# Patient Record
Sex: FEMALE | Race: BLACK OR AFRICAN AMERICAN | NOT HISPANIC OR LATINO | Employment: UNEMPLOYED | URBAN - METROPOLITAN AREA
[De-identification: names, ages, dates, MRNs, and addresses within clinical notes are randomized per-mention and may not be internally consistent; named-entity substitution may affect disease eponyms.]

---

## 2017-02-16 ENCOUNTER — TELEPHONE (OUTPATIENT)
Dept: BEHAVIORAL HEALTH | Facility: CLINIC | Age: 37
End: 2017-02-16

## 2017-02-16 ENCOUNTER — HOSPITAL ENCOUNTER (INPATIENT)
Facility: CLINIC | Age: 37
LOS: 1 days | Discharge: HOME | DRG: 885 | End: 2017-02-17
Attending: PSYCHIATRY & NEUROLOGY | Admitting: PSYCHIATRY & NEUROLOGY
Payer: COMMERCIAL

## 2017-02-16 DIAGNOSIS — F25.1 SCHIZOAFFECTIVE DISORDER, DEPRESSIVE TYPE (H): Primary | ICD-10-CM

## 2017-02-16 DIAGNOSIS — R45.851 SUICIDAL IDEATION: ICD-10-CM

## 2017-02-16 LAB
AMPHETAMINES UR QL SCN: NORMAL
BARBITURATES UR QL: NORMAL
BENZODIAZ UR QL: NORMAL
CANNABINOIDS UR QL SCN: NORMAL
COCAINE UR QL: NORMAL
ETHANOL UR QL SCN: NORMAL
HCG UR QL: NEGATIVE
OPIATES UR QL SCN: NORMAL

## 2017-02-16 PROCEDURE — 80320 DRUG SCREEN QUANTALCOHOLS: CPT | Performed by: FAMILY MEDICINE

## 2017-02-16 PROCEDURE — 99285 EMERGENCY DEPT VISIT HI MDM: CPT | Performed by: PSYCHIATRY & NEUROLOGY

## 2017-02-16 PROCEDURE — 81025 URINE PREGNANCY TEST: CPT | Performed by: FAMILY MEDICINE

## 2017-02-16 PROCEDURE — 12400001 ZZH R&B MH UMMC

## 2017-02-16 PROCEDURE — 80307 DRUG TEST PRSMV CHEM ANLYZR: CPT | Performed by: FAMILY MEDICINE

## 2017-02-16 PROCEDURE — 99285 EMERGENCY DEPT VISIT HI MDM: CPT | Mod: Z6 | Performed by: PSYCHIATRY & NEUROLOGY

## 2017-02-16 RX ORDER — OLANZAPINE 10 MG/1
10 TABLET ORAL
Status: DISCONTINUED | OUTPATIENT
Start: 2017-02-16 | End: 2017-02-17 | Stop reason: HOSPADM

## 2017-02-16 RX ORDER — ALUMINA, MAGNESIA, AND SIMETHICONE 2400; 2400; 240 MG/30ML; MG/30ML; MG/30ML
30 SUSPENSION ORAL EVERY 4 HOURS PRN
Status: DISCONTINUED | OUTPATIENT
Start: 2017-02-16 | End: 2017-02-17 | Stop reason: HOSPADM

## 2017-02-16 RX ORDER — HYDROXYZINE HYDROCHLORIDE 25 MG/1
25-50 TABLET, FILM COATED ORAL EVERY 4 HOURS PRN
Status: DISCONTINUED | OUTPATIENT
Start: 2017-02-16 | End: 2017-02-17 | Stop reason: HOSPADM

## 2017-02-16 RX ORDER — OLANZAPINE 10 MG/2ML
10 INJECTION, POWDER, FOR SOLUTION INTRAMUSCULAR
Status: DISCONTINUED | OUTPATIENT
Start: 2017-02-16 | End: 2017-02-17 | Stop reason: HOSPADM

## 2017-02-16 RX ORDER — IBUPROFEN 600 MG/1
600 TABLET, FILM COATED ORAL EVERY 6 HOURS PRN
Status: DISCONTINUED | OUTPATIENT
Start: 2017-02-16 | End: 2017-02-17 | Stop reason: HOSPADM

## 2017-02-16 RX ORDER — DIPHENHYDRAMINE HCL 25 MG
25-50 CAPSULE ORAL DAILY PRN
Status: DISCONTINUED | OUTPATIENT
Start: 2017-02-16 | End: 2017-02-17 | Stop reason: HOSPADM

## 2017-02-16 RX ORDER — ACETAMINOPHEN 325 MG/1
650 TABLET ORAL EVERY 4 HOURS PRN
Status: DISCONTINUED | OUTPATIENT
Start: 2017-02-16 | End: 2017-02-17 | Stop reason: HOSPADM

## 2017-02-16 RX ORDER — TRAZODONE HYDROCHLORIDE 50 MG/1
50 TABLET, FILM COATED ORAL
Status: DISCONTINUED | OUTPATIENT
Start: 2017-02-16 | End: 2017-02-17 | Stop reason: HOSPADM

## 2017-02-16 RX ORDER — BISACODYL 10 MG
10 SUPPOSITORY, RECTAL RECTAL DAILY PRN
Status: DISCONTINUED | OUTPATIENT
Start: 2017-02-16 | End: 2017-02-17 | Stop reason: HOSPADM

## 2017-02-16 ASSESSMENT — ENCOUNTER SYMPTOMS
HALLUCINATIONS: 1
SLEEP DISTURBANCE: 1
RESPIRATORY NEGATIVE: 1
DECREASED CONCENTRATION: 1
ENDOCRINE NEGATIVE: 1
CARDIOVASCULAR NEGATIVE: 1
DYSPHORIC MOOD: 0
MUSCULOSKELETAL NEGATIVE: 1
HYPERACTIVE: 0
GASTROINTESTINAL NEGATIVE: 1
NERVOUS/ANXIOUS: 1
EYES NEGATIVE: 1
CONSTITUTIONAL NEGATIVE: 1
NEUROLOGICAL NEGATIVE: 1

## 2017-02-16 ASSESSMENT — ACTIVITIES OF DAILY LIVING (ADL)
TOILETING: 0-->INDEPENDENT
DRESS: 0-->INDEPENDENT
SWALLOWING: 0-->SWALLOWS FOODS/LIQUIDS WITHOUT DIFFICULTY
RETIRED_EATING: 0-->INDEPENDENT
COGNITION: 0 - NO COGNITION ISSUES REPORTED
FALL_HISTORY_WITHIN_LAST_SIX_MONTHS: NO
BATHING: 0-->INDEPENDENT
TRANSFERRING: 0-->INDEPENDENT
RETIRED_COMMUNICATION: 0-->UNDERSTANDS/COMMUNICATES WITHOUT DIFFICULTY
AMBULATION: 0-->INDEPENDENT

## 2017-02-16 NOTE — PHARMACY-ADMISSION MEDICATION HISTORY
Admission Medication History status for the 2/16/2017 admission is complete.  See EPIC admission navigator for Prior to Admission medications.    Medication history sources: Patient     Medication history source reliability: Good; patient knew her medications well. Confirmed paliperidone injection with ACT team since dose seemed higher than expected based on oral paliperidone dose when discharged 11/2016. ACT team confirmed she had been on 156 mg, but had been increased to 234 mg one to two months ago.    Medication adherence:  Good; patient reports she takes her medications regularly    Changes made to PTA medication list (reason)  Added:   - Diphenhydramine 25-50 mg po daily PRN EPS per patient  Deleted:   - Ferrous sulfate; patient reports she stopped taking  Changed:   - Paliperidone Palmitate 234 mg IM q28 days per patient and ACT team (updated dose and frequency). Patient last received 2/6/2016.    Additional medication history information (including reliability of information, actions taken by pharmacist): None    Time spent in this activity: 20 minutes    Medication history completed by: Andie James PharmD    Prior to Admission medications    Medication Sig Last Dose Taking? Auth Provider   Paliperidone Palmitate (INVEGA SUSTENNA IM) Inject 234 mg into the muscle every 28 days  2/6/2017 Yes Reported, Patient   FLUoxetine HCl (PROZAC PO) Take 20 mg by mouth daily 2/15/2017 Yes Reported, Patient   DiphenhydrAMINE HCl (BENADRYL PO) Take 25 mg by mouth daily as needed (EPS) 2/15/2017 Yes Unknown, Entered By History   Prenatal Vit-Fe Fumarate-FA (PRENATAL MULTIVITAMIN  PLUS IRON) 27-0.8 MG TABS per tablet Take 1 tablet by mouth daily 2/15/2017 Yes Ashok Perry MD   Cholecalciferol (VITAMIN D) 2000 UNITS tablet Take 2,000 Units by mouth daily 2/15/2017 Yes Rossana Lomeli MD   ibuprofen (ADVIL,MOTRIN) 600 MG tablet Take 1 tablet (600 mg) by mouth every 6 hours as needed for moderate pain  Yes  Patricia Mcduffie MD

## 2017-02-16 NOTE — PROGRESS NOTES
BELONGING:    In locker   [place] wic program vendors   Phone LG  Wic packaged in zip lock bag   Wallet   [facility] health card program (5)  Medica card (7)  Smilesafe (5)                  ADMISSION:  I am responsible for any personal items that are not sent to the safe or pharmacy. [facility] is not responsible for loss, theft or damage of any property in my possession.    Patient Signature _____________________ Date/Time _____________________    Staff Signature _______________________ Date/Time _____________________    2nd Staff person, if patient is unable/unwilling to sign  ___________________________________ Date/Time _____________________    DISCHARGE:  My personal items have been returned to me.   Patient Signature _____________________ Date/Time _____________________

## 2017-02-16 NOTE — IP AVS SNAPSHOT
MRN:[id number]                      After Visit Summary   2/16/2017    Nona Finley    MRN: [id number]           Thank you!     Thank you for choosing [facility] for your care. Our goal is always to provide you with excellent care.        Patient Information     Date Of Birth          1980        About your hospital stay     You were admitted on:  February 16, 2017 You last received care in the:  [facility]    You were discharged on:  February 17, 2017       Who to Call     For medical emergencies, please call 911.  For non-urgent questions about your medical care, please call your primary care provider or clinic, [phone]          Attending Provider     Provider Specialty    Serg Chauhan MD Psychiatry    Olga Nazario MD Psychiatry    Pavey, Nathan Watson MD Psychiatry       Primary Care Provider Office Phone # Fax #    Vanessa Thompson [phone]       [facility] [address] [place] [address]        Further instructions from your care team       Behavioral Discharge Planning and Instructions      Summary:  You were admitted on 2/16/2017 for anxiety and auditory ahllucinations telling you to kill yourself.  You were treated by Dr. Nathan Block MD and discharged on 02/17/2017 from [facility].    Main Diagnosis:  Schizoaffective Disorder     Health Care Follow-up Appointments:   No appointments were set up due to a quick discharge. You will return to the care of your ACT team.    Attend all scheduled appointments with your outpatient providers. Call at least 24 hours in advance if you need to reschedule an appointment to ensure continued access to your outpatient providers.   Major Treatments, Procedures and Findings:  You were provided with: a psychiatric assessment, medication evaluation and/or management and group therapy    Symptoms to Report: feeling more aggressive, increased confusion, losing more sleep, mood getting worse or thoughts of  "suicide    Early warning signs can include: increased depression or anxiety sleep disturbances increased thoughts or behaviors of suicide or self-harm  increased unusual thinking, such as paranoia or hearing voices    Safety and Wellness:  Take all medicines as directed.  Make no changes unless your doctor suggests them. Follow treatment recommendations.  Refrain from alcohol and non-prescribed drugs.  If there is a concern for safety, call 911.    Resources:   Crisis Intervention: 639.467.8145 or 999-846-1811 (TTY: 172.102.1896).  Call anytime for help.  National [place] on Mental Illness (www.mn.doron.org): 385.852.7777 or 341-885-2628.  Suicide Awareness Voices of Education (SAVE) (www.save.org): 860-176-NJJH (4001)  National Suicide Prevention Line (www.mentalhealthmn.org): 494-570-NGWM (0163)  [facility] Crisis (COPE) Response - Adult [phone]  [facility] Crisis Team - Child: [phone]    The treatment team has appreciated the opportunity to work with you. If you have any questions or concerns our unit number is [phone]. You may be receiving a follow-up phone call within the next three days from a representative from behavioral health.  You have identified the best phone number to reach you as [phone].        Pending Results     No orders found for last 3 day(s).            Statement of Approval     Ordered          02/17/17 1331  I have reviewed and agree with all the recommendations and orders detailed in this document.  EFFECTIVE NOW     Approved and electronically signed by:  Nathan Block MD             Admission Information     Date & Time Provider Department Dept. Phone    2/16/2017 Nathan Block MD [facility] [phone]      Your Vitals Were     Blood Pressure Pulse Temperature Respirations Height Weight    116/69 75 97.5  F (36.4  C) 16 1.676 m (5' 6\") 59.9 kg (132 lb)    Last Period Pulse Oximetry BMI (Body Mass Index)             12/16/2016 " 98% 21.31 kg/m2         Statusly Information     Status[facility] gives you secure access to your electronic health record. If you see a primary care provider, you can also send messages to your care team and make appointments. If you have questions, please call your primary care clinic.  If you do not have a primary care provider, please call [phone] and they will assist you.        Care EveryWhere ID     This is your Care EveryWhere ID. This could be used by other organizations to access your [facility] medical records  [id number]           Review of your medicines      START taking        Dose / Directions    OLANZapine 5 MG tablet   Commonly known as:  zyPREXA   Used for:  Schizoaffective disorder, depressive type (H)        Dose:  5 mg   Take 1 tablet (5 mg) by mouth 2 times daily as needed   Quantity:  30 tablet   Refills:  1         CONTINUE these medicines which have NOT CHANGED        Dose / Directions    BENADRYL PO        Dose:  25-50 mg   Take 25-50 mg by mouth daily as needed (EPS)   Refills:  0       ibuprofen 600 MG tablet   Commonly known as:  ADVIL/MOTRIN   Used for:  Delivery normal        Dose:  600 mg   Take 1 tablet (600 mg) by mouth every 6 hours as needed for moderate pain   Quantity:  60 tablet   Refills:  0       INVEGA SUSTENNA IM        Dose:  234 mg   Inject 234 mg into the muscle every 28 days   Refills:  0       prenatal multivitamin  plus iron 27-0.8 MG Tabs per tablet   Used for:  On Depo-Provera for contraception        Dose:  1 tablet   Take 1 tablet by mouth daily   Quantity:  100 tablet   Refills:  3       PROZAC PO        Dose:  20 mg   Take 20 mg by mouth daily   Refills:  0       vitamin D 2000 UNITS tablet   Used for:  Lactating mother        Dose:  2000 Units   Take 2,000 Units by mouth daily   Quantity:  100 tablet   Refills:  3            Where to get your medicines      These medications were sent to [facility] [place], [place] - [address]  [address]  [facility] [address], [facility] [address]     Phone:  [phone]     OLANZapine 5 MG tablet                Protect others around you: Learn how to safely use, store and throw away your medicines at www.disposemymeds.org.             Medication List: This is a list of all your medications and when to take them. Check marks below indicate your daily home schedule. Keep this list as a reference.      Medications           Morning Afternoon Evening Bedtime As Needed    BENADRYL PO   Take 25-50 mg by mouth daily as needed (EPS)                                ibuprofen 600 MG tablet   Commonly known as:  ADVIL/MOTRIN   Take 1 tablet (600 mg) by mouth every 6 hours as needed for moderate pain                                INVEGA SUSTENNA IM   Inject 234 mg into the muscle every 28 days                                OLANZapine 5 MG tablet   Commonly known as:  zyPREXA   Take 1 tablet (5 mg) by mouth 2 times daily as needed                                prenatal multivitamin  plus iron 27-0.8 MG Tabs per tablet   Take 1 tablet by mouth daily                                PROZAC PO   Take 20 mg by mouth daily   Last time this was given:  20 mg on 2/17/2017  8:35 AM                                vitamin D 2000 UNITS tablet   Take 2,000 Units by mouth daily   Last time this was given:  2,000 Units on 2/17/2017  8:35 AM

## 2017-02-16 NOTE — IP AVS SNAPSHOT
30    [address]    [place] [address]    Phone:  [phone]                                       After Visit Summary   2/16/2017    Nona Finley    MRN: [id number]           After Visit Summary Signature Page     I have received my discharge instructions, and my questions have been answered. I have discussed any challenges I see with this plan with the nurse or doctor.    ..........................................................................................................................................  Patient/Patient Representative Signature      ..........................................................................................................................................  Patient Representative Print Name and Relationship to Patient    ..................................................               ................................................  Date                                            Time    ..........................................................................................................................................  Reviewed by Signature/Title    ...................................................              ..............................................  Date                                                            Time

## 2017-02-16 NOTE — TELEPHONE ENCOUNTER
"S: Per Karrie in [facility] see Dr. Chauhan's ED note for need for admit  B: Tien's notes reason for admit: Suicidal.  called 911 after patient had taken off clothes and was running around, per PD. told PD she wanted to jump in the river. Just had baby 5 months ago. Per patient \"I called 911 telling them I was going to kill myself\". SI since \"waking up\". This has happened before and \"I don't know what happens, last time right after baby born and I tried to walk in the river\". Nona Finley is a 36 year old female who is here sent in from her ACT team. Patient has schizoaffective disorder. She had decompensated with post-partum onset. She was last hospitalized 4 months ago when her son was born. She currently receives an Invega Sustenna injection and had one on 2/6/17. Patient reports missing her period last month. She wonders if she could be pregnant. She woke up this morning at 2 AM with command voices to go kill herself. She was planning to walking/jumping into the river. She denies drug abuse. She has no acute medical concerns. She is voluntary for admission. Per pt's nurse Rossana reports pt is cooperative and pleasant. Pt is K[place]gy[place], speaks fluent English, no interpretor needed.  A: Depressed, anxious, SI, blunt affect, command hallucinations to jump into the river, impulsive, needing admit for safety and stabilization.  R: Admit to 30 under Magno Nazario accepted.  "

## 2017-02-16 NOTE — TELEPHONE ENCOUNTER
"S: Per Karrie in [facility] see Dr. Chauhan's ED note for need for admit  B: Tien's notes reason for admit: Suicidal.  called 911 after patient had taken off clothes and was running around, per PD. told PD she wanted to jump in the river. Just had baby 5 months ago. Per patient \"I called 911 telling them I was going to kill myself\". SI since \"waking up\". This has happened before and \"I don't know what happens, last time right after baby born and I tried to walk in the river\". Nona Finley is a 36 year old female who is here sent in from her ACT team. Patient has schizoaffective disorder. She had decompensated with post-partum onset. She was last hospitalized 4 months ago when her son was born. She currently receives an Invega Sustenna injection and had one on 2/6/17. Patient reports missing her period last month. She wonders if she could be pregnant. She woke up this morning at 2 AM with command voices to go kill herself. She was planning to walking/jumping into the river. She denies drug abuse. She has no acute medical concerns. She is voluntary for admission. Per pt's nurse Rossana reports pt is cooperative and pleasant. Pt is [place]an, speaks fluent English, no interpretor needed.  A: Depressed, anxious, SI, blunt affect, command hallucinations to jump into the river, impulsive, needing admit for safety and stabilization.  R:   "

## 2017-02-16 NOTE — ED PROVIDER NOTES
"  History     Chief Complaint   Patient presents with     Suicidal      called 911 after patient had taken off clothes and was running around, per PD. told PD she wanted to jump in the river. Just had baby 5 months ago. Per patient \"I called 911 telling them I was going to kill myself\". SI since \"waking up\". This has happened before and \"I don't know what happens, last time right after baby born and I tried to walk in the river\".      The history is provided by the patient and medical records.     Nona Finley is a 36 year old female who is here sent in from her ACT team. Patient has schizoaffective disorder. She had decompensated with post-partum onset. She was last hospitalized 4 months ago when her son was born. She currently receives an Invega Sustenna injection and had one on 2/6/17. Patient reports missing her period last month. She wonders if she could be pregnant. She woke up this morning at 2 AM with command voices to go kill herself. She was planning to walking/jumping into the river. She denies drug abuse. She has no acute medical concerns. She is voluntary for admission.    I have reviewed the Medications, Allergies, Past Medical and Surgical History, and Social History in the Epic system.    Review of Systems   Constitutional: Negative.    HENT: Negative.    Eyes: Negative.    Respiratory: Negative.    Cardiovascular: Negative.    Gastrointestinal: Negative.    Endocrine: Negative.    Genitourinary: Negative.    Musculoskeletal: Negative.    Skin: Negative.    Neurological: Negative.    Psychiatric/Behavioral: Positive for decreased concentration, hallucinations, sleep disturbance and suicidal ideas. Negative for dysphoric mood. The patient is nervous/anxious. The patient is not hyperactive.    All other systems reviewed and are negative.      Physical Exam   BP: 110/72  Heart Rate: 74  Temp: 97.1  F (36.2  C)  Resp: 16  SpO2: 100 %  Physical Exam   Constitutional: She appears " well-developed and well-nourished.   HENT:   Head: Normocephalic.   Eyes: Pupils are equal, round, and reactive to light.   Neck: Normal range of motion.   Cardiovascular: Normal rate.    Pulmonary/Chest: Effort normal.   Abdominal: Soft.   Musculoskeletal: Normal range of motion.   Neurological: She is alert.   Skin: Skin is warm.   Psychiatric: Her speech is normal and behavior is normal. Her mood appears anxious. Her affect is blunt. She is not agitated, not aggressive, not hyperactive and not combative. Cognition and memory are normal. She expresses impulsivity. She exhibits a depressed mood. She expresses suicidal ideation. She expresses suicidal plans.   Nursing note and vitals reviewed.      ED Course     ED Course     Procedures    Labs Ordered and Resulted from Time of ED Arrival Up to the Time of Departure from the ED - No data to display    Assessments & Plan (with Medical Decision Making)   Patient with schizoaffective disorder who appears to have decompensated and is having command hallucinations to jump into the river. Patient will be referred for admission.    I have reviewed the nursing notes.    I have reviewed the findings, diagnosis, plan and need for follow up with the patient.    New Prescriptions    No medications on file       Final diagnoses:   Schizoaffective disorder, depressive type (H)   Suicidal ideation       2/16/2017   [facility], [place], EMERGENCY DEPARTMENT     Serg Chauhan MD  02/16/17 4085

## 2017-02-16 NOTE — ED NOTES
"States she has a \"mood disturbance\"  Reports seeing things.  Currently seeing crocodiles.  Feels there are drugs in her food and medicine.  Requested food.    "

## 2017-02-17 VITALS
SYSTOLIC BLOOD PRESSURE: 116 MMHG | TEMPERATURE: 97.5 F | RESPIRATION RATE: 16 BRPM | HEART RATE: 75 BPM | BODY MASS INDEX: 21.21 KG/M2 | WEIGHT: 132 LBS | DIASTOLIC BLOOD PRESSURE: 69 MMHG | HEIGHT: 66 IN | OXYGEN SATURATION: 98 %

## 2017-02-17 LAB
ANION GAP SERPL CALCULATED.3IONS-SCNC: 8 MMOL/L (ref 3–14)
BASOPHILS # BLD AUTO: 0 10E9/L (ref 0–0.2)
BASOPHILS NFR BLD AUTO: 0.7 %
BUN SERPL-MCNC: 21 MG/DL (ref 7–30)
CALCIUM SERPL-MCNC: 8.9 MG/DL (ref 8.5–10.1)
CHLORIDE SERPL-SCNC: 108 MMOL/L (ref 94–109)
CO2 SERPL-SCNC: 24 MMOL/L (ref 20–32)
CREAT SERPL-MCNC: 0.54 MG/DL (ref 0.52–1.04)
DIFFERENTIAL METHOD BLD: NORMAL
EOSINOPHIL # BLD AUTO: 0.1 10E9/L (ref 0–0.7)
EOSINOPHIL NFR BLD AUTO: 1.4 %
ERYTHROCYTE [DISTWIDTH] IN BLOOD BY AUTOMATED COUNT: 13.3 % (ref 10–15)
GFR SERPL CREATININE-BSD FRML MDRD: NORMAL ML/MIN/1.7M2
GLUCOSE SERPL-MCNC: 83 MG/DL (ref 70–99)
HCT VFR BLD AUTO: 35.3 % (ref 35–47)
HGB BLD-MCNC: 12.1 G/DL (ref 11.7–15.7)
IMM GRANULOCYTES # BLD: 0 10E9/L (ref 0–0.4)
IMM GRANULOCYTES NFR BLD: 0.2 %
LYMPHOCYTES # BLD AUTO: 1.4 10E9/L (ref 0.8–5.3)
LYMPHOCYTES NFR BLD AUTO: 32.8 %
MCH RBC QN AUTO: 30.6 PG (ref 26.5–33)
MCHC RBC AUTO-ENTMCNC: 34.3 G/DL (ref 31.5–36.5)
MCV RBC AUTO: 89 FL (ref 78–100)
MONOCYTES # BLD AUTO: 0.4 10E9/L (ref 0–1.3)
MONOCYTES NFR BLD AUTO: 9.2 %
NEUTROPHILS # BLD AUTO: 2.4 10E9/L (ref 1.6–8.3)
NEUTROPHILS NFR BLD AUTO: 55.7 %
NRBC # BLD AUTO: 0 10*3/UL
NRBC BLD AUTO-RTO: 0 /100
PLATELET # BLD AUTO: 289 10E9/L (ref 150–450)
POTASSIUM SERPL-SCNC: 4.1 MMOL/L (ref 3.4–5.3)
RBC # BLD AUTO: 3.95 10E12/L (ref 3.8–5.2)
SODIUM SERPL-SCNC: 140 MMOL/L (ref 133–144)
WBC # BLD AUTO: 4.3 10E9/L (ref 4–11)

## 2017-02-17 PROCEDURE — 85025 COMPLETE CBC W/AUTO DIFF WBC: CPT | Performed by: PSYCHIATRY & NEUROLOGY

## 2017-02-17 PROCEDURE — 25000132 ZZH RX MED GY IP 250 OP 250 PS 637: Performed by: PSYCHIATRY & NEUROLOGY

## 2017-02-17 PROCEDURE — 36415 COLL VENOUS BLD VENIPUNCTURE: CPT | Performed by: PSYCHIATRY & NEUROLOGY

## 2017-02-17 PROCEDURE — 80048 BASIC METABOLIC PNL TOTAL CA: CPT | Performed by: PSYCHIATRY & NEUROLOGY

## 2017-02-17 PROCEDURE — H2032 ACTIVITY THERAPY, PER 15 MIN: HCPCS

## 2017-02-17 PROCEDURE — 99235 HOSP IP/OBS SAME DATE MOD 70: CPT | Performed by: PSYCHIATRY & NEUROLOGY

## 2017-02-17 RX ORDER — OLANZAPINE 5 MG/1
5 TABLET ORAL 2 TIMES DAILY PRN
Qty: 30 TABLET | Refills: 1 | Status: ON HOLD | OUTPATIENT
Start: 2017-02-17 | End: 2017-04-14

## 2017-02-17 RX ADMIN — VITAMIN D, TAB 1000IU (100/BT) 2000 UNITS: 25 TAB at 08:35

## 2017-02-17 RX ADMIN — FLUOXETINE 20 MG: 20 CAPSULE ORAL at 08:35

## 2017-02-17 NOTE — PLAN OF CARE
Problem: Depressive Symptoms  Goal: Depressive Symptoms  Signs and symptoms of listed problems will be absent or manageable.   Outcome: Adequate for Discharge Date Met:  02/17/17  Patient denies hallucinations and suicidal ideation. Patient states that she feels ready to go home. Patient misses his family, and all her children. Patient is attentive to her discharge plan and understands the use of her prn medication, Zyprexa. Patient is calling to have transport home.

## 2017-02-17 NOTE — PLAN OF CARE
Problem: Depressive Symptoms  Goal: Depressive Symptoms  Signs and symptoms of listed problems will be absent or manageable.  35 yo female admitted on a voluntary basis. She was having auditory hallucinations to kill herself. She was sleeping when I approached her for the interview. She was polite and cooperative. She states she will be better if she takes her medications and she does enjoy taking care of her children. She had good eye contact and insight.

## 2017-02-17 NOTE — DISCHARGE INSTRUCTIONS
Behavioral Discharge Planning and Instructions      Summary:  You were admitted on 2/16/2017 for anxiety and auditory ahllucinations telling you to kill yourself.  You were treated by Dr. Nathan Block MD and discharged on 02/17/2017 from [facility].    Main Diagnosis:  Schizoaffective Disorder     Health Care Follow-up Appointments:   No appointments were set up due to a quick discharge. You will return to the care of your ACT team.    Attend all scheduled appointments with your outpatient providers. Call at least 24 hours in advance if you need to reschedule an appointment to ensure continued access to your outpatient providers.   Major Treatments, Procedures and Findings:  You were provided with: a psychiatric assessment, medication evaluation and/or management and group therapy    Symptoms to Report: feeling more aggressive, increased confusion, losing more sleep, mood getting worse or thoughts of suicide    Early warning signs can include: increased depression or anxiety sleep disturbances increased thoughts or behaviors of suicide or self-harm  increased unusual thinking, such as paranoia or hearing voices    Safety and Wellness:  Take all medicines as directed.  Make no changes unless your doctor suggests them. Follow treatment recommendations.  Refrain from alcohol and non-prescribed drugs.  If there is a concern for safety, call 911.    Resources:   Crisis Intervention: 766.936.4293 or 614-614-4647 (TTY: 361.199.9113).  Call anytime for help.  National [place] on Mental Illness (www.mn.doron.org): 357.326.9503 or 360-913-9130.  Suicide Awareness Voices of Education (SAVE) (www.save.org): 204-467-DVTC (1083)  National Suicide Prevention Line (www.mentalhealthmn.org): 801-811-QEEL (8054)  [facility] Crisis (COPE) Response - Adult [phone]  [facility] Crisis Team - Child: [phone]    The treatment team has appreciated the opportunity to work with you. If you have any  questions or concerns our unit number is [phone]. You may be receiving a follow-up phone call within the next three days from a representative from behavioral health.  You have identified the best phone number to reach you as [phone].

## 2017-02-17 NOTE — PLAN OF CARE
Problem: General Plan of Care (Inpatient Behavioral)  Goal: Team Discussion  Team Plan:   BEHAVIORAL TEAM DISCUSSION     Continued Stay Criteria/Rationale:New pt admitted for auditory halluciantions telling her to kill herself  Plan: Provide a safe environment and therapeutic milieu. Encourage participation in unit programming. Develop appropriate aftercare plan.  Participants: Dr. Nathan Block MD, Geovanna Hyman [facility]  Summary/Recommendation: Pt is requesting discharge, stating her symptoms have ceased  Medical/Physical: defer to medical  Progress: Improved. Pt will discharge today.

## 2017-02-17 NOTE — PROGRESS NOTES
BELONGING:    In locker   [place] wic program vendors   Phone LG  Wic package in zip lock bag  Wallet   Med[employer] card (7)  [place] health card program   Smilesafe card (5)   [employer] card   $2 bill  Bettina Tara (2)  Medela lotion  Lipgloss   Key   Small clear Eyebrow sharpener   $12.94 in change   $1.25 in change (foreign money)  Necklaces  Small locker key   White phone    Black and gold scarf   Black and white dress   Black medium length coat   Black  boats     Security   [place] 's license (5)  [facility] card (2)  [place] 's license     ADMISSION:  I am responsible for any personal items that are not sent to the safe or pharmacy. [facility] is not responsible for loss, theft or damage of any property in my possession.    Patient Signature _____________________ Date/Time _____________________    Staff Signature _______________________ Date/Time _____________________    2nd Staff person, if patient is unable/unwilling to sign  ___________________________________ Date/Time _____________________    DISCHARGE:  My personal items have been returned to me.   Patient Signature _____________________ Date/Time _____________________

## 2017-02-17 NOTE — PLAN OF CARE
Problem: Depressive Symptoms  Intervention: Social and Therapeutic Interv (Depressive Symptoms)        Occupational Therapy:  Pt has yet to attend OT-facilitated group sessions.  Plan:  Pt will be encouraged to attend groups and be provided a self assessment form.  OT staff will explain the value of OT including pt in her treatment plan and offer options to meet her needs, identify goals, and implement positive coping skills.

## 2017-02-17 NOTE — PLAN OF CARE
Problem: General Plan of Care (Inpatient Behavioral)  Goal: Individualization/Patient Specific Goal (IP Behavioral)  The patient and/or their representative will achieve their patient-specific goals related to the plan of care.    The patient-specific goals include: Illness Management Recovery model: Objectives    Patient will identify reason(s) for hospitalization from their perspective.  Patient will identify a minimum of three goals for discharge.  Patient will identify a minimum of three triggers that may increase their symptoms.  Patient will identify a minimum of three coping skills they can do to stay well.   Patient will identify their support system to demonstrate readiness for discharge.      The patient completed the reasons for admit and goals for discharge in the personal plan of care.   Reasons for admit:  1)  I was hearing voices telling me to kill myself  2)  The voices told me to go to river and jump off bridge  3)  Anxiety  4)  ACT team recommended I come     Goals for discharge:  1)  The voices stopped today  2)  Anxiety is gone

## 2017-02-17 NOTE — PROGRESS NOTES
Initial Psychosocial Assessment     I have reviewed the chart, met with the patient, and developed Care Plan. Information for assessment was obtained from: pt and chart review     Presenting Problem:  Patient admitted voluntarily due to command voices telling her to kill herself with a plan to walk or jump into the river. She was sent here by her ACT team. Pt was last hospitalized here 4 months ago for postpartum symptoms when her daughter was born.     History of Mental Health and Chemical Dependency:  Hx of bipolar disorder, severe with psychotic behavior, schizoaffective disorder, postpartum depression with psychotic features. Hx of postpartum symptoms after her delivery in 2011; but symptoms were worse then. Hx of commitment and Reyes in 2012. 6 previous hospitalizations; first being in 10/2011 and last one 11/2016. Hx of ECT in 2012.      Pt denies drug use.      Family Description (Constellation, Family Psychiatric History):  Pt .  recently graduated from medical school in the [facility] [facility]. He was gone for 4 years, came back to [place] in May 2016.  Has 6 children. Oldest child is 12 (has autism) and youngest child is 5 months old (born 10/21/16). Pt also reports that her 7 year-old has seizures. Pt came to the  in 2001 from [place] [place]. Pt reports her brother In [place] [place] has mental illness with similar symptoms as hers. Several relatives have mental illness.     Significant Life Events (Illness, Abuse, Trauma, Death):  None reported.     Living Situation:  Lives with her  and children. Owns a house.      Educational Background:  5th grade     Occupational History:  None. 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      Financial Status:  Stable.      Legal Issues:  none     Ethnic/Cultural Issues:  Somalian, pt reports no issues     Spiritual Orientation:  Muslium      Service History:  none     Social Functioning (organization, interests):  Pt reports that  her  is her biggest support. Her 's mother takes care of the children while she is in the hospital. Pt reports she has no friends. She mainly takes care of her kids.     Current Treatment Providers are:  ACT team through [facility] visit pt daily.     Social Service Assessment/Plan:  Patient will have psychiatric assessment and medication management by the psychiatrist. Medications will be reviewed and adjusted per MD as indicated. The treatment team will continue to assess and stabilize the patient's mental health symptoms with the use of medications and therapeutic programming. Hospital staff will provide a safe environment and a therapeutic milieu. Staff will continue to assess patient as needed. Patient will participate in unit groups and activities. Patient will receive individual and group support on the unit.  CTC will do individual inpatient treatment planning and after care planning. CTC will discuss options for increasing community supports with the patient. CTC will coordinate with outpatient providers and will place referrals to ensure appropriate follow up care is in place.

## 2017-02-17 NOTE — H&P
"PSYCHIATRIC HISTORY AND PHYSICAL       DATE OF ADMISSION:  02/16/2017        DATE OF SERVICE:  02/17/2017      CHIEF COMPLAINT:  \"I'm ready to go home.\"      HISTORY OF PRESENT ILLNESS:  Nona Finley is a 36-year-old woman who initially presented to the Emergency Department after she called her ACT team, and then 911 was called because she was experiencing suicidal thoughts.  She woke up in the morning with command hallucinations to walk into the river.  She apparently had a similar problem and did start walking into the river in 11/2016.  She says other than that, she has never had a suicide attempt.  Command hallucinations have never told her to harm anybody else.  She states that she has not had any voices since 6:00 p.m. last night.  She is no longer feeling suicidal and very eager to get home.  She is agreeable to going home with Zyprexa as an as-needed oral medication to help and she plans to follow up with her outpatient providers.      PAST PSYCHIATRIC HISTORY:  The patient's last hospitalization was in November for about 4 days when she was changed from Zyprexa to Invega and again she had a similar presentation and discharged.      PAST MEDICAL HISTORY:  No chronic or acute medical issues.      SUBSTANCE HISTORY:  The patient denies any substance use including tobacco, alcohol or illicit drugs.      PHYSICAL REVIEW OF SYSTEMS:  The patient denies any problems on 10-point review of systems except as noted in HPI.      FAMILY HISTORY:  Brother with unkown mental illness.  No suicides in family members.      SOCIAL HISTORY:  The patient currently lives at home with , children and mother-in-law.   works throughout the week.  She has 6 children.      ALLERGIES:  No known drug allergies.      PRIOR TO ADMISSION MEDICATIONS:   1.  Vitamin D.   2.  Benadryl as needed for extrapyramidal symptoms.   3.  Fluoxetine 20 mg daily.   4.  Ibuprofen 600 mg every 6 hours as needed.  She uses this for headaches. "   5.  Paliperidone long-acting injection 234 mg every 28 days.  Most recent injection was on 02/06/2017.     6.  Prenatal vitamin.      LABORATORY DATA:  Basic metabolic panel within normal limits.  Urine pregnancy is negative.  Glucose is 83.  CBC with differential within normal limits.  Urine toxicology is negative for amphetamines, cocaine, opiates, cannabinoids, barbiturates, benzodiazepines and alcohol.      VITAL SIGNS:  Temperature 97.5, pulse is 75, respiratory rate is 16, blood pressure is 116/69, oxygen saturation 98% on room air.      PHYSICAL EXAMINATION:  I reviewed physical exam as documented by emergency room physician, Serg Chauhan, dated 02/16/2017.  I have no additional findings at this time.      MENTAL STATUS EXAMINATION:  The patient is awake, alert, oriented to person, place and date.  She is casually dressed.  She is cooperative throughout the interview.  She has good eye contact.  One eye is divergent throughout the interview.  Speech is normal in rate and volume.  Language is intact for word usage and sentence structure.  Mood is euthymic.  Affect is congruent to mood, somewhat blunted.  She has no psychomotor agitation or retardation.  Muscle strength and tone and gait and station are within normal limits.  Thought process is linear and goal oriented.  Associations are intact.  Thought content is currently negative for suicidal thoughts, homicidal thoughts, no signs of psychosis, and patient denies hallucinations.  Recent and remote memory are intact.  Fund of knowledge is adequate.  Attention and concentration are intact.  Insight is fair, judgment is fair.      DIAGNOSIS:  Schizoaffective disorder, depressive type.      HOSPITAL COURSE:   The patient reports that her auditory hallucinations subsided about 6:00 p.m. last night.  She has not had any since and she is asking to leave.  She has demonstrated ability to utilize resources available to maintain her safety during prior to past  admissions and no time has she ever had any thoughts to harm anyone else.  She has not had a suicide attempt other than the possible issue of her last admission, but again she was able to alert someone and brought into the hospital safely.  During her last admission, she was in the hospital for several days, not long enough for the medications to have full effect; however, she was discharged with good results.  At this time because of her ability to keep herself safe, I do not feel as though further involuntary hospitalization is warranted.  I provided encouragement to possibly stay in the hospital for another night just to make sure that her symptoms had subsided completely; however, she was not agreeable to this.  She did indicate her safety plan including calling her crisis team or 911 should she have any return in her suicidal thoughts and plan to return if that happened.  She will continue to follow up with her outpatient provider.      PLAN:     1.  I have ordered the patient Zyprexa 5 mg twice a day as needed for hallucinations.  She agreed to have this medication.  I gave her 30 tabs with a refill.     2.  The patient will continue with her previous outpatient appointments.         ANNETTE GILBERT MD             D: 2017 14:17   T: 2017 14:56   MT: TONY      Name:     BHAVIK CHRIS   MRN:      [id number]        Account:      [id number]   :      1980           Admitted:     [id number]      Document: [id number]

## 2017-02-19 ENCOUNTER — HOSPITAL ENCOUNTER (INPATIENT)
Facility: CLINIC | Age: 37
LOS: 16 days | Discharge: HOME | DRG: 885 | End: 2017-03-07
Attending: PSYCHIATRY & NEUROLOGY | Admitting: PSYCHIATRY & NEUROLOGY
Payer: COMMERCIAL

## 2017-02-19 DIAGNOSIS — F25.1 SCHIZOAFFECTIVE DISORDER, DEPRESSIVE TYPE (H): ICD-10-CM

## 2017-02-19 PROCEDURE — 25000132 ZZH RX MED GY IP 250 OP 250 PS 637: Performed by: STUDENT IN AN ORGANIZED HEALTH CARE EDUCATION/TRAINING PROGRAM

## 2017-02-19 PROCEDURE — 99285 EMERGENCY DEPT VISIT HI MDM: CPT | Mod: Z6 | Performed by: PSYCHIATRY & NEUROLOGY

## 2017-02-19 PROCEDURE — 80307 DRUG TEST PRSMV CHEM ANLYZR: CPT | Performed by: PSYCHIATRY & NEUROLOGY

## 2017-02-19 PROCEDURE — 99285 EMERGENCY DEPT VISIT HI MDM: CPT | Mod: 25 | Performed by: PSYCHIATRY & NEUROLOGY

## 2017-02-19 PROCEDURE — 12400001 ZZH R&B MH UMMC

## 2017-02-19 PROCEDURE — 81025 URINE PREGNANCY TEST: CPT | Performed by: PSYCHIATRY & NEUROLOGY

## 2017-02-19 PROCEDURE — 80320 DRUG SCREEN QUANTALCOHOLS: CPT | Performed by: PSYCHIATRY & NEUROLOGY

## 2017-02-19 PROCEDURE — 90791 PSYCH DIAGNOSTIC EVALUATION: CPT

## 2017-02-19 RX ORDER — ACETAMINOPHEN 325 MG/1
650 TABLET ORAL EVERY 4 HOURS PRN
Status: DISCONTINUED | OUTPATIENT
Start: 2017-02-19 | End: 2017-03-07 | Stop reason: HOSPADM

## 2017-02-19 RX ORDER — PRENATAL VIT/IRON FUM/FOLIC AC 27MG-0.8MG
1 TABLET ORAL DAILY
Status: DISCONTINUED | OUTPATIENT
Start: 2017-02-20 | End: 2017-03-07 | Stop reason: HOSPADM

## 2017-02-19 RX ORDER — OLANZAPINE 10 MG/2ML
10 INJECTION, POWDER, FOR SOLUTION INTRAMUSCULAR
Status: DISCONTINUED | OUTPATIENT
Start: 2017-02-19 | End: 2017-03-07 | Stop reason: HOSPADM

## 2017-02-19 RX ORDER — DIPHENHYDRAMINE HCL 25 MG
25-50 CAPSULE ORAL DAILY PRN
Status: DISCONTINUED | OUTPATIENT
Start: 2017-02-19 | End: 2017-03-07 | Stop reason: HOSPADM

## 2017-02-19 RX ORDER — TRAZODONE HYDROCHLORIDE 50 MG/1
50 TABLET, FILM COATED ORAL
Status: DISCONTINUED | OUTPATIENT
Start: 2017-02-19 | End: 2017-03-07 | Stop reason: HOSPADM

## 2017-02-19 RX ORDER — ALUMINA, MAGNESIA, AND SIMETHICONE 2400; 2400; 240 MG/30ML; MG/30ML; MG/30ML
30 SUSPENSION ORAL EVERY 4 HOURS PRN
Status: DISCONTINUED | OUTPATIENT
Start: 2017-02-19 | End: 2017-03-07 | Stop reason: HOSPADM

## 2017-02-19 RX ORDER — IBUPROFEN 600 MG/1
600 TABLET, FILM COATED ORAL EVERY 6 HOURS PRN
Status: DISCONTINUED | OUTPATIENT
Start: 2017-02-19 | End: 2017-03-07 | Stop reason: HOSPADM

## 2017-02-19 RX ORDER — OLANZAPINE 10 MG/1
10 TABLET ORAL
Status: DISCONTINUED | OUTPATIENT
Start: 2017-02-19 | End: 2017-03-07 | Stop reason: HOSPADM

## 2017-02-19 RX ORDER — OLANZAPINE 5 MG/1
5 TABLET ORAL 2 TIMES DAILY PRN
Status: DISCONTINUED | OUTPATIENT
Start: 2017-02-19 | End: 2017-03-07 | Stop reason: HOSPADM

## 2017-02-19 RX ORDER — LANOLIN ALCOHOL/MO/W.PET/CERES
3 CREAM (GRAM) TOPICAL
Status: DISCONTINUED | OUTPATIENT
Start: 2017-02-19 | End: 2017-03-07 | Stop reason: HOSPADM

## 2017-02-19 RX ADMIN — FLUOXETINE 20 MG: 20 CAPSULE ORAL at 20:22

## 2017-02-19 ASSESSMENT — ACTIVITIES OF DAILY LIVING (ADL)
FALL_HISTORY_WITHIN_LAST_SIX_MONTHS: NO
COGNITION: 0 - NO COGNITION ISSUES REPORTED
AMBULATION: 0-->INDEPENDENT
BATHING: 0-->INDEPENDENT
DRESS: 0-->INDEPENDENT
RETIRED_COMMUNICATION: 0-->UNDERSTANDS/COMMUNICATES WITHOUT DIFFICULTY
SWALLOWING: 0-->SWALLOWS FOODS/LIQUIDS WITHOUT DIFFICULTY
PRIOR_FUNCTIONAL_LEVEL_COMMENT: INDEPENDENT
RETIRED_EATING: 0-->INDEPENDENT
TOILETING: 0-->INDEPENDENT
TRANSFERRING: 0-->INDEPENDENT

## 2017-02-19 ASSESSMENT — ENCOUNTER SYMPTOMS
DIZZINESS: 0
CHEST TIGHTNESS: 0
SHORTNESS OF BREATH: 0
HALLUCINATIONS: 1
BACK PAIN: 0
ABDOMINAL PAIN: 0
NERVOUS/ANXIOUS: 0
DYSPHORIC MOOD: 0
FEVER: 0

## 2017-02-19 NOTE — IP AVS SNAPSHOT
MRN:[id number]                      After Visit Summary   2/19/2017    Nona Finley    MRN: [id number]           Thank you!     Thank you for choosing [facility] for your care. Our goal is always to provide you with excellent care.        Patient Information     Date Of Birth          1980        About your hospital stay     You were admitted on:  February 19, 2017 You last received care in the:   [facility]    You were discharged on:  March 7, 2017       Who to Call     For medical emergencies, please call 911.  For non-urgent questions about your medical care, please call your primary care provider or clinic, [phone]          Attending Provider     Provider Specialty    Volodymyr Santos MD Emergency Medicine    Allison Coombs MD Psychiatry    Danish Levi MD Psychiatry       Primary Care Provider Office Phone # Fax #    Vanessa Thompson [phone]       [facility] [address] [place] [address]        Further instructions from your care team       Behavioral Discharge Planning and Instructions    Summary: You were admitted to [facility] on February 19th after having an increase in psychotic symptoms. You had not been taking your medication as prescribed which made your hallucinations worsen and thoughts of suicide persist to the point of requiring hospitalization. CPS was notified by hospital staff as you had initial concerns regarding the safety of your children while in your care. Since being in the hospital your medications have been assessed and have began to help your thinking to clear. After contacting your ACT team and working with CPS you have been approved to return home with close follow up in place.     Main Diagnosis: Schizoaffective Disorder    Major Treatments, Procedures and Findings: You were seen daily by your mental health team to discuss treatment options and concerns. You had access  to both occupational and therapeutic groups to help promote a safe recovery. Medications were assessed and evaluated for their effectiveness in treating targeted mental health areas that have been dysregulated.     Symptoms to Report: feeling more aggressive, increased confusion, losing more sleep, mood getting worse or thoughts of suicide    Lifestyle Adjustment: Attend all your appointments and take your medications as prescribed. If you are unable to do so notify a member of your treatment team. Adjust your lifestyle to get enough sleep, relaxation, exercise and good nutrition. Continue to develop healthy coping skills to decrease stress and promote a healthy living environment. Abstain from the use of alcohol, illegal drugs or medications that you are not prescribed.     Psychiatry Follow-up:     Dr. Gaviria- Re-Entry Act Team will schedule follow up appointment with provider.  Phone: [phone] Fax: [phone]    Clozaril Instructions:   You have been referred to [facility] for ongoing maintenance of your Clozaril medication. They have been sent your information and will be contacting you. You should follow up and call them if you do not receive a call within 24 hours. Phone [phone]   With this medication you will need weekly lab draws in order for your medication to be filled by a pharmacy. This medication is only distributed 7 days at a time initially.   [facility] will establish with you someone to come to your home to do the lab draw and will have your medication sent to you at home.   [facility] [phone]     Turner IGLESIAS will contact you to schedule follow up assessment for safety   Ph: [phone] Fax: [phone]    Resources:   COPE Community Outreach for Psychiatric Emergencies ([place]) 341.137.1172    Crisis Intervention: 825.836.8140 or 603-209-5861 (TTY: 209.313.3855).  Call anytime for help.  National [place] on Mental Illness (www.[place].doron.org): 467.116.7118 or  "348.438.1600.  Suicide Awareness Voices of Education (SAVE) (www.save.org): 944-358-SWZT (9669)  National Suicide Prevention Line (www.mentalhealthmn.org): 935-133-NUBW (5763)  Mental Health Consumer/Survivor Network of [place] (www.mhcsn.net): 709.911.4668 or 246-908-7260  Mental Health Association of [place] (www.mentalhealth.org): 468.785.9052 or 473-200-8148    General Medication Instructions:   See your medication sheet(s) for instructions.   Take all medicines as directed.  Make no changes unless your doctor suggests them.   Go to all your doctor visits.  Be sure to have all your required lab tests. This way, your medicines can be refilled on time.  Do not use any drugs not prescribed by your doctor.  Avoid alcohol.    The treatment team has appreciated the opportunity to work with you.  We wish you the best in the future.  If you have any questions or concerns our unit number is [phone]        Pending Results     No orders found from 2/17/2017 to 2/20/2017.            Statement of Approval     Ordered          03/07/17 1110  I have reviewed and agree with all the recommendations and orders detailed in this document.  EFFECTIVE NOW     Approved and electronically signed by:  Avery Freeman MD             Admission Information     Date & Time Provider Department Dept. Phone    2/19/2017 Danish Levi MD [facility] [phone]      Your Vitals Were     Blood Pressure Pulse Temperature Respirations Height Weight    95/62 (BP Location: Right arm) 72 97.5  F (36.4  C) (Tympanic) 15 1.676 m (5' 6\") 64 kg (141 lb)    Last Period Pulse Oximetry BMI (Body Mass Index)             12/16/2016 100% 22.76 kg/m2         MyChart Information     [facility] gives you secure access to your electronic health record. If you see a primary care provider, you can also send messages to your care team and make appointments. If you have questions, please call your primary care clinic.  If you do not have a primary care provider, " please call [phone] and they will assist you.        Care EveryWhere ID     This is your Care EveryWhere ID. This could be used by other organizations to access your [facility] medical records  [id number]           Review of your medicines      START taking        Dose / Directions    cloZAPine 25 MG tablet   Commonly known as:  CLOZARIL   Used for:  Schizoaffective disorder, depressive type (H)        Dose:  25 mg   Take 1 tablet (25 mg) by mouth At Bedtime   Quantity:  7 tablet   Refills:  0         CONTINUE these medicines which have NOT CHANGED        Dose / Directions    BENADRYL PO        Dose:  25-50 mg   Take 25-50 mg by mouth daily as needed (EPS)   Refills:  0       ibuprofen 600 MG tablet   Commonly known as:  ADVIL/MOTRIN   Used for:  Delivery normal        Dose:  600 mg   Take 1 tablet (600 mg) by mouth every 6 hours as needed for moderate pain   Quantity:  60 tablet   Refills:  0       INVEGA SUSTENNA IM        Dose:  234 mg   Inject 234 mg into the muscle every 28 days   Refills:  0       OLANZapine 5 MG tablet   Commonly known as:  zyPREXA   Used for:  Schizoaffective disorder, depressive type (H)        Dose:  5 mg   Take 1 tablet (5 mg) by mouth 2 times daily as needed   Quantity:  30 tablet   Refills:  1       prenatal multivitamin  plus iron 27-0.8 MG Tabs per tablet   Used for:  On Depo-Provera for contraception        Dose:  1 tablet   Take 1 tablet by mouth daily   Quantity:  100 tablet   Refills:  3       vitamin D 2000 UNITS tablet   Used for:  Lactating mother        Dose:  2000 Units   Take 2,000 Units by mouth daily   Quantity:  100 tablet   Refills:  3         STOP taking     PROZAC PO                Where to get your medicines      These medications were sent to [facility] [place], [place] - [address]  [address] [address]     Phone:  [phone]     cloZAPine 25 MG tablet                Protect others around you: Learn how to safely  use, store and throw away your medicines at www.disposemymeds.org.             Medication List: This is a list of all your medications and when to take them. Check marks below indicate your daily home schedule. Keep this list as a reference.      Medications           Morning Afternoon Evening Bedtime As Needed    BENADRYL PO   Take 25-50 mg by mouth daily as needed (EPS)                                cloZAPine 25 MG tablet   Commonly known as:  CLOZARIL   Take 1 tablet (25 mg) by mouth At Bedtime   Last time this was given:  25 mg on 3/6/2017  9:03 PM                                ibuprofen 600 MG tablet   Commonly known as:  ADVIL/MOTRIN   Take 1 tablet (600 mg) by mouth every 6 hours as needed for moderate pain   Last time this was given:  600 mg on 3/4/2017  3:35 PM                                INVEGA SUSTENNA IM   Inject 234 mg into the muscle every 28 days   Last time this was given:  234 mg on 3/6/2017  5:37 PM                                OLANZapine 5 MG tablet   Commonly known as:  zyPREXA   Take 1 tablet (5 mg) by mouth 2 times daily as needed   Last time this was given:  10 mg on 2/21/2017  9:03 AM                                prenatal multivitamin  plus iron 27-0.8 MG Tabs per tablet   Take 1 tablet by mouth daily   Last time this was given:  1 tablet on 3/7/2017  8:31 AM                                vitamin D 2000 UNITS tablet   Take 2,000 Units by mouth daily   Last time this was given:  2,000 Units on 3/7/2017  8:30 AM

## 2017-02-19 NOTE — ED PROVIDER NOTES
History     Chief Complaint   Patient presents with     Hallucinations     hearing voices telli ng her to kill her self and family. pt states she held knife to 5 month old daughter today but was stopped by      Suicidal     The history is provided by the patient and medical records.     Nona Finley is a 36 year old female who comes in due to her hearing voices to kill all her kids and herself.  She was walking around this am with a knife.  Her  saw her and took the knife away bringing her here.  She has 6 kids total with her daughter being 5 months old.  She has a history of schizoaffective disorder.  She was hospitalized for voices for one day and then discharged because she was no longer hearing voices nor wanting to hurt herself or her kids.  This was 2 days ago.  She woke up hearing the voices again.  She feels she needs to be in the hospital. She did not take the zyprexa given to her from the hospital.    Please see the 's assessment for further details.    I have reviewed the Medications, Allergies, Past Medical and Surgical History, and Social History in the Epic system.    Review of Systems   Constitutional: Negative for fever.   Eyes: Negative for visual disturbance.   Respiratory: Negative for chest tightness and shortness of breath.    Cardiovascular: Negative for chest pain.   Gastrointestinal: Negative for abdominal pain.   Musculoskeletal: Negative for back pain.   Neurological: Negative for dizziness.   Psychiatric/Behavioral: Positive for hallucinations and suicidal ideas. Negative for dysphoric mood and self-injury. The patient is not nervous/anxious.    All other systems reviewed and are negative.      Physical Exam   BP: 113/66  Pulse: 84  Temp: 98.4  F (36.9  C)  Resp: 14  SpO2: 98 %  Physical Exam   Constitutional: She is oriented to person, place, and time. She appears well-developed and well-nourished.   HENT:   Head: Normocephalic and atraumatic.   Mouth/Throat:  Oropharynx is clear and moist.   Eyes: Pupils are equal, round, and reactive to light.   Neck: Normal range of motion. Neck supple.   Cardiovascular: Normal rate, regular rhythm and normal heart sounds.    Pulmonary/Chest: Effort normal and breath sounds normal.   Abdominal: Soft. Bowel sounds are normal.   Musculoskeletal: Normal range of motion.   Neurological: She is alert and oriented to person, place, and time.   Skin: Skin is warm and dry.   Psychiatric: She has a normal mood and affect. Her speech is normal. Thought content normal. She is actively hallucinating. Thought content is not delusional. Cognition and memory are normal. She expresses inappropriate judgment. She expresses no suicidal ideation. She expresses no suicidal plans and no homicidal plans.   Nona is a 37 y/o female who looks her age.  She is well groomed with good eye contact.   Nursing note and vitals reviewed.      ED Course     ED Course     Procedures               Labs Ordered and Resulted from Time of ED Arrival Up to the Time of Departure from the ED - No data to display    Assessments & Plan (with Medical Decision Making)   Nona will be admitted to the hospital on a 72 hour hold due to her postpartum psychosis with thoughts to kill all her kids and herself.  She will go to station 22 under Dr. Levi.    I have reviewed the nursing notes.    I have reviewed the findings, diagnosis, plan and need for follow up with the patient.    New Prescriptions    No medications on file       Final diagnoses:   Postpartum psychosis   Schizoaffective disorder, depressive type (H)       2/19/2017   [facility], [place], EMERGENCY DEPARTMENT     Volodymyr Santos MD  02/19/17 9317

## 2017-02-19 NOTE — ED NOTES
Pt states that she hears voices telling her to hurt herself and her children. She had a knife and the baby. She states that her meds are not working

## 2017-02-19 NOTE — IP AVS SNAPSHOT
[address] [address]    Phone:  [phone]                                       After Visit Summary   2/19/2017    Nona Finley    MRN: [id number]           After Visit Summary Signature Page     I have received my discharge instructions, and my questions have been answered. I have discussed any challenges I see with this plan with the nurse or doctor.    ..........................................................................................................................................  Patient/Patient Representative Signature      ..........................................................................................................................................  Patient Representative Print Name and Relationship to Patient    ..................................................               ................................................  Date                                            Time    ..........................................................................................................................................  Reviewed by Signature/Title    ...................................................              ..............................................  Date                                                            Time

## 2017-02-20 PROCEDURE — 99223 1ST HOSP IP/OBS HIGH 75: CPT | Mod: GC | Performed by: PSYCHIATRY & NEUROLOGY

## 2017-02-20 PROCEDURE — 25000132 ZZH RX MED GY IP 250 OP 250 PS 637: Performed by: STUDENT IN AN ORGANIZED HEALTH CARE EDUCATION/TRAINING PROGRAM

## 2017-02-20 PROCEDURE — 12400001 ZZH R&B MH UMMC

## 2017-02-20 RX ADMIN — FLUOXETINE 20 MG: 20 CAPSULE ORAL at 08:19

## 2017-02-20 RX ADMIN — ACETAMINOPHEN 650 MG: 325 TABLET, FILM COATED ORAL at 08:23

## 2017-02-20 RX ADMIN — PRENATAL VIT W/ FE FUMARATE-FA TAB 27-0.8 MG 1 TABLET: 27-0.8 TAB at 08:19

## 2017-02-20 RX ADMIN — Medication 2000 UNITS: at 08:19

## 2017-02-20 RX ADMIN — OLANZAPINE 5 MG: 5 TABLET, FILM COATED ORAL at 08:21

## 2017-02-20 ASSESSMENT — ACTIVITIES OF DAILY LIVING (ADL)
DRESS: STREET CLOTHES
GROOMING: INDEPENDENT
LAUNDRY: WITH SUPERVISION
ORAL_HYGIENE: INDEPENDENT
DRESS: STREET CLOTHES;INDEPENDENT
HYGIENE/GROOMING: INDEPENDENT
ORAL_HYGIENE: INDEPENDENT

## 2017-02-20 NOTE — PHARMACY-ADMISSION MEDICATION HISTORY
Admission Medication History status for the 2/19/2017 admission is complete.  See EPIC admission navigator for Prior to Admission medications.    Medication history sources:  Patient, chart review (Pt discharged 2/17/2017 from [facility])    Medication history source reliability: Good; I had reviewed the patient's medications when she was here 2/16/2017. The only change was a new order for olanzapine PRN.    Medication adherence:  Good; patient reports taking her medications regularly    Changes made to PTA medication list (reason)  Added: None  Deleted: None  Changed: None    Additional medication history information (including reliability of information, actions taken by pharmacist):   - Patient received a 234 mg Invega Sustenna injection on 2/6/2016.   - The patient reports she was taking the olanzapine and took 10 mg this morning.     Time spent in this activity: 5 minutes    Medication history completed by: Andie James PharmD    Prior to Admission medications    Medication Sig Last Dose Taking? Auth Provider   OLANZapine (ZYPREXA) 5 MG tablet Take 1 tablet (5 mg) by mouth 2 times daily as needed 2/19/2017 at am - 10 mg Yes Nathan Block MD   Paliperidone Palmitate (INVEGA SUSTENNA IM) Inject 234 mg into the muscle every 28 days  2/6/2017 Yes Reported, Patient   FLUoxetine HCl (PROZAC PO) Take 20 mg by mouth daily 2/18/2017 Yes Reported, Patient   DiphenhydrAMINE HCl (BENADRYL PO) Take 25-50 mg by mouth daily as needed (EPS)   Yes Unknown, Entered By History   Prenatal Vit-Fe Fumarate-FA (PRENATAL MULTIVITAMIN  PLUS IRON) 27-0.8 MG TABS per tablet Take 1 tablet by mouth daily 2/19/2017 Yes Ashok Perry MD   Cholecalciferol (VITAMIN D) 2000 UNITS tablet Take 2,000 Units by mouth daily 2/19/2017 Yes Rossana Lomeli MD   ibuprofen (ADVIL,MOTRIN) 600 MG tablet Take 1 tablet (600 mg) by mouth every 6 hours as needed for moderate pain  Yes Patricia Mcduffie MD

## 2017-02-20 NOTE — PLAN OF CARE
Problem: Depressive Symptoms  Goal: Depressive Symptoms  Signs and symptoms of listed problems will be absent or manageable.  37 yo female admitted on a voluntary basis. She was here a couple of days ago and said she asked to go home and knows now that she went home too soon.  She is cooperative and pleasant with good eye contact. She tells her  that she is going to kill herself and waves a knife around in the kitchen.

## 2017-02-20 NOTE — PROGRESS NOTES
Pt has been mostly isolative in her room today, laying in bed and sleeping. Pt reports hearing voices and seeing people talking to her. Has suicidal thoughts and thoughts of self harm, but has contracted for safety on the unit. Pt feels anxious and became tearful during 1-1.       02/20/17 1305   Behavioral Health   Hallucinations auditory;visual   Thinking intact   Orientation person: oriented;place: oriented;date: oriented;time: oriented   Memory baseline memory   Insight poor   Judgement impaired   Eye Contact at examiner   Affect blunted, flat   Mood anxious;depressed   ADL Assessment (WDL) WDL   Suicidality thoughts only   Self Injury thoughts only   Activity isolative;withdrawn   Speech (WDL) WDL   Psychomotor Gait (WDL) WDL   Psycho Education   Type of Intervention 1:1 intervention   Response participates, initiates socially appropriate   Hours 0.5   Treatment Detail triggers   Activities of Daily Living   Hygiene/Grooming independent   Oral Hygiene independent   Dress street clothes   Room Organization independent

## 2017-02-20 NOTE — PROGRESS NOTES
02/19/17 2052   Valuables   Disposition of Belongings in pt locker   Did you bring any home meds/supplements to the hospital?  Yes   Disposition of meds  Sent to security/pharmacy per site process     Clothing: Boots, dress (x2), long skirt, zip-up sweater, grey leggings, scarf (x2).   Purse  cellphone w/pink case    Lotion (x2)  Wallet containing medical cards and multiple state ID's    Valuables:   Debit cards ([facility] [employer] VISA [id number] and [employer] VISA [id number])  Gold colored necklace with black jewel.   ADMISSION:  I am responsible for any personal items that are not sent to the safe or pharmacy. [facility] is not responsible for loss, theft or damage of any property in my possession.    Patient Signature _____________________ Date/Time _____________________    Staff Signature _______________________ Date/Time _____________________    2nd Staff person, if patient is unable/unwilling to sign  ___________________________________ Date/Time _____________________    DISCHARGE:  My personal items have been returned to me.   Patient Signature _____________________ Date/Time _____________________

## 2017-02-20 NOTE — PROGRESS NOTES
----------------------------------------------------------------------------------------------------------  [facility], [place]   Psychiatric Progress Note     Assessment    Presentation:  Nona Chatterjee is a 37yo [place] female w a h/o Schizoaffective Disorder who was BIB her  after he found her brandishing a knife and stating she is going to kill 6 children and herself with it. She was recently discharged from [facility] for SI 2 days ago after a brief hospitalization. She states that her medications are no longer working for her. Prior to Friday, she had not been hospitalized since October following the birth of her new daughter.  She has 6 children.  She has had good adherence since discharging but the AH simply aren't going away as they have in the past with medications. Her OPP recently increased her long-acting paliperidone to 234mg. Her mood has been good of late, but the 's feeding schedule has been disruptive to her sleep cycle. She does not want to kill her children or herself, but the CAH were overwhelming the morning of admission.    Diagnostic Impression:  Nona Finley is a 36 year old female with h/o Schizoaffective disorder who presented to the [facility] following command hallucinations telling her to kill herself and her children in the context of medication inefficacy, disrupted sleep, and stressful home (6 kids, one of whom has autism). The patient's last psychiatric hospitalization was 2 days prior to this admission. The patient is currently followed by [facility] with psychiatrist Walter Gaviria. Has had ECT at this facility in . Family history is notable for schizophrenia. Current psychosocial stressors include raising multiple children and chronic mental illness. The patient denies drug abuse and self injurious behaviors. The MSE is notable for command hallucinations, SI, and HI. Admission warranted to maintain safety and identify a new  antipsychotic medication for managing her psychotic symptoms.    Hospital course: Nona Finley was admitted to station 22 on a 72-hr hold.  PTA fluoxetine and PRN olanzapine were restarted.  During the initial assessment interview on the first morning of admission the patient endorsed being closely followed by [facility] team and we agreed to speak with her [facility] psychiatrist Dr. Walter Gaviria regarding possible initiation of clozapine.  The possibility of ECT was also discussed, as the patient had received this modality of treatment from this facility in 2012.    Medical course: None.    Plan     Principal Diagnosis: Schizoaffective disorder    Medications:  Paliperidone 234mg [id number] (last on 2/6, next on 3/6)  Fluoxetine 20mg daily  Olanzapine 5mg BID PRN; 10mg PO/IM for behavioral events    Labs: previous admission labs reviewed/wnl; utox/hcg negative  Consults pending: none   Treatment in therapeutic milieu with individual/group therapies as described.     Medical diagnoses to be addressed this admission (Problem-Based): None     Relevant psychosocial stressors: severe including motherhood, chronic MI    Legal Status: 72-h Hold signed on 2/19/2017    Safety Assessment:   Checks: Status 15  Precautions: Suicide  Pt has not required locked seclusion or restraints in the past 24 hours to maintain safety, please refer to RN documentation for further details.    The risks, benefits, alternatives and side effects have been discussed and are understood by the patient and other caregivers.    Anticipated Disposition/Discharge Date: TBD pending stabilization and the formulation of an appropriate discharge plan    Pt seen and discussed with my attending, Danish eLvi MD.      Avery Freeman DO  Resident Psychiatrist, PGY-1  [facility]    Psychiatry Attending Attestation:  This patient has been seen and evaluated by me, Danish Levi M.D.  The patient's condition and treatment plan were discussed with the  resident, and care coordinated with the CTC and RN. I reviewed, edited and agree with the findings and plan in this note.     Danish Levi M.D.   of Psychiatry     Interim History:   The patient's care was discussed with the treatment team and chart notes were reviewed.     Sleep: 7 hrs  PRN'S: Zyprexa 5mg x1, Tylenol 650mg x1  Staff Notes: Cooperative and pleasant, acknowledges that she had gone home too soon from station 30 on 2/17    Interview:  Patient met with the treatment team in the conference room this AM.  She was clear, cooperative and answered questions put to her.   She endorsed hearing and seeing commanding hallucinations instructing her to kill her daughter and herself with a knife.  He  had come home and encountered her in this state.  She stated that she has only acted on command hallucinations once, that being in 2012 when she had begun walking to the river to throw herself in but had been stopped prior to doing so.  She did recall having ECT at this facility with attending Dr. Levi in 2012.  She stated that she lost her memory at the time but it returned.  She stated that when she is well she does not have difficulty taking care of her family.  Her eldest daughter has autism and goes to school during the day and there is a PCA who is approved for 5 hrs in the evening to help the family with this child's care.  Nona did state her feeling that her significant stress in taking care of her family has led to this current decompensation in part.  We discussed clozapine and she agreed to consider this.  We agreed to speak with her ACT team psychiatrist, Walter Gaviria, prior to initiating clozapine.    Review of systems:     The Review of Systems is negative other than noted in the HPI         Medications:     Current Facility-Administered Medications   Medication     cholecalciferol (vitamin D) tablet 2,000 Units     diphenhydrAMINE (BENADRYL) capsule 25-50 mg      "FLUoxetine (PROzac) capsule 20 mg     ibuprofen (ADVIL/MOTRIN) tablet 600 mg     OLANZapine (zyPREXA) tablet 5 mg     [START ON 3/6/2017] paliperidone (INVEGA SUSTENNA) injection SUSP 234 mg     prenatal multivitamin  plus iron per tablet 1 tablet     OLANZapine (zyPREXA) tablet 10 mg    Or     OLANZapine (zyPREXA) injection 10 mg     acetaminophen (TYLENOL) tablet 650 mg     alum & mag hydroxide-simethicone (MYLANTA ES/MAALOX  ES) suspension 30 mL     magnesium hydroxide (MILK OF MAGNESIA) suspension 30 mL     traZODone (DESYREL) tablet 50 mg     melatonin tablet 3 mg             Allergies:   No Known Allergies         Psychiatric Examination:   /69  Pulse 69  Temp 97.7  F (36.5  C) (Tympanic)  Resp 14  Ht 1.676 m (5' 6\")  Wt 62.6 kg (138 lb)  LMP 12/16/2016  SpO2 98%  BMI 22.27 kg/m2  Weight is 138 lbs 0 oz  Body mass index is 22.27 kg/(m^2).    Appearance:  awake, alert and neatly groomed with head covering/Micronesian dress  Attitude:  cooperative  Eye Contact:  good  Mood:  better but worried, sad  Affect:  intensity is blunted and constricted mobility  Speech:  clear, coherent  Psychomotor Behavior:  no evidence of tardive dyskinesia, dystonia, or tics and intact station, gait and muscle tone  Thought Process:  logical and linear  Associations:  no loose associations  Thought Content:  thoughts of self-harm/suicide and homicide  Insight:  good  Judgment:  intact  Oriented to:  time, person, and place  Attention Span and Concentration:  intact  Recent and Remote Memory:  fair  Language: clear English, Micronesian accent  Fund of Knowledge: appropriate  Muscle Strength and Tone: normal  Gait and Station: Normal         Labs:   No results found for this or any previous visit (from the past 24 hour(s)).    Antipsychotic Labs:  Recent Labs   Lab Test  05/09/12   0842  10/27/11   0756   CHOL  206*  180   TRIG  35  69   LDL  139*  105   HDL  60  61     Recent Labs   Lab Test  02/17/17   0737  11/05/16   0759  11/03/16   " 1459  11/05/15   1503   GLC  83  85  70   --    A1C   --    --    --   5.4     Recent Labs   Lab Test  02/17/17   0737  11/05/16   0759  11/03/16   1459   WBC  4.3  4.7  4.7   ANEU  2.4  2.6  2.4   HGB  12.1  13.1  12.4   PLT  289  333  312       [place] Labs:  Recent Labs   Lab Test  09/13/12   0903  06/01/12   0932  05/13/12   0759   LITHIUM  <0.2*  0.9  0.8     Recent Labs   Lab Test  02/17/17   0737  11/05/16   0759  11/03/16   1459   CR  0.54  0.49*  0.48*   GFRESTIMATED  >90  Non  GFR Calc    >90  Non  GFR Calc    >90  Non  GFR Calc     BUN  21  9  14   NA  140  141  144   POTASSIUM  4.1  4.0  3.6   BRIANNA  8.9  9.3  8.9   GLC  83  85  70     Recent Labs   Lab Test  11/05/15   1503   A1C  5.4     Recent Labs   Lab Test  11/05/16   0759  11/03/16   1459  11/05/15   1627  09/13/12   0903   TSH  0.61  0.37*  0.66  0.79   T4   --   1.43   --    --      Recent Labs   Lab Test  02/17/17   0737  11/05/16   0759  11/03/16   1459   WBC  4.3  4.7  4.7   ANEU  2.4  2.6  2.4   HGB  12.1  13.1  12.4   PLT  289  333  312     Recent Labs   Lab Test  11/09/16   1103  03/11/16   1548  10/26/11   1345   SG  1.020  1.025  1.023     No lab results found.    Valproate Labs:  No lab results found.  Recent Labs   Lab Test  11/05/16   0759  11/03/16   1459  11/05/15   1503   09/25/12   0825   AST  22  25  22.3   < >  25   ALT  25  25  12.4   < >  11   ALKPHOS  60  56   --    --   60   BILITOTAL  0.4  0.5   --    --   0.5   PROTTOTAL  8.2  8.0   --    --   7.6   ALBUMIN  3.4  3.4   --    --   3.9    < > = values in this interval not displayed.     Recent Labs   Lab Test  02/17/17   0737  11/05/16   0759  11/03/16   1459   WBC  4.3  4.7  4.7   ANEU  2.4  2.6  2.4   HGB  12.1  13.1  12.4   PLT  289  333  312

## 2017-02-20 NOTE — PROGRESS NOTES
Initial Psychosocial Assessment    I have reviewed the chart, met with the patient, and developed Care Plan.  Information for assessment was obtained from:     Chart: Patient was discharged 2 days prior to admission so information was carried forward    Presenting Problem:  Patient admitted voluntarily due to command voices telling her to kill herself with a plan to walk or jump into the river. She was sent here by her ACT team. Pt was last hospitalized here 4 months ago for postpartum symptoms when her daughter was born.      History of Mental Health and Chemical Dependency:  Hx of bipolar disorder, severe with psychotic behavior, schizoaffective disorder, postpartum depression with psychotic features. Hx of postpartum symptoms after her delivery in 2011; but symptoms were worse then. Hx of commitment and [place] in 2012. 6 previous hospitalizations; first being in 10/2011 and last one 11/2016. Hx of ECT in 2012.       Pt denies drug use.       Family Description (Constellation, Family Psychiatric History):  Pt .  recently graduated from medical school in the [place]. He was gone for 4 years, came back to [place] in May 2016. Has 6 children. Oldest child is 12 (has autism) and youngest child is 5 months old (born 10/21/16). Pt also reports that her 7 year-old has seizures. Pt came to the  in 2001 from [place]. Pt reports her brother In [place] has mental illness with similar symptoms as hers. Several relatives have mental illness.      Significant Life Events (Illness, Abuse, Trauma, Death):  None reported.      Living Situation:  Lives with her  and children. Owns a house.       Educational Background:  5th grade      Occupational History:  None. 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       Financial Status:   supports      Legal Issues:  none      Ethnic/Cultural Issues:  [place], pt reports no issues      Spiritual Orientation:  [employer]  Service History:  none      Social Functioning (organization, interests):  Pt reports that her  is her biggest support. Her 's mother takes care of the children while she is in the hospital. Pt reports she has no friends. She mainly takes care of her kids.      Current Treatment Providers are:  ACT team through [facility] visit pt daily.      Social Service Assessment/Plan:  Patient has been admitted for psychiatric stabilization for this acute crisis. Patient will meet with treatment team including a psychiatrist to have psychiatric assessment and medication management. Team will coordinate with the any outpatient medication providers to review and adjust medications as appropriate. Staff will provide therapeutic programming and structure to help maintain a safe environment for healing. Staff will continue to assess patient as needed. Patient will participate in unit groups and activities. Patient will receive individual and group support on the unit. CTC will coordinate with outpatient providers and will place referrals to ensure appropriate follow up care is in place.

## 2017-02-20 NOTE — H&P
"[facility]  Psychiatric History and Physical    Name: Nona Finley MRN#: [id number]   Age: 36 year old YOB: 1980     Date of Admission: 2017          Contacts:    [facility]  Possibly has an ACT team   Bob Rawls [phone]         Chief Complaint:    \"I'm hearing voices telling me to kill myself and my children.\"    History obtained from the chart and patient. She is a good historian.         History of Present Illness:    Nona Chatterjee is a 35yo Citizen of the [place] female w a h/o Schizoaffective Disorder who was BIB her  after he found her brandishing a knife and stating she is going to kill 6 children and herself with it. She was recently discharged from [facility] for  2 days ago after a brief hospitalization. She states that her medications are no longer working for her. Prior to Friday, she had not been hospitalized since October following the birth of her new daughter. She has 6 childrens. Her experienced her first psychotic symptoms after the birth of her first daughter 12 years ago. She has had good adherence since discharging but the AH simply aren't going away as they have in the past with medications. Her OPP recently increased or long-acting paliperidone to 234mg. Her mood has been good of late, but the 's feeding schedule has been disruptive to her sleep cycle. She does not want to kill her children or herself, but the CAH were overwhelming this morning.         Psychiatric History:    Multiple previous admissions. Currently on paliperidone and PRN olanzapine. Has been on risperidone (ineffective) and olanzapine at higher doses (too sedating). Cannot recall use of other psych meds at this time. Believes her current regimen is no longer working. Olanzapine had been effective, but sedation was too severe.         Substance Use History:    Denies substance use hx, active or historical.         Psychiatric Review of Systems:  "   Depression: negative  Anxiety: negative  Martha: negative  Psychosis: see HPI  PTSD: negative for trauma          Medical Review of Systems:    The 10 point Review of Systems is negative other than noted in the HPI          Past Medical History:    This patient has no significant past medical or surgical history.         Home Medications:      Prior to Admission medications    Medication Sig Last Dose Taking? Auth Provider   OLANZapine (ZYPREXA) 5 MG tablet Take 1 tablet (5 mg) by mouth 2 times daily as needed 2/19/2017 at am - 10 mg Yes Nathan Block MD   Paliperidone Palmitate (INVEGA SUSTENNA IM) Inject 234 mg into the muscle every 28 days  2/6/2017 Yes Reported, Patient   FLUoxetine HCl (PROZAC PO) Take 20 mg by mouth daily 2/18/2017 Yes Reported, Patient   DiphenhydrAMINE HCl (BENADRYL PO) Take 25-50 mg by mouth daily as needed (EPS)   Yes Unknown, Entered By History   Prenatal Vit-Fe Fumarate-FA (PRENATAL MULTIVITAMIN  PLUS IRON) 27-0.8 MG TABS per tablet Take 1 tablet by mouth daily 2/19/2017 Yes Ashok Perry MD   Cholecalciferol (VITAMIN D) 2000 UNITS tablet Take 2,000 Units by mouth daily 2/19/2017 Yes Rossana Lomeli MD   ibuprofen (ADVIL,MOTRIN) 600 MG tablet Take 1 tablet (600 mg) by mouth every 6 hours as needed for moderate pain  Yes Patricia Mcduffie MD          Allergies:    No Known Allergies         Family History:    Reports significant family history of psychotic illness--brother, aunt, others         Social and Sexual Health History:    Lives in [place] with her  and their six children, ages 4mos to 12 years. Eldest has autism. 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  is a graduate of a [employer], currently working as a lab tech. [place] immigrant, came to [place] in [place]. No h/o of trauma. Has not lived in refugee camps.         Lab Results:   Utox negative  HCG negative  CBC, CMP from last admission all wnl         Psychiatric Examination:     Appearance:   "Sedated s/p olanzapine, dressed in ethnic clothing  Psychomotor Behavior:  no evidence of tardive dyskinesia, dystonia, or tics, intact station, gait and muscle tone and physical retardation  Eye Contact:  fair  Attitude:  cooperative  Mood:  \"okay\"  Affect:  intensity is flat, fixed mobility and restricted range  Speech: Soft volume, decreased prosody  Language: English is primary, communicates coherently in conversational context  Thought Process:  linear, no loose associations  Thought Content: active suicidal ideation present, auditory hallucinations present and voices telling her to kill her children  Insight:  good  Judgment:  intact  Oriented to:  time, person, and place  Attention Span and Concentration:  intact  Recent and Remote Memory:  intact  Fund of Knowledge: appropriate         Physical Exam:   Blood pressure 113/66, pulse 84, temperature 98.4  F (36.9  C), temperature source Oral, resp. rate 14, last menstrual period 12/16/2016, SpO2 98 %, not currently breastfeeding.    For physical exam, please see note from ED on day of admission.          Assessment & Plan:    Nona Finley is a 36 year old female with h/o Schizoaffective disorder who presented to the [facility] following command hallucinations telling her to kill herself and her children in the context of medication inefficacy, disrupted sleep, and stressful home (6 kids, one of whom has autism). The patient's last psychiatric hospitalization was 2 days ago.  The patient is currently followed by [facility]. Family history is notable for schizophrenia. Current psychosocial stressors include raising multiple children and chronic mental illness. The patient denies  drug abuse and self injurious behaviors. The MSE is notable for command hallucinations, SI, and HI. Admission warranted to maintain safety and identify a new antipsychotic medication for managing her psychotic symptoms.    Principal Diagnosis: Schizoaffective disorder  --Meds: continue PTA " meds for now, though anticipate primary team may elect to make changes  -Paliperidone 234mg [id number] (last on 2/6, next on 3/6)  -Olanzapine 5mg BID PRN; 10mg PO/IM for behavioral events  -Fluoxetine 20mg daily  --Labs: previous admission labs reviewed/wnl; utox/hcg negative  --Consults pending: none  --Will staff in the AM with Dr. Levi.  --Risks/benefits/alternatives/side effects of the above discussed with, understood by patient   --Trx in therapeutic milieu with individual/group therapies as described.    Medical diagnoses to be addressed this admission (Problem-Based):  None    #Relevant psychosocial stressors: severe including motherhood, chronic MI  #Legal Status: 72hh expiring 11:59PM on 2/22  #Safety Assessment:   Checks: Status 15  Precautions: suicide  Pt has not required locked seclusion or restraints in the past 24 hours to maintain safety, please refer to RN documentation for further details.    #Dispo: TBD pending clinical stabilization and establishment of a safe discharge plan.    Attestation: I have seen and evaluated this patient. She will be staffed in the AM by Dr. Levi.    Attending Admission Attestation Note:    I personally interviewed and examined Nona on February 20, 2017, I reviewed the admission history/examination and other documents related to the admission.  I confirmed the findings in the admission note and I agree with the diagnosis and treatment plan with the following corrections, clarifications, additional findings, and assessments: I certify that the treatment plan was reviewed and approved or developed by me in accordance with standard psychiatric practice. I certifiy that the inpatient services were ordered in accordance with the Medicare regulations governing the order. This includes certification that hospital inpatient services are reasonable and necessary and in the case of services not specified as inpatient-only under 42 .22(n), that they are appropriately  provided as inpatient services in accordance with the 2-midnight benchmark under 42 .3(e).     The reason for inpatient status is Acute Psychosis, Bipolar Disorder, Suicidal Ideation and/or Behavior, Danger to others due to mental illness and Unable to care for self due to mental illness. High degree of complexity due to infanticidal ideation, multiple hospitalizations, past treatment resistance, lack of family support for her needs.    35 y/o M [place] F mother of 6, admitted for AH and urges to kill her children, specifically her 5 month old infant. This is at least the 3rd post-partum episode--I treated her with Li+ and risperidone in early 2012 and later that year for a course of ECT. Response to that was equivocal at the time of her last treatment. She now says her medications have stopped working, ut she also describes a stressful home situation with 6 children under the age of 12, oldest daughter has autism and requires near total care, has a PCA in the evenings during the week, but spouse does little of the . We will review her Hx, but she seems to be a candidate for clozapine if her ACT psychiatrist is in agreement with that plan. Alternatively, ECT could be reconsidered if clozapine is not an option or if it is only partially effective.    Danish Levi M.D.  of Psychiatry  Pager: [phone]    email: [email or web address]

## 2017-02-20 NOTE — PROGRESS NOTES
CPS has been notified of this situation.  They would like to be called when pt discharges @ [phone]

## 2017-02-21 LAB
BASOPHILS # BLD AUTO: 0 10E9/L (ref 0–0.2)
BASOPHILS NFR BLD AUTO: 0.4 %
CK SERPL-CCNC: 105 U/L (ref 30–225)
CRP SERPL-MCNC: <2.9 MG/L (ref 0–8)
DIFFERENTIAL METHOD BLD: NORMAL
EOSINOPHIL # BLD AUTO: 0.1 10E9/L (ref 0–0.7)
EOSINOPHIL NFR BLD AUTO: 2.6 %
ERYTHROCYTE [DISTWIDTH] IN BLOOD BY AUTOMATED COUNT: 13.6 % (ref 10–15)
HCT VFR BLD AUTO: 40.3 % (ref 35–47)
HGB BLD-MCNC: 13.3 G/DL (ref 11.7–15.7)
IMM GRANULOCYTES # BLD: 0 10E9/L (ref 0–0.4)
IMM GRANULOCYTES NFR BLD: 0.2 %
LYMPHOCYTES # BLD AUTO: 2 10E9/L (ref 0.8–5.3)
LYMPHOCYTES NFR BLD AUTO: 38.1 %
MCH RBC QN AUTO: 30.6 PG (ref 26.5–33)
MCHC RBC AUTO-ENTMCNC: 33 G/DL (ref 31.5–36.5)
MCV RBC AUTO: 93 FL (ref 78–100)
MONOCYTES # BLD AUTO: 0.3 10E9/L (ref 0–1.3)
MONOCYTES NFR BLD AUTO: 5.4 %
NEUTROPHILS # BLD AUTO: 2.9 10E9/L (ref 1.6–8.3)
NEUTROPHILS NFR BLD AUTO: 53.3 %
NRBC # BLD AUTO: 0 10*3/UL
NRBC BLD AUTO-RTO: 0 /100
PLATELET # BLD AUTO: 304 10E9/L (ref 150–450)
RBC # BLD AUTO: 4.34 10E12/L (ref 3.8–5.2)
WBC # BLD AUTO: 5.4 10E9/L (ref 4–11)

## 2017-02-21 PROCEDURE — 25000132 ZZH RX MED GY IP 250 OP 250 PS 637: Performed by: STUDENT IN AN ORGANIZED HEALTH CARE EDUCATION/TRAINING PROGRAM

## 2017-02-21 PROCEDURE — 12400001 ZZH R&B MH UMMC

## 2017-02-21 PROCEDURE — 82550 ASSAY OF CK (CPK): CPT | Performed by: STUDENT IN AN ORGANIZED HEALTH CARE EDUCATION/TRAINING PROGRAM

## 2017-02-21 PROCEDURE — 36415 COLL VENOUS BLD VENIPUNCTURE: CPT | Performed by: STUDENT IN AN ORGANIZED HEALTH CARE EDUCATION/TRAINING PROGRAM

## 2017-02-21 PROCEDURE — 25900017 H RX MED GY IP 259 OP 259 PS 637: Performed by: STUDENT IN AN ORGANIZED HEALTH CARE EDUCATION/TRAINING PROGRAM

## 2017-02-21 PROCEDURE — 85025 COMPLETE CBC W/AUTO DIFF WBC: CPT | Performed by: PSYCHIATRY & NEUROLOGY

## 2017-02-21 PROCEDURE — S0136 CLOZAPINE, 25 MG: HCPCS | Performed by: STUDENT IN AN ORGANIZED HEALTH CARE EDUCATION/TRAINING PROGRAM

## 2017-02-21 PROCEDURE — 93005 ELECTROCARDIOGRAM TRACING: CPT

## 2017-02-21 PROCEDURE — 86140 C-REACTIVE PROTEIN: CPT | Performed by: STUDENT IN AN ORGANIZED HEALTH CARE EDUCATION/TRAINING PROGRAM

## 2017-02-21 PROCEDURE — 99232 SBSQ HOSP IP/OBS MODERATE 35: CPT | Mod: GC | Performed by: PSYCHIATRY & NEUROLOGY

## 2017-02-21 RX ORDER — CLOZAPINE 25 MG/1
12.5 TABLET ORAL AT BEDTIME
Status: DISCONTINUED | OUTPATIENT
Start: 2017-02-21 | End: 2017-02-22

## 2017-02-21 RX ADMIN — OLANZAPINE 10 MG: 10 TABLET, FILM COATED ORAL at 09:03

## 2017-02-21 RX ADMIN — Medication 12.5 MG: at 21:04

## 2017-02-21 RX ADMIN — PRENATAL VIT W/ FE FUMARATE-FA TAB 27-0.8 MG 1 TABLET: 27-0.8 TAB at 09:00

## 2017-02-21 RX ADMIN — Medication 2000 UNITS: at 09:00

## 2017-02-21 RX ADMIN — ACETAMINOPHEN 650 MG: 325 TABLET, FILM COATED ORAL at 09:10

## 2017-02-21 RX ADMIN — FLUOXETINE 20 MG: 20 CAPSULE ORAL at 09:00

## 2017-02-21 ASSESSMENT — ACTIVITIES OF DAILY LIVING (ADL)
ORAL_HYGIENE: INDEPENDENT
DRESS: STREET CLOTHES
ORAL_HYGIENE: INDEPENDENT
GROOMING: INDEPENDENT
DRESS: STREET CLOTHES;INDEPENDENT
LAUNDRY: WITH SUPERVISION
GROOMING: INDEPENDENT
LAUNDRY: WITH SUPERVISION

## 2017-02-21 NOTE — PLAN OF CARE
"Problem: Depressive Symptoms  Goal: Depressive Symptoms  Signs and symptoms of listed problems will be absent or manageable.   Outcome: No Change  RN48/      Patient rates depression 8/10* c/o Significant change in appetite or weight  Change in sleep (too much or too little)  Psychomotor agitation or retardation (can be observed by others)  Diminished concentration or indecisiveness  Recurrent thoughts of death or suicide  Patient rates anxiety at 8/10* c/o Trouble relaxing  Restlessness  A/H of voices telling her to kill her kids and herself  Patient describes mood as \"depressed and anxious.\"     Patient is cooperative appearing blunted, indifferent and brief responsesand Blunted/Flat. Patient is med-compliant, is eating 25% and reports \"waking at night and feels unrested\". Patient is not attending groups.     Patient reports suicidal ideation with out intention or a suicidal plan   SIB denies  Thoughts only  HI: denies current or recent homicidal ideation or behaviors.     VS reviewed: /69  Pulse 69  Temp 97.7  F (36.5  C) (Tympanic)  Resp 15  Ht 1.676 m (5' 6\")  Wt 60.8 kg (134 lb)  LMP 12/16/2016  SpO2 98%  BMI 21.63 kg/m2 . Patient reports low back pain takes tylenol with some relief.     Patient evaluation continues. Assessed mood,anxiety,thoughts and behavior. Patient is not progressing towards goals. Patient is encouraged to participate in groups and assisted to develop healthy coping skills.      Length of stay: 2     Refer to daily team meeting notes for individualized plan of care. Nursing will continue to assess.     * Scale is offered as scale of 1 to 10 with 10 being the worst.                       "

## 2017-02-21 NOTE — PROGRESS NOTES
Pt has not attended OT since admit.  Will continue to encourage participation and completion of  self-assessment as able. OT staff will explain the purpose of being involved with treatment plan and provide options to meet current needs and goals.

## 2017-02-21 NOTE — PROGRESS NOTES
Pt spent most of the shift sleeping in pt room. Pt isolative and withdrawn. Pt did not attend group. Pt ate meals and snacks in dining area. Affect is blunted, flat, mood is depressed. Pt endorses auditory and visual hallucinations. Pt endorses thoughts of SI/SIB.        02/20/17 2100   Behavioral Health   Hallucinations auditory;visual   Thinking poor concentration   Orientation person: oriented;place: oriented   Memory baseline memory   Insight poor   Judgement impaired   Eye Contact at examiner   Affect blunted, flat   Mood depressed   Physical Appearance/Attire attire appropriate to age and situation   Hygiene other (see comment)  (adequate)   Suicidality thoughts only   Self Injury thoughts only   Activity isolative;withdrawn   Speech clear;coherent   Medication Sensitivity no stated side effects   Psychomotor / Gait balanced;steady   Psycho Education   Type of Intervention 1:1 intervention   Response participates, initiates socially appropriate   Hours 0.5   Treatment Detail Triggers   Activities of Daily Living   Hygiene/Grooming independent   Oral Hygiene independent   Dress street clothes;independent   Laundry with supervision   Room Organization independent

## 2017-02-21 NOTE — PROGRESS NOTES
----------------------------------------------------------------------------------------------------------  [facility], [place]   Psychiatric Progress Note     Assessment    Presentation:  Nona Chatterjee is a 35yo [place] female w a h/o Schizoaffective Disorder who was BIB her  after he found her brandishing a knife and stating she is going to kill 6 children and herself with it. She was recently discharged from [facility] for SI 2 days ago after a brief hospitalization. She states that her medications are no longer working for her. Prior to Friday, she had not been hospitalized since October following the birth of her new daughter.  She has 6 children.  She has had good adherence since discharging but the AH simply aren't going away as they have in the past with medications. Her ACT team recently increased her long-acting paliperidone to 234mg. Her mood has been good of late, but the 's feeding schedule has been disruptive to her sleep cycle. She does not want to kill her children or herself, but the CAH were overwhelming the morning of admission.    Diagnostic Impression:  Nona Finley is a 36 year old female with h/o Schizoaffective disorder who presented to the [facility] following command hallucinations telling her to kill herself and her children in the context of medication inefficacy, disrupted sleep, and stressful home (6 kids, one of whom has autism). The patient's last psychiatric hospitalization was 2 days prior to this admission. The patient is currently followed by [facility] with psychiatrist Walter Gaviria. Has had ECT at this facility in . Family history is notable for schizophrenia. Current psychosocial stressors include raising multiple children,  away at medical school in the [facility], and chronic mental illness. The patient denies drug abuse and self injurious behaviors. The MSE is notable for command hallucinations, SI, and HI.  Admission warranted to maintain safety and identify a new antipsychotic medication for managing her psychotic symptoms.    Hospital course: Nona Finley was admitted to [facility] on a 72-hr hold.  PTA fluoxetine and PRN olanzapine were restarted.  During the initial assessment interview on the first morning of admission the patient endorsed being closely followed by [facility] team and we agreed to speak with her [facility] psychiatrist Dr. Walter Gaviria regarding possible initiation of clozapine, which the ACT team agreed to have started inpatient. Consideration of stopping fluoxetine also discussed due to possible activation of david.    Medical course: None.    Plan     Principal Diagnosis: Schizoaffective disorder    Medications:  Paliperidone 234mg [id number] (last on 2/6, next on 3/6)  Fluoxetine 20mg daily - consider discontinuing  Olanzapine 5mg BID PRN; 10mg PO/IM for behavioral events  Clozapine - 12.5 mg QHS start 2/21    Labs: previous admission labs reviewed/wnl; utox/hcg negative  Consults pending: none   Treatment in therapeutic milieu with individual/group therapies as described.     Medical diagnoses to be addressed this admission (Problem-Based): None     Relevant psychosocial stressors: severe including motherhood, chronic MI    Legal Status: 72-h Hold signed on 2/19/2017    Safety Assessment:   Checks: Status 15  Precautions: Suicide  Pt has not required locked seclusion or restraints in the past 24 hours to maintain safety, please refer to RN documentation for further details.    The risks, benefits, alternatives and side effects have been discussed and are understood by the patient and other caregivers.    Anticipated Disposition/Discharge Date: TBD pending stabilization and the formulation of an appropriate discharge plan    Pt seen and discussed with my attending, Danish Levi MD.      Ban Gillette MD MPH  PGY2 Psychiatry    Psychiatry Attending Attestation:  This patient has been  "seen and evaluated by me, Danish Levi M.D.  The patient's condition and treatment plan were discussed with the resident, and care coordinated with the CTC and RN. I reviewed, edited and agree with the findings and plan in this note.     Danish Levi M.D.   of Psychiatry     Interim History:   The patient's care was discussed with the treatment team and chart notes were reviewed.     Sleep: 7 hrs  PRN'S: Zyprexa 5mg x1, Tylenol 650mg x1  Staff Notes: Cooperative and pleasant, acknowledges that she had gone home too soon from [facility] on 2/17    Interview:  Patient met with the treatment team in the conference room this AM.  She endorsed continued AVH - telling her not to take her medications because they are bad \"drugs\", and wishing she would kill herself (by stabbing) and her children. She started fluoxetine 1 month ago to treat PP depression and feels it helps with that, but also thinks that her AVH worsening since starting fluoxetine. Patient did receive depo provera for birth control sometime in December, and would like another shot if she is due. She has never been on combination oral contraceptive pills, but would consider them since her psychosis and mood symptoms have worsened post partum, in the past, and may be related to hypoestrogenic state. She denies breastfeeding. Her mother in law, who doesn't speak English, is watching her 6 children. She feels her mother in law does a good job and that right now she, herself, could not watch her children, as she is overwhelmed. She would consider changing medication to clozapine, after reviewing the risks and benefits. She also gave the team verbal permission to speak with her . She endorsed feeling somewhat safe on the unit and agreed to talk to staff if she had concerns. She also agreed to not use silverware for now.    Review of systems:     The Review of Systems is negative other than noted in the HPI         Medications: " "    Current Facility-Administered Medications   Medication     cholecalciferol (vitamin D) tablet 2,000 Units     diphenhydrAMINE (BENADRYL) capsule 25-50 mg     FLUoxetine (PROzac) capsule 20 mg     ibuprofen (ADVIL/MOTRIN) tablet 600 mg     OLANZapine (zyPREXA) tablet 5 mg     [START ON 3/6/2017] paliperidone (INVEGA SUSTENNA) injection SUSP 234 mg     prenatal multivitamin  plus iron per tablet 1 tablet     OLANZapine (zyPREXA) tablet 10 mg    Or     OLANZapine (zyPREXA) injection 10 mg     acetaminophen (TYLENOL) tablet 650 mg     alum & mag hydroxide-simethicone (MYLANTA ES/MAALOX  ES) suspension 30 mL     magnesium hydroxide (MILK OF MAGNESIA) suspension 30 mL     traZODone (DESYREL) tablet 50 mg     melatonin tablet 3 mg             Allergies:   No Known Allergies         Psychiatric Examination:   /69  Pulse 69  Temp 97.7  F (36.5  C) (Tympanic)  Resp 15  Ht 1.676 m (5' 6\")  Wt 60.8 kg (134 lb)  LMP 12/16/2016  SpO2 98%  BMI 21.63 kg/m2  Weight is 134 lbs 0 oz  Body mass index is 21.63 kg/(m^2).    Appearance:  awake, alert and neatly groomed with head covering/Mal[place]ian dress with hospital blanket over that  Attitude:  cooperative  Eye Contact:  good  Mood:  depressed, sad, anxious/frightened  Affect:  intensity is blunted and constricted mobility  Speech:  clear, coherent  Psychomotor Behavior:  no evidence of tardive dyskinesia, dystonia, or tics and intact station, gait and muscle tone  Thought Process:  linear  Associations:  no loose associations  Thought Content:  thoughts of self-harm/suicide and homicide  Insight:  fair  Judgment:  limited  Oriented to:  time, person, and place  Attention Span and Concentration:  intact  Recent and Remote Memory:  fair  Language: clear English, Malaysian accent  Fund of Knowledge: appropriate  Muscle Strength and Tone: normal  Gait and Station: Normal         Labs:     2/19 Utox and Uhcg negative  2/21 EKG Sinus arrhythmia rate 54   2/21 CBC normal; WBC 5.4 " and ANC 2900; ; CRP <2.9

## 2017-02-21 NOTE — PROGRESS NOTES
"Pt taking medications with prompts including prn tylenol for Low back pain; pt reassures writer she is urinating with no discomfort or pain; encourage patient to drink fluids/keep hydrated, \"They voices are really bothering me, zyprexa is not helping, its what I was taking at home.\", Pt took her scheduled vitamins and prozac and accepting of 10 mg of zyprexa when offered the 5 or the 10 mg dose; will continue to monitor behavior, pain, VS and reassess effectiveness of medications.  "

## 2017-02-22 LAB — INTERPRETATION ECG - MUSE: NORMAL

## 2017-02-22 PROCEDURE — 25900017 H RX MED GY IP 259 OP 259 PS 637: Performed by: STUDENT IN AN ORGANIZED HEALTH CARE EDUCATION/TRAINING PROGRAM

## 2017-02-22 PROCEDURE — 93005 ELECTROCARDIOGRAM TRACING: CPT | Performed by: NURSE PRACTITIONER

## 2017-02-22 PROCEDURE — 12400001 ZZH R&B MH UMMC

## 2017-02-22 PROCEDURE — 25000132 ZZH RX MED GY IP 250 OP 250 PS 637: Performed by: STUDENT IN AN ORGANIZED HEALTH CARE EDUCATION/TRAINING PROGRAM

## 2017-02-22 PROCEDURE — S0136 CLOZAPINE, 25 MG: HCPCS | Performed by: STUDENT IN AN ORGANIZED HEALTH CARE EDUCATION/TRAINING PROGRAM

## 2017-02-22 PROCEDURE — 99232 SBSQ HOSP IP/OBS MODERATE 35: CPT | Mod: GC | Performed by: PSYCHIATRY & NEUROLOGY

## 2017-02-22 RX ORDER — CLOZAPINE 25 MG/1
25 TABLET ORAL AT BEDTIME
Status: DISCONTINUED | OUTPATIENT
Start: 2017-02-22 | End: 2017-02-23

## 2017-02-22 RX ADMIN — ACETAMINOPHEN 650 MG: 325 TABLET, FILM COATED ORAL at 19:09

## 2017-02-22 RX ADMIN — ACETAMINOPHEN 650 MG: 325 TABLET, FILM COATED ORAL at 08:06

## 2017-02-22 RX ADMIN — FLUOXETINE 20 MG: 20 CAPSULE ORAL at 08:07

## 2017-02-22 RX ADMIN — PRENATAL VIT W/ FE FUMARATE-FA TAB 27-0.8 MG 1 TABLET: 27-0.8 TAB at 08:06

## 2017-02-22 RX ADMIN — Medication 2000 UNITS: at 08:06

## 2017-02-22 RX ADMIN — CLOZAPINE 25 MG: 25 TABLET ORAL at 20:27

## 2017-02-22 ASSESSMENT — ACTIVITIES OF DAILY LIVING (ADL)
ORAL_HYGIENE: INDEPENDENT
LAUNDRY: WITH SUPERVISION
LAUNDRY: WITH SUPERVISION
ORAL_HYGIENE: INDEPENDENT
GROOMING: INDEPENDENT
DRESS: STREET CLOTHES
GROOMING: INDEPENDENT
DRESS: STREET CLOTHES;INDEPENDENT

## 2017-02-22 NOTE — PROGRESS NOTES
----------------------------------------------------------------------------------------------------------  [facility], [place]   Psychiatric Progress Note     Assessment    Presentation:  Nona Chatterjee is a 37yo Lao female w a h/o Schizoaffective Disorder who was BIB her  after he found her brandishing a knife and stating she is going to kill 6 children and herself with it. She was recently discharged from [facility] for SI 2 days ago after a brief hospitalization. She states that her medications are no longer working for her. Prior to Friday, she had not been hospitalized since October following the birth of her new daughter.  She has 6 children.  She has had good adherence since discharging but the AH simply aren't going away as they have in the past with medications. Her ACT team recently increased her long-acting paliperidone to 234mg. Her mood has been good of late, but the 's feeding schedule has been disruptive to her sleep cycle. She does not want to kill her children or herself, but the CAH were overwhelming the morning of admission.    Diagnostic Impression:  Nona Finley is a 36 year old female with h/o Schizoaffective disorder who presented to the [facility] following command hallucinations telling her to kill herself and her children in the context of medication inefficacy, disrupted sleep, and stressful home (6 kids, one of whom has autism). The patient's last psychiatric hospitalization was 2 days prior to this admission. The patient is currently followed by [facility] with psychiatrist Walter Gaviria. Has had ECT at this facility in . Family history is notable for schizophrenia. Current psychosocial stressors include raising multiple children,  away at medical school in the [facility], and chronic mental illness. The patient denies drug abuse and self injurious behaviors. The MSE is notable for command hallucinations, SI, and HI.  Admission warranted to maintain safety and identify a new antipsychotic medication for managing her psychotic symptoms.    Hospital course: Nona Finley was admitted to station 22 on a 72-hr hold.  PTA fluoxetine and PRN olanzapine were restarted.  During the initial assessment interview on the first morning of admission the patient endorsed being closely followed by [facility] team and we agreed to speak with her [facility] psychiatrist Dr. Walter Gaviria regarding possible initiation of clozapine, which the ACT team agreed to have started inpatient. Will discontinue fluoxetine due to possible activation of david. Clozapine was started on 2/21 and titrated up as tolerated.  Medical course: None.    Plan     Principal Diagnosis: Schizoaffective disorder    Medications:  D/c Fluoxetine today 2/22  Paliperidone 234mg [id number] (last on 2/6, next on 3/6)  Olanzapine 5mg BID PRN; 10mg PO/IM for behavioral events  Clozapine - increase from 12.5 mg to 25 mg QHS 2/22    Labs: previous admission labs reviewed/wnl; utox/hcg negative  Consults pending: none   Treatment in therapeutic milieu with individual/group therapies as described.     Medical diagnoses to be addressed this admission (Problem-Based): None     Relevant psychosocial stressors: severe including motherhood, chronic MI    Legal Status: 72-h Hold signed on 2/19/2017    Safety Assessment:   Checks: Status 15  Precautions: Suicide  Pt has not required locked seclusion or restraints in the past 24 hours to maintain safety, please refer to RN documentation for further details.    The risks, benefits, alternatives and side effects have been discussed and are understood by the patient and other caregivers.    Anticipated Disposition/Discharge Date: TBD pending stabilization and the formulation of an appropriate discharge plan    SUSANA, Rosie Horton, MS4, am serving as a scribe to document services personally performed by Dr. Avery Freeman based on my observations and the  "provider's statements to me.    I, Avery Freeman DO, have reviewed and edited the documentation recorded by the scribe.  This documentation accurately reflects the services I personally performed and treatment decisions made by me in consultation with the attending physician.    Avery Freeman DO  Resident Psychiatrist, PGY-1  Pager: [phone]    Psychiatry Attending Attestation:  This patient has been seen and evaluated by me, Danish Levi M.D.  The patient's condition and treatment plan were discussed with the resident, and care coordinated with the CTC and RN. I reviewed, edited and agree with the findings and plan in this note.     Danish Levi M.D.   of Psychiatry     Interim History:   The patient's care was discussed with the treatment team and chart notes were reviewed.     Sleep: 7 hrs  PRN'S: Zyprexa 5mg x1, Tylenol 650mg x1  Staff Notes: Cooperative, appearing blunted, indifferent and brief responses. Isolative to room; pt ate meals and snacks in dining area. Not attending groups.     Interview:  Patient met with the treatment team in the [facility] area this AM. She endorsed continued AVH. She says the voices occur throughout the day and are worse in the early morning. The voices continue to tell her to kill (stab) her children. The visual hallucinations consist of seeing people \"holding knives and wanting to kill me.\" The VH occur mainly in the early morning as well as at night. She says she is very frightened by the hallucinations because sometimes she thinks they are real. She says she has been avoiding group meetings due to feeling anxious and frightened. She also endorses feeling worried about what will happen if the medications do not work and she cannot go home. She agrees with discontinuing the fluoxetine and increasing the dose of clozapine. She endorsed feeling somewhat safe on the unit and agreed to talk to staff if she had concerns.  She also reports feeling dizzy when " "she gets up from bed in the morning or anytime she stands up after sitting or laying down for a period of time. She says she has not been drinking very much water and agreed to try and take in more fluids. She agreed to talk to staff if she feels dizzy or feels like she is going to faint.    Review of systems:     The Review of Systems is negative other than noted in the HPI         Medications:     Current Facility-Administered Medications   Medication     cloZAPine (CLOZARIL) tablet 25 mg     [START ON 3/6/2017] paliperidone (INVEGA SUSTENNA) injection SUSP 234 mg     cholecalciferol (vitamin D) tablet 2,000 Units     diphenhydrAMINE (BENADRYL) capsule 25-50 mg     ibuprofen (ADVIL/MOTRIN) tablet 600 mg     OLANZapine (zyPREXA) tablet 5 mg     prenatal multivitamin  plus iron per tablet 1 tablet     OLANZapine (zyPREXA) tablet 10 mg    Or     OLANZapine (zyPREXA) injection 10 mg     acetaminophen (TYLENOL) tablet 650 mg     alum & mag hydroxide-simethicone (MYLANTA ES/MAALOX  ES) suspension 30 mL     magnesium hydroxide (MILK OF MAGNESIA) suspension 30 mL     traZODone (DESYREL) tablet 50 mg     melatonin tablet 3 mg             Allergies:   No Known Allergies         Psychiatric Examination:   /69  Pulse 69  Temp 97  F (36.1  C) (Tympanic)  Resp 14  Ht 1.676 m (5' 6\")  Wt 60.8 kg (134 lb)  LMP 12/16/2016  SpO2 98%  BMI 21.63 kg/m2   Orthostatic vitals: Sitting /63, sitting pulse 82 bpm. Standing /53, standing pulse 130 bpm.   Weight is 134 lbs 0 oz  Body mass index is 21.63 kg/(m^2).    Appearance:  awake, alert and neatly groomed with head covering/Citizen of the [place] dress with hospital blanket over that  Attitude:  cooperative  Eye Contact:  good  Mood:  depressed, sad, anxious/frightened  Affect:  intensity is blunted and constricted mobility  Speech:  clear, coherent  Psychomotor Behavior:  no evidence of tardive dyskinesia, dystonia, or tics and intact station, gait and muscle tone  Thought Process:  " linear  Associations:  no loose associations  Thought Content:  thoughts of self-harm/suicide and homicide  Insight:  fair  Judgment:  limited  Oriented to:  time, person, and place  Attention Span and Concentration:  intact  Recent and Remote Memory:  fair  Language: clear English, [place] accent  Fund of Knowledge: appropriate  Muscle Strength and Tone: normal  Gait and Station: Normal         Labs:     2/19 Utox and Uhcg negative  2/21 EKG Sinus arrhythmia rate 54   2/21 CBC normal; WBC 5.4 and ANC 2900; ; CRP <2.9

## 2017-02-22 NOTE — PROGRESS NOTES
"Pt was withdrawn pacing the halls. Pt socialized with others and was present in the milieu. Pt refused suggestions to attend groups stating she didn't like them. She said she is feeling bad today and not doing good today because of the visual and auditory hallucinations. She also complained of lower back pain, \"I feel a pressure on my lower back.\" Pt was given a hot pack and staff suggested she lay down being that she was constantly on her feet throughout the day. Pt stated she is not having thoughts of SI/SIB today.      02/22/17 1300   Behavioral Health   Hallucinations auditory;visual   Thinking distractable;poor concentration   Orientation person: oriented;place: oriented   Memory baseline memory   Insight poor   Judgement impaired   Eye Contact at examiner   Affect blunted, flat   Mood depressed   Physical Appearance/Attire attire appropriate to age and situation   Hygiene well groomed   Suicidality other (see comments)  (denies)   Self Injury other (see comment)  (denies)   Activity withdrawn   Speech coherent;clear   Medication Sensitivity no stated side effects;no observed side effects   Psychomotor / Gait balanced;steady   Psycho Education   Type of Intervention 1:1 intervention   Response participates, initiates socially appropriate   Hours 0.5   Treatment Detail check in    Activities of Daily Living   Hygiene/Grooming independent   Oral Hygiene independent   Dress street clothes;independent   Laundry with supervision   Room Organization independent   Activity   Activity Level of Assistance independent     "

## 2017-02-22 NOTE — PROGRESS NOTES
Pt isolative to room. Pt ate meals and snacks in dining area. Pt endorses auditory and visual hallucinations. Pt endorses SI/SIB thoughts only. Affect is blunted, flat, mood is depressed.       02/21/17 2100   Behavioral Health   Hallucinations auditory;visual   Thinking poor concentration   Orientation person: oriented;place: oriented   Memory baseline memory   Insight poor   Judgement impaired   Eye Contact at examiner   Affect blunted, flat   Mood depressed   Physical Appearance/Attire attire appropriate to age and situation   Hygiene well groomed   Suicidality thoughts only   Self Injury thoughts only   Activity isolative;withdrawn   Speech clear;coherent   Medication Sensitivity no stated side effects   Psychomotor / Gait balanced   Psycho Education   Type of Intervention 1:1 intervention   Response participates, initiates socially appropriate   Hours 0.5   Treatment Detail wellness strategies   Activities of Daily Living   Hygiene/Grooming independent   Oral Hygiene independent   Dress street clothes   Laundry with supervision   Room Organization independent   Activity   Activity Level of Assistance independent

## 2017-02-23 LAB
HCG UR QL: NEGATIVE
INTERPRETATION ECG - MUSE: NORMAL

## 2017-02-23 PROCEDURE — 12400001 ZZH R&B MH UMMC

## 2017-02-23 PROCEDURE — 25000128 H RX IP 250 OP 636: Performed by: PSYCHIATRY & NEUROLOGY

## 2017-02-23 PROCEDURE — 90853 GROUP PSYCHOTHERAPY: CPT

## 2017-02-23 PROCEDURE — 81025 URINE PREGNANCY TEST: CPT | Performed by: PSYCHIATRY & NEUROLOGY

## 2017-02-23 PROCEDURE — 99232 SBSQ HOSP IP/OBS MODERATE 35: CPT | Mod: GC | Performed by: PSYCHIATRY & NEUROLOGY

## 2017-02-23 PROCEDURE — S0136 CLOZAPINE, 25 MG: HCPCS | Performed by: STUDENT IN AN ORGANIZED HEALTH CARE EDUCATION/TRAINING PROGRAM

## 2017-02-23 PROCEDURE — 25900017 H RX MED GY IP 259 OP 259 PS 637: Performed by: STUDENT IN AN ORGANIZED HEALTH CARE EDUCATION/TRAINING PROGRAM

## 2017-02-23 PROCEDURE — H2032 ACTIVITY THERAPY, PER 15 MIN: HCPCS

## 2017-02-23 PROCEDURE — 25000132 ZZH RX MED GY IP 250 OP 250 PS 637: Performed by: STUDENT IN AN ORGANIZED HEALTH CARE EDUCATION/TRAINING PROGRAM

## 2017-02-23 RX ORDER — CLOZAPINE 25 MG/1
50 TABLET ORAL AT BEDTIME
Status: DISCONTINUED | OUTPATIENT
Start: 2017-02-23 | End: 2017-02-26

## 2017-02-23 RX ORDER — MEDROXYPROGESTERONE ACETATE 150 MG/ML
150 INJECTION, SUSPENSION INTRAMUSCULAR ONCE
Status: COMPLETED | OUTPATIENT
Start: 2017-02-23 | End: 2017-02-23

## 2017-02-23 RX ADMIN — CLOZAPINE 50 MG: 25 TABLET ORAL at 20:01

## 2017-02-23 RX ADMIN — PRENATAL VIT W/ FE FUMARATE-FA TAB 27-0.8 MG 1 TABLET: 27-0.8 TAB at 09:02

## 2017-02-23 RX ADMIN — MEDROXYPROGESTERONE ACETATE 150 MG: 150 INJECTION, SUSPENSION INTRAMUSCULAR at 14:49

## 2017-02-23 RX ADMIN — IBUPROFEN 600 MG: 600 TABLET ORAL at 09:13

## 2017-02-23 RX ADMIN — Medication 2000 UNITS: at 09:02

## 2017-02-23 ASSESSMENT — ACTIVITIES OF DAILY LIVING (ADL)
DRESS: STREET CLOTHES;INDEPENDENT
ORAL_HYGIENE: INDEPENDENT
ORAL_HYGIENE: INDEPENDENT
GROOMING: INDEPENDENT
GROOMING: HANDWASHING;SHOWER;INDEPENDENT
DRESS: INDEPENDENT

## 2017-02-23 NOTE — PROGRESS NOTES
----------------------------------------------------------------------------------------------------------  [facility], [place]   Psychiatric Progress Note     Assessment    Presentation:  Nona Chatterjee is a 35yo [place] female w a h/o Schizoaffective Disorder who was BIB her  after he found her brandishing a knife and stating she is going to kill 6 children and herself with it. She was recently discharged from [facility] for SI 2 days ago after a brief hospitalization. She states that her medications are no longer working for her. Prior to Friday, she had not been hospitalized since October following the birth of her new daughter.  She has 6 children.  She has had good adherence since discharging but the AH simply aren't going away as they have in the past with medications. Her ACT team recently increased her long-acting paliperidone to 234mg. Her mood has been good of late, but the 's feeding schedule has been disruptive to her sleep cycle. She does not want to kill her children or herself, but the CAH were overwhelming the morning of admission.    Diagnostic Impression:  Nona Finley is a 36 year old female with h/o Schizoaffective disorder who presented to the [facility] following command hallucinations telling her to kill herself and her children in the context of medication inefficacy, disrupted sleep, and stressful home (6 kids, one of whom has autism). The patient's last psychiatric hospitalization was 2 days prior to this admission. The patient is currently followed by [facility] with psychiatrist Walter Gaviria. Has had ECT at this facility in . Family history is notable for schizophrenia. Current psychosocial stressors include raising multiple children,  away at medical school in the [facility], and chronic mental illness. The patient denies drug abuse and self injurious behaviors. The MSE is notable for command hallucinations, SI, and HI.  Admission warranted to maintain safety and identify a new antipsychotic medication for managing her psychotic symptoms.    Hospital course: Nona Finley was admitted to station 22 on a 72-hr hold.  PTA fluoxetine and PRN olanzapine were restarted.  During the initial assessment interview on the first morning of admission the patient endorsed being closely followed by [facility] team and we agreed to speak with her ACT team regarding possible initiation of clozapine.  On 2/21 the team spoke with the [facility] psychiatry resident who works on the ACT team with the patient, Dr. Yuridia Rod, and she stated that they had previously considered starting clozapine, endorsing approval of our team initiating clozapine treatment this admission.  She also agreed with discontinuing fluoxetine due to possible activation of david. Clozapine was started on 2/21 and titrated up as tolerated.  On 2/23 the patient agreed to sign in voluntarily.  Additionally on that date she endorsed having already seen improvements in psychotic symptoms with two doses of clozapine.    Medical course: Patient endorsed preferring to continue Depo-Provera versus beginning OCP's.  On 2/23 Depo-Provera 150 mg injection was given.    Plan     Principal Diagnosis: Schizoaffective disorder    Medications:  Clozapine upward titration - 50 mg qHS 2/23  Paliperidone 234mg [id number] (last on 2/6, next on 3/6)  Olanzapine 5mg BID PRN; 10mg PO/IM for behavioral events  Depo-Provera 150 mg injection, once 2/23    Labs: previous admission labs reviewed/wnl; Utox/HCG negative.  Will repeat HCG on 2/27 due to lag in Depo-Provera coverage.  Consults pending: none   Treatment in therapeutic milieu with individual/group therapies as described.     Medical diagnoses to be addressed this admission (Problem-Based): None     Relevant psychosocial stressors: severe including motherhood, chronic MI    Legal Status: Voluntary    Safety Assessment:   Checks: Status  "15  Precautions: Suicide  Pt has not required locked seclusion or restraints in the past 24 hours to maintain safety, please refer to RN documentation for further details.    The risks, benefits, alternatives and side effects have been discussed and are understood by the patient and other caregivers.    Anticipated Disposition/Discharge Date: TBD pending stabilization and the formulation of an appropriate discharge plan    Pt seen and discussed with my attending, Danish Levi MD.      Avery Freeman, DO  Resident Psychiatrist, PGY-1  [facility] Psychiatry    Psychiatry Attending Attestation:  This patient has been seen and evaluated by me, Danish Levi M.D.  The patient's condition and treatment plan were discussed with the resident, and care coordinated with the CTC and RN. I reviewed, edited and agree with the findings and plan in this note.     Danish eLvi M.D.   of Psychiatry     Interim History:   The patient's care was discussed with the treatment team and chart notes were reviewed.     Sleep: 7 hrs  PRN'S: Tylenol 650 mg x1, Ibuprofen 600 mg x1  Staff Notes: Withdrawn, pacing.  Socialized with peers in milieu but did not attend groups.  Endorsed feeling bad with bad voices and visual hallucinations.  Endorsed some lower back pain.  Withdrawn but full range when interacting with staff.  Later in the evening stated that her voices had diminished, anxiety rated 5/10.  Was medication compliant, and stated: \"I didn't think the voices would let me take the medicine...\"    Interview:  Patient met with the treatment team in the conference room this AM.  She seemed more bright eyed and had a slightly broader affect.  Stated that since taking the medications last night her voices are gone, denying any AVH since last night or this morning.  We told her that the team was encouraged that she has been feeling better, but explained that she has only started taking a very low dose of clozapine (25 mg) " "and that it will be necessary to continue titrating and to see if her gains are maintained.  She asked why her outpatient paliperidone injections \"hadn't worked,\" and we explained that different people respond to different medications.  We also suggested that she might have received some degree of benefit from the injections which has allowed her to improve with a lower dose of clozapine, but that over time as the paliperidone injection wears off it may become necessary to increase her dose of clozapine further.  As the meeting ended, we discussed birth control and she endorsed wanting to continue Depo-Provera instead of birth-control pills.  She was also encouraged to consider going to groups today.    Review of systems:     The Review of Systems is negative other than noted in the HPI         Medications:     Current Facility-Administered Medications   Medication     cloZAPine (CLOZARIL) tablet 25 mg     [START ON 3/6/2017] paliperidone (INVEGA SUSTENNA) injection SUSP 234 mg     cholecalciferol (vitamin D) tablet 2,000 Units     diphenhydrAMINE (BENADRYL) capsule 25-50 mg     ibuprofen (ADVIL/MOTRIN) tablet 600 mg     OLANZapine (zyPREXA) tablet 5 mg     prenatal multivitamin  plus iron per tablet 1 tablet     OLANZapine (zyPREXA) tablet 10 mg    Or     OLANZapine (zyPREXA) injection 10 mg     acetaminophen (TYLENOL) tablet 650 mg     alum & mag hydroxide-simethicone (MYLANTA ES/MAALOX  ES) suspension 30 mL     magnesium hydroxide (MILK OF MAGNESIA) suspension 30 mL     traZODone (DESYREL) tablet 50 mg     melatonin tablet 3 mg             Allergies:   No Known Allergies         Psychiatric Examination:   /69  Pulse 69  Temp 97.8  F (36.6  C) (Tympanic)  Resp 16  Ht 1.676 m (5' 6\")  Wt 60.8 kg (134 lb)  LMP 12/16/2016  SpO2 98%  BMI 21.63 kg/m2   Orthostatic vitals: Sitting /63, sitting pulse 82 bpm. Standing /53, standing pulse 130 bpm.   Weight is 134 lbs 0 oz  Body mass index is 21.63 " "kg/(m^2).    Appearance:  awake, alert and neatly groomed with head covering/[place] dress with hospital blanket over that  Attitude:  cooperative  Eye Contact:  good  Mood: calm, with \"no emotions\"  Affect: mildly constricted mobility and improved  Speech:  clear, coherent  Psychomotor Behavior:  no evidence of tardive dyskinesia, dystonia, or tics and intact station, gait and muscle tone  Thought Process:  linear  Associations:  no loose associations  Thought Content:  denied AVH since last night  Insight:  fair  Judgment:  limited  Oriented to:  time, person, and place  Attention Span and Concentration:  intact  Recent and Remote Memory:  fair  Language: clear English, [place] accent  Fund of Knowledge: appropriate  Muscle Strength and Tone: normal  Gait and Station: Normal         Labs:     2/19 Utox and Uhcg negative  2/21 EKG Sinus arrhythmia rate 54   2/21 CBC normal; WBC 5.4 and ANC 2900; ; CRP <2.9  "

## 2017-02-23 NOTE — PLAN OF CARE
"Problem: Depressive Symptoms  Goal: Depressive Symptoms  Signs and symptoms of listed problems will be absent or manageable.      Pt is calm and pleasant, has bland affect. She reports her mood is \"good.\" She is a/o, denies hallucinations or paranoid thoughts; does not appear guarded. Pt said she does not attend groups, but is in the milieu. She states she will take a nap today, and mainly keeps to herself. Pt reports some dizziness; states this is a side effect of her meds. Her standing BP is 77/54; fluids encouraged. Pt t has some \"back pain, pressure;\" nidhi Navarrete is aware of this; ibuprofen given.Her goal is \"to go home to my  and kids.\"       "

## 2017-02-23 NOTE — PROGRESS NOTES
"Client was present in the milieu for a majority of the shift mainly pacing the hallway. She is withdrawn but full range affect when interacting with staff during check-in. She reports the voices have reduced. Denies SI/SIB, and depression. Rates anxiety at a 5 \"I did not think I would be able to take the medication because the voices were telling me not to.\" She did question \"If you refuse meds will they transfer you to a psychotic hospital?\"        02/22/17 1800   Behavioral Health   Hallucinations auditory   Thinking poor concentration   Orientation person: oriented;place: oriented;date: oriented;time: oriented   Memory baseline memory   Insight admits / accepts   Judgement impaired   Eye Contact at examiner   Affect full range affect   Mood anxious   Physical Appearance/Attire attire appropriate to age and situation   Hygiene well groomed   Suicidality other (see comments)  (denies)   Self Injury other (see comment)  (denies)   Activity withdrawn   Speech coherent;clear   Psychomotor / Gait balanced   Coping/Psychosocial   Verbalized Emotional State acceptance;hopefulness   Activities of Daily Living   Hygiene/Grooming independent   Oral Hygiene independent   Dress street clothes   Laundry with supervision   Room Organization independent   Activity   Activity Level of Assistance independent   Behavioral Health Interventions   Depression maintain safety precautions;maintain safe secure environment;assist patient in developing safety plan;assist patient in following safety plan;provide emotional support;establish therapeutic relationship;assist with developing and utilizing healthy coping strategies;build upon strengths;monitor need for prn medication   Social and Therapeutic Interventions (Depression) encourage socialization with peers;encourage effective boundaries with peers;encourage participation in therapeutic groups and milieu activities     "

## 2017-02-23 NOTE — PLAN OF CARE
Problem: General Plan of Care (Inpatient Behavioral)  Goal: Individualization/Patient Specific Goal (IP Behavioral)  The patient and/or their representative will achieve their patient-specific goals related to the plan of care.    The patient-specific goals include:   Illness Management Recovery model: Objectives    Patient will identify reason(s) for hospitalization from their perspective.  Patient will identify a minimum of three goals for discharge.  Patient will identify a minimum of three triggers that may increase their symptoms.  Patient will identify a minimum of three coping skills they can do to stay well.   Patient will identify their support system to demonstrate readiness for discharge.   Illness Management Recovery model:  Feedback.     Patient identified the following people they would like to receive feedback from if/when they see early warning signs:     1.   2. psychiatrist

## 2017-02-23 NOTE — PROGRESS NOTES
"Initially seen by OT on this date. Nona  attended 2 of 2 OT groups today. This a.m. she attended a yoga group, and was able to follow instructions and participate in all movement sequences. Affect bright. Interactive. Also  participated in a structured task group this afternoon (baking cookies). Described herself as \"not a good baker,\" but amicably agreed to try and learn. Followed recipe with some assistance, problem solved when necessary, and cooperated with peers to execute the activity. Will be given a written self-assessment upon increased group attendance. More observation needed to complete initial evaluation at this time.       "

## 2017-02-24 PROCEDURE — 97150 GROUP THERAPEUTIC PROCEDURES: CPT | Mod: GO

## 2017-02-24 PROCEDURE — 25000132 ZZH RX MED GY IP 250 OP 250 PS 637: Performed by: STUDENT IN AN ORGANIZED HEALTH CARE EDUCATION/TRAINING PROGRAM

## 2017-02-24 PROCEDURE — 99232 SBSQ HOSP IP/OBS MODERATE 35: CPT | Mod: GC | Performed by: PSYCHIATRY & NEUROLOGY

## 2017-02-24 PROCEDURE — 25900017 H RX MED GY IP 259 OP 259 PS 637: Performed by: STUDENT IN AN ORGANIZED HEALTH CARE EDUCATION/TRAINING PROGRAM

## 2017-02-24 PROCEDURE — 12400001 ZZH R&B MH UMMC

## 2017-02-24 PROCEDURE — S0136 CLOZAPINE, 25 MG: HCPCS | Performed by: STUDENT IN AN ORGANIZED HEALTH CARE EDUCATION/TRAINING PROGRAM

## 2017-02-24 RX ADMIN — Medication 2000 UNITS: at 10:03

## 2017-02-24 RX ADMIN — CLOZAPINE 50 MG: 25 TABLET ORAL at 21:36

## 2017-02-24 RX ADMIN — PRENATAL VIT W/ FE FUMARATE-FA TAB 27-0.8 MG 1 TABLET: 27-0.8 TAB at 10:04

## 2017-02-24 ASSESSMENT — ACTIVITIES OF DAILY LIVING (ADL)
LAUNDRY: UNABLE TO COMPLETE
DRESS: INDEPENDENT
DRESS: PROMPTS
HYGIENE/GROOMING: INDEPENDENT
GROOMING: PROMPTS
ORAL_HYGIENE: PROMPTS
ORAL_HYGIENE: INDEPENDENT
LAUNDRY: WITH SUPERVISION

## 2017-02-24 NOTE — PROGRESS NOTES
CPS is involved with patient due to her thoughts of wanting to harm her children. CPS has required a call from team when patient is ready to discharge to [facility] [phone].

## 2017-02-24 NOTE — PLAN OF CARE
Problem: General Plan of Care (Inpatient Behavioral)  Goal: Team Discussion  Team Plan:   BEHAVIORAL TEAM DISCUSSION     Continued Stay Criteria/Rationale: New admission due to command auditory hallucinations   Plan: Patient is being titrated on clozapine to help with symptoms of psychosis.  Participants: Ewa Sandy [facility],   Dr. Danish Levi MD,  Dr. Soni Gillette MD,  Dr. Avery Freeman DO  Summary/Recommendation: Patient had complaints of dizziness when standing so she is encouraged to drink more water. Team discussed the option of going to IRT as her ACT team has encouraged and patient has reluctantly agreed to this so referrals will be made.   Medical/Physical: Dizziness   Progress: Little change

## 2017-02-24 NOTE — PROGRESS NOTES
02/24/17 9089   Behavioral Health   Hallucinations appears responding;auditory   Thinking distractable;poor concentration   Orientation person: oriented;place: oriented   Memory baseline memory   Insight poor   Judgement impaired   Eye Contact at examiner;into space   Affect blunted, flat   Mood mood is calm   Physical Appearance/Attire disheveled;multiple layers   Hygiene neglected grooming - unclean body, hair, teeth   Suicidality other (see comments)  (denies)   Self Injury other (see comment)  (denies)   Activity isolative;withdrawn;refusal   Speech clear;rambling   Medication Sensitivity orthostasis   Psychomotor / Gait slow;steady   Occupational Therapy   Type of Intervention structured groups   Response Participates with encouragement   Hours 2   Activities of Daily Living   Hygiene/Grooming independent   Oral Hygiene independent   Dress independent   Laundry unable to complete   Room Organization independent   Behavioral Health Interventions   Depression maintain safety precautions;monitor need to revise level of observation;maintain safe secure environment;assist patient in developing safety plan;assist patient in following safety plan;encourage participation / independence with adls;provide emotional support;establish therapeutic relationship;assist with developing and utilizing healthy coping strategies;build upon strengths;assess patient response to medication;monitor need for prn medication;monitor confusion, memory loss, decision making ability and reorient / intervene as needed;assess grief and loss issues   Social and Therapeutic Interventions (Depression) encourage socialization with peers;encourage effective boundaries with peers;encourage participation in therapeutic groups and milieu activities   Patient denies SI or SIB. Patient appears talking with herself, and responding to hallucination. Patient has complaints of dizziness and feeling tired when standing, finds more comfortable to walk  around. Patient appears isolative.

## 2017-02-24 NOTE — PROGRESS NOTES
Patient was isolative and withdrawn during shift. Pt's  visited and brought her some food. Minimally social with peers. Went to sleep early in the evening after having her BP checked. Pt stated that she feels dizzy from her medications. No safety concerns this shift.        02/23/17 2021   Behavioral Health   Hallucinations denies / not responding to hallucinations   Thinking distractable;poor concentration   Orientation person: oriented;place: oriented   Memory baseline memory   Insight poor   Judgement impaired   Eye Contact at examiner   Affect blunted, flat   Mood mood is calm   Physical Appearance/Attire multiple layers   Hygiene other (see comment)  (adequate)   Suicidality other (see comments)  (denies)   Self Injury other (see comment)  (denies)   Activity isolative;withdrawn   Speech clear   Medication Sensitivity orthostasis   Psychomotor / Gait balanced;steady   Activities of Daily Living   Hygiene/Grooming independent   Oral Hygiene independent   Dress independent   Room Organization independent   Activity   Activity Level of Assistance independent
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
## 2017-02-24 NOTE — PROGRESS NOTES
Nona took her morning meds shortly after eating breakfast in the lounge.  Took a nap in her room mid-morning.  States feeling tired and somewhat dizzy.  VS = lying down (97/56 with pulse of 96).  Sitting VS = 90/65 with pulse of 121.  She states feeling dizzy and unable to stand for long (hence no standing VS).  Dizziness initially began for patient yesterday.  She feels it is related to the increase in her Clozaril dose which increased from 12.5 mg to 50 mg in a few days.  Encouraged patient to increase her fluid intake and alert staff if sxs worsen or further questions or concerns.  Will notify MD about above.

## 2017-02-25 PROCEDURE — S0136 CLOZAPINE, 25 MG: HCPCS | Performed by: STUDENT IN AN ORGANIZED HEALTH CARE EDUCATION/TRAINING PROGRAM

## 2017-02-25 PROCEDURE — 25000132 ZZH RX MED GY IP 250 OP 250 PS 637: Performed by: STUDENT IN AN ORGANIZED HEALTH CARE EDUCATION/TRAINING PROGRAM

## 2017-02-25 PROCEDURE — 25900017 H RX MED GY IP 259 OP 259 PS 637: Performed by: STUDENT IN AN ORGANIZED HEALTH CARE EDUCATION/TRAINING PROGRAM

## 2017-02-25 PROCEDURE — 12400001 ZZH R&B MH UMMC

## 2017-02-25 RX ADMIN — CLOZAPINE 50 MG: 25 TABLET ORAL at 20:56

## 2017-02-25 RX ADMIN — PRENATAL VIT W/ FE FUMARATE-FA TAB 27-0.8 MG 1 TABLET: 27-0.8 TAB at 10:17

## 2017-02-25 RX ADMIN — Medication 2000 UNITS: at 10:17

## 2017-02-25 ASSESSMENT — ACTIVITIES OF DAILY LIVING (ADL)
LAUNDRY: WITH SUPERVISION
GROOMING: PROMPTS
ORAL_HYGIENE: INDEPENDENT
DRESS: STREET CLOTHES;INDEPENDENT
LAUNDRY: WITH SUPERVISION
DRESS: PROMPTS
ORAL_HYGIENE: PROMPTS
GROOMING: INDEPENDENT

## 2017-02-25 NOTE — PROGRESS NOTES
02/24/17 2100   Behavioral Health   Thoughts/Cognition (WDL) insight   Hallucinations denies / not responding to hallucinations   Thinking distractable;poor concentration   Orientation situation, disoriented   Memory baseline memory   Insight poor   Judgement impaired   Eye Contact at examiner   Affect blunted, flat   Mood mood is calm   Physical Appearance/Attire multiple layers;attire appropriate to age and situation   Hygiene neglected grooming - unclean body, hair, teeth   Suicidality other (see comments)   Self Injury other (see comment)   Activity isolative;withdrawn   Speech clear;coherent   Medication Sensitivity no stated side effects   Psychomotor / Gait slow;steady   Coping/Psychosocial   Verbalized Emotional State frustration   Plan Of Care Reviewed With patient   Patient Agreement with Plan of Care agrees   Psycho Education   Type of Intervention 1:1 intervention   Response participates with encouragement   Hours 0.5   Activities of Daily Living   Hygiene/Grooming prompts   Oral Hygiene prompts   Dress prompts   Laundry with supervision   Room Organization independent   Activity   Activity Level of Assistance independent       Patient spent the evening in her room sleeping/napping withdrawn and isolated from peers and staff. Mood was calm and quiet. Affect was tense and blunted. She ate dinner and snack in common area with peers. She was prompted for group but decline . Patient was pleasant on approached.

## 2017-02-25 NOTE — PROGRESS NOTES
02/25/17 1300   Behavioral Health   Thoughts/Cognition (WDL) insight   Hallucinations denies / not responding to hallucinations   Thinking distractable   Orientation situation, disoriented   Memory baseline memory   Insight poor   Judgement impaired   Eye Contact at examiner   Affect blunted, flat   Mood mood is calm   Physical Appearance/Attire posture rigid   Hygiene neglected grooming - unclean body, hair, teeth   Suicidality other (see comments)   Self Injury other (see comment)   Activity isolative;withdrawn   Speech clear;coherent   Medication Sensitivity no stated side effects   Psychomotor / Gait slow;steady   Coping/Psychosocial   Verbalized Emotional State frustration   Plan Of Care Reviewed With patient   Patient Agreement with Plan of Care agrees   Psycho Education   Type of Intervention 1:1 intervention   Response participates with encouragement   Hours 0.5   Activities of Daily Living   Hygiene/Grooming prompts   Oral Hygiene prompts   Dress prompts   Laundry with supervision   Room Organization prompts   Activity   Activity Level of Assistance independent     Patient spent the morning in her room withdrawn and isolated from peers and staff,sleeping/napping.   Mood was calm and quiet but upon approached patient is pleasant. Affect was tense and blunted. She came out for her breakfast and  Lunch.

## 2017-02-26 PROCEDURE — 25000132 ZZH RX MED GY IP 250 OP 250 PS 637: Performed by: STUDENT IN AN ORGANIZED HEALTH CARE EDUCATION/TRAINING PROGRAM

## 2017-02-26 PROCEDURE — 25900017 H RX MED GY IP 259 OP 259 PS 637: Performed by: STUDENT IN AN ORGANIZED HEALTH CARE EDUCATION/TRAINING PROGRAM

## 2017-02-26 PROCEDURE — S0136 CLOZAPINE, 25 MG: HCPCS | Performed by: STUDENT IN AN ORGANIZED HEALTH CARE EDUCATION/TRAINING PROGRAM

## 2017-02-26 PROCEDURE — 12400001 ZZH R&B MH UMMC

## 2017-02-26 RX ORDER — CLOZAPINE 25 MG/1
25 TABLET ORAL AT BEDTIME
Status: DISCONTINUED | OUTPATIENT
Start: 2017-02-26 | End: 2017-03-07 | Stop reason: HOSPADM

## 2017-02-26 RX ORDER — CLOZAPINE 25 MG/1
25 TABLET ORAL 2 TIMES DAILY
Status: DISCONTINUED | OUTPATIENT
Start: 2017-02-26 | End: 2017-02-26

## 2017-02-26 RX ADMIN — CLOZAPINE 25 MG: 25 TABLET ORAL at 20:11

## 2017-02-26 RX ADMIN — PRENATAL VIT W/ FE FUMARATE-FA TAB 27-0.8 MG 1 TABLET: 27-0.8 TAB at 09:50

## 2017-02-26 RX ADMIN — Medication 2000 UNITS: at 09:50

## 2017-02-26 ASSESSMENT — ACTIVITIES OF DAILY LIVING (ADL)
DRESS: INDEPENDENT
ORAL_HYGIENE: INDEPENDENT
GROOMING: INDEPENDENT
ORAL_HYGIENE: INDEPENDENT
LAUNDRY: WITH SUPERVISION
DRESS: INDEPENDENT;STREET CLOTHES
GROOMING: INDEPENDENT
LAUNDRY: WITH SUPERVISION

## 2017-02-26 NOTE — PROGRESS NOTES
Brief Resident Cross-Cover Note:    S: Ms. Finley has been feeling light-headed and dizzy while standing. Started clozapine titration a few days ago. PO-ing well. She is walking about the unit w/o issue. She becomes dizzy when standing up or when standing still.    O:    Vital reviewed    2/25:   sitting 105/75 --> standing 67/52, pulse 82-->107  104/56-->80/30, pulse 107-->134  2/26:  107/60-->68/50, pulse 106-->117    A/P:    Primary team is aware of this issue. Her symptoms seem to have progressed over the last 2 days. Likely to be orthostatic hypotension related to clozapine, which typically resolves with time. Discussed with Dr. Valdes.    --Decreased clozapine dosing to 25mg at bed time  --Increase salt intake: ordered Gatorades for between meals  --Primary team will re-assess in the AM    --  Griffin Pagan MD, MPH  Psychiatry PGY2
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
## 2017-02-26 NOTE — PLAN OF CARE
"Problem: Depressive Symptoms  Goal: Depressive Symptoms  Signs and symptoms of listed problems will be absent or manageable.   Outcome: Improving  Nona did not attend community meeting. She came out for dinner and to watch TV. She agreed to talk briefly to this nurse. She smiled and said that she no longer hears any voices. But, she says the medication has been making her feel dizzy, especially in the morning when she gets up. She has been making an effort to drink more liquids, but says is still dizzy in the morning. She said that it has been suggested that she go to a placement for about 3 months. She looked sad then and said \"I have a 5 month old daughter.\" This nurse encouraged her to take the time that she needs to heal, especially since she has 6 children. She smiled and said that her  wants her to return home to care for the house and the children. She says that her mother in law is helping him with the children. She indicates that no one at home is attending to the cleaning. Says that when she does return home, it will be terrible to deal with the condition of the house- after more than 3 months away. This nurse tried to encourage her that it is OK to take care of herself so that she will better be able to help her family later.       "

## 2017-02-26 NOTE — PROGRESS NOTES
Pt was calm and present in the milieu. Pt spent some time pacing the halls and watching movies. Pt mentioned dizziness and had noticeable trouble standing still during vitals. Staff has been giving pt snacks throughout the shift and she has a pitcher in her room that she prefers to be filled with apple juice. Pt denies hallucination and SI/SIB.      02/26/17 1427   Behavioral Health   Hallucinations denies / not responding to hallucinations   Thinking distractable   Orientation person: oriented;place: oriented;date: oriented   Memory baseline memory   Insight poor   Judgement impaired   Eye Contact at examiner   Affect full range affect   Mood mood is calm   Physical Appearance/Attire attire appropriate to age and situation   Hygiene well groomed   Suicidality other (see comments)  (denies)   Self Injury other (see comment)  (denies)   Activity withdrawn   Speech clear;coherent   Medication Sensitivity other (see comment)  (dizziness)   Psychomotor / Gait slow;steady   Psycho Education   Type of Intervention 1:1 intervention   Response participates, initiates socially appropriate   Hours 0.5   Treatment Detail Check in    Activities of Daily Living   Hygiene/Grooming independent   Oral Hygiene independent   Dress independent;street clothes   Laundry with supervision   Room Organization independent

## 2017-02-27 LAB — HCG UR QL: NEGATIVE

## 2017-02-27 PROCEDURE — 25900017 H RX MED GY IP 259 OP 259 PS 637: Performed by: STUDENT IN AN ORGANIZED HEALTH CARE EDUCATION/TRAINING PROGRAM

## 2017-02-27 PROCEDURE — 25000132 ZZH RX MED GY IP 250 OP 250 PS 637: Performed by: STUDENT IN AN ORGANIZED HEALTH CARE EDUCATION/TRAINING PROGRAM

## 2017-02-27 PROCEDURE — 81025 URINE PREGNANCY TEST: CPT | Performed by: STUDENT IN AN ORGANIZED HEALTH CARE EDUCATION/TRAINING PROGRAM

## 2017-02-27 PROCEDURE — 12400001 ZZH R&B MH UMMC

## 2017-02-27 PROCEDURE — 99221 1ST HOSP IP/OBS SF/LOW 40: CPT | Performed by: PHYSICIAN ASSISTANT

## 2017-02-27 PROCEDURE — S0136 CLOZAPINE, 25 MG: HCPCS | Performed by: STUDENT IN AN ORGANIZED HEALTH CARE EDUCATION/TRAINING PROGRAM

## 2017-02-27 PROCEDURE — 99232 SBSQ HOSP IP/OBS MODERATE 35: CPT | Mod: GC | Performed by: PSYCHIATRY & NEUROLOGY

## 2017-02-27 RX ADMIN — CLOZAPINE 25 MG: 25 TABLET ORAL at 20:22

## 2017-02-27 RX ADMIN — PRENATAL VIT W/ FE FUMARATE-FA TAB 27-0.8 MG 1 TABLET: 27-0.8 TAB at 09:00

## 2017-02-27 RX ADMIN — Medication 2000 UNITS: at 09:00

## 2017-02-27 ASSESSMENT — ACTIVITIES OF DAILY LIVING (ADL)
ORAL_HYGIENE: INDEPENDENT
DRESS: INDEPENDENT
GROOMING: HANDWASHING;SHOWER;INDEPENDENT
ORAL_HYGIENE: INDEPENDENT
DRESS: STREET CLOTHES;INDEPENDENT
LAUNDRY: WITH SUPERVISION
LAUNDRY: WITH SUPERVISION
GROOMING: INDEPENDENT

## 2017-02-27 NOTE — CONSULTS
"[facility]  Internal Medicine Consult     Nona Finley MRN# [id number]   Age: 36 year old YOB: 1980     Date of Admission: 2/19/2017  Date of Consult:  2/27/2017    Requesting Service: Behavioral Health - Danish Levi MD         Reason for Consult:   \"Variability in resting HR, prior abnormal EKGs\"         Assessment and Recommendations:   Noan Finley is a 36 year old female with a pmh of schizoaffective disorder who was admitted to [facility] for suicidal ideation. Internal medicine was asked to see this patient in regards to a variable resting HR and concern for previous abnormal EKG's.    # Schizoaffective disorder. Management per primary team, psychiatry.       Variable HR; Hx symptomatic hypotension. Patient started on Clozapine 25 mg on 2/21, titrated to 50 mg. Noted to be symptomatically hypotensive on 2/24,2/26, with no improvement with increased PO intake. Of note, hypotension and tachycardia or known AEs of Clozapine.  Today Clozapine decreased to 25 mg. Patient endorses feeling significantly better at this lower dose. At higher dose admits to feeling nauseated, dizzy, and lightheaded. Significant correlation between symptomatic hypotension and Clozapine dose. EKG performed 2/21, 2/22 with nonspecific findings of ?atrial enlargement, no significant change from prior and no signs of tachyarrhythmia. Patient denies any cardiac hx, or hx of chest pain, sob, palpitations or syncope. No signs of fluid overload on exam.   - Agree with decreased dose of Clozapine, would consider alternative agent if low dose is not therapeutic.  - Encourage PO intake  - Difficult to inteperate orthostatic readings, would encourage nursing staff to be sure to wait a full 120 seconds after standing to recheck BP and HR.  - Repeat EKG if sustained tachycardia >120  - No indication for further cardiac testing at this time       Internal medicine will continue to follow for variable HR and " orthostatic hypotension             History of Present Illness:   Nona Finley is a 36 year old female with a pmh of schizoaffective disorder who was admitted to [facility] for suicidal ideation. Internal medicine was asked to see this patient in regards to a variable resting HR and concern for previous abnormal EKG.    On admission, patients psychiatric symptoms were managed on PTA fluoxetine and prn olanzapine. On 2/21 primary team initiated Clozapine at 25 mg, titrating dose to 50 mg. On 2/24 she experienced symptomatic orthostatic hypotension. Increased fluid intake was encouraged. On 2/26 she noted that she continued to have symptomatic hypotension (lightheadedness, dizziness), and primary team decreased dose of Clozapine to 25 mg.     Upon interview, patient denies any history of cardiac or pulmonary disease. She denies currently, or having ever experienced chest pain or palpitations. No difficulty breathing, shortness of breath, or LE swelling. She does note that when her Clozapine dose was increased, she felt very dizzy and nauseated. These symptoms have resolved with reduction in dosing.          Review of Systems:   A 10 point review of systems was performed and is negative unless otherwise noted in HPI.          Past Medical History:     Past Medical History   Diagnosis Date     Bipolar 1 disorder (H)      Bipolar affective disorder, depressed, severe, with psychotic behavior (H)      NONSPECIFIC MEDICAL HISTORY      h/o +PPD. Neg CXR 2006     Postpartum depression 8/18/2011     Tuberculosis      Varicosities              Past Surgical History:      Past Surgical History   Procedure Laterality Date     No history of surgery       No history of surgery  06/13/2013     Per patient reported this             Family History:   Family history was reviewed.      Family History   Problem Relation Age of Onset     Neurologic Disorder Brother      mental health problem?     Neurologic Disorder Sister      seizures  (half sister)     Respiratory Paternal Grandfather      asthma     Respiratory Father      asthma     Respiratory Sister      asthma     Respiratory Brother      asthma     Neurologic Disorder Daughter      autism      Hypertension Maternal Grandmother      CEREBROVASCULAR DISEASE Mother      Neurologic Disorder Sister      seizures     DIABETES Maternal Grandfather      Coronary Artery Disease Son              Social History:     Social History   Substance Use Topics     Smoking status: Never Smoker     Smokeless tobacco: Never Used     Alcohol use No             Medications:     No current facility-administered medications on file prior to encounter.   Current Outpatient Prescriptions on File Prior to Encounter:  OLANZapine (ZYPREXA) 5 MG tablet Take 1 tablet (5 mg) by mouth 2 times daily as needed   Paliperidone Palmitate (INVEGA SUSTENNA IM) Inject 234 mg into the muscle every 28 days    FLUoxetine HCl (PROZAC PO) Take 20 mg by mouth daily   DiphenhydrAMINE HCl (BENADRYL PO) Take 25-50 mg by mouth daily as needed (EPS)    Prenatal Vit-Fe Fumarate-FA (PRENATAL MULTIVITAMIN  PLUS IRON) 27-0.8 MG TABS per tablet Take 1 tablet by mouth daily   Cholecalciferol (VITAMIN D) 2000 UNITS tablet Take 2,000 Units by mouth daily   ibuprofen (ADVIL,MOTRIN) 600 MG tablet Take 1 tablet (600 mg) by mouth every 6 hours as needed for moderate pain       Current Facility-Administered Medications   Medication     cloZAPine (CLOZARIL) tablet 25 mg     [START ON 3/6/2017] paliperidone (INVEGA SUSTENNA) injection SUSP 234 mg     cholecalciferol (vitamin D) tablet 2,000 Units     diphenhydrAMINE (BENADRYL) capsule 25-50 mg     ibuprofen (ADVIL/MOTRIN) tablet 600 mg     OLANZapine (zyPREXA) tablet 5 mg     prenatal multivitamin  plus iron per tablet 1 tablet     OLANZapine (zyPREXA) tablet 10 mg    Or     OLANZapine (zyPREXA) injection 10 mg     acetaminophen (TYLENOL) tablet 650 mg     alum & mag hydroxide-simethicone (MYLANTA ES/MAALOX  " ES) suspension 30 mL     magnesium hydroxide (MILK OF MAGNESIA) suspension 30 mL     traZODone (DESYREL) tablet 50 mg     melatonin tablet 3 mg            Allergies:   No Known Allergies          Physical Exam:   BP (!) 89/54  Pulse 71  Temp 98.5  F (36.9  C) (Tympanic)  Resp 16  Ht 1.676 m (5' 6\")  Wt 64 kg (141 lb)  LMP 12/16/2016  SpO2 98%  BMI 22.76 kg/m2   GENERAL: Alert and oriented x 3. NAD.   HEENT: Anicteric sclera. PERRL. Mucous membranes moist.   CV: RRR. S1, S2. No murmurs appreciated.   RESPIRATORY: Effort normal. Lungs CTAB with no wheezing, rales, rhonchi.   GI: Abdomen soft and non distended with normoactive bowel sounds present in all quadrants. No tenderness, rebound, guarding.   MUSCULOSKELETAL: No joint swelling or tenderness.   NEUROLOGICAL: No focal deficits. Moves all extremities.   EXTREMITIES: No peripheral edema. Intact bilateral pedal pulses.   SKIN: No jaundice. No rashes.          Labs:   CBC:  Recent Labs   Lab Test  02/21/17   1624   WBC  5.4   RBC  4.34   HGB  13.3   HCT  40.3   MCV  93   MCH  30.6   MCHC  33.0   RDW  13.6   PLT  304       CMP:  Recent Labs   Lab Test  02/17/17   0737  11/05/16   0759   NA  140  141   POTASSIUM  4.1  4.0   CHLORIDE  108  105   BRIANNA  8.9  9.3   CO2  24  29   BUN  21  9   CR  0.54  0.49*   GLC  83  85   AST   --   22   ALT   --   25   BILITOTAL   --   0.4   ALBUMIN   --   3.4   PROTTOTAL   --   8.2   ALKPHOS   --   60       TSH:  TSH   Date Value Ref Range Status   11/05/2016 0.61 0.40 - 4.00 mU/L Final                  EKG/Imaging:   EKG 2/21/17  Vent. rate 54 BPM  AL interval 136 ms  QRS duration 76 ms  QT/QTc 408/386 ms  P-R-T axes 83 73 53  Sinus bradycardia with sinus arrhythmia  Possible Left atrial enlargement  Borderline ECG  When compared with ECG of 02-OCT-2012 14:07,  Vent. rate has decreased BY 33 BPM  T wave inversion less evident in Anterior leads  QT has shortened    EKG 2/22/17  Vent. rate 65 BPM  AL interval 148 ms  QRS duration 76 " ms  QT/QTc 386/401 ms  P-R-T axes 80 74 65  Sinus rhythm with sinus arrhythmia  Possible Left atrial enlargement  Borderline ECG  When compared with ECG of 21-FEB-2017 15:44,  No significant change was found        Cindi iWtt PA-C  Hospitalist Service  [phone]

## 2017-02-27 NOTE — PROGRESS NOTES
"   02/27/17 1200   Behavioral Health   Hallucinations denies / not responding to hallucinations   Thinking intact   Orientation person: oriented;place: oriented   Memory baseline memory   Insight insight appropriate to situation   Judgement intact   Eye Contact at examiner   Affect full range affect   Mood mood is calm   Physical Appearance/Attire neat   Hygiene well groomed   Suicidality other (see comments)  (reported none )   Self Injury other (see comment)  (reported none )   Activity isolative   Speech clear;coherent   Activities of Daily Living   Hygiene/Grooming independent   Oral Hygiene independent   Dress independent   Laundry with supervision   Room Organization independent       Patient steven SI and SIB.  Patient seems calm, isolative spend most of shift in room.  Patient reported feeling tired from a scale 1-10 a 6.  Patient wants to get in touch with her .  Patient did not have any daily goals or concerns.  Patient goal after discharge is to go to urban housing.  Patient stated \" they told me I can't go home after this I have to go to urban housing for three months, but I have a baby at home.\"    "

## 2017-02-27 NOTE — PROGRESS NOTES
----------------------------------------------------------------------------------------------------------  [facility], [place]   Psychiatric Progress Note     Assessment    Presentation:  Nona Chatterjee is a 35yo Lao female w a h/o Schizoaffective Disorder who was BIB her  after he found her brandishing a knife and stating she is going to kill 6 children and herself with it. She was recently discharged from [facility] for SI 2 days ago after a brief hospitalization. She states that her medications are no longer working for her. Prior to Friday, she had not been hospitalized since October following the birth of her new daughter.  She has 6 children.  She has had good adherence since discharging but the AH simply aren't going away as they have in the past with medications. Her ACT team recently increased her long-acting paliperidone to 234mg. Her mood has been good of late, but the 's feeding schedule has been disruptive to her sleep cycle. She does not want to kill her children or herself, but the CAH were overwhelming the morning of admission.    Diagnostic Impression:  Nona Finley is a 36 year old female with h/o Schizoaffective disorder who presented to the [facility] following command hallucinations telling her to kill herself and her children in the context of medication inefficacy, disrupted sleep, and stressful home (6 kids, one of whom has autism). The patient's last psychiatric hospitalization was 2 days prior to this admission. The patient is currently followed by [facility] with psychiatrist Walter Gaviria. Has had ECT at this facility in . Family history is notable for schizophrenia. Current psychosocial stressors include raising multiple children,  away at medical school in the [employer], and chronic mental illness. The patient denies drug abuse and self injurious behaviors. The MSE is notable for command hallucinations, SI, and HI.  "Admission warranted to maintain safety and identify a new antipsychotic medication for managing her psychotic symptoms.    Hospital course: Nona Finley was admitted to [facility] on a 72-hr hold.  PTA fluoxetine and PRN olanzapine were restarted.  During the initial assessment interview on the first morning of admission the patient endorsed being closely followed by [facility] team and we agreed to speak with her ACT team regarding possible initiation of clozapine.  On 2/21 the team spoke with the [facility] psychiatry resident who works on the ACT team with the patient, Dr. Yuridia Rod, and she stated that they had previously considered starting clozapine, endorsing approval of our team initiating clozapine treatment this admission.  She also agreed with discontinuing fluoxetine due to possible activation of david. Clozapine was started on 2/21 and titrated up as tolerated.  On 2/23 the patient agreed to sign in voluntarily.  Additionally on that date she endorsed having already seen improvements in psychotic symptoms with two doses of clozapine. On 2/24, she again noted the absence of AH/SIB, however did endorse symptomatic orthostatic hypotension (BP found to be 70s/50s on standing this AM, a/w dizziness). Encouraged vigilant fluid intake on this date, and will maintain clozaril dose at 50 mg for this reason. In addition, her  visited on 2/23 who shared with her that CPS felt it was unsafe for her to return directly home. Thus, following a meeting with representatives from her ACT team (also on 2/23), she thinks it \"would be a good idea\" for her to spend time in transitional housing (such as an [facility]) immediately following discharge. Our team agreed with this, and stated that we will begin making arrangements for this moving forward.  On 2/26 the patient had demonstrated continued light-headedness with observed orthostatic hypotension and so the decision was made to hold clozapine dose at 25 mg " during continued assessment of her tolerance to this agent.  On 2/27, she reported subjective improvement in light-headedness but with continued orthostatic tachycardia.  Also of note, on that date her willingness to discharge to an ITRs seemed to have waned somewhat and a conversation regarding CPS's rationale on this matter was conducted with the patient demonstrating understanding.    Medical course: Patient endorsed preferring to continue Depo-Provera versus beginning OCP's.  On 2/23 Depo-Provera 150 mg injection was given.  On 2/27 Internal Medicine was consulted to evaluate the patient's EKG's, vitals and cardiac history due to marked orthostatic side effects from clozapine and to weight in on whether the patient might appear to have any underlying cardiac disease.    Plan     Principal Diagnosis: Schizoaffective disorder    Medications:  Clozapine 25 mg qHS  Paliperidone 234mg [id number] (last on 2/6, next on 3/6)  Olanzapine 5mg BID PRN; 10mg PO/IM for behavioral events    Labs: previous admission labs reviewed/wnl; Utox/HCG negative.  Repeat HCG today due to lag in Depo-Provera coverage.  Consults pending: none   Treatment in therapeutic milieu with individual/group therapies as described.     Medical diagnoses to be addressed this admission (Problem-Based): None     Relevant psychosocial stressors: severe including motherhood, chronic MI    Legal Status: Voluntary; CPS involvement - notify prior to discharge    Safety Assessment:   Checks: Status 15  Precautions: Suicide  Pt has not required locked seclusion or restraints in the past 24 hours to maintain safety, please refer to RN documentation for further details.    The risks, benefits, alternatives and side effects have been discussed and are understood by the patient and other caregivers.    Anticipated Disposition/Discharge Date: TBD pending stabilization and the formulation of an appropriate discharge plan    Patient has been seen and evaluated by me,  "Avery Freeman DO, Psychiatry Resident, PGY1.    Avery Freeman DO  Resident Psychiatrist, PGY-1  [facility] Psychiatry    Attestation:  This patient has been seen and evaluated by me, Allison Coombs.  I have discussed this patient with the psychiatry resident and I agree with the findings and plan in this note.    I have reviewed today's vital signs, medications, labs and imaging. Allison Coombs MD.         Interim History:   The patient's care was discussed with the treatment team and chart notes were reviewed.     Sleep: Friday 7 hrs; Sat 7 hrs: Sun 7 hrs  PRN's: Fri-Sunday: None  Staff Notes: On Friday, patient was observed responding to internal stimuli, speaking to herself in the milieu.  Reported dizziness, was isolative and declined groups.  Saturday her dizziness upon standing and with prolonged standing continued and this occurred on Sunday morning as well, but appeared to lessen throughout the day on Sunday.  It was charted that she was responsive to suggestions that she drink plenty of fluids, having taken in almost a liter during early AM on Sunday alone.    Interview:  Patient met with the treatment team in her room this AM.  Her affect was mildly blunted but much more well-modulated than in previous interviews and she smiled several times.  She denied having had any voices since Thursday of last week \"after that night that I had 50 mg\" of clozapine.  We reviewed her recent dose adjustments, including that the 50 mg had been lowered to 25 mg on Sunday due to her continued orthostatic side effects.  She did state that while she is still feeling very tired from the medication, her lightheadedness has improved since yesterday morning.  As the interview progressed, the patient stated that she had changed her mind about discharging to an ITRs and said that with further consideration, she now feels that she would like to go home after \"I'm done here.\"  (It is of note that the patient's  stated on " "Friday, after the team had met with the patient, his desire for her to return home after discharge because of difficulty arranging childcare for their six children.)  The patient's desire to return home was validated at length, and she was invited to tell the team about her children.  She was also reminded of CPS's rationale for her transitioning through an IRTs on her way to returning home, e.g. to make certain that everyone, including herself, is safe and ready to be reunited, especially given how acute and frightening her decompensation had been (with thoughts of harm to self and children).  As the meeting ended, she agreed to proceed as planned with seeking an IRTs placement.    Review of systems:     The 10 point Review of Systems is negative other than noted in the HPI         Medications:     Current Facility-Administered Medications   Medication     cloZAPine (CLOZARIL) tablet 25 mg     [START ON 3/6/2017] paliperidone (INVEGA SUSTENNA) injection SUSP 234 mg     cholecalciferol (vitamin D) tablet 2,000 Units     diphenhydrAMINE (BENADRYL) capsule 25-50 mg     ibuprofen (ADVIL/MOTRIN) tablet 600 mg     OLANZapine (zyPREXA) tablet 5 mg     prenatal multivitamin  plus iron per tablet 1 tablet     OLANZapine (zyPREXA) tablet 10 mg    Or     OLANZapine (zyPREXA) injection 10 mg     acetaminophen (TYLENOL) tablet 650 mg     alum & mag hydroxide-simethicone (MYLANTA ES/MAALOX  ES) suspension 30 mL     magnesium hydroxide (MILK OF MAGNESIA) suspension 30 mL     traZODone (DESYREL) tablet 50 mg     melatonin tablet 3 mg             Allergies:   No Known Allergies         Psychiatric Examination:   BP (!) 89/54  Pulse 71  Temp 98.5  F (36.9  C) (Tympanic)  Resp 16  Ht 1.676 m (5' 6\")  Wt 64 kg (141 lb)  LMP 12/16/2016  SpO2 98%  BMI 22.76 kg/m2   Orthostatic vitals: Sitting /63, sitting pulse 82 bpm. Standing /53, standing pulse 130 bpm.   Weight is 141 lbs 0 oz  Body mass index is 22.76 " kg/(m^2).    Appearance:  awake, alert and neatly groomed with head covering/[place]oc[place] dress   Attitude:  cooperative  Eye Contact:  good  Mood: calm, sad about not going right home  Affect: mildly constricted mobility but improved with a few smiles  Speech:  clear, coherent  Psychomotor Behavior:  no evidence of tardive dyskinesia, dystonia, or tics and intact station, gait and muscle tone  Thought Process:  linear  Associations:  no loose associations  Thought Content:  denied AVH and SIB.   Insight:  fair  Judgment:  limited but noticeable improvement  Oriented to:  time, person, and place  Attention Span and Concentration:  intact  Recent and Remote Memory:  fair  Language: clear English, Moroccan accent  Fund of Knowledge: appropriate  Muscle Strength and Tone: normal  Gait and Station: Normal         Labs:     2/19 Utox and Uhcg negative  2/21 EKG Sinus arrhythmia rate 54   2/21 CBC normal; WBC 5.4 and ANC 2900; ; CRP <2.9

## 2017-02-27 NOTE — PROGRESS NOTES
Pt was positive and social with staff and peers. She was visited by her brother and two other family members, stated it was a good visit. Pt said she did not feel as dizzy  as she did this AM and is having a good day. Pt denies SI/SIB and hallucinations.      02/26/17 1855   Behavioral Health   Hallucinations denies / not responding to hallucinations   Thinking distractable   Orientation person: oriented;place: oriented;date: oriented   Memory baseline memory   Insight poor   Judgement impaired   Eye Contact at examiner   Affect full range affect   Mood mood is calm   Physical Appearance/Attire attire appropriate to age and situation   Hygiene well groomed   Suicidality other (see comments)  (denies)   Self Injury other (see comment)  (denies)   Activity restless   Speech clear;coherent   Medication Sensitivity other (see comment)  (dizziness)   Psychomotor / Gait slow;steady   Activities of Daily Living   Hygiene/Grooming independent   Oral Hygiene independent   Dress independent   Laundry with supervision   Room Organization independent   Activity   Activity Level of Assistance independent

## 2017-02-27 NOTE — PROGRESS NOTES
Nona denies dizziness today-states has been consuming adequate fluids-tachycardia continues with standing measure, but orthostatic hypotension not present this AM-

## 2017-02-27 NOTE — PROGRESS NOTES
[facility] ph: [phone]     Voicemail left on green team CTC phone at 1:00pm from the above. She stated in message that received phone message from [place] about plan to look into IRTS placement. Requested call back to discuss patient current symptoms / status. Also stated would like to share information from the last visit she had with Nona.     This writer called the above worker back at stated number. Left her voicemail informing her I am covering for patient's case today, if calling back today can call my phone directly and left number. Also stated if needed to talk to team RN or psychiatrist can call  and ask if available to speak with. Left  number as well.

## 2017-02-28 ENCOUNTER — APPOINTMENT (OUTPATIENT)
Dept: GENERAL RADIOLOGY | Facility: CLINIC | Age: 37
DRG: 885 | End: 2017-02-28
Attending: PHYSICIAN ASSISTANT
Payer: COMMERCIAL

## 2017-02-28 LAB
ALBUMIN SERPL-MCNC: 3.3 G/DL (ref 3.4–5)
ALP SERPL-CCNC: 56 U/L (ref 40–150)
ALT SERPL W P-5'-P-CCNC: 30 U/L (ref 0–50)
ANION GAP SERPL CALCULATED.3IONS-SCNC: 8 MMOL/L (ref 3–14)
AST SERPL W P-5'-P-CCNC: 22 U/L (ref 0–45)
BASOPHILS # BLD AUTO: 0 10E9/L (ref 0–0.2)
BASOPHILS NFR BLD AUTO: 0.3 %
BILIRUB SERPL-MCNC: 0.2 MG/DL (ref 0.2–1.3)
BUN SERPL-MCNC: 18 MG/DL (ref 7–30)
CALCIUM SERPL-MCNC: 8.7 MG/DL (ref 8.5–10.1)
CHLORIDE SERPL-SCNC: 108 MMOL/L (ref 94–109)
CO2 SERPL-SCNC: 26 MMOL/L (ref 20–32)
CREAT SERPL-MCNC: 0.62 MG/DL (ref 0.52–1.04)
DIFFERENTIAL METHOD BLD: NORMAL
EOSINOPHIL # BLD AUTO: 0.2 10E9/L (ref 0–0.7)
EOSINOPHIL NFR BLD AUTO: 2.4 %
GFR SERPL CREATININE-BSD FRML MDRD: ABNORMAL ML/MIN/1.7M2
GLUCOSE SERPL-MCNC: 122 MG/DL (ref 70–99)
IMM GRANULOCYTES # BLD: 0 10E9/L (ref 0–0.4)
IMM GRANULOCYTES NFR BLD: 0.3 %
LACTATE SERPL-SCNC: 1.8 MMOL/L (ref 0.4–2)
LYMPHOCYTES # BLD AUTO: 2 10E9/L (ref 0.8–5.3)
LYMPHOCYTES NFR BLD AUTO: 32.7 %
MONOCYTES # BLD AUTO: 0.3 10E9/L (ref 0–1.3)
MONOCYTES NFR BLD AUTO: 5.5 %
NEUTROPHILS # BLD AUTO: 3.6 10E9/L (ref 1.6–8.3)
NEUTROPHILS NFR BLD AUTO: 58.8 %
NRBC # BLD AUTO: 0 10*3/UL
NRBC BLD AUTO-RTO: 0 /100
POTASSIUM SERPL-SCNC: 3.8 MMOL/L (ref 3.4–5.3)
PROT SERPL-MCNC: 7.4 G/DL (ref 6.8–8.8)
SODIUM SERPL-SCNC: 142 MMOL/L (ref 133–144)
WBC # BLD AUTO: 6.1 10E9/L (ref 4–11)

## 2017-02-28 PROCEDURE — 80053 COMPREHEN METABOLIC PANEL: CPT | Performed by: PHYSICIAN ASSISTANT

## 2017-02-28 PROCEDURE — 83605 ASSAY OF LACTIC ACID: CPT | Performed by: PHYSICIAN ASSISTANT

## 2017-02-28 PROCEDURE — S0136 CLOZAPINE, 25 MG: HCPCS | Performed by: STUDENT IN AN ORGANIZED HEALTH CARE EDUCATION/TRAINING PROGRAM

## 2017-02-28 PROCEDURE — 71010 XR CHEST PORT 1 VW: CPT

## 2017-02-28 PROCEDURE — 87040 BLOOD CULTURE FOR BACTERIA: CPT | Performed by: PHYSICIAN ASSISTANT

## 2017-02-28 PROCEDURE — 36415 COLL VENOUS BLD VENIPUNCTURE: CPT | Performed by: PSYCHIATRY & NEUROLOGY

## 2017-02-28 PROCEDURE — 85048 AUTOMATED LEUKOCYTE COUNT: CPT | Performed by: PSYCHIATRY & NEUROLOGY

## 2017-02-28 PROCEDURE — 99232 SBSQ HOSP IP/OBS MODERATE 35: CPT | Mod: GC | Performed by: PSYCHIATRY & NEUROLOGY

## 2017-02-28 PROCEDURE — 85004 AUTOMATED DIFF WBC COUNT: CPT | Performed by: PSYCHIATRY & NEUROLOGY

## 2017-02-28 PROCEDURE — 25900017 H RX MED GY IP 259 OP 259 PS 637: Performed by: STUDENT IN AN ORGANIZED HEALTH CARE EDUCATION/TRAINING PROGRAM

## 2017-02-28 PROCEDURE — 36415 COLL VENOUS BLD VENIPUNCTURE: CPT | Performed by: PHYSICIAN ASSISTANT

## 2017-02-28 PROCEDURE — 12400001 ZZH R&B MH UMMC

## 2017-02-28 PROCEDURE — 25000132 ZZH RX MED GY IP 250 OP 250 PS 637: Performed by: STUDENT IN AN ORGANIZED HEALTH CARE EDUCATION/TRAINING PROGRAM

## 2017-02-28 RX ADMIN — Medication 2000 UNITS: at 08:52

## 2017-02-28 RX ADMIN — PRENATAL VIT W/ FE FUMARATE-FA TAB 27-0.8 MG 1 TABLET: 27-0.8 TAB at 08:53

## 2017-02-28 RX ADMIN — CLOZAPINE 25 MG: 25 TABLET ORAL at 19:56

## 2017-02-28 ASSESSMENT — ACTIVITIES OF DAILY LIVING (ADL)
GROOMING: INDEPENDENT
LAUNDRY: WITH SUPERVISION
LAUNDRY: WITH SUPERVISION
GROOMING: INDEPENDENT
ORAL_HYGIENE: INDEPENDENT
ORAL_HYGIENE: INDEPENDENT
DRESS: INDEPENDENT;STREET CLOTHES
DRESS: INDEPENDENT

## 2017-02-28 NOTE — PROGRESS NOTES
"Brief Medicine Note    Internal Medicine following this patient in regards to variable HR and orthostatic hypotension.  Patient first noted to be orthostatic on 2/25, and thought secondary to increased dose of Clozapine (50 mg). Dose since decreased to 25 mg. Patient endorses feeling significantly better at this lower dose. Continues to be hypotensive and intermittently tachycardic. Most recent BP 92/61 with pulse 106. Also note that elevated temp to 100.2 evening of 2/27 and 99.0 this am. Will start a basic infectious workup as this could also explain hypotension.     Plan:  - CBC, CMP, Lactate, ordered  - UA, BCx  - CXR    Internal Medicine will follow for above results.     BP 92/61 (BP Location: Left arm)  Pulse 106  Temp 97.8  F (36.6  C) (Oral)  Resp 16  Ht 1.676 m (5' 6\")  Wt 64 kg (141 lb)  LMP 12/16/2016  SpO2 98%  BMI 22.76 kg/m2      Cindi Witt PA-C  Hospitalist Service  Pager [phone]      "

## 2017-02-28 NOTE — PLAN OF CARE
"Problem: Depressive Symptoms  Goal: Depressive Symptoms  Signs and symptoms of listed problems will be absent or manageable.   Outcome: Improving  Nona did not attend community meeting. She has been isolative to her room, often resting on bed. She came out for dinner and ate with peers. She had a water pitcher present over weekend in her room, but today it has been missing. She had a bottle of sports drink in her room, but has not finished it this evening. Is running a low grade temperature (100.2). This nurse brought in a large glass of water and encouraged her to drink some more fluid. Trying to locate some water pitchers through [facility]. Her urine was sent for HCG and was found to be negative. This nurse entered her room later in the evening and asked if she wanted to talk, but she declined saying that she is \"tired.\" She has been denying any voices or any desire to harm self or others.       "

## 2017-02-28 NOTE — PROGRESS NOTES
----------------------------------------------------------------------------------------------------------  [facility], [place]   Psychiatric Progress Note     Assessment    Presentation:  Nona Chatterjee is a 35yo Ch[place] female w a h/o Schizoaffective Disorder who was BIB her  after he found her brandishing a knife and stating she is going to kill 6 children and herself with it. She was recently discharged from [facility] for SI 2 days ago after a brief hospitalization. She states that her medications are no longer working for her. Prior to Friday, she had not been hospitalized since October following the birth of her new daughter.  She has 6 children.  She has had good adherence since discharging but the AH simply aren't going away as they have in the past with medications. Her ACT team recently increased her long-acting paliperidone to 234mg. Her mood has been good of late, but the 's feeding schedule has been disruptive to her sleep cycle. She does not want to kill her children or herself, but the CAH were overwhelming the morning of admission.    Diagnostic Impression:  Nona Finley is a 36 year old female with h/o Schizoaffective disorder who presented to the [facility] following command hallucinations telling her to kill herself and her children in the context of medication inefficacy, disrupted sleep, and stressful home (6 kids, one of whom has autism). The patient's last psychiatric hospitalization was 2 days prior to this admission. The patient is currently followed by [facility] with psychiatrist Walter Gaviria. Has had ECT at this facility in . Family history is notable for schizophrenia. Current psychosocial stressors include raising multiple children,  away at medical school in the [facility], and chronic mental illness. The patient denies drug abuse and self injurious behaviors. The MSE is notable for command hallucinations, SI, and HI.  "Admission warranted to maintain safety and identify a new antipsychotic medication for managing her psychotic symptoms.    Hospital course: Nona Finley was admitted to station 22 on a 72-hr hold.  PTA fluoxetine and PRN olanzapine were restarted.  During the initial assessment interview on the first morning of admission the patient endorsed being closely followed by [facility] team and we agreed to speak with her ACT team regarding possible initiation of clozapine.  On 2/21 the team spoke with the [facility] psychiatry resident who works on the ACT team with the patient, Dr. Yuridia Rod, and she stated that they had previously considered starting clozapine, endorsing approval of our team initiating clozapine treatment this admission.  She also agreed with discontinuing fluoxetine due to possible activation of david. Clozapine was started on 2/21 and titrated up as tolerated.  On 2/23 the patient agreed to sign in voluntarily.  Additionally on that date she endorsed having already seen improvements in psychotic symptoms with two doses of clozapine. On 2/24, she again noted the absence of AH/SIB, however did endorse symptomatic orthostatic hypotension (BP found to be 70s/50s on standing this AM, a/w dizziness). Encouraged vigilant fluid intake on this date, and will maintain clozaril dose at 50 mg for this reason. In addition, her  visited on 2/23 who shared with her that CPS felt it was unsafe for her to return directly home. Thus, following a meeting with representatives from her ACT team (also on 2/23), she thinks it \"would be a good idea\" for her to spend time in transitional housing (such as an [facility]) immediately following discharge. Our team agreed with this, and stated that we will begin making arrangements for this moving forward.  On 2/26 the patient had demonstrated continued light-headedness with observed orthostatic hypotension and so the decision was made to hold clozapine dose at 25 mg " during continued assessment of her tolerance to this agent.  On 2/27, she reported subjective improvement in light-headedness but with continued orthostatic tachycardia.  Also of note, on that date her willingness to discharge to an ITRs seemed to have waned somewhat and a conversation regarding CPS's rationale on this matter was conducted with the patient demonstrating understanding.    Medical course: Patient endorsed preferring to continue Depo-Provera versus beginning OCP's.  On 2/23 Depo-Provera 150 mg injection was given.  On 2/27 Internal Medicine was consulted to evaluate the patient's EKG's, vitals and cardiac history due to marked orthostatic side effects from clozapine and to weight in on whether the patient might appear to have any underlying cardiac disease.  Their recommendations have been to continue encouraging PO fluids, to maintain clozapine dose at 25 mg qHS and to repeat EKG if sustained HR >120 is noticed.  They agreed to continue peripherally monitoring her vitals.    Plan     Principal Diagnosis: Schizoaffective disorder    Medications:  Clozapine 25 mg qHS  Paliperidone 234mg [id number] (last on 2/6, next on 3/6)  Olanzapine 5mg BID PRN; 10mg PO/IM for behavioral events    Labs: previous admission labs reviewed/wnl; Utox negative.  HCG negative x2.  Consults pending: none   Treatment in therapeutic milieu with individual/group therapies as described.     Medical diagnoses to be addressed this admission (Problem-Based): None     Relevant psychosocial stressors: severe including motherhood, chronic MI    Legal Status: Voluntary; CPS involvement - notify prior to discharge    Safety Assessment:   Checks: Status 15  Precautions: Suicide  Pt has not required locked seclusion or restraints in the past 24 hours to maintain safety, please refer to RN documentation for further details.    The risks, benefits, alternatives and side effects have been discussed and are understood by the patient and other  "caregivers.    Anticipated Disposition/Discharge Date: TBD pending continued stabilization, coordination with CPS, and determination of a safe immediate discharge destination, as well as a long-range plan for reintroducing the patient into her home environment    Patient has been seen and evaluated by me, Avery Freeman DO, Psychiatry Resident, PGY1.    Avery Freeman DO  Resident Psychiatrist, PGY-1  [facility] Psychiatry    Attestation:  This patient has been seen and evaluated by me, Allison Coombs.  I have discussed this patient with the psychiatry resident and I agree with the findings and plan in this note.    I have reviewed today's vital signs, medications, labs and imaging. Allison Coombs MD.         Interim History:   The patient's care was discussed with the treatment team and chart notes were reviewed.     Sleep: 7 hrs   PRN's: None  Staff Notes: Denied dizziness, denied SI, SIB thoughts.  Shared with staff her desire to return home but her understanding that it's the feeling of the team that she should discharge to an interim IRTs placement for further stabilization and in order to demonstrate safety.    Interview:  Patient met with the treatment team in the conference room this AM.  Her affect was mildly blunted and she seemed sad but she smiled several times.  She endorsed some light-headedness this morning and was encouraged to keep up her oral fluid intake as her body adjusts to the clozapine.  She stated that she has Gatorade and water both, and is trying to keep drinking. She denied hearing voices or having thoughts of suicide or self-harm.  Overall she stated that her mood is \"good\" but that she does have some anxiety regarding discharge to an IRT's, stating that she understands why this is suggested but also stating that she would prefer to be with her family/children.  Her desire to be with her family was validated.  She asked if it might be possible for her to visit her children here on the " "unit, and the procedure for seeing if this will be a possibility was discussed.  Her children are 12, 10, 9, 8, 5 year, 5 mos and we explained that generally family visits with children are discouraged due to safety concerns for the children, however in some circumstances they can be arranged and that we would both speak to CPS as well as our unit's nurse manager regarding this.  She thanked us and agreed to continue working with the team.    Review of systems:     The 10 point Review of Systems is negative other than noted in the HPI         Medications:     Current Facility-Administered Medications   Medication     cloZAPine (CLOZARIL) tablet 25 mg     [START ON 3/6/2017] paliperidone (INVEGA SUSTENNA) injection SUSP 234 mg     cholecalciferol (vitamin D) tablet 2,000 Units     diphenhydrAMINE (BENADRYL) capsule 25-50 mg     ibuprofen (ADVIL/MOTRIN) tablet 600 mg     OLANZapine (zyPREXA) tablet 5 mg     prenatal multivitamin  plus iron per tablet 1 tablet     OLANZapine (zyPREXA) tablet 10 mg    Or     OLANZapine (zyPREXA) injection 10 mg     acetaminophen (TYLENOL) tablet 650 mg     alum & mag hydroxide-simethicone (MYLANTA ES/MAALOX  ES) suspension 30 mL     magnesium hydroxide (MILK OF MAGNESIA) suspension 30 mL     traZODone (DESYREL) tablet 50 mg     melatonin tablet 3 mg             Allergies:   No Known Allergies         Psychiatric Examination:   BP 92/61 (BP Location: Left arm)  Pulse 106  Temp 97.8  F (36.6  C) (Oral)  Resp 16  Ht 1.676 m (5' 6\")  Wt 64 kg (141 lb)  LMP 12/16/2016  SpO2 98%  BMI 22.76 kg/m2   Orthostatic vitals: Sitting /63, sitting pulse 82 bpm. Standing /53, standing pulse 130 bpm.   Weight is 141 lbs 0 oz  Body mass index is 22.76 kg/(m^2).    Appearance:  awake, alert and neatly groomed with head covering/[place] dress   Attitude:  cooperative  Eye Contact:  good  Mood: calm, sad about not going right home  Affect: mildly constricted mobility but improved with a few " smiles  Speech:  clear, coherent  Psychomotor Behavior:  no evidence of tardive dyskinesia, dystonia, or tics and intact station, gait and muscle tone  Thought Process:  linear  Associations:  no loose associations  Thought Content:  denied AVH and SIB.   Insight:  fair  Judgment:  limited but noticeable improvement  Oriented to:  time, person, and place  Attention Span and Concentration:  intact  Recent and Remote Memory:  fair  Language: clear English, Wallisian accent  Fund of Knowledge: appropriate  Muscle Strength and Tone: normal  Gait and Station: Normal         Labs:     2/19 Utox and Uhcg negative  2/21 EKG Sinus arrhythmia rate 54   2/21 CBC normal; WBC 5.4 and ANC 2900; ; CRP <2.9  2/28 Uhcg negative

## 2017-03-01 ENCOUNTER — APPOINTMENT (OUTPATIENT)
Dept: CARDIOLOGY | Facility: CLINIC | Age: 37
DRG: 885 | End: 2017-03-01
Attending: PHYSICIAN ASSISTANT
Payer: COMMERCIAL

## 2017-03-01 LAB
ALBUMIN UR-MCNC: 10 MG/DL
APPEARANCE UR: CLEAR
BACTERIA #/AREA URNS HPF: ABNORMAL /HPF
BILIRUB UR QL STRIP: NEGATIVE
COLOR UR AUTO: YELLOW
GLUCOSE UR STRIP-MCNC: NEGATIVE MG/DL
HGB UR QL STRIP: NEGATIVE
KETONES UR STRIP-MCNC: NEGATIVE MG/DL
LEUKOCYTE ESTERASE UR QL STRIP: ABNORMAL
MUCOUS THREADS #/AREA URNS LPF: PRESENT /LPF
NITRATE UR QL: NEGATIVE
PH UR STRIP: 6 PH (ref 5–7)
RBC #/AREA URNS AUTO: 1 /HPF (ref 0–2)
SP GR UR STRIP: 1.02 (ref 1–1.03)
SQUAMOUS #/AREA URNS AUTO: 1 /HPF (ref 0–1)
URN SPEC COLLECT METH UR: ABNORMAL
UROBILINOGEN UR STRIP-MCNC: NORMAL MG/DL (ref 0–2)
WBC #/AREA URNS AUTO: 6 /HPF (ref 0–2)

## 2017-03-01 PROCEDURE — 93306 TTE W/DOPPLER COMPLETE: CPT | Mod: 26 | Performed by: INTERNAL MEDICINE

## 2017-03-01 PROCEDURE — 81001 URINALYSIS AUTO W/SCOPE: CPT | Performed by: PHYSICIAN ASSISTANT

## 2017-03-01 PROCEDURE — 87086 URINE CULTURE/COLONY COUNT: CPT | Performed by: PSYCHIATRY & NEUROLOGY

## 2017-03-01 PROCEDURE — 25900017 H RX MED GY IP 259 OP 259 PS 637: Performed by: STUDENT IN AN ORGANIZED HEALTH CARE EDUCATION/TRAINING PROGRAM

## 2017-03-01 PROCEDURE — 93306 TTE W/DOPPLER COMPLETE: CPT

## 2017-03-01 PROCEDURE — 87088 URINE BACTERIA CULTURE: CPT | Performed by: PSYCHIATRY & NEUROLOGY

## 2017-03-01 PROCEDURE — 12400001 ZZH R&B MH UMMC

## 2017-03-01 PROCEDURE — S0136 CLOZAPINE, 25 MG: HCPCS | Performed by: STUDENT IN AN ORGANIZED HEALTH CARE EDUCATION/TRAINING PROGRAM

## 2017-03-01 PROCEDURE — 25000132 ZZH RX MED GY IP 250 OP 250 PS 637: Performed by: STUDENT IN AN ORGANIZED HEALTH CARE EDUCATION/TRAINING PROGRAM

## 2017-03-01 RX ADMIN — CLOZAPINE 25 MG: 25 TABLET ORAL at 21:16

## 2017-03-01 RX ADMIN — PRENATAL VIT W/ FE FUMARATE-FA TAB 27-0.8 MG 1 TABLET: 27-0.8 TAB at 09:33

## 2017-03-01 RX ADMIN — IBUPROFEN 600 MG: 600 TABLET ORAL at 12:35

## 2017-03-01 RX ADMIN — Medication 2000 UNITS: at 09:33

## 2017-03-01 ASSESSMENT — ACTIVITIES OF DAILY LIVING (ADL)
GROOMING: HANDWASHING;SHOWER;INDEPENDENT
DRESS: STREET CLOTHES;INDEPENDENT
ORAL_HYGIENE: INDEPENDENT
ORAL_HYGIENE: INDEPENDENT
LAUNDRY: WITH SUPERVISION
GROOMING: INDEPENDENT
LAUNDRY: WITH SUPERVISION
DRESS: INDEPENDENT

## 2017-03-01 NOTE — PROGRESS NOTES
Pt spent the morning pacing the halls and watching TV. Pt's affect was blunted and flat, with her mood being calm. Pt did not attend groups and minimally interacted with staff, isolative with peers. Pt denies SI/SIB and hallucinations.      03/01/17 1039   Behavioral Health   Hallucinations denies / not responding to hallucinations   Thinking poor concentration   Orientation person: oriented;place: oriented;date: oriented   Memory baseline memory   Insight insight appropriate to situation   Judgement intact   Eye Contact at examiner   Affect full range affect   Mood mood is calm   Physical Appearance/Attire attire appropriate to age and situation   Hygiene well groomed   Suicidality other (see comments)  (denies )   Self Injury other (see comment)  (denies)   Activity restless;withdrawn   Speech clear;coherent   Medication Sensitivity no observed side effects   Psychomotor / Gait balanced;steady   Psycho Education   Type of Intervention 1:1 intervention   Response participates, initiates socially appropriate   Hours 0.5   Treatment Detail Warning signs/ Check in    Activities of Daily Living   Hygiene/Grooming independent   Oral Hygiene independent   Dress independent   Laundry with supervision   Room Organization independent   Activity   Activity Level of Assistance independent

## 2017-03-01 NOTE — PROGRESS NOTES
"Brief Medicine Note    Internal Medicine following this patient in regards to variable HR, orthostatic hypotension, and intermittently elevated temps. Please see consult note dated 2/27 for details.    Infectious w/u initiated 2/28 due to new onset elevated temps (Tmax 100.2), which included CMP, CBC, lactate, BCx and Chest XR, all of which resolved unremarkable. BP continues to be soft, with orthostatic tachycardia.     Plan:  - Echo ordered to r/o subacute endocarditis or valvular disease    BP 92/61 (BP Location: Left arm)  Pulse 86  Temp 97.5  F (36.4  C)  Resp 14  Ht 1.676 m (5' 6\")  Wt 64 kg (141 lb)  LMP 12/16/2016  SpO2 100%  BMI 22.76 kg/m2      Cindi Witt PA-C  Hospitalist Service  Pager [phone]      "

## 2017-03-01 NOTE — PROGRESS NOTES
"Brief Daily Psychiatric Progress Note      Visited with patient who endorsed feeling more anxious today. She says that she did not get enough sleep last night and was having nightmares described as \"people killing other people.\" She watched a movie yesterday that had people fighting, which she says may have prompted her nightmares. She reports hearing voices today calling her name, but says the voices are not intrusive or frightening. She had a brief episode had visual hallucinations this morning of \"dead figures,\" which she says was a little frightening. She denies having thoughts of suicide or self-harm.      Nona says she continues to be concerned over where she is going after discharge. She reports that she does not want to go to an IRTs unless \"it is court-ordered.\" She met with her outside  yesterday who brought up the IRTS, [employer]. Nona understands why this treatment is suggested, however she prefers to be with her family/children. Per Social Work, still waiting to hear from CPS regarding patient's current status.     Patient with persistent hypotension today; she reports her light-headedness is improved today and she has been drinking water and Gatorade. Internal Medicine (consulted 2/27) started infectious work-up yesterday given ongoing hypotension and orthostatic tachycardia despite vigilant fluid intake and low grade fevers. Work-up so far, including CMP, CBC, lactate, blood cultures, and chest XR are unremarkable. Discussed with Internal Medicine team about pursuing an ECHO to rule out possible endocarditis. They will order this for today.       I, Rosie Horton, MS4, am serving as a scribe to document services personally performed by Dr. Ban Gillette based on my observations and the provider's statements to me.        I have reviewed and edited the documentation recorded by the scribe.  This documentation accurately reflects the services I personally performed and treatment " decisions made by me in consultation with the attending physician.      Ban Gillette MD MPH  Psychiatry Resident, PGY-2

## 2017-03-01 NOTE — PROGRESS NOTES
Writer spoke to CPS worker Martha ([phone]) who states there was no court order placed on patient and she is free to return to home once she is stable.

## 2017-03-01 NOTE — PROGRESS NOTES
Patient was visible in milieu during shift, pacing halls and watching tv. Visited with her outside , which she said went well. Patient stated that she would prefer to go home but that she will probably have to go to another treatment facility when she discharges. Affect was full range. Mood was calm. Denied hallucinations, SI. Reported feeling sedated from her medications. No safety concerns this shift.       02/28/17 2114   Behavioral Health   Hallucinations denies / not responding to hallucinations   Thinking poor concentration   Orientation person: oriented;place: oriented   Memory baseline memory   Insight insight appropriate to situation   Judgement intact   Eye Contact at examiner   Affect full range affect   Mood mood is calm   Physical Appearance/Attire attire appropriate to age and situation   Hygiene well groomed   Suicidality other (see comments)  (denies)   Self Injury other (see comment)  (denies)   Activity withdrawn;restless   Speech clear;coherent   Medication Sensitivity no observed side effects   Psychomotor / Gait balanced;steady   Psycho Education   Type of Intervention 1:1 intervention   Response participates, initiates socially appropriate   Hours 0.5   Treatment Detail check in   Activities of Daily Living   Hygiene/Grooming independent   Oral Hygiene independent   Dress independent   Laundry with supervision   Room Organization independent   Activity   Activity Level of Assistance independent

## 2017-03-01 NOTE — PROGRESS NOTES
03/01/17 1620   Visit Information   Visit Made By Staff    Type of Visit Initial   Visited Patient   Interventions   Basic Spiritual Interventions    introduction/orientation to Spiritual Health Services;Assessment of spiritual needs/resources;Reflective conversation;Life Review;Prayer   Provision of Spiritual Resources  Sacred text;Prayer rug   Advanced Assessments/Interventions   Presenting Concerns/Issues Spiritual/Shinto/emotional support   SPIRITUAL HEALTH SERVICES Progress Note  [facility] [id number]          DATA:    Visited with pt on the basis of spiritual support for pt.  Reflected with pt around her hospital experience, sources of spiritual and emotional support and current spiritual health needs. Pt talked about her current situation and what it means for her. Pt shared her story and her family story. Pt said  I have had this sickness since 2011 . During my conversation with her, I let her know that I could be support of her. Pt receives support from her  and her extended family. She reported that her bobby is important to her and hope for the future and trust in God.        INTERVENTION:    Emotional support. Reflective conversation integrating illness elements and family spiritual narratives. I gave copy of the Qur an and prayer Rug. I encouraged her to follow the doctor's advised.  I introduced spiritual Health Services that the hospital offers. I also, introduced myself as [facility]  in the hospital.         OUTCOME:    Pt/family received spiritual support and reflective conversation in the context of this hospitalization. The pt/family expressed appreciation for the visit and the encouragement that she felt.       PLAN:    Will continue to provide support to pt/family during their hospitalization at least 1x/wk.                                                                                                                                      Joshua  Bob  Staff    Pager [phone]

## 2017-03-02 LAB
BACTERIA SPEC CULT: ABNORMAL
Lab: ABNORMAL
MICRO REPORT STATUS: ABNORMAL
SPECIMEN SOURCE: ABNORMAL

## 2017-03-02 PROCEDURE — S0136 CLOZAPINE, 25 MG: HCPCS | Performed by: STUDENT IN AN ORGANIZED HEALTH CARE EDUCATION/TRAINING PROGRAM

## 2017-03-02 PROCEDURE — 25000132 ZZH RX MED GY IP 250 OP 250 PS 637: Performed by: STUDENT IN AN ORGANIZED HEALTH CARE EDUCATION/TRAINING PROGRAM

## 2017-03-02 PROCEDURE — 90853 GROUP PSYCHOTHERAPY: CPT

## 2017-03-02 PROCEDURE — H2032 ACTIVITY THERAPY, PER 15 MIN: HCPCS

## 2017-03-02 PROCEDURE — 99232 SBSQ HOSP IP/OBS MODERATE 35: CPT | Mod: GC | Performed by: PSYCHIATRY & NEUROLOGY

## 2017-03-02 PROCEDURE — 12400001 ZZH R&B MH UMMC

## 2017-03-02 PROCEDURE — 25900017 H RX MED GY IP 259 OP 259 PS 637: Performed by: STUDENT IN AN ORGANIZED HEALTH CARE EDUCATION/TRAINING PROGRAM

## 2017-03-02 RX ADMIN — PRENATAL VIT W/ FE FUMARATE-FA TAB 27-0.8 MG 1 TABLET: 27-0.8 TAB at 10:06

## 2017-03-02 RX ADMIN — Medication 2000 UNITS: at 10:06

## 2017-03-02 RX ADMIN — CLOZAPINE 25 MG: 25 TABLET ORAL at 19:46

## 2017-03-02 ASSESSMENT — ACTIVITIES OF DAILY LIVING (ADL)
ORAL_HYGIENE: INDEPENDENT
ORAL_HYGIENE: INDEPENDENT
GROOMING: INDEPENDENT
DRESS: STREET CLOTHES;INDEPENDENT
GROOMING: INDEPENDENT
DRESS: INDEPENDENT
LAUNDRY: WITH SUPERVISION

## 2017-03-02 NOTE — PLAN OF CARE
Problem: Depressive Symptoms  Goal: Depressive Symptoms  Signs and symptoms of listed problems will be absent or manageable.   Outcome: Improving  Nona did not attend community meeting. She walked in the hallway and smiled and responded to this nurse when asked how she was doing. She says she is doing quite well and came out to eat dinner with peers. She continues to isolate in her room much of the time. She retired to bed early. Encouraged her to drink more water, but she did not want to do so late in the evening (doesn't want to get up during the night). She continues to deny that she has voices. Denies SI/HI.

## 2017-03-02 NOTE — PROGRESS NOTES
"Nona attended 2 of 2 OT groups today. This afternoon she participated in a structured task group (making homemade pizzas). Worked cooperatively with peers to execute task, problem solved as necessary. Also  attended a process group on the topic of kindness. When prompted, gave a compliment to a peer (\"you are kind\"). Pleasant, bright, engaged in the activity.   "

## 2017-03-02 NOTE — PROGRESS NOTES
"Brief Medicine Note    Internal Medicine following this patient in regards to variable HR, orthostatic hypotension, and intermittently elevated temps.     Patient started on Clozapine 25 mg on 2/21, titrated to 50 mg. Noted to be symptomatically hypotensive on 2/24,2/26, with no improvement with increased PO intake. Of note, hypotension and tachycardia or known AEs of Clozapine.  Clozapine decreased to 25 mg. Patient endorsed feeling significantly better at  lower dose. At higher dose admits to feeling nauseated, dizzy, and lightheaded. Significant correlation between symptomatic hypotension and Clozapine dose. EKG performed 2/21, 2/22 with nonspecific findings of ?atrial enlargement, no significant change from prior and no signs of tachyarrhythmia. Patient denies any cardiac hx, or hx of chest pain, sob, palpitations or syncope. No signs of fluid overload on exam. Continued to be hypotensive and intermittently tachycardic. Noted elevated tempt to 100.2 evening of 2/27 and initiated an infectious w/u (CBC, CMP, Lactate, BC, CXR) all of which were unremarkable. Echo performed 3/1 to rule out subacute endocarditis or valvular disease, which showed normal LV function w/ EF of 60-65%, no evidence of vegetation noted. Did note mild tricuspid and mitral insufficiency.     Today's vital signs, medications, and nursing notes were reviewed. Her BP continues to be soft, but she is seen up walking the halls without any dizziness, lightheadedness, or syncope. Would continue to encourage adequate fluid intake during this hospital admission.     Of note, UA collected yesterday significant for moderate LE and 6 WBC. Will continue to follow cultures and treat if indicated. If cultures negative after 48 hours, will likely sign off.      BP 91/48 (BP Location: Right arm)  Pulse 90  Temp 97.5  F (36.4  C)  Resp 14  Ht 1.676 m (5' 6\")  Wt 64 kg (141 lb)  LMP 12/16/2016  SpO2 100%  BMI 22.76 kg/m2      Cindi Witt, " SOUTH  Hospitalist Service  Pager [phone]

## 2017-03-02 NOTE — PROGRESS NOTES
Pt states she feels much better today, her thoughts are clear and she has no thoughts of SI/SIB. Denies depression, anxiety, racing and negative thoughts. Pt showed full range affect during check in.      03/02/17 1400   Behavioral Health   Hallucinations denies / not responding to hallucinations   Thinking poor concentration   Orientation person: oriented;place: oriented;time: oriented;date: oriented   Memory baseline memory   Insight insight appropriate to events;insight appropriate to situation   Eye Contact at examiner   Affect full range affect   Mood mood is calm   Physical Appearance/Attire neat   Hygiene well groomed   Suicidality other (see comments)  (denies)   Self Injury other (see comment)  (denies)   Activity other (see comment)  (participating in groups)   Speech clear;coherent   Medication Sensitivity no stated side effects;no observed side effects   Psychomotor / Gait steady;balanced   Psycho Education   Type of Intervention 1:1 intervention   Response participates, initiates socially appropriate   Hours 0.5   Treatment Detail Check in   Activities of Daily Living   Hygiene/Grooming independent   Oral Hygiene independent   Dress independent   Room Organization independent   Activity   Activity Level of Assistance independent

## 2017-03-02 NOTE — PROGRESS NOTES
----------------------------------------------------------------------------------------------------------  [facility], [place]   Psychiatric Progress Note     Assessment    Presentation:  Nona Chatterjee is a 35yo [place] female w a h/o Schizoaffective Disorder who was BIB her  after he found her brandishing a knife and stating she is going to kill 6 children and herself with it. She was recently discharged from [facility] for SI 2 days ago after a brief hospitalization. She states that her medications are no longer working for her. Prior to Friday, she had not been hospitalized since October following the birth of her new daughter.  She has 6 children.  She has had good adherence since discharging but the AH simply aren't going away as they have in the past with medications. Her ACT team recently increased her long-acting paliperidone to 234mg. Her mood has been good of late, but the 's feeding schedule has been disruptive to her sleep cycle. She does not want to kill her children or herself, but the CAH were overwhelming the morning of admission.    Diagnostic Impression:  Nona Finley is a 36 year old female with h/o Schizoaffective disorder who presented to the [facility] following command hallucinations telling her to kill herself and her children in the context of medication inefficacy, disrupted sleep, and stressful home (6 kids, one of whom has autism). The patient's last psychiatric hospitalization was 2 days prior to this admission. The patient is currently followed by [facility] with psychiatrist Walter Gaviria. Has had ECT at this facility in . Family history is notable for schizophrenia. Current psychosocial stressors include raising multiple children,  away at medical school in the [facility], and chronic mental illness. The patient denies drug abuse and self injurious behaviors. The MSE is notable for command hallucinations, SI, and HI.  "Admission warranted to maintain safety and identify a new antipsychotic medication for managing her psychotic symptoms.    Hospital course: Nona Finley was admitted to [facility] on a 72-hr hold.  PTA fluoxetine and PRN olanzapine were restarted.  During the initial assessment interview on the first morning of admission the patient endorsed being closely followed by [facility] team and we agreed to speak with her ACT team regarding possible initiation of clozapine.  On 2/21 the team spoke with the [facility] psychiatry resident who works on the ACT team with the patient, Dr. Yuridia Rod, and she stated that they had previously considered starting clozapine, endorsing approval of our team initiating clozapine treatment this admission.  She also agreed with discontinuing fluoxetine due to possible activation of david. Clozapine was started on 2/21 and titrated up as tolerated.  On 2/23 the patient agreed to sign in voluntarily.  Additionally on that date she endorsed having already seen improvements in psychotic symptoms with two doses of clozapine. On 2/24, she again noted the absence of AH/SIB, however did endorse symptomatic orthostatic hypotension (BP found to be 70s/50s on standing this AM, a/w dizziness). Encouraged vigilant fluid intake on this date, and will maintain clozaril dose at 50 mg for this reason. In addition, her  visited on 2/23 who shared with her that CPS felt it was unsafe for her to return directly home. Thus, following a meeting with representatives from her ACT team (also on 2/23), she thinks it \"would be a good idea\" for her to spend time in transitional housing (such as an [facility]) immediately following discharge. Our team agreed with this, and stated that we will begin making arrangements for this moving forward.  On 2/26 the patient had demonstrated continued light-headedness with observed orthostatic hypotension and so the decision was made to hold clozapine dose at 25 mg " during continued assessment of her tolerance to this agent.  On 2/27, she reported subjective improvement in light-headedness but with continued orthostatic tachycardia.  Also of note, on that date her willingness to discharge to an ITRs seemed to have waned somewhat and a conversation regarding CPS's rationale on this matter was conducted with the patient demonstrating understanding.  On 3/1, the patient had decided that she wished to return home and would only transition her care through an IRTs if this was court ordered.  Later that day, by way of clarification, our unit CTC spoke with CPS who stated that they did not feel it was at this time merited to use the courts to hart temporary GH placement and that with the supports she currently has (ACT team) the patient could return home once our team at [facility] have deemed her stabilized and safe for discharge. On 3/2 the patient reported resolution of her symptomatic orthostasis.      Medical course: Patient endorsed preferring to continue Depo-Provera versus beginning OCP's.  On 2/23 Depo-Provera 150 mg injection was given.  On 2/27 Internal Medicine was consulted to evaluate the patient's EKG's, vitals and cardiac history due to marked orthostatic side effects from clozapine and to weight in on whether the patient might appear to have any underlying cardiac disease.  Their recommendations have been to continue encouraging PO fluids, to maintain clozapine dose at 25 mg qHS and to repeat EKG if sustained HR >120 is noticed.  They agreed to continue peripherally monitoring her vitals.  On 2/28, in response to a low grade temperature of 100.2, the IM consult service ordered an infectious work up with CMP, WBC w/ diff, lactic acid, UA, blood cultures, CXR, and echo (to assess for endocarditis), all of which were WNL.    Plan     Principal Diagnosis: Schizoaffective disorder    Medications:  Clozapine 25 mg qHS   Paliperidone 234mg [id number] (last on 2/6, next on  "3/6)  Olanzapine 5mg BID PRN; 10mg PO/IM for behavioral events    Labs: previous admission labs reviewed/wnl; Utox negative.  HCG negative x2.  CMP, WBC w/ diff, lactic acid, UA, blood cultures, CXR, echo - all WNL.  Consults pending: none   Treatment in therapeutic milieu with individual/group therapies as described.     Medical diagnoses to be addressed this admission (Problem-Based): None     Relevant psychosocial stressors: severe including motherhood, chronic MI    Legal Status: Voluntary; CPS involvement - notify prior to discharge    Safety Assessment:   Checks: Status 15  Precautions: Suicide  Pt has not required locked seclusion or restraints in the past 24 hours to maintain safety, please refer to RN documentation for further details.    The risks, benefits, alternatives and side effects have been discussed and are understood by the patient and other caregivers.    Anticipated Disposition/Discharge Date: TBD pending continued stabilization, coordination with CPS, and determination of a safe immediate discharge destination, as well as a long-range plan for reintroducing the patient into her home environment    Patient has been seen and evaluated by me, Avery Freeman DO, Psychiatry Resident, PGY1.    Avery Freeman DO  Resident Psychiatrist, PGY-1  [facility] Psychiat[facility]    Attestation:  This patient has been seen and evaluated by me, Allison Coombs.  I have discussed this patient with the psychiatry resident and I agree with the findings and plan in this note.    I have reviewed today's vital signs, medications, labs and imaging. Allison Coombs MD.           Interim History:   The patient's care was discussed with the treatment team and chart notes were reviewed.     Sleep: 7 hrs  PRN's:  Ibuprofen 600 mg x1  Staff Notes:  Pacing halls during the day.  Flat and blunted.  Denied SI, SIB thoughts, AVH.  Attended no groups.  In the evening was noted to be smiling and stated she was \"doing " "well.\"    Interview:  Patient met with the treatment team in the hallway today.  She had been sleeping but roused easily, becoming alert and responsive to questioning.  Her affect was mildly flattened but congruent with mood and content.  She was asked about the AVH she had experienced yesterday morning - the dead figures she saw and the voices she had heard calling her name.  She repeated that this was due to her fatigue and is something which can happen to her at baseline, in addition to her feeling that the movie she'd seen the night before, with fighting, had disturbed her.  We discussed possibly avoiding these kind of movies/shows, particularly before bed, and she agreed.  She denied any AVH, paranoia or anxiety since that time.  She also denied lightheadedness.  We told her that the team was considering titrating her clozapine up another 12.5 mg to see if she can tolerate this however that we will not do this yet and would like to see several more days of stable vital signs.  She asked about whether we had talked to CPS about a court order for her to attend IRTs and told her that it wasn't their feeling that this would be necessary, and that instead we can discharge her home once the team feels she is safe and has stabilized.  She endorsed understanding.    Review of systems:     The 10 point Review of Systems is negative other than noted in the HPI         Medications:     Current Facility-Administered Medications   Medication     cloZAPine (CLOZARIL) tablet 25 mg     [START ON 3/6/2017] paliperidone (INVEGA SUSTENNA) injection SUSP 234 mg     cholecalciferol (vitamin D) tablet 2,000 Units     diphenhydrAMINE (BENADRYL) capsule 25-50 mg     ibuprofen (ADVIL/MOTRIN) tablet 600 mg     OLANZapine (zyPREXA) tablet 5 mg     prenatal multivitamin  plus iron per tablet 1 tablet     OLANZapine (zyPREXA) tablet 10 mg    Or     OLANZapine (zyPREXA) injection 10 mg     acetaminophen (TYLENOL) tablet 650 mg     alum & " "mag hydroxide-simethicone (MYLANTA ES/MAALOX  ES) suspension 30 mL     magnesium hydroxide (MILK OF MAGNESIA) suspension 30 mL     traZODone (DESYREL) tablet 50 mg     melatonin tablet 3 mg             Allergies:   No Known Allergies         Psychiatric Examination:   BP 91/48 (BP Location: Right arm)  Pulse 90  Temp 97.5  F (36.4  C)  Resp 14  Ht 1.676 m (5' 6\")  Wt 64 kg (141 lb)  LMP 12/16/2016  SpO2 100%  BMI 22.76 kg/m2   Orthostatic vitals: Sitting /63, sitting pulse 82 bpm. Standing /53, standing pulse 130 bpm.   Weight is 141 lbs 0 oz  Body mass index is 22.76 kg/(m^2).    Appearance:  awake, alert and neatly groomed with head covering/[place] dress   Attitude:  cooperative  Eye Contact:  good  Mood: calm, sad about not going right home  Affect: mildly constricted mobility but improved with a few smiles  Speech:  clear, coherent  Psychomotor Behavior:  no evidence of tardive dyskinesia, dystonia, or tics and intact station, gait and muscle tone  Thought Process:  linear  Associations:  no loose associations  Thought Content:  denied AVH and SIB.   Insight:  fair  Judgment:  limited but noticeable improvement  Oriented to:  time, person, and place  Attention Span and Concentration:  intact  Recent and Remote Memory:  fair  Language: clear English, [place] accent  Fund of Knowledge: appropriate  Muscle Strength and Tone: normal  Gait and Station: Normal         Labs:     2/19 Utox and Uhcg negative  2/21 EKG Sinus arrhythmia rate 54   2/21 CBC normal; WBC 5.4 and ANC 2900; ; CRP <2.9  2/28 Uhcg negative  3/1 CMP, WBC w/ diff, lactic acid, UA, blood cultures, CXR, echo - all WNL.    "

## 2017-03-03 PROCEDURE — 25900017 H RX MED GY IP 259 OP 259 PS 637: Performed by: STUDENT IN AN ORGANIZED HEALTH CARE EDUCATION/TRAINING PROGRAM

## 2017-03-03 PROCEDURE — S0136 CLOZAPINE, 25 MG: HCPCS | Performed by: STUDENT IN AN ORGANIZED HEALTH CARE EDUCATION/TRAINING PROGRAM

## 2017-03-03 PROCEDURE — 12400001 ZZH R&B MH UMMC

## 2017-03-03 PROCEDURE — 25000132 ZZH RX MED GY IP 250 OP 250 PS 637: Performed by: STUDENT IN AN ORGANIZED HEALTH CARE EDUCATION/TRAINING PROGRAM

## 2017-03-03 PROCEDURE — 90853 GROUP PSYCHOTHERAPY: CPT

## 2017-03-03 PROCEDURE — 97150 GROUP THERAPEUTIC PROCEDURES: CPT | Mod: GO

## 2017-03-03 PROCEDURE — 99232 SBSQ HOSP IP/OBS MODERATE 35: CPT | Mod: GC | Performed by: PSYCHIATRY & NEUROLOGY

## 2017-03-03 PROCEDURE — 25000132 ZZH RX MED GY IP 250 OP 250 PS 637: Performed by: PHYSICIAN ASSISTANT

## 2017-03-03 RX ADMIN — PRENATAL VIT W/ FE FUMARATE-FA TAB 27-0.8 MG 1 TABLET: 27-0.8 TAB at 08:32

## 2017-03-03 RX ADMIN — CLOZAPINE 25 MG: 25 TABLET ORAL at 19:40

## 2017-03-03 RX ADMIN — Medication 2000 UNITS: at 08:32

## 2017-03-03 RX ADMIN — AMPICILLIN 250 MG: 250 CAPSULE ORAL at 22:53

## 2017-03-03 RX ADMIN — AMPICILLIN 250 MG: 250 CAPSULE ORAL at 16:21

## 2017-03-03 ASSESSMENT — ACTIVITIES OF DAILY LIVING (ADL)
LAUNDRY: WITH SUPERVISION
GROOMING: INDEPENDENT
DRESS: STREET CLOTHES;INDEPENDENT
GROOMING: INDEPENDENT
ORAL_HYGIENE: INDEPENDENT
ORAL_HYGIENE: INDEPENDENT
DRESS: INDEPENDENT

## 2017-03-03 NOTE — PROGRESS NOTES
Pt visible in milieu. Pt ate meals and snacks in dining area. Full range affect, mood is calm. Pt denies hallucinations. Pt denies SI/SIB.        03/02/17 2100   Behavioral Health   Hallucinations denies / not responding to hallucinations   Thinking poor concentration   Orientation person: oriented;place: oriented;date: oriented   Memory baseline memory   Insight insight appropriate to situation   Eye Contact at examiner   Affect full range affect   Mood mood is calm   Physical Appearance/Attire neat   Hygiene well groomed   Suicidality other (see comments)  (denies)   Self Injury other (see comment)  (denies)   Activity other (see comment)  (visible in milieu)   Speech clear;coherent   Medication Sensitivity no stated side effects   Psychomotor / Gait balanced;steady   Psycho Education   Type of Intervention 1:1 intervention   Response participates, initiates socially appropriate   Hours 0.5   Treatment Detail receiving feedback   Activities of Daily Living   Hygiene/Grooming independent   Oral Hygiene independent   Dress street clothes;independent   Laundry with supervision   Room Organization independent   Activity   Activity Level of Assistance independent

## 2017-03-03 NOTE — PLAN OF CARE
"Problem: Depressive Symptoms  Goal: Depressive Symptoms  Signs and symptoms of listed problems will be absent or manageable.   Outcome: Improving  \"I'm better, my thoughts are more clear and I am able to focus and concentrate better. Denies depression and anxiety. Racing thoughts are gone, hallucinations are gone. Denies thoughts of suicide and thoughts of wanting to hurt anyone else. Attends about half the groups, patient states is social with peers. Continues to have drop in orthostatic BP and pulse increase when standing. Has been drinking a lot of fluids today. \"I want to go home, I want to see my children\". Doctor ordered face time  With patient on 's cell phone on the unit.       "

## 2017-03-03 NOTE — PROGRESS NOTES
"Brief Medicine Note    Internal Medicine following in regards to results of UC cultures.     UA collected 3/1 significant for moderate LE and 6 WBC. UCx growing Beta hemolytic Streptococcus. Pt endorses frequency, no dysuria or urgency.  Will treat with Ampicillin 250 mg Q6H x 3 days, ordered placed.     IM will sign off at this time. Please notify if any intercurrent medical questions or concerns arise.     BP 91/48 (BP Location: Right arm)  Pulse 90  Temp 97.8  F (36.6  C)  Resp 16  Ht 1.676 m (5' 6\")  Wt 64 kg (141 lb)  LMP 12/16/2016  SpO2 100%  BMI 22.76 kg/m2      Cindi Witt PA-C  Hospitalist Service  Pager [phone]      "

## 2017-03-03 NOTE — PROGRESS NOTES
----------------------------------------------------------------------------------------------------------  [facility], [place]   Psychiatric Progress Note     Assessment    Presentation:  Nona Chatterjee is a 37yo [place]ian female w a h/o Schizoaffective Disorder who was BIB her  after he found her brandishing a knife and stating she is going to kill 6 children and herself with it. She was recently discharged from [facility] for SI 2 days ago after a brief hospitalization. She states that her medications are no longer working for her. Prior to Friday, she had not been hospitalized since October following the birth of her new daughter.  She has 6 children.  She has had good adherence since discharging but the AH simply aren't going away as they have in the past with medications. Her ACT team recently increased her long-acting paliperidone to 234mg. Her mood has been good of late, but the 's feeding schedule has been disruptive to her sleep cycle. She does not want to kill her children or herself, but the CAH were overwhelming the morning of admission.    Diagnostic Impression:  Nona Finley is a 36 year old female with h/o Schizoaffective disorder who presented to the [facility] following command hallucinations telling her to kill herself and her children in the context of medication inefficacy, disrupted sleep, and stressful home (6 kids, one of whom has autism). The patient's last psychiatric hospitalization was 2 days prior to this admission. The patient is currently followed by [facility] with psychiatrist Walter Gaviria. Has had ECT at this facility in . Family history is notable for schizophrenia. Current psychosocial stressors include raising multiple children,  away at medical school in the [employer] [facility], and chronic mental illness. The patient denies drug abuse and self injurious behaviors. The MSE is notable for command hallucinations, SI, and HI.  "Admission warranted to maintain safety and identify a new antipsychotic medication for managing her psychotic symptoms.    Hospital course: Nona Finley was admitted to station 22 on a 72-hr hold.  PTA fluoxetine and PRN olanzapine were restarted.  During the initial assessment interview on the first morning of admission the patient endorsed being closely followed by [facility] team and we agreed to speak with her ACT team regarding possible initiation of clozapine.  On 2/21 the team spoke with the [facility] psychiatry resident who works on the ACT team with the patient, Dr. Yuridia Rod, and she stated that they had previously considered starting clozapine, endorsing approval of our team initiating clozapine treatment this admission.  She also agreed with discontinuing fluoxetine due to possible activation of david. Clozapine was started on 2/21 and titrated up as tolerated.  On 2/23 the patient agreed to sign in voluntarily.  Additionally on that date she endorsed having already seen improvements in psychotic symptoms with two doses of clozapine. On 2/24, she again noted the absence of AH/SIB, however did endorse symptomatic orthostatic hypotension (BP found to be 70s/50s on standing this AM, a/w dizziness). Encouraged vigilant fluid intake on this date, and will maintain clozaril dose at 50 mg for this reason. In addition, her  visited on 2/23 who shared with her that CPS felt it was unsafe for her to return directly home. Thus, following a meeting with representatives from her ACT team (also on 2/23), she thinks it \"would be a good idea\" for her to spend time in transitional housing (such as an [facility]) immediately following discharge. Our team agreed with this, and stated that we will begin making arrangements for this moving forward.  On 2/26 the patient had demonstrated continued light-headedness with observed orthostatic hypotension and so the decision was made to hold clozapine dose at 25 mg " during continued assessment of her tolerance to this agent.  On 2/27, she reported subjective improvement in light-headedness but with continued orthostatic tachycardia.  Also of note, on that date her willingness to discharge to an ITRs seemed to have waned somewhat and a conversation regarding CPS's rationale on this matter was conducted with the patient demonstrating understanding.  On 3/1, the patient had decided that she wished to return home and would only transition her care through an IRTs if this was court ordered.  Later that day, by way of clarification, our unit CTC spoke with CPS who stated that they did not feel it was at this time merited to use the courts to hart temporary GH placement and that with the supports she currently has (ACT team) the patient could return home once our team at [facility] have deemed her stabilized and safe for discharge. On 3/2 the patient reported resolution of her symptomatic orthostasis however on 3/3 she did allow palpitations upon standing and orthostatic hypotension was again noted x2.  Due to this the team decided to continue to hold dose titration of clozapine at 25 mg and to encourage fluid intake.  Also on that date it was agreed that the patient and her family could work with nursing staff in order to help facilitate a visit between the patient and her children via face-time.    Medical course: Patient endorsed preferring to continue Depo-Provera versus beginning OCP's.  On 2/23 Depo-Provera 150 mg injection was given.  On 2/27 Internal Medicine was consulted to evaluate the patient's EKG's, vitals and cardiac history due to marked orthostatic side effects from clozapine and to weight in on whether the patient might appear to have any underlying cardiac disease.  Their recommendations have been to continue encouraging PO fluids, to maintain clozapine dose at 25 mg qHS and to repeat EKG if sustained HR >120 is noticed.  They agreed to continue peripherally monitoring  her vitals.  On 2/28, in response to a low grade temperature of 100.2, the IM consult service ordered an infectious work up with CMP, WBC w/ diff, lactic acid, UA, blood cultures, CXR, and echo (to assess for endocarditis), all of which were WNL.  UA on that date was equivocal with moderate leukocyte esterase but without nitrate and urine culture of 10,000 to 50,000 CFU and patient denied urinary symptoms.  On 3/3 she did continue to demonstrate orthostatic hypotension and tachycardia, and endorsed palpitations when standing - with the decision being to continue holding clozapine titration and encouraging fluid intake.    Plan     Principal Diagnosis: Schizoaffective disorder    Medications:  Clozapine 25 mg qHS   Paliperidone 234mg [id number] (last on 2/6, next on 3/6)  Olanzapine 5mg BID PRN; 10mg PO/IM for behavioral events    Labs: previous admission labs reviewed/wnl; Utox negative.  HCG negative x2.  CMP, WBC w/ diff, lactic acid, UA, blood cultures, CXR, echo - all WNL.  Urine with moderate LE and urine culture with 10,000 to 50,000 CFU.  Consults pending: none   Treatment in therapeutic milieu with individual/group therapies as described.     Medical diagnoses to be addressed this admission (Problem-Based): None     Relevant psychosocial stressors: severe including motherhood, chronic MI    Legal Status: Voluntary; CPS involvement - notify prior to discharge    Safety Assessment:   Checks: Status 15  Precautions: Suicide  Pt has not required locked seclusion or restraints in the past 24 hours to maintain safety, please refer to RN documentation for further details.    The risks, benefits, alternatives and side effects have been discussed and are understood by the patient and other caregivers.    Anticipated Disposition/Discharge Date: TBD pending continued stabilization, coordination with CPS, and determination of a safe immediate discharge destination, as well as a long-range plan for reintroducing the patient  "into her home environment    Patient has been seen and evaluated by me, Avery Freeman DO, Psychiatry Resident, PGY1.    Avery Freeman DO  Resident Psychiatrist, PGY-1  [facility]     Interim History:   The patient's care was discussed with the treatment team and chart notes were reviewed.     Sleep: 7 hrs  PRN's:  None.  Staff Notes:  Stated that her \"thoughts are better,\" and \"clearer.\"  Affect full range.  Visible in milieu.  Denied AVH, SI, SIB thoughts.     Interview:  Patient met with the treatment team in her room this AM.  She denied any visual or auditory hallucinations since her overnight disturbances of two days ago.  When asked about her mood she stated that she was feeling \"good\" and \"better\" and denied anxiety or sadness.  She did express the desire to discharge today but agreed to stay until next week for continued stabilization given the severity of her PTA decompensation and the team's need to continue monitoring her vitals due to orthostasis which has been observed concomitant to initiation of clozapine.  When asked about these symptoms she denied lightheadedness but did allow palpitations (without chest pain or pressure) when standing.  She was encouraged to drink as much as she can during the day and she said that she would do her best to do so.  As the meeting ended we agreed to have nursing work with her family members to enable her to face-time with her kids during a visit from her  or other relative (they own an iPad at home and the patient's  owns an iPhone).  As well, we agreed to arrange to have an ACT team member visit her on the unit next week to talk about discharge.    Review of systems:     The 10 point Review of Systems is negative other than noted in the HPI         Medications:     Current Facility-Administered Medications   Medication     cloZAPine (CLOZARIL) tablet 25 mg     [START ON 3/6/2017] paliperidone (INVEGA SUSTENNA) injection SUSP 234 mg     " "cholecalciferol (vitamin D) tablet 2,000 Units     diphenhydrAMINE (BENADRYL) capsule 25-50 mg     ibuprofen (ADVIL/MOTRIN) tablet 600 mg     OLANZapine (zyPREXA) tablet 5 mg     prenatal multivitamin  plus iron per tablet 1 tablet     OLANZapine (zyPREXA) tablet 10 mg    Or     OLANZapine (zyPREXA) injection 10 mg     acetaminophen (TYLENOL) tablet 650 mg     alum & mag hydroxide-simethicone (MYLANTA ES/MAALOX  ES) suspension 30 mL     magnesium hydroxide (MILK OF MAGNESIA) suspension 30 mL     traZODone (DESYREL) tablet 50 mg     melatonin tablet 3 mg             Allergies:   No Known Allergies         Psychiatric Examination:   BP 91/48 (BP Location: Right arm)  Pulse 90  Temp 97.8  F (36.6  C)  Resp 16  Ht 1.676 m (5' 6\")  Wt 64 kg (141 lb)  LMP 12/16/2016  SpO2 100%  BMI 22.76 kg/m2   Orthostatic vitals: Laying /56, laying pulse 97 bpm. Standing BP 90/62, standing pulse 125 bpm.   Weight is 141 lbs 0 oz  Body mass index is 22.76 kg/(m^2).    Appearance:  awake, alert and neatly groomed with head covering/[place] dress   Attitude:  cooperative  Eye Contact:  good  Mood: calm, sad about not going home yet  Affect: mildly constricted mobility but improved with a few smiles  Speech:  clear, coherent  Psychomotor Behavior:  no evidence of tardive dyskinesia, dystonia, or tics and intact station, gait and muscle tone  Thought Process:  linear  Associations:  no loose associations  Thought Content:  denied AVH and SI.   Insight:  fair  Judgment:  limited but noticeable improvement  Oriented to:  time, person, and place  Attention Span and Concentration:  intact  Recent and Remote Memory:  fair  Language: clear English, [place] accent  Fund of Knowledge: appropriate  Muscle Strength and Tone: normal  Gait and Station: Normal         Labs:     2/19 Utox and Uhcg negative  2/21 EKG Sinus arrhythmia rate 54   2/21 CBC normal; WBC 5.4 and ANC 2900; ; CRP <2.9  2/28 Uhcg negative  3/1 CMP, WBC w/ diff, " lactic acid, UA, blood cultures, CXR, echo - all WNL.  Urine with moderate LE and urine culture with 10,000 to 50,000 CFU.    Attestation:    I, Ashok Bueno, have personally performed an examination of this patient on March 3, 2017 and I have reviewed the resident's documentation.  I have edited the note to reflect all relevant changes. I agree with resident findings and plan in this resident H&P.  I have reviewed all vitals and laboratory findings.      Nona endorses good response to clozapine with decreased frequency/intensity of AH. Unfortunately she has orthostatic hypotension and marked increase in HR on standing (to about 140). She denies chest pain or any other cardiac symptoms. We will hold clozapine at current dose and encourage fluid intake. We will monitor vitals over the w/e. Hopefully she will be able to increase claozapine next week.    Ashok Bueno

## 2017-03-04 PROCEDURE — 25000132 ZZH RX MED GY IP 250 OP 250 PS 637: Performed by: PHYSICIAN ASSISTANT

## 2017-03-04 PROCEDURE — 12400001 ZZH R&B MH UMMC

## 2017-03-04 PROCEDURE — S0136 CLOZAPINE, 25 MG: HCPCS | Performed by: STUDENT IN AN ORGANIZED HEALTH CARE EDUCATION/TRAINING PROGRAM

## 2017-03-04 PROCEDURE — 25900017 H RX MED GY IP 259 OP 259 PS 637: Performed by: STUDENT IN AN ORGANIZED HEALTH CARE EDUCATION/TRAINING PROGRAM

## 2017-03-04 PROCEDURE — 25000132 ZZH RX MED GY IP 250 OP 250 PS 637: Performed by: STUDENT IN AN ORGANIZED HEALTH CARE EDUCATION/TRAINING PROGRAM

## 2017-03-04 RX ADMIN — Medication 2000 UNITS: at 09:21

## 2017-03-04 RX ADMIN — AMPICILLIN 250 MG: 250 CAPSULE ORAL at 10:49

## 2017-03-04 RX ADMIN — CLOZAPINE 25 MG: 25 TABLET ORAL at 19:50

## 2017-03-04 RX ADMIN — ACETAMINOPHEN 650 MG: 325 TABLET, FILM COATED ORAL at 19:55

## 2017-03-04 RX ADMIN — AMPICILLIN 250 MG: 250 CAPSULE ORAL at 22:59

## 2017-03-04 RX ADMIN — AMPICILLIN 250 MG: 250 CAPSULE ORAL at 05:26

## 2017-03-04 RX ADMIN — IBUPROFEN 600 MG: 600 TABLET ORAL at 15:35

## 2017-03-04 RX ADMIN — PRENATAL VIT W/ FE FUMARATE-FA TAB 27-0.8 MG 1 TABLET: 27-0.8 TAB at 09:21

## 2017-03-04 RX ADMIN — AMPICILLIN 250 MG: 250 CAPSULE ORAL at 19:50

## 2017-03-04 RX ADMIN — ACETAMINOPHEN 650 MG: 325 TABLET, FILM COATED ORAL at 10:42

## 2017-03-04 ASSESSMENT — ACTIVITIES OF DAILY LIVING (ADL)
DRESS: INDEPENDENT
LAUNDRY: WITH SUPERVISION
LAUNDRY: WITH SUPERVISION
GROOMING: HANDWASHING;INDEPENDENT
GROOMING: INDEPENDENT
DRESS: STREET CLOTHES;INDEPENDENT
ORAL_HYGIENE: INDEPENDENT
ORAL_HYGIENE: INDEPENDENT

## 2017-03-04 NOTE — PROGRESS NOTES
"Pt pacing milieu and laying down in bed, ate meals, brushed teeth, pt reports being at a \"10\" feels ready to discharge and continue taking meds upon discharge.      03/04/17 1300   Behavioral Health   Hallucinations denies / not responding to hallucinations   Thinking intact   Orientation person: oriented;place: oriented   Memory baseline memory   Insight admits / accepts   Judgement intact   Eye Contact at examiner   Affect full range affect   Mood mood is calm   Physical Appearance/Attire attire appropriate to age and situation   Hygiene other (see comment)  (fair)   Suicidality other (see comments)  (denies)   Self Injury other (see comment)  (denies)   Activity withdrawn;restless   Speech clear;coherent   Medication Sensitivity sedation   Psychomotor / Gait balanced;steady   Psycho Education   Type of Intervention 1:1 intervention   Response participates, initiates socially appropriate   Hours 0.5   Treatment Detail check-in   Activities of Daily Living   Hygiene/Grooming handwashing;independent   Oral Hygiene independent   Dress street clothes;independent   Laundry with supervision   Room Organization independent   Activity   Activity Level of Assistance independent     "

## 2017-03-04 NOTE — PROGRESS NOTES
Patient was restless during shift, pacing halls, in and out of room. Minimally social with peers. Affect was full range. Mood was calm. Patient is eager to be discharged. Denies hallucinations, SI. No safety concerns this shift.        03/03/17 1939   Behavioral Health   Hallucinations denies / not responding to hallucinations   Thinking intact   Orientation person: oriented;place: oriented   Memory baseline memory   Insight admits / accepts   Judgement intact   Eye Contact at examiner   Affect full range affect   Mood mood is calm   Physical Appearance/Attire attire appropriate to age and situation   Hygiene other (see comment)  (adequate)   Suicidality (denies)   Self Injury (denies)   Activity withdrawn;restless   Speech clear;coherent   Medication Sensitivity sedation   Psychomotor / Gait balanced;steady   Activities of Daily Living   Hygiene/Grooming independent   Oral Hygiene independent   Dress independent   Laundry with supervision   Room Organization independent

## 2017-03-05 PROCEDURE — 25000132 ZZH RX MED GY IP 250 OP 250 PS 637: Performed by: STUDENT IN AN ORGANIZED HEALTH CARE EDUCATION/TRAINING PROGRAM

## 2017-03-05 PROCEDURE — S0136 CLOZAPINE, 25 MG: HCPCS | Performed by: STUDENT IN AN ORGANIZED HEALTH CARE EDUCATION/TRAINING PROGRAM

## 2017-03-05 PROCEDURE — 12400001 ZZH R&B MH UMMC

## 2017-03-05 PROCEDURE — 25900017 H RX MED GY IP 259 OP 259 PS 637: Performed by: STUDENT IN AN ORGANIZED HEALTH CARE EDUCATION/TRAINING PROGRAM

## 2017-03-05 PROCEDURE — 25000132 ZZH RX MED GY IP 250 OP 250 PS 637: Performed by: PHYSICIAN ASSISTANT

## 2017-03-05 RX ADMIN — AMPICILLIN 250 MG: 250 CAPSULE ORAL at 18:03

## 2017-03-05 RX ADMIN — AMPICILLIN 250 MG: 250 CAPSULE ORAL at 11:37

## 2017-03-05 RX ADMIN — AMPICILLIN 250 MG: 250 CAPSULE ORAL at 05:04

## 2017-03-05 RX ADMIN — CLOZAPINE 25 MG: 25 TABLET ORAL at 19:47

## 2017-03-05 RX ADMIN — Medication 2000 UNITS: at 10:06

## 2017-03-05 RX ADMIN — AMPICILLIN 250 MG: 250 CAPSULE ORAL at 22:17

## 2017-03-05 RX ADMIN — PRENATAL VIT W/ FE FUMARATE-FA TAB 27-0.8 MG 1 TABLET: 27-0.8 TAB at 10:06

## 2017-03-05 ASSESSMENT — ACTIVITIES OF DAILY LIVING (ADL)
GROOMING: INDEPENDENT
DRESS: INDEPENDENT
ORAL_HYGIENE: INDEPENDENT
LAUNDRY: WITH SUPERVISION
GROOMING: INDEPENDENT
DRESS: INDEPENDENT
ORAL_HYGIENE: INDEPENDENT
LAUNDRY: WITH SUPERVISION

## 2017-03-05 NOTE — PLAN OF CARE
Problem: Depressive Symptoms  Goal: Depressive Symptoms  -pt will remain safe without any self harm during hospitalization.  -pt will have decreased thoughts of self harm and or suicidal ideation.  -pt will report improved sleep.  -pt will have adequate daily oral intake.  -pt will have improvement in self care and person hygiene.  -pt will spend time out of their room.  -pt will express decrease feelings of hopelessness.  Outcome: Improving  Nona reports improved mood-states she is feeling completely recovered and feels prepared to return home and resume responsibilities-states she especially misses 5 mth old baby and is longing to hold her in her arms-did speak of increasing difficulty managing 12 yr old autistic daughter because she is now taller than Nona-states she must bathe her and brush her teeth and her larger size makes it more difficult-states it is difficult at home because  believes she should be able to do all , cooking and housekeeping without assistance-Nona states she is working all day and it is still difficult to keep up-Nona is, however, in good spirits and states she will do her best, but be more mindful of needing a break at times-discussed sacrificing some of cleaning expectations to lower burden-discussed various current events appropriately including recent research related to autism-active on unit-

## 2017-03-05 NOTE — PROGRESS NOTES
Patient was restless for most of shift, pacing halls. Patient denied anxiety and depression. Stated that her mood is good and she wants to go home soon.  Affect is full range. Mood is calm. Pt also denied SI, hallucinations, med side effects.     03/04/17 2008   Behavioral Health   Hallucinations denies / not responding to hallucinations   Thinking intact   Orientation person: oriented;place: oriented   Memory baseline memory   Insight admits / accepts   Judgement intact   Eye Contact at examiner   Affect full range affect   Mood mood is calm   Physical Appearance/Attire attire appropriate to age and situation   Hygiene (adequate)   Suicidality (denies)   Self Injury (denies)   Activity withdrawn;restless   Speech clear;coherent   Medication Sensitivity sedation   Psychomotor / Gait paces;balanced;steady   Activities of Daily Living   Hygiene/Grooming independent   Oral Hygiene independent   Dress independent   Laundry with supervision   Room Organization independent   Activity   Activity Level of Assistance independent

## 2017-03-06 LAB
BACTERIA SPEC CULT: NO GROWTH
BACTERIA SPEC CULT: NO GROWTH
Lab: NORMAL
Lab: NORMAL
MICRO REPORT STATUS: NORMAL
MICRO REPORT STATUS: NORMAL
SPECIMEN SOURCE: NORMAL
SPECIMEN SOURCE: NORMAL

## 2017-03-06 PROCEDURE — 12400001 ZZH R&B MH UMMC

## 2017-03-06 PROCEDURE — 25000132 ZZH RX MED GY IP 250 OP 250 PS 637: Performed by: STUDENT IN AN ORGANIZED HEALTH CARE EDUCATION/TRAINING PROGRAM

## 2017-03-06 PROCEDURE — S0136 CLOZAPINE, 25 MG: HCPCS | Performed by: STUDENT IN AN ORGANIZED HEALTH CARE EDUCATION/TRAINING PROGRAM

## 2017-03-06 PROCEDURE — 25000132 ZZH RX MED GY IP 250 OP 250 PS 637: Performed by: PHYSICIAN ASSISTANT

## 2017-03-06 PROCEDURE — 25900017 H RX MED GY IP 259 OP 259 PS 637: Performed by: STUDENT IN AN ORGANIZED HEALTH CARE EDUCATION/TRAINING PROGRAM

## 2017-03-06 PROCEDURE — 99232 SBSQ HOSP IP/OBS MODERATE 35: CPT | Mod: GC | Performed by: PSYCHIATRY & NEUROLOGY

## 2017-03-06 RX ORDER — CLOZAPINE 25 MG/1
25 TABLET ORAL AT BEDTIME
Qty: 7 TABLET | Refills: 0 | Status: ON HOLD
Start: 2017-03-06 | End: 2017-04-14

## 2017-03-06 RX ADMIN — CLOZAPINE 25 MG: 25 TABLET ORAL at 21:03

## 2017-03-06 RX ADMIN — Medication 2000 UNITS: at 09:02

## 2017-03-06 RX ADMIN — PRENATAL VIT W/ FE FUMARATE-FA TAB 27-0.8 MG 1 TABLET: 27-0.8 TAB at 09:02

## 2017-03-06 RX ADMIN — AMPICILLIN 250 MG: 250 CAPSULE ORAL at 05:04

## 2017-03-06 ASSESSMENT — ACTIVITIES OF DAILY LIVING (ADL)
DRESS: STREET CLOTHES;INDEPENDENT
GROOMING: HANDWASHING;SHOWER;INDEPENDENT
DRESS: INDEPENDENT
ORAL_HYGIENE: INDEPENDENT
LAUNDRY: WITH SUPERVISION
LAUNDRY: WITH SUPERVISION
ORAL_HYGIENE: INDEPENDENT
GROOMING: INDEPENDENT

## 2017-03-06 NOTE — DISCHARGE INSTRUCTIONS
Behavioral Discharge Planning and Instructions    Summary: You were admitted to [facility] station 22 on February 19th after having an increase in psychotic symptoms. You had not been taking your medication as prescribed which made your hallucinations worsen and thoughts of suicide persist to the point of requiring hospitalization. CPS was notified by hospital staff as you had initial concerns regarding the safety of your children while in your care. Since being in the hospital your medications have been assessed and have began to help your thinking to clear. After contacting your ACT team and working with CPS you have been approved to return home with close follow up in place.     Main Diagnosis: Schizoaffective Disorder    Major Treatments, Procedures and Findings: You were seen daily by your mental health team to discuss treatment options and concerns. You had access to both occupational and therapeutic groups to help promote a safe recovery. Medications were assessed and evaluated for their effectiveness in treating targeted mental health areas that have been dysregulated.     Symptoms to Report: feeling more aggressive, increased confusion, losing more sleep, mood getting worse or thoughts of suicide    Lifestyle Adjustment: Attend all your appointments and take your medications as prescribed. If you are unable to do so notify a member of your treatment team. Adjust your lifestyle to get enough sleep, relaxation, exercise and good nutrition. Continue to develop healthy coping skills to decrease stress and promote a healthy living environment. Abstain from the use of alcohol, illegal drugs or medications that you are not prescribed.     Psychiatry Follow-up:     Dr. Gaviria- Re-Entry Act Team will schedule follow up appointment with provider.  Phone: [phone] Fax: [phone]    Clozaril Instructions:   You have been referred to [facility] for ongoing maintenance  of your Clozaril medication. They have been sent your information and will be contacting you. You should follow up and call them if you do not receive a call within 24 hours. Phone [phone]   With this medication you will need weekly lab draws in order for your medication to be filled by a pharmacy. This medication is only distributed 7 days at a time initially.   [facility] will establish with you someone to come to your home to do the lab draw and will have your medication sent to you at home.   [facility] [phone]     Martha HARRIS will contact you to schedule follow up assessment for safety   Ph: [phone] Fax: [phone]    Resources:   "[employer] Community Outreach for Psychiatric Emergencies ([employer]) 248.323.6976    Crisis Intervention: 459.956.2592 or 240-507-3518 (TTY: 872.317.9566).  Call anytime for help.  National [place] on Mental Illness (www.mn.doron.org): 358.944.6234 or 866-794-1373.  Suicide Awareness Voices of Education (SAVE) (www.save.org): 048-371-WTIU (1844)  National Suicide Prevention Line (www.mentalhealthmn.org): 076-189-CDUR (2131)  Mental Health Consumer/Survivor Network of [place] (www.mhcsn.net): 369.974.7629 or 930-465-5343  Mental Health Association of [place] (www.mentalhealth.org): 760.871.7202 or 486-304-3363    General Medication Instructions:   See your medication sheet(s) for instructions.   Take all medicines as directed.  Make no changes unless your doctor suggests them.   Go to all your doctor visits.  Be sure to have all your required lab tests. This way, your medicines can be refilled on time.  Do not use any drugs not prescribed by your doctor.  Avoid alcohol.    The treatment team has appreciated the opportunity to work with you.  We wish you the best in the future.  If you have any questions or concerns our unit number is [phone]

## 2017-03-06 NOTE — PROGRESS NOTES
Writer and resident doctor met with patient and her ACT Team nurse Johanne regarding patient's discharge. Patient and team are agreeable with discharging tomorrow afternoon. ACT team will be seeing patient daily after discharge to continue monitoring her stability.

## 2017-03-06 NOTE — PLAN OF CARE
Problem: Depressive Symptoms  Goal: Depressive Symptoms  -pt will remain safe without any self harm during hospitalization.  -pt will have decreased thoughts of self harm and or suicidal ideation.  -pt will report improved sleep.  -pt will have adequate daily oral intake.  -pt will have improvement in self care and person hygiene.  -pt will spend time out of their room.  -pt will express decrease feelings of hopelessness.   Outcome: Improving  Nona crying on phone with -states she misses her baby and wants to go home-met with MDs, CTC and ACT team member-pankaj refusing grps-discussed plan for discharge tomorrow-spending most of day [place]-pleasant in interactions, despite disappointment not discharged today-

## 2017-03-06 NOTE — PROGRESS NOTES
Patient was restless during shift, slowly pacing halls, occasionally pausing to watch tv. Pt ate dinner and did her laundry. Was cooperative about having her room changed. Affect is full range. Mood is calm. No hallucinations, SI, med side effects noted.      03/05/17 2048   Behavioral Health   Hallucinations denies / not responding to hallucinations   Thinking intact   Orientation person: oriented;place: oriented   Memory baseline memory   Insight admits / accepts   Judgement intact   Eye Contact at examiner   Affect full range affect   Mood mood is calm   Physical Appearance/Attire attire appropriate to age and situation   Hygiene well groomed   Suicidality (denies)   Self Injury (denies)   Activity withdrawn;restless   Speech clear;coherent   Medication Sensitivity no observed side effects   Psychomotor / Gait paces;steady   Activities of Daily Living   Hygiene/Grooming independent   Oral Hygiene independent   Dress independent   Laundry with supervision   Room Organization independent   Activity   Activity Level of Assistance independent

## 2017-03-06 NOTE — DISCHARGE SUMMARY
----------------------------------------------------------------------------------------------------------  [facility], [place]   Discharge Summary      Nona Finley MRN# [id number]   Age: 36 year old YOB: 1980     Date of Admission:  2017  Date of Discharge:  3/7/2017  Admitting Physician:  Danish Levi MD  Discharge Physician:  Danish Levi MD         Event Leading to Hospitalization:     Nona Finley is a 37yo [place] female w a h/o Schizoaffective Disorder who was BIB her  after he found her brandishing a knife and stating she is going to kill 6 children and herself with it. She was recently discharged from [facility] for SI 2 days ago after a brief hospitalization. She states that her medications are no longer working for her. Prior to Friday, she had not been hospitalized since October following the birth of her new daughter. She has 6 children, the oldest of whom is developmentally delayed, non-verbal and requires the services of a PCA 5 hours/day. She has had good adherence since discharging but the AH simply aren't going away as they have in the past with medications. Her ACT team recently increased her long-acting paliperidone to 234mg. Her mood has been good of late, but the 's feeding schedule has been disruptive to her sleep cycle. She does not want to kill her children or herself, but the CAH were overwhelming the morning of admission.       See Admission note by Danish Levi MD, on 2017 for additional details.          Diagnoses:     Principal Diagnosis: Schizoaffective disorder, post-partum associated onset     Medical diagnoses to be addressed this admission (Problem-Based): Bacteriuria, Orthostasis (associated with clozapine use)      Relevant psychosocial stressors: Severe - including motherhood, chronic mental illness, recent postpartum status, extensive household responsibilities         Labs:     Utox  negative. HCG negative x2. CMP, WBC w/ diff, lactic acid, UA, blood cultures, CXR, echo - all WNL. Urine with moderate LE and urine culture with 10,000 to 50,000 CFU, grew beta hemolytic strep.    On 2/21/2017, prior to initiation of clozapine, CK total was assessed at 105 and CRP was <2.9.  Also on this date, WBC was 5.4 and ANC was 2.9.  EKG on 2/22/2017 was auto-read with normal sinus, possible left atrial enlargement and QTc of 401.    Later hematologic monitoring, performed after initiation of clozapine: 2/28/2017 - WBC 6.1 with ANC 3.6.  3/7/2017 - WBC 5.5 with ANC 2.7.         Consults/Medical Course:     Family planning: Patient endorsed preferring to continue Depo-Provera versus beginning OCP's. On 2/23 Depo-Provera 150 mg injection was given.     On 2/27, Internal Medicine was consulted to evaluate the patient's EKGs, vitals, and cardiac history due to marked orthostatic side effects from clozapine and to weigh in on whether there is reason to suspect underlying cardiac disease. Their recommendations were to encourage PO fluids, to maintain clozapine dose at 25 mg qHS, and to repeat EKG if sustained HR >120 is noticed. They agreed to continue peripherally monitoring her vitals. On 2/28, in response to a low grade temperature of 100.2, the IM consult service ordered an infectious work up with CMP, WBC w/ diff, lactic acid, UA, blood cultures, CXR, and echo (to assess for endocarditis), all of which were WNL. UA on that date with moderate leukocyte esterase but without nitrite.  Urine culture proved out 10,000 to 50,000 CFU growing beta hemolytic strep, and with the patient endorsing increased urinary frequency (without dysuria or urgency) the medicine team elected to start ampicillin 250 mg q6H x 3 days (3/3 - 3/6).    At the time of discharge the patient's orthostatic hypotension and endorsed lightheadedness had resolved, however she still endorsed intermittent palpitations and was assessed to have  orthostatic tachycardia.           Hospital Course:     Diagnostic Impression (Upon Admission): Nona Finley is a 36 year old female with h/o schizoaffective disorder who presented to the [facility] following command hallucinations telling her to kill herself and her children in the context of medication inefficacy, disrupted sleep, and stressful home (6 kids, one of whom has autism). The patient's last psychiatric hospitalization was 2 days prior to this admission. The patient is currently followed by [facility] with psychiatrist Walter Gaviria. Has had ECT at this facility in 2012. Family history is notable for schizophrenia. Current psychosocial stressors include raising multiple children,  away at medical school in the [facility], and chronic mental illness. The patient denies drug abuse and self injurious behaviors. The MSE is notable for command hallucinations, SI, and HI. Admission warranted to maintain safety and identify a new antipsychotic medication for managing her psychotic symptoms.     Hospital course: Nona Finley was admitted to [facility] on a 72-hr hold. PTA fluoxetine and PRN olanzapine were restarted. During the initial assessment interview on the first morning of admission the patient endorsed being closely followed by [facility] team and we agreed to speak with her ACT team regarding possible initiation of clozapine. On 2/21 the team spoke with the [facility] psychiatry resident who works on the ACT team with the patient, Dr. Yuridia Rod, and she stated that they had previously considered starting clozapine, endorsing approval of our team initiating treatment with this agent during this admission. She also agreed with discontinuing fluoxetine due to possible activation of david. Clozapine was started on 2/21 with the intention being to titrate up as tolerated. On 2/23 the patient agreed to sign in voluntarily. Additionally on that date she endorsed having already seen  "improvements in psychotic symptoms with two doses of clozapine.     On 2/23, her  visited and shared that CPS felt it was unsafe for her to return directly home. Thus, following a meeting with representatives from her ACT team (also on 2/23), she allowed that it \"would be a good idea\" for her to spend time in transitional housing (such as an IRTs) immediately following discharge. Our team agreed with this, and stated that we would begin making arrangements for this moving forward.  On 2/24, she again noted the absence of command AH/SI, or fear/dysphoria associated with same, however did endorse symptomatic orthostatic hypotension (BP found to be 70s/50s on standing with associated dizziness). Encouraged vigilant fluid intake on this date with arrestment of clozapine titration.   On 2/26 the patient had demonstrated continued light-headedness with observed orthostatic hypotension and so the decision was made to hold clozapine dose at 25 mg during continued assessment of her tolerance to this agent. On 2/27, she reported subjective improvement in light-headedness but with continued orthostatic tachycardia. Also of note, on that date her willingness to discharge to an ITRs appeared to have waned and a conversation regarding CPS's rationale on this matter was conducted at length with the patient allowing understanding     On 3/1, the patient informed the team that that she had decided to return home and would only transition her care through an IRTs if this was court ordered. Later that day, by way of clarification, our unit CTC spoke with CPS who stated that they did not feel it was merited at this time to use the courts to hart temporary GH placement and that with the supports she currently has (ACT team) the patient could return home once our team at [facility] have deemed her stabilized and safe for discharge. On 3/2 the patient reported resolution of her symptomatic orthostasis, however the next day she allowed " palpitations upon standing and clinically assessed orthostatic hypotension was again noted x2. Due to this the team decided to continue to hold dose titration of clozapine at 25 mg.    On 3/6, the patient received her paliperidone (Invega Sustenna) 234 mg q28 days injection.  Also on that date, a member of the patient's ACT team (Johanne) visited to assess the patient's status. They agreed that she seemed stable/close to her previous baseline, and it was agreed for her to discharge the next day, 3/7, with her mother-in-law present at home to provide additional support. The plan, as agreed upon, was for CPS to meet with the patient and her  at their home soon after discharge to elaborate a plan for safety of patient and children moving forward. Additionally, she will receive daily visits from ACT team in the next week and will have her clozapine monitoring provided by [facility]. Patient and family were in agreement with this plan. Given impending discharge and patient's stability, decision was made to keep patient on clozapine 25 mg rather than attempting an up-titration with (potentially) fludrocortisone to assist in management of her orthostatic instability.  In the future, if her response in the home setting warrants need for increase of clozapine, it is recommended that Florinef be added to target the hypotension.  Additionally, it may be valuable to consider checking a clozapine plasma level and pharmacogenetic testing to assess the patient's metabolic processing of clozapine, given her orthostatic side effects concomitant with apparent positive treatment response to such a low dose (25 mg) of this agent.    The patient's symptoms of violent command hallucinations, visual hallucinations, suicidal ideation, and marked fear and anxiety associated with the aforementioned improved throughout her admission, and on 3/7/2017 Nona Finley was discharged to home.  At the time of discharge Nona Finley was  determined to not be a danger to herself or others.    Today Nona Finley reports an absence of psychotic symptoms, denying AVH or paranoia, and improved mood, with no endorsed fear or anxiety associated with her psychiatric status, which had been marked upon admission.  This patient does have notable risk factors for self-harm, including anxiety, psychosis and recent history of auditory command hallucinations to harm herself and others. However, risk is mitigated by commitment to family, Hinduism beliefs, sobriety, absence of past attempts, ability to volunteer a safety plan and history of seeking help when needed. Additional steps taken to minimize risk include: aggressive coordination of outpatient support including daily ACT team visits, domestic assistance from family members and meeting with CPS to assess the patient's condition and the safety of herself and family members. Therefore, based on all available evidence including the factors cited above, Nona Finley does not appear to be at imminent risk for self-harm, and is appropriate for outpatient level of care.     This document serves as a transfer of care to Nona Finley's outpatient providers.         Discharge Medications (Psychiatric are Bolded):     Clozapine 25 mg qHS  Paliperidone (Invega Sustenna) 234 mg IM q28 days - last administered 3/6/2017  Diphenhydramine 25-50 mg daily PRN  Olanzapine 5 mg BID PRN  Cholecalciferol (vitamin D) 2,000 Units daily  Prenatal multivitamin plus iron 1 tablet daily         Psychiatric Examination:     Appearance: awake, alert and neatly groomed with head covering/[place] dress   Attitude: cooperative  Eye Contact: good, much improved from admission  Mood: happy  Affect: smiling, mood congruent and in normal range  Speech: clear, coherent  Psychomotor Behavior: no evidence of tardive dyskinesia, dystonia, or tics and intact station, gait and muscle tone  Thought Process: linear, logical and  goal-oriented  Associations: no loose associations  Thought Content: no evidence of suicidal or homicidal ideation and no evidence of psychotic thought, denies [facility]  Insight: fair  Judgment: fair, noticeable improvement  Oriented to: time, person, and place  Attention Span and Concentration: intact  Recent and Remote Memory: fair  Language: clear English, [place] accent  Fund of Knowledge: appropriate  Muscle Strength and Tone: normal  Gait and Station: Normal         Discharge Plan (Provided to Patient in [facility]):     Psychiatry Follow-up:      Dr. Gaviria- Re-Entry Act Team will schedule follow up appointment with provider.  Phone: [phone] Fax: [phone]     Clozaril Instructions:   You have been referred to [facility] [place], [facility] for ongoing maintenance of your Clozaril medication. They have been sent your information and will be contacting you. You should follow up and call them if you do not receive a call within 24 hours. Phone [phone]   With this medication you will need weekly lab draws in order for your medication to be filled by a pharmacy. This medication is only distributed 7 days at a time initially.   [facility] will establish with you someone to come to your home to do the lab draw and will have your medication sent to you at home.   [facility] [place] [phone]      [facility] to schedule follow up assessment for safety   Ph: [phone] Fax: [phone]    ----------------------------------------------    Patient has been seen and evaluated by me, Avery Freeman DO, Psychiatry Resident, PGY1.    Avery Freeman DO  Resident Psychiatrist, PGY-1  [facility]    Psychiatry Attending Attestation:  This patient has been seen and evaluated by me, Danish Levi M.D. The hospital care and discharge plan were formulated in team discussion with the patient, psychiatry resident and/or medical student, the acuna Clinical Treatment Coordinator, and RN. I reviewed, edited and agree with the  findings and plan in this discharge summary. Total time on discharge date involved with planning and face-to-face discussion with the patient was 25 minutes.    It was our pleasure and privilege to participate in the care of this patient. Please contact any of the team for any questions about the above information.     Raphael Levi M.D.   of Psychiatry  [facility]  Email [email or web address]  Phone [phone]

## 2017-03-06 NOTE — PROGRESS NOTES
----------------------------------------------------------------------------------------------------------  [facility], [place]   Psychiatric Progress Note     Assessment    Presentation:  Nona Chatterjee is a 35yo American female w a h/o Schizoaffective Disorder who was BIB her  after he found her brandishing a knife and stating she is going to kill 6 children and herself with it. She was recently discharged from [facility] for SI 2 days ago after a brief hospitalization. She states that her medications are no longer working for her. Prior to Friday, she had not been hospitalized since October following the birth of her new daughter.  She has 6 children.  She has had good adherence since discharging but the AH simply aren't going away as they have in the past with medications. Her ACT team recently increased her long-acting paliperidone to 234mg. Her mood has been good of late, but the 's feeding schedule has been disruptive to her sleep cycle. She does not want to kill her children or herself, but the CAH were overwhelming the morning of admission.    Diagnostic Impression:  Nona Finley is a 36 year old female with h/o Schizoaffective disorder who presented to the [facility] following command hallucinations telling her to kill herself and her children in the context of medication inefficacy, disrupted sleep, and stressful home (6 kids, one of whom has autism). The patient's last psychiatric hospitalization was 2 days prior to this admission. The patient is currently followed by [facility] with psychiatrist Walter Gaviria. Has had ECT at this facility in . Family history is notable for schizophrenia. Current psychosocial stressors include raising multiple children,  away at medical school in the [facility], and chronic mental illness. The patient denies drug abuse and self injurious behaviors. The MSE is notable for command hallucinations, SI, and HI.  "Admission warranted to maintain safety and identify a new antipsychotic medication for managing her psychotic symptoms.    Hospital course: Nona Finley was admitted to station [facility] on a 72-hr hold.  PTA fluoxetine and PRN olanzapine were restarted.  During the initial assessment interview on the first morning of admission the patient endorsed being closely followed by [facility] team and we agreed to speak with her ACT team regarding possible initiation of clozapine.  On 2/21 the team spoke with the [facility] psychiatry resident who works on the ACT team with the patient, Dr. Yuridia Rod, and she stated that they had previously considered starting clozapine, endorsing approval of our team initiating clozapine treatment this admission.  She also agreed with discontinuing fluoxetine due to possible activation of david. Clozapine was started on 2/21 and titrated up as tolerated.  On 2/23 the patient agreed to sign in voluntarily.  Additionally on that date she endorsed having already seen improvements in psychotic symptoms with two doses of clozapine. On 2/24, she again noted the absence of AH/SIB, however did endorse symptomatic orthostatic hypotension (BP found to be 70s/50s on standing this AM, a/w dizziness). Encouraged vigilant fluid intake on this date, and will maintain clozaril dose at 50 mg for this reason. In addition, her  visited on 2/23 who shared with her that CPS felt it was unsafe for her to return directly home. Thus, following a meeting with representatives from her ACT team (also on 2/23), she thinks it \"would be a good idea\" for her to spend time in transitional housing (such as an [facility]) immediately following discharge. Our team agreed with this, and stated that we will begin making arrangements for this moving forward.  On 2/26 the patient had demonstrated continued light-headedness with observed orthostatic hypotension and so the decision was made to hold clozapine dose at 25 mg " during continued assessment of her tolerance to this agent.  On 2/27, she reported subjective improvement in light-headedness but with continued orthostatic tachycardia.  Also of note, on that date her willingness to discharge to an IRTs seemed to have waned somewhat and a conversation regarding CPS's rationale on this matter was conducted with the patient demonstrating understanding.  On 3/1, the patient had decided that she wished to return home and would only transition her care through an IRTs if this was court ordered.  Later that day, by way of clarification, our unit CTC spoke with CPS who stated that they did not feel it was at this time merited to use the courts to hart temporary GH placement and that with the supports she currently has (ACT team) the patient could return home once our team at [facility] have deemed her stabilized and safe for discharge. On 3/2 the patient reported resolution of her symptomatic orthostasis however on 3/3 she did allow palpitations upon standing and orthostatic hypotension was again noted x2.  Due to this the team decided to continue to hold dose titration of clozapine at 25 mg and to encourage fluid intake.  Also on that date it was agreed that the patient and her family could work with nursing staff in order to help facilitate a visit between the patient and her children via face-time. On 3/6, a member of the patient's ACT team (Johanne) visited to assess the patient's status.  They agreed that she seemed stable/close to her previous baseline, and it was agreed for her to discharge the next day, 3/7, to be taken home by her  and with her mother-in-law present at home to provide additional support. The plan will be for CPS to meet with the patient and her  at their home soon after discharge to elaborate a plan for safety of patient and children moving forward.  Additionally, she will receive daily visits from ACT team in the next week and will have her  clozapine monitoring provided by [email or web address].  Patient and family were in agreement with this plan. Given impending discharge and patient's stability, decision was made to keep patient on clozapine 25 mg rather than attempting to up-titrate. In the future, if her response in the home setting warrants need for increase of clozapine, it is recommended that Florinef be added to target the hypotension.    Medical course: Patient endorsed preferring to continue Depo-Provera versus beginning OCP's.  On 2/23 Depo-Provera 150 mg injection was given.  On 2/27 Internal Medicine was consulted to evaluate the patient's EKG's, vitals and cardiac history due to marked orthostatic side effects from clozapine and to weight in on whether the patient might appear to have any underlying cardiac disease.  Their recommendations have been to continue encouraging PO fluids, to maintain clozapine dose at 25 mg qHS and to repeat EKG if sustained HR >120 is noticed.  They agreed to continue peripherally monitoring her vitals.  On 2/28, in response to a low grade temperature of 100.2, the IM consult service ordered an infectious work up with CMP, WBC w/ diff, lactic acid, UA, blood cultures, CXR, and echo (to assess for endocarditis), all of which were WNL.  UA on that date with moderate leukocyte esterase but without nitrate, urine culture of 10,000 to 50,000 CFU growing beta hemolytic strep, patient endorsed urinary frequency but no dysuria or urgency. Medicine team started ampicillin 250 mg q6H x 3 days (3/3 - 3/6). On 3/3 she did continue to demonstrate orthostatic hypotension and tachycardia, and endorsed palpitations when standing - with the decision being to continue holding clozapine titration and encouraging fluid intake.    Plan     Principal Diagnosis: Schizoaffective disorder    Medications:  Clozapine 25 mg qHS   Paliperidone 234mg Q86otru (last on 2/6, next on 3/6)  Olanzapine 5mg BID PRN; 10mg PO/IM for behavioral events    Labs:  previous admission labs reviewed/wnl; Utox negative.  HCG negative x2.  CMP, WBC w/ diff, lactic acid, UA, blood cultures, CXR, echo - all WNL.  Urine with moderate LE and urine culture with 10,000 to 50,000 CFU, grew beta hemolytic strep.  Consults pending: none   Treatment in therapeutic milieu with individual/group therapies as described.     Medical diagnoses to be addressed this admission (Problem-Based): Bacteriuria     Relevant psychosocial stressors: severe including motherhood, chronic MI    Legal Status: Voluntary; CPS involvement - notify prior to discharge    Safety Assessment:   Checks: Status 15  Precautions: Suicide  Pt has not required locked seclusion or restraints in the past 24 hours to maintain safety, please refer to RN documentation for further details.    The risks, benefits, alternatives and side effects have been discussed and are understood by the patient and other caregivers.    Anticipated Disposition/Discharge Date: Plan for discharge 3/7. Coordinating with ACT to provide additional home support (mother-in-law). CPS to meet with patient and  upon discharge to discuss how to ensure safety of children and patient moving forward. Will also set up regular blood count checks (clozapine monitoring) with [facility].    Scribed by Matt Negron, MS3, for Dr. Avery Freeman DO, PGY1.    I, Avery Freeman DO, have reviewed and edited the documentation recorded by the scribe.  This documentation accurately reflects the services I personally performed and treatment decisions made by me in consultation with the attending physician.    Avery Freeman DO  Resident Psychiatrist, PGY-1  Pager: [phone]    Psychiatry Attending Attestation:  This patient has been seen and evaluated by me, Danish Levi M.D.  The patient's condition and treatment plan were discussed with the resident, and care coordinated with the CTC and RN. I reviewed, edited and agree with the findings and plan in this note.     Danish  "TOMMY Levi M.D.   of Psychiatry     Interim History:   The patient's care was discussed with the treatment team and chart notes were reviewed.     Sleep:  Friday, 3/3: 7 hrs  Saturday, 3/4: 6.75 hrs  Sunday, 3/5: 6.5 hrs  PRN's:  None.  Staff Notes:  Stated that her mood is calm, feels ready to discharge.  Affect full range. Restless, pacing halls. Visible in milieu.  Denied AVH, SI, SIB thoughts.     Interview:  Patient met with the treatment team this AM. She denied any visual or auditory hallucinations over the weekend. When asked about her mood she reports feeling \"good\" and denied feelings of sadness. Her  visited her yesterday. She tried face-timing her kids over the weekend, but they did not answer. With regards to her orthostasis (clozapine), she reports orthostatic tachycardia that improves with increased fluid intake. She denies dizziness or lightheadedness. She again expressed her desire to discharge today but understood the plan for her treatment team to discuss her readiness to discharge with her  and ACT team.     Later in the AM, a member of her ACT team (Johanne) visited and stated that the patient appears to be back to her baseline.  It was decided that she will discharge tomorrow 3/7, to be taken home by her , to have her mother-in-law stay with her in the short term to assist with children/chores, with daily ACT team visits, and that CPS will visit her and her  at their home to assess the patient/home and to agree to a plan for keeping the patient and her family safe.    Review of systems:     The 10 point Review of Systems is negative other than noted in the HPI         Medications:     Current Facility-Administered Medications   Medication     cloZAPine (CLOZARIL) tablet 25 mg     paliperidone (INVEGA SUSTENNA) injection SUSP 234 mg     cholecalciferol (vitamin D) tablet 2,000 Units     diphenhydrAMINE (BENADRYL) capsule 25-50 mg     " "ibuprofen (ADVIL/MOTRIN) tablet 600 mg     OLANZapine (zyPREXA) tablet 5 mg     prenatal multivitamin  plus iron per tablet 1 tablet     OLANZapine (zyPREXA) tablet 10 mg    Or     OLANZapine (zyPREXA) injection 10 mg     acetaminophen (TYLENOL) tablet 650 mg     alum & mag hydroxide-simethicone (MYLANTA ES/MAALOX  ES) suspension 30 mL     magnesium hydroxide (MILK OF MAGNESIA) suspension 30 mL     traZODone (DESYREL) tablet 50 mg     melatonin tablet 3 mg          Allergies:   No Known Allergies         Psychiatric Examination:   BP 95/62 (BP Location: Right arm)  Pulse 72  Temp 98.6  F (37  C) (Tympanic)  Resp 14  Ht 1.676 m (5' 6\")  Wt 64 kg (141 lb)  LMP 12/16/2016  SpO2 100%  BMI 22.76 kg/m2  Weight is 141 lbs 0 oz  Body mass index is 22.76 kg/(m^2).    Appearance:  awake, alert and neatly groomed with head covering/Jordanian dress   Attitude:  cooperative  Eye Contact:  good  Mood: calm, sad about not going home yet  Affect: mildly constricted mobility but improved with a few smiles  Speech:  clear, coherent  Psychomotor Behavior:  no evidence of tardive dyskinesia, dystonia, or tics and intact station, gait and muscle tone  Thought Process:  linear, logical and goal-oriented  Associations:  no loose associations  Thought Content:  denied AVH and SI   Insight:  fair  Judgment:  fair, noticeable improvement  Oriented to:  time, person, and place  Attention Span and Concentration:  intact  Recent and Remote Memory:  fair  Language: clear English, Jordanian accent  Fund of Knowledge: appropriate  Muscle Strength and Tone: normal  Gait and Station: Normal         Labs:     2/19 Utox and Uhcg negative  2/21 EKG Sinus arrhythmia rate 54   2/21 CBC normal; WBC 5.4 and ANC 2900; ; CRP <2.9  2/28 Uhcg negative  3/1 CMP, WBC w/ diff, lactic acid, UA, blood cultures, CXR, echo - all WNL.  Urine with moderate LE and urine culture with 10,000 to 50,000 CFU, positive for beta hemolytic strep.  "

## 2017-03-07 VITALS
DIASTOLIC BLOOD PRESSURE: 62 MMHG | RESPIRATION RATE: 15 BRPM | HEART RATE: 72 BPM | SYSTOLIC BLOOD PRESSURE: 95 MMHG | HEIGHT: 66 IN | TEMPERATURE: 97.5 F | OXYGEN SATURATION: 100 % | BODY MASS INDEX: 22.66 KG/M2 | WEIGHT: 141 LBS

## 2017-03-07 LAB
BASOPHILS # BLD AUTO: 0 10E9/L (ref 0–0.2)
BASOPHILS NFR BLD AUTO: 0.5 %
DIFFERENTIAL METHOD BLD: NORMAL
EOSINOPHIL # BLD AUTO: 0.2 10E9/L (ref 0–0.7)
EOSINOPHIL NFR BLD AUTO: 4.4 %
IMM GRANULOCYTES # BLD: 0 10E9/L (ref 0–0.4)
IMM GRANULOCYTES NFR BLD: 0.4 %
LYMPHOCYTES # BLD AUTO: 2 10E9/L (ref 0.8–5.3)
LYMPHOCYTES NFR BLD AUTO: 37.2 %
MONOCYTES # BLD AUTO: 0.5 10E9/L (ref 0–1.3)
MONOCYTES NFR BLD AUTO: 8.7 %
NEUTROPHILS # BLD AUTO: 2.7 10E9/L (ref 1.6–8.3)
NEUTROPHILS NFR BLD AUTO: 48.8 %
NRBC # BLD AUTO: 0 10*3/UL
NRBC BLD AUTO-RTO: 0 /100
WBC # BLD AUTO: 5.5 10E9/L (ref 4–11)

## 2017-03-07 PROCEDURE — 85004 AUTOMATED DIFF WBC COUNT: CPT | Performed by: PSYCHIATRY & NEUROLOGY

## 2017-03-07 PROCEDURE — 25000132 ZZH RX MED GY IP 250 OP 250 PS 637: Performed by: STUDENT IN AN ORGANIZED HEALTH CARE EDUCATION/TRAINING PROGRAM

## 2017-03-07 PROCEDURE — 85048 AUTOMATED LEUKOCYTE COUNT: CPT | Performed by: PSYCHIATRY & NEUROLOGY

## 2017-03-07 PROCEDURE — 99238 HOSP IP/OBS DSCHRG MGMT 30/<: CPT | Mod: GC | Performed by: PSYCHIATRY & NEUROLOGY

## 2017-03-07 PROCEDURE — 36415 COLL VENOUS BLD VENIPUNCTURE: CPT | Performed by: PSYCHIATRY & NEUROLOGY

## 2017-03-07 RX ADMIN — Medication 2000 UNITS: at 08:30

## 2017-03-07 RX ADMIN — PRENATAL VIT W/ FE FUMARATE-FA TAB 27-0.8 MG 1 TABLET: 27-0.8 TAB at 08:31

## 2017-03-07 ASSESSMENT — ACTIVITIES OF DAILY LIVING (ADL)
ORAL_HYGIENE: INDEPENDENT
LAUNDRY: WITH SUPERVISION
GROOMING: INDEPENDENT
DRESS: STREET CLOTHES;INDEPENDENT

## 2017-03-07 NOTE — PLAN OF CARE
"Problem: General Plan of Care (Inpatient Behavioral)  Goal: Individualization/Patient Specific Goal (IP Behavioral)  The patient and/or their representative will achieve their patient-specific goals related to the plan of care.    The patient-specific goals include:   Illness Management Recovery model: Objectives    Patient will identify reason(s) for hospitalization from their perspective.  Patient will identify a minimum of three goals for discharge.  Patient will identify a minimum of three triggers that may increase their symptoms.  Patient will identify a minimum of three coping skills they can do to stay well.   Patient will identify their support system to demonstrate readiness for discharge.   Illness Management Recovery model:  Wellness Strategies.     Patient identified the following actions/activities they can do to stay well.     1. \"I want to go to school\".  2. Members of patient's support system help her by \"they tell me to take my meds\".   3. If patient had time every day to do something healthy for herself she would \"go to the park with my kids, take a shower\".   4. Something creative patient would like to do is \"paint the inside of my house\".           "

## 2017-03-07 NOTE — PLAN OF CARE
Problem: Depressive Symptoms  Goal: Depressive Symptoms  -pt will remain safe without any self harm during hospitalization.  -pt will have decreased thoughts of self harm and or suicidal ideation.  -pt will report improved sleep.  -pt will have adequate daily oral intake.  -pt will have improvement in self care and person hygiene.  -pt will spend time out of their room.  -pt will express decrease feelings of hopelessness.   Outcome: Rafael Castellano did not attend community meeting. She denies any voices or any thoughts of either self harm or of harming her children. She is looking forward to discharge tomorrow. Says that her  will probably come to pick her up by noon. She looks forward to getting home to take care of her children. In addition, she looks forward to cleaning her home in small increments. She had her invega shot tonight. Says that she feels quite tired from the medications. However, she is not as sedated as when she was on zyprexa.

## 2017-03-07 NOTE — PLAN OF CARE
"Problem: Depressive Symptoms  Goal: Depressive Symptoms  -pt will remain safe without any self harm during hospitalization.  -pt will have decreased thoughts of self harm and or suicidal ideation.  -pt will report improved sleep.  -pt will have adequate daily oral intake.  -pt will have improvement in self care and person hygiene.  -pt will spend time out of their room.  -pt will express decrease feelings of hopelessness.   Outcome: Adequate for Discharge Date Met:  03/07/17  \"I'm better in the way I'm thinking, my thinking is clear. The voices aren't telling me to hurt anyone\". Denies depression and anxiety, both have improved. Is able to focus and concentrate better. Did not go to groups yesterday or today. Read over discharge instructions with patient and answered questions. Was discharged at 1145 to home with .       "

## 2017-03-21 ENCOUNTER — HOSPITAL ENCOUNTER (INPATIENT)
Facility: CLINIC | Age: 37
LOS: 24 days | Discharge: HOME | DRG: 885 | End: 2017-04-14
Attending: PSYCHIATRY & NEUROLOGY | Admitting: PSYCHIATRY & NEUROLOGY
Payer: COMMERCIAL

## 2017-03-21 DIAGNOSIS — I95.1 ORTHOSTASIS: ICD-10-CM

## 2017-03-21 DIAGNOSIS — F25.1 SCHIZOAFFECTIVE DISORDER, DEPRESSIVE TYPE (H): ICD-10-CM

## 2017-03-21 DIAGNOSIS — Z00.00 ROUTINE GENERAL MEDICAL EXAMINATION AT A HEALTH CARE FACILITY: ICD-10-CM

## 2017-03-21 DIAGNOSIS — G25.71 AKATHISIA: ICD-10-CM

## 2017-03-21 DIAGNOSIS — K08.409 S/P TOOTH EXTRACTION, UNSPECIFIED EDENTULISM: Primary | ICD-10-CM

## 2017-03-21 DIAGNOSIS — R45.851 SUICIDAL IDEATION: ICD-10-CM

## 2017-03-21 LAB
AMPHETAMINES UR QL SCN: NORMAL
BARBITURATES UR QL: NORMAL
BASOPHILS # BLD AUTO: 0 10E9/L (ref 0–0.2)
BASOPHILS NFR BLD AUTO: 0.4 %
BENZODIAZ UR QL: NORMAL
CANNABINOIDS UR QL SCN: NORMAL
COCAINE UR QL: NORMAL
DIFFERENTIAL METHOD BLD: NORMAL
EOSINOPHIL # BLD AUTO: 0.1 10E9/L (ref 0–0.7)
EOSINOPHIL NFR BLD AUTO: 1.8 %
ETHANOL UR QL SCN: NORMAL
HCG UR QL: NEGATIVE
IMM GRANULOCYTES # BLD: 0 10E9/L (ref 0–0.4)
IMM GRANULOCYTES NFR BLD: 0.2 %
LYMPHOCYTES # BLD AUTO: 2.1 10E9/L (ref 0.8–5.3)
LYMPHOCYTES NFR BLD AUTO: 37.5 %
MONOCYTES # BLD AUTO: 0.6 10E9/L (ref 0–1.3)
MONOCYTES NFR BLD AUTO: 10.3 %
NEUTROPHILS # BLD AUTO: 2.8 10E9/L (ref 1.6–8.3)
NEUTROPHILS NFR BLD AUTO: 49.8 %
OPIATES UR QL SCN: NORMAL
WBC # BLD AUTO: 5.8 10E9/L (ref 4–11)

## 2017-03-21 PROCEDURE — 99285 EMERGENCY DEPT VISIT HI MDM: CPT | Mod: Z6 | Performed by: PSYCHIATRY & NEUROLOGY

## 2017-03-21 PROCEDURE — 85048 AUTOMATED LEUKOCYTE COUNT: CPT | Performed by: STUDENT IN AN ORGANIZED HEALTH CARE EDUCATION/TRAINING PROGRAM

## 2017-03-21 PROCEDURE — 25000132 ZZH RX MED GY IP 250 OP 250 PS 637: Performed by: STUDENT IN AN ORGANIZED HEALTH CARE EDUCATION/TRAINING PROGRAM

## 2017-03-21 PROCEDURE — 85004 AUTOMATED DIFF WBC COUNT: CPT | Performed by: STUDENT IN AN ORGANIZED HEALTH CARE EDUCATION/TRAINING PROGRAM

## 2017-03-21 PROCEDURE — 81025 URINE PREGNANCY TEST: CPT | Performed by: FAMILY MEDICINE

## 2017-03-21 PROCEDURE — 80320 DRUG SCREEN QUANTALCOHOLS: CPT | Performed by: FAMILY MEDICINE

## 2017-03-21 PROCEDURE — 25900017 H RX MED GY IP 259 OP 259 PS 637: Performed by: STUDENT IN AN ORGANIZED HEALTH CARE EDUCATION/TRAINING PROGRAM

## 2017-03-21 PROCEDURE — 12400001 ZZH R&B MH UMMC

## 2017-03-21 PROCEDURE — 80307 DRUG TEST PRSMV CHEM ANLYZR: CPT | Performed by: FAMILY MEDICINE

## 2017-03-21 PROCEDURE — 99285 EMERGENCY DEPT VISIT HI MDM: CPT | Performed by: PSYCHIATRY & NEUROLOGY

## 2017-03-21 PROCEDURE — S0136 CLOZAPINE, 25 MG: HCPCS | Performed by: STUDENT IN AN ORGANIZED HEALTH CARE EDUCATION/TRAINING PROGRAM

## 2017-03-21 PROCEDURE — HZ2ZZZZ DETOXIFICATION SERVICES FOR SUBSTANCE ABUSE TREATMENT: ICD-10-PCS | Performed by: PSYCHIATRY & NEUROLOGY

## 2017-03-21 RX ORDER — ACETAMINOPHEN 325 MG/1
650 TABLET ORAL EVERY 4 HOURS PRN
Status: DISCONTINUED | OUTPATIENT
Start: 2017-03-21 | End: 2017-04-14 | Stop reason: HOSPADM

## 2017-03-21 RX ORDER — CLOZAPINE 25 MG/1
12.5 TABLET ORAL AT BEDTIME
Status: DISCONTINUED | OUTPATIENT
Start: 2017-03-21 | End: 2017-03-23

## 2017-03-21 RX ORDER — OLANZAPINE 10 MG/2ML
10 INJECTION, POWDER, FOR SOLUTION INTRAMUSCULAR
Status: DISCONTINUED | OUTPATIENT
Start: 2017-03-21 | End: 2017-04-14 | Stop reason: HOSPADM

## 2017-03-21 RX ORDER — DIPHENHYDRAMINE HCL 25 MG
25-50 CAPSULE ORAL DAILY PRN
Status: DISCONTINUED | OUTPATIENT
Start: 2017-03-21 | End: 2017-04-14 | Stop reason: HOSPADM

## 2017-03-21 RX ORDER — PRENATAL VIT/IRON FUM/FOLIC AC 27MG-0.8MG
1 TABLET ORAL DAILY
Status: DISCONTINUED | OUTPATIENT
Start: 2017-03-21 | End: 2017-04-14 | Stop reason: HOSPADM

## 2017-03-21 RX ORDER — PRENATAL VIT/IRON FUM/FOLIC AC 27MG-0.8MG
1 TABLET ORAL DAILY
Status: ON HOLD | COMMUNITY
End: 2017-04-14

## 2017-03-21 RX ORDER — OLANZAPINE 10 MG/1
10 TABLET ORAL
Status: DISCONTINUED | OUTPATIENT
Start: 2017-03-21 | End: 2017-04-14 | Stop reason: HOSPADM

## 2017-03-21 RX ORDER — HYDROXYZINE HYDROCHLORIDE 25 MG/1
25-50 TABLET, FILM COATED ORAL EVERY 4 HOURS PRN
Status: DISCONTINUED | OUTPATIENT
Start: 2017-03-21 | End: 2017-04-14 | Stop reason: HOSPADM

## 2017-03-21 RX ORDER — IBUPROFEN 600 MG/1
600 TABLET, FILM COATED ORAL EVERY 6 HOURS PRN
Status: DISCONTINUED | OUTPATIENT
Start: 2017-03-21 | End: 2017-03-29

## 2017-03-21 ASSESSMENT — ACTIVITIES OF DAILY LIVING (ADL)
ORAL_HYGIENE: INDEPENDENT
LAUNDRY: WITH SUPERVISION
GROOMING: HANDWASHING;SHOWER;INDEPENDENT
DRESS: STREET CLOTHES;INDEPENDENT

## 2017-03-21 ASSESSMENT — ENCOUNTER SYMPTOMS
GASTROINTESTINAL NEGATIVE: 1
SLEEP DISTURBANCE: 1
NEUROLOGICAL NEGATIVE: 1
DECREASED CONCENTRATION: 1
HEMATOLOGIC/LYMPHATIC NEGATIVE: 1
HYPERACTIVE: 0
CONSTITUTIONAL NEGATIVE: 1
RESPIRATORY NEGATIVE: 1
CARDIOVASCULAR NEGATIVE: 1
EYES NEGATIVE: 1
MUSCULOSKELETAL NEGATIVE: 1
HALLUCINATIONS: 1
ENDOCRINE NEGATIVE: 1

## 2017-03-21 NOTE — IP AVS SNAPSHOT
MRN:[id number]                      After Visit Summary   3/21/2017    Nona Finley    MRN: [id number]           Thank you!     Thank you for choosing [facility] for your care. Our goal is always to provide you with excellent care.        Patient Information     Date Of Birth          1980        Designated Caregiver       Most Recent Value    Caregiver    Will someone help with your care after discharge? no      About your hospital stay     You were admitted on:  March 21, 2017 You last received care in the:  [facility]    You were discharged on:  April 14, 2017       Who to Call     For medical emergencies, please call 911.  For non-urgent questions about your medical care, please call your primary care provider or clinic, [phone]          Attending Provider     Provider Specialty    Serg Chauhan MD Psychiatry    Danish Levi MD Psychiatry       Primary Care Provider Office Phone # Fax #    Vanessa Thompson [phone]       [facility] [address] [place] [address]        Further instructions from your care team       Behavioral Discharge Planning and Instructions      Summary:  You were admitted on 3/21/2017  For Psychotic D/O NOS and Suicidal Ideations.  You were treated by Dr. Danish Levi MD and discharged on 4/14/2017 from [facility]. You completed the commitment process and have been placed on civil commitment through [facility]. You have agreed to the terms of your civil commitment by signing your provisional discharge and understand that any further noncompliance will result in you being placed outside of your home.     Main Diagnosis: Schizoaffective    Health Care Follow-up Appointments:     Dr. Gaviria- Re-Entry Act Team will schedule follow up appointment with provider.  Phone: [phone] Fax: [phone]    Major Treatments, Procedures and Findings:  You were provided with: a psychiatric assessment, assessed for medical  "stability, medication evaluation and/or management, group therapy and milieu management    Symptoms to Report: feeling more aggressive, increased confusion, losing more sleep or thoughts of suicide    Early warning signs can include: increased depression or anxiety increased thoughts or behaviors of suicide or self-harm  increased unusual thinking, such as paranoia or hearing voices    Safety and Wellness:  Take all medicines as directed.  Make no changes unless your doctor suggests them.      Follow treatment recommendations.  Refrain from alcohol and non-prescribed drugs.  If there is a concern for safety, call 911.    Resources:   Crisis Intervention: 456.506.9352 or 205-195-1848 (TTY: 103.387.8867).  Call anytime for help.  [employer] [place] [employer] Mental Illness (www.[place].doron.org): 187.141.3707 or 317-623-9809.  Suicide Awareness Voices of Education (SAVE) (www.save.org): 861-868-SAVE (7283)  Mental Health Association of [place] (www.mentalhealth.org): 834.791.4417 or 407-084-6662  Self- Management and Recovery Training., SMART-- Toll free: 471.461.1753  www.[employer]Control.[employer]  [facility] Crisis (COPE) Response - Adult 459 118-2553  Text 4 Life: txt \"LIFE\" to 48376 for immediate support and crisis intervention  Crisis text line: Text \"START\" to 808-662. Free, confidential, 24/7.    The treatment team has appreciated the opportunity to work with you.     If you have any questions or concerns our unit number is [phone]  You may be receiving a follow-up phone call within the next three days from a representative from behavioral health.        Pending Results     Date and Time Order Name Status Description    4/14/2017 1210 Prolactin In process             Admission Information     Date & Time Provider Department Dept. Phone    3/21/2017 Danish Levi MD [facility] [phone]      Your Vitals Were     Blood Pressure Pulse Temperature Respirations Height Weight    120/78 83 98  F (36.7  C) (Tympanic) 16 1.651 m " "(5' 5\") 73.9 kg (163 lb)    Pulse Oximetry BMI (Body Mass Index)                100% 27.12 kg/m2          [facility] Information     [facility] gives you secure access to your electronic health record. If you see a primary care provider, you can also send messages to your care team and make appointments. If you have questions, please call your primary care clinic.  If you do not have a primary care provider, please call [phone] and they will assist you.        Care EveryWhere ID     This is your Care EveryWhere ID. This could be used by other organizations to access your [facility] medical records  [id number]           Review of your medicines      START taking        Dose / Directions    fludrocortisone 0.1 MG tablet   Commonly known as:  FLORINEF   Used for:  Orthostasis        Dose:  0.1 mg   Take 1 tablet (0.1 mg) by mouth 2 times daily   Quantity:  60 tablet   Refills:  0       mirtazapine 15 MG tablet   Commonly known as:  REMERON   Used for:  Akathisia        Dose:  15 mg   Take 1 tablet (15 mg) by mouth At Bedtime   Quantity:  30 tablet   Refills:  0       naproxen 500 MG tablet   Commonly known as:  NAPROSYN   Used for:  S/P tooth extraction, unspecified edentulism        Dose:  500 mg   Take 1 tablet (500 mg) by mouth every 12 hours as needed for moderate pain   Quantity:  10 tablet   Refills:  0         CONTINUE these medicines which may have CHANGED, or have new prescriptions. If we are uncertain of the size of tablets/capsules you have at home, strength may be listed as something that might have changed.        Dose / Directions    cloZAPine 25 MG tablet   Commonly known as:  CLOZARIL   This may have changed:  how much to take   Used for:  Schizoaffective disorder, depressive type (H)        Dose:  75 mg   Take 3 tablets (75 mg) by mouth At Bedtime   Quantity:  21 tablet   Refills:  0         CONTINUE these medicines which have NOT CHANGED        Dose / Directions    BENADRYL PO        Dose:  25-50 mg "   Take 25-50 mg by mouth daily as needed (EPS)   Refills:  0       INVEGA SUSTENNA IM        Dose:  234 mg   Inject 234 mg into the muscle every 28 days   Refills:  0       prenatal multivitamin  plus iron 27-0.8 MG Tabs per tablet   Used for:  Routine general medical examination at a health care facility        Dose:  1 tablet   Take 1 tablet by mouth daily   Quantity:  30 tablet   Refills:  0       VITAMIN D (CHOLECALCIFEROL) PO        Dose:  2000 Units   Take 2,000 Units by mouth daily   Refills:  0         STOP taking     ibuprofen 600 MG tablet   Commonly known as:  ADVIL/MOTRIN           OLANZapine 5 MG tablet   Commonly known as:  zyPREXA                Where to get your medicines      These medications were sent to [facility] [place], [place] - [address]  [address] [address]     Phone:  [phone]     cloZAPine 25 MG tablet    fludrocortisone 0.1 MG tablet    mirtazapine 15 MG tablet    naproxen 500 MG tablet    prenatal multivitamin  plus iron 27-0.8 MG Tabs per tablet                Protect others around you: Learn how to safely use, store and throw away your medicines at www.disposemymeds.org.             Medication List: This is a list of all your medications and when to take them. Check marks below indicate your daily home schedule. Keep this list as a reference.      Medications           Morning Afternoon Evening Bedtime As Needed    BENADRYL PO   Take 25-50 mg by mouth daily as needed (EPS)   Last time this was given:  50 mg on 4/8/2017  8:01 PM                                cloZAPine 25 MG tablet   Commonly known as:  CLOZARIL   Take 3 tablets (75 mg) by mouth At Bedtime   Last time this was given:  75 mg on 4/13/2017  8:02 PM                                fludrocortisone 0.1 MG tablet   Commonly known as:  FLORINEF   Take 1 tablet (0.1 mg) by mouth 2 times daily   Last time this was given:  100 mcg on 4/14/2017  9:18 AM                                 INVEGA SUSTENNA IM   Inject 234 mg into the muscle every 28 days   Last time this was given:  234 mg on 4/10/2017 12:00 PM                                mirtazapine 15 MG tablet   Commonly known as:  REMERON   Take 1 tablet (15 mg) by mouth At Bedtime   Last time this was given:  15 mg on 4/13/2017  8:02 PM                                naproxen 500 MG tablet   Commonly known as:  NAPROSYN   Take 1 tablet (500 mg) by mouth every 12 hours as needed for moderate pain   Last time this was given:  500 mg on 4/11/2017  5:34 PM                                prenatal multivitamin  plus iron 27-0.8 MG Tabs per tablet   Take 1 tablet by mouth daily   Last time this was given:  1 tablet on 4/14/2017  9:18 AM                                VITAMIN D (CHOLECALCIFEROL) PO   Take 2,000 Units by mouth daily   Last time this was given:  2,000 Units on 4/14/2017  9:18 AM

## 2017-03-21 NOTE — SUMMARY OF CARE
Patient arrives to [facility]. Psych Associate explains process, gives patient urine cup and questionnaire. Patient told about meeting with Mental Health  and Psychiatrist. Patient told about 2-5 hour time frame for complete evaluation.

## 2017-03-21 NOTE — IP AVS SNAPSHOT
[address] [address]    Phone:  [phone]                                       After Visit Summary   3/21/2017    Nona Finley    MRN: [id number]           After Visit Summary Signature Page     I have received my discharge instructions, and my questions have been answered. I have discussed any challenges I see with this plan with the nurse or doctor.    ..........................................................................................................................................  Patient/Patient Representative Signature      ..........................................................................................................................................  Patient Representative Print Name and Relationship to Patient    ..................................................               ................................................  Date                                            Time    ..........................................................................................................................................  Reviewed by Signature/Title    ...................................................              ..............................................  Date                                                            Time

## 2017-03-21 NOTE — ED NOTES
Pt searched and wanded. Changed into scrubs.  Belongings given to security.  Process explained.  Offered fluids, nutrition, and comfort measures.

## 2017-03-21 NOTE — ED PROVIDER NOTES
Per Intake  Francisco Izabella        3/21/17 12:48 PM   Note      S-COPE calling to refer this pt to ED / [facility] for MH evaluation.  B-Dr Rod, psych , did crisis assessment today at pt's home. She is psychotic and having AH with voices telling her to kill herself. She has been off meds x 4 days b/c she thinks they are poisoned. She has not eaten x 2 days for same reason. She is paranoid and afraid of what will happen when her 6 kids come home from school. Pt has open CPS report due to pt having prior thoughts of harming her children when psychotic. Pt stated she will not voluntarily be admitted to the hospital. [facility] is looking for commitment with [facility] for this pt.   A-Evaluate for MH admit.  R-ok to call Dr Rod at  or her supervisor Dr Walter Gaviria: [phone].  Izabella Singh          History     Chief Complaint   Patient presents with     Suicidal     off meds for the past 1 week, now hearing voices, feeling suicidal     The history is provided by the patient and medical records.     Nona Finley is a 36 year old female who is referred here from [facility]. Patient is known to us. She has schizoaffective disorder. She is followed by an ACT team, psychiatrist is Dr Gaviria. Patient has been hospitalized here multiple times past couple months. She is postpartum. She reports stopping her oral meds as she felt they were poison. She since has trouble sleeping and reports exacerbation of command voices to kill herself. Patient denies acute medical concerns. She reports not eating past couple days for fear of being poisoned. She denies drug abuse. She is willing to sign herself into the hospital.    PERSONAL MEDICAL HISTORY  Past Medical History   Diagnosis Date     Bipolar 1 disorder (H)      Bipolar affective disorder, depressed, severe, with psychotic behavior (H)      NONSPECIFIC MEDICAL HISTORY      h/o +PPD. Neg CXR 2006     Postpartum depression 2011     Tuberculosis       Varicosities      PAST SURGICAL HISTORY  Past Surgical History   Procedure Laterality Date     No history of surgery       No history of surgery  06/13/2013     Per patient reported this     FAMILY HISTORY  Family History   Problem Relation Age of Onset     Neurologic Disorder Brother      mental health problem?     Neurologic Disorder Sister      seizures (half sister)     Respiratory Paternal Grandfather      asthma     Respiratory Father      asthma     Respiratory Sister      asthma     Respiratory Brother      asthma     Neurologic Disorder Daughter      autism      Hypertension Maternal Grandmother      CEREBROVASCULAR DISEASE Mother      Neurologic Disorder Sister      seizures     DIABETES Maternal Grandfather      Coronary Artery Disease Son      SOCIAL HISTORY  Social History   Substance Use Topics     Smoking status: Never Smoker     Smokeless tobacco: Never Used     Alcohol use No     MEDICATIONS  No current facility-administered medications for this encounter.      Current Outpatient Prescriptions   Medication     cloZAPine (CLOZARIL) 25 MG tablet     OLANZapine (ZYPREXA) 5 MG tablet     Paliperidone Palmitate (INVEGA SUSTENNA IM)     DiphenhydrAMINE HCl (BENADRYL PO)     ibuprofen (ADVIL,MOTRIN) 600 MG tablet     ALLERGIES  No Known Allergies    I have reviewed the Medications, Allergies, Past Medical and Surgical History, and Social History in the Epic system.    Review of Systems   Constitutional: Negative.    HENT: Negative.    Eyes: Negative.    Respiratory: Negative.    Cardiovascular: Negative.    Gastrointestinal: Negative.    Endocrine: Negative.    Genitourinary: Negative.    Musculoskeletal: Negative.    Skin: Negative.    Neurological: Negative.    Hematological: Negative.    Psychiatric/Behavioral: Positive for decreased concentration, hallucinations, sleep disturbance and suicidal ideas. Negative for self-injury. The patient is not hyperactive.    All other systems reviewed and are  negative.      Physical Exam   BP: 111/69  Pulse: 88  Temp: 98.6  F (37  C)  Resp: 16  SpO2: 99 %  Physical Exam   Constitutional: She appears well-developed.   HENT:   Head: Normocephalic.   Eyes: Pupils are equal, round, and reactive to light.   Neck: Normal range of motion.   Cardiovascular: Normal rate.    Pulmonary/Chest: Effort normal.   Abdominal: Soft.   Musculoskeletal: Normal range of motion.   Neurological: She is alert.   Skin: Skin is warm.   Psychiatric: Her speech is normal. Judgment normal. Her affect is blunt. She is withdrawn. She is not agitated, not aggressive, not hyperactive, not actively hallucinating and not combative. Thought content is paranoid. Cognition and memory are normal. She exhibits a depressed mood. She expresses suicidal ideation. She expresses no homicidal ideation.   Nursing note and vitals reviewed.      ED Course     ED Course     Procedures    Labs Ordered and Resulted from Time of ED Arrival Up to the Time of Departure from the ED - No data to display    Assessments & Plan (with Medical Decision Making)   Patient with schizoaffective disorder who is feeling suicidal. She has been nonadherent to her med regimen. She will be admitted for stabilization.    I have reviewed the nursing notes.    I have reviewed the findings, diagnosis, plan and need for follow up with the patient.    New Prescriptions    No medications on file       Final diagnoses:   Schizoaffective disorder, depressive type (H)   Suicidal ideation       3/21/2017   [facility], [place], EMERGENCY DEPARTMENT     Serg Chauhan MD  03/21/17 0727

## 2017-03-21 NOTE — ED NOTES
During search, pt refused to change into scrubs, and RN said that it is ok for pt to keep on street clothes.

## 2017-03-21 NOTE — H&P
-----------------------------------------------------------------------------------------------------------  Psychiatry History & Physical      Nona Finley MRN# [id number]   Age: 36 year old YOB: 1980     Date of Admission: 3/21/2017     Interviewed at 4:45 PM             Contacts:   [facility]  ACT Team:  Dr Rdo: [phone] or her supervisor Dr. Walter Gaviria: [phone]  Family:   Bob Rawls [phone]  [facility]: for ongoing maintenance of clozapine: [phone]           Chief Concern:   Voices are telling me to kill myself    History is obtained from the patient and EMR         History of Present Illness:   Nona Finley is a 36 year old female with a significant past psychiatric history of  schizoaffective disorder, peripartum onset who was brought in by ambulance due to suicidal ideation in the setting of worsening auditory and visual hallucinations. Nona reports initially taking her medications after discharge from hospital 2 weeks ago. She noticed an odd scent in her medications seven days ago and only took her medications intermittently for the next three days because she believed they were poisoned. Four days ago, she began experiencing AH/VH. At this point, she stopped taking all her medications because she believed the medciations were poisonous and would kill her. The AH tell her to kill herself by jumping into the river. She reports VH of people around her with knives threatening to kill her. She reports the VH will stab her if she does not kill herself. Nona reports running around the house trying to hide from the VH. The AH/VH have worsened over the last 4 days. Her AH do not tell her to harm her children and she has no thoughts to hurt them. Yesterday, she began smelling the odd scent in her food and did not eat last night or this morning for fear it would kill her. She did eat a little bit of food at the hospital because she was starving.  "During the last four days, Nona has progressively felt more depressed. She states she has suicidal ideation and a plan, but does not believe she will kill herself.    Nona was worried about her children coming home from school because she feels the commotion would make her depression worse. She has an infant at home and reports the child's grandmother comes over during the day to help take care her baby. She states she has less motivation than usual. She sits on her couch all day and is unable to get her usual chores done. She is in constant fear from her VH and AH. She also reports poor sleep because the / wake her.    Nona Finley's goals of this hospitalization are to \"feel free\" from her depression but states she is unwilling to take her medications and probably will not eat food. She knows she is getting the same medications and food as everyone else, but she believes the food and medications are poison to only her body.  Her greatest fear is that she will get sent to a \"state hospital\" for 1-2 years and be forced to take PO/IM meds, because \"those are dangerous places full of bad people, women get raped and killed there.\"  Pt is also suspicious that the meds make her feel tired/sleepy and they smell like \"street drugs that bad people use\", so they must be poisonous.         Psychiatric History:   Past Diagnoses: schizoaffective disorder, bipolar type, peripartum onset about 12 years ago  Past Hospitalizations: Multiple hospitalizations, most recently 2/19 - 3/7 2017 (SI/HI with knife, CAH); 2/17 on [address];  Nov 3-7 2016 (sczA, mood/anx, resolved on paliperidone)  Prior ECT: 2011, she is unsure if it worked  Prior use of Psychotropic Medication: on clozapine and olanzapine PRN after last hospitalization  Court Commitment: 2011  Past Suicide Attempt: yes, attempted to jump in river to drown, was stopped by her   Self-injurious Behavior: none         Substance Use History:     Denies any alcohol, " tobacco, or illicit drug use, citing cultural/Taoism restrictions.  Denies any history of CD treatment.  Denies any legal history related to substance abuse.         Psychiatric Review of Systems:   Depression:   Reports: depressed mood, suicidal ideation, decreased interest, changes in sleep, guilt, hopelessness, helplessness, impaired concentration, decreased energy, irritability.   Denies: changes in appetite  Martha:   Reports: sleeplessness  Denies: impulsiveness (spending, driving recklessly, change in sexual activity), racing thoughts  Psychosis:   Reports: visual hallucinations, auditory hallucinations, paranoia  Anxiety:   Reports: worries  Denies: panic attacks (palpitations, diaphoresis, shortness of breath, sense of impending doom)  PTSD:  Denies: re-experiencing past trauma, nightmares, increased arousal, avoidance of traumatic stimuli, impaired function.  OCD:   Reports: checking, cleaning.  Denies: obsessions, symmetry, skin picking.  ADD/ADHD:   Reports: impaired attention, difficulty with organization, difficulty with task completion, forgetful.  Denies:  unable to listen, interrupting.  ED:   Reports: restriction (due to belief that food is poisoned)  Denies: binging, purging.    ODD:   Reports: anger    Conduct Disorder:  Denies: bullies, fights, weapons        Medical Review of Systems:   The Review of Systems is negative other than noted in the HPI  The current method of family planning is Depo-Provera (last dose was on day of prior d/c, 3/7/17).          Past Medical History     Past Medical History   Diagnosis Date     Bipolar 1 disorder (H)      Bipolar affective disorder, depressed, severe, with psychotic behavior (H)      NONSPECIFIC MEDICAL HISTORY      h/o +PPD. Neg CXR 2006     Postpartum depression 8/18/2011     Tuberculosis      Varicosities           Medications:     Patient has not taken any medications for 4 days    Prescriptions Prior to Admission   Medication Sig Dispense Refill  "Last Dose     VITAMIN D, CHOLECALCIFEROL, PO Take 2,000 Units by mouth daily   Past Week at Unknown time     Prenatal Vit-Fe Fumarate-FA (PRENATAL MULTIVITAMIN  PLUS IRON) 27-0.8 MG TABS per tablet Take 1 tablet by mouth daily   Past Week at Unknown time     cloZAPine (CLOZARIL) 25 MG tablet Take 1 tablet (25 mg) by mouth At Bedtime 7 tablet 0 Past Week at Unknown time     OLANZapine (ZYPREXA) 5 MG tablet Take 1 tablet (5 mg) by mouth 2 times daily as needed 30 tablet 1 Past Week at Unknown time     Paliperidone Palmitate (INVEGA SUSTENNA IM) Inject 234 mg into the muscle every 28 days    3/6/2017 at Unknown time     DiphenhydrAMINE HCl (BENADRYL PO) Take 25-50 mg by mouth daily as needed (EPS)    More than a month at Unknown time     ibuprofen (ADVIL,MOTRIN) 600 MG tablet Take 1 tablet (600 mg) by mouth every 6 hours as needed for moderate pain 60 tablet 0 More than a month at Unknown time            Allergies:   No Known Allergies        Family History:    Schizophrenia: brother   \"mental illness\" in aunt, niece, cousin (she does not know what types)  Completed/Attempted Suicides in Friends/Family:  None        Social History   Relationships:   Current Living Situation: Lives in [facility] in house with  and 6 children  Education:  Completed fifth grade, took additional English classes  Occupation: stays home with child  Legal History: None  Abuse History: None         Labs:   No results found for this or any previous visit (from the past 24 hour(s)).         Psychiatric Examination:   /69  Pulse 88  Temp 98.6  F (37  C) (Oral)  Resp 16  SpO2 99%    Appearance:  awake, alert, adequately groomed, cooperative, no apparent distress, normal weight and in own clothes  Attitude:  cooperative  Eye Contact:  good  Mood:  calm and friendly  Affect:  appropriate and in normal range, mood congruent and intensity is blunted  Speech:  clear, coherent and normal prosody  Psychomotor Behavior:  no " evidence of tardive dyskinesia, dystonia, or tics  Thought Process:  linear, goal oriented and illogical  Associations:  no loose associations  Thought Content:  active suicidal ideation present and auditory hallucinations present  Insight:  limited  Judgment:  limited  Oriented to:  time, person, and place  Attention Span and Concentration:  intact  Recent and Remote Memory:  intact  Language: communicates coherently in conversational context  Fund of Knowledge: appropriate  Muscle Strength and Tone: normal  Gait and Station: Patient sitting in chair         Physical Examination:   Please refer to ED note by Dr. Chauhan, dated 3/21/17 for details of physical exam.         Assessment:   Nona Finley is a 36 year old female with a history of schizoaffective disorder, bipolar type who presented to the [facility] following suicidal ideation in the context of worsening AH/VH. The patient's last psychiatric hospitalization was in February 2017.  The patient is currently followed by ACT team with Dr. Rod. Family history is notable for schizophrenia in her brother. Current psychosocial stressors include raising multiple children and chronic mental illness. The patient denies drug abuse or self injurious behaviors. The MSE is notable for command hallucinations and suicidal ideation. Additionally, the patient has traits of paranoia which interfere with her ability to adhere with the treatment plan.  PTA medications were continued at time of admission, though patient reports she will refuse to take them. Admission warranted to maintain safety and identify medication plan to manage her psychotic symptoms. Disposition pending clinical stabilization, medication optimization and development of an appropriate discharge plan.      Plan   Admit to station 22 under the care of Dr. Danish Levi. To be staffed by Dr. Danish Levi in the AM.    Principal Diagnosis: Schizoaffective disorder, recurrent  Medications: continue PTA meds  for now, will anticipate primary team may elect to make changes   - paliperidone 234 mg IM q28 days (last on 3/6/17, next dose 4/3/17)   - Clozapine 25 mg PO qHS:  Dose interruption >2d, so will re-start at 12.5mg qhs, pending WBC w/diff results, and monitor for orthostatic Sx   - olanzapine 10 mg PO/IM PRN, for agitation   - benadryl 25-50 mg PO q4h prn, for EPS   - hydroxyzine 25-50 mg PO q4h prn, for anxiety    Laboratory/Imaging: utox/hcg negative, WBC w/diff (pending)    Consults: None    Patient will be treated in therapeutic milieu with appropriate individual and group therapies as described.    Secondary psychiatric diagnoses of concern this admission: None    Medical diagnoses to be addressed this admission:  None  Medications:     Depo-provera (last dispensed 3/7/17), not needed at this time    Cholecalciferol 2000u, 1 tab PO daily    Prenatal MV + iron, 1 tab PO daily    Tylenol 650mg tab PO q4h prn for mild pain    Ibuprofen tab 600mg q6h prn for moderate pain    Consults: None    Relevant psychosocial stressors: Severe including motherhood, chronic mental illness    Legal Status: Voluntary   - May wish to pursue commitment and Reyes, due to pt's likely non-adherence to medication, and unintentional risk of harm to her 6 children due to decompensated guardian/caretaker, with past Hx of intent to kill children & self with knife.    Safety Assessment:   Checks: Status 15  Precautions: Suicide  Self-harm  Pt has not required locked seclusion or restraints in the past 24 hours to maintain safety, please refer to RN documentation for further details.    The risks, benefits, alternatives and side effects have been discussed and are understood by the patient and other caregivers.     Anticipated Disposition/Discharge Date: Unknown at this time. Pending further clinical evaluation and stabilization.    Attestation:    Scribed by Jorge Diallo, MS4, for Dr. Ashok Casas, psychiatry resident    I have reviewed and  edited the documentation recorded by the scribe. This documentation accurately reflects the services I personally performed and treatment decisions made by me in consultation with the attending physician.    Ashok Casas MD, PGY-1 Psychiatry resident  Pager [phone]    Attending Admission Attestation Note:    I personally interviewed and examined Nona on March 22, 2017, I reviewed the admission history/examination and other documents related to the admission.  I confirmed the findings in the admission note and I agree with the diagnosis and treatment plan with the following corrections, clarifications, additional findings, and assessments: I certify that the treatment plan was reviewed and approved or developed by me in accordance with standard psychiatric practice. I certifiy that the inpatient services were ordered in accordance with the Medicare regulations governing the order. This includes certification that hospital inpatient services are reasonable and necessary and in the case of services not specified as inpatient-only under 42 .22(n), that they are appropriately provided as inpatient services in accordance with the 2-midnight benchmark under 42 .3(e).     The reason for inpatient status is Acute Psychosis, Suicidal Ideation and/or Behavior and Danger to others due to mental illness. High degree of complexity due to treatment refractory psychosis, nonadherence with treatment, poor insight, severe family sressors.      Danish Levi M.D.  of Psychiatry  Pager: [phone]    email: [email or web address]

## 2017-03-21 NOTE — PHARMACY-ADMISSION MEDICATION HISTORY
Admission Medication History status for the 3/21/2017 admission is complete.  See EPIC admission navigator for Prior to Admission medications.    Medication history sources:  Patient, EPIC     Medication history source reliability: Good    Medication adherence:  Poor Pt stopped taking her medications >1 week ago.    Changes made to PTA medication list (reason)  Added: Cholecalciferol 2000 units daily and Prenatal MV 1 tablet daily  Deleted: none  Changed: None    Additional medication history information (including reliability of information, actions taken by pharmacist): None    Time spent in this activity: 15 minutes    Medication history completed by: Sisi Carlson     Prior to Admission medications    Medication Sig Last Dose Taking? Auth Provider   VITAMIN D, CHOLECALCIFEROL, PO Take 2,000 Units by mouth daily Past Week at Unknown time Yes Unknown, Entered By History   Prenatal Vit-Fe Fumarate-FA (PRENATAL MULTIVITAMIN  PLUS IRON) 27-0.8 MG TABS per tablet Take 1 tablet by mouth daily Past Week at Unknown time Yes Unknown, Entered By History   cloZAPine (CLOZARIL) 25 MG tablet Take 1 tablet (25 mg) by mouth At Bedtime Past Week at Unknown time Yes Avery Freeman MD   OLANZapine (ZYPREXA) 5 MG tablet Take 1 tablet (5 mg) by mouth 2 times daily as needed Past Week at Unknown time Yes Nathan Block MD   Paliperidone Palmitate (INVEGA SUSTENNA IM) Inject 234 mg into the muscle every 28 days  3/6/2017 at Unknown time Yes Reported, Patient   DiphenhydrAMINE HCl (BENADRYL PO) Take 25-50 mg by mouth daily as needed (EPS)  More than a month at Unknown time  Unknown, Entered By History   ibuprofen (ADVIL,MOTRIN) 600 MG tablet Take 1 tablet (600 mg) by mouth every 6 hours as needed for moderate pain More than a month at Unknown time  Patricia Mcduffie MD

## 2017-03-21 NOTE — PROGRESS NOTES
03/21/17 1820   Patient Belongings   Did you bring any home meds/supplements to the hospital?  No   Patient Belongings earings;jewelry;cell phone/electronics;clothing;purse;shoes;wallet;necklace   Disposition of Belongings black jacket, dresses, headscarves, black boots, jewelry, cell phone and , wallet, purse, bras, underwear. 2 debit cards sent down to security.   Belongings Search Yes   Clothing Search Yes   Second Staff Shweta MARTINEZ RN       ADMISSION:  I am responsible for any personal items that are not sent to the safe or pharmacy. [place] [facility] is not responsible for loss, theft or damage of any property in my possession.    Patient Signature _____________________ Date/Time _____________________    Staff Signature _______________________ Date/Time _____________________    2nd Staff person, if patient is unable/unwilling to sign  ___________________________________ Date/Time _____________________    DISCHARGE:  My personal items have been returned to me.   Patient Signature _____________________ Date/Time _____________________

## 2017-03-22 PROCEDURE — S0136 CLOZAPINE, 25 MG: HCPCS | Performed by: STUDENT IN AN ORGANIZED HEALTH CARE EDUCATION/TRAINING PROGRAM

## 2017-03-22 PROCEDURE — 12400001 ZZH R&B MH UMMC

## 2017-03-22 PROCEDURE — 25900017 H RX MED GY IP 259 OP 259 PS 637: Performed by: STUDENT IN AN ORGANIZED HEALTH CARE EDUCATION/TRAINING PROGRAM

## 2017-03-22 PROCEDURE — 99223 1ST HOSP IP/OBS HIGH 75: CPT | Mod: GC | Performed by: PSYCHIATRY & NEUROLOGY

## 2017-03-22 ASSESSMENT — ACTIVITIES OF DAILY LIVING (ADL)
LAUNDRY: WITH SUPERVISION
GROOMING: INDEPENDENT
DRESS: INDEPENDENT;STREET CLOTHES
ORAL_HYGIENE: INDEPENDENT
DRESS: INDEPENDENT
LAUNDRY: WITH SUPERVISION
ORAL_HYGIENE: INDEPENDENT
GROOMING: INDEPENDENT

## 2017-03-22 NOTE — PROGRESS NOTES
Patient CMM came to visit with patient and work on insurance issues. Carmela Bass with [facility] [phone]

## 2017-03-22 NOTE — PLAN OF CARE
"Problem: Psychotic Symptoms  Goal: Psychotic Symptoms  Signs and symptoms of listed problems will be absent or manageable.  Outcome: No Change  36 year old Bangladeshi female admitted to station 22 from [facility] at 17:45. Upon admission, she cooperated with search. She was shown her room and settled in with room mate. She ate dinner with peers in the dining room. She was willing to be interviewed for adult data base. She declined all of her bedtime medications. When asked why, she said \"I can't. I just can't.\" While being interviewed privately, she said that voices tell her there is marijuana in the pills. (The pills this nurse presented to her were sealed and wrapped still). The voices also tell her \"to kill myself-to end my life.\" Sometimes they say to \"stab yourself.\" Her plan of choice right now is to walk to the river. She also sees people who have knives and scary faces. [facility]. She says that she has no hope of getting better right now. Asked why, she said it was because she is not taking medications. This nurse tried to present the positive points that if she takes her medications (including some vitamins), she can return home to her children. She indicates that her mother in law is staying with her children currently and normally comes over 5 hours a day. Says that her  and brother help, but just help by encouraging her to take her medications. Says her  \"doesn't help inside the house.\" Her main stressor is the noise of the children at home. Client said she was very tired and went back to her room to lie down. Defer signing of voluntary papers until morning, as this nurse was passing medications and the hour got late when completing data base.       "

## 2017-03-22 NOTE — PROGRESS NOTES
SPIRITUAL HEALTH SERVICES  SPIRITUAL ASSESSMENT Progress Note  [facility] ([facility]) Station 22     REFERRAL SOURCE: I visited [facility] this morning per [facility]  referral. Pt is a Christianity and I ask her, if she need to get an Christianity Imam but she refused that. As pt mentioned, at this moment she don't need any spiritual help form anyone. In case she change her mind during her stay in the unit, I let her know my availability.    PLAN: I will remain open to offer or to provide an Imam for any spiritual and emotional support she needs.    Yasmin Smart M.Div. ([place]), M.Th., D.Min., [facility]  Staff   Pager [phone]

## 2017-03-22 NOTE — PROGRESS NOTES
Pleasant and polite. In periphery of groups. Distracted. Ate meals.       03/22/17 1400   Behavioral Health   Hallucinations auditory   Thinking distractable;poor concentration   Orientation person: oriented;place: oriented   Memory baseline memory   Insight poor   Judgement impaired   Eye Contact at examiner   Affect blunted, flat   Mood depressed   Physical Appearance/Attire attire appropriate to age and situation   Hygiene well groomed   Activity restless;withdrawn   Speech clear;coherent   Psychomotor / Gait balanced;steady   Psycho Education   Type of Intervention structured groups   Response observes from a distance   Hours (15 min)   Treatment Detail Mental health Ed   Activities of Daily Living   Hygiene/Grooming independent   Oral Hygiene independent   Dress independent;street clothes   Laundry with supervision   Room Organization independent

## 2017-03-22 NOTE — PROGRESS NOTES
Initial Psychosocial Assessment     I have reviewed the chart, met with the patient, and developed Care Plan. Information for assessment was obtained from:      Chart: Patient was discharged a couple weeks ago, most information moved forward. Brief interview with patient regarding updates.     Presenting Problem:  Per ED Note: Nona Finley is a 36 year old female who is referred here from [place]. Patient is known to us. She has schizoaffective disorder. She is followed by an ACT team, psychiatrist is Dr Gaviria. Patient has been hospitalized here multiple times past couple months. She is postpartum. She reports stopping her oral meds as she felt they were poison. She since has trouble sleeping and reports exacerbation of command voices to kill herself. Patient denies acute medical concerns. She reports not eating past couple days for fear of being poisoned. She denies drug abuse. She is willing to sign herself into the hospital.     History of Mental Health and Chemical Dependency:  Was recently hospitalized 2/19-3/7/17 at [facility] Station 22. Hx of bipolar disorder, severe with psychotic behavior, schizoaffective disorder, postpartum depression with psychotic features. Hx of postpartum symptoms after her delivery in 2011; but symptoms were worse then. Hx of commitment and Reyes in 2012. 6 previous hospitalizations; first being in 10/2011 and last one 11/2016. Hx of ECT in 2012.      Pt denies drug use.      Family Description (Constellation, Family Psychiatric History):  Pt .  recently graduated from medical school in the [place]. He was gone for 4 years, came back to [place] in May 2016. Has 6 children. Oldest child is 12 (has autism) and youngest child is 5 months old (born 10/21/16). Pt also reports that her 7 year-old has seizures. Pt came to the [place] in [place] from [place]. Pt reports her brother [place] [facility] has mental illness with similar symptoms as hers. Several relatives have mental illness.      Significant Life Events (Illness, Abuse, Trauma, Death):  None reported.     Living Situation:  Lives with her  and children. Owns a house.      Educational Background:  5th grade     Occupational History:  None. 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      Financial Status:   supports     Legal Issues:  None     Ethnic/Cultural Issues:  Somalian, pt reports no issues     Spiritual Orientation:  Muslium      Service History:  None     Social Functioning (organization, interests):  Pt reports that her  is her biggest support. Her 's mother takes care of the children while she is in the hospital. Pt reports she has no friends. She mainly takes care of her kids.     Current Treatment Providers are:  ACT team through [facility] visit pt daily.  Carmela Rivas [place] @ [phone]     Social Service Assessment/Plan:  Patient has been admitted for psychiatric stabilization due to psychosis. Patient will have psychiatric assessment and medication management by the psychiatrist. Medications will be reviewed and adjusted per MD as indicated. The treatment team will continue to assess and stabilize the patient's mental health symptoms with the use of medications and therapeutic programming. Hospital staff will provide a safe environment and a therapeutic milieu. Staff will continue to assess patient as needed. Patient will participate in unit groups and activities. Patient will receive individual and group support on the unit.  CTC will do individual inpatient treatment planning and after care planning. CTC will discuss options for increasing community supports with the patient. CTC will coordinate with outpatient providers and will place referrals to ensure appropriate follow up care is in place. Petition for commitment, rhonda and price shep started.

## 2017-03-23 PROCEDURE — H2032 ACTIVITY THERAPY, PER 15 MIN: HCPCS

## 2017-03-23 PROCEDURE — 12400001 ZZH R&B MH UMMC

## 2017-03-23 PROCEDURE — 99232 SBSQ HOSP IP/OBS MODERATE 35: CPT | Mod: GC | Performed by: PSYCHIATRY & NEUROLOGY

## 2017-03-23 PROCEDURE — 25900017 H RX MED GY IP 259 OP 259 PS 637: Performed by: STUDENT IN AN ORGANIZED HEALTH CARE EDUCATION/TRAINING PROGRAM

## 2017-03-23 PROCEDURE — S0136 CLOZAPINE, 25 MG: HCPCS | Performed by: STUDENT IN AN ORGANIZED HEALTH CARE EDUCATION/TRAINING PROGRAM

## 2017-03-23 PROCEDURE — 25000132 ZZH RX MED GY IP 250 OP 250 PS 637: Performed by: STUDENT IN AN ORGANIZED HEALTH CARE EDUCATION/TRAINING PROGRAM

## 2017-03-23 RX ORDER — CLOZAPINE 25 MG/1
12.5 TABLET ORAL AT BEDTIME
Status: DISCONTINUED | OUTPATIENT
Start: 2017-03-24 | End: 2017-03-24

## 2017-03-23 RX ORDER — CLOZAPINE 25 MG/1
12.5 TABLET ORAL ONCE
Status: COMPLETED | OUTPATIENT
Start: 2017-03-23 | End: 2017-03-23

## 2017-03-23 RX ADMIN — HYDROXYZINE HYDROCHLORIDE 50 MG: 25 TABLET ORAL at 20:12

## 2017-03-23 RX ADMIN — Medication 12.5 MG: at 12:16

## 2017-03-23 RX ADMIN — PRENATAL VIT W/ FE FUMARATE-FA TAB 27-0.8 MG 1 TABLET: 27-0.8 TAB at 12:16

## 2017-03-23 RX ADMIN — VITAMIN D, TAB 1000IU (100/BT) 2000 UNITS: 25 TAB at 12:15

## 2017-03-23 ASSESSMENT — ACTIVITIES OF DAILY LIVING (ADL)
GROOMING: HANDWASHING
LAUNDRY: WITH SUPERVISION
LAUNDRY: UNABLE TO COMPLETE
DRESS: STREET CLOTHES
DRESS: STREET CLOTHES
ORAL_HYGIENE: INDEPENDENT
GROOMING: HANDWASHING;SHOWER;INDEPENDENT
ORAL_HYGIENE: INDEPENDENT

## 2017-03-23 NOTE — PROGRESS NOTES
"Pt present in milieu, pacing hallway, making phone calls. Pt said she was hearing voices earlier today that were telling her to \"jump off a bridge.\" She said she took meds about an hour ago and the voices have gone away. Pt says that she does not want to go home to her . Pt says she feels better and is going to try to relax. Pt ate meals, did not attend any groups.     03/23/17 1400   Behavioral Health   Hallucinations denies / not responding to hallucinations   Thinking poor concentration;distractable   Orientation person: oriented;place: oriented   Memory baseline memory   Insight insight appropriate to situation   Judgement impaired   Eye Contact at examiner   Affect sad;tense   Mood depressed;mood is calm   Physical Appearance/Attire attire appropriate to age and situation   Hygiene well groomed   Suicidality other (see comments)  (denies)   Self Injury other (see comment)  (denies)   Activity restless;withdrawn   Speech coherent;clear   Medication Sensitivity sedation   Psychomotor / Gait balanced;steady   Psycho Education   Type of Intervention 1:1 intervention   Response participates, initiates socially appropriate   Hours 0.5   Treatment Detail check-in   Activities of Daily Living   Hygiene/Grooming handwashing   Oral Hygiene independent   Dress street clothes   Laundry unable to complete   Room Organization independent       "

## 2017-03-23 NOTE — PROGRESS NOTES
"    ----------------------------------------------------------------------------------------------------------  [facility], [place]   Psychiatric Progress Note     Assessment    Presentation: Nona Finley is a 36 year old female with a significant past psychiatric history of schizoaffective disorder, peripartum onset who was brought in by ambulance due to suicidal ideation in the setting of worsening auditory and visual hallucinations.  Pt recently noticed an odd scent in her medications and food, was concerned for poison, stopped ingesting them, then began having AH/VH telling her to kill herself or they will stab her.  Pt endorses SI and plan, and paranoia, resulting in refusal to (willingly) take any medications to control her psychotic symptoms. Admission warranted to maintain safety and identify medication plan to manage her psychotic symptoms.    Diagnostic Impression: Pt with Hx german-partum depression and psychosis, prior hospitalizations for SI/HI.  Pt is relatively isolated at home, cannot tolerate the noise level and chaos of her 6 children.  Pt discontinued her meds and food due to paranoid belief that these are poisoned with marijuana, bleach or other chemicals;  AH/VH then erupted soon after discontinuation.  Pt's psychotic Sx and depression are consistent with her historic Dx of Schizoaffective d/o -- recurrent.    Hospital course: Nona Finley was admitted to station 22 as a voluntary patient.  Pt's PTA meds were restarted, but pt has consistently refused all her meds, with paranoid fear that they are \"poison.\"  3/22 Pt stated that she will not take meds or ECT unless she is forced.    Medical course None    Plan     Principal Diagnosis: Schizoaffective disorder, recurrent  Medications: continue PTA meds for now, will anticipate primary team may elect to make changes  - paliperidone 234 mg IM q28 days (last on 3/6/17, next dose 4/3/17)  - Clozapine 25 mg PO qHS: Dose " interruption >2d, so re-started at 12.5mg qhs; monitor for orthostatic Sx  - olanzapine 10 mg PO/IM PRN, for agitation  - benadryl 25-50 mg PO q4h prn, for EPS  - hydroxyzine 25-50 mg PO q4h prn, for anxiety     Laboratory/Imaging: utox/hcg negative, WBC w/diff (pending)     Consults: None     Patient will be treated in therapeutic milieu with appropriate individual and group therapies as described.    Secondary psychiatric diagnoses of concern this admission: None    Medical diagnoses to be addressed this admission:  None    Medications:     Depo-provera (last dispensed 3/7/17), not needed at this time    Cholecalciferol 2000u, 1 tab PO daily    Prenatal MV + iron, 1 tab PO daily    Tylenol 650mg tab PO q4h prn for mild pain    Ibuprofen tab 600mg q6h prn for moderate pain (Discontinued 3/22)     Relevant psychosocial stressors: Severe including motherhood, chronic mental illness    Legal Status: Voluntary  - Team discussed and wishes to pursue commitment and Reyes + [facility], due to pt's likely non-adherence to medication, and unintentional risk of harm to her 6 children due to decompensated guardian/caretaker, with past Hx of intent to kill children & self with knife.    Safety Assessment:   Checks: Status 15  Precautions: Suicide  Self-harm  Pt has not required locked seclusion or restraints in the past 24 hours to maintain safety, please refer to RN documentation for further details.     The risks, benefits, alternatives and side effects have been discussed and are understood by the patient and other caregivers.    Anticipated Disposition/Discharge Date: Unknown at this time. Pending further clinical evaluation and stabilization.    Attestation:    This documentation accurately reflects the services I personally performed and treatment decisions made by me in consultation with the attending physician.    Ashok Casas MD, PGY-1 Psychiatry resident  Pager [phone]    Psychiatry Attending  "Attestation:  This patient has been seen and evaluated by me, Danish Levi M.D.  The patient's condition and treatment plan were discussed with the resident, and care coordinated with the CTC and RN. I reviewed, edited and agree with the findings and plan in this note.     Danish Levi M.D.   of Psychiatry     Interim History:   The patient's care was discussed with the treatment team and chart notes were reviewed.  Sleep: 7h  PRN: None    Review of systems:     The 10 point Review of Systems is negative other than noted in the HPI         Medications:     Current Facility-Administered Medications   Medication     diphenhydrAMINE (BENADRYL) capsule 25-50 mg     ibuprofen (ADVIL/MOTRIN) tablet 600 mg     [START ON 4/3/2017] paliperidone (INVEGA SUSTENNA) injection SUSP 234 mg     prenatal multivitamin  plus iron per tablet 1 tablet     cholecalciferol (vitamin D) tablet 2,000 Units     hydrOXYzine (ATARAX) tablet 25-50 mg     OLANZapine (zyPREXA) tablet 10 mg    Or     OLANZapine (zyPREXA) injection 10 mg     acetaminophen (TYLENOL) tablet 650 mg     cloZAPine (CLOZARIL) half-tab 12.5 mg             Allergies:   No Known Allergies         Psychiatric Examination:   /50  Pulse 60  Temp 97.8  F (36.6  C) (Oral)  Resp 16  Ht 1.651 m (5' 5\")  Wt 67.8 kg (149 lb 8 oz)  SpO2 99%  BMI 24.88 kg/m2  Weight is 149 lbs 8 oz  Body mass index is 24.88 kg/(m^2).    Appearance:  awake, alert, adequately groomed, cooperative, mild distress and neatly groomed  Attitude:  cooperative, evasive and guarded  Eye Contact:  fair  Mood:  sad  and depressed  Affect:  appropriate and in normal range, mood congruent, intensity is blunted and constricted mobility  Speech:  clear, coherent  Psychomotor Behavior:  no evidence of tardive dyskinesia, dystonia, or tics and intact station, gait and muscle tone  Thought Process:  linear and goal oriented  Associations:  no loose associations  Thought Content:  " auditory hallucinations present, visual hallucinations present and patient appears to be responding to internal stimuli  Insight:  fair  Judgment:  limited  Oriented to:  time, person, and place  Attention Span and Concentration:  fair  Recent and Remote Memory:  fair  Language: Pt is fluent in conversational English  Fund of Knowledge: appropriate  Muscle Strength and Tone: normal  Gait and Station: Normal         Labs:   No results found for this or any previous visit (from the past 24 hour(s)).    Antipsychotic Labs:  Recent Labs   Lab Test  05/09/12   0842  10/27/11   0756   CHOL  206*  180   TRIG  35  69   LDL  139*  105   HDL  60  61     Recent Labs   Lab Test  02/28/17   1542  02/17/17   0737  11/05/16   0759   11/05/15   1503   GLC  122*  83  85   < >   --    A1C   --    --    --    --   5.4    < > = values in this interval not displayed.     Recent Labs   Lab Test  03/21/17   1917  03/07/17   0732  02/28/17   0816  02/21/17   1624  02/17/17   0737  11/05/16   0759   WBC  5.8  5.5  6.1  5.4  4.3  4.7   ANEU  2.8  2.7  3.6  2.9  2.4  2.6   HGB   --    --    --   13.3  12.1  13.1   PLT   --    --    --   304  289  333       [place] Labs:  Recent Labs   Lab Test  09/13/12   0903  06/01/12   0932  05/13/12   0759   LITHIUM  <0.2*  0.9  0.8     Recent Labs   Lab Test  02/28/17   1542  02/17/17   0737  11/05/16   0759   CR  0.62  0.54  0.49*   GFRESTIMATED  >90  Non  GFR Calc    >90  Non  GFR Calc    >90  Non  GFR Calc     BUN  18  21  9   NA  142  140  141   POTASSIUM  3.8  4.1  4.0   BRIANNA  8.7  8.9  9.3   GLC  122*  83  85     Recent Labs   Lab Test  11/05/15   1503   A1C  5.4     Recent Labs   Lab Test  11/05/16   0759  11/03/16   1459  11/05/15   1627  09/13/12   0903   TSH  0.61  0.37*  0.66  0.79   T4   --   1.43   --    --      Recent Labs   Lab Test  03/21/17   1917  03/07/17   0732  02/28/17   0816  02/21/17   1624  02/17/17   0737  11/05/16   0759   WBC  5.8   5.5  6.1  5.4  4.3  4.7   ANEU  2.8  2.7  3.6  2.9  2.4  2.6   HGB   --    --    --   13.3  12.1  13.1   PLT   --    --    --   304  289  333     Recent Labs   Lab Test  03/01/17   1430  11/09/16   1103  03/11/16   1548   SG  1.024  1.020  1.025     No lab results found.    Valproate Labs:  No lab results found.  Recent Labs   Lab Test  02/28/17   1542  11/05/16   0759  11/03/16   1459   AST  22  22  25   ALT  30  25  25   ALKPHOS  56  60  56   BILITOTAL  0.2  0.4  0.5   PROTTOTAL  7.4  8.2  8.0   ALBUMIN  3.3*  3.4  3.4     Recent Labs   Lab Test  03/21/17   1917  03/07/17   0732  02/28/17   0816  02/21/17   1624  02/17/17   0737  11/05/16   0759   WBC  5.8  5.5  6.1  5.4  4.3  4.7   ANEU  2.8  2.7  3.6  2.9  2.4  2.6   HGB   --    --    --   13.3  12.1  13.1   PLT   --    --    --   304  289  333

## 2017-03-23 NOTE — CONSULTS
Consult requested by Dr. Danish Levi to evaluate inpatient administration of paliperidone palmitate, long-acting injection.    Nona Finley is a 36 year old female with a significant past psychiatric history of schizoaffective disorder, peripartum onset who was brought in by ambulance due to suicidal ideation in the setting of worsening auditory and visual hallucinations.  The patient recently stopped taking her medications because she reports an odd scent from food and medications and is concerned they are poison. She also began having AH/VH telling her to kill herself or they will stab her.  The patient endorses SI and a plan along with paranoia resulting in refusal to willingly take any medications. She was admitted to maintain safety and identify medication plan to manage her psychotic symptoms.    Current Medications Prior to Admission:  Paliperidone palmitate 234 mg IM every 28 days (last dose 3/6/17)  Clozapine 25 mg PO at bedtime    Notable PRN Medications:  Olanzapine 5 mg PO BID PRN  Diphenhydramine 25-50 mg PO daily PRN for EPS    Allergies (known/reported):  No known allergies      Criteria for continuation of paliperidone palmitate during inpatient hospitalization:  1) Patient has been on paliperidone palmitate for at least 3 weeks prior to admission.  2) Patient has received both initiation doses.  3) Patient will be hospitalized for a minimum of one week after the next scheduled maintenance dose is due.   4) If six months or more have passed since the last maintenance dose, do not restart paliperidone palmitate in the hospital, defer to outpatient treatment.    Based on available records and per the [facility] policy on inpatient use of paliperidone palmitate MORENO, Nona qualifies for inpatient administration of paliperidone palmitate IM, MORENO no earlier than April 10, 2017.    Thank you for the consult. Will continue to follow.    Ayan Desouza, PharmD  [phone] (pager)

## 2017-03-23 NOTE — PROGRESS NOTES
03/22/17 2156   Behavioral Health   Hallucinations auditory;visual   Thinking poor concentration;distractable   Orientation person: oriented;place: oriented;date: oriented;time: oriented   Memory baseline memory   Insight poor   Judgement impaired   Affect sad   Mood mood is calm;depressed   Physical Appearance/Attire attire appropriate to age and situation   Hygiene well groomed   Self Injury other (see comment)  (denied)   Activity other (see comment)  (denied)   Speech clear;coherent   Psychomotor / Gait balanced;steady   Coping/Psychosocial   Verbalized Emotional State depression;anxiety   Plan Of Care Reviewed With patient   Psycho Education   Type of Intervention 1:1 intervention   Response participates, initiates socially appropriate   Hours 0.5   Activities of Daily Living   Hygiene/Grooming independent   Oral Hygiene independent   Dress independent   Laundry with supervision   Room Organization independent   Activity   Activity Level of Assistance independent   Behavioral Health Interventions   Psychotic Symptoms maintain safety precautions;maintain safe secure environment   Social and Therapeutic Interventions (Psychotic Symptoms) encourage socialization with peers;encourage participation in therapeutic groups and milieu activities     Pt stated that she had fleeting thoughts of suicide but did not have a plan with intent to act. Pt stated she was having auditory and visual hallucinations. Pt states that she is paranoid about taking medications because she feels like there is poison in it. Pt was present in milieu and appropriate with peers. Pt did appear paranoid at times. Pt ate meals but did not attend groups because she doesn't feel they are helpful. No concerns or issues to report otherwise.

## 2017-03-24 PROCEDURE — 12400001 ZZH R&B MH UMMC

## 2017-03-24 PROCEDURE — 25900017 H RX MED GY IP 259 OP 259 PS 637: Performed by: STUDENT IN AN ORGANIZED HEALTH CARE EDUCATION/TRAINING PROGRAM

## 2017-03-24 PROCEDURE — 90853 GROUP PSYCHOTHERAPY: CPT

## 2017-03-24 PROCEDURE — S0136 CLOZAPINE, 25 MG: HCPCS | Performed by: STUDENT IN AN ORGANIZED HEALTH CARE EDUCATION/TRAINING PROGRAM

## 2017-03-24 PROCEDURE — 25000132 ZZH RX MED GY IP 250 OP 250 PS 637: Performed by: STUDENT IN AN ORGANIZED HEALTH CARE EDUCATION/TRAINING PROGRAM

## 2017-03-24 PROCEDURE — 99232 SBSQ HOSP IP/OBS MODERATE 35: CPT | Mod: GC | Performed by: PSYCHIATRY & NEUROLOGY

## 2017-03-24 RX ORDER — CLOZAPINE 25 MG/1
25 TABLET ORAL AT BEDTIME
Status: DISCONTINUED | OUTPATIENT
Start: 2017-03-24 | End: 2017-03-29

## 2017-03-24 RX ADMIN — Medication 1 MG: at 21:49

## 2017-03-24 RX ADMIN — CLOZAPINE 25 MG: 25 TABLET ORAL at 21:50

## 2017-03-24 RX ADMIN — VITAMIN D, TAB 1000IU (100/BT) 2000 UNITS: 25 TAB at 08:54

## 2017-03-24 RX ADMIN — PRENATAL VIT W/ FE FUMARATE-FA TAB 27-0.8 MG 1 TABLET: 27-0.8 TAB at 08:54

## 2017-03-24 ASSESSMENT — ACTIVITIES OF DAILY LIVING (ADL)
DRESS: STREET CLOTHES;INDEPENDENT
GROOMING: INDEPENDENT
DRESS: INDEPENDENT;STREET CLOTHES
LAUNDRY: WITH SUPERVISION
GROOMING: INDEPENDENT
ORAL_HYGIENE: INDEPENDENT
ORAL_HYGIENE: INDEPENDENT
LAUNDRY: WITH SUPERVISION

## 2017-03-24 NOTE — PROGRESS NOTES
Writer has submitted the petition for commitment and Reyes to [facility] and an  will be out on 3/27 to see patient.

## 2017-03-24 NOTE — PROGRESS NOTES
"Pt was visible in the milieu, pacing and restless. She mentioned taking a little bit of medication today that's has helped. She stated that she is afraid to take her medication because her voices have told her not to. She said her voices are mean and ask her, \"why are you living\", \"You're worthless\". She said her voices are sometimes present for hours then disappear, which is what has happened today. Pt mentioned having a faster heart rate and dizziness upon standing. Pt denies SI/SIB      03/24/17 1227   Behavioral Health   Hallucinations auditory   Thinking poor concentration   Orientation person: oriented;place: oriented;date: oriented   Memory baseline memory   Insight insight appropriate to situation   Judgement impaired   Eye Contact at examiner   Affect sad   Mood mood is calm;depressed   Physical Appearance/Attire attire appropriate to age and situation   Hygiene well groomed   Suicidality other (see comments)  (denies )   Self Injury other (see comment)  (denies)   Activity restless   Speech clear;coherent   Medication Sensitivity sedation   Psychomotor / Gait balanced;steady   Psycho Education   Type of Intervention 1:1 intervention   Response participates, initiates socially appropriate   Hours 0.5   Treatment Detail Check in    Activities of Daily Living   Hygiene/Grooming independent   Oral Hygiene independent   Dress street clothes;independent   Laundry with supervision   Room Organization independent   Activity   Activity Level of Assistance independent      "

## 2017-03-24 NOTE — PLAN OF CARE
Problem: Psychotic Symptoms  Goal: Psychotic Symptoms  Signs and symptoms of listed problems will be absent or manageable.   Outcome: Rafael Castellano did not attend community meeting. Her affect is flat. She appears well groomed. She agreed to talk privately with this nurse. She denies any voices this evening. She continues to say that she wants a divorce from her . She says that all he does is go to work and pay bills, but is currently just working part time. She says he does nothing around the house. She does not sound happy that he has told her that he would get custody of the children. This nurse advised her to seek legal advice after discharge and reminded her that both men and women have rights. She is also concerned about finding a place to live. Says that she will not fight with her  over the house or over any belongings. She was encouraged to speak to [facility] tomorrow. She is willing to go to a placement for 6 months or so, she says.

## 2017-03-24 NOTE — PROGRESS NOTES
"    ----------------------------------------------------------------------------------------------------------  [facility], [place]   Psychiatric Progress Note     Assessment    Presentation: Nona Finley is a 36 year old female with a significant past psychiatric history of schizoaffective disorder, peripartum onset who was brought in by ambulance due to suicidal ideation in the setting of worsening auditory and visual hallucinations.  Pt recently noticed an odd scent in her medications and food, was concerned for poison, stopped ingesting them, then began having AH/VH telling her to kill herself or they will stab her.  Pt endorses SI and plan, and paranoia, resulting in refusal to (willingly) take any medications to control her psychotic symptoms. Admission warranted to maintain safety and identify medication plan to manage her psychotic symptoms.    Diagnostic Impression: Pt with Hx german-partum depression and psychosis, prior hospitalizations for SI/HI.  Pt is relatively isolated at home, cannot tolerate the noise level and chaos of her 6 children.  Pt discontinued her meds and food due to paranoid belief that these are poisoned with marijuana, bleach or other chemicals;  AH/VH then erupted soon after discontinuation.  Pt's psychotic Sx and depression are consistent with her historic Dx of Schizoaffective d/o -- recurrent.    Hospital course: Nona Finley was admitted to station 22 as a voluntary patient.  Pt's PTA meds were restarted, but pt has consistently refused all her meds, with paranoid fear that they are \"poison.\"  3/22 Pt stated that she will not take meds or ECT unless she is forced.  3/23 pt revealed she wants to leave , is willing to immediately take CLZ.    Medical course None    Plan     Principal Diagnosis: Schizoaffective disorder, recurrent  Medications: continue PTA meds for now, will anticipate primary team may elect to make changes  - paliperidone 234 " mg IM q28 days (last on 3/6/17, next dose 4/3/17)  - Clozapine 25 mg PO qHS: Dose interruption >2d, so re-started at 12.5mg qhs; monitor for orthostatic Sx  - olanzapine 10 mg PO/IM PRN, for agitation  - benadryl 25-50 mg PO q4h prn, for EPS  - hydroxyzine 25-50 mg PO q4h prn, for anxiety     Laboratory/Imaging: utox/hcg negative, 3/21 WBC 5.8 ANC 2.8     Consults: None     Patient will be treated in therapeutic milieu with appropriate individual and group therapies as described.    Secondary psychiatric diagnoses of concern this admission: None    Medical diagnoses to be addressed this admission:  None    Medications:     Depo-provera (last dispensed 3/7/17), not needed at this time    Cholecalciferol 2000u, 1 tab PO daily    Prenatal MV + iron, 1 tab PO daily    Tylenol 650mg tab PO q4h prn for mild pain    Ibuprofen tab 600mg q6h prn for moderate pain (Discontinued 3/22)     Relevant psychosocial stressors: Severe including motherhood, chronic mental illness    Legal Status: Voluntary  - Team discussed and wishes to pursue commitment and Reyes + Slater-Mckinnon, due to pt's likely non-adherence to medication, and unintentional risk of harm to her 6 children due to decompensated guardian/caretaker, with past Hx of intent to kill children & self with knife.    Safety Assessment:   Checks: Status 15  Precautions: Suicide  Self-harm  Pt has not required locked seclusion or restraints in the past 24 hours to maintain safety, please refer to RN documentation for further details.     The risks, benefits, alternatives and side effects have been discussed and are understood by the patient and other caregivers.    Anticipated Disposition/Discharge Date: Unknown at this time. Pending further clinical evaluation and stabilization.    Attestation:    This documentation accurately reflects the services I personally performed and treatment decisions made by me in consultation with the attending physician.    Ashok Casas MD,  "PGY-1 Psychiatry resident  Pager [phone]  Psychiatry Attending Attestation:  This patient has been seen and evaluated by me, Danish Levi M.D.  The patient's condition and treatment plan were discussed with the resident, and care coordinated with the CTC and RN. I reviewed, edited and agree with the findings and plan in this note.     Danish Levi M.D.   of Psychiatry     Interim History:   The patient's care was discussed with the treatment team and chart notes were reviewed.  Sleep: 7h  PRN: Pt continued to refuse all scheduled and PRN meds    No overnight staff notes were available.    This morning the pt was interviewed in [place] [facility] [place].  Pt reported that she still can't take any meds, the  voices tell her they're poison, and she's afraid to disobey the voices.    Pt becomes tearful and states that she doesn't want to go back home to her , she doesn't love him anymore, he is not supportive of her, she doesn't think there's any way he could change so that she'd want to stay.  Pt reports that  has said if she leaves, she'll never get to see her children again.   has more traditional cultural values, does not help with household chores or child-rearing very much.  He is an IM[employer] and [employer] .  He goes home after work, but then spends all his time on the computer doing residency-related things rather than supporting her.    Pt eventually assents to take clozapine immediately, in hopes that the voices will subside.  Orders are adjusted to give a \"now\" dose, then resume qhs dosing tomorrow.    Review of systems:     The 10 point Review of Systems is negative other than noted in the HPI         Medications:     Current Facility-Administered Medications   Medication     [START ON 3/24/2017] cloZAPine (CLOZARIL) half-tab 12.5 mg     diphenhydrAMINE (BENADRYL) capsule 25-50 mg     ibuprofen (ADVIL/MOTRIN) tablet 600 mg     [START ON 4/3/2017] paliperidone (INVEGA " "SUSTENNA) injection SUSP 234 mg     prenatal multivitamin  plus iron per tablet 1 tablet     cholecalciferol (vitamin D) tablet 2,000 Units     hydrOXYzine (ATARAX) tablet 25-50 mg     OLANZapine (zyPREXA) tablet 10 mg    Or     OLANZapine (zyPREXA) injection 10 mg     acetaminophen (TYLENOL) tablet 650 mg             Allergies:   No Known Allergies         Psychiatric Examination:   /50  Pulse 60  Temp 98.8  F (37.1  C) (Oral)  Resp 16  Ht 1.651 m (5' 5\")  Wt 67.8 kg (149 lb 8 oz)  SpO2 99%  BMI 24.88 kg/m2  Weight is 149 lbs 8 oz  Body mass index is 24.88 kg/(m^2).    Appearance:  awake, alert, adequately groomed, cooperative, moderate distress, and neatly groomed  Attitude:  cooperative, evasive and guarded  Eye Contact:  fair  Mood:  sad  and depressed  Affect:  appropriate and in normal range, mood congruent, intensity is blunted, constricted mobility and labile  Speech:  clear, coherent  Psychomotor Behavior:  no evidence of tardive dyskinesia, dystonia, or tics and intact station, gait and muscle tone  Thought Process:  linear and goal oriented  Associations:  no loose associations  Thought Content:  auditory hallucinations present, visual hallucinations present and patient appears to be responding to internal stimuli  Insight:  fair  Judgment:  limited  Oriented to:  time, person, and place  Attention Span and Concentration:  fair  Recent and Remote Memory:  fair  Language: Pt is fluent in conversational English  Fund of Knowledge: appropriate  Muscle Strength and Tone: normal  Gait and Station: Normal         Labs:   No results found for this or any previous visit (from the past 24 hour(s)).    Antipsychotic Labs:  Recent Labs   Lab Test  05/09/12   0842  10/27/11   0756   CHOL  206*  180   TRIG  35  69   LDL  139*  105   HDL  60  61     Recent Labs   Lab Test  02/28/17   1542  02/17/17   0737  11/05/16   0759   11/05/15   1503   GLC  122*  83  85   < >   --    A1C   --    --    --    --   5.4 "    < > = values in this interval not displayed.     Recent Labs   Lab Test  03/21/17 1917 03/07/17   0732  02/28/17   0816  02/21/17   1624  02/17/17   0737  11/05/16   0759   WBC  5.8  5.5  6.1  5.4  4.3  4.7   ANEU  2.8  2.7  3.6  2.9  2.4  2.6   HGB   --    --    --   13.3  12.1  13.1   PLT   --    --    --   304  289  333       [place] Labs:  Recent Labs   Lab Test  09/13/12   0903  06/01/12   0932  05/13/12   0759   LITHIUM  <0.2*  0.9  0.8     Recent Labs   Lab Test  02/28/17   1542  02/17/17   0737  11/05/16   0759   CR  0.62  0.54  0.49*   GFRESTIMATED  >90  Non  GFR Calc    >90  Non  GFR Calc    >90  Non  GFR Calc     BUN  18  21  9   NA  142  140  141   POTASSIUM  3.8  4.1  4.0   BRIANNA  8.7  8.9  9.3   GLC  122*  83  85     Recent Labs   Lab Test  11/05/15   1503   A1C  5.4     Recent Labs   Lab Test  11/05/16   0759  11/03/16   1459  11/05/15   1627  09/13/12   0903   TSH  0.61  0.37*  0.66  0.79   T4   --   1.43   --    --      Recent Labs   Lab Test  03/21/17 1917 03/07/17   0732  02/28/17   0816  02/21/17   1624  02/17/17   0737  11/05/16   0759   WBC  5.8  5.5  6.1  5.4  4.3  4.7   ANEU  2.8  2.7  3.6  2.9  2.4  2.6   HGB   --    --    --   13.3  12.1  13.1   PLT   --    --    --   304  289  333     Recent Labs   Lab Test  03/01/17   1430  11/09/16   1103  03/11/16   1548   SG  1.024  1.020  1.025     No lab results found.    Valproate Labs:  No lab results found.  Recent Labs   Lab Test  02/28/17   1542  11/05/16   0759  11/03/16   1459   AST  22  22  25   ALT  30  25  25   ALKPHOS  56  60  56   BILITOTAL  0.2  0.4  0.5   PROTTOTAL  7.4  8.2  8.0   ALBUMIN  3.3*  3.4  3.4     Recent Labs   Lab Test  03/21/17   1917  03/07/17   0732  02/28/17   0816  02/21/17   1624  02/17/17   0737  11/05/16   0759   WBC  5.8  5.5  6.1  5.4  4.3  4.7   ANEU  2.8  2.7  3.6  2.9  2.4  2.6   HGB   --    --    --   13.3  12.1  13.1   PLT   --    --    --   304  289  333

## 2017-03-24 NOTE — PROGRESS NOTES
"    ----------------------------------------------------------------------------------------------------------  [facility], [place]   Psychiatric Progress Note     Assessment    Presentation: Nona Finley is a 36 year old female with a significant past psychiatric history of schizoaffective disorder, peripartum onset who was brought in by ambulance due to suicidal ideation in the setting of worsening auditory and visual hallucinations.  Pt recently noticed an odd scent in her medications and food, was concerned for poison, stopped ingesting them, then began having AH/VH telling her to kill herself or they will stab her.  Pt endorses SI and plan, and paranoia, resulting in refusal to (willingly) take any medications to control her psychotic symptoms. Admission warranted to maintain safety and identify medication plan to manage her psychotic symptoms.    Diagnostic Impression: Pt with Hx german-partum depression and psychosis, prior hospitalizations for SI/HI.  Pt is relatively isolated at home, cannot tolerate the noise level and chaos of her 6 children.  Pt discontinued her meds and food due to paranoid belief that these are poisoned with marijuana, bleach or other chemicals;  AH/VH then erupted soon after discontinuation.  Pt's psychotic Sx and depression are consistent with her historic Dx of Schizoaffective d/o.    Hospital course: Nona Finley was admitted to station 22 as a voluntary patient.  Pt's PTA meds were restarted, but pt has consistently refused all her meds, with paranoid fear that they are \"poison.\"  3/22 Pt stated that she will not take meds or ECT unless she is forced.  3/23 pt revealed she wants to leave , is willing to immediately take CLZ.  3/24 AH voices stopped last night, haven't returned, pt improving.  Increased clozapine to 25mg QHS, and started melatonin 1mg qhs for sleep.  Filed Juan J paperwork for commitment and Reyes.    Medical course " None    Plan     Principal Diagnosis: Schizoaffective disorder    Medications:   - paliperidone 234 mg IM q28 days (last on 3/6/17, next dose 4/3/17)  - Clozapine 25 mg PO qHS  (Dose interruption >2d, so re-started at 12.5mg qhs; 3/24 increased to 25mg)  - olanzapine 10 mg PO/IM PRN, for agitation  - benadryl 25-50 mg PO q4h prn, for EPS  - hydroxyzine 25-50 mg PO q4h prn, for anxiety     Laboratory/Imaging: utox/hcg negative; 3/21 WBC 5.8 ANC 2.8 (re-measure each Tuesday, next: 3/28)     Consults: None     Patient will be treated in therapeutic milieu with appropriate individual and group therapies as described.    Secondary psychiatric diagnoses of concern this admission: None    Medical diagnoses to be addressed this admission:  Insomnia    Medications:     Melatonin 1 mg PO QHS (take 2-3h before desired sleep time) (started 3/24)    Depo-provera (last dispensed 3/7/17), not needed at this time    Cholecalciferol 2000u, 1 tab PO daily    Prenatal MV + iron, 1 tab PO daily    Tylenol 650mg tab PO q4h prn for mild pain    Ibuprofen tab 600mg q6h prn for moderate pain (Discontinued 3/22)     Relevant psychosocial stressors: Severe including motherhood, chronic mental illness    Legal Status: Voluntary  - Team pursuing commitment and Reyes (filed paperwork 3/24), due to pt's likely non-adherence to medication, and unintentional risk of harm to her 6 children due to decompensated guardian/caretaker, with past Hx of intent to kill children & self with knife.    Safety Assessment:   Checks: Status 15  Precautions: Suicide  Self-harm  Pt has not required locked seclusion or restraints in the past 24 hours to maintain safety, please refer to RN documentation for further details.     The risks, benefits, alternatives and side effects have been discussed and are understood by the patient and other caregivers.    Anticipated Disposition/Discharge Date: Unknown at this time. Pending further clinical evaluation and  stabilization.    Attestation:    This documentation accurately reflects the services I personally performed and treatment decisions made by me in consultation with the attending physician.    Ashok Casas MD, PGY-1 Psychiatry resident  Pager [phone]      Psychiatry Attending Attestation:  This patient has been seen and evaluated by me, Danish Levi M.D.  The patient's condition and treatment plan were discussed with the resident, and care coordinated with the CTC and RN. I reviewed, edited and agree with the findings and plan in this note.     Danish Levi M.D.   of Psychiatry     Interim History:   The patient's care was discussed with the treatment team and chart notes were reviewed.  Sleep: 6.75h  PRN: Took clozapine 12.5mg 3/23 12:15pm, is now scheduled 25mg QHS.  PRN atarax 50mg @8pm    Per staff notes, pt denied hearing voices any further last yeimi.  Still wants to divorce her .  Custody concerns.  Will need place to live s/p d/c.  Willing to go to placement for 6 months.    [place] [facility] [place]  Pt reports she feels much better today, the voices stopped yesterday evening and haven't returned.  Pt admits that she still feels paranoid about medications being poisoned, but it's more manageable now that the voices are gone.  Pt endorses difficulty sleeping.   Pt is still sad about planning to end her marriage and leave her home; is worried about housing.  Pt agreeable to continue medications.    Review of systems:     The 10 point Review of Systems is negative other than noted in the HPI         Medications:     Current Facility-Administered Medications   Medication     cloZAPine (CLOZARIL) half-tab 12.5 mg     diphenhydrAMINE (BENADRYL) capsule 25-50 mg     ibuprofen (ADVIL/MOTRIN) tablet 600 mg     [START ON 4/3/2017] paliperidone (INVEGA SUSTENNA) injection SUSP 234 mg     prenatal multivitamin  plus iron per tablet 1 tablet     cholecalciferol (vitamin D)  "tablet 2,000 Units     hydrOXYzine (ATARAX) tablet 25-50 mg     OLANZapine (zyPREXA) tablet 10 mg    Or     OLANZapine (zyPREXA) injection 10 mg     acetaminophen (TYLENOL) tablet 650 mg             Allergies:   No Known Allergies         Psychiatric Examination:   /50  Pulse 60  Temp 98.8  F (37.1  C)  Resp 12  Ht 1.651 m (5' 5\")  Wt 67.8 kg (149 lb 8 oz)  SpO2 99%  BMI 24.88 kg/m2  Weight is 149 lbs 8 oz  Body mass index is 24.88 kg/(m^2).    Appearance:  awake, alert, adequately groomed, cooperative, mild distress and neatly groomed  Attitude:  cooperative and guarded  Eye Contact:  fair  Mood:  sad , depressed and better  Affect:  appropriate and in normal range, mood congruent, intensity is blunted and constricted mobility  Speech:  clear, coherent  Psychomotor Behavior:  no evidence of tardive dyskinesia, dystonia, or tics and intact station, gait and muscle tone  Thought Process:  linear and goal oriented  Associations:  no loose associations  Thought Content:  patient appears to be responding to internal stimuli  Insight:  fair  Judgment:  fair  Oriented to:  time, person, and place  Attention Span and Concentration:  fair  Recent and Remote Memory:  fair  Language: Pt is fluent in conversational English  Fund of Knowledge: appropriate  Muscle Strength and Tone: normal  Gait and Station: Normal         Labs:   No results found for this or any previous visit (from the past 24 hour(s)).    Antipsychotic Labs:  Recent Labs   Lab Test  05/09/12   0842  10/27/11   0756   CHOL  206*  180   TRIG  35  69   LDL  139*  105   HDL  60  61     Recent Labs   Lab Test  02/28/17   1542  02/17/17   0737  11/05/16   0759   11/05/15   1503   GLC  122*  83  85   < >   --    A1C   --    --    --    --   5.4    < > = values in this interval not displayed.     Recent Labs   Lab Test  03/21/17   1917  03/07/17   0732  02/28/17   0816  02/21/17   1624  02/17/17   0737  11/05/16   0759   WBC  5.8  5.5  6.1  5.4  4.3  4.7 "   ANEU  2.8  2.7  3.6  2.9  2.4  2.6   HGB   --    --    --   13.3  12.1  13.1   PLT   --    --    --   304  289  333       [place] Labs:  Recent Labs   Lab Test  09/13/12   0903  06/01/12   0932  05/13/12   0759   LITHIUM  <0.2*  0.9  0.8     Recent Labs   Lab Test  02/28/17   1542  02/17/17   0737  11/05/16   0759   CR  0.62  0.54  0.49*   GFRESTIMATED  >90  Non  GFR Calc    >90  Non  GFR Calc    >90  Non  GFR Calc     BUN  18  21  9   NA  142  140  141   POTASSIUM  3.8  4.1  4.0   BRIANNA  8.7  8.9  9.3   GLC  122*  83  85     Recent Labs   Lab Test  11/05/15   1503   A1C  5.4     Recent Labs   Lab Test  11/05/16   0759  11/03/16   1459  11/05/15   1627  09/13/12   0903   TSH  0.61  0.37*  0.66  0.79   T4   --   1.43   --    --      Recent Labs   Lab Test  03/21/17 1917 03/07/17   0732  02/28/17   0816  02/21/17   1624  02/17/17   0737  11/05/16   0759   WBC  5.8  5.5  6.1  5.4  4.3  4.7   ANEU  2.8  2.7  3.6  2.9  2.4  2.6   HGB   --    --    --   13.3  12.1  13.1   PLT   --    --    --   304  289  333     Recent Labs   Lab Test  03/01/17   1430  11/09/16   1103  03/11/16   1548   SG  1.024  1.020  1.025     No lab results found.    Valproate Labs:  No lab results found.  Recent Labs   Lab Test  02/28/17   1542  11/05/16   0759  11/03/16   1459   AST  22  22  25   ALT  30  25  25   ALKPHOS  56  60  56   BILITOTAL  0.2  0.4  0.5   PROTTOTAL  7.4  8.2  8.0   ALBUMIN  3.3*  3.4  3.4     Recent Labs   Lab Test  03/21/17   1917  03/07/17   0732  02/28/17   0816  02/21/17   1624  02/17/17   0737  11/05/16   0759   WBC  5.8  5.5  6.1  5.4  4.3  4.7   ANEU  2.8  2.7  3.6  2.9  2.4  2.6   HGB   --    --    --   13.3  12.1  13.1   PLT   --    --    --   304  [id number]

## 2017-03-24 NOTE — PROGRESS NOTES
"Nona attended dance movement therapy this morning. After the session, the DMT reported to this writer a comment Nona had made after the group. Nona disclosed that she \"hears voices telling [her] that her medications contain marijuana and that [she] should not take them for that reason.\" Nona apparently recognizes that this is not factual, but merely an auditory hallucination, however it still makes her suspicious.   "

## 2017-03-25 PROCEDURE — 25000132 ZZH RX MED GY IP 250 OP 250 PS 637: Performed by: STUDENT IN AN ORGANIZED HEALTH CARE EDUCATION/TRAINING PROGRAM

## 2017-03-25 PROCEDURE — 12400001 ZZH R&B MH UMMC

## 2017-03-25 RX ADMIN — Medication 1 MG: at 21:25

## 2017-03-25 RX ADMIN — PRENATAL VIT W/ FE FUMARATE-FA TAB 27-0.8 MG 1 TABLET: 27-0.8 TAB at 13:25

## 2017-03-25 RX ADMIN — VITAMIN D, TAB 1000IU (100/BT) 2000 UNITS: 25 TAB at 13:25

## 2017-03-25 ASSESSMENT — ACTIVITIES OF DAILY LIVING (ADL)
ORAL_HYGIENE: INDEPENDENT
GROOMING: INDEPENDENT
DRESS: STREET CLOTHES

## 2017-03-25 NOTE — PROGRESS NOTES
Pt visible in milieu, withdrawn from others except a select few other peers. Pt did not attend group, but ate meals and snacks in dining area. Affect is blunted, flat, mood is calm, depressed. Pt endorses hearing voices. Pt denies SI/SIB.        03/24/17 2100   Behavioral Health   Hallucinations auditory   Thinking poor concentration   Orientation person: oriented;place: oriented;date: oriented   Memory baseline memory   Insight insight appropriate to situation   Judgement impaired   Eye Contact at examiner   Affect blunted, flat   Mood mood is calm;depressed   Physical Appearance/Attire attire appropriate to age and situation   Hygiene well groomed   Suicidality other (see comments)  (denies)   Self Injury other (see comment)  (denies)   Activity withdrawn  (visible in milieu)   Speech clear;coherent   Medication Sensitivity no stated side effects   Psychomotor / Gait balanced;steady   Psycho Education   Type of Intervention 1:1 intervention   Response participates, initiates socially appropriate   Hours 0.5   Treatment Detail taking action   Activities of Daily Living   Hygiene/Grooming independent   Oral Hygiene independent   Dress independent;street clothes   Laundry with supervision   Room Organization independent   Activity   Activity Level of Assistance independent

## 2017-03-25 NOTE — PLAN OF CARE
"Problem: Psychotic Symptoms  Goal: Psychotic Symptoms  Signs and symptoms of listed problems will be absent or manageable.   Outcome: No Change  Nona said, \"I am hearing voices this morning.  That is why I want to stay in my room.  I am so tired.  The voices are saying that I should not take my medicine so I am not going to.  My  and my daughter will say, \"Did you take your meds.  I can tell that you didn't.\"  But when I take the medicine I cannot feel anything around me.  I am not aware of things that are going on around me.  I cannot tell what people are thinking.  When I do take my medication the voices go away and I like that.  I do not think that people are watching my house and commenting on how I cannot manage and clean my house.  That is good too.  I think that my  speaks the truth but exaggerates when he says, \"You are normal, you can talk to people when you take your medication.\".   I know that I let people in my house, receive them, enjoy them, talk to them when I am taking the medicine.  But the voices are saying the medicine is full of marijuana.  See you are tired and drunk-like.  It is the marijuana in the medicine.\".  Even though Nona can use her reason for so far, she cannot, she says, go against this thought.  \"My mother in law likes the children.  She does a lot but it is also another kind of stress with her there.  I get angry with her and argue.  I have been sick since I was 13 years old, I started talking to myself all the time and angry all the time.  Then, yes, the talking went inside my head and is both male and female, telling me to do things, I should do this and that all the time.  Never kind, never relenting.\"  \"The baby, five months and the oldest child need a lot of care.  I am sick.  I want to live alone and not take the medicine.\"  Nona denies thoughts of hurting self or others.  She is very anxious she said and is suspicious of all the others around her " here.

## 2017-03-26 PROCEDURE — 25900017 H RX MED GY IP 259 OP 259 PS 637: Performed by: STUDENT IN AN ORGANIZED HEALTH CARE EDUCATION/TRAINING PROGRAM

## 2017-03-26 PROCEDURE — S0136 CLOZAPINE, 25 MG: HCPCS | Performed by: STUDENT IN AN ORGANIZED HEALTH CARE EDUCATION/TRAINING PROGRAM

## 2017-03-26 PROCEDURE — 25000132 ZZH RX MED GY IP 250 OP 250 PS 637: Performed by: STUDENT IN AN ORGANIZED HEALTH CARE EDUCATION/TRAINING PROGRAM

## 2017-03-26 PROCEDURE — 12400001 ZZH R&B MH UMMC

## 2017-03-26 RX ADMIN — PRENATAL VIT W/ FE FUMARATE-FA TAB 27-0.8 MG 1 TABLET: 27-0.8 TAB at 08:37

## 2017-03-26 RX ADMIN — Medication 1 MG: at 19:55

## 2017-03-26 RX ADMIN — VITAMIN D, TAB 1000IU (100/BT) 2000 UNITS: 25 TAB at 08:36

## 2017-03-26 RX ADMIN — CLOZAPINE 25 MG: 25 TABLET ORAL at 19:55

## 2017-03-26 RX ADMIN — CLOZAPINE 25 MG: 25 TABLET ORAL at 03:04

## 2017-03-26 ASSESSMENT — ACTIVITIES OF DAILY LIVING (ADL)
DRESS: STREET CLOTHES;INDEPENDENT
DRESS: INDEPENDENT;STREET CLOTHES
LAUNDRY: WITH SUPERVISION
LAUNDRY: WITH SUPERVISION
ORAL_HYGIENE: INDEPENDENT
GROOMING: INDEPENDENT
ORAL_HYGIENE: INDEPENDENT
GROOMING: INDEPENDENT

## 2017-03-26 NOTE — PROGRESS NOTES
"Pt appears sedated, paces the hallway, and napping in room. Pt reports dizziness upon standing and feeling like she \"wants to die.\" Pt is concerned about being able to work outside the home and has agreed to speak to her  on Monday regarding her choices.      03/26/17 1424   Behavioral Health   Hallucinations denies / not responding to hallucinations   Thinking poor concentration   Orientation place: oriented;person: oriented   Memory baseline memory   Insight insight appropriate to situation   Judgement impaired   Eye Contact at examiner   Affect sad;blunted, flat   Mood depressed;hopeless   Physical Appearance/Attire attire appropriate to age and situation   Hygiene well groomed   Suicidality thoughts only   Self Injury other (see comment)  (denies)   Activity isolative;withdrawn   Speech coherent;clear   Medication Sensitivity sedation   Psychomotor / Gait unsteady;slow;paces   Psycho Education   Type of Intervention 1:1 intervention   Response participates, initiates socially appropriate   Hours 0.5   Treatment Detail check-in/planning   Activities of Daily Living   Hygiene/Grooming independent   Oral Hygiene independent   Dress street clothes;independent   Laundry with supervision   Room Organization independent     "

## 2017-03-26 NOTE — PROGRESS NOTES
"Observed t have slept well till approp. 0300 at which time pt. awakened, came out to the nurses station reporting \"I did not take my medicine and now I am hearing voices and I cannot sleep.\" The voices told me that there was something in the medicine so I didn't take it\".  Requesting to take the Clozaril at this time \"so the voices will go away and I can sleep.\"  States that she knows that there is nothing in the medicine - -  \"the voices just tell me that\".  Positive feedback provided for insightful decision-making.  Sitting out in lounge area for a while.  Maintains pleasant but sad demeanor. Verbalizes feeling \"tired\"  Supportive intervention provided as appropriate.     "

## 2017-03-26 NOTE — PLAN OF CARE
Problem: Psychotic Symptoms  Goal: Psychotic Symptoms  Signs and symptoms of listed problems will be absent or manageable.   Nona was up earlier today, did go out for breakfast and asked that meds be given after her meal.  She smiled, walked slowly and acknowledged that taking the medication does help to decrease the voices.  She is still ambivalent, to say the least.

## 2017-03-27 PROCEDURE — 99232 SBSQ HOSP IP/OBS MODERATE 35: CPT | Mod: GC | Performed by: PSYCHIATRY & NEUROLOGY

## 2017-03-27 PROCEDURE — 25900017 H RX MED GY IP 259 OP 259 PS 637: Performed by: STUDENT IN AN ORGANIZED HEALTH CARE EDUCATION/TRAINING PROGRAM

## 2017-03-27 PROCEDURE — 25000132 ZZH RX MED GY IP 250 OP 250 PS 637: Performed by: STUDENT IN AN ORGANIZED HEALTH CARE EDUCATION/TRAINING PROGRAM

## 2017-03-27 PROCEDURE — S0136 CLOZAPINE, 25 MG: HCPCS | Performed by: STUDENT IN AN ORGANIZED HEALTH CARE EDUCATION/TRAINING PROGRAM

## 2017-03-27 PROCEDURE — 12400001 ZZH R&B MH UMMC

## 2017-03-27 RX ADMIN — VITAMIN D, TAB 1000IU (100/BT) 2000 UNITS: 25 TAB at 09:07

## 2017-03-27 RX ADMIN — PRENATAL VIT W/ FE FUMARATE-FA TAB 27-0.8 MG 1 TABLET: 27-0.8 TAB at 09:07

## 2017-03-27 RX ADMIN — CLOZAPINE 25 MG: 25 TABLET ORAL at 20:29

## 2017-03-27 RX ADMIN — Medication 1 MG: at 20:29

## 2017-03-27 ASSESSMENT — ACTIVITIES OF DAILY LIVING (ADL)
GROOMING: INDEPENDENT
GROOMING: INDEPENDENT
ORAL_HYGIENE: INDEPENDENT
DRESS: INDEPENDENT
LAUNDRY: WITH SUPERVISION
ORAL_HYGIENE: INDEPENDENT
LAUNDRY: WITH SUPERVISION
DRESS: INDEPENDENT

## 2017-03-27 NOTE — PROGRESS NOTES
Patient is agreeable to attending an IRT program stating that she believes a break from home could be helpful. Writer has made referrals to the following programs:  [facility]  [facility]  [facility]  [facility]

## 2017-03-27 NOTE — PROGRESS NOTES
"    ----------------------------------------------------------------------------------------------------------  [facility], [place]   Psychiatric Progress Note     Assessment    Presentation: Nona Finley is a 36 year old female with a significant past psychiatric history of schizoaffective disorder, peripartum onset who was brought in by ambulance due to suicidal ideation in the setting of worsening auditory and visual hallucinations.  Pt recently noticed an odd scent in her medications and food, was concerned for poison, stopped ingesting them, then began having AH/VH telling her to kill herself or they will stab her.  Pt endorses SI and plan, and paranoia, resulting in refusal to (willingly) take any medications to control her psychotic symptoms. Admission warranted to maintain safety and identify medication plan to manage her psychotic symptoms.    Diagnostic Impression: Pt with Hx german-partum depression and psychosis, prior hospitalizations for SI/HI.  Pt is relatively isolated at home, cannot tolerate the noise level and chaos of her 6 children.  Pt discontinued her meds and food due to paranoid belief that these are poisoned with marijuana, bleach or other chemicals;  AH/VH then erupted soon after discontinuation.  Pt's psychotic Sx and depression are consistent with her historic Dx of Schizoaffective d/o.    Hospital course: Nona Finley was admitted to station 22 as a voluntary patient.  Pt's PTA meds were restarted, but pt has consistently refused all her meds, with paranoid fear that they are \"poison.\"  3/22 Pt stated that she will not take meds or ECT unless she is forced.  3/23 pt revealed she wants to leave , is willing to immediately take CLZ.  3/24 AH voices stopped last night, haven't returned, pt improving.  Increased clozapine to 25mg QHS, and started melatonin 1mg qhs for sleep.  Filed [facility] paperwork for commitment and Reyes.  3/27 intermittent CAH, " no VH, still paranoia of poisoned meds.  Still awaiting court ruling for commitment/Reyes.    Medical course 3/27 pt c/o Sx of orthostatic hypotension / tachycardia, thought d/t increase in clozaril, similar occurrence on last hospitalization when clozaril was initially started / titrated.  If continues, team may consider [facility].    Plan     Principal Diagnosis: Schizoaffective disorder    Medications:   - paliperidone 234 mg IM q28 days (prior dose on 3/6/17, next dose 4/3/17)  - Clozapine 25 mg PO qHS  (Dose interruption >2d, so re-started at 12.5mg qhs; 3/24 increased to 25mg)  - olanzapine 10 mg PO/IM PRN, for agitation  - benadryl 25-50 mg PO q4h prn, for EPS  - hydroxyzine 25-50 mg PO q4h prn, for anxiety     Laboratory/Imaging: utox/hcg negative; 3/21 WBC 5.8 ANC 2.8 (re-measure each Tuesday, next: 3/28)   - 3/28 WBC/diff, fasting lipids (pending)    Consults: None     Patient will be treated in therapeutic milieu with appropriate individual and group therapies as described.    Secondary psychiatric diagnoses of concern this admission: None    Medical diagnoses to be addressed this admission:  Insomnia    Medications:     Melatonin 1 mg PO QHS (take 2-3h before desired sleep time) (started 3/24)    Depo-provera (last dispensed 3/7/17), not needed at this time    Cholecalciferol 2000u, 1 tab PO daily    Prenatal MV + iron, 1 tab PO daily    Tylenol 650mg tab PO q4h prn for mild pain    Ibuprofen tab 600mg q6h prn for moderate pain (Discontinued 3/22)     Relevant psychosocial stressors: Severe including motherhood, chronic mental illness    Legal Status: Voluntary  - Team pursuing commitment and Reyes (filed paperwork 3/24, pt seen by  3/27), due to pt's likely non-adherence to medication, and unintentional risk of harm to her 6 children due to decompensated guardian/caretaker, with past Hx of intent to kill children & self with knife.    Safety Assessment:   Checks: Status 15  Precautions:  "Suicide  Self-harm  Pt has not required locked seclusion or restraints in the past 24 hours to maintain safety, please refer to RN documentation for further details.     The risks, benefits, alternatives and side effects have been discussed and are understood by the patient and other caregivers.    Anticipated Disposition/Discharge Date: Unknown at this time. Pending further clinical evaluation and stabilization.  Recommending pt d/c to IRTs before d/c'ing home.    Attestation:    This documentation accurately reflects the services I personally performed and treatment decisions made by me in consultation with the attending physician.    Ashok Casas MD, PGY-1 Psychiatry resident  Pager [phone]    Psychiatry Attending Attestation:  This patient has been seen and evaluated by me, Danish Levi M.D.  The patient's condition and treatment plan were discussed with the resident, and care coordinated with the CTC and RN. I reviewed, edited and agree with the findings and plan in this note.     Danish Levi M.D.   of Psychiatry     Interim History:   The patient's care was discussed with the treatment team and chart notes were reviewed.  Sleep: 6.75h  PRN: 3/25 PT refused CLZ, then took at 3/24 3am.  No PRNs.    Per staff notes, pt has had commitment & Reyes papers filed 3/24.  Pt endorsing CAH telling her not to take Rxs, \"Why are you living, you're worthless.\"  Feels \"tired and drunk-like\" on meds, voices say meds laced with THC.  CAH present for a few hours then goes away jackie s/p CLZ dose.  Denies SI/SIB, endorses AH, denies VH since started taking CLZ during this hospitalization.  Pt c/o orthostatic tachycardia, dizziness on standing.  Pt also reports being sick since 12yo when voices started and she began talking to herself all the time.  3/27 AM pt seen by .    Pt was interviewed in the [facility] area.  Pt reports she feels better.  Last night she skipped the PM dose d/t fear " "of poison, but then woke @ 3am with increased voices so she finally took the dose.  Pt agrees that she is not ready to go home, is willing to go to IRTs for 1-3 months to stabilize and regain health before heading home.  Pt asks for all-[place] IRTs, but no such IRT exists, only permanent-residence GHs, which is not compatible w/ pt's long-term goals.    Review of systems:     The 10 point Review of Systems is negative other than noted in the HPI         Medications:     Current Facility-Administered Medications   Medication     [START ON 4/10/2017] paliperidone (INVEGA SUSTENNA) injection SUSP 234 mg     cloZAPine (CLOZARIL) tablet 25 mg     melatonin tablet 1 mg     diphenhydrAMINE (BENADRYL) capsule 25-50 mg     ibuprofen (ADVIL/MOTRIN) tablet 600 mg     prenatal multivitamin  plus iron per tablet 1 tablet     cholecalciferol (vitamin D) tablet 2,000 Units     hydrOXYzine (ATARAX) tablet 25-50 mg     OLANZapine (zyPREXA) tablet 10 mg    Or     OLANZapine (zyPREXA) injection 10 mg     acetaminophen (TYLENOL) tablet 650 mg             Allergies:   No Known Allergies         Psychiatric Examination:   BP 99/63  Pulse 98  Temp 97.7  F (36.5  C)  Resp 16  Ht 1.651 m (5' 5\")  Wt 69.4 kg (153 lb)  SpO2 99%  BMI 25.46 kg/m2  Weight is 153 lbs 0 oz  Body mass index is 25.46 kg/(m^2).    Appearance:  awake, alert, adequately groomed, cooperative, mild distress and neatly groomed  Attitude:  cooperative and guarded  Eye Contact:  fair  Mood:  sad , depressed and better  Affect:  appropriate and in normal range, mood congruent, intensity is blunted and constricted mobility  Speech:  clear, coherent  Psychomotor Behavior:  no evidence of tardive dyskinesia, dystonia, or tics and intact station, gait and muscle tone  Thought Process:  linear and goal oriented  Associations:  no loose associations  Thought Content:  auditory hallucinations present, no visual hallucinations present and patient appears to be responding to " internal stimuli  Insight:  fair  Judgment:  fair  Oriented to:  time, person, and place  Attention Span and Concentration:  fair  Recent and Remote Memory:  fair  Language: Pt is fluent in conversational English  Fund of Knowledge: appropriate  Muscle Strength and Tone: normal  Gait and Station: Normal         Labs:   No results found for this or any previous visit (from the past 24 hour(s)).    Antipsychotic Labs:  Recent Labs   Lab Test  05/09/12   0842  10/27/11   0756   CHOL  206*  180   TRIG  35  69   LDL  139*  105   HDL  60  61     Recent Labs   Lab Test  02/28/17   1542  02/17/17   0737  11/05/16   0759   11/05/15   1503   GLC  122*  83  85   < >   --    A1C   --    --    --    --   5.4    < > = values in this interval not displayed.     Recent Labs   Lab Test  03/21/17 1917 03/07/17   0732  02/28/17   0816  02/21/17   1624  02/17/17   0737  11/05/16   0759   WBC  5.8  5.5  6.1  5.4  4.3  4.7   ANEU  2.8  2.7  3.6  2.9  2.4  2.6   HGB   --    --    --   13.3  12.1  13.1   PLT   --    --    --   304  289  333       [place] Labs:  Recent Labs   Lab Test  09/13/12   0903  06/01/12   0932  05/13/12   0759   LITHIUM  <0.2*  0.9  0.8     Recent Labs   Lab Test  02/28/17   1542  02/17/17   0737  11/05/16   0759   CR  0.62  0.54  0.49*   GFRESTIMATED  >90  Non  GFR Calc    >90  Non  GFR Calc    >90  Non  GFR Calc     BUN  18  21  9   NA  142  140  141   POTASSIUM  3.8  4.1  4.0   BRIANNA  8.7  8.9  9.3   GLC  122*  83  85     Recent Labs   Lab Test  11/05/15   1503   A1C  5.4     Recent Labs   Lab Test  11/05/16   0759  11/03/16   1459  11/05/15   1627  09/13/12   0903   TSH  0.61  0.37*  0.66  0.79   T4   --   1.43   --    --      Recent Labs   Lab Test  03/21/17 1917 03/07/17   0732  02/28/17   0816  02/21/17   1624  02/17/17   0737  11/05/16   0759   WBC  5.8  5.5  6.1  5.4  4.3  4.7   ANEU  2.8  2.7  3.6  2.9  2.4  2.6   HGB   --    --    --   13.3  12.1  13.1    PLT   --    --    --   304  289  333     Recent Labs   Lab Test  03/01/17   1430  11/09/16   1103  03/11/16   1548   SG  1.024  1.020  1.025     No lab results found.    Valproate Labs:  No lab results found.  Recent Labs   Lab Test  02/28/17   1542  11/05/16   0759  11/03/16   1459   AST  22  22  25   ALT  30  25  25   ALKPHOS  56  60  56   BILITOTAL  0.2  0.4  0.5   PROTTOTAL  7.4  8.2  8.0   ALBUMIN  3.3*  3.4  3.4     Recent Labs   Lab Test  03/21/17   1917  03/07/17   0732  02/28/17   0816  02/21/17   1624  02/17/17   0737  11/05/16   0759   WBC  5.8  5.5  6.1  5.4  4.3  4.7   ANEU  2.8  2.7  3.6  2.9  2.4  2.6   HGB   --    --    --   13.3  12.1  13.1   PLT   --    --    --   304  289  333

## 2017-03-27 NOTE — PLAN OF CARE
Problem: Psychotic Symptoms  Goal: Psychotic Symptoms  Signs and symptoms of listed problems will be absent or manageable.  -pt will verbalize coping skills to manage hallucinations  -pt will participate in groups demonstrating adequate attention span  -pt will identify when or if they are having any medication side effects  -pt will be able to have reality based conversations  Outcome: Improving  Nona active on unit, although declines most grps-spends most of time walking in [place]-social with staff and select peers-states she feels improved today-states mood is better and she is feeling more hopeful, although continues issues with -

## 2017-03-27 NOTE — PROGRESS NOTES
Pt visible in milieu, withdrawn from others. Pt spend a good part of the shift pacing the hallway slowly with headphones on. Pt did not attend group. Affect is blunted, flat, sad, mood is depressed, hopeless. Pt denies SI/SIB. Pt appears to be responding to hallucinations.        03/26/17 2200   Behavioral Health   Hallucinations appears responding   Thinking poor concentration;distractable   Orientation person: oriented;place: oriented;date: oriented   Memory baseline memory   Insight poor   Judgement impaired   Eye Contact at examiner   Affect blunted, flat;sad   Mood depressed;hopeless   Physical Appearance/Attire attire appropriate to age and situation   Hygiene well groomed   Suicidality other (see comments)  (denies)   Self Injury other (see comment)  (denies)   Activity withdrawn   Speech clear;coherent   Medication Sensitivity no stated side effects   Psychomotor / Gait balanced;slow;paces   Psycho Education   Type of Intervention 1:1 intervention   Response participates, initiates socially appropriate   Hours 0.5   Treatment Detail self reflection and planning   Activities of Daily Living   Hygiene/Grooming independent   Oral Hygiene independent   Dress independent;street clothes   Laundry with supervision   Room Organization independent   Activity   Activity Level of Assistance independent

## 2017-03-28 LAB
BASOPHILS # BLD AUTO: 0 10E9/L (ref 0–0.2)
BASOPHILS NFR BLD AUTO: 0.4 %
CHOLEST SERPL-MCNC: 170 MG/DL
DIFFERENTIAL METHOD BLD: NORMAL
EOSINOPHIL # BLD AUTO: 0.1 10E9/L (ref 0–0.7)
EOSINOPHIL NFR BLD AUTO: 2.4 %
HDLC SERPL-MCNC: 59 MG/DL
IMM GRANULOCYTES # BLD: 0 10E9/L (ref 0–0.4)
IMM GRANULOCYTES NFR BLD: 0.2 %
LDLC SERPL CALC-MCNC: 98 MG/DL
LYMPHOCYTES # BLD AUTO: 1.9 10E9/L (ref 0.8–5.3)
LYMPHOCYTES NFR BLD AUTO: 36.3 %
MONOCYTES # BLD AUTO: 0.4 10E9/L (ref 0–1.3)
MONOCYTES NFR BLD AUTO: 6.9 %
NEUTROPHILS # BLD AUTO: 2.8 10E9/L (ref 1.6–8.3)
NEUTROPHILS NFR BLD AUTO: 53.8 %
NONHDLC SERPL-MCNC: 111 MG/DL
NRBC # BLD AUTO: 0 10*3/UL
NRBC BLD AUTO-RTO: 0 /100
TRIGL SERPL-MCNC: 64 MG/DL
WBC # BLD AUTO: 5.1 10E9/L (ref 4–11)

## 2017-03-28 PROCEDURE — 99232 SBSQ HOSP IP/OBS MODERATE 35: CPT | Mod: GC | Performed by: PSYCHIATRY & NEUROLOGY

## 2017-03-28 PROCEDURE — 25000132 ZZH RX MED GY IP 250 OP 250 PS 637: Performed by: STUDENT IN AN ORGANIZED HEALTH CARE EDUCATION/TRAINING PROGRAM

## 2017-03-28 PROCEDURE — 80061 LIPID PANEL: CPT | Performed by: PSYCHIATRY & NEUROLOGY

## 2017-03-28 PROCEDURE — 12400001 ZZH R&B MH UMMC

## 2017-03-28 PROCEDURE — S0136 CLOZAPINE, 25 MG: HCPCS | Performed by: STUDENT IN AN ORGANIZED HEALTH CARE EDUCATION/TRAINING PROGRAM

## 2017-03-28 PROCEDURE — 85004 AUTOMATED DIFF WBC COUNT: CPT | Performed by: PSYCHIATRY & NEUROLOGY

## 2017-03-28 PROCEDURE — 25900017 H RX MED GY IP 259 OP 259 PS 637: Performed by: STUDENT IN AN ORGANIZED HEALTH CARE EDUCATION/TRAINING PROGRAM

## 2017-03-28 PROCEDURE — 85048 AUTOMATED LEUKOCYTE COUNT: CPT | Performed by: PSYCHIATRY & NEUROLOGY

## 2017-03-28 PROCEDURE — 36415 COLL VENOUS BLD VENIPUNCTURE: CPT | Performed by: PSYCHIATRY & NEUROLOGY

## 2017-03-28 RX ORDER — FLUDROCORTISONE ACETATE 0.1 MG/1
100 TABLET ORAL DAILY
Status: DISCONTINUED | OUTPATIENT
Start: 2017-03-28 | End: 2017-03-30

## 2017-03-28 RX ADMIN — IBUPROFEN 600 MG: 600 TABLET ORAL at 15:33

## 2017-03-28 RX ADMIN — VITAMIN D, TAB 1000IU (100/BT) 2000 UNITS: 25 TAB at 08:56

## 2017-03-28 RX ADMIN — PRENATAL VIT W/ FE FUMARATE-FA TAB 27-0.8 MG 1 TABLET: 27-0.8 TAB at 08:56

## 2017-03-28 RX ADMIN — Medication 1 MG: at 19:59

## 2017-03-28 RX ADMIN — IBUPROFEN 600 MG: 600 TABLET ORAL at 08:55

## 2017-03-28 RX ADMIN — CLOZAPINE 25 MG: 25 TABLET ORAL at 19:57

## 2017-03-28 RX ADMIN — FLUDROCORTISONE ACETATE 100 MCG: 0.1 TABLET ORAL at 14:38

## 2017-03-28 ASSESSMENT — ACTIVITIES OF DAILY LIVING (ADL)
LAUNDRY: UNABLE TO COMPLETE
ORAL_HYGIENE: INDEPENDENT
GROOMING: INDEPENDENT
GROOMING: INDEPENDENT
DRESS: INDEPENDENT
ORAL_HYGIENE: INDEPENDENT
DRESS: STREET CLOTHES

## 2017-03-28 NOTE — PROGRESS NOTES
Patient was visible in milieu, watching tv, pacing halls for most of the shift. Pt was social with roommate who also speaks [place]. Pt took a shower and ate dinner. Affect was blunted, flat. Mood was calm. No SI, med side effects noted.      03/27/17 2102   Behavioral Health   Hallucinations other (see comment)   Thinking distractable;poor concentration   Orientation person: oriented;place: oriented   Memory baseline memory   Insight poor   Judgement impaired   Eye Contact at examiner   Affect blunted, flat   Mood mood is calm   Physical Appearance/Attire attire appropriate to age and situation   Hygiene well groomed   Suicidality (denies)   Self Injury (denies)   Activity withdrawn;restless   Speech clear;coherent   Medication Sensitivity no observed side effects   Psychomotor / Gait paces;slow   Activities of Daily Living   Hygiene/Grooming independent   Oral Hygiene independent   Dress independent   Laundry with supervision   Room Organization independent   Activity   Activity Level of Assistance independent

## 2017-03-28 NOTE — PROGRESS NOTES
Writer received a call from [facility] and the petition for commitment has been supported.

## 2017-03-28 NOTE — PROGRESS NOTES
03/28/17 1336   Behavioral Health   Hallucinations other (see comment)  (Pt. denies AH/VH.)   Thinking distractable   Orientation person: oriented;place: oriented;date: oriented   Memory baseline memory   Insight poor   Judgement impaired   Eye Contact at examiner   Affect blunted, flat   Mood depressed   Physical Appearance/Attire attire appropriate to age and situation   Hygiene well groomed   Suicidality other (see comments)  (None stated.)   Self Injury other (see comment)  (None stated.)   Activity withdrawn;other (see comment)  (Mostly isolative to room, in milieu for meals only.)   Speech clear;coherent   Medication Sensitivity no stated side effects   Psychomotor / Gait balanced;steady   Coping/Psychosocial   Verbalized Emotional State depression;hopefulness   Psycho Education   Type of Intervention 1:1 intervention   Response participates, initiates socially appropriate   Hours 0.5   Treatment Detail check-in   Activities of Daily Living   Hygiene/Grooming independent   Oral Hygiene independent   Dress street clothes   Laundry unable to complete   Room Organization independent   Behavioral Health Interventions   Psychotic Symptoms maintain safety precautions;maintain safe secure environment;reality orientation;assist patient in developing safety plan;assist patient in following safety plan;provide emotional support;establish therapeutic relationship;assist with developing & utilizing healthy coping strategies;build upon strengths;assess patient response to medication;assess medication adherance;monitor need for prn medication;monitor confusion, memory loss, decision making ability and reorient / intervent as needed   Social and Therapeutic Interventions (Psychotic Symptoms) encourage socialization with peers;encourage participation in therapeutic groups and milieu activities       Pt. Was mostly isolative this shift, occasionally coming into the milieu. Pt. Reported tightness in her left calf, which  eventually resolved by the end of the shift. No significant events to report.

## 2017-03-28 NOTE — PROGRESS NOTES
"    ----------------------------------------------------------------------------------------------------------  [facility], [place]   Psychiatric Progress Note     Assessment    Presentation: Nona Finley is a 36 year old female with a significant past psychiatric history of schizoaffective disorder, peripartum onset who was brought in by ambulance due to suicidal ideation in the setting of worsening auditory and visual hallucinations.  Pt recently noticed an odd scent in her medications and food, was concerned for poison, stopped ingesting them, then began having AH/VH telling her to kill herself or they will stab her.  Pt endorses SI and plan, and paranoia, resulting in refusal to (willingly) take any medications to control her psychotic symptoms. Admission warranted to maintain safety and identify medication plan to manage her psychotic symptoms.    Diagnostic Impression: Pt with Hx german-partum depression and psychosis, prior hospitalizations for SI/HI.  Pt is relatively isolated at home, cannot tolerate the noise level and chaos of her 6 children.  Pt discontinued her meds and food due to paranoid belief that these are poisoned with marijuana, bleach or other chemicals;  AH/VH then erupted soon after discontinuation.  Pt's psychotic Sx and depression are consistent with her historic Dx of Schizoaffective d/o.    Hospital course: Nona Finley was admitted to station 22 as a voluntary patient.  Pt's PTA meds were restarted, but pt has consistently refused all her meds, with paranoid fear that they are \"poison.\"  3/22 Pt stated that she will not take meds or ECT unless she is forced.  3/23 pt revealed she wants to leave , is willing to immediately take CLZ.  3/24 AH voices stopped last night, haven't returned, pt improving.  Increased clozapine to 25mg QHS, and started melatonin 1mg qhs for sleep.  Filed [facility] paperwork for commitment and Reyes.  3/27 intermittent CAH, " no VH, still paranoia of poisoned meds.  3/28 petition for commitment being approved for a hearing.    Medical course 3/28 Florinef 0.1 mg qd started for continuing Sx of orthostatic hypotension / tachycardia that began on 3/27, thought d/t increase in clozaril, similar occurrence on last hospitalization when clozaril was initially started / titrated.       Plan     Principal Diagnosis: Schizoaffective disorder    Medications:   - paliperidone 234 mg IM q28 days (prior dose on 3/6/17, next dose 4/3/17)  - Clozapine 25 mg PO qHS  (Dose interruption >2d, so re-started at 12.5mg qhs; 3/24 increased to 25mg)  - olanzapine 10 mg PO/IM PRN, for agitation  - benadryl 25-50 mg PO q4h prn, for EPS  - hydroxyzine 25-50 mg PO q4h prn, for anxiety  -fludrocortisone 0.1 mg PO qd, for orthostatic hypotension/tachycardia d/t Clozapine (3/28 started)     Laboratory/Imaging: utox/hcg negative; 3/21 WBC 5.8 ANC 2.8 (re-measure each Tuesday); 3/28 WBC 5.1 ANC 2.8  3/28 fasting lipids wnl.    Consults: None     Patient will be treated in therapeutic milieu with appropriate individual and group therapies as described.    Secondary psychiatric diagnoses of concern this admission: None    Medical diagnoses to be addressed this admission:  Insomnia    Medications:     Melatonin 1 mg PO QHS (take 2-3h before desired sleep time) (started 3/24)    Depo-provera (last dispensed 3/7/17), not needed at this time    Cholecalciferol 2000u, 1 tab PO daily    Prenatal MV + iron, 1 tab PO daily    Tylenol 650mg tab PO q4h prn for mild pain    Ibuprofen tab 600mg q6h prn for moderate pain     Relevant psychosocial stressors: Severe including motherhood, chronic mental illness    Legal Status: Voluntary  - 3/28 Pt  awaiting commitment and Reyes (filed paperwork 3/24, pt seen by  3/27, 3/28 petition for commitment being approved for a hearing), due to pt's likely non-adherence to medication, and unintentional risk of harm to her 6  children due to decompensated guardian/caretaker, with past Hx of intent to kill children & self with knife.    Safety Assessment:   Checks: Status 15  Precautions: Suicide  Self-harm  Pt has not required locked seclusion or restraints in the past 24 hours to maintain safety, please refer to RN documentation for further details.     The risks, benefits, alternatives and side effects have been discussed and are understood by the patient and other caregivers.    Anticipated Disposition/Discharge Date: Unknown at this time. Pending further clinical evaluation and stabilization.  Recommending pt d/c to IRTs.  IRT Referrals placed 3/27.    Attestation:    This documentation accurately reflects the services I personally performed and treatment decisions made by me in consultation with the attending physician.    Scribed by Vira Mandujano, MS3, for Dr. Ashok Casas, Resident.    I have reviewed and edited the documentation recorded by the scribe. This documentation accurately reflects the services I personally performed and treatment decisions made by me in consultation with the attending physician.    Ashok Casas MD, PGY-1 Psychiatry resident  Pager [phone]    Psychiatry Attending Attestation:  This patient has been seen and evaluated by me, Danish Levi M.D.  The patient's condition and treatment plan were discussed with the resident, and care coordinated with the CTC and RN. I reviewed, edited and agree with the findings and plan in this note.     Danish Levi M.D.   of Psychiatry     Interim History:   The patient's care was discussed with the treatment team and chart notes were reviewed.  Sleep: 7h  PRN:  No PRNs.    Per staff notes, pt's mood is improving, agreeable to IRTs.  Denies SI or med S/E.  Endorsed some leg pain.    [place] [facility] [place]  Pt reports she feels better.  Adherent with medication last night.  Continuing to experience orthostasis and tachycardia.  Pt  "agrees that she is not ready to go home, desires placement at IRT for 1-3 months to stabilize and regain health before heading home.  Also stated she is open to the option of a permanent-residence .     This morning pt reported left ankle pain that awoke her from her sleep, it is worsened with movement and bearing weight.  Has experienced this pain intermittently in the past, feels that it is a deep \"bone pain\", not shallow like skin or tendons. No recent injury to the area.  Recommended ibuprofen and ice prn.  Pain was completely alleviated at later questioning after ibuprofen.  Will consider IM consult if pain worsens or persists.      Review of systems:     The 10 point Review of Systems is negative other than noted in the HPI         Medications:     Current Facility-Administered Medications   Medication     [START ON 4/10/2017] paliperidone (INVEGA SUSTENNA) injection SUSP 234 mg     cloZAPine (CLOZARIL) tablet 25 mg     melatonin tablet 1 mg     diphenhydrAMINE (BENADRYL) capsule 25-50 mg     ibuprofen (ADVIL/MOTRIN) tablet 600 mg     prenatal multivitamin  plus iron per tablet 1 tablet     cholecalciferol (vitamin D) tablet 2,000 Units     hydrOXYzine (ATARAX) tablet 25-50 mg     OLANZapine (zyPREXA) tablet 10 mg    Or     OLANZapine (zyPREXA) injection 10 mg     acetaminophen (TYLENOL) tablet 650 mg             Allergies:   No Known Allergies         Psychiatric Examination:   BP 99/63  Pulse 98  Temp 97.9  F (36.6  C) (Tympanic)  Resp 16  Ht 1.651 m (5' 5\")  Wt 69.6 kg (153 lb 8 oz)  SpO2 99%  BMI 25.54 kg/m2  Weight is 153 lbs 8 oz  Body mass index is 25.54 kg/(m^2).    Appearance:  awake, alert, adequately groomed, cooperative, mild distress and neatly groomed  Attitude:  cooperative and guarded  Eye Contact:  fair  Mood:  sad , depressed and better  Affect:  appropriate and in normal range, mood congruent, intensity is blunted and constricted mobility  Speech:  clear, coherent  Psychomotor " Behavior:  no evidence of tardive dyskinesia, dystonia, or tics and intact station, gait and muscle tone  Thought Process:  linear and goal oriented  Associations:  no loose associations  Thought Content:  auditory hallucinations present, no visual hallucinations present and patient appears to be responding to internal stimuli  Insight:  fair  Judgment:  fair  Oriented to:  time, person, and place  Attention Span and Concentration:  fair  Recent and Remote Memory:  fair  Language: Pt is fluent in conversational English  Fund of Knowledge: appropriate  Muscle Strength and Tone: normal  Gait and Station: Normal         Labs:     Recent Results (from the past 24 hour(s))   WBC and differential    Collection Time: 03/28/17  7:37 AM   Result Value Ref Range    WBC 5.1 4.0 - 11.0 10e9/L    Diff Method Automated Method     % Neutrophils 53.8 %    % Lymphocytes 36.3 %    % Monocytes 6.9 %    % Eosinophils 2.4 %    % Basophils 0.4 %    % Immature Granulocytes 0.2 %    Nucleated RBCs 0 0 /100    Absolute Neutrophil 2.8 1.6 - 8.3 10e9/L    Absolute Lymphocytes 1.9 0.8 - 5.3 10e9/L    Absolute Monocytes 0.4 0.0 - 1.3 10e9/L    Absolute Eosinophils 0.1 0.0 - 0.7 10e9/L    Absolute Basophils 0.0 0.0 - 0.2 10e9/L    Abs Immature Granulocytes 0.0 0 - 0.4 10e9/L    Absolute Nucleated RBC 0.0    Lipid panel reflex to direct LDL    Collection Time: 03/28/17  7:37 AM   Result Value Ref Range    Cholesterol 170 <200 mg/dL    Triglycerides 64 <150 mg/dL    HDL Cholesterol 59 >49 mg/dL    LDL Cholesterol Calculated 98 <100 mg/dL    Non HDL Cholesterol 111 <130 mg/dL       Antipsychotic Labs:  Recent Labs   Lab Test  03/28/17   0737  05/09/12   0842  10/27/11   0756   CHOL  170  206*  180   TRIG  64  35  69   LDL  98  139*  105   HDL  59  60  61     Recent Labs   Lab Test  02/28/17   1542  02/17/17   0737  11/05/16   0759   11/05/15   1503   GLC  122*  83  85   < >   --    A1C   --    --    --    --   5.4    < > = values in this interval  not displayed.     Recent Labs   Lab Test  03/28/17   0737  03/21/17   1917  03/07/17   0732   02/21/17   1624  02/17/17   0737  11/05/16   0759   WBC  5.1  5.8  5.5   < >  5.4  4.3  4.7   ANEU  2.8  2.8  2.7   < >  2.9  2.4  2.6   HGB   --    --    --    --   13.3  12.1  13.1   PLT   --    --    --    --   304  289  333    < > = values in this interval not displayed.       [place] Labs:  Recent Labs   Lab Test  09/13/12   0903  06/01/12   0932  05/13/12   0759   LITHIUM  <0.2*  0.9  0.8     Recent Labs   Lab Test  02/28/17   1542  02/17/17   0737  11/05/16   0759   CR  0.62  0.54  0.49*   GFRESTIMATED  >90  Non  GFR Calc    >90  Non  GFR Calc    >90  Non  GFR Calc     BUN  18  21  9   NA  142  140  141   POTASSIUM  3.8  4.1  4.0   BRIANNA  8.7  8.9  9.3   GLC  122*  83  85     Recent Labs   Lab Test  11/05/15   1503   A1C  5.4     Recent Labs   Lab Test  11/05/16   0759  11/03/16   1459  11/05/15   1627  09/13/12   0903   TSH  0.61  0.37*  0.66  0.79   T4   --   1.43   --    --      Recent Labs   Lab Test  03/28/17   0737  03/21/17 1917 03/07/17   0732   02/21/17   1624  02/17/17   0737  11/05/16   0759   WBC  5.1  5.8  5.5   < >  5.4  4.3  4.7   ANEU  2.8  2.8  2.7   < >  2.9  2.4  2.6   HGB   --    --    --    --   13.3  12.1  13.1   PLT   --    --    --    --   304  289  333    < > = values in this interval not displayed.     Recent Labs   Lab Test  03/01/17   1430  11/09/16   1103  03/11/16   1548   SG  1.024  1.020  1.025     No lab results found.    Valproate Labs:  No lab results found.  Recent Labs   Lab Test  02/28/17   1542  11/05/16   0759  11/03/16   1459   AST  22  22  25   ALT  30  25  25   ALKPHOS  56  60  56   BILITOTAL  0.2  0.4  0.5   PROTTOTAL  7.4  8.2  8.0   ALBUMIN  3.3*  3.4  3.4     Recent Labs   Lab Test  03/28/17   0737  03/21/17   1917  03/07/17   0732   02/21/17   1624  02/17/17   0737  11/05/16   0759   WBC  5.1  5.8  5.5   < >  5.4  4.3   4.7   ANEU  2.8  2.8  2.7   < >  2.9  2.4  2.6   HGB   --    --    --    --   13.3  12.1  13.1   PLT   --    --    --    --   304  289  333    < > = values in this interval not displayed.

## 2017-03-29 PROCEDURE — 25900017 H RX MED GY IP 259 OP 259 PS 637: Performed by: STUDENT IN AN ORGANIZED HEALTH CARE EDUCATION/TRAINING PROGRAM

## 2017-03-29 PROCEDURE — 25000132 ZZH RX MED GY IP 250 OP 250 PS 637: Performed by: STUDENT IN AN ORGANIZED HEALTH CARE EDUCATION/TRAINING PROGRAM

## 2017-03-29 PROCEDURE — 99232 SBSQ HOSP IP/OBS MODERATE 35: CPT | Mod: GC | Performed by: PSYCHIATRY & NEUROLOGY

## 2017-03-29 PROCEDURE — S0136 CLOZAPINE, 25 MG: HCPCS | Performed by: STUDENT IN AN ORGANIZED HEALTH CARE EDUCATION/TRAINING PROGRAM

## 2017-03-29 PROCEDURE — 12400001 ZZH R&B MH UMMC

## 2017-03-29 PROCEDURE — 90853 GROUP PSYCHOTHERAPY: CPT

## 2017-03-29 RX ORDER — CLOZAPINE 25 MG/1
50 TABLET ORAL AT BEDTIME
Status: DISCONTINUED | OUTPATIENT
Start: 2017-03-29 | End: 2017-04-12

## 2017-03-29 RX ORDER — NAPROXEN 500 MG/1
500 TABLET ORAL EVERY 12 HOURS PRN
Status: DISCONTINUED | OUTPATIENT
Start: 2017-03-29 | End: 2017-04-14 | Stop reason: HOSPADM

## 2017-03-29 RX ADMIN — CLOZAPINE 50 MG: 25 TABLET ORAL at 19:57

## 2017-03-29 RX ADMIN — IBUPROFEN 600 MG: 600 TABLET ORAL at 08:39

## 2017-03-29 RX ADMIN — PRENATAL VIT W/ FE FUMARATE-FA TAB 27-0.8 MG 1 TABLET: 27-0.8 TAB at 08:40

## 2017-03-29 RX ADMIN — Medication 1 MG: at 19:57

## 2017-03-29 RX ADMIN — VITAMIN D, TAB 1000IU (100/BT) 2000 UNITS: 25 TAB at 08:39

## 2017-03-29 RX ADMIN — FLUDROCORTISONE ACETATE 100 MCG: 0.1 TABLET ORAL at 08:40

## 2017-03-29 ASSESSMENT — ACTIVITIES OF DAILY LIVING (ADL)
LAUNDRY: UNABLE TO COMPLETE
GROOMING: INDEPENDENT
GROOMING: INDEPENDENT
LAUNDRY: WITH SUPERVISION
ORAL_HYGIENE: INDEPENDENT
DRESS: STREET CLOTHES
DRESS: STREET CLOTHES
ORAL_HYGIENE: INDEPENDENT

## 2017-03-29 NOTE — PROGRESS NOTES
03/29/17 1258   Behavioral Health   Hallucinations denies / not responding to hallucinations   Thinking intact   Orientation person: oriented;place: oriented;date: oriented   Memory baseline memory   Insight other (see comment)  (fair)   Judgement intact   Eye Contact at examiner   Affect full range affect   Mood mood is calm   Physical Appearance/Attire attire appropriate to age and situation   Hygiene well groomed   Suicidality other (see comments)  (denies)   Self Injury other (see comment)  (denies)   Activity other (see comment)  (Present in the milieu, social and engaged with others.)   Speech clear;coherent   Medication Sensitivity no stated side effects   Psychomotor / Gait balanced;steady   Coping/Psychosocial   Verbalized Emotional State acceptance;hopefulness   Psycho Education   Type of Intervention 1:1 intervention   Response participates, initiates socially appropriate   Hours 0.5   Treatment Detail check-in   Activities of Daily Living   Hygiene/Grooming independent   Oral Hygiene independent   Dress street clothes   Laundry unable to complete   Room Organization independent   Behavioral Health Interventions   Psychotic Symptoms maintain safety precautions;maintain safe secure environment;provide emotional support;establish therapeutic relationship;assist with developing & utilizing healthy coping strategies;build upon strengths;assess patient response to medication;assess medication adherance;monitor need for prn medication;monitor confusion, memory loss, decision making ability and reorient / intervent as needed   Social and Therapeutic Interventions (Psychotic Symptoms) encourage socialization with peers;encourage participation in therapeutic groups and milieu activities       Pt. Was visible in the milieu, pleasant and appropriate. She denies AH/VH, as well as SI/SIB. Pt. Had a meeting with her  today to complete paperwork, which she stated was helpful. She reported that she may  discharge to home on Monday and is looking forward to this.

## 2017-03-29 NOTE — PROGRESS NOTES
Patient was visible in milieu and hallway, pacing, mostly quiet. Pt is social with roommate and other male peer and they were all speaking [place] in the lounge. Affect was blunted, flat. Mood was depressed. Pt showered and ate dinner. No SI, hallucinations, medication side effects noted.     03/28/17 2010   Behavioral Health   Hallucinations denies / not responding to hallucinations   Thinking distractable;poor concentration   Orientation person: oriented;place: oriented   Memory baseline memory   Insight poor   Judgement impaired   Eye Contact at examiner   Affect blunted, flat   Mood depressed   Physical Appearance/Attire attire appropriate to age and situation   Hygiene well groomed   Suicidality (denies)   Self Injury (denies)   Activity withdrawn;restless   Speech clear;coherent   Medication Sensitivity no observed side effects   Psychomotor / Gait balanced;steady   Activities of Daily Living   Hygiene/Grooming independent   Oral Hygiene independent   Dress independent   Room Organization independent   Activity   Activity Level of Assistance independent

## 2017-03-29 NOTE — PLAN OF CARE
Problem: Psychotic Symptoms  Goal: Social and Therapeutic (Psychotic Symptoms)  Signs and symptoms of listed problems will be absent or manageable.         Pt.attended partial session of OT group today.  Pt. Actively participated in goal directed task session. Social with peers.  Organized in structured task.  Talked and laughed about her life and her many children.  Pt.was cooperative and pleasant throughout session.

## 2017-03-29 NOTE — PROGRESS NOTES
"    ----------------------------------------------------------------------------------------------------------  [facility], [place]   Psychiatric Progress Note     Assessment    Presentation: Nona Finley is a 36 year old female with a significant past psychiatric history of schizoaffective disorder, peripartum onset who was brought in by ambulance due to suicidal ideation in the setting of worsening auditory and visual hallucinations.  Pt recently noticed an odd scent in her medications and food, was concerned for poison, stopped ingesting them, then began having AH/VH telling her to kill herself or they will stab her.  Pt endorses SI and plan, and paranoia, resulting in refusal to (willingly) take any medications to control her psychotic symptoms. Admission warranted to maintain safety and identify medication plan to manage her psychotic symptoms.    Diagnostic Impression: Pt with Hx german-partum depression and psychosis, prior hospitalizations for SI/HI.  Pt is relatively isolated at home, cannot tolerate the noise level and chaos of her 6 children.  Pt discontinued her meds and food due to paranoid belief that these are poisoned with marijuana, bleach or other chemicals;  AH/VH then erupted soon after discontinuation.  Pt's psychotic Sx and depression are consistent with her historic Dx of Schizoaffective d/o.    Hospital course: Nona Finley was admitted to station 22 as a voluntary patient.  Pt's PTA meds were restarted, but pt has consistently refused all her meds, with paranoid fear that they are \"poison.\"  3/22 Pt stated that she will not take meds or ECT unless she is forced.  3/23 pt revealed she wants to leave , is willing to immediately take CLZ.  3/24 AH voices stopped last night, haven't returned, pt improving.  Increased clozapine to 25mg QHS, and started melatonin 1mg qhs for sleep.  Filed [place] paperwork for commitment and Reyes.  3/27 intermittent CAH, " no VH, still paranoia of poisoned meds.  3/29 petition for commitment being approved for a hearing.    Medical course 3/28 Florinef 0.1 mg qd started for continuing Sx of orthostatic hypotension / tachycardia that began on 3/27, thought d/t increase in clozaril, similar occurrence on last hospitalization when clozaril was initially started / titrated.   3/29 pt responding well, no further orthostasis.    Plan     Principal Diagnosis: Schizoaffective disorder    Medications:   - paliperidone 234 mg IM q28 days (prior dose on 3/6/17, next dose 4/3/17)  - Clozapine 50 mg PO qHS  (Dose interruption >2d, so re-started at 12.5mg qhs; 3/24 increased to 25mg; 3/29 increased to 50mg)  - olanzapine 10 mg PO/IM PRN, for agitation  - benadryl 25-50 mg PO q4h prn, for EPS  - hydroxyzine 25-50 mg PO q4h prn, for anxiety  -fludrocortisone 0.1 mg PO qd, for orthostatic hypotension/tachycardia d/t Clozapine (3/28 started)     Laboratory/Imaging: utox/hcg negative; 3/21 WBC 5.8 ANC 2.8 (re-measure each Tuesday); 3/28 WBC 5.1 ANC 2.8  3/28 fasting lipids wnl.  3/29 Recommend full orthostatic vitals (including prone measurements)    Consults: None     Patient will be treated in therapeutic milieu with appropriate individual and group therapies as described.    Secondary psychiatric diagnoses of concern this admission: None    Medical diagnoses to be addressed this admission:  Insomnia    Medications:     Melatonin 1 mg PO QHS (take 2-3h before desired sleep time) (started 3/24)    Depo-provera (last dispensed 3/7/17), not needed at this time    Cholecalciferol 2000u, 1 tab PO daily    Prenatal MV + iron, 1 tab PO daily    Tylenol 650mg tab PO q4h prn for mild pain        Naproxen tab 500 mg q12h prn for moderate pain     Relevant psychosocial stressors: Severe including motherhood, chronic mental illness    Legal Status: Voluntary  - 3/28 Pt awaiting commitment and Reyes (filed paperwork 3/24, pt seen by  3/27, 3/29  petition for commitment being approved for a hearing), due to pt's likely non-adherence to medication, and unintentional risk of harm to her 6 children due to decompensated guardian/caretaker, with past Hx of intent to kill children & self with knife.    Safety Assessment:   Checks: Status 15  Precautions: Suicide  Self-harm  Pt has not required locked seclusion or restraints in the past 24 hours to maintain safety, please refer to RN documentation for further details.     The risks, benefits, alternatives and side effects have been discussed and are understood by the patient and other caregivers.    Anticipated Disposition/Discharge Date: Unknown at this time. Pending further clinical evaluation and stabilization.  Recommending pt d/c to IRTs.  IRT Referrals placed 3/27.    Attestation:    This documentation accurately reflects the services I personally performed and treatment decisions made by me in consultation with the attending physician.    Scribed by Vira Mandujano, MS3, for Dr. Ashok Casas, Resident.    I have reviewed and edited the documentation recorded by the scribe. This documentation accurately reflects the services I personally performed and treatment decisions made by me in consultation with the attending physician.    Ashok Casas MD, PGY-1 Psychiatry resident  Pager [phone]    Psychiatry Attending Attestation:  This patient has been seen and evaluated by me, Danish Levi M.D.  The patient's condition and treatment plan were discussed with the resident, and care coordinated with the CTC and RN. I reviewed, edited and agree with the findings and plan in this note.     Danish Levi M.D.   of Psychiatry   Interim History:   The patient's care was discussed with the treatment team and chart notes were reviewed.  Sleep: 6.75h  PRN:  Ibuprofen x2    Staff notes:  Pt isolative, quietly pacing flores.  C/o tightness in Left ankle/calf, resolved 100% on NSAIDs.  [facility]  "approved a hearing to [place] team's Petition for commitment / Reyes.    Pt was interviewed in her room.  Pt reports she is feeling well, adherent with medication last night.  No SI, hallucinations.  Started fludrocortisone yesterday for orthostasis/tachycardia d/t clozapine, today pt notes that those symptoms have disappeared.  She has no side effects from the fludrocortisone.  Since orthostasis/tachycardia have been ameliorated, discussed increasing clozapine to 50 mg qhs, she is agreeable. Pt is continuing to have intermittent left ankle pain that is entirely relieved by ibuprofen.  Since pt required 2 doses of ibuprofen yesterday, will switch to naproxen as it is longer-lasting.  Will consider IM consult if pain persists.     Concerning d/c plans, Pt decided today that she no longer desires placement at at IRT and now wishes to go home.  She cites the reason for this change as that \"I need to go home to my baby, she is only 6 months old.\"  This change of decision comes after talking with her  yesterday.  Pt states that she was not pressured to go home, that this is her decision.  \"I talk about divorce and leaving whenever I go off my meds.\"  She says that when she takes her medication, she is fine at home and not bothered by chaos of taking care of her 6 children. She feels safe at her home. Her mother-in-law will be helping her at home with the children and is a PCA.  She believes she will be successful at home if she adheres to her medications.  Treatment tx plans to meet with pt's  to discuss these discharge plans.     Review of systems:     The 10 point Review of Systems is negative other than noted in the HPI         Medications:     Current Facility-Administered Medications   Medication     fludrocortisone (FLORINEF) tablet 100 mcg     [START ON 4/10/2017] paliperidone (INVEGA SUSTENNA) injection SUSP 234 mg     cloZAPine (CLOZARIL) tablet 25 mg     melatonin tablet 1 mg     " "diphenhydrAMINE (BENADRYL) capsule 25-50 mg     ibuprofen (ADVIL/MOTRIN) tablet 600 mg     prenatal multivitamin  plus iron per tablet 1 tablet     cholecalciferol (vitamin D) tablet 2,000 Units     hydrOXYzine (ATARAX) tablet 25-50 mg     OLANZapine (zyPREXA) tablet 10 mg    Or     OLANZapine (zyPREXA) injection 10 mg     acetaminophen (TYLENOL) tablet 650 mg             Allergies:   No Known Allergies         Psychiatric Examination:   BP 99/63  Pulse 98  Temp 97.9  F (36.6  C) (Tympanic)  Resp 16  Ht 1.651 m (5' 5\")  Wt 69.6 kg (153 lb 8 oz)  SpO2 99%  BMI 25.54 kg/m2  Weight is 153 lbs 8 oz  Body mass index is 25.54 kg/(m^2).    Appearance:  awake, alert, adequately groomed, cooperative, mild distress and neatly groomed  Attitude:  cooperative and guarded  Eye Contact:  fair  Mood:  sad , depressed and better  Affect:  appropriate and in normal range, mood congruent, intensity is blunted and constricted mobility  Speech:  clear, coherent  Psychomotor Behavior:  no evidence of tardive dyskinesia, dystonia, or tics and intact station, gait and muscle tone  Thought Process:  linear and goal oriented  Associations:  no loose associations  Thought Content:  auditory hallucinations present, no visual hallucinations present and patient appears to be responding to internal stimuli  Insight:  fair  Judgment:  fair  Oriented to:  time, person, and place  Attention Span and Concentration:  fair  Recent and Remote Memory:  fair  Language: Pt is fluent in conversational English  Fund of Knowledge: appropriate  Muscle Strength and Tone: normal  Gait and Station: Normal         Labs:     No results found for this or any previous visit (from the past 24 hour(s)).    Antipsychotic Labs:  Recent Labs   Lab Test  03/28/17   0737  05/09/12   0842  10/27/11   0756   CHOL  170  206*  180   TRIG  64  35  69   LDL  98  139*  105   HDL  59  60  61     Recent Labs   Lab Test  02/28/17   1542  02/17/17   0737  11/05/16   0759   " 11/05/15   1503   GLC  122*  83  85   < >   --    A1C   --    --    --    --   5.4    < > = values in this interval not displayed.     Recent Labs   Lab Test  03/28/17   0737  03/21/17 1917 03/07/17   0732   02/21/17   1624  02/17/17   0737  11/05/16   0759   WBC  5.1  5.8  5.5   < >  5.4  4.3  4.7   ANEU  2.8  2.8  2.7   < >  2.9  2.4  2.6   HGB   --    --    --    --   13.3  12.1  13.1   PLT   --    --    --    --   304  289  333    < > = values in this interval not displayed.       [place] Labs:  Recent Labs   Lab Test  09/13/12   0903  06/01/12   0932  05/13/12   0759   LITHIUM  <0.2*  0.9  0.8     Recent Labs   Lab Test  02/28/17   1542  02/17/17   0737  11/05/16   0759   CR  0.62  0.54  0.49*   GFRESTIMATED  >90  Non  GFR Calc    >90  Non  GFR Calc    >90  Non  GFR Calc     BUN  18  21  9   NA  142  140  141   POTASSIUM  3.8  4.1  4.0   BRINANA  8.7  8.9  9.3   GLC  122*  83  85     Recent Labs   Lab Test  11/05/15   1503   A1C  5.4     Recent Labs   Lab Test  11/05/16   0759  11/03/16   1459  11/05/15   1627  09/13/12   0903   TSH  0.61  0.37*  0.66  0.79   T4   --   1.43   --    --      Recent Labs   Lab Test  03/28/17   0737  03/21/17 1917 03/07/17   0732   02/21/17   1624  02/17/17   0737  11/05/16   0759   WBC  5.1  5.8  5.5   < >  5.4  4.3  4.7   ANEU  2.8  2.8  2.7   < >  2.9  2.4  2.6   HGB   --    --    --    --   13.3  12.1  13.1   PLT   --    --    --    --   304  289  333    < > = values in this interval not displayed.     Recent Labs   Lab Test  03/01/17   1430  11/09/16   1103  03/11/16   1548   SG  1.024  1.020  1.025     No lab results found.    Valproate Labs:  No lab results found.  Recent Labs   Lab Test  02/28/17   1542  11/05/16   0759  11/03/16   1459   AST  22  22  25   ALT  30  25  25   ALKPHOS  56  60  56   BILITOTAL  0.2  0.4  0.5   PROTTOTAL  7.4  8.2  8.0   ALBUMIN  3.3*  3.4  3.4     Recent Labs   Lab Test  03/28/17   0737   03/21/17   1917  03/07/17   0732   02/21/17   1624  02/17/17   0737  11/05/16   0759   WBC  5.1  5.8  5.5   < >  5.4  4.3  4.7   ANEU  2.8  2.8  2.7   < >  2.9  2.4  2.6   HGB   --    --    --    --   13.3  12.1  13.1   PLT   --    --    --    --   304  289  333    < > = values in this interval not displayed.

## 2017-03-30 PROCEDURE — 12400001 ZZH R&B MH UMMC

## 2017-03-30 PROCEDURE — 25000132 ZZH RX MED GY IP 250 OP 250 PS 637: Performed by: STUDENT IN AN ORGANIZED HEALTH CARE EDUCATION/TRAINING PROGRAM

## 2017-03-30 PROCEDURE — 99232 SBSQ HOSP IP/OBS MODERATE 35: CPT | Mod: GC | Performed by: PSYCHIATRY & NEUROLOGY

## 2017-03-30 RX ORDER — FLUDROCORTISONE ACETATE 0.1 MG/1
100 TABLET ORAL AT BEDTIME
Status: DISCONTINUED | OUTPATIENT
Start: 2017-03-31 | End: 2017-04-04

## 2017-03-30 RX ADMIN — VITAMIN D, TAB 1000IU (100/BT) 2000 UNITS: 25 TAB at 09:30

## 2017-03-30 RX ADMIN — FLUDROCORTISONE ACETATE 100 MCG: 0.1 TABLET ORAL at 09:30

## 2017-03-30 RX ADMIN — PRENATAL VIT W/ FE FUMARATE-FA TAB 27-0.8 MG 1 TABLET: 27-0.8 TAB at 09:30

## 2017-03-30 ASSESSMENT — ACTIVITIES OF DAILY LIVING (ADL): GROOMING: INDEPENDENT

## 2017-03-30 NOTE — PLAN OF CARE
"Problem: Psychotic Symptoms  Goal: Psychotic Symptoms  Signs and symptoms of listed problems will be absent or manageable.  -pt will verbalize coping skills to manage hallucinations  -pt will participate in groups demonstrating adequate attention span  -pt will identify when or if they are having any medication side effects  -pt will be able to have reality based conversations   Outcome: Improving  Pt presents with flat, blunted affect. She endorses no sx of psychosis. She denies SI, SIB. She states that her mood was \"good\". She complains of sedation and lightheadedness. RN encouraged her to drink fluids and try to be out in the milieu during the day for good sleep hygiene. She states she is excited to DC soon and be with her family.      "

## 2017-03-30 NOTE — PROGRESS NOTES
"    ----------------------------------------------------------------------------------------------------------  [facility], [place]   Psychiatric Progress Note     Assessment    Presentation: Nona Finley is a 36 year old female with a significant past psychiatric history of schizoaffective disorder, peripartum onset who was brought in by ambulance due to suicidal ideation in the setting of worsening auditory and visual hallucinations.  Pt recently noticed an odd scent in her medications and food, was concerned for poison, stopped ingesting them, then began having AH/VH telling her to kill herself or they will stab her.  Pt endorses SI and plan, and paranoia, resulting in refusal to (willingly) take any medications to control her psychotic symptoms. Admission warranted to maintain safety and identify medication plan to manage her psychotic symptoms.    Diagnostic Impression: Pt with Hx german-partum depression and psychosis, prior hospitalizations for SI/HI.  Pt is relatively isolated at home, cannot tolerate the noise level and chaos of her 6 children.  Pt discontinued her meds and food due to paranoid belief that these are poisoned with marijuana, bleach or other chemicals;  AH/VH then erupted soon after discontinuation.  Pt's psychotic Sx and depression are consistent with her historic Dx of Schizoaffective d/o.    Hospital course: Nona Finley was admitted to station 22 as a voluntary patient.  Pt's PTA meds were restarted, but pt has consistently refused all her meds, with paranoid fear that they are \"poison.\"  3/22 Pt stated that she will not take meds or ECT unless she is forced.  3/23 pt revealed she wants to leave , is willing to immediately take CLZ.  3/24 AH voices stopped last night, haven't returned, pt improving.  Increased clozapine to 25mg QHS, and started melatonin 1mg qhs for sleep.  Filed [place] paperwork for commitment and Reyes.  3/27 intermittent CAH, " no VH, still paranoia of poisoned meds.  3/29 petition for commitment being approved for a hearing.  Pt requests d/c home and continue with day Tx.    Medical course 3/28 Florinef 0.1 mg qd started for continuing Sx of orthostatic hypotension / tachycardia that began on 3/27, thought d/t increase in clozaril, similar occurrence on last hospitalization when clozaril was initially started / titrated.   3/29 pt responding well, no further orthostasis.  3/30 moved Florinef to [facility] for nighttime protection.    Plan     Principal Diagnosis: Schizoaffective disorder    Medications:   - paliperidone 234 mg IM q28 days (prior dose on 3/6/17, next dose 4/3/17)  - Clozapine 50 mg PO qHS  (Dose interruption >2d, so re-started at 12.5mg qhs; 3/24 increased to 25mg; 3/29 increased to 50mg)  - olanzapine 10 mg PO/IM PRN, for agitation  - benadryl 25-50 mg PO q4h prn, for EPS  - hydroxyzine 25-50 mg PO q4h prn, for anxiety  -fludrocortisone 0.1 mg PO qd, for orthostatic hypotension/tachycardia d/t Clozapine (3/28 started)     Laboratory/Imaging: utox/hcg negative; 3/21 WBC 5.8 ANC 2.8 (re-measure each Tuesday); 3/28 WBC 5.1 ANC 2.8  3/28 fasting lipids wnl.  3/29 Recommend full orthostatic vitals (including prone measurements)    Consults: None     Patient will be treated in therapeutic milieu with appropriate individual and group therapies as described.    Secondary psychiatric diagnoses of concern this admission: None    Medical diagnoses to be addressed this admission:  Insomnia    Medications:     Melatonin 1 mg PO QHS (take 2-3h before desired sleep time) (started 3/24)    Depo-provera (last dispensed 3/7/17), not needed at this time    Cholecalciferol 2000u, 1 tab PO daily    Prenatal MV + iron, 1 tab PO daily    Tylenol 650mg tab PO q4h prn for mild pain        Naproxen tab 500 mg q12h prn for moderate pain    Florinef 0.1 mg PO qhs (3/28 started, 3/30 moved to [facility])     Relevant psychosocial stressors: Severe including  motherhood, chronic mental illness    Legal Status: Voluntary  - 3/28 Pt awaiting commitment and Reyes (filed paperwork 3/24, pt seen by  3/27, 3/29 petition for commitment being approved for a hearing), due to pt's likely non-adherence to medication, and unintentional risk of harm to her 6 children due to decompensated guardian/caretaker, with past Hx of intent to kill children & self with knife.  Will likely forego Reyes as pt has improved, is med compliant, has supportive family, willing to attend day Tx.    Safety Assessment:   Checks: Status 15  Precautions: Suicide  Self-harm  Pt has not required locked seclusion or restraints in the past 24 hours to maintain safety, please refer to RN documentation for further details.     The risks, benefits, alternatives and side effects have been discussed and are understood by the patient and other caregivers.    Anticipated Disposition/Discharge Date: Not yet determined, possibly in new few weekdays.  Pt likely to go home, continue meds, attend day Tx.    Attestation:    This documentation accurately reflects the services I personally performed and treatment decisions made by me in consultation with the attending physician.    Ashok Casas MD, PGY-1 Psychiatry resident  Pager [phone]    Psychiatry Attending Attestation:  This patient has been seen and evaluated by me, Danish Levi M.D.  The patient's condition and treatment plan were discussed with the resident, and care coordinated with the CTC and RN. I reviewed, edited and agree with the findings and plan in this note.     Danish Levi M.D.   of Psychiatry     Interim History:   The patient's care was discussed with the treatment team and chart notes were reviewed.  Sleep: 7h  PRN:  Ibuprofen x1    Staff notes:  Pt denies hallucinations, SI, or SIB.  Pt completed insurance paperwork with CM.  Pt attended some groups.    Pt was interviewed in the conference room.  Left  "ankle/leg still hurts at times, pain completely resolves with ibuprofen.  Pt endorses some increased tiredness, agrees that it's likely d/t the increased clozaril dose.  Pt reports that her orthostasis is OK since she started taking Florinef.  Pt willing to switch dosing to qhs to get the BP support she needs for safety throughout the night.    Pt asks about d/c plans.  Her  can tell when she's taking meds, and reports that she's on her meds and doing much better.  's mom is a PCA and lives at the home, helps with childcare, cooking, cleaning, so pt won't be overwhelmed with house and child-rearing duties.  Team is pleased with pt's progress and willingness to take medications, may decide to rescind petitions, and discharge pt soon to home with thorough follow-up.    Review of systems:     The 10 point Review of Systems is negative other than noted in the HPI         Medications:     Current Facility-Administered Medications   Medication     cloZAPine (CLOZARIL) tablet 50 mg     naproxen (NAPROSYN) tablet 500 mg     fludrocortisone (FLORINEF) tablet 100 mcg     [START ON 4/10/2017] paliperidone (INVEGA SUSTENNA) injection SUSP 234 mg     melatonin tablet 1 mg     diphenhydrAMINE (BENADRYL) capsule 25-50 mg     prenatal multivitamin  plus iron per tablet 1 tablet     cholecalciferol (vitamin D) tablet 2,000 Units     hydrOXYzine (ATARAX) tablet 25-50 mg     OLANZapine (zyPREXA) tablet 10 mg    Or     OLANZapine (zyPREXA) injection 10 mg     acetaminophen (TYLENOL) tablet 650 mg             Allergies:   No Known Allergies         Psychiatric Examination:   BP 99/63  Pulse 98  Temp 98.1  F (36.7  C) (Tympanic)  Resp 16  Ht 1.651 m (5' 5\")  Wt 69.6 kg (153 lb 8 oz)  SpO2 99%  BMI 25.54 kg/m2  Weight is 153 lbs 8 oz  Body mass index is 25.54 kg/(m^2).    Appearance:  awake, alert, adequately groomed, cooperative, no apparent distress and neatly groomed  Attitude:  cooperative  Eye Contact:  good, " fair  Mood:  sad , depressed and better  Affect:  appropriate and in normal range, mood congruent, intensity is blunted and constricted mobility  Speech:  clear, coherent  Psychomotor Behavior:  no evidence of tardive dyskinesia, dystonia, or tics and intact station, gait and muscle tone  Thought Process:  linear and goal oriented  Associations:  no loose associations  Thought Content:  no auditory hallucinations present and no visual hallucinations present  Insight:  fair  Judgment:  fair  Oriented to:  time, person, and place  Attention Span and Concentration:  fair  Recent and Remote Memory:  fair  Language: Pt is fluent in conversational English  Fund of Knowledge: appropriate  Muscle Strength and Tone: normal  Gait and Station: Normal         Labs:     ANTIPSYCHOTIC LABS  [glu, A1C, lipids (focus LDL), liver enzymes, WBC, ANEU, Hgb, plts]  q12mo   Recent Labs   Lab Test  03/31/17   1119  02/28/17   1542   11/05/15   1503   GLC  55*  122*   < >   --    A1C   --    --    --   5.4    < > = values in this interval not displayed.     Recent Labs   Lab Test  03/28/17   0737  05/09/12   0842   CHOL  170  206*   TRIG  64  35   LDL  98  139*   HDL  59  60     Recent Labs   Lab Test  02/28/17   1542  11/05/16   0759   AST  22  22   ALT  30  25   ALKPHOS  56  60     CLOZAPINE LABS  No lab results found.  combined therapeutic range: 450-500 ng/mL;  caution adverse effects 800-1200 ng/mL; critical value  >1800ng/mL  Recent Labs   Lab Test  03/28/17   0737  03/21/17   1917  03/07/17   0732   02/21/17   1624  02/17/17   0737  11/05/16   0759   WBC  5.1  5.8  5.5   < >  5.4  4.3  4.7   ANEU  2.8  2.8  2.7   < >  2.9  2.4  2.6   HGB   --    --    --    --   13.3  12.1  13.1   PLT   --    --    --    --   304  289  333    < > = values in this interval not displayed.

## 2017-03-30 NOTE — PLAN OF CARE
Problem: General Plan of Care (Inpatient Behavioral)  Goal: Team Discussion  Team Plan:   BEHAVIORAL TEAM DISCUSSION     Continued Stay Criteria/Rationale: Court process for Reyes order and continued need for medication adjustment for symptom improvement   Plan: Dose of clozapine will be increased as patient has less complaints of dizziness and a therapeutic dose is necessary to help symptoms to improve.   Participants: Ewa Sandy [facility],   Chanelle Key RN,  Dr. Danish Levi MD,  Dr. Ashok Casas MD,  Summary/Recommendation: Patient is agreeable with medication planning and is focused on discharge to return home. Patient is no longer seeking IRT placement and believes she could do well at home with her family.   Progress: Improving

## 2017-03-31 LAB
ANION GAP SERPL CALCULATED.3IONS-SCNC: 8 MMOL/L (ref 3–14)
BUN SERPL-MCNC: 19 MG/DL (ref 7–30)
CALCIUM SERPL-MCNC: 8.8 MG/DL (ref 8.5–10.1)
CHLORIDE SERPL-SCNC: 104 MMOL/L (ref 94–109)
CO2 SERPL-SCNC: 27 MMOL/L (ref 20–32)
CREAT SERPL-MCNC: 0.5 MG/DL (ref 0.52–1.04)
GFR SERPL CREATININE-BSD FRML MDRD: ABNORMAL ML/MIN/1.7M2
GLUCOSE SERPL-MCNC: 55 MG/DL (ref 70–99)
POTASSIUM SERPL-SCNC: 3.9 MMOL/L (ref 3.4–5.3)
SODIUM SERPL-SCNC: 139 MMOL/L (ref 133–144)

## 2017-03-31 PROCEDURE — 80048 BASIC METABOLIC PNL TOTAL CA: CPT | Performed by: PSYCHIATRY & NEUROLOGY

## 2017-03-31 PROCEDURE — 99232 SBSQ HOSP IP/OBS MODERATE 35: CPT | Mod: GC | Performed by: PSYCHIATRY & NEUROLOGY

## 2017-03-31 PROCEDURE — 36415 COLL VENOUS BLD VENIPUNCTURE: CPT | Performed by: PSYCHIATRY & NEUROLOGY

## 2017-03-31 PROCEDURE — 90853 GROUP PSYCHOTHERAPY: CPT

## 2017-03-31 PROCEDURE — 25900017 H RX MED GY IP 259 OP 259 PS 637: Performed by: STUDENT IN AN ORGANIZED HEALTH CARE EDUCATION/TRAINING PROGRAM

## 2017-03-31 PROCEDURE — 12400001 ZZH R&B MH UMMC

## 2017-03-31 PROCEDURE — S0136 CLOZAPINE, 25 MG: HCPCS | Performed by: STUDENT IN AN ORGANIZED HEALTH CARE EDUCATION/TRAINING PROGRAM

## 2017-03-31 PROCEDURE — 25000132 ZZH RX MED GY IP 250 OP 250 PS 637: Performed by: STUDENT IN AN ORGANIZED HEALTH CARE EDUCATION/TRAINING PROGRAM

## 2017-03-31 RX ADMIN — FLUDROCORTISONE ACETATE 100 MCG: 0.1 TABLET ORAL at 19:58

## 2017-03-31 RX ADMIN — CLOZAPINE 50 MG: 25 TABLET ORAL at 19:55

## 2017-03-31 RX ADMIN — Medication 1 MG: at 19:55

## 2017-03-31 RX ADMIN — PRENATAL VIT W/ FE FUMARATE-FA TAB 27-0.8 MG 1 TABLET: 27-0.8 TAB at 19:58

## 2017-03-31 ASSESSMENT — ACTIVITIES OF DAILY LIVING (ADL)
ORAL_HYGIENE: INDEPENDENT
DRESS: STREET CLOTHES
HYGIENE/GROOMING: INDEPENDENT
ORAL_HYGIENE: INDEPENDENT
GROOMING: INDEPENDENT
DRESS: STREET CLOTHES;INDEPENDENT

## 2017-03-31 NOTE — PROGRESS NOTES
03/30/17 2039   Behavioral Health   Hallucinations denies / not responding to hallucinations   Thinking distractable   Insight insight appropriate to situation   Judgement intact   Eye Contact at examiner   Affect blunted, flat   Mood mood is calm   Physical Appearance/Attire attire appropriate to age and situation   Hygiene well groomed   Activity withdrawn   Speech coherent   Psycho Education   Type of Intervention 1:1 intervention   Response participates with cues/redirection   Activities of Daily Living   Hygiene/Grooming independent   Pt affect is blunted, she is visible in milieu watching tv, or lying in bed. Pt is pleasant and cooperative on approach. Pt states no further concerns.

## 2017-03-31 NOTE — PROGRESS NOTES
Behavioral Health  Note   Behavioral Health  Spirituality Group Note     Unit 22    Name: Nona Finley    YOB: 1980   MRN: [id number]    Age: 36 year old     Patient attended -led group, which included discussion of spirituality, coping with illness and building resilience.   Patient attended group for 0.5 hrs.   patient minimally participated, but was respectful to the group process.     Yasmin [facility]  Staff    Page [phone]

## 2017-03-31 NOTE — PROGRESS NOTES
"    ----------------------------------------------------------------------------------------------------------  [facility], [place]   Psychiatric Progress Note     Assessment    Presentation: Nona Finley is a 36 year old female w/h/o schizoaffective disorder depressive type with recent hospitalization in Feb. who presented 3/21/17 by ambulance with SI in the setting of worsening AH and paranoia.    Diagnostic Impression: Pt's presentation consistent with exacerbation of schizoaffective disorder partly due to poor medication adherence.  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  Concerns for discharge include SI and medication non-adherence. If patient continues to decline clozapine tonight 3/31, will need to reduce dose to 25 mg.    Hospital course: Nona Finley was admitted to station 22 as a voluntary patient.  Patient initially refused  medication, prompting filing paperwork for commitment and Reyes to [facility]. Subsequently she agreed to restart clozapine, but developed tachycardia and orthostatic hypotension with 25 mg dose, so fludrocortisone was started at 0.1 mg QD, which seemed to resolve her adverse reaction, but after two days, she again refused all medications, believling that they were poison, and said she could not eat or drink because water smelled \"like bleach.\"  Patient has since had inconsistent adherence to medication and vacillating symptom occurrence.  Currently awaiting ruling on Commitment and Reyes.    Medical course: Fludrocortisone was added to patient's medical regimen for orthostasis 2/2 Clozapine.    Plan     Principal Diagnosis: Schizoaffective disorder, depressive type    Medications:   - paliperidone 234 mg IM q28 days (next dose 4/3/17)  - Clozapine 50 mg PO qHS   - olanzapine 10 mg PO/IM PRN  - benadryl 25-50 mg PO q4h prn  - hydroxyzine 25-50 mg " PO q4h prn  -fludrocortisone 0.1 mg PO qd  -melatonin 1 mg PO qhs  -Naproxen 500 mg PO PRN     Laboratory/Imaging:   -CBC q1 wk     Consults: None     Patient will be treated in therapeutic milieu with appropriate individual and group therapies as described.    Secondary psychiatric diagnoses of concern this admission: None    Medical diagnoses to be addressed this admission:  Insomnia    Relevant psychosocial stressors: Severe including motherhood, chronic mental illness    Legal Status: Voluntary  - Awaiting commitment and Reyes     Safety Assessment:   Checks: Status 15  Precautions: Suicide  Self-harm  Pt has not required locked seclusion or restraints in the past 24 hours to maintain safety, please refer to RN documentation for further details.     The risks, benefits, alternatives and side effects have been discussed and are understood by the patient and other caregivers.    Anticipated Disposition/Discharge Date: Not yet determined, continues to need medical stabilization as pt is now non-compliant with meds.      Attestation:    This documentation accurately reflects the services I personally performed and treatment decisions made by me in consultation with the attending physician.    Vira Mandujano, MS3, Scribed for Dr. Farzana Santoyo, Resident MD    I have reviewed and edited the documentation recorded by the scribe. This documentation accurately reflects the services I personally performed and treatment decisions made by me in consultation with the attending physician.     Farzana Santoyo MD  Psychiatry Resident, PGY-4    Psychiatry Attending Attestation:  This patient has been seen and evaluated by me, Danish Levi M.D.  The patient's condition and treatment plan were discussed with the resident, and care coordinated with the CTC and RN. I reviewed, edited and agree with the findings and plan in this note.     Danish Levi M.D.   of Psychiatry     Interim History:   The patient's  "care was discussed with the treatment team and chart notes were reviewed.  Sleep: 6.75h  PRN:  None    Staff notes:  Pt declined all medications last night.      Pt was interviewed in the conference room.  Pt reports CAH last night, ordering her not to take medications because they are poisoned. States she refused all evening medications.   Pt had disturbed sleep overnight, waking up approximately q1hr to AH.   AH are scary and distressing to her, but she believes their content. Offered pt injectable medication, pt declined.  Pt has not been drinking water because  \"water smell like bleach and blood when I do not take my medication.\"  Pt agreed to have labs drawn for BMP; encouraged pt to increase fluid intake.    Review of systems:     The 10 point Review of Systems is negative other than noted in the HPI         Medications:     Current Facility-Administered Medications   Medication     fludrocortisone (FLORINEF) tablet 100 mcg     cloZAPine (CLOZARIL) tablet 50 mg     naproxen (NAPROSYN) tablet 500 mg     [START ON 4/10/2017] paliperidone (INVEGA SUSTENNA) injection SUSP 234 mg     melatonin tablet 1 mg     diphenhydrAMINE (BENADRYL) capsule 25-50 mg     prenatal multivitamin  plus iron per tablet 1 tablet     cholecalciferol (vitamin D) tablet 2,000 Units     hydrOXYzine (ATARAX) tablet 25-50 mg     OLANZapine (zyPREXA) tablet 10 mg    Or     OLANZapine (zyPREXA) injection 10 mg     acetaminophen (TYLENOL) tablet 650 mg             Allergies:   No Known Allergies         Psychiatric Examination:   BP 99/63  Pulse 98  Temp 99.3  F (37.4  C) (Oral)  Resp 16  Ht 1.651 m (5' 5\")  Wt 70.8 kg (156 lb)  SpO2 99%  BMI 25.96 kg/m2  Weight is 156 lbs 0 oz  Body mass index is 25.96 kg/(m^2).    Appearance:  awake, alert, adequately groomed, cooperative, no apparent distress and neatly groomed  Attitude:  cooperative  Eye Contact:  good, fair  Mood:  sad  and depressed  Affect:  mood congruent, intensity is blunted, " constricted mobility and restricted range  Speech:  clear, coherent  Psychomotor Behavior:  no evidence of tardive dyskinesia, dystonia, or tics and intact station, gait and muscle tone  Thought Process:  linear  Associations:  no loose associations  Thought Content:  auditory hallucinations present  Insight:  fair  Judgment:  fair  Oriented to:  time, person, and place  Attention Span and Concentration:  fair  Recent and Remote Memory:  fair  Language: Pt is fluent in conversational English  Fund of Knowledge: appropriate  Muscle Strength and Tone: normal  Gait and Station: Normal         Labs:     No results found for this or any previous visit (from the past 24 hour(s)).    Antipsychotic Labs:  Recent Labs   Lab Test  03/28/17   0737  05/09/12   0842  10/27/11   0756   CHOL  170  206*  180   TRIG  64  35  69   LDL  98  139*  105   HDL  59  60  61     Recent Labs   Lab Test  02/28/17   1542  02/17/17   0737  11/05/16   0759   11/05/15   1503   GLC  122*  83  85   < >   --    A1C   --    --    --    --   5.4    < > = values in this interval not displayed.     Recent Labs   Lab Test  03/28/17   0737  03/21/17   1917  03/07/17   0732   02/21/17   1624  02/17/17   0737  11/05/16   0759   WBC  5.1  5.8  5.5   < >  5.4  4.3  4.7   ANEU  2.8  2.8  2.7   < >  2.9  2.4  2.6   HGB   --    --    --    --   13.3  12.1  13.1   PLT   --    --    --    --   304  289  333    < > = values in this interval not displayed.       [place] Labs:  Recent Labs   Lab Test  09/13/12   0903  06/01/12   0932  05/13/12   0759   LITHIUM  <0.2*  0.9  0.8     Recent Labs   Lab Test  02/28/17   1542  02/17/17   0737  11/05/16   0759   CR  0.62  0.54  0.49*   GFRESTIMATED  >90  Non  GFR Calc    >90  Non  GFR Calc    >90  Non  GFR Calc     BUN  18  21  9   NA  142  140  141   POTASSIUM  3.8  4.1  4.0   BRIANNA  8.7  8.9  9.3   GLC  122*  83  85     Recent Labs   Lab Test  11/05/15   1503   A1C  5.4      Recent Labs   Lab Test  11/05/16   0759  11/03/16   1459  11/05/15   1627  09/13/12   0903   TSH  0.61  0.37*  0.66  0.79   T4   --   1.43   --    --      Recent Labs   Lab Test  03/28/17   0737  03/21/17 1917 03/07/17   0732   02/21/17   1624  02/17/17   0737  11/05/16   0759   WBC  5.1  5.8  5.5   < >  5.4  4.3  4.7   ANEU  2.8  2.8  2.7   < >  2.9  2.4  2.6   HGB   --    --    --    --   13.3  12.1  13.1   PLT   --    --    --    --   304  289  333    < > = values in this interval not displayed.     Recent Labs   Lab Test  03/01/17   1430  11/09/16   1103  03/11/16   1548   SG  1.024  1.020  1.025     No lab results found.    Valproate Labs:  No lab results found.  Recent Labs   Lab Test  02/28/17   1542  11/05/16   0759  11/03/16   1459   AST  22  22  25   ALT  30  25  25   ALKPHOS  56  60  56   BILITOTAL  0.2  0.4  0.5   PROTTOTAL  7.4  8.2  8.0   ALBUMIN  3.3*  3.4  3.4     Recent Labs   Lab Test  03/28/17   0737  03/21/17 1917 03/07/17   0732   02/21/17   1624  02/17/17   0737  11/05/16   0759   WBC  5.1  5.8  5.5   < >  5.4  4.3  4.7   ANEU  2.8  2.8  2.7   < >  2.9  2.4  2.6   HGB   --    --    --    --   13.3  12.1  13.1   PLT   --    --    --    --   304  289  333    < > = values in this interval not displayed.

## 2017-03-31 NOTE — PLAN OF CARE
"Problem: Psychotic Symptoms  Goal: Psychotic Symptoms  Signs and symptoms of listed problems will be absent or manageable.  -pt will verbalize coping skills to manage hallucinations  -pt will participate in groups demonstrating adequate attention span  -pt will identify when or if they are having any medication side effects  -pt will be able to have reality based conversations   Outcome: No Change  \"I'm suffering today, I'm hearing voices and seeing things. My thoughts are racing, I'm not sleeping\". Depression and anxiety are high. Denies suicidal thoughts. Is having a hard time focusing and concentrating. Does not attend groups. Is minimally social. Refused medications this morning, \"the voices are telling me not to take them\". Does not attend groups. Ate well for lunch, said she ate what she could for breakfast. Has been drinking a lot of water.       "

## 2017-03-31 NOTE — PLAN OF CARE
"Problem: General Plan of Care (Inpatient Behavioral)  Goal: Individualization/Patient Specific Goal (IP Behavioral)  The patient and/or their representative will achieve their patient-specific goals related to the plan of care.    The patient-specific goals include: Illness Management Recovery model: Objectives    Patient will identify reason(s) for hospitalization from their perspective.  Patient will identify a minimum of three goals for discharge.  Patient will identify a minimum of three triggers that may increase their symptoms.  Patient will identify a minimum of three coping skills they can do to stay well.   Patient will identify their support system to demonstrate readiness for discharge.   Illness Management Recovery model:  Taking Action.     Patient identified the following actions they can take when early warning signs are present in order to prevent relapse:     1. \"Taking my medications\".  2.  is the most supportive person in patient's life.  3. When patient is feeling really stressed she will walk to feel better.   4.THe first person patient calls when she is feeling unstable is \"my  and brother\".           "

## 2017-03-31 NOTE — PROGRESS NOTES
"Nona went to bed without requesting her bedtime medication. This nurse approached her room and asked her to come out for medication. \"I don't want them tonight.\" This nurse expressed surprise that she was going to go home next week! Reminded her that if she does not take her medication, she will not be able to return home. She still insists she does not want the medicine, not even the melatonin. Says she is having the voices \"they say not to.\" Reminded her that when she does not take medicine, the voices will get worse. She continues to refuse.   "

## 2017-04-01 PROCEDURE — S0136 CLOZAPINE, 25 MG: HCPCS | Performed by: STUDENT IN AN ORGANIZED HEALTH CARE EDUCATION/TRAINING PROGRAM

## 2017-04-01 PROCEDURE — 12400001 ZZH R&B MH UMMC

## 2017-04-01 PROCEDURE — 25000132 ZZH RX MED GY IP 250 OP 250 PS 637: Performed by: STUDENT IN AN ORGANIZED HEALTH CARE EDUCATION/TRAINING PROGRAM

## 2017-04-01 PROCEDURE — 25900017 H RX MED GY IP 259 OP 259 PS 637: Performed by: STUDENT IN AN ORGANIZED HEALTH CARE EDUCATION/TRAINING PROGRAM

## 2017-04-01 RX ADMIN — CLOZAPINE 50 MG: 25 TABLET ORAL at 20:07

## 2017-04-01 RX ADMIN — PRENATAL VIT W/ FE FUMARATE-FA TAB 27-0.8 MG 1 TABLET: 27-0.8 TAB at 10:43

## 2017-04-01 RX ADMIN — FLUDROCORTISONE ACETATE 100 MCG: 0.1 TABLET ORAL at 20:07

## 2017-04-01 RX ADMIN — VITAMIN D, TAB 1000IU (100/BT) 2000 UNITS: 25 TAB at 10:42

## 2017-04-01 RX ADMIN — ACETAMINOPHEN 650 MG: 325 TABLET, FILM COATED ORAL at 10:42

## 2017-04-01 ASSESSMENT — ACTIVITIES OF DAILY LIVING (ADL)
GROOMING: INDEPENDENT
DRESS: STREET CLOTHES
ORAL_HYGIENE: INDEPENDENT

## 2017-04-01 NOTE — PROGRESS NOTES
Had a good evening tonight. Says she has been dealing with some auditory and some visual hallucinations. She said they should get better when she takes her medications tonight. Said she has anxiety because she is seeing and hearing hallucinations. Was visible at groups and in the milieu tonight. Mood was calm, blunted, and flat.

## 2017-04-02 PROCEDURE — 12400001 ZZH R&B MH UMMC

## 2017-04-02 PROCEDURE — 25900017 H RX MED GY IP 259 OP 259 PS 637: Performed by: STUDENT IN AN ORGANIZED HEALTH CARE EDUCATION/TRAINING PROGRAM

## 2017-04-02 PROCEDURE — S0136 CLOZAPINE, 25 MG: HCPCS | Performed by: STUDENT IN AN ORGANIZED HEALTH CARE EDUCATION/TRAINING PROGRAM

## 2017-04-02 PROCEDURE — 25000132 ZZH RX MED GY IP 250 OP 250 PS 637: Performed by: STUDENT IN AN ORGANIZED HEALTH CARE EDUCATION/TRAINING PROGRAM

## 2017-04-02 RX ADMIN — VITAMIN D, TAB 1000IU (100/BT) 2000 UNITS: 25 TAB at 12:14

## 2017-04-02 RX ADMIN — CLOZAPINE 50 MG: 25 TABLET ORAL at 20:00

## 2017-04-02 RX ADMIN — FLUDROCORTISONE ACETATE 100 MCG: 0.1 TABLET ORAL at 20:01

## 2017-04-02 RX ADMIN — PRENATAL VIT W/ FE FUMARATE-FA TAB 27-0.8 MG 1 TABLET: 27-0.8 TAB at 12:15

## 2017-04-02 ASSESSMENT — ACTIVITIES OF DAILY LIVING (ADL)
DRESS: INDEPENDENT
GROOMING: INDEPENDENT
DRESS: INDEPENDENT
LAUNDRY: WITH SUPERVISION
ORAL_HYGIENE: INDEPENDENT
LAUNDRY: WITH SUPERVISION
GROOMING: INDEPENDENT
ORAL_HYGIENE: INDEPENDENT

## 2017-04-02 NOTE — PLAN OF CARE
Problem: Psychotic Symptoms  Goal: Psychotic Symptoms  Signs and symptoms of listed problems will be absent or manageable.  -pt will verbalize coping skills to manage hallucinations  -pt will participate in groups demonstrating adequate attention span  -pt will identify when or if they are having any medication side effects  -pt will be able to have reality based conversations   Outcome: Improving  Nona slept until late AM-states feeling tired in AM due to HS Clozaril-OOB and [place]-unable to stand for orthostatic BP c/o feeling too dizzy-states no fluids since yesterday-sxs resolved with 20 oz water including resolution of orthostatic BP-expressed concerns  will send her to [facility] stating this is making her anxious-reminded tx plans are initiated by MD-states she is relieved by this as she has discussed with MD returning home-

## 2017-04-03 PROCEDURE — 25900017 H RX MED GY IP 259 OP 259 PS 637: Performed by: STUDENT IN AN ORGANIZED HEALTH CARE EDUCATION/TRAINING PROGRAM

## 2017-04-03 PROCEDURE — 25000132 ZZH RX MED GY IP 250 OP 250 PS 637: Performed by: STUDENT IN AN ORGANIZED HEALTH CARE EDUCATION/TRAINING PROGRAM

## 2017-04-03 PROCEDURE — 99232 SBSQ HOSP IP/OBS MODERATE 35: CPT | Mod: GC | Performed by: PSYCHIATRY & NEUROLOGY

## 2017-04-03 PROCEDURE — S0136 CLOZAPINE, 25 MG: HCPCS | Performed by: STUDENT IN AN ORGANIZED HEALTH CARE EDUCATION/TRAINING PROGRAM

## 2017-04-03 PROCEDURE — 12400001 ZZH R&B MH UMMC

## 2017-04-03 RX ADMIN — PRENATAL VIT W/ FE FUMARATE-FA TAB 27-0.8 MG 1 TABLET: 27-0.8 TAB at 09:15

## 2017-04-03 RX ADMIN — VITAMIN D, TAB 1000IU (100/BT) 2000 UNITS: 25 TAB at 09:15

## 2017-04-03 RX ADMIN — FLUDROCORTISONE ACETATE 100 MCG: 0.1 TABLET ORAL at 20:23

## 2017-04-03 RX ADMIN — CLOZAPINE 50 MG: 25 TABLET ORAL at 20:22

## 2017-04-03 ASSESSMENT — ACTIVITIES OF DAILY LIVING (ADL)
DRESS: STREET CLOTHES;INDEPENDENT
DRESS: STREET CLOTHES
GROOMING: INDEPENDENT
ORAL_HYGIENE: INDEPENDENT
ORAL_HYGIENE: INDEPENDENT
LAUNDRY: WITH SUPERVISION
GROOMING: HANDWASHING;SHOWER;INDEPENDENT

## 2017-04-03 NOTE — PROGRESS NOTES
"    ----------------------------------------------------------------------------------------------------------  [facility], [place]   Psychiatric Progress Note     Assessment    Presentation: Nona Finley is a 36 year old female w/h/o schizoaffective disorder depressive type with recent hospitalization in Feb. who presented 3/21/17 by ambulance with SI in the setting of worsening AH and paranoia.    Diagnostic Impression: Pt's presentation consistent with exacerbation of schizoaffective disorder partly due to poor medication adherence.  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  Concerns for discharge include SI and medication non-adherence. If patient continues to decline clozapine tonight 3/31, will need to reduce dose to 25 mg.    Hospital course: Nona Finley was admitted to station 22 as a voluntary patient.  Patient initially refused  medication, prompting filing paperwork for commitment and Reyes to [facility]. Subsequently she agreed to restart clozapine, but developed tachycardia and orthostatic hypotension with 25 mg dose, so fludrocortisone was started at 0.1 mg QD, which seemed to resolve her adverse reaction, but after two days, she again refused all medications, believing that they were poison, and said she could not eat or drink because water smelled \"like bleach.\"  Patient has had inconsistent adherence to medication and vacillating symptom occurrence.  Currently awaiting ruling on Commitment and Reyes.    Medical course: Fludrocortisone was added to patient's medical regimen for orthostasis 2/2 clozapine, plus encouraging good PO intake.    Plan     Principal Diagnosis: Schizoaffective disorder, depressive type    Medications:   - paliperidone 234 mg IM q28 days (next dose 4/3/17)  - Clozapine 50 mg PO qHS   - olanzapine 10 mg PO/IM PRN  - benadryl 25-50 mg PO q4h " prn  - hydroxyzine 25-50 mg PO q4h prn  -fludrocortisone 0.1 mg PO qd  -melatonin 1 mg PO qhs  -Naproxen 500 mg PO PRN     Laboratory/Imaging:   -WBC/diff q1 wk     Consults: None     Patient will be treated in therapeutic milieu with appropriate individual and group therapies as described.    Secondary psychiatric diagnoses of concern this admission: None    Medical diagnoses to be addressed this admission:  Insomnia    Relevant psychosocial stressors: Severe including motherhood, chronic mental illness    Legal Status: Voluntary  - Awaiting commitment and Reyes     Safety Assessment:   Checks: Status 15  Precautions: Suicide  Self-harm  Pt has not required locked seclusion or restraints in the past 24 hours to maintain safety, please refer to RN documentation for further details.     The risks, benefits, alternatives and side effects have been discussed and are understood by the patient and other caregivers.    Anticipated Disposition/Discharge Date: Not yet determined, continues to need medical stabilization as pt is now non-compliant with meds.      Attestation:    This documentation accurately reflects the services I personally performed and treatment decisions made by me in consultation with the attending physician.    Vira Mandujano, MS3, Scribed for Dr. Ashok Casas, Resident     I have reviewed and edited the documentation recorded by the scribe. This documentation accurately reflects the services I personally performed and treatment decisions made by me in consultation with the attending physician.    Ashok Casas MD, PGY-1 Psychiatry resident  Pager [phone]    Psychiatry Attending Attestation:  This patient has been seen and evaluated by me, Danish Levi M.D.  The patient's condition and treatment plan were discussed with the resident, and care coordinated with the CTC and RN. I reviewed, edited and agree with the findings and plan in this note.     Danish Levi M.D.   of  "Psychiatry     Interim History:   The patient's care was discussed with the treatment team and chart notes were reviewed.  Sleep: 7h  PRN:  Tylenol 4/1; refused scheduled melatonin    Staff notes:  Pt was compliant w/all critical medication doses over the weekend.  AH have decreased Sat-Sun.  Participating in group activities.    Pt was interviewed in the conference room.  Pt reports she was able to cope with AH on Friday night and take her medication.  AH decreased on Sat; no AH on Sun.  Davina/olf hallucinations of water/food tasting \"like bleach\" have also abated. Has been sleeping about 7 hrs per night, sleep has been restful.  Pt declined melatonin b/c \"it made me sleep too deeply, it's hard to wake up in the morning, I slept well without it.\"  Pt continues to have symptoms of orthostasis, tx team encouraged pt to increase her fluid intake to about 8-10 cups/day.  She is agreeable to trying to increase her fluid intake.   Will do strict I/Os to ensure adequate intake.      Review of systems:     The 10 point Review of Systems is negative other than noted in the HPI         Medications:     Current Facility-Administered Medications   Medication     fludrocortisone (FLORINEF) tablet 100 mcg     cloZAPine (CLOZARIL) tablet 50 mg     naproxen (NAPROSYN) tablet 500 mg     [START ON 4/10/2017] paliperidone (INVEGA SUSTENNA) injection SUSP 234 mg     melatonin tablet 1 mg     diphenhydrAMINE (BENADRYL) capsule 25-50 mg     prenatal multivitamin  plus iron per tablet 1 tablet     cholecalciferol (vitamin D) tablet 2,000 Units     hydrOXYzine (ATARAX) tablet 25-50 mg     OLANZapine (zyPREXA) tablet 10 mg    Or     OLANZapine (zyPREXA) injection 10 mg     acetaminophen (TYLENOL) tablet 650 mg             Allergies:   No Known Allergies         Psychiatric Examination:   /65  Pulse 93  Temp 96.8  F (36  C) (Tympanic)  Resp 16  Ht 1.651 m (5' 5\")  Wt 70.8 kg (156 lb)  SpO2 99%  BMI 25.96 kg/m2  Weight is 156 lbs " 0 oz  Body mass index is 25.96 kg/(m^2).    Appearance:  awake, alert, adequately groomed, cooperative, no apparent distress and neatly groomed  Attitude:  cooperative  Eye Contact:  good, fair  Mood:  sad  and depressed  Affect:  mood congruent, intensity is blunted, constricted mobility and restricted range  Speech:  clear, coherent  Psychomotor Behavior:  no evidence of tardive dyskinesia, dystonia, or tics and intact station, gait and muscle tone  Thought Process:  linear  Associations:  no loose associations  Thought Content:  no auditory hallucinations present  Insight:  fair  Judgment:  fair  Oriented to:  time, person, and place  Attention Span and Concentration:  fair  Recent and Remote Memory:  fair  Language: Pt is fluent in conversational English  Fund of Knowledge: appropriate  Muscle Strength and Tone: normal  Gait and Station: Normal         Labs:     3/31: BMP wnl, except glucose 55 (L), Cr 0.5 (L)    No results found for this or any previous visit (from the past 24 hour(s)).    Antipsychotic Labs:  Recent Labs   Lab Test  03/28/17   0737  05/09/12   0842  10/27/11   0756   CHOL  170  206*  180   TRIG  64  35  69   LDL  98  139*  105   HDL  59  60  61     Recent Labs   Lab Test  03/31/17   1119  02/28/17   1542  02/17/17   0737   11/05/15   1503   GLC  55*  122*  83   < >   --    A1C   --    --    --    --   5.4    < > = values in this interval not displayed.     Recent Labs   Lab Test  03/28/17   0737  03/21/17   1917  03/07/17   0732   02/21/17   1624  02/17/17   0737  11/05/16   0759   WBC  5.1  5.8  5.5   < >  5.4  4.3  4.7   ANEU  2.8  2.8  2.7   < >  2.9  2.4  2.6   HGB   --    --    --    --   13.3  12.1  13.1   PLT   --    --    --    --   304  289  333    < > = values in this interval not displayed.       [place] Labs:  Recent Labs   Lab Test  09/13/12   0903  06/01/12   0932  05/13/12   0759   LITHIUM  <0.2*  0.9  0.8     Recent Labs   Lab Test  03/31/17   1119  02/28/17   1542  02/17/17    0737   CR  0.50*  0.62  0.54   GFRESTIMATED  >90  Non  GFR Calc    >90  Non  GFR Calc    >90  Non  GFR Calc     BUN  19  18  21   NA  139  142  140   POTASSIUM  3.9  3.8  4.1   BRIANNA  8.8  8.7  8.9   GLC  55*  122*  83     Recent Labs   Lab Test  11/05/15   1503   A1C  5.4     Recent Labs   Lab Test  11/05/16   0759  11/03/16   1459  11/05/15   1627  09/13/12   0903   TSH  0.61  0.37*  0.66  0.79   T4   --   1.43   --    --      Recent Labs   Lab Test  03/28/17   0737  03/21/17   1917  03/07/17   0732   02/21/17   1624  02/17/17   0737  11/05/16   0759   WBC  5.1  5.8  5.5   < >  5.4  4.3  4.7   ANEU  2.8  2.8  2.7   < >  2.9  2.4  2.6   HGB   --    --    --    --   13.3  12.1  13.1   PLT   --    --    --    --   304  289  333    < > = values in this interval not displayed.     Recent Labs   Lab Test  03/01/17   1430  11/09/16   1103  03/11/16   1548   SG  1.024  1.020  1.025     No lab results found.    Valproate Labs:  No lab results found.  Recent Labs   Lab Test  02/28/17   1542  11/05/16   0759  11/03/16   1459   AST  22  22  25   ALT  30  25  25   ALKPHOS  56  60  56   BILITOTAL  0.2  0.4  0.5   PROTTOTAL  7.4  8.2  8.0   ALBUMIN  3.3*  3.4  3.4     Recent Labs   Lab Test  03/28/17   0737  03/21/17   1917  03/07/17   0732   02/21/17   1624  02/17/17   0737  11/05/16   0759   WBC  5.1  5.8  5.5   < >  5.4  4.3  4.7   ANEU  2.8  2.8  2.7   < >  2.9  2.4  2.6   HGB   --    --    --    --   13.3  12.1  13.1   PLT   --    --    --    --   304  638  333    < > = values in this interval not displayed.

## 2017-04-03 NOTE — PROGRESS NOTES
Patient was visible in milieu, selectively social but mostly with her roommate. Pt showered and used the blow dryer on her hair. Pt ate dinner. Affect is blunted, flat. Mood is calm. No SI, hallucinations, med side effects noted.      04/02/17 2037   Behavioral Health   Hallucinations denies / not responding to hallucinations   Thinking distractable;poor concentration   Orientation person: oriented;place: oriented   Memory baseline memory   Insight poor   Judgement impaired   Eye Contact at examiner   Affect blunted, flat   Mood mood is calm   Physical Appearance/Attire attire appropriate to age and situation   Hygiene well groomed   Suicidality other (see comments)  (denies)   Self Injury (denies)   Activity isolative   Speech clear;coherent   Medication Sensitivity no observed side effects   Psychomotor / Gait balanced;steady   Activities of Daily Living   Hygiene/Grooming independent   Oral Hygiene independent   Dress independent   Laundry with supervision   Room Organization independent   Activity   Activity Level of Assistance independent

## 2017-04-03 NOTE — PROGRESS NOTES
"   04/03/17 1143   Behavioral Health   Hallucinations denies / not responding to hallucinations   Thinking (improved )   Orientation person: oriented;place: oriented;date: oriented   Insight (limited )   Judgement impaired   Eye Contact at examiner   Affect blunted, flat   Mood mood is calm   Physical Appearance/Attire attire appropriate to age and situation   Hygiene (fair )   Suicidality (denies )   Self Injury (none)   Activity withdrawn   Speech clear;coherent   Psychomotor / Gait balanced;steady   Activities of Daily Living   Hygiene/Grooming independent   Oral Hygiene independent   Dress street clothes   Room Organization independent   Pt. Slept in this am, got up around 11:00. Pt states she had a medication increase and this made her more drowsy. Pt also c/o dizziness. Pt states her mood is \"great\" today, denies depression, SI, or safety concerns. PT denies aud halls at the present time. Pt remains pleasant and cooperative on unit.   "

## 2017-04-04 LAB
BASOPHILS # BLD AUTO: 0 10E9/L (ref 0–0.2)
BASOPHILS NFR BLD AUTO: 0.2 %
DIFFERENTIAL METHOD BLD: NORMAL
EOSINOPHIL # BLD AUTO: 0.3 10E9/L (ref 0–0.7)
EOSINOPHIL NFR BLD AUTO: 4.8 %
IMM GRANULOCYTES # BLD: 0 10E9/L (ref 0–0.4)
IMM GRANULOCYTES NFR BLD: 0.4 %
LYMPHOCYTES # BLD AUTO: 1.8 10E9/L (ref 0.8–5.3)
LYMPHOCYTES NFR BLD AUTO: 34.1 %
MONOCYTES # BLD AUTO: 0.5 10E9/L (ref 0–1.3)
MONOCYTES NFR BLD AUTO: 9 %
NEUTROPHILS # BLD AUTO: 2.7 10E9/L (ref 1.6–8.3)
NEUTROPHILS NFR BLD AUTO: 51.5 %
NRBC # BLD AUTO: 0 10*3/UL
NRBC BLD AUTO-RTO: 0 /100
WBC # BLD AUTO: 5.2 10E9/L (ref 4–11)

## 2017-04-04 PROCEDURE — 90853 GROUP PSYCHOTHERAPY: CPT

## 2017-04-04 PROCEDURE — 85048 AUTOMATED LEUKOCYTE COUNT: CPT | Performed by: PSYCHIATRY & NEUROLOGY

## 2017-04-04 PROCEDURE — 25900017 H RX MED GY IP 259 OP 259 PS 637: Performed by: STUDENT IN AN ORGANIZED HEALTH CARE EDUCATION/TRAINING PROGRAM

## 2017-04-04 PROCEDURE — S0136 CLOZAPINE, 25 MG: HCPCS | Performed by: STUDENT IN AN ORGANIZED HEALTH CARE EDUCATION/TRAINING PROGRAM

## 2017-04-04 PROCEDURE — 25000132 ZZH RX MED GY IP 250 OP 250 PS 637: Performed by: STUDENT IN AN ORGANIZED HEALTH CARE EDUCATION/TRAINING PROGRAM

## 2017-04-04 PROCEDURE — 99232 SBSQ HOSP IP/OBS MODERATE 35: CPT | Mod: GC | Performed by: PSYCHIATRY & NEUROLOGY

## 2017-04-04 PROCEDURE — 36415 COLL VENOUS BLD VENIPUNCTURE: CPT | Performed by: PSYCHIATRY & NEUROLOGY

## 2017-04-04 PROCEDURE — 85004 AUTOMATED DIFF WBC COUNT: CPT | Performed by: PSYCHIATRY & NEUROLOGY

## 2017-04-04 PROCEDURE — 12400001 ZZH R&B MH UMMC

## 2017-04-04 RX ORDER — FLUDROCORTISONE ACETATE 0.1 MG/1
100 TABLET ORAL 2 TIMES DAILY
Status: DISCONTINUED | OUTPATIENT
Start: 2017-04-04 | End: 2017-04-14 | Stop reason: HOSPADM

## 2017-04-04 RX ADMIN — PRENATAL VIT W/ FE FUMARATE-FA TAB 27-0.8 MG 1 TABLET: 27-0.8 TAB at 08:20

## 2017-04-04 RX ADMIN — FLUDROCORTISONE ACETATE 100 MCG: 0.1 TABLET ORAL at 10:44

## 2017-04-04 RX ADMIN — CLOZAPINE 50 MG: 25 TABLET ORAL at 20:00

## 2017-04-04 RX ADMIN — VITAMIN D, TAB 1000IU (100/BT) 2000 UNITS: 25 TAB at 08:20

## 2017-04-04 RX ADMIN — FLUDROCORTISONE ACETATE 100 MCG: 0.1 TABLET ORAL at 20:00

## 2017-04-04 ASSESSMENT — ACTIVITIES OF DAILY LIVING (ADL)
DRESS: INDEPENDENT
LAUNDRY: WITH SUPERVISION
HYGIENE/GROOMING: INDEPENDENT
ORAL_HYGIENE: INDEPENDENT
ORAL_HYGIENE: INDEPENDENT
GROOMING: INDEPENDENT
DRESS: INDEPENDENT;STREET CLOTHES
LAUNDRY: WITH SUPERVISION

## 2017-04-04 NOTE — PROGRESS NOTES
Nona did not attend community meeting. She has been quite withdrawn. Her brother from [place] came to visit a little early for visiting. She had full range of affect while talking to him. Seemed to enjoy his visit. She was compliant keeping track of her intake. Measured 600 cc output in the sani pan. She also says that she forgot and voided in the toilet once. Estiimated 600 cc additional output unmeasured. She took her scheduled medication at bedtime.

## 2017-04-04 NOTE — PROGRESS NOTES
04/04/17 1445   Behavioral Health   Hallucinations denies / not responding to hallucinations   Thinking other (see comment);intact  (good concentration than last time when talked with patient)   Orientation person: oriented;place: oriented;date: oriented   Memory baseline memory   Insight admits / accepts   Judgement impaired   Eye Contact at examiner   Affect full range affect   Mood mood is calm   Physical Appearance/Attire attire appropriate to age and situation   Hygiene well groomed   Suicidality other (see comments)  (denies)   Self Injury other (see comment)  (denies)   Activity refusal;isolative   Speech clear;coherent   Medication Sensitivity no stated side effects  (no dizziness)   Psychomotor / Gait balanced;steady   Activities of Daily Living   Hygiene/Grooming independent   Oral Hygiene independent   Dress independent   Laundry with supervision   Room Organization independent   Behavioral Health Interventions   Psychotic Symptoms maintain safe secure environment;simple, clear language;decrease environmental stimulation;assist patient in following safety plan;encourage nutrition and hydration;provide emotional support;encourage participation / independence with adls;establish therapeutic relationship;assist with developing & utilizing healthy coping strategies   Social and Therapeutic Interventions (Psychotic Symptoms) encourage socialization with peers;encourage effective boundaries with peers;encourage participation in therapeutic groups and milieu activities   Patient had a good shift.    Patient did not require seclusion/restraints to manage behavior.    Nona Finley did not participate in groups and was visible in the milieu.    Notable mental health symptoms during this shift:decreased energy    Patient is working on these coping/social skills: Sharing feelings  Reaching out to family  Asking for medications when needed    Other information about this shift: Patient denied SI/ SIB. Patient  refused to go to groups. Patient is excited about being discharged on Friday. Patient denied any anxiousness and depression.

## 2017-04-04 NOTE — PROGRESS NOTES
"    ----------------------------------------------------------------------------------------------------------  [facility], [place]   Psychiatric Progress Note     Assessment    Presentation: Nona Finley is a 36 year old female w/h/o schizoaffective disorder depressive type with recent hospitalization in Feb. who presented 3/21/17 by ambulance with SI in the setting of worsening AH and paranoia.    Diagnostic Impression: Pt's presentation consistent with exacerbation of schizoaffective disorder partly due to poor medication adherence.  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  Concerns for discharge include SI and medication non-adherence. If patient continues to decline clozapine tonight 3/31, will need to reduce dose to 25 mg.    Hospital course: Nona Finley was admitted to station 22 as a voluntary patient.  Patient initially refused medication, prompting filing paperwork for commitment and Reyes to [facility]. Subsequently she agreed to restart clozapine, but developed tachycardia and orthostatic hypotension with 25 mg dose, so fludrocortisone was started at 0.1 mg QD, which seemed to resolve her adverse reaction, but after two days, she again refused all medications, believing that they were poison, and said she could not eat or drink because water smelled \"like bleach.\"  Patient has had inconsistent adherence to medication and vacillating symptom occurrence.  Court date Thurs 4/6, awaiting ruling on Commitment and Reyes.    Medical course: Fludrocortisone was added to patient's medical regimen for orthostasis 2/2 clozapine, plus encouraging good PO intake.    Plan     Principal Diagnosis: Schizoaffective disorder, depressive type    Medications:   - paliperidone 234 mg IM q28 days (most recent dose 4/3/17, next dose 5/1/17)  - Clozapine 50 mg PO qHS   -fludrocortisone 0.1 mg " PO bid  - olanzapine 10 mg PO/IM PRN, agitation  - benadryl 25-50 mg PO q4h prn, eps  - hydroxyzine 25-50 mg PO q4h prn, anxiety  -Naproxen 500 mg PO PRN     Laboratory/Imaging:   -WBC/diff q1 wk     Consults: None     Patient will be treated in therapeutic milieu with appropriate individual and group therapies as described.    Secondary psychiatric diagnoses of concern this admission: None    Medical diagnoses to be addressed this admission:  Insomnia: resolved    Relevant psychosocial stressors: Severe including motherhood, chronic mental illness    Legal Status: Voluntary  - Awaiting commitment and Reyes, court date Thurs 4/6    Safety Assessment:   Checks: Status 15  Precautions: Suicide  Self-harm  Pt has not required locked seclusion or restraints in the past 24 hours to maintain safety, please refer to RN documentation for further details.     The risks, benefits, alternatives and side effects have been discussed and are understood by the patient and other caregivers.    Anticipated Disposition/Discharge Date: Not yet determined, continues to need medical stabilization as pt is now non-compliant with meds.  Anticipating d/c pt home to family, continue meds and appointments, and go to intensive day Tx.    Attestation:    This documentation accurately reflects the services I personally performed and treatment decisions made by me in consultation with the attending physician.    Scribed by Vira Mandujano, MS3, for Dr. Ashok Casas, Resident     I have reviewed and edited the documentation recorded by the scribe. This documentation accurately reflects the services I personally performed and treatment decisions made by me in consultation with the attending physician.    Ashok Casas MD, PGY-1 Psychiatry resident  Pager [phone]    Psychiatry Attending Attestation:  This patient has been seen and evaluated by me, Danish Levi M.D.  The patient's condition and treatment plan were discussed with the resident, and  "care coordinated with the CTC and RN. I reviewed, edited and agree with the findings and plan in this note.     Danish Levi M.D.   of Psychiatry   Interim History:   The patient's care was discussed with the treatment team and chart notes were reviewed.  Sleep: 7h  PRN:  None.  Declined melatonin.    Staff notes:  Intake 1,520 mL in past 24 hrs. Did not attend groups.  Brother visited, pt had full affect when talking with him.    Pt was interviewed in the conference room.  Has been compliant with medication, no AH, no SI.  Pt reports that she continues to feel \"dizzy\" when standing, reviewed that pt continues to show signs of orthostatic hypotension in vitals from this AM.  Recommended increasing fludrocortisone to bid dosing, pt is agreeable.  Pt stated that she has been successful at increasing her fluids, and that it is easy for her to drink a lot at home.  Pt has been refusing melatonin because she says it makes her too drowsy the following day and that she has not had any problems with falling asleep.  Will discontinue melatonin.  Pt continues to desire d/c to home, has upcoming hearing for commitment/Reyes on Thurs 4/6.      Review of systems:     The 10 point Review of Systems is negative other than noted in the HPI         Medications:     Current Facility-Administered Medications   Medication     fludrocortisone (FLORINEF) tablet 100 mcg     cloZAPine (CLOZARIL) tablet 50 mg     naproxen (NAPROSYN) tablet 500 mg     [START ON 4/10/2017] paliperidone (INVEGA SUSTENNA) injection SUSP 234 mg     diphenhydrAMINE (BENADRYL) capsule 25-50 mg     prenatal multivitamin  plus iron per tablet 1 tablet     cholecalciferol (vitamin D) tablet 2,000 Units     hydrOXYzine (ATARAX) tablet 25-50 mg     OLANZapine (zyPREXA) tablet 10 mg    Or     OLANZapine (zyPREXA) injection 10 mg     acetaminophen (TYLENOL) tablet 650 mg             Allergies:   No Known Allergies         Psychiatric Examination: " "  /65  Pulse 93  Temp 96.8  F (36  C) (Tympanic)  Resp 16  Ht 1.651 m (5' 5\")  Wt 70.8 kg (156 lb)  SpO2 99%  BMI 25.96 kg/m2  Weight is 156 lbs 0 oz  Body mass index is 25.96 kg/(m^2).    Appearance:  awake, alert, adequately groomed, cooperative, no apparent distress and neatly groomed  Attitude:  cooperative  Eye Contact:  good, fair  Mood:  sad  and better  Affect:  mood congruent, intensity is blunted, constricted mobility and restricted range  Speech:  clear, coherent  Psychomotor Behavior:  no evidence of tardive dyskinesia, dystonia, or tics and intact station, gait and muscle tone  Thought Process:  linear  Associations:  no loose associations  Thought Content:  no evidence of psychotic thought, no auditory hallucinations present and no visual hallucinations present  Insight:  fair  Judgment:  fair  Oriented to:  time, person, and place  Attention Span and Concentration:  fair  Recent and Remote Memory:  fair  Language: Pt is fluent in conversational English  Fund of Knowledge: appropriate  Muscle Strength and Tone: normal  Gait and Station: Normal         Selected Labs:     3/31: BMP wnl, except glucose 55 (L), Cr 0.5 (L)    WBC/diff (weekly):  4/4: WBC 5.2, ANC 2.7    "

## 2017-04-05 PROCEDURE — 97150 GROUP THERAPEUTIC PROCEDURES: CPT | Mod: GO

## 2017-04-05 PROCEDURE — 25900017 H RX MED GY IP 259 OP 259 PS 637: Performed by: STUDENT IN AN ORGANIZED HEALTH CARE EDUCATION/TRAINING PROGRAM

## 2017-04-05 PROCEDURE — 99232 SBSQ HOSP IP/OBS MODERATE 35: CPT | Mod: GC | Performed by: PSYCHIATRY & NEUROLOGY

## 2017-04-05 PROCEDURE — S0136 CLOZAPINE, 25 MG: HCPCS | Performed by: STUDENT IN AN ORGANIZED HEALTH CARE EDUCATION/TRAINING PROGRAM

## 2017-04-05 PROCEDURE — 25000132 ZZH RX MED GY IP 250 OP 250 PS 637: Performed by: STUDENT IN AN ORGANIZED HEALTH CARE EDUCATION/TRAINING PROGRAM

## 2017-04-05 PROCEDURE — 12400001 ZZH R&B MH UMMC

## 2017-04-05 RX ORDER — MIRTAZAPINE 15 MG/1
15 TABLET, FILM COATED ORAL AT BEDTIME
Status: DISCONTINUED | OUTPATIENT
Start: 2017-04-05 | End: 2017-04-14 | Stop reason: HOSPADM

## 2017-04-05 RX ADMIN — PRENATAL VIT W/ FE FUMARATE-FA TAB 27-0.8 MG 1 TABLET: 27-0.8 TAB at 09:17

## 2017-04-05 RX ADMIN — FLUDROCORTISONE ACETATE 100 MCG: 0.1 TABLET ORAL at 19:33

## 2017-04-05 RX ADMIN — VITAMIN D, TAB 1000IU (100/BT) 2000 UNITS: 25 TAB at 09:16

## 2017-04-05 RX ADMIN — FLUDROCORTISONE ACETATE 100 MCG: 0.1 TABLET ORAL at 09:17

## 2017-04-05 RX ADMIN — MIRTAZAPINE 15 MG: 15 TABLET, FILM COATED ORAL at 20:55

## 2017-04-05 RX ADMIN — CLOZAPINE 50 MG: 25 TABLET ORAL at 19:33

## 2017-04-05 ASSESSMENT — ACTIVITIES OF DAILY LIVING (ADL)
DRESS: INDEPENDENT
LAUNDRY: WITH SUPERVISION
GROOMING: INDEPENDENT
DRESS: INDEPENDENT
LAUNDRY: WITH SUPERVISION
GROOMING: INDEPENDENT
ORAL_HYGIENE: INDEPENDENT
ORAL_HYGIENE: INDEPENDENT

## 2017-04-05 NOTE — PLAN OF CARE
Problem: General Plan of Care (Inpatient Behavioral)  Goal: Team Discussion  Team Plan:   BEHAVIORAL TEAM DISCUSSION     Continued Stay Criteria/Rationale: In court commitment process with [facility]  Plan: Continue medication   Participants: Ewa Sandy [facility],   Chanelle Key RN,  Dr. Danish Levi MD, Sita Mota MD,  Dr. Mily Domínguez MD, Priyanka Mandujano MS3  Summary/Recommendation: Patient states that she has not been hearing any voices. Patient discussed her restlessness due to the Invega and patient is agreeable to starting a medication to help with this side effect.   Medical/Physical: Previous concerns due to dizziness because of the clozapine but drinking water has helped with this issue.  Progress: Improving

## 2017-04-05 NOTE — PROGRESS NOTES
"    ----------------------------------------------------------------------------------------------------------  [facility]   Psychiatric Progress Note     Assessment    Presentation: Nona Finley is a 36 year old female w/h/o schizoaffective disorder depressive type with recent hospitalization in Feb. who presented 3/21/17 by ambulance with SI in the setting of worsening AH and paranoia.    Diagnostic Impression: Pt's presentation consistent with exacerbation of schizoaffective disorder partly due to poor medication (clozapine) adherence.  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  Concerns for discharge include SI and medication non-adherence.     Hospital course: Nona Finley was admitted to station 22 as a voluntary patient.  Patient initially refused medication which she thought was poisoned, prompting filing paperwork for commitment and Reyes to [facility]. Subsequently she agreed to restart clozapine, but developed tachycardia and orthostatic hypotension with 25 mg dose, so fludrocortisone was started at 0.1 mg QD, which seemed to resolve her adverse reaction, but after two days, she again refused all medications, believing that they were poison, and said she could not eat or drink because water smelled \"like bleach.\"  Patient has had inconsistent adherence to medication and vacillating symptom occurrence.  Once taking clozapine 50 mg QHS and fludrocortisone 0.1 mg BID were taken consistently for 3 days, psychotic Sx largely resolved, her psychotic Sx improved and she changed her mind from wanting to leave her family and live in a group home to wishing to return home to care for her children. Court date Thurs 4/6, awaiting ruling on Commitment and Reyes. On  4/5 to address chronic mild akathisia ( likely secondeary to paliperidone) , propranolol and benztropine were " considered however due to her cardiovascular symptoms, mirtazapine 15 mg QHS was added .    Medical course: Fludrocortisone was added to patient's medical regimen for orthostasis 2/2 clozapine, plus encouraging good PO intake.    Plan     Principal Diagnosis: Schizoaffective disorder, depressive type    Medications:   - paliperidone 234 mg IM q28 days (most recent dose 4/3/17, next dose 5/1/17)  - Clozapine 50 mg PO qHS   -fludrocortisone 0.1 mg PO bid  -mirtazapine 15 mg qhs  - olanzapine 10 mg PO/IM PRN, agitation  - benadryl 25-50 mg PO q4h prn, eps  - hydroxyzine 25-50 mg PO q4h prn, anxiety  -Naproxen 500 mg PO PRN     Med Changes: added mirtazapine 15 mg qhs    Hold: None    Laboratory/Imaging:   -WBC/diff q1 wk     Consults: None     Patient will be treated in therapeutic milieu with appropriate individual and group therapies as described.    Secondary psychiatric diagnoses of concern this admission: None    Medical diagnoses to be addressed this admission:  Insomnia: resolved    Relevant psychosocial stressors: Severe including motherhood (6 children, ages 6 mo to 12 years, oldest is indio with autism spectrum, needs PCA 5 hour+/day, provided by mother-in-law.     Legal Status: - Awaiting commitment and Reyes, court date Thurs 4/6    Safety Assessment:   Checks: Status 15  Precautions: Suicide  Self-harm  Pt has not required locked seclusion or restraints in the past 24 hours to maintain safety, please refer to RN documentation for further details.     The risks, benefits, alternatives and side effects have been discussed and are understood by the patient and other caregivers.    Anticipated Disposition/Discharge Date: Not yet determined.  Awaiting court ruling on commitment/Reyes.  Anticipating d/c pt home to family, continue meds and appointments, and go to intensive day Tx.    Attestation:    This documentation accurately reflects the services I personally performed and treatment decisions made by me  in consultation with the attending physician.    Scribed by Vira Mandujano, MS3, for Dr. Mily Domínguez, Resident     Attestation:  I have reviewed and edited the documentation recorded by the scribe. This documentation accurately reflects the services I personally performed and treatment decisions made by me in consultation with the attending physician.     Mily Domínguez MD  Psychiatry PGY1    Psychiatry Attending Attestation:  This patient has been seen and evaluated by me, Danish Levi M.D.  The patient's condition and treatment plan were discussed with the resident, and care coordinated with the CTC and RN. I reviewed, edited and agree with the findings and plan in this note.     Danish Levi M.D.   of Psychiatry     Interim History:   The patient's care was discussed with the treatment team and chart notes were reviewed.  Sleep: 7h  VS: Stable  Scheduled Meds: Adherent  PRN:  None.    Staff notes: Reviewed. Intake 5, 020 mL in past 24 hrs. Did not participate in groups, visible in milieu.    Pt was interviewed in the conference room.  She reports feeling well, no AH, SI.   Pt has greatly increased her fluid intake, no symptoms of orthostasis.  No side effects from increased dose of fludrocortisone.  Pt c/o akathisia, restlessness that is very bothersome to her.  Recommended starting mirtazapine 15 mg qhs, pt is agreeable.  Pt continues to desire d/c to home, upcoming hearing for commitment/Reyes is tomorrow 4/6.  Since patient was verbally agreeable to the plan and continuing to take medications, team decided to request stay of commitment and team CTC is notified.     Review of systems:     The 10 point Review of Systems is negative other than noted above         Medications:     Current Facility-Administered Medications   Medication     mirtazapine (REMERON) tablet 15 mg     fludrocortisone (FLORINEF) tablet 100 mcg     cloZAPine (CLOZARIL) tablet 50 mg     naproxen (NAPROSYN)  "tablet 500 mg     [START ON 4/10/2017] paliperidone (INVEGA SUSTENNA) injection SUSP 234 mg     diphenhydrAMINE (BENADRYL) capsule 25-50 mg     prenatal multivitamin  plus iron per tablet 1 tablet     cholecalciferol (vitamin D) tablet 2,000 Units     hydrOXYzine (ATARAX) tablet 25-50 mg     OLANZapine (zyPREXA) tablet 10 mg    Or     OLANZapine (zyPREXA) injection 10 mg     acetaminophen (TYLENOL) tablet 650 mg             Allergies:   No Known Allergies         Psychiatric Examination:   /65  Pulse 93  Temp 98.9  F (37.2  C) (Tympanic)  Resp 16  Ht 1.651 m (5' 5\")  Wt 70.8 kg (156 lb)  SpO2 99%  BMI 25.96 kg/m2  Weight is 156 lbs 0 oz  Body mass index is 25.96 kg/(m^2).    Appearance:  awake, alert, adequately groomed, cooperative, no apparent distress and neatly groomed  Attitude:  cooperative  Eye Contact:  good, fair  Mood:  better  Affect:  mood congruent, intensity is normal, fixed mobility and restricted range  Speech:  clear, coherent  Psychomotor Behavior:  no evidence of tardive dyskinesia, dystonia, or tics and intact station, gait and muscle tone  Thought Process:  linear  Associations:  no loose associations  Thought Content:  no evidence of psychotic thought, no auditory hallucinations present and no visual hallucinations present  Insight:  fair  Judgment:  fair  Oriented to:  time, person, and place  Attention Span and Concentration:  fair  Recent and Remote Memory:  fair  Language: Pt is fluent in conversational English  Fund of Knowledge: appropriate  Muscle Strength and Tone: normal  Gait and Station: Normal         Selected Labs:     3/31: BMP wnl, except glucose 55 (L), Cr 0.5 (L)    WBC/diff (weekly):  4/4: WBC 5.2, ANC 2.7    "

## 2017-04-05 NOTE — PLAN OF CARE
Problem: Psychotic Symptoms  Goal: Psychotic Symptoms  Signs and symptoms of listed problems will be absent or manageable.  -pt will verbalize coping skills to manage hallucinations  -pt will participate in groups demonstrating adequate attention span  -pt will identify when or if they are having any medication side effects  -pt will be able to have reality based conversations   Outcome: Improving  Nona active on unit-compliant with I&O today-denies auditory hallucinations and does not appear to be responding-states she is going to agree to stay tomorrow and ask MD for discharge Friday-adamant she will continue medications as prescribed post discharge

## 2017-04-05 NOTE — PROGRESS NOTES
Nona  attended 1 of 2 OT groups today. She  participated in a project group this afternoon on creative letter writing. Designed a personalized greeting card for a peer's daughter, who is having a birthday this week. Reported boredom and frustration about still being on the unit, though she was bright and engaged during session this afternoon.

## 2017-04-06 PROCEDURE — 99231 SBSQ HOSP IP/OBS SF/LOW 25: CPT | Mod: GC | Performed by: PSYCHIATRY & NEUROLOGY

## 2017-04-06 PROCEDURE — 25900017 H RX MED GY IP 259 OP 259 PS 637: Performed by: STUDENT IN AN ORGANIZED HEALTH CARE EDUCATION/TRAINING PROGRAM

## 2017-04-06 PROCEDURE — S0136 CLOZAPINE, 25 MG: HCPCS | Performed by: STUDENT IN AN ORGANIZED HEALTH CARE EDUCATION/TRAINING PROGRAM

## 2017-04-06 PROCEDURE — 12400001 ZZH R&B MH UMMC

## 2017-04-06 PROCEDURE — 25000132 ZZH RX MED GY IP 250 OP 250 PS 637: Performed by: STUDENT IN AN ORGANIZED HEALTH CARE EDUCATION/TRAINING PROGRAM

## 2017-04-06 RX ADMIN — CLOZAPINE 50 MG: 25 TABLET ORAL at 21:06

## 2017-04-06 RX ADMIN — FLUDROCORTISONE ACETATE 100 MCG: 0.1 TABLET ORAL at 08:13

## 2017-04-06 RX ADMIN — PRENATAL VIT W/ FE FUMARATE-FA TAB 27-0.8 MG 1 TABLET: 27-0.8 TAB at 08:13

## 2017-04-06 RX ADMIN — VITAMIN D, TAB 1000IU (100/BT) 2000 UNITS: 25 TAB at 08:13

## 2017-04-06 RX ADMIN — FLUDROCORTISONE ACETATE 100 MCG: 0.1 TABLET ORAL at 21:06

## 2017-04-06 RX ADMIN — DIPHENHYDRAMINE HYDROCHLORIDE 25 MG: 25 CAPSULE ORAL at 21:10

## 2017-04-06 ASSESSMENT — ACTIVITIES OF DAILY LIVING (ADL)
GROOMING: INDEPENDENT
ORAL_HYGIENE: INDEPENDENT
DRESS: STREET CLOTHES;INDEPENDENT
ORAL_HYGIENE: INDEPENDENT
DRESS: STREET CLOTHES
GROOMING: INDEPENDENT
LAUNDRY: WITH SUPERVISION

## 2017-04-06 NOTE — PROGRESS NOTES
"   04/06/17 1051   Behavioral Health   Hallucinations denies / not responding to hallucinations   Thinking distractable;poor concentration   Orientation person: oriented;place: oriented;date: oriented   Insight insight appropriate to situation   Judgement impaired   Eye Contact at examiner   Affect blunted, flat;sad   Mood depressed;mood is calm   Physical Appearance/Attire attire appropriate to age and situation   Hygiene (fair )   Suicidality (denies )   Self Injury (none)   Activity (visible on unit )   Speech clear   Psychomotor / Gait paces;balanced;steady   Activities of Daily Living   Hygiene/Grooming independent   Oral Hygiene independent   Dress street clothes   Room Organization independent   Pt. Was at court this am, came back around 10:30. Pt tells staff she feels upset about how court went. \"They want me to go to a group home.\" pt states she was hoping to be discharged back to home and was depressed about the news she received today. Pt denies any active SI or safety concerns at this time. Pt also denying aud halls presently.   "

## 2017-04-06 NOTE — PROGRESS NOTES
"    ----------------------------------------------------------------------------------------------------------  [facility], [place]   Psychiatric Progress Note     Assessment    Presentation: Nona Finley is a 36 year old female w/h/o schizoaffective disorder depressive type with recent hospitalization in Feb. who presented 3/21/17 by ambulance with SI in the setting of worsening AH and paranoia.    Diagnostic Impression: Pt's presentation consistent with exacerbation of schizoaffective disorder partly due to poor medication (clozapine) adherence.  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  Concerns for discharge include SI and medication non-adherence.     Hospital course: Nona Finley was admitted to station 22 as a voluntary patient.  Patient initially refused medication which she thought was poisoned, prompting filing paperwork for commitment and Reyes to [facility]. Subsequently she agreed to restart clozapine, but developed tachycardia and orthostatic hypotension with 25 mg dose, so fludrocortisone was started at 0.1 mg QD, which seemed to resolve her adverse reaction, but after two days, she again refused all medications, believing that they were poison, and said she could not eat or drink because water smelled \"like bleach.\"  Patient has had inconsistent adherence to medication and vacillating symptom occurrence.  Once taking clozapine 50 mg QHS and fludrocortisone 0.1 mg BID were taken consistently for 3 days, psychotic Sx largely resolved, her psychotic Sx improved and she changed her mind from wanting to leave her family and live in a group home to wishing to return home to care for her children. On  4/5 to address chronic mild akathisia ( likely secondeary to paliperidone) , propranolol and benztropine were considered however due to her cardiovascular symptoms, mirtazapine " 15 mg QHS was added . Court supported full commitment/Reyes on 4/6,  subsequent court date scheduled for 4/11.     Medical course: Fludrocortisone was added to patient's medical regimen for orthostasis 2/2 clozapine, plus encouraging good PO intake.    Plan     Principal Diagnosis: Schizoaffective disorder, depressive type    Medications:   - paliperidone 234 mg IM q28 days (most recent dose 4/3/17, next dose 5/1/17)  - Clozapine 50 mg PO qHS   -fludrocortisone 0.1 mg PO bid  -mirtazapine 15 mg qhs  - olanzapine 10 mg PO/IM PRN, agitation  - benadryl 25-50 mg PO q4h prn, eps  - hydroxyzine 25-50 mg PO q4h prn, anxiety  -Naproxen 500 mg PO PRN     Med Changes: None    Hold: None    Laboratory/Imaging:   -WBC/diff q1 wk     Consults: None     Patient will be treated in therapeutic milieu with appropriate individual and group therapies as described.    Secondary psychiatric diagnoses of concern this admission: None    Medical diagnoses to be addressed this admission:   # Insomnia: resolved    Relevant psychosocial stressors: Severe including motherhood (6 children, ages 6 mo to 12 years, oldest is indio with autism spectrum, needs PCA 5 hour+/day, provided by mother-in-law.     Legal Status: - Awaiting commitment and Reyes, court date Tues 4/11.    Safety Assessment:   Checks: Status 15  Precautions: Suicide  Self-harm  Pt has not required locked seclusion or restraints in the past 24 hours to maintain safety, please refer to RN documentation for further details.     The risks, benefits, alternatives and side effects have been discussed and are understood by the patient and other caregivers.    Anticipated Disposition/Discharge Date: Not yet determined.  Awaiting court ruling on commitment/Reyes.  Anticipating d/c pt home to family, continue meds and appointments, and go to intensive day Tx.    Attestation:    This documentation accurately reflects the services I personally performed and treatment decisions made  by me in consultation with the attending physician.    Scribed by Vira Mandujano, MS3, for Dr. Mily Domínguez, Resident   Attestation:  I have reviewed and edited the documentation recorded by the scribe. This documentation accurately reflects the services I personally performed and treatment decisions made by me in consultation with the attending physician.     Mily Domínguez MD  Psychiatry PGY1    Psychiatry Attending Attestation:  The patient's condition and treatment plan were discussed with the resident, and care coordinated with the CTC and RN. I reviewed, edited and agree with the findings and plan in this note.     Danish Levi M.D.   of Psychiatry    Interim History:   The patient's care was discussed with the treatment team and chart notes were reviewed.  Sleep: 7h  VS: Stable  Scheduled Meds: Adherent  PRN:  None.    Staff notes: Reviewed. Intake 1,200 mL in past 24 hrs. Participated in OT grp, completed project.  Pacing, no SI, no AH.    Pt was interviewed in the conference room.  Attended court hearing today, says that court decided that she needs to go to IRT for a few months and that she is very frustrated w/this.  Discussed long-term benefit of attending IRT.  Has another court hearing on Tuesday.  Reports no orthostasis, has been hydrating well.  Took first dose of mirtazapine last night, she has noticed that she feels less restless today and has not been pacing as much.             Review of systems:     The 10 point Review of Systems is negative other than noted above         Medications:     Current Facility-Administered Medications   Medication     mirtazapine (REMERON) tablet 15 mg     fludrocortisone (FLORINEF) tablet 100 mcg     cloZAPine (CLOZARIL) tablet 50 mg     naproxen (NAPROSYN) tablet 500 mg     [START ON 4/10/2017] paliperidone (INVEGA SUSTENNA) injection SUSP 234 mg     diphenhydrAMINE (BENADRYL) capsule 25-50 mg     prenatal multivitamin  plus iron per  "tablet 1 tablet     cholecalciferol (vitamin D) tablet 2,000 Units     hydrOXYzine (ATARAX) tablet 25-50 mg     OLANZapine (zyPREXA) tablet 10 mg    Or     OLANZapine (zyPREXA) injection 10 mg     acetaminophen (TYLENOL) tablet 650 mg             Allergies:   No Known Allergies         Psychiatric Examination:   /65  Pulse 93  Temp 98.3  F (36.8  C)  Resp 16  Ht 1.651 m (5' 5\")  Wt 70.8 kg (156 lb)  SpO2 99%  BMI 25.96 kg/m2  Weight is 156 lbs 0 oz  Body mass index is 25.96 kg/(m^2).    Appearance:  awake, alert, cooperative, no apparent distress and neatly groomed  Attitude:  cooperative  Eye Contact:  good, fair  Mood:  sad  and frustrated about court decision  Affect:  mood congruent, intensity is normal, fixed mobility and restricted range  Speech:  clear, coherent  Psychomotor Behavior:  no evidence of tardive dyskinesia, dystonia, or tics, intact station, gait and muscle tone and mild fidgeting  Thought Process:  linear  Associations:  no loose associations  Thought Content:  no evidence of psychotic thought, no auditory hallucinations present and no visual hallucinations present  Insight:  fair  Judgment:  fair  Oriented to:  time, person, and place  Attention Span and Concentration:  fair  Recent and Remote Memory:  fair  Language: Pt is fluent in conversational English  Fund of Knowledge: appropriate  Muscle Strength and Tone: normal  Gait and Station: Normal         Selected Labs:     3/31: BMP wnl, except glucose 55 (L), Cr 0.5 (L)    WBC/diff (weekly):  4/4: WBC 5.2, ANC 2.7    "

## 2017-04-06 NOTE — PROGRESS NOTES
Patient was visible in milieu, watching tv and pacing hallway. Pt asked to be woken up at 6:30 am because she has to go to court at 7:30 in the morning. Affect was blunted, flat. Mood was calm. Minimally social with peers. No SI, hallucinations, med side effects noted.     04/05/17 1956   Behavioral Health   Hallucinations denies / not responding to hallucinations   Thinking distractable;poor concentration   Orientation person: oriented;place: oriented   Memory baseline memory   Insight poor   Judgement impaired   Eye Contact at examiner   Affect blunted, flat   Mood mood is calm   Physical Appearance/Attire attire appropriate to age and situation   Hygiene well groomed   Suicidality (denies)   Self Injury (denies)   Activity withdrawn;restless   Speech clear;coherent   Medication Sensitivity no observed side effects   Psychomotor / Gait paces;steady   Activities of Daily Living   Hygiene/Grooming independent   Oral Hygiene independent   Dress independent   Laundry with supervision   Room Organization independent   Activity   Activity Level of Assistance independent

## 2017-04-06 NOTE — PROGRESS NOTES
[facility] did not agree to support a stay of commitment, patient on full commitment. Eric hearing scheduled for April 11, 2017 at 10am.

## 2017-04-07 PROCEDURE — 25000132 ZZH RX MED GY IP 250 OP 250 PS 637: Performed by: STUDENT IN AN ORGANIZED HEALTH CARE EDUCATION/TRAINING PROGRAM

## 2017-04-07 PROCEDURE — 12400001 ZZH R&B MH UMMC

## 2017-04-07 PROCEDURE — 25000132 ZZH RX MED GY IP 250 OP 250 PS 637: Performed by: PSYCHIATRY & NEUROLOGY

## 2017-04-07 PROCEDURE — S0136 CLOZAPINE, 25 MG: HCPCS | Performed by: STUDENT IN AN ORGANIZED HEALTH CARE EDUCATION/TRAINING PROGRAM

## 2017-04-07 PROCEDURE — 99231 SBSQ HOSP IP/OBS SF/LOW 25: CPT | Mod: GC | Performed by: PSYCHIATRY & NEUROLOGY

## 2017-04-07 PROCEDURE — 97150 GROUP THERAPEUTIC PROCEDURES: CPT | Mod: GO

## 2017-04-07 RX ORDER — IBUPROFEN 800 MG/1
800 TABLET, FILM COATED ORAL EVERY 8 HOURS PRN
Status: DISCONTINUED | OUTPATIENT
Start: 2017-04-07 | End: 2017-04-07

## 2017-04-07 RX ADMIN — FLUDROCORTISONE ACETATE 100 MCG: 0.1 TABLET ORAL at 20:30

## 2017-04-07 RX ADMIN — CLOZAPINE 50 MG: 25 TABLET ORAL at 20:30

## 2017-04-07 RX ADMIN — PRENATAL VIT W/ FE FUMARATE-FA TAB 27-0.8 MG 1 TABLET: 27-0.8 TAB at 09:15

## 2017-04-07 RX ADMIN — NAPROXEN 500 MG: 500 TABLET ORAL at 20:30

## 2017-04-07 RX ADMIN — ACETAMINOPHEN 650 MG: 325 TABLET, FILM COATED ORAL at 17:42

## 2017-04-07 RX ADMIN — ACETAMINOPHEN 650 MG: 325 TABLET, FILM COATED ORAL at 21:52

## 2017-04-07 RX ADMIN — HYDROXYZINE HYDROCHLORIDE 50 MG: 25 TABLET ORAL at 20:30

## 2017-04-07 RX ADMIN — VITAMIN D, TAB 1000IU (100/BT) 2000 UNITS: 25 TAB at 09:15

## 2017-04-07 RX ADMIN — FLUDROCORTISONE ACETATE 100 MCG: 0.1 TABLET ORAL at 09:15

## 2017-04-07 RX ADMIN — BENZOCAINE: 200 SOLUTION TOPICAL at 20:32

## 2017-04-07 ASSESSMENT — ACTIVITIES OF DAILY LIVING (ADL)
DRESS: INDEPENDENT
LAUNDRY: WITH SUPERVISION
LAUNDRY: WITH SUPERVISION
ORAL_HYGIENE: INDEPENDENT
GROOMING: INDEPENDENT
ORAL_HYGIENE: INDEPENDENT
DRESS: INDEPENDENT
GROOMING: INDEPENDENT

## 2017-04-07 NOTE — PROGRESS NOTES
"    ----------------------------------------------------------------------------------------------------------  [facility], [place]   Psychiatric Progress Note     Assessment    Presentation: Nona Finley is a 36 year old female w/h/o schizoaffective disorder depressive type with recent hospitalization in Feb. who presented 3/21/17 by ambulance with SI in the setting of worsening AH and paranoia.    Diagnostic Impression: Pt's presentation consistent with decompensation of schizoaffective disorder likely due to poor medication (clozapine) adherence. 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  Concerns for discharge include SI and medication non-adherence.     Hospital course: Nona Finley was admitted to station 22 as a voluntary patient.  Patient initially refused medication which she thought was poisoned, prompting filing paperwork for commitment and Reyes to [facility]. Subsequently she agreed to restart clozapine, but developed tachycardia and orthostatic hypotension with 25 mg dose, so fludrocortisone was started at 0.1 mg QD, which seemed to resolve her adverse reaction, but after two days, she again refused all medications, believing that they were poison, and said she could not eat or drink because water smelled \"like bleach.\"  Patient has had inconsistent adherence to medication and vacillating symptom occurrence.  Once taking clozapine 50 mg QHS and fludrocortisone 0.1 mg BID were taken consistently for 3 days, psychotic Sx largely resolved, her psychotic Sx improved and she changed her mind from wanting to leave her family and live in a group home to wishing to return home to care for her children. On  4/5 to address chronic mild akathisia ( likely secondeary to " paliperidone) , propranolol and benztropine were considered however due to her cardiovascular symptoms, mirtazapine 15 mg QHS was added . Court supported full commitment on 4/6,  subsequent court date for Reyes scheduled for 4/11.     Medical course: None    Plan     Principal Diagnosis: Schizoaffective disorder, depressive type    Medications:   Continue:  - paliperidone 234 mg IM q28 days ( next dose 4/10/17)  - Clozapine 50 mg PO qHS   - fludrocortisone 0.1 mg PO bid  -mirtazapine 15 mg qhs  - olanzapine 10 mg PO/IM PRN  - benadryl 25-50 mg PO q4h prn  - hydroxyzine 25-50 mg PO q4h prn  -Naproxen 500 mg PO PRN    Change: None    Hold: None    Laboratory/Imaging:   -WBC/diff q1 wk     Consults: None     Patient will be treated in therapeutic milieu with appropriate individual and group therapies as described.    Secondary psychiatric diagnoses of concern this admission: None    Medical diagnoses to be addressed this admission:   # Insomnia: resolved    Relevant psychosocial stressors: Severe including motherhood: 6 children, ages 6 mo to 12 years, oldest is indio with autism spectrum, needs PCA 5 hour+/day, provided by mother-in-law.     Legal Status:  Court hold. Awaiting commitment and Reyes, next court date Tues 4/11.    Safety Assessment:   Checks: Status 15  Precautions: Suicide  Self-harm  Pt has not required locked seclusion or restraints in the past 24 hours to maintain safety, please refer to RN documentation for further details.     The risks, benefits, alternatives and side effects have been discussed and are understood by the patient and other caregivers.    Anticipated Disposition/Discharge Date:   Awaiting court ruling on commitment/Reyes.     Attestation:    This documentation accurately reflects the services I personally performed and treatment decisions made by me in consultation with the attending physician.    Scribed by Vira Mandujano, MS3, for Dr. Mily Domínguez, Resident   Attestation:  I  have reviewed and edited the documentation recorded by the scribe. This documentation accurately reflects the services I personally performed and treatment decisions made by me in consultation with the attending physician.     Mily Domínguez MD  Psychiatry PGY1    Psychiatry Attending Attestation:  This patient has been seen and evaluated by me, Danish Levi M.D.  The patient's condition and treatment plan were discussed with the resident, and care coordinated with the CTC and RN. I reviewed, edited and agree with the findings and plan in this note.     Danish Levi M.D.   of Psychiatry    Interim History:   The patient's care was discussed with the treatment team and chart notes were reviewed.    Sleep: 7h  VSS  Scheduled Meds: Adherent w/clozapine, refused mirtazapine  PRN:  Diphenhydramine   Staff notes: Reviewed. Did not attend grps, withdrawn, sad.  No hallucinations, SI.    Pt was interviewed in the conference room.  Pt reports she is doing well but is unhappy because the she says the  specified to her that she will need to be discharged to an IRT rather than home.  Declined mirtazapine last night because she was informed that it was indicated for insomnia.  Educated pt about mirtazapine, that it is an antidepressant that can help insomnia but for her is indicated for akathisia.  She agreed to continue taking mirtazapine for at least 3 days consecutively in order to assess whether it decreases her symptoms of akathisia.  No symptoms of orthostasis, has been hydrating well.      Review of systems:     The 10 point Review of Systems is negative other than noted above         Medications:     Current Facility-Administered Medications   Medication     mirtazapine (REMERON) tablet 15 mg     fludrocortisone (FLORINEF) tablet 100 mcg     cloZAPine (CLOZARIL) tablet 50 mg     naproxen (NAPROSYN) tablet 500 mg     [START ON 4/10/2017] paliperidone (INVEGA SUSTENNA) injection SUSP  "234 mg     diphenhydrAMINE (BENADRYL) capsule 25-50 mg     prenatal multivitamin  plus iron per tablet 1 tablet     cholecalciferol (vitamin D) tablet 2,000 Units     hydrOXYzine (ATARAX) tablet 25-50 mg     OLANZapine (zyPREXA) tablet 10 mg    Or     OLANZapine (zyPREXA) injection 10 mg     acetaminophen (TYLENOL) tablet 650 mg             Allergies:   No Known Allergies         Psychiatric Examination:   /65  Pulse 93  Temp 98.8  F (37.1  C)  Resp 12  Ht 1.651 m (5' 5\")  Wt 70.8 kg (156 lb)  SpO2 99%  BMI 25.96 kg/m2  Weight is 156 lbs 0 oz  Body mass index is 25.96 kg/(m^2).    Appearance: American female, wearing hijab,  cooperative, no apparent distress, awake, appears fatigued, and neatly groomed  Attitude:  cooperative and reserved  Eye Contact:  good, fair  Mood:  \"good\", sad about prospect of not being discharged directly to home  Affect:  mood congruent, intensity is normal, fixed mobility and restricted range  Speech:  clear, coherent  Psychomotor Behavior:  no evidence of tardive dyskinesia, dystonia, or tics, intact station, gait and muscle tone and mild fidgeting  Thought Process:  linear and forward thinking  Associations:  no loose associations  Thought Content:  no evidence of psychotic thought, no auditory hallucinations present and no visual hallucinations present  Insight:  fair  Judgment:  fair  Oriented to:  time, person, and place  Attention Span and Concentration:  fair  Recent and Remote Memory:  fair  Language: Pt is fluent in conversational English  Fund of Knowledge: appropriate  Muscle Strength and Tone: normal  Gait and Station: Normal         Selected Labs:     3/31: BMP wnl, except glucose 55 (L), Cr 0.5 (L)    WBC/diff (weekly):  4/4: WBC 5.2, ANC 2.7    "

## 2017-04-07 NOTE — PROGRESS NOTES
Pt withdrawn from others but visible in milieu. Pt ate meals and snacks in dining area. Affect is blunted, flat, sad, mood is calm, depressed. Pt stated she went to court today and they want her to go to a group home for two months, and this makes her sad because she does not want to. Pt denies hallucinations. Pt denies SI/SIB.        04/06/17 2151   Behavioral Health   Hallucinations denies / not responding to hallucinations   Thinking poor concentration   Orientation person: oriented;date: oriented;place: oriented;time: oriented   Memory baseline memory   Insight insight appropriate to situation   Judgement impaired   Eye Contact at examiner   Affect blunted, flat;sad   Mood depressed;mood is calm   Physical Appearance/Attire attire appropriate to age and situation   Hygiene well groomed   Suicidality other (see comments)  (denies)   Self Injury other (see comment)  (denies)   Activity withdrawn   Speech clear   Medication Sensitivity no stated side effects   Psychomotor / Gait balanced;steady   Oxygen Therapy   SpO2 99 %   Psycho Education   Type of Intervention 1:1 intervention   Response participates, initiates socially appropriate   Hours 0.5   Treatment Detail receiving feedback   Activities of Daily Living   Hygiene/Grooming independent   Oral Hygiene independent   Dress street clothes;independent   Laundry with supervision   Room Organization independent   Activity   Activity Level of Assistance independent

## 2017-04-07 NOTE — PLAN OF CARE
Problem: Psychotic Symptoms  Goal: Psychotic Symptoms  Signs and symptoms of listed problems will be absent or manageable.  -pt will verbalize coping skills to manage hallucinations  -pt will participate in groups demonstrating adequate attention span  -pt will identify when or if they are having any medication side effects  -pt will be able to have reality based conversations   Outcome: Improving  Nona continues to express frustration stating she was told in court she must go to IRTS-states she wants to return home to family-denies hallucinations-discussed importance of maintaining regimen to prevent return of sxs-Nona spoke of stoping meds in past thinking she didn't need them because she felt so well-also spoke of becoming busy with responsibilities at home at home and missing doses-states she will try her best to take meds as scheduled

## 2017-04-07 NOTE — PROGRESS NOTES
"INITIAL O.T. ASSESSMENT: Nona has attended OT sessions intermittently since admission, usually opting to \"just watch\" rather than actively participate. In the milieu she is friendly and polite. Reports boredom and restlessness on the unit. Is eager for discharge.     OT staff explained the purpose of pt being involved in current treatment plan.      Plan: Encourage ongoing attendance as able to meet self-stated goals, implement positive coping skills, engage in graded opportunities for success, and provide assessment ongoing.       04/07/17 0900   Clinical Impression   Affect Appropriate to situation;Flat   Orientation Oriented to person, place and time   Appearance and ADLs Neatly groomed   Attention to Internal Stimuli Needs further assessment   Interaction Skills Interacts with prompts, minimal response;Withdrawn   Ability to Communicate Needs Independent   Verbal Content Appropriate to topic   Ability to Maintain Boundaries Maintains appropriate physical boundaries;Maintains appropriate verbal boundaries   Participation Observes only;Resistive   Concentration Concentrates 10-20 minutes   Ability to Concentrate With structure;Preoccupied   Follows and Comprehends Directions Independently follows 1 step verbal directions   Memory Needs further assessment   Organization Independently organizes simple tasks   Decision Making Independent   Planning and Problem Solving Indentifies problems but not alternatives   Ability to Apply and Learn Concepts Comprehends concepts, but needs assist to apply   Frustrations / Stress Tolerance Independently identifies sources of frustration/stress   Level of Insight Some insight   Self Esteem Accepts positive feedback   Social Supports Needs further assessment     "

## 2017-04-08 PROCEDURE — 25000132 ZZH RX MED GY IP 250 OP 250 PS 637: Performed by: NURSE PRACTITIONER

## 2017-04-08 PROCEDURE — 25000132 ZZH RX MED GY IP 250 OP 250 PS 637: Performed by: STUDENT IN AN ORGANIZED HEALTH CARE EDUCATION/TRAINING PROGRAM

## 2017-04-08 PROCEDURE — 12400001 ZZH R&B MH UMMC

## 2017-04-08 PROCEDURE — 99222 1ST HOSP IP/OBS MODERATE 55: CPT | Performed by: NURSE PRACTITIONER

## 2017-04-08 PROCEDURE — S0136 CLOZAPINE, 25 MG: HCPCS | Performed by: STUDENT IN AN ORGANIZED HEALTH CARE EDUCATION/TRAINING PROGRAM

## 2017-04-08 RX ORDER — MINERAL OIL/HYDROPHIL PETROLAT
OINTMENT (GRAM) TOPICAL
Status: DISCONTINUED | OUTPATIENT
Start: 2017-04-08 | End: 2017-04-14 | Stop reason: HOSPADM

## 2017-04-08 RX ADMIN — DIPHENHYDRAMINE HYDROCHLORIDE 50 MG: 25 CAPSULE ORAL at 20:01

## 2017-04-08 RX ADMIN — FLUDROCORTISONE ACETATE 100 MCG: 0.1 TABLET ORAL at 20:01

## 2017-04-08 RX ADMIN — FLUDROCORTISONE ACETATE 100 MCG: 0.1 TABLET ORAL at 09:34

## 2017-04-08 RX ADMIN — PRENATAL VIT W/ FE FUMARATE-FA TAB 27-0.8 MG 1 TABLET: 27-0.8 TAB at 09:34

## 2017-04-08 RX ADMIN — CLOZAPINE 50 MG: 25 TABLET ORAL at 20:01

## 2017-04-08 RX ADMIN — VITAMIN D, TAB 1000IU (100/BT) 2000 UNITS: 25 TAB at 09:36

## 2017-04-08 RX ADMIN — ACETAMINOPHEN 650 MG: 325 TABLET, FILM COATED ORAL at 20:01

## 2017-04-08 ASSESSMENT — ACTIVITIES OF DAILY LIVING (ADL)
GROOMING: INDEPENDENT
DRESS: INDEPENDENT
ORAL_HYGIENE: INDEPENDENT
GROOMING: INDEPENDENT
ORAL_HYGIENE: INDEPENDENT
LAUNDRY: WITH SUPERVISION
LAUNDRY: WITH SUPERVISION
DRESS: INDEPENDENT

## 2017-04-08 NOTE — PROGRESS NOTES
Pt mentioned that her medication was making her tired which is why she had been sleeping all morning. Pt did get out of bed around noon and was visible in the milieu. Pt denies SI/SIB and hallucinations. She stated she was sad, angry today because she does not want to go to a group home for two months. She said she would be sad having to be away from her children for such a long period of time.     04/08/17 1300   Behavioral Health   Thoughts/Cognition (WDL) insight   Hallucinations denies / not responding to hallucinations   Thinking poor concentration;paranoid   Orientation person: oriented;place: oriented;date: oriented   Memory baseline memory   Insight insight appropriate to situation   Judgement impaired   Eye Contact at examiner   Affect blunted, flat;sad;angry   Mood mood is calm;depressed   Physical Appearance/Attire attire appropriate to age and situation   Hygiene well groomed   Suicidality other (see comments)  (denies)   Self Injury other (see comment)  (denies)   Activity isolative;withdrawn   Speech clear;coherent   Psychomotor / Gait balanced;steady   Psycho Education   Type of Intervention 1:1 intervention   Response participates with encouragement   Hours 0.5   Treatment Detail Check in    Activities of Daily Living   Hygiene/Grooming independent   Oral Hygiene independent   Dress independent   Laundry with supervision   Room Organization independent   Activity   Activity Level of Assistance independent

## 2017-04-08 NOTE — CONSULTS
Internal Medicine Consult - Initial Visit       Nona Finley MRN# [id number]   YOB: 1980 Age: 36 year old   Date of Admission: 3/21/2017  PCP: Vanessa Thompson  Date of Service: 4/8/2017    Referring Provider: Danish Levi MD  Reason for Consult: Tooth pain/decay         Assessment and Recommendations:   Nona Finley is a 36 year old female with a history of resolved TB, varicose veins, postpartum depression, bipolar disorder, and schizoaffective disorder admitted for worsening auditory command hallucinations.     Schizoaffective disorder, BPD - Management per Psychiatry     Tooth pain, cavity - Ongoing last 3 months, worsening over last several days. D/w Dental, would not be able to treat or perform extraction during weekend, but will schedule visit for Monday, 4/10/17 and possible extraction later this week.     - Orajel QID PRN for pain   - Continue Tylenol, Advil PRN       Medicine will sign off, no further recommendations at this time.  Please feel free to reconsult if patient's symptoms worsen or if new problems arise.  Thank you for this consult.    Tish Rod CNP  Hospitalist Service   Pager: [phone]       Reason for Visit:   Medicine consulted for tooth pain, decay          History of Present Illness:   History is obtained from the patient and medical record.     This patient is a 36 year old female with a history of resolved TB, varicose veins, postpartum depression, bipolar disorder, and schizoaffective disorder admitted for worsening auditory command hallucinations.     Internal Medicine service was asked to see patient for tooth pain.  She states that she has had pain at her upper R molar for the last 3 months.  She was seen in her primary dental clinic prior to this admission for routine cleaning.  She states that this clinic had planned to refer her to [facility] for extraction of upper R molar.  She feels that the pain has gotten worse over last 3 days.  She  describes it as sharp pain, worse with eating and talking.  No difficulty swallowing.  She denies any bleeding, gumline swelling, or drainage from the tooth.  She denies fevers, chills, nausea, vomiting, chest pain, dyspnea, and abdominal pain.  No acute distress.            Review of Systems:   A 10 point ROS was performed and negative unless otherwise noted in HPI.           Past Medical History:   Reviewed and updated in Epic.  Past Medical History:   Diagnosis Date     Bipolar 1 disorder (H)      Bipolar affective disorder, depressed, severe, with psychotic behavior (H)      NONSPECIFIC MEDICAL HISTORY     h/o +PPD. Neg CXR 2006     Postpartum depression 8/18/2011     Tuberculosis      Varicosities              Past Surgical History:   Reviewed and updated in Epic.  Past Surgical History:   Procedure Laterality Date     NO HISTORY OF SURGERY       NO HISTORY OF SURGERY  06/13/2013    Per patient reported this             Social History:   Reviewed and updated in [employer].  Social History     Social History     Marital status:      Spouse name: N/A     Number of children: 2     Years of education: N/A     Occupational History      None      Social History Main Topics     Smoking status: Never Smoker     Smokeless tobacco: Never Used     Alcohol use No     Drug use: No     Sexual activity: Yes     Partners: Male     Birth control/ protection: None     Other Topics Concern     Blood Transfusions No     Occupational Exposure No     Social History Narrative    Caffeine intake/servings daily - 2 times a week    Calcium intake/servings daily - 1-2    Exercise no times weekly - describe active with children    Sunscreen used - soemtimes    Seatbelts used - Yes    Guns stored in the home - Yes    Self Breast Exam - Yes    Pap test up to date -  Yes    Eye exam up to date -  No    Dental exam up to date -  No    DEXA scan up to date -  Not Applicable    Flex Sig/Colonoscopy up to date -  Not Applicable    Mammography  "up to date -  Not Applicable    Immunizations reviewed and up to date - needs tdap at postpartum    Abuse: Current or Past (Physical, Sexual or Emotional) - No    Do you feel safe in your environment - Yes    Do you cope well with stress - sometimes    Do you suffer from insomnia - No    Last updated by: Joanne Gilligan RN 12/16/10                                  Family History:   Reviewed and updated in Epic.  Family History   Problem Relation Age of Onset     Respiratory Father      asthma     Asthma Father      CEREBROVASCULAR DISEASE Mother      DIABETES Mother      Neurologic Disorder Brother      mental health problem?     Neurologic Disorder Sister      seizures (half sister)     Respiratory Paternal Grandfather      asthma     Respiratory Sister      asthma     Respiratory Brother      asthma     Neurologic Disorder Daughter      autism      Hypertension Maternal Grandmother      Neurologic Disorder Sister      seizures     DIABETES Maternal Grandfather      Coronary Artery Disease Son              Allergies:   No Known Allergies          Medications:     Current Facility-Administered Medications   Medication     benzocaine (ORAJEL MAXIMUM STRENGTH) 20 % gel     mirtazapine (REMERON) tablet 15 mg     fludrocortisone (FLORINEF) tablet 100 mcg     cloZAPine (CLOZARIL) tablet 50 mg     naproxen (NAPROSYN) tablet 500 mg     [START ON 4/10/2017] paliperidone (INVEGA SUSTENNA) injection SUSP 234 mg     diphenhydrAMINE (BENADRYL) capsule 25-50 mg     prenatal multivitamin  plus iron per tablet 1 tablet     cholecalciferol (vitamin D) tablet 2,000 Units     hydrOXYzine (ATARAX) tablet 25-50 mg     OLANZapine (zyPREXA) tablet 10 mg    Or     OLANZapine (zyPREXA) injection 10 mg     acetaminophen (TYLENOL) tablet 650 mg            Physical Exam:   Blood pressure 103/65, pulse 93, temperature 98.8  F (37.1  C), resp. rate 12, height 1.651 m (5' 5\"), weight 70.8 kg (156 lb), SpO2 99 %, not currently " breastfeeding.  Body mass index is 25.96 kg/(m^2).  GENERAL: Alert and oriented x 3. Well nourished, well developed.  No acute distress.   HEENT: Anicteric sclera. PERRL. Mucous membranes dry. No facial swelling.  Upper R molar and gumline without visible swelling, drainage, or erythema.   CV: RRR. S1, S2. No murmurs appreciated.   RESPIRATORY: Effort normal on room air. Lungs CTAB with no wheezing, rales, rhonchi.   GI: Abdomen soft and non distended, bowel sounds present. No tenderness, rebound, guarding.   MUSCULOSKELETAL: No joint swelling or tenderness. Moves all extremities.   NEUROLOGICAL: No focal deficits. Strength 5/5 bilaterally in upper and lower extremities.   EXTREMITIES: No peripheral edema. Intact bilateral pedal pulses.   SKIN: No jaundice. No rashes. Grossly warm, dry, and intact.           Data:   I personally reviewed the following studies:  ROUTINE IP LABS (Last four results)  CMP No lab results found in last 7 days.  CBC   Recent Labs  Lab 04/04/17  0757   WBC 5.2     INR No lab results found in last 7 days.        Unresulted Labs Ordered in the Past 30 Days of this Admission     No orders found from 1/20/2017 to 3/22/2017.

## 2017-04-08 NOTE — PROGRESS NOTES
04/07/17 2100   Behavioral Health   Thoughts/Cognition (WDL) insight   Hallucinations denies / not responding to hallucinations   Thinking paranoid   Orientation person: oriented;place: oriented   Memory baseline memory   Insight insight appropriate to situation   Judgement impaired   Eye Contact at examiner   Affect blunted, flat;sad;tense   Mood depressed;anxious;mood is calm;irritable   Physical Appearance/Attire attire appropriate to age and situation   Hygiene well groomed   Activity isolative;withdrawn   Speech clear;coherent   Coping/Psychosocial   Verbalized Emotional State acceptance;depression;frustration   Plan Of Care Reviewed With patient   Patient Agreement with Plan of Care agrees   Psycho Education   Type of Intervention 1:1 intervention   Response participates with encouragement   Hours 0.5   Activities of Daily Living   Hygiene/Grooming independent   Oral Hygiene independent   Dress independent   Laundry with supervision   Room Organization independent   Activity   Activity Level of Assistance independent       Patient spent the evening in common area pacing the hallway. Mood was calm and quiet. Affect was tense and blunted. She had a visitor from child protective service. She ate dinner and snack with peers in common area. She spent the evening in deep though staring through the hallway.

## 2017-04-09 ENCOUNTER — DOCUMENTATION ONLY (OUTPATIENT)
Dept: DENTISTRY | Facility: CLINIC | Age: 37
End: 2017-04-09

## 2017-04-09 PROCEDURE — 25000132 ZZH RX MED GY IP 250 OP 250 PS 637: Performed by: PSYCHIATRY & NEUROLOGY

## 2017-04-09 PROCEDURE — 12400001 ZZH R&B MH UMMC

## 2017-04-09 PROCEDURE — 25000132 ZZH RX MED GY IP 250 OP 250 PS 637: Performed by: STUDENT IN AN ORGANIZED HEALTH CARE EDUCATION/TRAINING PROGRAM

## 2017-04-09 PROCEDURE — S0136 CLOZAPINE, 25 MG: HCPCS | Performed by: STUDENT IN AN ORGANIZED HEALTH CARE EDUCATION/TRAINING PROGRAM

## 2017-04-09 RX ADMIN — VITAMIN D, TAB 1000IU (100/BT) 2000 UNITS: 25 TAB at 11:23

## 2017-04-09 RX ADMIN — CLOZAPINE 50 MG: 25 TABLET ORAL at 20:13

## 2017-04-09 RX ADMIN — FLUDROCORTISONE ACETATE 100 MCG: 0.1 TABLET ORAL at 20:13

## 2017-04-09 RX ADMIN — FLUDROCORTISONE ACETATE 100 MCG: 0.1 TABLET ORAL at 11:24

## 2017-04-09 RX ADMIN — MIRTAZAPINE 15 MG: 15 TABLET, FILM COATED ORAL at 20:14

## 2017-04-09 RX ADMIN — PRENATAL VIT W/ FE FUMARATE-FA TAB 27-0.8 MG 1 TABLET: 27-0.8 TAB at 11:24

## 2017-04-09 ASSESSMENT — ACTIVITIES OF DAILY LIVING (ADL)
ORAL_HYGIENE: INDEPENDENT
LAUNDRY: WITH SUPERVISION
ORAL_HYGIENE: INDEPENDENT
DRESS: INDEPENDENT
GROOMING: HANDWASHING;SHOWER;INDEPENDENT
GROOMING: INDEPENDENT
LAUNDRY: WITH SUPERVISION
DRESS: STREET CLOTHES;INDEPENDENT

## 2017-04-09 NOTE — PROGRESS NOTES
Dental Service Consultation        Nona Finley MRN# [id number]   YOB: 1980 Age: 36 year old   Date of Admission: (Not on file)     Reason for consult: I was asked by Tish LUJAN (internal med) to evaluate this patient for pain on the upper right.           Assessment and Plan:   Assessment: #1-Buccal caries    Plan: limited exam        Progress Note: Upon intra oral exam, caries were found on the buccal surface of #1. Patient reports no current pain or difficulties from this tooth at the moment. Patient reports having pain yesterday, but it went away this morning. Patient was advised that depending on the extent of the decay, tooth may be restorable with a large restoration, or extraction. Patient will be scheduled to be seen this week at the [facility] clinic in the [place] [address]. The clinic phone number is: [phone]       Chief Complaint:   My upper right tooth was hurting me a lot yesterday, but the pain is gone today. I've been told I have a large cavity and it needs to be extracted.    History is obtained from the patient         History of Present Illness:   This patient is a 36 year old female who presents with dental caries on #1, upper right third molar              Past Medical History:     Past Medical History:   Diagnosis Date     Bipolar 1 disorder (H)      Bipolar affective disorder, depressed, severe, with psychotic behavior (H)      NONSPECIFIC MEDICAL HISTORY     h/o +PPD. Neg CXR 2006     Postpartum depression 8/18/2011     Tuberculosis      Varicosities              Past Surgical History:     Past Surgical History:   Procedure Laterality Date     NO HISTORY OF SURGERY       NO HISTORY OF SURGERY  06/13/2013    Per patient reported this               Social History:     Social History   Substance Use Topics     Smoking status: Never Smoker     Smokeless tobacco: Never Used     Alcohol use No             Family History:     Family History   Problem  Relation Age of Onset     Respiratory Father      asthma     Asthma Father      CEREBROVASCULAR DISEASE Mother      DIABETES Mother      Neurologic Disorder Brother      mental health problem?     Neurologic Disorder Sister      seizures (half sister)     Respiratory Paternal Grandfather      asthma     Respiratory Sister      asthma     Respiratory Brother      asthma     Neurologic Disorder Daughter      autism      Hypertension Maternal Grandmother      Neurologic Disorder Sister      seizures     DIABETES Maternal Grandfather      Coronary Artery Disease Son              Immunizations:     Immunization History   Administered Date(s) Administered     Influenza Vaccine IM 3yrs+ 4 Valent IIV4 12/04/2015, 09/22/2016     TDAP Vaccine (Adacel) 07/03/2011, 09/22/2016             Allergies:   No Known Allergies          Medications:     Current Facility-Administered Medications Ordered in Epic   Medication Dose Route Frequency Last Rate Last Dose     benzocaine (ORAJEL MAXIMUM STRENGTH) 20 % gel   Mouth/Throat 4x Daily PRN         mineral oil-hydrophilic petrolatum (AQUAPHOR)   Topical Q1H PRN         mirtazapine (REMERON) tablet 15 mg  15 mg Oral At Bedtime   15 mg at 04/05/17 2055     fludrocortisone (FLORINEF) tablet 100 mcg  100 mcg Oral BID   100 mcg at 04/09/17 1124     cloZAPine (CLOZARIL) tablet 50 mg  50 mg Oral At Bedtime   50 mg at 04/08/17 2001     naproxen (NAPROSYN) tablet 500 mg  500 mg Oral Q12H PRN   500 mg at 04/07/17 2030     [START ON 4/10/2017] paliperidone (INVEGA SUSTENNA) injection SUSP 234 mg  234 mg Intramuscular Q28 Days         diphenhydrAMINE (BENADRYL) capsule 25-50 mg  25-50 mg Oral Daily PRN   50 mg at 04/08/17 2001     prenatal multivitamin  plus iron per tablet 1 tablet  1 tablet Oral Daily   1 tablet at 04/09/17 1124     cholecalciferol (vitamin D) tablet 2,000 Units  2,000 Units Oral Daily   2,000 Units at 04/09/17 1123     hydrOXYzine (ATARAX) tablet 25-50 mg  25-50 mg Oral Q4H PRN    50 mg at 04/07/17 2030     OLANZapine (zyPREXA) tablet 10 mg  10 mg Oral Q2H PRN        Or     OLANZapine (zyPREXA) injection 10 mg  10 mg Intramuscular Q2H PRN         acetaminophen (TYLENOL) tablet 650 mg  650 mg Oral Q4H PRN   650 mg at 04/08/17 2001     No current Epic-ordered outpatient prescriptions on file.             Review of Systems:   The 10 point Review of Systems is negative other than noted in the HPI            Physical Exam:   Vitals were reviewed                       Head and neck exam: no tenderness on palpation on UL, currently not in pain, no cervical lymphadenopathy    Intraoral exam: large decay on #1Buccal       Data:   Caries: #1Buccal    Branden Caal DDS MS  PGY1  Pager: [phone]

## 2017-04-09 NOTE — PROGRESS NOTES
Pt visible in milieu, withdrawn from others. Pt did not attend group but ate meals and snacks in dining area. Pt denies hallucinations. Pt denies SI/SIB. Affect is blunted, flat, mood is calm, depressed. Pt asked about having her children visit, although some of them are under 12 years old. Pt was directed to talk to RN and team about this.        04/08/17 2100   Behavioral Health   Hallucinations denies / not responding to hallucinations   Thinking poor concentration   Orientation person: oriented;place: oriented   Memory baseline memory   Insight insight appropriate to situation   Judgement impaired   Eye Contact at examiner   Affect blunted, flat   Mood mood is calm;depressed   Physical Appearance/Attire attire appropriate to age and situation   Hygiene neglected grooming - unclean body, hair, teeth   Suicidality other (see comments)  (denies)   Self Injury other (see comment)  (denies)   Activity isolative;withdrawn   Speech clear;coherent   Medication Sensitivity no stated side effects   Psychomotor / Gait balanced;steady   Psycho Education   Type of Intervention 1:1 intervention   Response participates, initiates socially appropriate   Hours 0.5   Treatment Detail ways to cope   Activities of Daily Living   Hygiene/Grooming independent   Oral Hygiene independent   Dress independent   Laundry with supervision   Room Organization independent   Activity   Activity Level of Assistance independent

## 2017-04-09 NOTE — PLAN OF CARE
Problem: Psychotic Symptoms  Goal: Psychotic Symptoms  Signs and symptoms of listed problems will be absent or manageable.  -pt will verbalize coping skills to manage hallucinations  -pt will participate in groups demonstrating adequate attention span  -pt will identify when or if they are having any medication side effects  -pt will be able to have reality based conversations   Outcome: Improving  Nona denies hallucinations-slept late this AM stating medications make her sleepy-states she feels well today, although wishes she was home with her family-minimal interactions with peers

## 2017-04-10 PROCEDURE — 25000128 H RX IP 250 OP 636: Performed by: PSYCHIATRY & NEUROLOGY

## 2017-04-10 PROCEDURE — 12400001 ZZH R&B MH UMMC

## 2017-04-10 PROCEDURE — 25000132 ZZH RX MED GY IP 250 OP 250 PS 637: Performed by: STUDENT IN AN ORGANIZED HEALTH CARE EDUCATION/TRAINING PROGRAM

## 2017-04-10 PROCEDURE — S0136 CLOZAPINE, 25 MG: HCPCS | Performed by: STUDENT IN AN ORGANIZED HEALTH CARE EDUCATION/TRAINING PROGRAM

## 2017-04-10 PROCEDURE — 99232 SBSQ HOSP IP/OBS MODERATE 35: CPT | Mod: GC | Performed by: PSYCHIATRY & NEUROLOGY

## 2017-04-10 RX ADMIN — PALIPERIDONE PALMITATE 234 MG: 234 INJECTION INTRAMUSCULAR at 12:00

## 2017-04-10 RX ADMIN — FLUDROCORTISONE ACETATE 100 MCG: 0.1 TABLET ORAL at 20:46

## 2017-04-10 RX ADMIN — MIRTAZAPINE 15 MG: 15 TABLET, FILM COATED ORAL at 20:46

## 2017-04-10 RX ADMIN — PRENATAL VIT W/ FE FUMARATE-FA TAB 27-0.8 MG 1 TABLET: 27-0.8 TAB at 09:00

## 2017-04-10 RX ADMIN — VITAMIN D, TAB 1000IU (100/BT) 2000 UNITS: 25 TAB at 09:00

## 2017-04-10 RX ADMIN — CLOZAPINE 50 MG: 25 TABLET ORAL at 20:46

## 2017-04-10 RX ADMIN — FLUDROCORTISONE ACETATE 100 MCG: 0.1 TABLET ORAL at 09:03

## 2017-04-10 RX ADMIN — NAPROXEN 500 MG: 500 TABLET ORAL at 16:03

## 2017-04-10 ASSESSMENT — ACTIVITIES OF DAILY LIVING (ADL)
DRESS: INDEPENDENT
LAUNDRY: WITH SUPERVISION
DRESS: INDEPENDENT
ORAL_HYGIENE: INDEPENDENT
GROOMING: INDEPENDENT
LAUNDRY: WITH SUPERVISION
GROOMING: INDEPENDENT
ORAL_HYGIENE: INDEPENDENT

## 2017-04-10 NOTE — PLAN OF CARE
"Problem: Psychotic Symptoms  Goal: Psychotic Symptoms  Signs and symptoms of listed problems will be absent or manageable.  -pt will verbalize coping skills to manage hallucinations  -pt will participate in groups demonstrating adequate attention span  -pt will identify when or if they are having any medication side effects  -pt will be able to have reality based conversations   Outcome: Rafael Castellano did not attend community meeting. She has sad affect and appears tired, with dark circles under eyes. She agreed to talk privately. Says that \"I've been crying all day.\" She went on to say that she is sad because she wants to be with her children. \"They need me.\" Asked what would be different when she is discharged this time, she said \"I know the voices are not real. I will take my medication.\" She is anxious about court Tuesday and what will be decided there. She says that she has not been sleeping well because of her worries. She also complained about drooling too much at night. This nurse gave her a notebook to write her concerns for team tomorrow.       "

## 2017-04-10 NOTE — PROGRESS NOTES
"   04/10/17 1400   Behavioral Health   Hallucinations denies / not responding to hallucinations   Thinking intact   Orientation person: oriented;place: oriented   Memory baseline memory   Insight insight appropriate to situation   Judgement impaired   Eye Contact at examiner   Affect sad;full range affect   Mood anxious;mood is calm   Physical Appearance/Attire neat   Hygiene well groomed   Suicidality other (see comments)  (denies )   Self Injury other (see comment)  (denies )   Activity isolative;withdrawn   Speech clear;coherent   Activities of Daily Living   Hygiene/Grooming independent   Oral Hygiene independent   Dress independent   Laundry with supervision   Room Organization independent       Patient denies SI and SIB.  Patient reported feeling sad (6) \" I'm sad about going to the group home,\" anxious (4) I'm anxious about going to court.\"  Patient reported feeling \" just pain from my teeth otherwise I'm doing good.\"  Patient visible in milieu, did not attended group, but socialized with others.  Patient daily goal \"sleep.\"  Patient goal after discharge \" just take care of my children and myself.\"  Patient reported no nutritional concerns.  Patient wants visitors.    "

## 2017-04-10 NOTE — PROGRESS NOTES
"    ----------------------------------------------------------------------------------------------------------  [facility], [place]   Psychiatric Progress Note     Assessment    Presentation: Nona Finley is a 36 year old female w/h/o schizoaffective disorder depressive type with recent hospitalization in Feb. who presented 3/21/17 by ambulance with SI in the setting of worsening AH and paranoia.    Diagnostic Impression: Pt's presentation consistent with decompensation of schizoaffective disorder likely due to poor medication (clozapine) adherence. 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  Concerns for discharge include SI and medication non-adherence.     Hospital course: Nona Finley was admitted to station 22 as a voluntary patient.  Patient initially refused medication which she thought was poisoned, prompting filing paperwork for commitment and Reyes to [facility]. Subsequently she agreed to restart clozapine, but developed tachycardia and orthostatic hypotension with 25 mg dose, so fludrocortisone was started at 0.1 mg QD, which seemed to resolve her adverse reaction, but after two days, she again refused all medications, believing that they were poison, and said she could not eat or drink because water smelled \"like bleach.\"  Patient has had inconsistent adherence to medication and vacillating symptom occurrence.  Once taking clozapine 50 mg QHS and fludrocortisone 0.1 mg BID were taken consistently for 3 days, psychotic Sx largely resolved, her psychotic Sx improved and she changed her mind from wanting to leave her family and live in a group home to wishing to return home to care for her children. On  4/5 to address chronic mild akathisia ( likely secondeary to " paliperidone) , propranolol and benztropine were considered however due to her cardiovascular symptoms, mirtazapine 15 mg QHS was added . Court supported full commitment on 4/6,  subsequent court date for Reyes scheduled for 4/11.     Medical course: Tooth extraction for dental caries on 4/10.     Plan     Principal Diagnosis: Schizoaffective disorder, depressive type    Medications:   Continue:  - paliperidone 234 mg IM q28 days ( next dose 5/8/17)  - Clozapine 50 mg PO qHS   - fludrocortisone 0.1 mg PO bid  -mirtazapine 15 mg qhs  - olanzapine 10 mg PO/IM PRN  - benadryl 25-50 mg PO q4h prn  - hydroxyzine 25-50 mg PO q4h prn    Change: None    Hold: None    Laboratory/Imaging:   -WBC/diff q1 wk     Consults: None     Patient will be treated in therapeutic milieu with appropriate individual and group therapies as described.    Secondary psychiatric diagnoses of concern this admission: None    Medical diagnoses to be addressed this admission:   # Insomnia: resolved    #Dental Caries: Consulted Medicine, Dental service for tooth pain.  Tooth extracted 4/10.    -benzocaine 20% gel 4 qd PRN  -naproxen 500 mg q12 hr PRN  -acetaminophen 650 mg q4 hr PRN    Relevant psychosocial stressors: Severe including motherhood: 6 children, ages 6 mo to 12 years, oldest is ind[place] with autism spectrum, needs PCA 5 hour+/day, provided by mother-in-law.     Legal Status:  Court hold. Awaiting commitment and Reyes, next court date Tues 4/11.    Safety Assessment:   Checks: Status 15  Precautions: Suicide  Self-harm  Pt has not required locked seclusion or restraints in the past 24 hours to maintain safety, please refer to RN documentation for further details.     The risks, benefits, alternatives and side effects have been discussed and are understood by the patient and other caregivers.    Anticipated Disposition/Discharge Date:   Awaiting court ruling on commitment/Reyes.     Attestation:    This documentation accurately reflects  the services I personally performed and treatment decisions made by me in consultation with the attending physician.    Scribed by Vira Mandujano, MS3, for Dr. Mily Domínguez, Resident   Attestation:  I have reviewed and edited the documentation recorded by the scribe. This documentation accurately reflects the services I personally performed and treatment decisions made by me in consultation with the attending physician.     Mily Domínguez MD  Psychiatry PGY1    Psychiatry Attending Attestation:  This patient has been seen and evaluated by me, Danish Levi M.D.  The patient's condition and treatment plan were discussed with the resident, and care coordinated with the CTC and RN. I reviewed, edited and agree with the findings and plan in this note.     Danish Levi M.D.   of Psychiatry    Interim History:   The patient's care was discussed with the treatment team and chart notes were reviewed.    Sleep: 6.25h  VSS  Scheduled Meds: Adherent, except mirtazapine on 4/7, 4/8  PRN:  Hydroxizine, 4/7; diphenhydramine, 4/8  Staff notes: Reviewed. Sad and angry over weekend about court.  Attended some groups.  No SI, no AH.  Feeling tired.    Pt was interviewed in the conference room.  Patient reported no change in regards to her mood or akathisia. After discussing the akathisia with patient and the reason for refusing the Remeron, she reported that she was told the medication is for sleep and since she is already having grogginess in the morning she avoids it. She and the nursing staff were given the information regarding the indication for akathisia for this particular case. She reports drooling, which has started since being started on Clozapine. She drools mainly at night and in the morning is mindful about that. Team provided her with modification at night to use water resistant pillow cases and towels on it. She also reports dizziness due to lack of enough hydration throughout weekend.  "She endorsed her unsatisfaction of the court rulling and she was given the permission to meet her children at OT room if the nursing staff permit.           Review of systems:     The 10 point Review of Systems is negative other than noted above         Medications:     Current Facility-Administered Medications   Medication     benzocaine (ORAJEL MAXIMUM STRENGTH) 20 % gel     mineral oil-hydrophilic petrolatum (AQUAPHOR)     mirtazapine (REMERON) tablet 15 mg     fludrocortisone (FLORINEF) tablet 100 mcg     cloZAPine (CLOZARIL) tablet 50 mg     naproxen (NAPROSYN) tablet 500 mg     paliperidone (INVEGA SUSTENNA) injection SUSP 234 mg     diphenhydrAMINE (BENADRYL) capsule 25-50 mg     prenatal multivitamin  plus iron per tablet 1 tablet     cholecalciferol (vitamin D) tablet 2,000 Units     hydrOXYzine (ATARAX) tablet 25-50 mg     OLANZapine (zyPREXA) tablet 10 mg    Or     OLANZapine (zyPREXA) injection 10 mg     acetaminophen (TYLENOL) tablet 650 mg             Allergies:   No Known Allergies         Psychiatric Examination:   /73  Pulse 97  Temp 99  F (37.2  C) (Oral)  Resp 16  Ht 1.651 m (5' 5\")  Wt 72.8 kg (160 lb 8 oz)  SpO2 99%  BMI 26.71 kg/m2  Weight is 160 lbs 8 oz  Body mass index is 26.71 kg/(m^2).    Appearance: [place] female, wearing hijab,  cooperative, no apparent distress, awake,  and neatly groomed  Attitude:  cooperative and reserved  Eye Contact:  good, fair  Mood:  \"good, no problems\"  Affect:  mood congruent, intensity is normal, fixed mobility and restricted range  Speech:  clear, coherent  Psychomotor Behavior: Mild fidgeting, restlessness no evidence of tardive dyskinesia, dystonia, or tics and intact station, gait and muscle tone  Thought Process:  linear and forward thinking  Associations:  no loose associations  Thought Content:  no evidence of psychotic thought, no auditory hallucinations present and no visual hallucinations present  Insight:  fair  Judgment:  " fair  Oriented to:  time, person, and place  Attention Span and Concentration:  fair  Recent and Remote Memory:  fair  Language: Pt is fluent in conversational English  Fund of Knowledge: appropriate  Muscle Strength and Tone: normal  Gait and Station: Normal         Selected Labs:     3/31: BMP wnl, except glucose 55 (L), Cr 0.5 (L)    WBC/diff (weekly):  4/4: WBC 5.2, ANC 2.7

## 2017-04-11 LAB
BASOPHILS # BLD AUTO: 0 10E9/L (ref 0–0.2)
BASOPHILS NFR BLD AUTO: 0.7 %
DIFFERENTIAL METHOD BLD: NORMAL
EOSINOPHIL # BLD AUTO: 0.2 10E9/L (ref 0–0.7)
EOSINOPHIL NFR BLD AUTO: 3.5 %
IMM GRANULOCYTES # BLD: 0 10E9/L (ref 0–0.4)
IMM GRANULOCYTES NFR BLD: 0.2 %
LYMPHOCYTES # BLD AUTO: 2 10E9/L (ref 0.8–5.3)
LYMPHOCYTES NFR BLD AUTO: 36.3 %
MONOCYTES # BLD AUTO: 0.4 10E9/L (ref 0–1.3)
MONOCYTES NFR BLD AUTO: 7.2 %
NEUTROPHILS # BLD AUTO: 2.8 10E9/L (ref 1.6–8.3)
NEUTROPHILS NFR BLD AUTO: 52.1 %
NRBC # BLD AUTO: 0 10*3/UL
NRBC BLD AUTO-RTO: 0 /100
WBC # BLD AUTO: 5.4 10E9/L (ref 4–11)

## 2017-04-11 PROCEDURE — 99232 SBSQ HOSP IP/OBS MODERATE 35: CPT | Mod: GC | Performed by: PSYCHIATRY & NEUROLOGY

## 2017-04-11 PROCEDURE — 12400001 ZZH R&B MH UMMC

## 2017-04-11 PROCEDURE — 85004 AUTOMATED DIFF WBC COUNT: CPT | Performed by: PSYCHIATRY & NEUROLOGY

## 2017-04-11 PROCEDURE — 25000132 ZZH RX MED GY IP 250 OP 250 PS 637: Performed by: STUDENT IN AN ORGANIZED HEALTH CARE EDUCATION/TRAINING PROGRAM

## 2017-04-11 PROCEDURE — S0136 CLOZAPINE, 25 MG: HCPCS | Performed by: STUDENT IN AN ORGANIZED HEALTH CARE EDUCATION/TRAINING PROGRAM

## 2017-04-11 PROCEDURE — 85048 AUTOMATED LEUKOCYTE COUNT: CPT | Performed by: PSYCHIATRY & NEUROLOGY

## 2017-04-11 PROCEDURE — 90853 GROUP PSYCHOTHERAPY: CPT

## 2017-04-11 PROCEDURE — 36415 COLL VENOUS BLD VENIPUNCTURE: CPT | Performed by: PSYCHIATRY & NEUROLOGY

## 2017-04-11 RX ADMIN — MIRTAZAPINE 15 MG: 15 TABLET, FILM COATED ORAL at 20:00

## 2017-04-11 RX ADMIN — FLUDROCORTISONE ACETATE 100 MCG: 0.1 TABLET ORAL at 20:00

## 2017-04-11 RX ADMIN — FLUDROCORTISONE ACETATE 100 MCG: 0.1 TABLET ORAL at 10:57

## 2017-04-11 RX ADMIN — CLOZAPINE 50 MG: 25 TABLET ORAL at 20:00

## 2017-04-11 RX ADMIN — VITAMIN D, TAB 1000IU (100/BT) 2000 UNITS: 25 TAB at 10:56

## 2017-04-11 RX ADMIN — NAPROXEN 500 MG: 500 TABLET ORAL at 17:34

## 2017-04-11 RX ADMIN — PRENATAL VIT W/ FE FUMARATE-FA TAB 27-0.8 MG 1 TABLET: 27-0.8 TAB at 10:56

## 2017-04-11 ASSESSMENT — ACTIVITIES OF DAILY LIVING (ADL)
DRESS: STREET CLOTHES
ORAL_HYGIENE: INDEPENDENT
GROOMING: INDEPENDENT

## 2017-04-11 NOTE — PROGRESS NOTES
"    ----------------------------------------------------------------------------------------------------------  [facility], [place]   Psychiatric Progress Note     Assessment    Presentation: Nona Finley is a 36 year old female w/h/o schizoaffective disorder depressive type with recent hospitalization in Feb. who presented 3/21/17 by ambulance with SI in the setting of worsening AH and paranoia.    Diagnostic Impression: Pt's presentation consistent with decompensation of schizoaffective disorder likely due to poor medication (clozapine) adherence. 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  Concerns for discharge include SI and medication non-adherence.     Hospital course: Nona Finley was admitted to station 22 as a voluntary patient.  Patient initially refused medication which she thought was poisoned, prompting filing paperwork for commitment and Reyes to [facility]. Subsequently she agreed to restart clozapine, but developed tachycardia and orthostatic hypotension with 25 mg dose, so fludrocortisone was started at 0.1 mg QD, which seemed to resolve her adverse reaction, but after two days, she again refused all medications, believing that they were poison, and said she could not eat or drink because water smelled \"like bleach.\"  Patient has had inconsistent adherence to medication and vacillating symptom occurrence.  Once taking clozapine 50 mg QHS and fludrocortisone 0.1 mg BID were taken consistently for 3 days, psychotic Sx largely resolved, her psychotic Sx improved and she changed her mind from wanting to leave her family and live in a group home to wishing to return home to care for her children. On  4/5 to address chronic mild akathisia ( likely secondeary to " paliperidone) , propranolol and benztropine were considered however due to her cardiovascular symptoms, mirtazapine 15 mg QHS was added . Court supported full commitment on 4/6,  subsequent court date for Reyes scheduled for 4/11.     Medical course: Tooth extraction for dental caries on 4/10.     Plan     Principal Diagnosis: Schizoaffective disorder, depressive type    Medications:   Continue:  - paliperidone 234 mg IM q28 days ( next dose 5/8/17)  - Clozapine 50 mg PO qHS   - fludrocortisone 0.1 mg PO bid  -mirtazapine 15 mg qhs  - olanzapine 10 mg PO/IM PRN  - benadryl 25-50 mg PO q4h prn  - hydroxyzine 25-50 mg PO q4h prn    Change: None    Hold: None    Laboratory/Imaging:   -WBC/diff q1 wk     Consults: None     Patient will be treated in therapeutic milieu with appropriate individual and group therapies as described.    Secondary psychiatric diagnoses of concern this admission: None    Medical diagnoses to be addressed this admission:   # Insomnia: resolved    #Dental Caries: Consulted Medicine, Dental service for tooth pain.  Tooth extracted 4/10.    -benzocaine 20% gel 4 qd PRN  -naproxen 500 mg q12 hr PRN  -acetaminophen 650 mg q4 hr PRN    Relevant psychosocial stressors: Severe including motherhood: 6 children, ages 6 mo to 12 years, oldest is ind[place] with autism spectrum, needs PCA 5 hour+/day, provided by mother-in-law.     Legal Status:  Court hold. Awaiting commitment and Reyes, next court date Tues 4/11.    Safety Assessment:   Checks: Status 15  Precautions: Suicide  Self-harm  Pt has not required locked seclusion or restraints in the past 24 hours to maintain safety, please refer to RN documentation for further details.     The risks, benefits, alternatives and side effects have been discussed and are understood by the patient and other caregivers.    Anticipated Disposition/Discharge Date:   Awaiting court ruling on commitment/Reyes.     Attestation:    This documentation accurately reflects  the services I personally performed and treatment decisions made by me in consultation with the attending physician.    Scribed by Vira Mandujano, MS3, for Dr. Mily Domínguez, Resident   Attestation:  I have reviewed and edited the documentation recorded by the scribe. This documentation accurately reflects the services I personally performed and treatment decisions made by me in consultation with the attending physician.     Mily Domínguez MD  Psychiatry PGY1    Psychiatry Attending Attestation:  This patient has been seen and evaluated by me, Danish Levi M.D.  The patient's condition and treatment plan were discussed with the resident, and care coordinated with the CTC and RN. I reviewed, edited and agree with the findings and plan in this note.     Danish Levi M.D.   of Psychiatry    Interim History:   The patient's care was discussed with the treatment team and chart notes were reviewed.    Sleep: 6.75h  VSS  Scheduled Meds: Adherent  PRN:  Naproxen  Staff notes: Reviewed. Sad about potentially going to a group home, worried about court.  Calm mood. Family visited, pt had full affect during visit. No SI, no AH.    Pt was interviewed in the conference room. Pt reports her mood has been good, she has been a little worried about her court date today, but not significantly anxious. Pt took the Remeron last night, she states that as of today she has had no restlessness.  Pt desires continuing Remeron as it has so far proved beneficial for her akathisia.   Pt reports ongoing drooling while asleep.  She found it beneficial to use a towel on her pillow as per tx team's suggestion yesterday.  Pt denies ongoing pain from her tooth extraction yesterday.         Review of systems:     The 10 point Review of Systems is negative other than noted above         Medications:     Current Facility-Administered Medications   Medication     benzocaine (ORAJEL MAXIMUM STRENGTH) 20 % gel     mineral  "oil-hydrophilic petrolatum (AQUAPHOR)     mirtazapine (REMERON) tablet 15 mg     fludrocortisone (FLORINEF) tablet 100 mcg     cloZAPine (CLOZARIL) tablet 50 mg     naproxen (NAPROSYN) tablet 500 mg     paliperidone (INVEGA SUSTENNA) injection SUSP 234 mg     diphenhydrAMINE (BENADRYL) capsule 25-50 mg     prenatal multivitamin  plus iron per tablet 1 tablet     cholecalciferol (vitamin D) tablet 2,000 Units     hydrOXYzine (ATARAX) tablet 25-50 mg     OLANZapine (zyPREXA) tablet 10 mg    Or     OLANZapine (zyPREXA) injection 10 mg     acetaminophen (TYLENOL) tablet 650 mg             Allergies:   No Known Allergies         Psychiatric Examination:   /78  Pulse 83  Temp 97.9  F (36.6  C)  Resp 14  Ht 1.651 m (5' 5\")  Wt 73.9 kg (163 lb)  SpO2 100%  BMI 27.12 kg/m2  Weight is 163 lbs 0 oz  Body mass index is 27.12 kg/(m^2).    Appearance: [place] female, wearing hijab,  cooperative, no apparent distress, awake,  and neatly groomed  Attitude:  cooperative and reserved  Eye Contact:  good, fair  Mood:  \"feeling good\"  Affect:  mood congruent, intensity is normal, fixed mobility and restricted range  Speech:  clear, coherent  Psychomotor Behavior:  no evidence of tardive dyskinesia, dystonia, or tics and intact station, gait and muscle tone, no fidgeting  Thought Process:  linear and forward thinking  Associations:  no loose associations  Thought Content:  no evidence of psychotic thought, no auditory hallucinations present and no visual hallucinations present  Insight:  fair  Judgment:  fair  Oriented to:  time, person, and place  Attention Span and Concentration:  fair  Recent and Remote Memory:  fair  Language: Pt is fluent in conversational English  Fund of Knowledge: appropriate  Muscle Strength and Tone: normal  Gait and Station: Normal         Selected Labs:     3/31: BMP wnl, except glucose 55 (L), Cr 0.5 (L)    WBC/diff (weekly):  4/11: WBC 5.4, ANC 2.8    "

## 2017-04-11 NOTE — PROGRESS NOTES
04/11/17 0418   Patient Belongings   Did you bring any home meds/supplements to the hospital?  Yes   Disposition of meds  Sent to security/pharmacy per site process   Patient Belongings plastic bag   Disposition of Belongings makeup bag c makeup and toiletries,plastic bag c clothing and shoes; purse c cig's lighter, cell phone, makeup and flashlight all in locker   Belongings Search Yes   Clothing Search Yes   Second Staff Rossana SUAREZ R.N.  and maximino HAND [address]   General Info Comment meds to safe/no money or credit cards   ADMISSION:  I am responsible for any personal items that are not sent to the safe or pharmacy. [facility] is not responsible for loss, theft or damage of any property in my possession.    Patient Signature _____________________ Date/Time _____________________    Staff Signature _______________________ Date/Time _____________________    2nd Staff person, if patient is unable/unwilling to sign  ___________________________________ Date/Time _____________________  DISCHARGE:  All personal items have been returned to me.    Patient Signature _____________________ Date/Time _____________________    Staff Signature _______________________ Date/Time _____________________

## 2017-04-11 NOTE — PROGRESS NOTES
Patient was visible in milieu, watching tv, pacing. Pt's  visited with all 6 of her children, which went well. Affect was full range. Mood was calm. Patient showered and ate dinner and asked to be woken up at 7 am because she has to go to court in the morning. No SI, hallucinations, medication side effects noted.      04/10/17 1937   Behavioral Health   Hallucinations denies / not responding to hallucinations   Thinking intact   Orientation person: oriented;place: oriented;date: oriented   Memory baseline memory   Insight insight appropriate to situation   Judgement impaired   Affect full range affect   Mood mood is calm   Physical Appearance/Attire attire appropriate to age and situation   Hygiene well groomed   Suicidality (denies)   Self Injury (denies)   Activity withdrawn   Speech clear;coherent   Medication Sensitivity no observed side effects   Psychomotor / Gait balanced;steady   Activities of Daily Living   Hygiene/Grooming independent   Oral Hygiene independent   Dress independent   Laundry with supervision   Room Organization independent

## 2017-04-11 NOTE — PROGRESS NOTES
04/11/17 1400   Activities of Daily Living   Hygiene/Grooming independent   Oral Hygiene independent   Dress street clothes   Room Organization independent   Pt has been napping / sleeping most of this shift.She refused all groups.She told this writer, that she was concern about her court hearing. She denies all mental  health symptoms. She admits to being anxious 5/10.Pt was pickup to go to her court hearing about 12:30 pm.

## 2017-04-12 PROCEDURE — 25000132 ZZH RX MED GY IP 250 OP 250 PS 637: Performed by: STUDENT IN AN ORGANIZED HEALTH CARE EDUCATION/TRAINING PROGRAM

## 2017-04-12 PROCEDURE — 99232 SBSQ HOSP IP/OBS MODERATE 35: CPT | Mod: GC | Performed by: PSYCHIATRY & NEUROLOGY

## 2017-04-12 PROCEDURE — S0136 CLOZAPINE, 25 MG: HCPCS | Performed by: STUDENT IN AN ORGANIZED HEALTH CARE EDUCATION/TRAINING PROGRAM

## 2017-04-12 PROCEDURE — 12400001 ZZH R&B MH UMMC

## 2017-04-12 RX ORDER — CLOZAPINE 25 MG/1
75 TABLET ORAL AT BEDTIME
Status: DISCONTINUED | OUTPATIENT
Start: 2017-04-12 | End: 2017-04-14 | Stop reason: HOSPADM

## 2017-04-12 RX ADMIN — FLUDROCORTISONE ACETATE 100 MCG: 0.1 TABLET ORAL at 09:04

## 2017-04-12 RX ADMIN — CLOZAPINE 75 MG: 25 TABLET ORAL at 20:17

## 2017-04-12 RX ADMIN — VITAMIN D, TAB 1000IU (100/BT) 2000 UNITS: 25 TAB at 09:04

## 2017-04-12 RX ADMIN — MIRTAZAPINE 15 MG: 15 TABLET, FILM COATED ORAL at 20:17

## 2017-04-12 RX ADMIN — FLUDROCORTISONE ACETATE 100 MCG: 0.1 TABLET ORAL at 20:17

## 2017-04-12 RX ADMIN — PRENATAL VIT W/ FE FUMARATE-FA TAB 27-0.8 MG 1 TABLET: 27-0.8 TAB at 09:04

## 2017-04-12 ASSESSMENT — ACTIVITIES OF DAILY LIVING (ADL)
ORAL_HYGIENE: INDEPENDENT
GROOMING: INDEPENDENT
DRESS: INDEPENDENT;STREET CLOTHES
DRESS: INDEPENDENT;STREET CLOTHES
LAUNDRY: WITH SUPERVISION
LAUNDRY: WITH SUPERVISION
GROOMING: INDEPENDENT
ORAL_HYGIENE: INDEPENDENT

## 2017-04-12 NOTE — PROGRESS NOTES
Pt was visible in the milieu, social with peers. Pt spend time pacing the halls and did not attend group. Pt's affect was full range, denies SI/SIB and hallucinations. Pt stated she is worried about the court decision on whether she has to go to a group home, because she wants to go home.      04/12/17 1200   Behavioral Health   Hallucinations denies / not responding to hallucinations   Thinking intact   Orientation person: oriented;date: oriented;place: oriented   Memory baseline memory   Insight insight appropriate to situation   Judgement impaired   Eye Contact at examiner   Affect full range affect   Mood mood is calm   Physical Appearance/Attire attire appropriate to age and situation   Hygiene well groomed   Suicidality other (see comments)  (denies)   Self Injury other (see comment)  (denies)   Activity withdrawn   Speech clear;coherent   Medication Sensitivity no stated side effects;no observed side effects   Psychomotor / Gait balanced;steady   Psycho Education   Type of Intervention 1:1 intervention   Response participates, initiates socially appropriate   Hours 0.5   Treatment Detail Check in    Activities of Daily Living   Hygiene/Grooming independent   Oral Hygiene independent   Dress independent;street clothes   Laundry with supervision   Room Organization independent   Activity   Activity Level of Assistance independent

## 2017-04-12 NOTE — PLAN OF CARE
Problem: General Plan of Care (Inpatient Behavioral)  Goal: Team Discussion  Team Plan:   BEHAVIORAL TEAM DISCUSSION     Continued Stay Criteria/Rationale: In commitment process awaiting final order  Plan: Continue to monitor patient's symptoms as a medication regiment is initiated and document changes until proper discharge is set in place.   Participants: Ewa [facility],   Dr. Danish Levi MD,   Dr. Mily Domínguez MD, Priyanka Mandujano MS3  Summary/Recommendation: Patient discussed supports at home with her children and her desire to return home but not to an IRT. Patient has reached out to workers to get services for her eldest daughter who has autism.   Progress: Some improvement

## 2017-04-12 NOTE — PLAN OF CARE
Problem: Psychotic Symptoms  Goal: Psychotic Symptoms  Signs and symptoms of listed problems will be absent or manageable.  -pt will verbalize coping skills to manage hallucinations  -pt will participate in groups demonstrating adequate attention span  -pt will identify when or if they are having any medication side effects  -pt will be able to have reality based conversations   Outcome: Improving     Patient occasionally out in [facility] with minimal interaction with peers.    Patient pleasant on approach, retiring early.

## 2017-04-12 NOTE — PROGRESS NOTES
"    ----------------------------------------------------------------------------------------------------------  [facility], [place]   Psychiatric Progress Note     Assessment    Presentation: Nona Finley is a 36 year old female w/h/o schizoaffective disorder depressive type with recent hospitalization in Feb. who presented 3/21/17 by ambulance with SI in the setting of worsening AH and paranoia.    Diagnostic Impression: Pt's presentation consistent with decompensation of schizoaffective disorder likely due to poor medication (clozapine) adherence. 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  Concerns for discharge include SI and medication non-adherence.     Hospital course: Nona Finley was admitted to station 22 as a voluntary patient.  Patient initially refused medication which she thought was poisoned, prompting filing paperwork for commitment and Reyes to [facility]. Subsequently she agreed to restart clozapine, but developed tachycardia and orthostatic hypotension with 25 mg dose, so fludrocortisone was started at 0.1 mg QD, which seemed to resolve her adverse reaction, but after two days, she again refused all medications, believing that they were poison, and said she could not eat or drink because water smelled \"like bleach.\"  Patient has had inconsistent adherence to medication and vacillating symptom occurrence.  Once taking clozapine 50 mg QHS and fludrocortisone 0.1 mg BID were taken consistently for 3 days, psychotic Sx largely resolved, her psychotic Sx improved and she changed her mind from wanting to leave her family and live in a group home to wishing to return home to care for her children. On  4/5 to address chronic mild akathisia ( likely secondeary to " paliperidone) , propranolol and benztropine were considered however due to her cardiovascular symptoms, mirtazapine 15 mg QHS was added . Court supported full commitment on 4/6,  subsequent court date for Reyes scheduled for 4/11. Clozapine increased to 75 mg QHS (4/12), as pt's side effects of orthostasis and akathisia have subsided.    Medical course: Tooth extraction for dental caries on 4/10.     Plan     Principal Diagnosis: Schizoaffective disorder, depressive type    Medications:   Continue:  - paliperidone 234 mg IM q28 days ( next dose 5/8/17)  - fludrocortisone 0.1 mg PO bid  -mirtazapine 15 mg qhs  - olanzapine 10 mg PO/IM PRN  - benadryl 25-50 mg PO q4h prn  - hydroxyzine 25-50 mg PO q4h prn    Med Changes:  -Increase Clozapine to 75 mg PO qhs    Hold: None    Laboratory/Imaging:   -WBC/diff q1 wk     Consults: None     Patient will be treated in therapeutic milieu with appropriate individual and group therapies as described.    Secondary psychiatric diagnoses of concern this admission: None    Medical diagnoses to be addressed this admission:   # Insomnia: resolved    #Dental Caries: Consulted Medicine, Dental service for tooth pain.  Tooth extracted 4/10.    -benzocaine 20% gel 4 qd PRN  -naproxen 500 mg q12 hr PRN  -acetaminophen 650 mg q4 hr PRN    Relevant psychosocial stressors: Severe including motherhood: 6 children, ages 6 mo to 12 years, oldest is indio with autism spectrum, needs PCA 5 hour+/day, provided by mother-in-law.    Legal Status:  Court hold. Awaiting notice of court decision.    Safety Assessment:   Checks: Status 15  Precautions: Suicide  Self-harm  Pt has not required locked seclusion or restraints in the past 24 hours to maintain safety, please refer to RN documentation for further details.     The risks, benefits, alternatives and side effects have been discussed and are understood by the patient and other caregivers.    Anticipated Disposition/Discharge Date:   Awaiting  "court ruling on commitment/Reyes.     Attestation:    This documentation accurately reflects the services I personally performed and treatment decisions made by me in consultation with the attending physician.    Scribed by Vira Mandujano, MS3, for Dr. Mily Domínguez, Resident     Attestation:  I have reviewed and edited the documentation recorded by the scribe. This documentation accurately reflects the services I personally performed and treatment decisions made by me in consultation with the attending physician.     Mily Domínguez MD  Psychiatry PGY1    Psychiatry Attending Attestation:  This patient has been seen and evaluated by me, Danish Levi M.D.  The patient's condition and treatment plan were discussed with the resident, and care coordinated with the CTC and RN. I reviewed, edited and agree with the findings and plan in this note.     Danish Levi M.D.   of Psychiatry    Interim History:   The patient's care was discussed with the treatment team and chart notes were reviewed.    Sleep: 6.75h  VS: Not recorded  Scheduled Meds: Adherent  PRN:  Naproxen  Staff notes: Reviewed. Napping often, refused grps.  Anxious 5/10 about court decision. No hallucinations.    Nona was interviewed in the conference room. Pt reports her mood has been \"good,\" she has been tired but otherwise \"OK.\"  Slept well, denies orthostasis, denies akathisia.  Pt strongly believes Remeron has helped with her restlessness.  Discussed increasing clozapine dose to 75 mg tonight, as pt's side effects are well-controlled, pt is agreeable.      Patient went to court yesterday, the court did not make a final decision yesterday as they would like to discuss her case further with her ACT team.  Pt expecting to be notified of the court decision by  later today.  Pt still strongly prefers to be discharged to home rather than attend an IRT for 2-3 months as the court, ACT team, and treatment team " "recommends. Pt feels that she now realizes that her CAH are not real and that she will be able to not listen to them if the occur.  Discussed with pt the risk of going directly home in that there is a high likelihood of the pt relapsing as occurred following her prior hospitalization.  Emphasized that although she currently feels she can resist CAH, her thinking may be altered in the event of CAH reoccurrence that will prevent her from recognizing that the CAH are false.  This situation would put her children at risk.  Informed her that this discharge home will likely be the last opportunity of going home, and that she needs to weigh that risk.  Pt states that she wants to take that risk and go home.     Review of systems:     The 10 point Review of Systems is negative other than noted above         Medications:     Current Facility-Administered Medications   Medication     cloZAPine (CLOZARIL) tablet 75 mg     benzocaine (ORAJEL MAXIMUM STRENGTH) 20 % gel     mineral oil-hydrophilic petrolatum (AQUAPHOR)     mirtazapine (REMERON) tablet 15 mg     fludrocortisone (FLORINEF) tablet 100 mcg     naproxen (NAPROSYN) tablet 500 mg     paliperidone (INVEGA SUSTENNA) injection SUSP 234 mg     diphenhydrAMINE (BENADRYL) capsule 25-50 mg     prenatal multivitamin  plus iron per tablet 1 tablet     cholecalciferol (vitamin D) tablet 2,000 Units     hydrOXYzine (ATARAX) tablet 25-50 mg     OLANZapine (zyPREXA) tablet 10 mg    Or     OLANZapine (zyPREXA) injection 10 mg     acetaminophen (TYLENOL) tablet 650 mg             Allergies:   No Known Allergies         Psychiatric Examination:   /78  Pulse 83  Temp 97.9  F (36.6  C)  Resp 14  Ht 1.651 m (5' 5\")  Wt 73.9 kg (163 lb)  SpO2 100%  BMI 27.12 kg/m2  Weight is 163 lbs 0 oz  Body mass index is 27.12 kg/(m^2).    Appearance: Dominican female, wearing towel on head,  cooperative, no apparent distress, awake,  and neatly groomed  Attitude:  cooperative and " "reserved  Eye Contact:  good, fair  Mood:  \"good\"  Affect:  mood congruent, intensity is normal, fixed mobility and restricted range  Speech:  clear, coherent  Psychomotor Behavior:  no evidence of tardive dyskinesia, dystonia, or tics and intact station, gait and muscle tone, no fidgeting  Thought Process:  linear and forward thinking  Associations:  no loose associations  Thought Content:  no evidence of psychotic thought, no auditory hallucinations present and no visual hallucinations present  Insight:  limited  Judgment:  limited  Oriented to:  time, person, and place  Attention Span and Concentration:  fair   Recent and Remote Memory:  fair  Language: Pt is fluent in conversational English  Fund of Knowledge: appropriate  Muscle Strength and Tone: normal  Gait and Station: Normal         Selected Labs:     3/31: BMP wnl, except glucose 55 (L), Cr 0.5 (L)    WBC/diff (weekly):  4/11: WBC 5.4, ANC 2.8    "

## 2017-04-13 PROCEDURE — 12400001 ZZH R&B MH UMMC

## 2017-04-13 PROCEDURE — 25000132 ZZH RX MED GY IP 250 OP 250 PS 637: Performed by: STUDENT IN AN ORGANIZED HEALTH CARE EDUCATION/TRAINING PROGRAM

## 2017-04-13 PROCEDURE — 90853 GROUP PSYCHOTHERAPY: CPT

## 2017-04-13 PROCEDURE — 99232 SBSQ HOSP IP/OBS MODERATE 35: CPT | Mod: GC | Performed by: PSYCHIATRY & NEUROLOGY

## 2017-04-13 PROCEDURE — S0136 CLOZAPINE, 25 MG: HCPCS | Performed by: STUDENT IN AN ORGANIZED HEALTH CARE EDUCATION/TRAINING PROGRAM

## 2017-04-13 RX ADMIN — MIRTAZAPINE 15 MG: 15 TABLET, FILM COATED ORAL at 20:02

## 2017-04-13 RX ADMIN — FLUDROCORTISONE ACETATE 100 MCG: 0.1 TABLET ORAL at 20:02

## 2017-04-13 RX ADMIN — PRENATAL VIT W/ FE FUMARATE-FA TAB 27-0.8 MG 1 TABLET: 27-0.8 TAB at 09:12

## 2017-04-13 RX ADMIN — FLUDROCORTISONE ACETATE 100 MCG: 0.1 TABLET ORAL at 09:12

## 2017-04-13 RX ADMIN — VITAMIN D, TAB 1000IU (100/BT) 2000 UNITS: 25 TAB at 09:12

## 2017-04-13 RX ADMIN — CLOZAPINE 75 MG: 25 TABLET ORAL at 20:02

## 2017-04-13 ASSESSMENT — ACTIVITIES OF DAILY LIVING (ADL)
GROOMING: INDEPENDENT
LAUNDRY: WITH SUPERVISION
GROOMING: INDEPENDENT
DRESS: INDEPENDENT
ORAL_HYGIENE: INDEPENDENT

## 2017-04-13 NOTE — PLAN OF CARE
Problem: Psychotic Symptoms  Goal: Psychotic Symptoms  Signs and symptoms of listed problems will be absent or manageable.  -pt will verbalize coping skills to manage hallucinations  -pt will participate in groups demonstrating adequate attention span  -pt will identify when or if they are having any medication side effects  -pt will be able to have reality based conversations   Outcome: Improving  Nona continues to deny depression or auditory hallucinations-states she feels well and is hopeful she will be discharged tomorrow-continues to decline most grps, but some social interactions with peers-no unusual sxs noted

## 2017-04-13 NOTE — PROGRESS NOTES
"Nona was given a copy of her court papers. She questioned whether she could return home soon. This nurse interpreted papers to state that she needs to continue committed at this hospital. Encouraged her to speak to [facility] for more information or detail. Nona complained about this. She blames the ACT team. \"I hate them.\" She wants to go home very badly. Says that she wants to appeal to the court again to go home.  "

## 2017-04-13 NOTE — PROGRESS NOTES
"    ----------------------------------------------------------------------------------------------------------  [facility], [place]   Psychiatric Progress Note     Assessment    Presentation: Nona Finley is a 36 year old female w/h/o schizoaffective disorder depressive type with recent hospitalization in Feb. who presented 3/21/17 by ambulance with SI in the setting of worsening AH and paranoia.    Diagnostic Impression: Pt's presentation consistent with decompensation of schizoaffective disorder likely due to poor medication (clozapine) adherence. 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  Concerns for discharge include SI and medication non-adherence.     Hospital course: Nona Finley was admitted to station 22 as a voluntary patient.  Patient initially refused medication which she thought was poisoned, prompting filing paperwork for commitment and Reyes to [facility]. Subsequently she agreed to restart clozapine, but developed tachycardia and orthostatic hypotension with 25 mg dose, so fludrocortisone was started at 0.1 mg QD, which seemed to resolve her adverse reaction, but after two days, she again refused all medications, believing that they were poison, and said she could not eat or drink because water smelled \"like bleach.\"  Patient has had inconsistent adherence to medication and vacillating symptom occurrence.  Once taking clozapine 50 mg QHS and fludrocortisone 0.1 mg BID were taken consistently for 3 days, psychotic Sx largely resolved, her psychotic Sx improved and she changed her mind from wanting to leave her family and live in a group home to wishing to return home to care for her children. On  4/5 to address chronic mild akathisia ( likely secondeary to " paliperidone) , propranolol and benztropine were considered however due to her cardiovascular symptoms, mirtazapine 15 mg QHS was added . Court decided full commitment on 4/12. Clozapine increased to 75 mg QHS (4/12), as pt's side effects of orthostasis and akathisia have subsided.    Medical course: Tooth extraction for dental caries on 4/10.     Plan     Principal Diagnosis: Schizoaffective disorder, depressive type    Medications:   Continue:  - paliperidone 234 mg IM q28 days ( next dose 5/8/17)  -Clozapine 75 mg PO qhs  - fludrocortisone 0.1 mg PO bid  -mirtazapine 15 mg qhs  - olanzapine 10 mg PO/IM PRN  - benadryl 25-50 mg PO q4h prn  - hydroxyzine 25-50 mg PO q4h prn    Med Changes: none    Hold: None    Laboratory/Imaging:   -WBC/diff q1 wk     Consults: None     Patient will be treated in therapeutic milieu with appropriate individual and group therapies as described.    Secondary psychiatric diagnoses of concern this admission: None    Medical diagnoses to be addressed this admission:   # Insomnia: resolved    #Dental Caries: Consulted Medicine, Dental service for tooth pain.  Tooth extracted 4/10.    -benzocaine 20% gel 4 qd PRN  -naproxen 500 mg q12 hr PRN  -acetaminophen 650 mg q4 hr PRN    Relevant psychosocial stressors: Severe including motherhood: 6 children, ages 6 mo to 12 years, oldest is indio with autism spectrum, needs PCA 5 hour+/day, provided by mother-in-law.    Legal Status:  Committed, 4/12    Safety Assessment:   Checks: Status 15  Precautions: Suicide  Self-harm  Pt has not required locked seclusion or restraints in the past 24 hours to maintain safety, please refer to RN documentation for further details.     The risks, benefits, alternatives and side effects have been discussed and are understood by the patient and other caregivers.    Anticipated Disposition/Discharge Date:   Likely tomorrow, pending assessment of pt following medication change    Attestation:    This  documentation accurately reflects the services I personally performed and treatment decisions made by me in consultation with the attending physician.    Scribed by Vira Mandujano, MS3, for Dr. Mily Domínguez, Resident   Attestation:  I have reviewed and edited the documentation recorded by the scribe. This documentation accurately reflects the services I personally performed and treatment decisions made by me in consultation with the attending physician.     Mily Domínguez MD  Psychiatry PGY1  Psychiatry Attending Attestation:  This patient has been seen and evaluated by me, Danish Levi M.D.  The patient's condition and treatment plan were discussed with the resident, and care coordinated with the CTC and RN. I reviewed, edited and agree with the findings and plan in this note.     Danish Levi M.D.   of Psychiatry  Interim History:   The patient's care was discussed with the treatment team and chart notes were reviewed.    Sleep: 7h  VSS  Scheduled Meds: Adherent  PRN:  Naproxen  Staff notes: Reviewed. Social, in milieu most of evening, did not attend grps.  Pacing in the hallway.  Angry at ACT team b/c she was under the impression that court mandated she attend a grp home.    Pt was interviewed in the common area.  Pt reports that she has been doing well, but that she is upset about the court decision. Team explained to pt that the court decision on commitment means that she is required to take her medications and follow the tx team's recommendations, but that she will not be forced to go to a group home.  Emphasized the importance of taking her medication regularly, as one missed dose can lead to dire consequences.  Recommended that pt have a plan in place to ensure she does not miss her medication doses.  Re-discussed setting a daily alarm on her phone.  Recommended that pt obtain a pill box to organize medication doses for the week, also to involve  in medication  adherence.  She agrees with these suggestions.      Pt denies orthostasis, akathisia.  Pt reports increased drooling last night, she has been using a towel on her pillow at night but the amount of drooling bothers her.  Discussed that although sub-optimal, this side effect does not outweigh the benefits of the increase in dose of clozapine.  Educated pt that her dose is a low dose of clozapine, and that team wants as high a dose as is manageable with her side effect of orthostasis in order to prevent further decompensation as much as possible.  Because of recent medication change, team plans to reassess tomorrow for recurrence of orthostasis or other side effects before pt is discharged. Pt was very agreeable.        Treatment team spoke w/Nona's  Frederic today to discuss discharge plans and management of pt's mediation adherence.   arrives home around 11 PM at night from work, he says that usually asks Nona if she has taken her medication, although he notes that he can tell by her behavior if she has missed her dose.  He agrees with the recommendation of the pill box, he says he will obtain this.  Also discussed Nona setting an alarm on her phone, which  says pt has not done in the past, and also encouraged keeping a medication diary.   is very agreeable and indicates that he will do his best to help support Nona in taking her medication.    Review of systems:     The 10 point Review of Systems is negative other than noted above         Medications:     Current Facility-Administered Medications   Medication     cloZAPine (CLOZARIL) tablet 75 mg     benzocaine (ORAJEL MAXIMUM STRENGTH) 20 % gel     mineral oil-hydrophilic petrolatum (AQUAPHOR)     mirtazapine (REMERON) tablet 15 mg     fludrocortisone (FLORINEF) tablet 100 mcg     naproxen (NAPROSYN) tablet 500 mg     paliperidone (INVEGA SUSTENNA) injection SUSP 234 mg     diphenhydrAMINE (BENADRYL) capsule 25-50 mg     prenatal  "multivitamin  plus iron per tablet 1 tablet     cholecalciferol (vitamin D) tablet 2,000 Units     hydrOXYzine (ATARAX) tablet 25-50 mg     OLANZapine (zyPREXA) tablet 10 mg    Or     OLANZapine (zyPREXA) injection 10 mg     acetaminophen (TYLENOL) tablet 650 mg             Allergies:   No Known Allergies         Psychiatric Examination:   /78  Pulse 83  Temp 98.8  F (37.1  C) (Oral)  Resp 16  Ht 1.651 m (5' 5\")  Wt 73.9 kg (163 lb)  SpO2 100%  BMI 27.12 kg/m2  Weight is 163 lbs 0 oz  Body mass index is 27.12 kg/(m^2).    Appearance: Salvad[place] female, wearing hijab,  cooperative, no apparent distress, awake,  and neatly groomed  Attitude:  cooperative and reserved  Eye Contact:  good, fair  Mood:  \"good\"  Affect:  mood congruent, intensity is normal, fixed mobility and restricted range  Speech:  clear, coherent  Psychomotor Behavior:  no evidence of tardive dyskinesia, dystonia, or tics and intact station, gait and muscle tone, mild fidgeting  Thought Process:  linear and forward thinking  Associations:  no loose associations  Thought Content:  no evidence of psychotic thought, no auditory hallucinations present and no visual hallucinations present  Insight:  limited  Judgment:  limited  Oriented to:  time, person, and place  Attention Span and Concentration:  fair  Recent and Remote Memory:  fair  Language: Pt is fluent in conversational English  Fund of Knowledge: appropriate  Muscle Strength and Tone: normal  Gait and Station: Normal         Selected Labs:     3/31: BMP wnl, except glucose 55 (L), Cr 0.5 (L)    WBC/diff (weekly):  4/11: WBC 5.4, ANC 2.8      "

## 2017-04-13 NOTE — PROGRESS NOTES
Pt was visible in the milieu, watched TV with peers. Pt refused to attend groups and spent time pacing the halls. Pt stated she was nervous because of the courts decision, which she interpreted as having to go to a group home. Pt stated she just won't go to the group home and plans to just go home. Pt denies SI/SIB and hallucinations.      04/12/17 1942   Behavioral Health   Hallucinations denies / not responding to hallucinations   Thinking intact   Orientation person: oriented;place: oriented;date: oriented   Memory baseline memory   Insight insight appropriate to situation   Judgement impaired   Eye Contact at examiner   Affect full range affect   Mood mood is calm   Physical Appearance/Attire attire appropriate to age and situation   Hygiene well groomed   Suicidality other (see comments)  (denies)   Self Injury other (see comment)  (denies)   Activity withdrawn   Speech clear;coherent   Medication Sensitivity no stated side effects;no observed side effects   Psychomotor / Gait balanced;steady   Psycho Education   Type of Intervention 1:1 intervention   Response participates, initiates socially appropriate   Hours 0.5   Treatment Detail Check in    Activities of Daily Living   Hygiene/Grooming independent   Oral Hygiene independent   Dress independent;street clothes   Laundry with supervision   Room Organization independent   Activity   Activity Level of Assistance independent

## 2017-04-14 VITALS
TEMPERATURE: 98 F | SYSTOLIC BLOOD PRESSURE: 120 MMHG | WEIGHT: 163 LBS | RESPIRATION RATE: 16 BRPM | DIASTOLIC BLOOD PRESSURE: 78 MMHG | HEART RATE: 83 BPM | OXYGEN SATURATION: 100 % | BODY MASS INDEX: 27.16 KG/M2 | HEIGHT: 65 IN

## 2017-04-14 LAB — PROLACTIN SERPL-MCNC: 124 UG/L (ref 3–27)

## 2017-04-14 PROCEDURE — 90853 GROUP PSYCHOTHERAPY: CPT

## 2017-04-14 PROCEDURE — 36415 COLL VENOUS BLD VENIPUNCTURE: CPT | Performed by: STUDENT IN AN ORGANIZED HEALTH CARE EDUCATION/TRAINING PROGRAM

## 2017-04-14 PROCEDURE — 25000132 ZZH RX MED GY IP 250 OP 250 PS 637: Performed by: STUDENT IN AN ORGANIZED HEALTH CARE EDUCATION/TRAINING PROGRAM

## 2017-04-14 PROCEDURE — 99239 HOSP IP/OBS DSCHRG MGMT >30: CPT | Mod: GC | Performed by: PSYCHIATRY & NEUROLOGY

## 2017-04-14 PROCEDURE — 84146 ASSAY OF PROLACTIN: CPT | Performed by: STUDENT IN AN ORGANIZED HEALTH CARE EDUCATION/TRAINING PROGRAM

## 2017-04-14 PROCEDURE — 97150 GROUP THERAPEUTIC PROCEDURES: CPT | Mod: GO

## 2017-04-14 RX ORDER — FLUDROCORTISONE ACETATE 0.1 MG/1
0.1 TABLET ORAL 2 TIMES DAILY
Qty: 60 TABLET | Refills: 0 | Status: SHIPPED | OUTPATIENT
Start: 2017-04-14 | End: 2017-06-14

## 2017-04-14 RX ORDER — NAPROXEN 500 MG/1
500 TABLET ORAL EVERY 12 HOURS PRN
Qty: 10 TABLET | Refills: 0 | Status: SHIPPED | OUTPATIENT
Start: 2017-04-14 | End: 2017-11-01

## 2017-04-14 RX ORDER — MIRTAZAPINE 15 MG/1
15 TABLET, FILM COATED ORAL AT BEDTIME
Qty: 30 TABLET | Refills: 0 | Status: SHIPPED | OUTPATIENT
Start: 2017-04-14 | End: 2017-06-14

## 2017-04-14 RX ORDER — PRENATAL VIT/IRON FUM/FOLIC AC 27MG-0.8MG
1 TABLET ORAL DAILY
Qty: 30 TABLET | Refills: 0 | Status: SHIPPED | OUTPATIENT
Start: 2017-04-14 | End: 2018-05-25

## 2017-04-14 RX ORDER — CLOZAPINE 25 MG/1
75 TABLET ORAL AT BEDTIME
Qty: 21 TABLET | Refills: 0 | Status: SHIPPED | OUTPATIENT
Start: 2017-04-14 | End: 2018-05-25

## 2017-04-14 RX ADMIN — FLUDROCORTISONE ACETATE 100 MCG: 0.1 TABLET ORAL at 09:18

## 2017-04-14 RX ADMIN — VITAMIN D, TAB 1000IU (100/BT) 2000 UNITS: 25 TAB at 09:18

## 2017-04-14 RX ADMIN — PRENATAL VIT W/ FE FUMARATE-FA TAB 27-0.8 MG 1 TABLET: 27-0.8 TAB at 09:18

## 2017-04-14 ASSESSMENT — ACTIVITIES OF DAILY LIVING (ADL)
GROOMING: INDEPENDENT
LAUNDRY: WITH SUPERVISION
ORAL_HYGIENE: INDEPENDENT
DRESS: INDEPENDENT

## 2017-04-14 NOTE — DISCHARGE SUMMARY
----------------------------------------------------------------------------------------------------------  [facility], [place]   Discharge Summary      Nona Finley MRN# [id number]   Age: 36 year old YOB: 1980     Date of Admission:  3/21/2017  Date of Discharge:  4/14/2017  Admitting Physician:  Danish Levi MD  Discharge Physician:  Danish Levi MD         Event Leading to Hospitalization:     Nona Finley is a 36 year old female with a significant past psychiatric history of schizoaffective disorder, peripartum onset who was brought in by ambulance due to suicidal ideation in the setting of worsening auditory and visual hallucinations due to medication non compliance.       See Admission note by Danish Levi MD on 3/21 for additional details.          Diagnoses:     Schizoaffective disorder, recurrent  Orthostatic hypotension secondary to clozapine, improved with fludrocortisone  Galactorrhea 2  paliperidone  Akathisia         Labs:     3/31: BMP wnl, except glucose 55 (L), Cr 0.5 (L)     Last WBC/diff : 4/11: WBC 5.4, ANC 2.8> repeat in one week         Consults:     Consultation during this admission received from internal medicine for tachycardia.  No medical intervention was indicated.           Hospital Course:     Nona Finley was admitted to [facility] with attending Danish Levi MD as a voluntary patient. The patient was placed under status 15 (15 minute checks) to ensure patient safety. [facility] protocol was initiated due to the patient's history of alcohol abuse and concern for withdrawal symptoms.  CBC, BMP and utox obtained.    Hospital course: Patient initially refused medication which she thought was poisoned, prompting filing paperwork for commitment and [facility] to [facility]. Subsequently she agreed to restart clozapine, but developed tachycardia and orthostatic hypotension with 25 mg dose, so fludrocortisone was  "started at 0.1 mg QD, which seemed to resolve her adverse reaction. After two days, she again refused all medications, believing that they were poison, and said she could not eat or drink because water smelled \"like bleach.\" Patient had inconsistent adherence to medication and vacillating symptom occurrence for the first week of hospitalization but not for the last 2 weeks. Once taking clozapine 50 mg QHS and fludrocortisone 0.1 mg BID consistently for 3 days, psychotic Sx largely resolved, her psychotic Sx improved and she changed her mind from wanting to leave her family and live in a group home to wishing to return home to care for her children. On 4/5, to address chronic mild akathisia ( likely secondary to paliperidone), propranolol and benztropine were considered however due to her cardiovascular symptoms, mirtazapine 15 mg QHS was added to target this akathisia. Court decided for full commitment on 4/12. Clozapine increased to 75 mg QHS (4/12), as pt's side effects of orthostasis and akathisia have subsided with increase of Florinef to 0.1 mg BID. She received a dose of paliperidone MORENO (Sustenna) 234 mg on April 10. On the day of discharge she reported galactorrhea, started gradually after the administration of Invega shot. Prolactin levels ordered returned elevated at 124. The patient did not find the galactorrhea to be very bothersome and was willing to tolerate it, as it might reflect a short lived result of recent injection and resolve spontaneously. If this continues, recommend decrease dose of Sustenna (increasing clozapine if psychosis recurs), or add low dose aripiprazole (doses of 5-15 mg reported in clinical trials to reduce risperidone induced hyperprolactinemia), or switch paliperidone to another long acting antipsychotic.     Medical course: Tooth extraction for dental caries on 4/10.     The patient's symptoms of suicidality and worsening auditory and visual hallucinations  improved. Nona " Bernice Finley was discharged to home. At the time of discharge Nona Finley was determined to not be a danger to herself or others.    Today Nona Finley reports no SI/HI/SIB today. In addition, Nona Finley has notable risk factors for self-harm, including age, psychosis and previous suicide attempts. However, risk is mitigated by commitment to family, Mandaeism beliefs, sobriety and history of seeking help when needed. Therefore, based on all available evidence including the factors cited above, Nona Finley does not appear to be at imminent risk for self-harm, and is appropriate for outpatient level of care.     This document serves as a transfer of care to Nona Finley's outpatient providers.         Discharge Medications:        Review of your medicines      START taking       Dose / Directions    fludrocortisone 0.1 MG tablet   Commonly known as:  FLORINEF   Used for:  Orthostasis        Dose:  0.1 mg   Take 1 tablet (0.1 mg) by mouth 2 times daily   Quantity:  60 tablet   Refills:  0       mirtazapine 15 MG tablet   Commonly known as:  REMERON   Used for:  Akathisia        Dose:  15 mg   Take 1 tablet (15 mg) by mouth At Bedtime   Quantity:  30 tablet   Refills:  0       naproxen 500 MG tablet   Commonly known as:  NAPROSYN   Used for:  S/P tooth extraction, unspecified edentulism        Dose:  500 mg   Take 1 tablet (500 mg) by mouth every 12 hours as needed for moderate pain   Quantity:  10 tablet   Refills:  0         CONTINUE these medicines which may have CHANGED, or have new prescriptions. If we are uncertain of the size of tablets/capsules you have at home, strength may be listed as something that might have changed.       Dose / Directions    cloZAPine 25 MG tablet   Commonly known as:  CLOZARIL   This may have changed:  how much to take   Used for:  Schizoaffective disorder, depressive type (H)        Dose:  75 mg   Take 3 tablets (75 mg) by mouth At Bedtime   Quantity:   "21 tablet   Refills:  0         CONTINUE these medicines which have NOT CHANGED       Dose / Directions    BENADRYL PO        Dose:  25-50 mg   Take 25-50 mg by mouth daily as needed (EPS)   Refills:  0       INVEGA SUSTENNA IM        Dose:  234 mg   Inject 234 mg into the muscle every 28 days   Refills:  0       prenatal multivitamin  plus iron 27-0.8 MG Tabs per tablet   Used for:  Routine general medical examination at a health care facility        Dose:  1 tablet   Take 1 tablet by mouth daily   Quantity:  30 tablet   Refills:  0       VITAMIN D (CHOLECALCIFEROL) PO        Dose:  2000 Units   Take 2,000 Units by mouth daily   Refills:  0         STOP taking          ibuprofen 600 MG tablet   Commonly known as:  ADVIL/MOTRIN           OLANZapine 5 MG tablet   Commonly known as:  zyPREXA                Where to get your medicines      These medications were sent to [facility] [place], [place] - [address]  [address] [facility] [address], [facility] [address]     Phone:  [phone]      cloZAPine 25 MG tablet     fludrocortisone 0.1 MG tablet     mirtazapine 15 MG tablet     naproxen 500 MG tablet     prenatal multivitamin  plus iron 27-0.8 MG Tabs per tablet                Psychiatric Examination:     /78  Pulse 83  Temp 98  F (36.7  C) (Tympanic)  Resp 16  Ht 1.651 m (5' 5\")  Wt 73.9 kg (163 lb)  SpO2 100%  BMI 27.12 kg/m2    Appearance: [place] female, wearing hijab, cooperative, no apparent distress, awake,  and neatly groomed  Attitude: cooperative and reserved  Eye Contact: good  Mood: \"good\"  Affect: mood congruent, reactive, intensity is normal, fixed mobility and restricted range  Speech: clear, coherent  Psychomotor Behavior: no evidence of tardive dyskinesia, dystonia, or tics and intact station, gait and muscle tone  Thought Process: linear and forward thinking  Associations: no loose associations  Thought Content: no evidence of psychotic thought, no auditory hallucinations " present and no visual hallucinations present  Insight: fair  Judgment: fair  Oriented to: time, person, and place  Attention Span and Concentration: fair  Recent and Remote Memory: fair  Language: Pt is fluent in conversational English  Fund of Knowledge: appropriate  Muscle Strength and Tone: normal  Gait and Station: Normal       Discharge Plan:     Dr. Gaviria- Re-Entry Act Team will schedule follow up appointment with provider.  Phone: [phone] Fax: [phone]    See recommendations    Pt seen and discussed with my attending, Dr. Levi.   Mily Domínguez MD  PGY1 Psychiatry Resident    Psychiatry Attending Attestation:  This patient has been seen and evaluated by me, Danish Levi M.D. The hospital care and discharge plan were formulated in team discussion with the patient, psychiatry resident and/or medical student, the acuna Clinical Treatment Coordinator, and RN. I reviewed, edited and agree with the findings and plan in this discharge summary. Total time on discharge date involved with planning and face-to-face discussion with the patient was 35 minutes.     It was our pleasure and privilege to participate in the care of this patient. Please contact any of the team for any questions about the above information.     Raphael Levi M.D.   of Psychiatry  [facility]  Email [email or web address]  Phone [phone]

## 2017-04-14 NOTE — PLAN OF CARE
Problem: Psychotic Symptoms  Goal: Psychotic Symptoms  Signs and symptoms of listed problems will be absent or manageable.  -pt will verbalize coping skills to manage hallucinations  -pt will participate in groups demonstrating adequate attention span  -pt will identify when or if they are having any medication side effects  -pt will be able to have reality based conversations   Outcome: Adequate for Discharge Date Met:  04/14/17  Pt discharged home accompanied by act team , instable condition , denies voices , suicidal ideation. Pt given discharge instructions , appointments and meds

## 2017-04-14 NOTE — PROGRESS NOTES
04/13/17 2208   Behavioral Health   Thinking intact   Insight insight appropriate to situation   Judgement intact   Affect full range affect   Mood mood is calm   Physical Appearance/Attire attire appropriate to age and situation   Hygiene well groomed   Activity restless;withdrawn   Speech clear;coherent   Psycho Education   Type of Intervention 1:1 intervention   Response participates, initiates socially appropriate   Hours 0.5   Activities of Daily Living   Hygiene/Grooming independent   Pt is visible in milieu pacing most evening. Pt watches tv at times and is eating meals. Pt is pleasant and cooperative on approach. Pt is in bed early. She is soft spoken and quiet. Pt states no further concerns.

## 2017-04-14 NOTE — DISCHARGE INSTRUCTIONS
Behavioral Discharge Planning and Instructions      Summary:  You were admitted on 3/21/2017  For Psychotic D/O NOS and Suicidal Ideations.  You were treated by Dr. Danish eLvi MD and discharged on 4/14/2017 from [facility]. You completed the commitment process and have been placed on civil commitment through [facility]. You have agreed to the terms of your civil commitment by signing your provisional discharge and understand that any further noncompliance will result in you being placed outside of your home.     Main Diagnosis: Schizoaffective    Health Care Follow-up Appointments:     Dr. Gaviria- Re-Entry Act Team will schedule follow up appointment with provider.  Phone: [phone] Fax: [phone]    Major Treatments, Procedures and Findings:  You were provided with: a psychiatric assessment, assessed for medical stability, medication evaluation and/or management, group therapy and milieu management    Symptoms to Report: feeling more aggressive, increased confusion, losing more sleep or thoughts of suicide    Early warning signs can include: increased depression or anxiety increased thoughts or behaviors of suicide or self-harm  increased unusual thinking, such as paranoia or hearing voices    Safety and Wellness:  Take all medicines as directed.  Make no changes unless your doctor suggests them.      Follow treatment recommendations.  Refrain from alcohol and non-prescribed drugs.  If there is a concern for safety, call 911.    Resources:   Crisis Intervention: 957.727.9837 or 618-741-2159 (TTY: 608.295.8793).  Call anytime for help.  National [place] on Mental Illness (www.[place].doron.org): 842.419.6840 or 168-936-6198.  Suicide Awareness Voices of Education (SAVE) (www.save.org): 401-411-SAVE (7283)  Mental Health Association of [place] (www.mentalhealth.org): 384.329.1922 or 408-072-3640  Self- Management and Recovery Training., SMART-- Toll free: 608.462.2235  www.[employer].org  [facility]  "Crisis (COPE) Response - Adult 446 380-5906  Text 4 Life: txt \"LIFE\" to 57279 for immediate support and crisis intervention  Crisis text line: Text \"START\" to 073-754. Free, confidential, 24/7.    The treatment team has appreciated the opportunity to work with you.     If you have any questions or concerns our unit number is [phone]  You may be receiving a follow-up phone call within the next three days from a representative from behavioral health.      "

## 2017-04-14 NOTE — PROGRESS NOTES
Pt was visible in the milieu pacing the halls, refused groups because she states  doesn't like them. Pt will be going home either today or tomorrow and is looking forward to another chance she states. Pt stated she is going to take her medications and not come back.  Pt denies SI/SIB, hallucinations, and medication side affects.     04/14/17 1200   Behavioral Health   Hallucinations denies / not responding to hallucinations   Thinking intact   Orientation person: oriented;place: oriented;date: oriented   Memory baseline memory   Insight insight appropriate to situation   Judgement impaired   Eye Contact at examiner   Affect full range affect   Mood mood is calm   Physical Appearance/Attire attire appropriate to age and situation   Hygiene well groomed   Suicidality other (see comments)  (denies)   Self Injury other (see comment)  (denies)   Activity restless;withdrawn   Speech clear;coherent   Medication Sensitivity no stated side effects;no observed side effects   Psychomotor / Gait balanced;steady   Psycho Education   Type of Intervention 1:1 intervention   Response participates, initiates socially appropriate   Hours 0.5   Treatment Detail Check in    Activities of Daily Living   Hygiene/Grooming independent   Oral Hygiene independent   Dress independent   Laundry with supervision   Room Organization independent

## 2017-06-14 ENCOUNTER — OFFICE VISIT (OUTPATIENT)
Dept: FAMILY MEDICINE | Facility: CLINIC | Age: 37
End: 2017-06-14

## 2017-06-14 VITALS
SYSTOLIC BLOOD PRESSURE: 106 MMHG | TEMPERATURE: 98.8 F | DIASTOLIC BLOOD PRESSURE: 75 MMHG | BODY MASS INDEX: 27.06 KG/M2 | HEART RATE: 111 BPM | OXYGEN SATURATION: 99 % | RESPIRATION RATE: 18 BRPM | WEIGHT: 162.6 LBS

## 2017-06-14 DIAGNOSIS — Z30.019 ENCOUNTER FOR FEMALE BIRTH CONTROL: Primary | ICD-10-CM

## 2017-06-14 DIAGNOSIS — B35.1 DERMATOPHYTOSIS OF NAIL: ICD-10-CM

## 2017-06-14 LAB — HCG UR QL: NEGATIVE

## 2017-06-14 RX ORDER — ARIPIPRAZOLE ORAL 1 MG/ML
5 SOLUTION ORAL DAILY
COMMUNITY
End: 2018-05-25

## 2017-06-14 RX ORDER — MEDROXYPROGESTERONE ACETATE 150 MG/ML
150 INJECTION, SUSPENSION INTRAMUSCULAR
Qty: 1 ML | Refills: 3 | OUTPATIENT
Start: 2017-06-14 | End: 2019-02-11

## 2017-06-14 NOTE — PROGRESS NOTES
HPI       Nona Finley is a 36 year old  female  who presents for DMPA injection .   Last Injection 3/7/2017  Pregnancy test negative     Schizoaffective disorder : had several psych inpatient admissions  Doing well. Feels new medications are good    Nail deformity  -think its fungus   -not painful      No Known Allergies    Problem, Medication and Allergy Lists were reviewed and are current.  reviewed and updated if needed..    Patient is an established patient of this clinic..         Review of Systems:   General: No distress  Psych: no depression              Physical Exam:     Vitals:    06/14/17 1507   BP: 106/75   BP Location: Left arm   Patient Position: Chair   Cuff Size: Adult Regular   Pulse: 111   Resp: 18   Temp: 98.8  F (37.1  C)   TempSrc: Oral   SpO2: 99%   Weight: 162 lb 9.6 oz (73.8 kg)     Body mass index is 27.06 kg/(m^2).    Constitutional: Awake, alert, cooperative, no apparent distress, and appears stated age  Nail: thick, moth eaten appearance, 3rd digit left hand , affecting whole nail   Psych : Good eye contact, well groomed, very interactive and collaborative. Good insight. Thought process is linear and coherent. Associations are tight. Mood is good. Affect euthymic.       Results for orders placed or performed in visit on 06/14/17 (from the past 24 hour(s))   HCG Qualitative Urine (UPT) ([facility])   Result Value Ref Range    HCG Qual Urine NEGATIVE Negative         Assessment and Plan        1. Encounter for female birth control  - medroxyPROGESTERone (DEPO-PROVERA) 150 MG/ML injection; Inject 1 mL (150 mg) into the muscle every 3 months for 4 doses  Dispense: 1 mL; Refill: 3  - INJECTION INTRAMUSCULAR OR SUB-Q  -Discussed advantage of long acting BC . Talked specifically about IUD and Nexplanon. She will think about it .     2. Dermatophytosis of nail  -Will check with pharmacy about drug interaction first   -Will need LFT monitoring and side effects     3. Schizoaffective  disorder  -Follows with  Psychiatry  -Mood is good today     Please call or return to clinic if your symptoms don't go away.    Follow up plan   as needed       Medications Discontinued During This Encounter   Medication Reason     mirtazapine (REMERON) 15 MG tablet Medication Reconciliation Clean Up     fludrocortisone (FLORINEF) 0.1 MG tablet Stopped by Patient       Options for treatment and follow-up care were reviewed with the patient. Nona Finley  engaged in the decision making process and verbalized understanding of the options discussed and agreed with the final plan.    Vanessa Thompson MD G3  [facility]  Family Medicine Resident  Pager [phone]

## 2017-06-14 NOTE — PROGRESS NOTES
Preceptor Attestation:   Patient seen and discussed with the resident. Assessment and plan reviewed with resident and agreed upon.   Supervising Physician:  Jorge Moscoso MD  [facility]

## 2017-06-14 NOTE — PATIENT INSTRUCTIONS
Here is the plan from today's visit    1. Encounter for female birth control  - medroxyPROGESTERone (DEPO-PROVERA) 150 MG/ML injection; Inject 1 mL (150 mg) into the muscle every 3 months for 4 doses  Dispense: 1 mL; Refill: 3  - INJECTION INTRAMUSCULAR OR SUB-Q    2. Dermatophytosis of nail  -Will check with pharmacy about drug interaction first   -Will need LFT monitoring and side effects     Please call or return to clinic if your symptoms don't go away.    Follow up plan   as needed     Thank you for coming to [facility] today.  Lab Testing:  **If you had lab testing today and your results are reassuring or normal they will be mailed to you or sent through [facility] within 7 days.   **If the lab tests need quick action we will call you with the results.  The phone number we will call with results is # [phone] (home) none (work). If this is not the best number please call our clinic and change the number.  Medication Refills:  If you need any refills please call your pharmacy and they will contact us.   If you need to  your refill at a new pharmacy, please contact the new pharmacy directly. The new pharmacy will help you get your medications transferred faster.   Scheduling:  If you have any concerns about today's visit or wish to schedule another appointment please call our office during normal business hours [phone] (8-5:00 M-F)  If a referral was made to a [facility] and you don't get a call from central scheduling please call [phone].  If a Mammogram was ordered for you at [facility] call [phone] to schedule or change your appointment.  If you had an XRay/CT/Ultrasound/MRI ordered the number is [phone] to schedule or change your radiology appointment.   Medical Concerns:  If you have urgent medical concerns please call [phone] at any time of the day.

## 2017-06-14 NOTE — NURSING NOTE
The following medication was given:     MEDICATION: Depo Provera 150mg  ROUTE: IM  SITE: Forearm - Left  DOSE: 150mg   LOT #: [id number]  :  [employer]   EXPIRATION DATE:  12/01/2019  NDC#: [address]-1     Was entire vial of medication used? Yes    Dr. Thompson onsite and available for questions at time of injection.   Problem list reviewed for plan for this injection. Patient has orders and plan written on AVS.  Patient should be seen and plan renewed before 06/14/2018    Pt need to return for the next one between Aug 30,2017 to Sep. 13,2017    Libby Desai MA

## 2017-06-14 NOTE — MR AVS SNAPSHOT
After Visit Summary   6/14/2017    Nona Finley    MRN: [id number]           Patient Information     Date Of Birth          1980        Visit Information        Provider Department      6/14/2017 3:20 PM Vanessa Thompson MD [facility]        Today's Diagnoses     Encounter for female birth control    -  1    Dermatophytosis of nail          Care Instructions    Here is the plan from today's visit    1. Encounter for female birth control  - medroxyPROGESTERone (DEPO-PROVERA) 150 MG/ML injection; Inject 1 mL (150 mg) into the muscle every 3 months for 4 doses  Dispense: 1 mL; Refill: 3  - INJECTION INTRAMUSCULAR OR SUB-Q    2. Dermatophytosis of nail  -Will check with pharmacy about drug interaction first   -Will need LFT monitoring and side effects     Please call or return to clinic if your symptoms don't go away.    Follow up plan   as needed     Thank you for coming to [facility] today.  Lab Testing:  **If you had lab testing today and your results are reassuring or normal they will be mailed to you or sent through [employer] within 7 days.   **If the lab tests need quick action we will call you with the results.  The phone number we will call with results is # [phone] (home) none (work). If this is not the best number please call our clinic and change the number.  Medication Refills:  If you need any refills please call your pharmacy and they will contact us.   If you need to  your refill at a new pharmacy, please contact the new pharmacy directly. The new pharmacy will help you get your medications transferred faster.   Scheduling:  If you have any concerns about today's visit or wish to schedule another appointment please call our office during normal business hours [phone] (8-5:00 M-F)  If a referral was made to a [facility] and you don't get a call from central scheduling please call [phone].  If a Mammogram was  ordered for you at [facility] call [phone] to schedule or change your appointment.  If you had an XRay/CT/Ultrasound/MRI ordered the number is [phone] to schedule or change your radiology appointment.   Medical Concerns:  If you have urgent medical concerns please call [phone] at any time of the day.          Follow-ups after your visit        Who to contact     Please call your clinic at [phone] to:    Ask questions about your health    Make or cancel appointments    Discuss your medicines    Learn about your test results    Speak to your doctor   If you have compliments or concerns about an experience at your clinic, or if you wish to file a complaint, please contact [facility] Patient Relations at [phone] or email us at [email or web address]         Additional Information About Your Visit        [employer] Information     [employer] gives you secure access to your electronic health record. If you see a primary care provider, you can also send messages to your care team and make appointments. If you have questions, please call your primary care clinic.  If you do not have a primary care provider, please call [phone] and they will assist you.      [employer] is an electronic gateway that provides easy, online access to your medical records. With [employer], you can request a clinic appointment, read your test results, renew a prescription or communicate with your care team.     To access your existing account, please contact your [facility] or call [phone] for assistance.        Care EveryWhere ID     This is your Care EveryWhere ID. This could be used by other organizations to access your [place] medical records  [id number]        Your Vitals Were     Pulse Temperature Respirations Pulse Oximetry Breastfeeding? BMI (Body Mass Index)    111 98.8  F (37.1  C) (Oral) 18 99% No 27.06 kg/m2       Blood Pressure from  Last 3 Encounters:   06/14/17 106/75   04/11/17 120/78   03/04/17 95/62    Weight from Last 3 Encounters:   06/14/17 162 lb 9.6 oz (73.8 kg)   04/11/17 163 lb (73.9 kg)   02/28/17 141 lb (64 kg)              We Performed the Following     HCG Qualitative Urine (UPT) ([facility])     INJECTION INTRAMUSCULAR OR SUB-Q          Today's Medication Changes          These changes are accurate as of: 6/14/17  3:42 PM.  If you have any questions, ask your nurse or doctor.               Start taking these medicines.        Dose/Directions    medroxyPROGESTERone 150 MG/ML injection   Commonly known as:  DEPO-PROVERA   Used for:  Encounter for female birth control   Started by:  Vanessa Thompson MD        Dose:  150 mg   Inject 1 mL (150 mg) into the muscle every 3 months for 4 doses   Quantity:  1 mL   Refills:  3            Where to get your medicines      Some of these will need a paper prescription and others can be bought over the counter.  Ask your nurse if you have questions.     You don't need a prescription for these medications     medroxyPROGESTERone 150 MG/ML injection                Primary Care Provider Office Phone # Fax #    Vanessa Thompson [phone]       [facility] [address] [facility] [address]        Thank you!     Thank you for choosing [facility]  for your care. Our goal is always to provide you with excellent care. Hearing back from our patients is one way we can continue to improve our services. Please take a few minutes to complete the written survey that you may receive in the mail after your visit with us. Thank you!             Your Updated Medication List - Protect others around you: Learn how to safely use, store and throw away your medicines at www.disposemymeds.org.          This list is accurate as of: 6/14/17  3:42 PM.  Always use your most recent med list.                   Brand Name Dispense Instructions for use    ABILIFY 1 MG/ML Soln solution    Generic drug:  ARIPiprazole      Take 5 mg by mouth daily       BENADRYL PO      Take 25-50 mg by mouth daily as needed (EPS)       cloZAPine 25 MG tablet    CLOZARIL    21 tablet    Take 3 tablets (75 mg) by mouth At Bedtime       INVEGA SUSTENNA IM      Inject 234 mg into the muscle every 28 days       medroxyPROGESTERone 150 MG/ML injection    DEPO-PROVERA    1 mL    Inject 1 mL (150 mg) into the muscle every 3 months for 4 doses       naproxen 500 MG tablet    NAPROSYN    10 tablet    Take 1 tablet (500 mg) by mouth every 12 hours as needed for moderate pain       prenatal multivitamin  plus iron 27-0.8 MG Tabs per tablet     30 tablet    Take 1 tablet by mouth daily       VITAMIN D (CHOLECALCIFEROL) PO      Take 2,000 Units by mouth daily

## 2017-11-01 ENCOUNTER — OFFICE VISIT (OUTPATIENT)
Dept: FAMILY MEDICINE | Facility: CLINIC | Age: 37
End: 2017-11-01

## 2017-11-01 VITALS
SYSTOLIC BLOOD PRESSURE: 109 MMHG | OXYGEN SATURATION: 99 % | RESPIRATION RATE: 16 BRPM | DIASTOLIC BLOOD PRESSURE: 78 MMHG | WEIGHT: 165.2 LBS | TEMPERATURE: 97.6 F | BODY MASS INDEX: 27.49 KG/M2 | HEART RATE: 92 BPM

## 2017-11-01 DIAGNOSIS — Z23 NEED FOR INFLUENZA VACCINATION: ICD-10-CM

## 2017-11-01 DIAGNOSIS — Z78.9 USES DEPO-PROVERA AS PRIMARY BIRTH CONTROL METHOD: ICD-10-CM

## 2017-11-01 DIAGNOSIS — Z30.013 INITIATION OF DEPO PROVERA: Primary | ICD-10-CM

## 2017-11-01 DIAGNOSIS — B35.1 FUNGAL NAIL INFECTION: ICD-10-CM

## 2017-11-01 LAB
HCG UR QL: NEGATIVE
KOH PREP: NEGATIVE
SPECIMEN DESCRIPTION: NORMAL

## 2017-11-01 RX ORDER — MEDROXYPROGESTERONE ACETATE 150 MG/ML
150 INJECTION, SUSPENSION INTRAMUSCULAR
Qty: 0.9 ML | Refills: 3 | Status: CANCELLED | OUTPATIENT
Start: 2017-11-01

## 2017-11-01 ASSESSMENT — ENCOUNTER SYMPTOMS
FEVER: 0
CHILLS: 0
VOMITING: 0
COUGH: 0
NAUSEA: 0
HEADACHES: 0
SHORTNESS OF BREATH: 0

## 2017-11-01 NOTE — Clinical Note
Please don't hate me.  It wouldn't let me order the depo that you entered because it said medicaid wouldn't cover it and the lab needed to the KOH and culture ordered ASAP.  I deleted it and tried to reenter the depo, but it was asking for the lot number.  SOOOOOO sorry.  Can you put that in for me tomorrow and I'll sign it?  My bad, learned lots of lessons today :(.  Thanks.  Carmela Neff

## 2017-11-01 NOTE — PATIENT INSTRUCTIONS
Here is the plan from today's visit    1. Contraception  Pregnancy test was negative today.  Please come back in a week to run a test again.  - HCG Qualitative Urine (UPT) ([facility])  - ADMIN VACCINE, EACH ADDITIONAL  - FLU VAC PRESRV FREE QUAD SPLIT VIR CHILD IM 0.25 mL dosage    2. Fungal nail infection  Would like you to come back to the clinic to discuss treatment.  Topical treatment that we discussed is not on formulary.  Would like to discuss oral therapy with you.  - KOH Prep ([facility])  - Fungus skin hair nail culture      Please call or return to clinic if your symptoms don't go away.    Follow up plan  Please make a clinic appointment for follow up with me (MILO ZHANG) in 1  week for pregnancy test and to discuss oral treatment for fungal nail infection.    Thank you for coming to [facility] today.  Lab Testing:  **If you had lab testing today and your results are reassuring or normal they will be mailed to you or sent through [email or web address] within 7 days.   **If the lab tests need quick action we will call you with the results.  The phone number we will call with results is # [phone] (home) none (work). If this is not the best number please call our clinic and change the number.  Medication Refills:  If you need any refills please call your pharmacy and they will contact us.   If you need to  your refill at a new pharmacy, please contact the new pharmacy directly. The new pharmacy will help you get your medications transferred faster.   Scheduling:  If you have any concerns about today's visit or wish to schedule another appointment please call our office during normal business hours [phone] (8-5:00 M-F)  If a referral was made to a [facility] and you don't get a call from central scheduling please call [phone].  If a Mammogram was ordered for you at [facility] call [phone] to schedule or change your appointment.  If you had an  XRay/CT/Ultrasound/MRI ordered the number is [phone] to schedule or change your radiology appointment.   Medical Concerns:  If you have urgent medical concerns please call [phone] at any time of the day.

## 2017-11-01 NOTE — PROGRESS NOTES
HPI:       Nona Finley is a 37 year old who presents for the following  Patient presents with:  Contraception: late- depo shot.      Concern: Restart depo for birth control   Description of the problem :Was supposed to return for depo shot between Aug 30-Sept 13.  Has had unprotected sexual intercourse once a week since Sept 13th, most recently 3 days ago.  Not desiring a pregnancy at this point.  Pregnancy test negative today and will come back in 1 week to take another.  Has felt that she is gaining weight and is more hungry while on the depo.  Still prefers this method of birth control.          Concern: fungal nail infection   Description of the problem :Has had this infection for almost 2 years.  Has not tried any treatment.           Adherence and Exercise  Medication side effects: yes: appetite change and weight gain  How often is a medication missed? Missed last shot    Problem, Medication and Allergy Lists were reviewed and are current.  Patient is an established patient of this clinic.         Review of Systems:   Review of Systems   Constitutional: Negative for chills and fever.   Respiratory: Negative for cough and shortness of breath.    Gastrointestinal: Negative for nausea and vomiting.   Neurological: Negative for headaches.             Physical Exam:   Patient Vitals for the past 24 hrs:   BP Temp Temp src Pulse Resp SpO2 Weight   11/01/17 1635 109/78 97.6  F (36.4  C) Oral 92 16 99 % 165 lb 3.2 oz (74.9 kg)     Body mass index is 27.49 kg/(m^2).  Vitals were reviewed and were normal     Physical Exam   Constitutional: She is oriented to person, place, and time. She appears well-developed and well-nourished.   HENT:   Head: Normocephalic and atraumatic.   Eyes: Conjunctivae are normal.   Cardiovascular: Normal rate, regular rhythm and normal heart sounds.  Exam reveals no gallop and no friction rub.    No murmur heard.  Pulmonary/Chest: Effort normal and breath sounds normal. No  respiratory distress. She has no wheezes. She has no rales.   Neurological: She is alert and oriented to person, place, and time.   Skin:   Middle finger of right hand nail has fungal infection affecting nail and underlying skin   Psychiatric: She has a normal mood and affect. Her behavior is normal. Judgment and thought content normal.         Results:      Results from the last 24 hours  Results for orders placed or performed in visit on 11/01/17 (from the past 24 hour(s))   HCG Qualitative Urine (UPT) ([facility])   Result Value Ref Range    HCG Qual Urine NEGATIVE Negative   KOH Prep ([facility])   Result Value Ref Range    Specimen Description Skin     KOH Prep NEGATIVE No fungal elements seen     Assessment and Plan     Nona was seen today for contraception.    Diagnoses and all orders for this visit:    Initiation of depo provera - Restarting depo shot.  Recommend coming in for another pregnancy test in 1 week due to recent unprotected sexual intercourse.  -     HCG Qualitative Urine (UPT) ([facility])  -     medroxyPROGESTERone (DEPO-PROVERA) 150 MG/ML injection; Inject 1 mL (150 mg) into the muscle every 3 months  -     C Medroxyprogesterone inj/1mg  -     INJECTION INTRAMUSCULAR OR SUB-Q  -     ADMIN VACCINE, EACH ADDITIONAL  -     FLU VAC PRESRV FREE QUAD SPLIT VIR CHILD IM 0.25 mL dosage    Fungal nail infection - Sent for culture and wanted to start her on topical medication.  Medication was not covered by her insurance and she had already left the clinic.  Will try to reach patient tomorrow to discuss oral treatment instead.  -     KOH Prep ([facility])  -     Fungus skin hair nail culture            There are no discontinued medications.  Options for treatment and follow-up care were reviewed with the patient. Nona Finley  engaged in the decision making process and verbalized understanding of the options discussed and agreed with the final plan.    Carmela Neff MD    ko

## 2017-11-01 NOTE — MR AVS SNAPSHOT
After Visit Summary   11/1/2017    Nona Finley    MRN: [id number]           Patient Information     Date Of Birth          1980        Visit Information        Provider Department      11/1/2017 4:20 PM Milo Neff MD [facility]        Today's Diagnoses     Initiation of Depo Provera    -  1    Uses Depo-Provera as primary birth control method        Fungal nail infection        Need for influenza vaccination          Care Instructions    Here is the plan from today's visit    1. Contraception  Pregnancy test was negative today.  Please come back in a week to run a test again.  - HCG Qualitative Urine (UPT) ([facility])  - ADMIN VACCINE, EACH ADDITIONAL  - FLU VAC PRESRV FREE QUAD SPLIT VIR CHILD IM 0.25 mL dosage    2. Fungal nail infection  Would like you to come back to the clinic to discuss treatment.  Topical treatment that we discussed is not on formulary.  Would like to discuss oral therapy with you.  - KOH Prep ([facility])  - Fungus skin hair nail culture      Please call or return to clinic if your symptoms don't go away.    Follow up plan  Please make a clinic appointment for follow up with me (MILO NEFF) in 1  week for pregnancy test and to discuss oral treatment for fungal nail infection.    Thank you for coming to [facility] today.  Lab Testing:  **If you had lab testing today and your results are reassuring or normal they will be mailed to you or sent through [employer] within 7 days.   **If the lab tests need quick action we will call you with the results.  The phone number we will call with results is # [phone] (home) none (work). If this is not the best number please call our clinic and change the number.  Medication Refills:  If you need any refills please call your pharmacy and they will contact us.   If you need to  your refill at a new pharmacy, please contact the new pharmacy directly. The new pharmacy will help you get your  medications transferred faster.   Scheduling:  If you have any concerns about today's visit or wish to schedule another appointment please call our office during normal business hours [phone] (8-5:00 M-F)  If a referral was made to a [facility] and you don't get a call from central scheduling please call [phone].  If a Mammogram was ordered for you at [facility] call [phone] to schedule or change your appointment.  If you had an XRay/CT/Ultrasound/MRI ordered the number is [phone] to schedule or change your radiology appointment.   Medical Concerns:  If you have urgent medical concerns please call [phone] at any time of the day.          Follow-ups after your visit        Follow-up notes from your care team     Return in about 1 week (around 11/8/2017) for Lab Work.      Who to contact     Please call your clinic at [phone] to:    Ask questions about your health    Make or cancel appointments    Discuss your medicines    Learn about your test results    Speak to your doctor   If you have compliments or concerns about an experience at your clinic, or if you wish to file a complaint, please contact [facility] Patient Relations at [phone] or email us at [email or web address]         Additional Information About Your Visit        [employer] Information     [employer] gives you secure access to your electronic health record. If you see a primary care provider, you can also send messages to your care team and make appointments. If you have questions, please call your primary care clinic.  If you do not have a primary care provider, please call [phone] and they will assist you.      [employer] is an electronic gateway that provides easy, online access to your medical records. With [employer], you can request a clinic appointment, read your test results, renew a prescription or communicate with your care team.     To access your  existing account, please contact your [facility] or call [phone] for assistance.        Care EveryWhere ID     This is your Care EveryWhere ID. This could be used by other organizations to access your [facility] medical records  [id number]        Your Vitals Were     Pulse Temperature Respirations Pulse Oximetry BMI (Body Mass Index)       92 97.6  F (36.4  C) (Oral) 16 99% 27.49 kg/m2        Blood Pressure from Last 3 Encounters:   11/01/17 109/78   06/14/17 106/75   04/11/17 120/78    Weight from Last 3 Encounters:   11/01/17 165 lb 3.2 oz (74.9 kg)   06/14/17 162 lb 9.6 oz (73.8 kg)   04/11/17 163 lb (73.9 kg)              We Performed the Following     ADMIN VACCINE, EACH ADDITIONAL     FLU VAC PRESRV FREE QUAD SPLIT VIR CHILD IM 0.25 mL dosage     Fungus skin hair nail culture     HCG Qualitative Urine (UPT) ([facility])     KOH Prep ([facility])          Today's Medication Changes          These changes are accurate as of: 11/1/17 11:59 PM.  If you have any questions, ask your nurse or doctor.               Stop taking these medicines if you haven't already. Please contact your care team if you have questions.     INVEGA SUSTENNA IM   Stopped by:  Carmela Neff MD                    Primary Care Provider Office Phone # Fax #    Johanne Christiana Tracy [phone]       [address] [facility] [address]        Equal Access to Services     YAHAIRA KHALIL : Hadii jojo stillo Som[place], waaxda lusarahyadaha, qaybta kaalmavida hzou donna dykes . So [facility] [phone].    ATENCIÓN: Si habla español, tiene a arambula disposición servicios gratuitos de asistencia lingüística. Llame al [phone].    We comply with applicable federal civil rights laws and [place] laws. We do not discriminate on the basis of race, color, national origin, age, disability, sex, sexual orientation, or gender identity.            Thank you!     Thank you for  choosing hospitals [facility]  for your care. Our goal is always to provide you with excellent care. Hearing back from our patients is one way we can continue to improve our services. Please take a few minutes to complete the written survey that you may receive in the mail after your visit with us. Thank you!             Your Updated Medication List - Protect others around you: Learn how to safely use, store and throw away your medicines at www.disposemymeds.org.          This list is accurate as of: 11/1/17 11:59 PM.  Always use your most recent med list.                   Brand Name Dispense Instructions for use Diagnosis    ABILIFY 1 MG/ML Soln solution   Generic drug:  ARIPiprazole      Take 5 mg by mouth daily        cloZAPine 25 MG tablet    CLOZARIL    21 tablet    Take 3 tablets (75 mg) by mouth At Bedtime    Schizoaffective disorder, depressive type (H)       medroxyPROGESTERone 150 MG/ML injection    DEPO-PROVERA    1 mL    Inject 1 mL (150 mg) into the muscle every 3 months for 4 doses    Encounter for female birth control       prenatal multivitamin plus iron 27-0.8 MG Tabs per tablet     30 tablet    Take 1 tablet by mouth daily    Routine general medical examination at a health care facility       VITAMIN D (CHOLECALCIFEROL) PO      Take 2,000 Units by mouth daily

## 2017-11-01 NOTE — NURSING NOTE
I administered the following to Nona Finley.    MEDICATION: Medroxyprogesterone 150 mg  ROUTE: IM  SITE: LUQ - Gluteus  DOSE: 150  LOT #: [id number]  :  [place]   EXPIRATION DATE:  02/01/2020  NDC#: [address]-5     Was entire vial of medication used? Yes    Name of provider who requested the injection: Amaon  Name of provider on site (faculty or community preceptor) at the time of performing the injection: Danis Conrad, [facility]

## 2017-11-02 DIAGNOSIS — B35.1 FUNGAL NAIL INFECTION: Primary | ICD-10-CM

## 2017-11-02 RX ORDER — TERBINAFINE HYDROCHLORIDE 250 MG/1
250 TABLET ORAL DAILY
Qty: 42 TABLET | Refills: 0 | Status: SHIPPED | OUTPATIENT
Start: 2017-11-02 | End: 2018-05-25

## 2017-11-08 NOTE — PROGRESS NOTES
Preceptor Attestation:   Patient seen and discussed with the resident. Assessment and plan reviewed with resident and agreed upon.   Supervising Physician:  Jorge Moscoso MD  [facility]

## 2017-11-16 ASSESSMENT — PATIENT HEALTH QUESTIONNAIRE - PHQ9: SUM OF ALL RESPONSES TO PHQ QUESTIONS 1-9: 0

## 2017-11-20 RX ORDER — MEDROXYPROGESTERONE ACETATE 150 MG/ML
150 INJECTION, SUSPENSION INTRAMUSCULAR
Qty: 0.9 ML | Refills: 5 | OUTPATIENT
Start: 2017-11-20 | End: 2019-02-12

## 2017-11-30 ENCOUNTER — TELEPHONE (OUTPATIENT)
Dept: FAMILY MEDICINE | Facility: CLINIC | Age: 37
End: 2017-11-30

## 2017-11-30 LAB
BACTERIA SPEC CULT: ABNORMAL
BACTERIA SPEC CULT: ABNORMAL
SPECIMEN SOURCE: ABNORMAL

## 2018-05-25 ENCOUNTER — HOSPITAL ENCOUNTER (INPATIENT)
Facility: CLINIC | Age: 38
LOS: 4 days | Discharge: HOME | DRG: 885 | End: 2018-05-29
Attending: PSYCHIATRY & NEUROLOGY | Admitting: PSYCHIATRY & NEUROLOGY
Payer: COMMERCIAL

## 2018-05-25 DIAGNOSIS — F25.1 SCHIZOAFFECTIVE DISORDER, DEPRESSIVE TYPE (H): ICD-10-CM

## 2018-05-25 LAB
ALBUMIN SERPL-MCNC: 3.5 G/DL (ref 3.4–5)
ALP SERPL-CCNC: 55 U/L (ref 40–150)
ALT SERPL W P-5'-P-CCNC: 13 U/L (ref 0–50)
ANION GAP SERPL CALCULATED.3IONS-SCNC: 9 MMOL/L (ref 3–14)
AST SERPL W P-5'-P-CCNC: 18 U/L (ref 0–45)
BASOPHILS # BLD AUTO: 0 10E9/L (ref 0–0.2)
BASOPHILS NFR BLD AUTO: 0.4 %
BILIRUB SERPL-MCNC: 0.6 MG/DL (ref 0.2–1.3)
BUN SERPL-MCNC: 10 MG/DL (ref 7–30)
CALCIUM SERPL-MCNC: 8.9 MG/DL (ref 8.5–10.1)
CHLORIDE SERPL-SCNC: 108 MMOL/L (ref 94–109)
CO2 SERPL-SCNC: 25 MMOL/L (ref 20–32)
CREAT SERPL-MCNC: 0.5 MG/DL (ref 0.52–1.04)
DIFFERENTIAL METHOD BLD: NORMAL
EOSINOPHIL # BLD AUTO: 0.1 10E9/L (ref 0–0.7)
EOSINOPHIL NFR BLD AUTO: 1.2 %
ERYTHROCYTE [DISTWIDTH] IN BLOOD BY AUTOMATED COUNT: 12.8 % (ref 10–15)
GFR SERPL CREATININE-BSD FRML MDRD: >90 ML/MIN/1.7M2
GLUCOSE SERPL-MCNC: 79 MG/DL (ref 70–99)
HCT VFR BLD AUTO: 35.9 % (ref 35–47)
HGB BLD-MCNC: 12.1 G/DL (ref 11.7–15.7)
IMM GRANULOCYTES # BLD: 0 10E9/L (ref 0–0.4)
IMM GRANULOCYTES NFR BLD: 0 %
LYMPHOCYTES # BLD AUTO: 2 10E9/L (ref 0.8–5.3)
LYMPHOCYTES NFR BLD AUTO: 40.3 %
MCH RBC QN AUTO: 30.4 PG (ref 26.5–33)
MCHC RBC AUTO-ENTMCNC: 33.7 G/DL (ref 31.5–36.5)
MCV RBC AUTO: 90 FL (ref 78–100)
MONOCYTES # BLD AUTO: 0.5 10E9/L (ref 0–1.3)
MONOCYTES NFR BLD AUTO: 9.8 %
NEUTROPHILS # BLD AUTO: 2.4 10E9/L (ref 1.6–8.3)
NEUTROPHILS NFR BLD AUTO: 48.3 %
NRBC # BLD AUTO: 0 10*3/UL
NRBC BLD AUTO-RTO: 0 /100
PLATELET # BLD AUTO: 267 10E9/L (ref 150–450)
POTASSIUM SERPL-SCNC: 3.4 MMOL/L (ref 3.4–5.3)
PROT SERPL-MCNC: 7.6 G/DL (ref 6.8–8.8)
RBC # BLD AUTO: 3.98 10E12/L (ref 3.8–5.2)
SODIUM SERPL-SCNC: 142 MMOL/L (ref 133–144)
TSH SERPL DL<=0.005 MIU/L-ACNC: 0.7 MU/L (ref 0.4–4)
WBC # BLD AUTO: 5 10E9/L (ref 4–11)

## 2018-05-25 PROCEDURE — 84443 ASSAY THYROID STIM HORMONE: CPT | Performed by: PSYCHIATRY & NEUROLOGY

## 2018-05-25 PROCEDURE — 12400007 ZZH R&B MH INTERMEDIATE UMMC

## 2018-05-25 PROCEDURE — 85025 COMPLETE CBC W/AUTO DIFF WBC: CPT | Performed by: PSYCHIATRY & NEUROLOGY

## 2018-05-25 PROCEDURE — 99285 EMERGENCY DEPT VISIT HI MDM: CPT | Mod: 25 | Performed by: PSYCHIATRY & NEUROLOGY

## 2018-05-25 PROCEDURE — 36415 COLL VENOUS BLD VENIPUNCTURE: CPT | Performed by: PSYCHIATRY & NEUROLOGY

## 2018-05-25 PROCEDURE — 99285 EMERGENCY DEPT VISIT HI MDM: CPT | Mod: Z6 | Performed by: PSYCHIATRY & NEUROLOGY

## 2018-05-25 PROCEDURE — 80053 COMPREHEN METABOLIC PANEL: CPT | Performed by: PSYCHIATRY & NEUROLOGY

## 2018-05-25 PROCEDURE — 90791 PSYCH DIAGNOSTIC EVALUATION: CPT

## 2018-05-25 RX ORDER — HYDROXYZINE HYDROCHLORIDE 25 MG/1
25-50 TABLET, FILM COATED ORAL EVERY 4 HOURS PRN
Status: DISCONTINUED | OUTPATIENT
Start: 2018-05-25 | End: 2018-05-29 | Stop reason: HOSPADM

## 2018-05-25 RX ORDER — ALUMINA, MAGNESIA, AND SIMETHICONE 2400; 2400; 240 MG/30ML; MG/30ML; MG/30ML
30 SUSPENSION ORAL EVERY 4 HOURS PRN
Status: DISCONTINUED | OUTPATIENT
Start: 2018-05-25 | End: 2018-05-29 | Stop reason: HOSPADM

## 2018-05-25 RX ORDER — CLOZAPINE 25 MG/1
25 TABLET ORAL AT BEDTIME
Status: DISCONTINUED | OUTPATIENT
Start: 2018-05-25 | End: 2018-05-27

## 2018-05-25 RX ORDER — ACETAMINOPHEN 325 MG/1
650 TABLET ORAL EVERY 4 HOURS PRN
Status: DISCONTINUED | OUTPATIENT
Start: 2018-05-25 | End: 2018-05-29 | Stop reason: HOSPADM

## 2018-05-25 RX ORDER — OLANZAPINE 10 MG/1
10 TABLET ORAL
Status: DISCONTINUED | OUTPATIENT
Start: 2018-05-25 | End: 2018-05-29 | Stop reason: HOSPADM

## 2018-05-25 RX ORDER — CLOZAPINE 100 MG/1
100 TABLET ORAL AT BEDTIME
Status: DISCONTINUED | OUTPATIENT
Start: 2018-05-25 | End: 2018-05-25

## 2018-05-25 RX ORDER — BISACODYL 10 MG
10 SUPPOSITORY, RECTAL RECTAL DAILY PRN
Status: DISCONTINUED | OUTPATIENT
Start: 2018-05-25 | End: 2018-05-29 | Stop reason: HOSPADM

## 2018-05-25 RX ORDER — CLOZAPINE 100 MG/1
100 TABLET ORAL AT BEDTIME
COMMUNITY
End: 2018-05-29

## 2018-05-25 RX ORDER — OLANZAPINE 10 MG/2ML
10 INJECTION, POWDER, FOR SOLUTION INTRAMUSCULAR
Status: DISCONTINUED | OUTPATIENT
Start: 2018-05-25 | End: 2018-05-29 | Stop reason: HOSPADM

## 2018-05-25 RX ORDER — TRAZODONE HYDROCHLORIDE 50 MG/1
50 TABLET, FILM COATED ORAL
Status: DISCONTINUED | OUTPATIENT
Start: 2018-05-25 | End: 2018-05-29 | Stop reason: HOSPADM

## 2018-05-25 ASSESSMENT — ENCOUNTER SYMPTOMS
ABDOMINAL PAIN: 0
NERVOUS/ANXIOUS: 1
CHEST TIGHTNESS: 0
DIZZINESS: 0
BACK PAIN: 0
HALLUCINATIONS: 1
DYSPHORIC MOOD: 1
FEVER: 0
SHORTNESS OF BREATH: 0

## 2018-05-25 ASSESSMENT — ACTIVITIES OF DAILY LIVING (ADL)
LAUNDRY: WITH SUPERVISION
ORAL_HYGIENE: INDEPENDENT
DRESS: INDEPENDENT
GROOMING: INDEPENDENT

## 2018-05-25 NOTE — PROGRESS NOTES
05/25/18 1814   Patient Belongings   Patient Belongings cell phone/electronics;money (see comment);wallet;other (see comments);Voodoo item   Disposition of Belongings Locker   Belongings Search Yes   Clothing Search Yes   Second Staff Lakisha GILMAN RN 30N     Locker #6: deodorant, bar soap, lotion, prayer rug, clothes, Black Tess Boop purse, black phone, white phone , keys     Security: VISA #[id number], VISA #[id number], (1) $100 bill, (2) $50.00 bills, (31) $20 bills, 86 cents    A               Admission:  I am responsible for any personal items that are not sent to the safe or pharmacy.  [facility] is not responsible for loss, theft or damage of any property in my possession.    Signature:  _________________________________ Date: _______  Time: _____                                              Staff Signature:  ____________________________ Date: ________  Time: _____      2nd Staff person, if patient is unable/unwilling to sign:    Signature: ________________________________ Date: ________  Time: _____     Discharge:  [facility] has returned all of my personal belongings:    Signature: _________________________________ Date: ________  Time: _____                                          Staff Signature:  ____________________________ Date: ________  Time: _____

## 2018-05-25 NOTE — ED NOTES
ED to Behavioral Floor Handoff    SITUATION  Nona Finley is a 37 year old female who speaks Gambian and lives home status is unknown unknown The patient arrived in the ED  from unknown with a complaint of Hallucinations (States she is hearing things and seeing things.  Feels like she does not want to live.)  .The patient's current symptoms started/worsened unknown time frame ago and during this time the symptoms have increased.   In the ED, pt was diagnosed with   Final diagnoses:   Schizoaffective disorder, depressive type (H)        Initial vitals were: BP: 108/61  Pulse: 75  Temp: 98.3  F (36.8  C)  Resp: 16  Weight: 72.2 kg (159 lb 4 oz)  SpO2: 99 %   --------  Is the patient diabetic? No   If yes, last blood glucose? --     If yes, was this treated in the ED? --  --------  Is the patient inebriated (ETOH) No or Impaired on other substances? No  MSSA done? N/A  Last MSSA score: --    Were withdrawal symptoms treated? N/A  Does the patient have a seizure history? No. If yes, date of most recent seizure--  --------  Is the patient patient experiencing suicidal ideation? reports occasional suicidal thoughts representing feeling that life is not worth feeling, reports auditory hallucinations telling her to harm herself  Homicidal ideation? denies current or recent homicidal ideation or behaviors.    Self-injurious behavior/urges? denies current or recent self injurious behavior or ideation.  ------  Was pt aggressive in the ED No  Was a code called No  Is the pt now cooperative? Yes  -------  Meds given in ED: Medications - No data to display   Family present during ED course? No  Family currently present? No    BACKGROUND  Does the patient have a cognitive impairment or developmental disability? No  Allergies: No Known Allergies.   Social demographics are   Social History     Social History     Marital status:      Spouse name: N/A     Number of children: 2     Years of education: N/A     Occupational  History      None      Social History Main Topics     Smoking status: Never Smoker     Smokeless tobacco: Never Used     Alcohol use No     Drug use: No     Sexual activity: Yes     Partners: Male     Birth control/ protection: None     Other Topics Concern     Blood Transfusions No     Occupational Exposure No     Social History Narrative    Caffeine intake/servings daily - 2 times a week    Calcium intake/servings daily - 1-2    Exercise no times weekly - describe active with children    Sunscreen used - soemtimes    Seatbelts used - Yes    Guns stored in the home - Yes    Self Breast Exam - Yes    Pap test up to date -  Yes    Eye exam up to date -  No    Dental exam up to date -  No    DEXA scan up to date -  Not Applicable    Flex Sig/Colonoscopy up to date -  Not Applicable    Mammography up to date -  Not Applicable    Immunizations reviewed and up to date - needs tdap at postpartum    Abuse: Current or Past (Physical, Sexual or Emotional) - No    Do you feel safe in your environment - Yes    Do you cope well with stress - sometimes    Do you suffer from insomnia - No    Last updated by: Joanne Gilligan RN 12/16/10                            ASSESSMENT  Labs results Labs Ordered and Resulted from Time of ED Arrival Up to the Time of Departure from the ED - No data to display   Imaging Studies: No results found for this or any previous visit (from the past 24 hour(s)).   Most recent vital signs /61  Pulse 75  Temp 98.3  F (36.8  C) (Oral)  Resp 16  Wt 72.2 kg (159 lb 4 oz)  LMP 05/06/2018  SpO2 99%  Breastfeeding? No  BMI 26.5 kg/m2   Abnormal labs/tests/findings requiring intervention:---   Pain control: pt had none  Nausea control: pt had none    RECOMMENDATION  Are any infection precautions needed (MRSA, VRE, etc.)? No If yes, what infection? --  ---  Does the patient have mobility issues? independently. If yes, what device does the pt use? ---  ---  Is patient on 72 hour hold or commitment?  No If on 72 hour hold, have hold and rights been given to patient? N/A  Are admitting orders written if after 10 p.m. ?N/A  Tasks needing to be completed:---     Chanelle Lockett   ascom-- *[phone] [facility]   [phone] [facility]

## 2018-05-25 NOTE — IP AVS SNAPSHOT
30    [address]    [place] [address]    Phone:  [phone]                                       After Visit Summary   5/25/2018    Nona Finley    MRN: [id number]           After Visit Summary Signature Page     I have received my discharge instructions, and my questions have been answered. I have discussed any challenges I see with this plan with the nurse or doctor.    ..........................................................................................................................................  Patient/Patient Representative Signature      ..........................................................................................................................................  Patient Representative Print Name and Relationship to Patient    ..................................................               ................................................  Date                                            Time    ..........................................................................................................................................  Reviewed by Signature/Title    ...................................................              ..............................................  Date                                                            Time

## 2018-05-25 NOTE — PROGRESS NOTES
05/25/18 1823   Patient Belongings   Did you bring any home meds/supplements to the hospital?  Yes

## 2018-05-25 NOTE — ED NOTES
I have performed an in person assessment of the patient.  Based on this assessment the patient no longer requires a one on one attendant at this point in time.       Volodymyr Santos MD  05/25/18 4761

## 2018-05-25 NOTE — ED PROVIDER NOTES
History     Chief Complaint   Patient presents with     Hallucinations     States she is hearing things and seeing things.  Feels like she does not want to live.     The history is provided by the patient and medical records.     Nona Finley is a 37 year old female who comes in due to her worsening symptoms.  She stopped her oral zyprexa 4 days ago and within 24 hours she started to have hallucinations.  She is hearing voices telling her to hurt herself and her kids.  She is not sleeping well and is fearful of following through with the command hallucinations.  She is under a provisional discharge.  She is also on zyprexa every two week shots.  She has not missed a shot.  She is tearful and feels she needs to be hospitalized.    Please [facility] the [facility] for further details.    I have reviewed the Medications, Allergies, Past Medical and Surgical History, and Social History in the Epic system.    Review of Systems   Constitutional: Negative for fever.   Eyes: Negative for visual disturbance.   Respiratory: Negative for chest tightness and shortness of breath.    Cardiovascular: Negative for chest pain.   Gastrointestinal: Negative for abdominal pain.   Musculoskeletal: Negative for back pain.   Neurological: Negative for dizziness.   Psychiatric/Behavioral: Positive for dysphoric mood, hallucinations and suicidal ideas. Negative for self-injury. The patient is nervous/anxious.    All other systems reviewed and are negative.      Physical Exam   BP: 108/61  Pulse: 75  Temp: 98.3  F (36.8  C)  Resp: 16  Weight: 72.2 kg (159 lb 4 oz)  SpO2: 99 %      Physical Exam   Constitutional: She is oriented to person, place, and time. She appears well-developed and well-nourished.   HENT:   Head: Normocephalic and atraumatic.   Mouth/Throat: Oropharynx is clear and moist.   Eyes: Pupils are equal, round, and reactive to light.   Neck: Normal range of motion. Neck supple.   Cardiovascular:  Normal rate, regular rhythm and normal heart sounds.    Pulmonary/Chest: Effort normal and breath sounds normal.   Abdominal: Soft. Bowel sounds are normal.   Musculoskeletal: Normal range of motion.   Neurological: She is alert and oriented to person, place, and time.   Skin: Skin is warm and dry.   Psychiatric: Her speech is normal. Judgment normal. She is actively hallucinating. Thought content is not paranoid and not delusional. Cognition and memory are normal. She exhibits a depressed mood. She expresses suicidal ideation. She expresses no homicidal ideation. She expresses no suicidal plans and no homicidal plans.   Nona is a 36 y/o female who looks her age.  She is well groomed with good eye contact.   Nursing note and vitals reviewed.      ED Course     ED Course     Procedures               Labs Ordered and Resulted from Time of ED Arrival Up to the Time of Departure from the ED - No data to display         Assessments & Plan (with Medical Decision Making)   Nona will be admitted to the hospital due to her worsening symptoms including hallucinations, thoughts to hurt herself and her kids and her stopping her medications.  She will go to station 30 under Dr. Lane.    I have reviewed the nursing notes.    I have reviewed the findings, diagnosis, plan and need for follow up with the patient.    New Prescriptions    No medications on file       Final diagnoses:   Schizoaffective disorder, depressive type (H)       5/25/2018   [facility], [place], EMERGENCY DEPARTMENT     Volodymyr Santos MD  05/25/18 8040

## 2018-05-25 NOTE — PHARMACY-ADMISSION MEDICATION HISTORY
Admission medication history interview status for the 5/25/2018 admission is NOT complete.    Medication history interview sources:  Patient, [facility] ([place]; [phone])    Changes made to PTA medication list (reason)  Added: none  Deleted: Abilify solution, prenatal vitamin, Lamisil, Vitamin D  Changed: clozapine (dose per [facility])    Additional medication history information (including reliability of information, actions taken by pharmacist): The patient was a moderately reliable historian. She was able to discuss some of her medications (clozapine) but not others. The patient knew she takes clozapine 100 mg at bedtime but reported she had not taken the medication for four days. It is not clear which injection or oral antipsychotic (besides clozapine) she is taking outpatient. She reported getting Abilify once monthly injection to this writer and getting Zyprexa injection to other staff. The patient reported he last dose was on May 6th. She reported she stopped taking oral aripiprazole and her other oral medications. The patient could not recall what pharmacy she fills at because her ACT helps her with her medications. The outpatient [facility] pharmacist was able to confirm her clozapine dosing but did not fill any other medications. Both [facility] and [facility] was contacted to perform a central search to see if she is fill prescriptions at either of those chain pharmacies. The patient had not filled at either [facility] or [facility]. This writer attempted to contact the patient's ACT team ([phone]) but no answer at 1700.  The patient reported she has not gotten her Depo Provera in over three months. The [facility] and was given the Depo Provera injection during that visit. It is unclear if she is being seen elsewhere to get her Depo Provera injection.    Recommend contacting ACT team for further information on medications.      Prior to Admission  medications    Medication Sig Last Dose Taking? Auth Provider   cloZAPine (CLOZARIL) 100 MG tablet Take 100 mg by mouth At Bedtime 4 days ago Yes Unknown, Entered By History   medroxyPROGESTERone (DEPO-PROVERA) 150 MG/ML injection Inject 1 mL (150 mg) into the muscle every 3 months 11/1/17 (?) Yes Carmela Neff MD         Medication history completed by:  Johanne Cortes, PharmD, [facility]  Behavioral ER Pharmacist  [phone]

## 2018-05-25 NOTE — IP AVS SNAPSHOT
MRN:[id number]                      After Visit Summary   5/25/2018    Nona Finley    MRN: [id number]           Thank you!     Thank you for choosing [facility] for your care. Our goal is always to provide you with excellent care.        Patient Information     Date Of Birth          1980        Designated Caregiver       Most Recent Value    Caregiver    Will someone help with your care after discharge? yes    Name of designated caregiver -Bob    Phone number of caregiver [phone]    Caregiver address ---      About your hospital stay     You were admitted on:  May 25, 2018 You last received care in the:  [facility] 30NR    You were discharged on:  May 29, 2018        Reason for your hospital stay       Psychosis                  Who to Call     For medical emergencies, please call 911.  For non-urgent questions about your medical care, please call your primary care provider or clinic, [phone]          Attending Provider     Provider Specialty    Volodymyr Santos MD Emergency Medicine    [facility], Aly Cadena MD Psychiatry       Primary Care Provider Office Phone # Fax #    Johanne Christiana Tracy [phone]      After Care Instructions     Activity       Your activity upon discharge: activity as tolerated            Diet       Follow this diet upon discharge: Orders Placed This Encounter      Regular Diet Adult No Pork            Discharge Instructions       1. Please do not harm yourself or others.  2. Please continue to take your medications.  3. Please follow up with your outpatient care team.  4. Please do not take drugs or alcohol as this will worsen your condition.  5. Please do not take more than the prescribed doses of medications as this may make them dangerous.   6. Please follow your safety plan of action.  7. Please call crisis if having trouble.  8. If having thoughts of harming self or others please come in to the emergency department as  soon as possible.                  Additional Services     Medication Therapy Management Referral       MTM referral reason            Clozapine prescribed     This service is designed to help you get the most from your medications.  A specially trained pharmacist will work closely with you and your doctors  to solve any problems related to your medications and to help you get the   best results from taking them.      The Medication Therapy Management staff will call you to schedule an appointment.                  Further instructions from your care team        Behavioral Discharge Planning and Instructions      Summary:  You were admitted on 5/25/2018  due to Psychotic Symptomology.  You were treated by Dr. Aly Lane MD and discharged on 05/29/2018 from [facility] to Home with your  and family.  Your  is in agreement with the discharge.  You are dually committed to [facility] and the [facility] and were admitted to the unit as a voluntary patient.  Your provisional discharge agreement was not revoked and is still in effect.      Principal Diagnosis:   Schizoaffective Disorder, bipolar type, currently depressed with psychotic features    Health Care Follow-up Appointments:   1. Home Visit from :               Date/Time: Tuesday, May 29, 2018 Time: This evening with Vicky  2. Psychiatry Home Visit:               Date: Thursday, May  31, 2018 with Harper Deluca Resident  [facility] Team:  Vicky [phone]  Psychiatrist: Dr. Khadra MD: Resident Psychiatrist: Sandrine  The Health Unit Coordinator will fax these discharge instructions to Fax:[phone]  Attend all scheduled appointments with your outpatient providers. Call at least 24 hours in advance if you need to reschedule an appointment to ensure continued access to your outpatient providers.   Major Treatments, Procedures and Findings:  You were provided with: a  psychiatric assessment, assessed for medical stability, medication evaluation and/or management, group therapy, individual therapy, milieu management and medical interventions    Symptoms to Report: feeling more aggressive, increased confusion, losing more sleep, mood getting worse or thoughts of suicide    Early warning signs can include: increased depression or anxiety sleep disturbances increased thoughts or behaviors of suicide or self-harm  increased unusual thinking, such as paranoia or hearing voices    Safety and Wellness:  Take all medicines as directed.  Make no changes unless your doctor suggests them.      Follow treatment recommendations.  Refrain from alcohol and non-prescribed drugs.  If there is a concern for safety, call 911.    Resources:   COPE (Community Outreach for Psychiatric Emergencies): [phone].Available 24-hours a day, 7 days a week. Call a COPE professional when a severe disturbance of mood or thinking is impacting your safety. COPE professionals are available to go where you are, handle an immediate crisis, and provide a clinical assessment. If needed, COPE will arrange an emergency evaluation for inpatient psychiatric services. Telephone consultations are also available. Ask them if you meet criteria for a crisis residence.   *You can also talk to [place] about crisis stabilization services if you are experiencing difficulty with the transition from the hospital.  [facility] Acute Psychiatric Services  Acute Psychiatric Services/Crisis Intervention: [phone]  24-hour walk-in crisis intervention and treatment of behavioral emergencies    Located at:   [address] [place] [address] -  in the Emergency room on the first floor of the [facility]  [facility] [place] [address]  Phone: [phone]  This is a crisis residence where you can stay for a short time if you feel like you need to stabilize but do not need to go to  the [facility].    Crisis Connection 24/7 Community Call Center: [phone]  Phone: [phone] outside the [facility] area: [phone]  www.crisis.org   Hours: 24 hours/day, 7 days/week  Services: Free and confidential telephone counseling provided by trained volunteers supervised by professional staff. Early intervention and brief supportive counseling for callers with issues of depression, stress and anxiety, domestic violence, parent/child conflicts, family issues, loneliness, grief and loss, suicidal ideation and chronic mental illness.    [facility]  You and your family would benefit from the support groups at [facility].   [email or web address]    [email or web address]    The [facility] has a parent support and educator staff - Bob Cornejo - that can be a support for your parents. There are also many classes that are available to you and your family.  Bob can be reached at [phone] xt [phone] or through e-mail at [email or web address].    The treatment team has appreciated the opportunity to work with you.     Nona,  please take care and make your recovery a daily recovery.   If you have any questions or concerns our unit number is [phone].  You may be receiving a follow-up phone call within the next three days from a representative from behavioral health.    You have identified the best phone number to reach you as [phone].          Pending Results     No orders found from 5/23/2018 to 5/26/2018.            Statement of Approval     Ordered          05/29/18 1404  I have reviewed and agree with all the recommendations and orders detailed in this document.  EFFECTIVE NOW     Approved and electronically signed by:  Aly Lane MD             Admission Information     Date & Time Provider Department Dept. Phone    5/25/2018 Aly Lane MD UR 30NR [phone]     "  Your Vitals Were     Blood Pressure Pulse Temperature Respirations Height Weight    86/59 97 97.8  F (36.6  C) 12 1.6 m (5' 3\") 72.6 kg (160 lb)    Last Period Pulse Oximetry BMI (Body Mass Index)             05/06/2018 100% 28.34 kg/m2         [email or web address] Information     [employer] gives you secure access to your electronic health record. If you see a primary care provider, you can also send messages to your care team and make appointments. If you have questions, please call your primary care clinic.  If you do not have a primary care provider, please call [phone] and they will assist you.        Care EveryWhere ID     This is your Care EveryWhere ID. This could be used by other organizations to access your [facility] medical records  [id number]        Equal Access to Services     YAHAIRA KHALIL : Yin Mas, anna rodriguez, hong russ. So [facility] [phone].    ATENCIÓN: Si habla español, tiene a arambula disposición servicios gratuitos de asistencia lingüística. Llame al [phone].    We comply with applicable federal civil rights laws and [place] laws. We do not discriminate on the basis of race, color, national origin, age, disability, sex, sexual orientation, or gender identity.               Review of your medicines      START taking        Dose / Directions    ARIPiprazole  MG extended release inj syringe   Commonly known as:  ABILIFY MAINTENA   Used for:  Schizoaffective disorder, depressive type (H)        Dose:  400 mg   Inject 1 Syringe (400 mg) into the muscle   Quantity:  1 Syringe   Refills:  0         CONTINUE these medicines which have NOT CHANGED        Dose / Directions    cloZAPine 100 MG tablet   Commonly known as:  CLOZARIL        Dose:  100 mg   Take 100 mg by mouth At Bedtime   Refills:  0       medroxyPROGESTERone 150 MG/ML injection   Commonly known as:  DEPO-PROVERA   Used for:  Initiation of Depo Provera     "    Dose:  150 mg   Inject 1 mL (150 mg) into the muscle every 3 months   Quantity:  0.9 mL   Refills:  5                Protect others around you: Learn how to safely use, store and throw away your medicines at www.disposemymeds.org.             Medication List: This is a list of all your medications and when to take them. Check marks below indicate your daily home schedule. Keep this list as a reference.      Medications           Morning Afternoon Evening Bedtime As Needed    ARIPiprazole  MG extended release inj syringe   Commonly known as:  ABILIFY MAINTENA   Inject 1 Syringe (400 mg) into the muscle   Last time this was given:  400 mg on 5/29/2018  2:17 PM                                cloZAPine 100 MG tablet   Commonly known as:  CLOZARIL   Take 100 mg by mouth At Bedtime   Last time this was given:  75 mg on 5/28/2018  9:03 PM                                medroxyPROGESTERone 150 MG/ML injection   Commonly known as:  DEPO-PROVERA   Inject 1 mL (150 mg) into the muscle every 3 months

## 2018-05-26 PROCEDURE — 99222 1ST HOSP IP/OBS MODERATE 55: CPT | Mod: AI | Performed by: PSYCHIATRY & NEUROLOGY

## 2018-05-26 PROCEDURE — 99207 ZZC CDG-MDM COMPONENT: MEETS LOW - DOWN CODED: CPT | Performed by: PSYCHIATRY & NEUROLOGY

## 2018-05-26 PROCEDURE — 25000132 ZZH RX MED GY IP 250 OP 250 PS 637: Performed by: PSYCHIATRY & NEUROLOGY

## 2018-05-26 PROCEDURE — 12400007 ZZH R&B MH INTERMEDIATE UMMC

## 2018-05-26 RX ADMIN — CLOZAPINE 25 MG: 25 TABLET ORAL at 21:50

## 2018-05-26 ASSESSMENT — ACTIVITIES OF DAILY LIVING (ADL)
GROOMING: INDEPENDENT;PROMPTS
ORAL_HYGIENE: INDEPENDENT
GROOMING: INDEPENDENT
LAUNDRY: WITH SUPERVISION
LAUNDRY: WITH SUPERVISION
DRESS: INDEPENDENT
ORAL_HYGIENE: INDEPENDENT
DRESS: INDEPENDENT

## 2018-05-26 NOTE — PROGRESS NOTES
05/26/18 1118   Behavioral Health   Hallucinations auditory;visual   Thinking poor concentration   Orientation time: oriented;date: oriented;place: oriented;person: oriented   Memory baseline memory   Insight insight appropriate to events   Judgement impaired   Eye Contact cultural specific;staring;into space   Affect blunted, flat;other (see comments)  (bright upon approach)   Mood depressed;hopeless   Physical Appearance/Attire disheveled   Hygiene neglected grooming - unclean body, hair, teeth   Suicidality thoughts only   1. Wish to be Dead Yes   2. Non-Specific Active Suicidal Thoughts  No   Self Injury other (see comment)  (Denies )   Activity withdrawn;other (see comment)  (movies with peers, check in )   Speech clear;coherent   Medication Sensitivity no observed side effects;no stated side effects   Psychomotor / Gait steady;balanced     Pt. Calm and pleasant upon approach. Pt. Stated SI thoughts with no plan. Pt. Shared feeling depressed today. Staff offered movie with peers. Pt. Agreed and came out of room. Pt. Presents disheveled at this time. Pt. Having visual and auditory hallucinations.

## 2018-05-26 NOTE — PHARMACY
Pharmacy Clozapine Note    Date of Service: 2018  Patient's : 1980  37 year old, female    Current clozapine regimen: clozapine 100 mg po QHS  Has there been a known interruption in therapy for greater than/equal to 48 hours? Yes    Recent ANC Value(s):  Recent Labs   Lab Test  18   0752  17   0757  17   0737  17   1917  17   0732  17   0816   ANEU  2.4  2.8  2.7  2.8  2.8  2.7  3.6       Is the patient enrolled in the clozapine REMS program? Yes  Ordering prescriber: Dr. Sita Mota  Is this provider certified in the clozapine REMS program? Yes  Is the ANC within recommended limits? Yes  Does the patient have any signs or symptoms of infection, including fever or sore throat? No    Plan:  1. Continue clozapine therapy at 25 mg PO QHS with plans to titrate up in the future.  2. A WBC with differential will be ordered at least weekly.  ANC values will be entered into the REMS program.  3. Signs/symptoms of infection will be monitored daily.    Johanne Cortes, PharmD, BCPP  Behavioral ER Pharmacist  [phone]

## 2018-05-26 NOTE — PROGRESS NOTES
"..Patient arrived  5/25/2018      EPIC Admitting  DX: Schizoaffective disorder, depressive type (H) [F25.1]    Vital signs reviewed related to admission.  /75  Pulse 70  Temp 98.7  F (37.1  C) (Oral)  Resp 16  Ht 1.6 m (5' 3\")  Wt 72.6 kg (160 lb)  LMP 05/06/2018  SpO2 100%  Breastfeeding? No  BMI 28.34 kg/m2.    Patient arrived on the unit and was cooperative with the clothing search. Patient affect was flat but brightens on approach. Patient ate dinner after sunset. Patient stated that she felt tired and would interview tomorrow. Denies suicidal ideation and self injurious thoughts. Reports depression and some anxiety, \" I think a lot\". Endorses auditory hallucinations that are telling her to not take her medication and to harm herself. Declined her Clozaril this evening for that reason. Patient's labs were drawn this evening.     EHR consent signed    Consent for service signed             "

## 2018-05-26 NOTE — H&P
"Psychiatry History and Physical    Nona Bernice Finley MRN# [id number]   Age: 37 year old YOB: 1980     Date of Admission:  5/25/2018          Assessment:   This patient is a 37 year old Syrian female with history of Schizoaffective Disorder who presents with active SI and worsening psychotic symptoms. She has been hospitalized several times in the past with similar presentation and is currently followed by an ACT team. On examination today, patient appears very depressed with presence of CAH telling her to harm her daughter and herself. She denied any intent to act on thoughts and did agree to notify nursing staff if SI thoughts worsen. She is reluctant to restart Clozaril because she feels she does not \"deserve to get better.\" However, she notes that the combination of Abilify Maintena and Clozaril have been overall quite helpful at alleviating depressive and psychotic symptoms when she remains adherent. Will reinitiate titration of Clozaril as cody has not taken this medication in 5 days. I have left a VM with her ACT requesting call back to the unit to confirm that she has been adherent with Abilify Maintena and whether or not she is currently under commitment . Patient states that she last received an injection on 5/6.  If ongoing depressive symptoms, the patient may benefit from initiation of a nonneuroleptic mood stabilizer, such as lithium.  However, the patient reports that when she is adherent to medications, her depressive symptoms and psychotic symptoms are under good control.  Inpatient psychiatric hospitalization is warranted at this time for safety, stabilization, and possible adjustment in medications.         Diagnoses:     Schizoaffective Disorder, bipolar type, currently depressed with psychotic features         Plan:   Psychiatric treatment/inteventions:  Medications: Reinitiate titration of Clozaril. Confirm last dose of Abilify Maintena  Laboratory/Imaging: None indicated at " "this time  Patient will be treated in therapeutic milieu with appropriate individual and group therapies as described.    Medical treatment/interventions:  Medical concerns: Pt denies acute medical concerns  Plan: Continue to monitor  Consults: None    Legal Status: Voluntary    Safety Assessment:   Checks: Status 15  Pt has not required locked seclusion or restraints in the past 24 hours to maintain safety, please refer to RN documentation for further details.    The risks, benefits, alternatives and side effects have been discussed and are understood by the patient.    Disposition: Pending clinical stabilization.    Dinah Mota MD  [facility]         Chief Complaint:     \"I don't deserve to live anymore\"         History of Present Illness:   Per DEC assessment completed by Clemencia Noel on 5/25/2018:  Patient is a 37-year-old [place] African female who was dropped off at the emergency department by her  for evaluation of hallucinations.  Patient has a history of schizoaffective disorder bipolar type.  Per DEC , the patient reported that she is on olanzapine MORENO and oral Zyprexa, though has not taken her medication for 4 days.  However, records indicate that she was prescribed Clozapine and Abilify MORENO.  Patient reported that 24 hours after stopping her oral medication she started experiencing auditory, visual, and olfactory hallucinations.  Patient reported that she stopped her medication as it made her tired, not wanting to get up in the morning, and gaining weight.    Per DEC , the patient reported command hallucinations telling her to kill herself and her children.  She denied having taken any action on them.  She reported feeling hopeless.  She stated that she does not deserve to live and wants to die.  She denied any plan to harm herself or her children.  She stated that she felt like she was unable to care for her children.  Patient also reported smelling bleach and " "water and blood in her food.  She reported smelling street drugs around her.  She reported that she is fasting during the day due to her Muslim believes and after sunset she could eat though has been avoiding it.  She also reported seeing people with scary faces that look like ghosts.  She also reported seeing people that she knows and that they tell her that her food is poisoned.  The patient also reported that she is currently under a court commitment that is set to  soon, and that she is working with reentry ACT team and is planning on seeking an extension of her commitment.  She requested hospitalization.    On interview with the patient today, she reported that she stopped taking clozapine 4 days ago, however, she states that prior to that she missed 3 doses in 1 week.  She states that she stopped taking because her pain because \"the voices say that if you take the medication you will damage your liver.\"  She also said that the \"voices tell me to take a knife and to kill my youngest daughter.\"  She notes that the voices have been telling her to harm her daughter for the past 4 days.  She denies any intent on acting on this, however, and stated that she never would.    Patient endorsed several depressive symptoms, and states that she does not want to take clozapine because \"I do not deserve to live.\"  She notes worsening depression for the past couple of months.  When asked about stressors, she comments on the fact that her daughter has autism, and it is very difficult to take care of her children.  She acknowledged feeling very overwhelmed at home.  She notes that suicidal thoughts started a few weeks ago.  She said that she was having thoughts of jumping into the river.  When asked what had prevented her from acting on these thoughts, she said \"I do not know.\"  Currently, she is endorsing active suicidal ideation to \"choke my neck.\"  She also notes that the voices are currently telling her to choke " "herself.  She denies any intent on acting on these thoughts, and agreed to inform staff if suicidal thoughts worsened or if intent emerged.    In regards to other psychotic symptoms, the patient states that recently she has been washing her close \"over and over again every day because I am smelling something that really is not there.  I smell bleach and blood everywhere around the house.\"  She also endorses paranoia and visual hallucinations as described above.            Psychiatric Review of Systems:   Depression:   Reports: depressed mood (I feel very sad all the time), suicidal ideation, decreased interest, changes in sleep, changes in appetite, guilt, hopelessness, helplessness, impaired concentration, decreased energy, irritability.   Denies: depressed mood, suicidal ideation, decreased interest, changes in sleep, changes in appetite, guilt, hopelessness, helplessness, impaired concentration, decreased energy, irritability.  Martha:   Denies: sleeplessness, impulsiveness, racing thoughts, increased goal-directed activities, pressured speech  Psychosis:   Reports: visual hallucinations (scary faces two weeks ago), auditory hallucinations, paranoia (I feel like people are watching my house), grandiosity, ideas of reference (I feel like the TV is talking about me, like I don't deserve to live)  Denies: visual hallucinations, auditory hallucinations, paranoia, grandiosity, ideas of reference, thought insertions, thought broadcasting.  Anxiety:   Reports: excessive worries about \"cooking and cleaning\"  Denies: worries, panic attacks, specific phobias.    PTSD:   Denies: hx of trauma, flashbacks, nightmares  OCD:   Denies: obsessions, checking, symmetry, cleaning, skin picking.  ED:   Denies: restriction, binging, purging.         Medical Review of Systems:     Review of systems positive for cough x 2 months with blood tinged sputum once every 2 weeks. She notes that she never completed medication for TB in the past. " "Chills all the time.  10 point review of systems is otherwise negative unless noted above.            Psychiatric History:   Past Diagnoses: schizoaffective disorder, bipolar type, peripartum onset about 13 years ago  Past Hospitalizations: Multiple hospitalizations, most recently 3/2017 for suicidal ideation  Prior ECT: 2011, she is unsure if it worked  Prior use of Psychotropic Medication: On clozapine and Abilify Maintena. Has had previous trials of Zyprexa and Invega Sustenna (ineffective per patient)  Court Commitment: She believes she is currently under MI commitment  Past Suicide Attempt: yes, attempted to jump in river to drown, was stopped by her  (2012)  Self-injurious Behavior: none         Substance Use History:   Alcohol: none  Cannabis: none  Nicotine: none  Stimulants: none  Diet Pills: none  Opium/Morphine/Narcotics: none  Sedatives: none  Hallucinogens: none  Inhalants: none  Other: none    Prior Chemical Dependency treatment: none          Social History:   Educational History: Completed 5th grade and then took some English classed  Relationships:  since 2005.  is supportive.  Children: 6 children  Current Living Situation: Lives in [facility] in house with  and 6 children. No recent changes in living situation.  Occupational History: Unemployed, last employed for a couple of months last year  Financial Support:   Legal History: None  Abuse/Trama History: None         Family History:   Schizophrenia: brother   \"mental illness\" in aunt, niece, cousin (she does not know what types)  H/o completed suicides in family: None         Past Medical History:     Past Medical History:   Diagnosis Date     Bipolar 1 disorder (H)      Bipolar affective disorder, depressed, severe, with psychotic behavior (H)      NONSPECIFIC MEDICAL HISTORY     h/o +PPD. Neg CXR 2006     Postpartum depression 8/18/2011     Tuberculosis      Varicosities      History of seizures: " None  History of Hepatitis, Head Trauma with or without loss of consciousness: None         Past Surgical History:     Past Surgical History:   Procedure Laterality Date     NO HISTORY OF SURGERY       NO HISTORY OF SURGERY  06/13/2013    Per patient reported this            Allergies:    No Known Allergies           Medications:   I have reviewed this patient's current medications  Prescriptions Prior to Admission   Medication Sig Dispense Refill Last Dose     cloZAPine (CLOZARIL) 100 MG tablet Take 100 mg by mouth At Bedtime   4 days ago     medroxyPROGESTERone (DEPO-PROVERA) 150 MG/ML injection Inject 1 mL (150 mg) into the muscle every 3 months 0.9 mL 5 11/1/17             Labs:     Recent Results (from the past 24 hour(s))   CBC with platelets differential    Collection Time: 05/25/18  8:12 PM   Result Value Ref Range    WBC 5.0 4.0 - 11.0 10e9/L    RBC Count 3.98 3.8 - 5.2 10e12/L    Hemoglobin 12.1 11.7 - 15.7 g/dL    Hematocrit 35.9 35.0 - 47.0 %    MCV 90 78 - 100 fl    MCH 30.4 26.5 - 33.0 pg    MCHC 33.7 31.5 - 36.5 g/dL    RDW 12.8 10.0 - 15.0 %    Platelet Count 267 150 - 450 10e9/L    Diff Method Automated Method     % Neutrophils 48.3 %    % Lymphocytes 40.3 %    % Monocytes 9.8 %    % Eosinophils 1.2 %    % Basophils 0.4 %    % Immature Granulocytes 0.0 %    Nucleated RBCs 0 0 /100    Absolute Neutrophil 2.4 1.6 - 8.3 10e9/L    Absolute Lymphocytes 2.0 0.8 - 5.3 10e9/L    Absolute Monocytes 0.5 0.0 - 1.3 10e9/L    Absolute Eosinophils 0.1 0.0 - 0.7 10e9/L    Absolute Basophils 0.0 0.0 - 0.2 10e9/L    Abs Immature Granulocytes 0.0 0 - 0.4 10e9/L    Absolute Nucleated RBC 0.0    Comprehensive metabolic panel    Collection Time: 05/25/18  8:12 PM   Result Value Ref Range    Sodium 142 133 - 144 mmol/L    Potassium 3.4 3.4 - 5.3 mmol/L    Chloride 108 94 - 109 mmol/L    Carbon Dioxide 25 20 - 32 mmol/L    Anion Gap 9 3 - 14 mmol/L    Glucose 79 70 - 99 mg/dL    Urea Nitrogen 10 7 - 30 mg/dL    Creatinine  "0.50 (L) 0.52 - 1.04 mg/dL    GFR Estimate >90 >60 mL/min/1.7m2    GFR Estimate If Black >90 >60 mL/min/1.7m2    Calcium 8.9 8.5 - 10.1 mg/dL    Bilirubin Total 0.6 0.2 - 1.3 mg/dL    Albumin 3.5 3.4 - 5.0 g/dL    Protein Total 7.6 6.8 - 8.8 g/dL    Alkaline Phosphatase 55 40 - 150 U/L    ALT 13 0 - 50 U/L    AST 18 0 - 45 U/L   TSH with free T4 reflex and/or T3 as indicated    Collection Time: 05/25/18  8:12 PM   Result Value Ref Range    TSH 0.70 0.40 - 4.00 mU/L       /75  Pulse 70  Temp 98.7  F (37.1  C) (Oral)  Resp 16  Ht 1.6 m (5' 3\")  Wt 72.6 kg (160 lb)  LMP 05/06/2018  SpO2 100%  Breastfeeding? No  BMI 28.34 kg/m2  Weight is 160 lbs 0 oz  Body mass index is 28.34 kg/(m^2).         Psychiatric Mental Status Examination:   Appearance: awake, alert, tearful throughout interview  Attitude: cooperative and pleasant  Eye Contact: good  Mood:  \"depressed\"  Affect: mood congruent and depressed  Speech:  clear, coherent and normal prosody  Language: fluent in English  Psychomotor Behavior:  no evidence of tardive dyskinesia, dystonia, or tics  Gait/Station: normal  Thought Process:  linear, logical, goal oriented  Associations:  no loose associations  Thought Content:  Endorsing active SI though no intent and CAH; evidence of delusional and paranoid thought content  Insight:  fair  Judgement: impaired  Oriented to:  time, person, and place  Attention Span and Concentration:  intact  Recent and Remote Memory:  intact  Fund of Knowledge: appropriate    Clinical Global Impressions  First:7     Most recent:7       # Pain Assessment:  Current Pain Score 4/14/2017   Patient currently in pain? denies   Pain score (0-10) -   Pain location -   Pain descriptors -   Nona s pain level was assessed and she currently denies pain.             Physical Exam:   Please refer to physical exam completed by ED provider, Volodymyr Santos MD, on 5/25/18. I agree with the findings and assessment and have no additional " findings to add at this time.

## 2018-05-26 NOTE — PLAN OF CARE
"Problem: Psychotic Symptoms  Goal: Psychotic Symptoms  Signs and symptoms of listed problems will be absent or manageable.   Outcome: Therapy, progress toward functional goals is gradual      Patient has been visible in the milieu for the early part of the shift. Was walking up and down the hallway mostly keeping to herself. Patient agreed to complete her profile interview. Patient reports that she continues to experience auditory hallucinations on/off that tell her to harm herself. Patient also experiencing olfactory hallucinations and says she tastes bleach. Experiencing visual hallucinations of scary looking faces. Reports her anxiety- 4 and depression- 10 (1 low -10 high). States that she felt very sad and was crying earlier. Patient stated that he sleep has been poor recently because she has not taken her medication. Denies any active suicidal ideation or self injurious thoughts.     Patient also reported that she's had a persistent cough for the last 2 months with bloody phlegm. Patient stated that she has a history of TB. Patient denies any weightless, night sweats or another symptoms besides coughing. Patient's affect is flat, brightens on approach. Patient is fasting and will eat meals after sunset.    Patient evaluation continues. Assessed mood,anxiety,thoughts and behavior.     Patient gradually progressing towards goals.    Patient is encouraged to participate in groups and assisted to develop healthy coping skills.     VS reviewed: /75  Pulse 70  Temp 98.7  F (37.1  C)  Resp 16  Ht 1.6 m (5' 3\")  Wt 72.6 kg (160 lb)  LMP 05/06/2018  SpO2 100%  Breastfeeding? No  BMI 28.34 kg/m2    Length of stay: 1    Refer to daily team meeting notes for individualized plan of care. Nursing will continue to assess.          "

## 2018-05-26 NOTE — PROGRESS NOTES
"Initial Psychosocial Assessment    I have reviewed the chart, met with the patient, and developed Care Plan.  Information for assessment was obtained from:       Chart Review and Pt interview.    Presenting Problem:  Pt is admitted to [facility] after her  dropped her off at the ED.  She has stopped taking her medicine for schizoaffective disorder.  She reports command hallucinations telling her to kill herself and her children.  She is seeing people who are telling her the medicine is poison and she shouldn't take it.  She told this writer she refused her medicine last night and hasn't taken anything today because she is fearful of being poisoned.  She reports she is still hearing voices and wants to be dead.  She reports no plans to end her life.    History of Mental Health and Chemical Dependency:  Rambo has numerous previous psychiatric admissions to this facility.  The last being 2017 when she was committed on 2017 to 2017.  She is currently not sure if she is on commitment.  She has an ACT Team.      Family Description (Constellation, Family Psychiatric History):  Pt is  and has 6 children, 14,12,11,9,6,and 1.  They are all girls.  As per her previous history, she has a brother with Mental Health problems similar to her own.    Significant Life Events (Illness, Abuse, Trauma, Death):  Pt was born in [place] and lived there until coming to [place] in  when she was 37.  She lived through the war in her home country.  Pt's Father  2 months ago from \"old age\".    Living Situation:  Pt lives in a house with her  and 6 children.    Educational Background:  Complete school up to 5th grade.  4 years of ESL.    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  She states \"I couldn't handle it.\"     Financial Status:  OK,  works and supports the family.    Legal Issues:  Previous Civil " Commitment.    Ethnic/Cultural Issues:  Algerian    Spiritual Orientation:  Baptist     Service History:  No     Social Functioning (organization, interests):  No friends.    Current Treatment Providers are:  Psychiatry: ANDER Sexton D., [phone]  PCP- Johanne Trcay MD, [phone].  Therapist: None   ACT Case Manger: Vicky    Social Service Assessment/Plan:  Pt is in need of psychiatric evaluation and stabilization.  She will comply with medication management as prescribed by her psychiatrist.  CTC will contact ACT Team Case Manger to determine her current commitment status.  CTC will work with Team and family to determine appropriate aftercare resources to be utilized.

## 2018-05-27 LAB
AMPHETAMINES UR QL SCN: NEGATIVE
BARBITURATES UR QL: NEGATIVE
BENZODIAZ UR QL: NEGATIVE
CANNABINOIDS UR QL SCN: NEGATIVE
COCAINE UR QL: NEGATIVE
ETHANOL UR QL SCN: NEGATIVE
HCG UR QL: NEGATIVE
OPIATES UR QL SCN: NEGATIVE

## 2018-05-27 PROCEDURE — 80307 DRUG TEST PRSMV CHEM ANLYZR: CPT | Performed by: PSYCHIATRY & NEUROLOGY

## 2018-05-27 PROCEDURE — 25000132 ZZH RX MED GY IP 250 OP 250 PS 637: Performed by: PSYCHIATRY & NEUROLOGY

## 2018-05-27 PROCEDURE — 12400007 ZZH R&B MH INTERMEDIATE UMMC

## 2018-05-27 PROCEDURE — 81025 URINE PREGNANCY TEST: CPT | Performed by: PSYCHIATRY & NEUROLOGY

## 2018-05-27 PROCEDURE — 80320 DRUG SCREEN QUANTALCOHOLS: CPT | Performed by: PSYCHIATRY & NEUROLOGY

## 2018-05-27 RX ORDER — CLOZAPINE 100 MG/1
100 TABLET ORAL AT BEDTIME
Status: DISCONTINUED | OUTPATIENT
Start: 2018-05-29 | End: 2018-05-29 | Stop reason: HOSPADM

## 2018-05-27 RX ORDER — CLOZAPINE 25 MG/1
50 TABLET ORAL AT BEDTIME
Status: DISCONTINUED | OUTPATIENT
Start: 2018-05-27 | End: 2018-05-27

## 2018-05-27 RX ORDER — CLOZAPINE 25 MG/1
75 TABLET ORAL AT BEDTIME
Status: COMPLETED | OUTPATIENT
Start: 2018-05-28 | End: 2018-05-28

## 2018-05-27 RX ORDER — CLOZAPINE 25 MG/1
50 TABLET ORAL AT BEDTIME
Status: COMPLETED | OUTPATIENT
Start: 2018-05-27 | End: 2018-05-27

## 2018-05-27 RX ADMIN — CLOZAPINE 50 MG: 25 TABLET ORAL at 21:07

## 2018-05-27 ASSESSMENT — ACTIVITIES OF DAILY LIVING (ADL)
LAUNDRY: WITH SUPERVISION
GROOMING: INDEPENDENT;PROMPTS
ORAL_HYGIENE: INDEPENDENT
ORAL_HYGIENE: INDEPENDENT
DRESS: INDEPENDENT;STREET CLOTHES
GROOMING: INDEPENDENT
LAUNDRY: WITH SUPERVISION
DRESS: INDEPENDENT

## 2018-05-27 NOTE — PROGRESS NOTES
05/27/18 1049   Behavioral Health   Hallucinations auditory;visual   Thinking poor concentration;distractable   Orientation time: oriented;date: oriented;place: oriented;person: oriented   Memory baseline memory   Insight admits / accepts   Judgement impaired   Eye Contact staring;into space;cultural specific   Affect blunted, flat   Mood depressed;mood is calm   Physical Appearance/Attire disheveled   Hygiene neglected grooming - unclean body, hair, teeth   Suicidality other (see comments)  (ua-sleep)   Self Injury other (see comment)  (ua-sleep)   Activity isolative;withdrawn   Speech clear;coherent   Medication Sensitivity no stated side effects;no observed side effects   Psychomotor / Gait steady;balanced     Pt. Shared auditory and visual hallucinations right away in the morning. Pt. Encouraged to attend groups and shower but declined at this time.

## 2018-05-28 VITALS
WEIGHT: 160 LBS | HEART RATE: 97 BPM | DIASTOLIC BLOOD PRESSURE: 59 MMHG | OXYGEN SATURATION: 100 % | TEMPERATURE: 97.8 F | RESPIRATION RATE: 12 BRPM | SYSTOLIC BLOOD PRESSURE: 86 MMHG | HEIGHT: 63 IN | BODY MASS INDEX: 28.35 KG/M2

## 2018-05-28 PROCEDURE — 12400007 ZZH R&B MH INTERMEDIATE UMMC

## 2018-05-28 PROCEDURE — 25000132 ZZH RX MED GY IP 250 OP 250 PS 637: Performed by: PSYCHIATRY & NEUROLOGY

## 2018-05-28 RX ADMIN — CLOZAPINE 75 MG: 25 TABLET ORAL at 21:03

## 2018-05-28 ASSESSMENT — ACTIVITIES OF DAILY LIVING (ADL)
ORAL_HYGIENE: INDEPENDENT
LAUNDRY: WITH SUPERVISION
GROOMING: INDEPENDENT
DRESS: INDEPENDENT

## 2018-05-28 NOTE — PROGRESS NOTES
CTC contacted [facility] ACT Team [phone] to leave a message asking for a return call tomorrow as one of their clients has been admitted.  CTC left contact info for return call.

## 2018-05-28 NOTE — PROGRESS NOTES
"Patient spent much of the shift in her room. She did not eat breakfast or lunch (month of Ramadan). She appeared depressed, reported having command hallucinations telling her to end her life. Patient encouraged to come out of her room for safety and she did for few minutes and went back to her room. Patient reported that she felt safe in her room. PRN offered but patient refused. \" I can't or eat anything\". Staff closely monitoring patient for safety.   "

## 2018-05-28 NOTE — PLAN OF CARE
"Problem: Depressive Symptoms  Goal: Social and Therapeutic (Depression)  Signs and symptoms of listed problems will be absent or manageable.   Outcome: Therapy, progress toward functional goals is gradual      Patient has been visible in the milieu for parts of the shift. Patient denies suicidal ideation and self injurious thoughts. Patient reports experiencing auditory hallucinations that say negative things to her. Reports being depressed and having some sadness. Patient ate dinner after sunset and is requesting to be awoken so she can eat before sunrise. Did not attend community meeting. Reports missing her children and wanting to go home to see them. Urine sample collected, neg- for pregnancy and drugs.     Patient evaluation continues. Assessed mood,anxiety,thoughts and behavior.     Patient gradually progressing towards goals.    Patient is encouraged to participate in groups and assisted to develop healthy coping skills.     VS reviewed: /75  Pulse 70  Temp 97.8  F (36.6  C)  Resp 12  Ht 1.6 m (5' 3\")  Wt 72.6 kg (160 lb)  LMP 05/06/2018  SpO2 100%  Breastfeeding? No  BMI 28.34 kg/m2    Length of stay: 2    Refer to daily team meeting notes for individualized plan of care. Nursing will continue to assess.          "

## 2018-05-29 PROCEDURE — 99239 HOSP IP/OBS DSCHRG MGMT >30: CPT | Performed by: PSYCHIATRY & NEUROLOGY

## 2018-05-29 RX ORDER — CLOZAPINE 100 MG/1
100 TABLET ORAL AT BEDTIME
Qty: 30 TABLET | Refills: 0 | Status: ON HOLD | OUTPATIENT
Start: 2018-05-29 | End: 2019-03-25

## 2018-05-29 NOTE — PROGRESS NOTES
05/28/18 1936   Behavioral Health   Hallucinations auditory;visual   Thinking distractable   Orientation time: oriented;date: oriented;place: oriented;person: oriented   Memory baseline memory   Insight admits / accepts   Judgement impaired   Eye Contact cultural specific   Affect full range affect   Mood depressed   Physical Appearance/Attire neat   Hygiene well groomed   Suicidality other (see comments)  (Denies )   Self Injury other (see comment)  (Denies )   Activity isolative;withdrawn;other (see comment)  (sleeping/napping)   Speech clear;coherent   Medication Sensitivity no observed side effects;no stated side effects   Psychomotor / Gait steady;balanced     Pt. Denies SI thoughts today. Pt. More full-range affect. Pt. Paces the halls at times. Pt. Social with staff at times. Pt. Explained auditory and visual hallucinations that decrease throughout the day.

## 2018-05-29 NOTE — PLAN OF CARE
Problem: Psychotic Symptoms  Goal: Psychotic Symptoms  Signs and symptoms of listed problems will be absent or manageable.   Patient states feeling better says medications were kicking in. She denies feeling suicidal and states feeling ready for discharge.  She will be discharge today to home and will follow through with ACT team.

## 2018-05-29 NOTE — PROGRESS NOTES
[facility] was informed that pt was going to discharge this afternoon.  [facility] attempted to contact pt's ACT Team WALTERVicky [phone] and had to leave a voicemail.  Also attempted to contact someone from the ACT team at the main number, it just rang and went to voicemail returned to main voice mail.    Vicky did return call and verbalized concern that when she saw patient on Saturday evening, pt was still responding to internal stimuli.  Vicky also verbalized concerned that pt had suicidal and homicidal ideation towards her children.  Vicky stated that pt had been off of all her medications and wanted to know when meds were restarted.  This information was provided to her.  Vicky also reported that pt's  did not want pt to be admitted to the hospital as they have six children at home.  Vicky did communicate with other members on her team and informed myself and Dr. Aparicio that pt does stabilize fairly quickly, but was unsure at to how quick.  Vicky stated that she was willing to meet with patient tonight after discharge if the team moved forward with the d/c.  Vicky was informed that if she believes that pt should return to the hospital then we will revoke her provisional discharge agreement.  Vicky stated that Pt will see Sandrine, Resident Psychiatrist on Thursday of this week.      Vicky stated that they have not seen pt as much due to [place].  Vicky stated that they typically see her 5-6 days a week, but have been seeing her 3 days a week.  Vicky also mentioned that pt's  doesn't always want her going to the hospital because of needing assistance with the kids.    Vicky contacted [facility] to verify that pt will be discharging with meds because she is unsure as to whether or not pt has meds at home.  [facility] paged MD about this matter and told Vikcy that writer will leave her a message with the information.      [facility] spoke with MD who stated that he did not send her with meds, however would  have put an order in, however pt had discharged already.  [facility] contacted Vicky and spoke with her directly to inform her.  Informed Vicky that if pt did not have meds at home that she can call and leave writer a voicemail and writer will discuss matter with MD tomorrow.

## 2018-05-29 NOTE — DISCHARGE INSTRUCTIONS
Behavioral Discharge Planning and Instructions      Summary:  You were admitted on 5/25/2018  due to Psychotic Symptomology.  You were treated by Dr. Aly Lane MD and discharged on 05/29/2018 from [facility] to Home with your  and family.  Your  is in agreement with the discharge.  You are dually committed to [facility] and the [facility] and were admitted to the unit as a voluntary patient.  Your provisional discharge agreement was not revoked and is still in effect.      Principal Diagnosis:   Schizoaffective Disorder, bipolar type, currently depressed with psychotic features    Health Care Follow-up Appointments:   1. Home Visit from :               Date/Time: Tuesday, May 29, 2018 Time: This evening with Vicky  2. Psychiatry Home Visit:               Date: Thursday, May  31, 2018 with Sandrine Psychiatry Resident  [facility] ACT Team:  Vicky [phone]  Psychiatrist: Dr. Khadra MD: Resident Psychiatrist: Sandrine  The Health Unit Coordinator will fax these discharge instructions to Fax:[phone]  Attend all scheduled appointments with your outpatient providers. Call at least 24 hours in advance if you need to reschedule an appointment to ensure continued access to your outpatient providers.   Major Treatments, Procedures and Findings:  You were provided with: a psychiatric assessment, assessed for medical stability, medication evaluation and/or management, group therapy, individual therapy, milieu management and medical interventions    Symptoms to Report: feeling more aggressive, increased confusion, losing more sleep, mood getting worse or thoughts of suicide    Early warning signs can include: increased depression or anxiety sleep disturbances increased thoughts or behaviors of suicide or self-harm  increased unusual thinking, such as paranoia or hearing voices    Safety and Wellness:  Take all medicines as directed.   Make no changes unless your doctor suggests them.      Follow treatment recommendations.  Refrain from alcohol and non-prescribed drugs.  If there is a concern for safety, call 911.    Resources:   COPE (Community Outreach for Psychiatric Emergencies): [phone].Available 24-hours a day, 7 days a week. Call a COPE professional when a severe disturbance of mood or thinking is impacting your safety. COPE professionals are available to go where you are, handle an immediate crisis, and provide a clinical assessment. If needed, COPE will arrange an emergency evaluation for inpatient psychiatric services. Telephone consultations are also available. Ask them if you meet criteria for a crisis residence.   *You can also talk to MERARI about crisis stabilization services if you are experiencing difficulty with the transition from the hospital.  [facility] Acute Psychiatric Services  Acute Psychiatric Services/Crisis Intervention: [phone]  24-hour walk-in crisis intervention and treatment of behavioral emergencies    Located at:  [address] -  in the Emergency room on the first floor of the [facility]  [address] [place] [address]  Phone: [phone]  This is a crisis residence where you can stay for a short time if you feel like you need to stabilize but do not need to go to the hospital.    Crisis Connection 24/7 Community Call Center: 239.518.2017  Phone: [phone] outside the [facility] area: [phone]  www.crisis.org   Hours: 24 hours/day, 7 days/week  Services: Free and confidential telephone counseling provided by trained volunteers supervised by professional staff. Early intervention and brief supportive counseling for callers with issues of depression, stress and anxiety, domestic violence, parent/child conflicts, family issues, loneliness, grief and loss, suicidal ideation and chronic mental illness.    National [place] of  Mental Illness  You and your family would benefit from the support groups at [facility].   Www.[email or web address]    [email or web address].[email or web address]    The [facility] has a parent support and educator staff - Bob Cornejo - that can be a support for your parents. There are also many classes that are available to you and your family.  Bob can be reached at [phone] xt [phone] or through e-mail at [email or web address].    The treatment team has appreciated the opportunity to work with you.     Nona,  please take care and make your recovery a daily recovery.   If you have any questions or concerns our unit number is [phone].  You may be receiving a follow-up phone call within the next three days from a representative from behavioral health.    You have identified the best phone number to reach you as [phone].

## 2018-05-30 ENCOUNTER — TELEPHONE (OUTPATIENT)
Dept: FAMILY MEDICINE | Facility: CLINIC | Age: 38
End: 2018-05-30

## 2018-05-30 ENCOUNTER — TELEPHONE (OUTPATIENT)
Dept: PHARMACY | Facility: OTHER | Age: 38
End: 2018-05-30

## 2018-05-30 NOTE — DISCHARGE SUMMARY
[facility] [place]   Psychiatric Discharge Summary  Time: 39 minutes on encounter.    Nona Finley MRN# [id number]   Age: 37 year old YOB: 1980     Date of Admission:  5/25/2018  Date of Discharge:  05/29/18  Admitting Physician:    Discharge Physician:  Aly Aparicio         Event Leading to Hospitalization and Hospital Course:   Nona Finley was admitted for evaluation of hallucinations and thoughts of harming herself and/or her children.       See Admission note by Nakul on 5/26 for additional details.     Nona Finley was hospitalized voluntarily brought in by her  for evaluation of hallucinations and possible thoughts of harming herself or a child.  She does have an ACT team which is following her closely she is also committed with a Reyes order is currently not being provoked as she is here voluntarily and wanted to seek assistance.  Clozapine was reinitiated and it sounds as if she might quickly have benefit from her medications when she is taking them.  She also takes aripiprazole 400 mg monthly injection in her clozapine dosage at home is 100 mg.  Upon meeting with her she states that she is all better though she is having some low blood pressure related to the clozapine at 86/59.  She is no longer having auditory hallucinations she misses her children does not have any thoughts of hurting herself or anyone else and mentions she was not taking her medications properly.  She is interested in going home and would like us to contact her  to see if that is okay.  She gives me Bob's phone number of [phone].  She has not had any side effects from medications that were reinitiated and restarted and we discussed safety planning in detail.  We additionally had a conversation with the ACT team and they are going to check on her later today to make sure that they feel that she is safe to be at home with her mother-in-law and  the children.  It is said that she has her home medication at home though we attempted to give her her medications however she left with her  prior to picking them up.  When I did speak with her  prior to discharge she did not have any concerns about her coming home and would prefer her to come home and that he would bring her in if she was not doing well.  Her ACT team believes she does not do well when she is not sleeping apparently she is sleeping recently slept about 6 hours overnight and seems to possibly be at her baseline function quite quickly after reinitiating medication.           DIagnoses:   Schizoaffective disorder bipolar type currently depressed with psychotic features         Labs:   No results found for this or any previous visit (from the past 24 hour(s)).         Consults:   No consultations were requested during this admission           Discharge Medications:        Review of your medicines      START taking       Dose / Directions    ARIPiprazole  MG extended release inj syringe   Commonly known as:  ABILIFY MAINTENA   Used for:  Schizoaffective disorder, depressive type (H)        Dose:  400 mg   Inject 1 Syringe (400 mg) into the muscle   Quantity:  1 Syringe   Refills:  0         CONTINUE these medicines which have NOT CHANGED       Dose / Directions    cloZAPine 100 MG tablet   Commonly known as:  CLOZARIL   Used for:  Schizoaffective disorder, depressive type (H)        Dose:  100 mg   Take 1 tablet (100 mg) by mouth At Bedtime   Quantity:  30 tablet   Refills:  0       medroxyPROGESTERone 150 MG/ML injection   Commonly known as:  DEPO-PROVERA   Used for:  Initiation of Depo Provera        Dose:  150 mg   Inject 1 mL (150 mg) into the muscle every 3 months   Quantity:  0.9 mL   Refills:  5            Where to get your medicines      These medications were sent to [facility] [place], [place] - [address]  [address] [facility] [address]      Phone:  [phone]      cloZAPine 100 MG tablet                  Mental Status Examination:   Nona 37-year-old female wrapping her head with a blanket.  Her speech is of an appropriate rate and tone in her language is intact.  Her behavior is appropriate and she does not have any abnormal movements.  Her affect is neutral and she smiles at times.  Her mood she describes as better.  Her thought content consists of the above without thoughts of harm herself or others or current delusions.  Thought process is slightly concrete without looseness of association.  She does not have any abnormal perceptions currently.  Her attention and concentration appear adequate.  Her cognition and fund of knowledge appear slightly below average.  Her attention concentration appear normal.  Her long-term/short-term/remote memory appears intact.  Her insight and judgment are both fair.         Discharge Plan:     Medication Therapy Management Referral     Reason for your hospital stay   Psychosis     Activity   Your activity upon discharge: activity as tolerated     Discharge Instructions   1. Please do not harm yourself or others.  2. Please continue to take your medications.  3. Please follow up with your outpatient care team.  4. Please do not take drugs or alcohol as this will worsen your condition.  5. Please do not take more than the prescribed doses of medications as this may make them dangerous.   6. Please follow your safety plan of action.  7. Please call crisis if having trouble.  8. If having thoughts of harming self or others please come in to the emergency department as soon as possible.     Full Code     Diet   Follow this diet upon discharge: Orders Placed This Encounter     Regular Diet Adult No Pork             Further instructions from your care team        Behavioral Discharge Planning and Instructions      Summary:  You were admitted on 5/25/2018  due to Psychotic Symptomology.  You were treated by   Aly Lane MD and discharged on 05/29/2018 from [facility] to Home with your  and family.  Your  is in agreement with the discharge.  You are dually committed to [facility] and the [facility] of [facility] and were admitted to the unit as a voluntary patient.  Your provisional discharge agreement was not revoked and is still in effect.      Principal Diagnosis:   Schizoaffective Disorder, bipolar type, currently depressed with psychotic features    Health Care Follow-up Appointments:   1. Home Visit from :               Date/Time: Tuesday, May 29, 2018 Time: This evening with Vicky  2. Psychiatry Home Visit:               Date: Thursday, May  31, 2018 with Sandrine Psychiatry Resident  [facility] ACT Team:  Vicky [phone]  Psychiatrist: Dr. Khadra MD: Resident Psychiatrist: Sandrine  The Health Unit Coordinator will fax these discharge instructions to Fax:[phone]  Attend all scheduled appointments with your outpatient providers. Call at least 24 hours in advance if you need to reschedule an appointment to ensure continued access to your outpatient providers.   Major Treatments, Procedures and Findings:  You were provided with: a psychiatric assessment, assessed for medical stability, medication evaluation and/or management, group therapy, individual therapy, milieu management and medical interventions    Symptoms to Report: feeling more aggressive, increased confusion, losing more sleep, mood getting worse or thoughts of suicide    Early warning signs can include: increased depression or anxiety sleep disturbances increased thoughts or behaviors of suicide or self-harm  increased unusual thinking, such as paranoia or hearing voices    Safety and Wellness:  Take all medicines as directed.  Make no changes unless your doctor suggests them.      Follow treatment recommendations.  Refrain from alcohol and non-prescribed drugs.  If there is  a concern for safety, call 911.    Resources:   COPE (Community Outreach for Psychiatric Emergencies): [phone].Available 24-hours a day, 7 days a week. Call a COPE professional when a severe disturbance of mood or thinking is impacting your safety. COPE professionals are available to go where you are, handle an immediate crisis, and provide a clinical assessment. If needed, COPE will arrange an emergency evaluation for inpatient psychiatric services. Telephone consultations are also available. Ask them if you meet criteria for a crisis residence.   *You can also talk to MERARI about crisis stabilization services if you are experiencing difficulty with the transition from the hospital.  [facility] Acute Psychiatric Services  Acute Psychiatric Services/Crisis Intervention: [phone]  24-hour walk-in crisis intervention and treatment of behavioral emergencies    Located at:  [address] -  in the Emergency room on the first floor of the [facility]  [address] [place] [address]  Phone: [phone]  This is a crisis residence where you can stay for a short time if you feel like you need to stabilize but do not need to go to the hospital.    Crisis Connection 24/7 Community Call Center: [phone]  Phone: [phone] outside the [facility] area: [phone]  www.crisis.org   Hours: 24 hours/day, 7 days/week  Services: Free and confidential telephone counseling provided by trained volunteers supervised by professional staff. Early intervention and brief supportive counseling for callers with issues of depression, stress and anxiety, domestic violence, parent/child conflicts, family issues, loneliness, grief and loss, suicidal ideation and chronic mental illness.    [facility] [place] of Mental Illness  You and your family would benefit from the support groups at [facility] [place] [facility].    Www.namihelps.org    www.[facility].org    The National [place] for Mental Illness - [facility] has a parent support and educator staff - Bob Cornejo - that can be a support for your parents. There are also many classes that are available to you and your family.  Bob can be reached at [phone] xt 106 or through e-mail at [email or web address].    The treatment team has appreciated the opportunity to work with you.     Nona,  please take care and make your recovery a daily recovery.   If you have any questions or concerns our unit number is [phone].  You may be receiving a follow-up phone call within the next three days from a representative from behavioral health.    You have identified the best phone number to reach you as [phone].                Please send copy to Dr. Khadra MD: Resident Psychiatrist: Sandrine      During hospitalizations patients have perpetuating, complicating, situational, acute, and chronic conditions that lead to risk for suicidality or homicidality. During hospitalizations we mitigate any modifiable risk factors that may have been identified in the history and physical examination and throughout the hospitalization. Chronic non-modifiable risk factors are not able to be changed with acute hospitalization. As a patient that is discharging these risk factors have been modified as much as possible and a supportive network and safety plan has been put in place. Further modifications and assistance will have to be obtained in the outpatient setting and the inpatient hospitalization team is no longer responsible for outcomes.    Aly Aparicio  [facility] Psychiatry      The following document has been created with voice recognition software and may contain unintentional word substitutions.      Non clinically relevant CMS requirements:  Clinical Global Impressions  First:  Considering your total clinical experience with this particular patient  population, how severe are the patient's symptoms at this time?: 7 (05/26/18 2023)  Compared to the patient's condition at the START of treatment, this patient's condition is:: 7 (05/26/18 2023)  Most recent:  Considering your total clinical experience with this particular patient population, how severe are the patient's symptoms at this time?: 1 (05/29/18 1928)  Compared to the patient's condition at the START of treatment, this patient's condition is:: 1 (05/29/18 1928)    # Pain Assessment:  Current Pain Score 5/26/2018   Patient currently in pain? denies   Pain score (0-10) -   Pain location -   Pain descriptors -       Any incidence of pain both chronic or acute reported will be documented in above documentation though further documentation can also be found in the internal medicine documentation or pain specialist documentation.

## 2018-05-30 NOTE — TELEPHONE ENCOUNTER
[facility] phone call message- general phone call:    Reason for call: Radha with [facility] called to inform patient's care team that the patient was admitted to [facility] in the psych unit for hallucinations and suicidal ideations to harm themselves and family. Patient was admitted 5/25 and discharged 5/29. Please call back to discuss.    Return call needed: Yes    OK to leave a message on voice mail? Yes    Primary language: Tongan      needed? No    Call taken on May 30, 2018 at 3:22 PM by Marybeth Hemphill

## 2018-05-30 NOTE — TELEPHONE ENCOUNTER
Attempted to reach patient to follow up after recent hospitalization, unable to reach. Left VM with name and call back number.    Patient was admitted to [facility] for hallucinations, discharged 5/29/18.    FD: If patient calls back, please schedule hospital follow up visit, then transfer to RN.    Taryn Guzman RN

## 2018-06-01 NOTE — TELEPHONE ENCOUNTER
3rd and final attempt to reach patient. Left VM with name and callback number    Taryn Guzman RN

## 2019-02-11 ENCOUNTER — HOSPITAL ENCOUNTER (INPATIENT)
Facility: CLINIC | Age: 39
LOS: 6 days | Discharge: HOME | DRG: 885 | End: 2019-02-18
Attending: PSYCHIATRY & NEUROLOGY | Admitting: PSYCHIATRY & NEUROLOGY
Payer: COMMERCIAL

## 2019-02-11 DIAGNOSIS — F25.0 SCHIZOAFFECTIVE DISORDER, BIPOLAR TYPE (H): ICD-10-CM

## 2019-02-11 DIAGNOSIS — Z91.148 NONCOMPLIANCE WITH MEDICATION REGIMEN: ICD-10-CM

## 2019-02-11 DIAGNOSIS — F25.1 SCHIZOAFFECTIVE DISORDER, DEPRESSIVE TYPE (H): ICD-10-CM

## 2019-02-11 DIAGNOSIS — Z91.148 PAIN MEDICATION AGREEMENT BROKEN: ICD-10-CM

## 2019-02-11 PROCEDURE — 90791 PSYCH DIAGNOSTIC EVALUATION: CPT

## 2019-02-11 PROCEDURE — 99284 EMERGENCY DEPT VISIT MOD MDM: CPT | Mod: Z6 | Performed by: PSYCHIATRY & NEUROLOGY

## 2019-02-11 PROCEDURE — 81025 URINE PREGNANCY TEST: CPT | Performed by: FAMILY MEDICINE

## 2019-02-11 PROCEDURE — 80307 DRUG TEST PRSMV CHEM ANLYZR: CPT | Performed by: FAMILY MEDICINE

## 2019-02-11 PROCEDURE — 80320 DRUG SCREEN QUANTALCOHOLS: CPT | Performed by: FAMILY MEDICINE

## 2019-02-11 PROCEDURE — 99285 EMERGENCY DEPT VISIT HI MDM: CPT | Mod: 25 | Performed by: PSYCHIATRY & NEUROLOGY

## 2019-02-11 ASSESSMENT — ENCOUNTER SYMPTOMS
NERVOUS/ANXIOUS: 1
DECREASED CONCENTRATION: 1
SLEEP DISTURBANCE: 1
MUSCULOSKELETAL NEGATIVE: 1
RESPIRATORY NEGATIVE: 1
NEUROLOGICAL NEGATIVE: 1
ACTIVITY CHANGE: 1
HALLUCINATIONS: 1
ENDOCRINE NEGATIVE: 1
HEMATOLOGIC/LYMPHATIC NEGATIVE: 1
EYES NEGATIVE: 1
CARDIOVASCULAR NEGATIVE: 1
GASTROINTESTINAL NEGATIVE: 1

## 2019-02-11 NOTE — ED TRIAGE NOTES
Pt.  states is seeing people with scary faces. These scary people are telling pt. to hurt her daughter.   Pt. is non-compliant with meds.  Pt. is on Reyes.  Nurse Johanne Craft  [phone] is on call tonite if questions.

## 2019-02-11 NOTE — ED NOTES
Contact info for facility pt came from:   Main office [phone]  On call Sherry BURTON [phone]

## 2019-02-12 PROBLEM — F20.9 SCHIZOPHRENIA (H): Status: ACTIVE | Noted: 2019-02-12

## 2019-02-12 LAB
BASOPHILS # BLD AUTO: 0 10E9/L (ref 0–0.2)
BASOPHILS NFR BLD AUTO: 0.5 %
DIFFERENTIAL METHOD BLD: NORMAL
EOSINOPHIL # BLD AUTO: 0.3 10E9/L (ref 0–0.7)
EOSINOPHIL NFR BLD AUTO: 4.3 %
ERYTHROCYTE [DISTWIDTH] IN BLOOD BY AUTOMATED COUNT: 13 % (ref 10–15)
HCG UR QL: NEGATIVE
HCT VFR BLD AUTO: 39 % (ref 35–47)
HGB BLD-MCNC: 12.8 G/DL (ref 11.7–15.7)
IMM GRANULOCYTES # BLD: 0 10E9/L (ref 0–0.4)
IMM GRANULOCYTES NFR BLD: 0.2 %
LYMPHOCYTES # BLD AUTO: 2.3 10E9/L (ref 0.8–5.3)
LYMPHOCYTES NFR BLD AUTO: 38.1 %
MCH RBC QN AUTO: 30.5 PG (ref 26.5–33)
MCHC RBC AUTO-ENTMCNC: 32.8 G/DL (ref 31.5–36.5)
MCV RBC AUTO: 93 FL (ref 78–100)
MONOCYTES # BLD AUTO: 0.4 10E9/L (ref 0–1.3)
MONOCYTES NFR BLD AUTO: 7.2 %
NEUTROPHILS # BLD AUTO: 3 10E9/L (ref 1.6–8.3)
NEUTROPHILS NFR BLD AUTO: 49.7 %
NRBC # BLD AUTO: 0 10*3/UL
NRBC BLD AUTO-RTO: 0 /100
PLATELET # BLD AUTO: 304 10E9/L (ref 150–450)
RBC # BLD AUTO: 4.2 10E12/L (ref 3.8–5.2)
WBC # BLD AUTO: 6.1 10E9/L (ref 4–11)

## 2019-02-12 PROCEDURE — 12400002 ZZH R&B MH SENIOR/ADOLESCENT

## 2019-02-12 PROCEDURE — 36415 COLL VENOUS BLD VENIPUNCTURE: CPT | Performed by: PSYCHIATRY & NEUROLOGY

## 2019-02-12 PROCEDURE — 81025 URINE PREGNANCY TEST: CPT | Performed by: PSYCHIATRY & NEUROLOGY

## 2019-02-12 PROCEDURE — 85025 COMPLETE CBC W/AUTO DIFF WBC: CPT | Performed by: PSYCHIATRY & NEUROLOGY

## 2019-02-12 PROCEDURE — 25000132 ZZH RX MED GY IP 250 OP 250 PS 637: Performed by: PSYCHIATRY & NEUROLOGY

## 2019-02-12 RX ORDER — OLANZAPINE 10 MG/2ML
10 INJECTION, POWDER, FOR SOLUTION INTRAMUSCULAR
Status: DISCONTINUED | OUTPATIENT
Start: 2019-02-12 | End: 2019-02-18 | Stop reason: HOSPADM

## 2019-02-12 RX ORDER — ALUMINA, MAGNESIA, AND SIMETHICONE 2400; 2400; 240 MG/30ML; MG/30ML; MG/30ML
30 SUSPENSION ORAL EVERY 4 HOURS PRN
Status: DISCONTINUED | OUTPATIENT
Start: 2019-02-12 | End: 2019-02-18 | Stop reason: HOSPADM

## 2019-02-12 RX ORDER — HYDROXYZINE HYDROCHLORIDE 25 MG/1
25 TABLET, FILM COATED ORAL EVERY 4 HOURS PRN
Status: DISCONTINUED | OUTPATIENT
Start: 2019-02-12 | End: 2019-02-18 | Stop reason: HOSPADM

## 2019-02-12 RX ORDER — BISACODYL 10 MG
10 SUPPOSITORY, RECTAL RECTAL DAILY PRN
Status: DISCONTINUED | OUTPATIENT
Start: 2019-02-12 | End: 2019-02-18 | Stop reason: HOSPADM

## 2019-02-12 RX ORDER — CLOZAPINE 25 MG/1
25 TABLET ORAL AT BEDTIME
Status: DISCONTINUED | OUTPATIENT
Start: 2019-02-12 | End: 2019-02-13

## 2019-02-12 RX ORDER — TRAZODONE HYDROCHLORIDE 50 MG/1
50 TABLET, FILM COATED ORAL
Status: DISCONTINUED | OUTPATIENT
Start: 2019-02-12 | End: 2019-02-18 | Stop reason: HOSPADM

## 2019-02-12 RX ORDER — OLANZAPINE 10 MG/1
10 TABLET ORAL
Status: DISCONTINUED | OUTPATIENT
Start: 2019-02-12 | End: 2019-02-18 | Stop reason: HOSPADM

## 2019-02-12 RX ORDER — ACETAMINOPHEN 325 MG/1
650 TABLET ORAL EVERY 4 HOURS PRN
Status: DISCONTINUED | OUTPATIENT
Start: 2019-02-12 | End: 2019-02-18 | Stop reason: HOSPADM

## 2019-02-12 RX ADMIN — CLOZAPINE 25 MG: 25 TABLET ORAL at 21:12

## 2019-02-12 RX ADMIN — TRAZODONE HYDROCHLORIDE 50 MG: 50 TABLET ORAL at 21:12

## 2019-02-12 RX ADMIN — HYDROXYZINE HYDROCHLORIDE 25 MG: 25 TABLET ORAL at 19:58

## 2019-02-12 ASSESSMENT — ACTIVITIES OF DAILY LIVING (ADL)
LAUNDRY: WITH SUPERVISION
HYGIENE/GROOMING: INDEPENDENT
SWALLOWING: 0-->SWALLOWS FOODS/LIQUIDS WITHOUT DIFFICULTY
TOILETING: 0-->INDEPENDENT
RETIRED_EATING: 0-->INDEPENDENT
BATHING: 0-->INDEPENDENT
DRESS: INDEPENDENT
ORAL_HYGIENE: INDEPENDENT
COGNITION: 0 - NO COGNITION ISSUES REPORTED
DRESS: 0-->INDEPENDENT
RETIRED_COMMUNICATION: 0-->UNDERSTANDS/COMMUNICATES WITHOUT DIFFICULTY

## 2019-02-12 ASSESSMENT — MIFFLIN-ST. JEOR: SCORE: 1380.76

## 2019-02-12 NOTE — ED PROVIDER NOTES
History     Chief Complaint   Patient presents with     Hallucinations     HPI  Nona Finley is a 38 year old female who is here referred by her ACT team as she is violating her provisional discharge by refusing to take her meds. She was being seen today for her Abilify maintena but refused the injection. She also admitted to not taking her clozapine 5 days out of past 10 days. She admits to having voices that tell her her clozapine is poisoned with marijuana and she should not take it. Her voices have gotten louder and are telling her to harm her kids, notably to kill her 2 1/1 yo daughter. This is new command for her. She has not taken her clozaril past 3 days.    Please see DEC Crisis Assessment on 2/11/19 in Epic for further details.    PERSONAL MEDICAL HISTORY  Past Medical History:   Diagnosis Date     Bipolar 1 disorder (H)      Bipolar affective disorder, depressed, severe, with psychotic behavior (H)      NONSPECIFIC MEDICAL HISTORY     h/o +PPD. Neg CXR 2006     Postpartum depression 8/18/2011     Schizo-affective schizophrenia (H)      Tuberculosis      Varicosities      PAST SURGICAL HISTORY  Past Surgical History:   Procedure Laterality Date     NO HISTORY OF SURGERY       NO HISTORY OF SURGERY  06/13/2013    Per patient reported this     FAMILY HISTORY  Family History   Problem Relation Age of Onset     Respiratory Father         asthma     Asthma Father      Cerebrovascular Disease Mother      Diabetes Mother      Neurologic Disorder Brother         mental health problem?     Neurologic Disorder Sister         seizures (half sister)     Respiratory Paternal Grandfather         asthma     Respiratory Sister         asthma     Respiratory Brother         asthma     Neurologic Disorder Daughter         autism      Hypertension Maternal Grandmother      Neurologic Disorder Sister         seizures     Diabetes Maternal Grandfather      Coronary Artery Disease Son      SOCIAL HISTORY  Social History      Tobacco Use     Smoking status: Never Smoker     Smokeless tobacco: Never Used   Substance Use Topics     Alcohol use: No     MEDICATIONS  No current facility-administered medications for this encounter.      Current Outpatient Medications   Medication     ARIPiprazole ER (ABILIFY MAINTENA) 400 MG extended release inj syringe     cloZAPine (CLOZARIL) 100 MG tablet     medroxyPROGESTERone (DEPO-PROVERA) 150 MG/ML injection     ALLERGIES  No Known Allergies      I have reviewed the Medications, Allergies, Past Medical and Surgical History, and Social History in the Epic system.    Review of Systems   Constitutional: Positive for activity change.   HENT: Negative.    Eyes: Negative.    Respiratory: Negative.    Cardiovascular: Negative.    Gastrointestinal: Negative.    Endocrine: Negative.    Genitourinary: Negative.    Musculoskeletal: Negative.    Skin: Negative.    Neurological: Negative.    Hematological: Negative.    Psychiatric/Behavioral: Positive for decreased concentration, hallucinations and sleep disturbance. The patient is nervous/anxious.    All other systems reviewed and are negative.      Physical Exam   BP: 124/75  Heart Rate: 79  Temp: 98.7  F (37.1  C)  Resp: 16  Weight: 74.4 kg (164 lb)  SpO2: 98 %      Physical Exam   Constitutional: She appears well-developed and well-nourished.   HENT:   Head: Normocephalic.   Eyes: Pupils are equal, round, and reactive to light.   Neck: Normal range of motion.   Cardiovascular: Normal rate.   Pulmonary/Chest: Effort normal.   Abdominal: Soft.   Musculoskeletal: Normal range of motion.   Neurological: She is alert.   Skin: Skin is warm.   Psychiatric: Her speech is normal and behavior is normal. Judgment normal. Her affect is blunt. She is actively hallucinating. Thought content is paranoid. Cognition and memory are normal. She expresses homicidal ideation. She is inattentive.   Nursing note and vitals reviewed.      ED Course        Procedures                Labs Ordered and Resulted from Time of ED Arrival Up to the Time of Departure from the ED   HCG QUALITATIVE URINE   DRUG ABUSE SCREEN 6 CHEM DEP URINE ([facility])            Assessments & Plan (with Medical Decision Making)   Patient with schizoaffective disorder who has decompensated as she stopped taking her meds. She is violating her provisional discharge. She will be referred for admission. Her  is working on the revocation. She is placed on a 72 hour hold.    I have reviewed the nursing notes.    I have reviewed the findings, diagnosis, plan and need for follow up with the patient.       Medication List      There are no discharge medications for this visit.         Final diagnoses:   Schizoaffective disorder, depressive type (H)   Noncompliance with medication regimen       2/11/2019   [facility], [place], EMERGENCY DEPARTMENT     Serg Chauhan MD  02/11/19 0381

## 2019-02-12 NOTE — ED NOTES
Pt is currently boarding in the ED.  Pt was offered hygiene supplies: Yes  Pt was offered to ambulate on unit with staff: Yes  Meal tray ordered for pt: Yes

## 2019-02-12 NOTE — PHARMACY-ADMISSION MEDICATION HISTORY
Admission medication history interview status for the 2/11/2019 admission is complete. See Epic admission navigator for allergy information, pharmacy, prior to admission medications and immunization status.     Medication history interview sources:  [facility] team [facility] facility ([phone])    Source Reliability: Good- the [facility] facility handles all her medications and has documentation of last doses    Medication Compliance: compliance unknown- facility states she is fairly regular with her medications, however the last time she took her clozapine was last Thursday on 2/7 (they did not state why)    Changes made to PTA medication list (reason)  Added: none  Deleted: Depo-Provera (facility states she no longer receives this injection)  Changed: none    Additional medication history information:  - spoke with nurse in the ED, patient is not fluent in English so did not interview patient. Called the facility to obtain med history information  - called [facility] to obtain med history. They state they handle all her medications and document last doses. They state she only takes aripiprazole 400 mg ER monthly injection and clozapine 100 mg at bedtime .   - facility states they will bring in a dose of her aripiprazole to the hospital tomorrow 2/13. She is due for a dose today, 2/12.      Prior to Admission medications    Medication Sig Last Dose Taking? Auth Provider   ARIPiprazole ER (ABILIFY MAINTENA) 400 MG extended release inj syringe Inject 400 mg into the muscle every 28 days  1/11/2019 Yes Aly Lane MD   cloZAPine (CLOZARIL) 100 MG tablet Take 1 tablet (100 mg) by mouth At Bedtime 2/7/2019 Yes Aly Laen MD         Medication history completed by: Carmela Bro, PharmD IV Student

## 2019-02-12 NOTE — ED NOTES
ED to Behavioral Floor Handoff    SITUATION  Nona Finley is a 38 year old female who speaks Malagasy and lives in a home with family members The patient arrived in the ED by private car from home with a complaint of Hallucinations  .The patient's current symptoms started/worsened 1 day(s) ago and during this time the symptoms have increased.   In the ED, pt was diagnosed with   Final diagnoses:   Schizoaffective disorder, depressive type (H)   Noncompliance with medication regimen        Initial vitals were: BP: 124/75  Heart Rate: 79  Temp: 98.7  F (37.1  C)  Resp: 16  Weight: 74.4 kg (164 lb)  SpO2: 98 %   --------  Is the patient diabetic? No   If yes, last blood glucose? --     If yes, was this treated in the ED? --  --------  Is the patient inebriated (ETOH) No or Impaired on other substances? No  MSSA done? N/A  Last MSSA score: --    Were withdrawal symptoms treated? N/A  Does the patient have a seizure history? No. If yes, date of most recent seizure--  --------  Is the patient patient experiencing suicidal ideation? denies current or recent suicidal ideation     Homicidal ideation? denies current or recent homicidal ideation or behaviors.    Self-injurious behavior/urges? denies current or recent self injurious behavior or ideation.  ------  Was pt aggressive in the ED No  Was a code called No  Is the pt now cooperative? Yes  -------  Meds given in ED: Medications - No data to display   Family present during ED course? No  Family currently present? No    BACKGROUND  Does the patient have a cognitive impairment or developmental disability? No  Allergies: No Known Allergies.   Social demographics are   Social History     Socioeconomic History     Marital status:      Spouse name: None     Number of children: 2     Years of education: None     Highest education level: None   Social Needs     Financial resource strain: None     Food insecurity - worry: None     Food insecurity - inability: None      Transportation needs - medical: None     Transportation needs - non-medical: None   Occupational History     Employer: NONE    Tobacco Use     Smoking status: Never Smoker     Smokeless tobacco: Never Used   Substance and Sexual Activity     Alcohol use: No     Drug use: No     Sexual activity: Yes     Partners: Male     Birth control/protection: None   Other Topics Concern      Service Not Asked     Blood Transfusions No     Caffeine Concern Not Asked     Occupational Exposure No     Hobby Hazards Not Asked     Sleep Concern Not Asked     Stress Concern Not Asked     Weight Concern Not Asked     Special Diet Not Asked     Back Care Not Asked     Exercise Not Asked     Bike Helmet Not Asked     Seat Belt Not Asked     Self-Exams Not Asked     Parent/sibling w/ CABG, MI or angioplasty before 65F 55M? Not Asked   Social History Narrative    Caffeine intake/servings daily - 2 times a week    Calcium intake/servings daily - 1-2    Exercise no times weekly - describe active with children    Sunscreen used - soemtimes    Seatbelts used - Yes    Guns stored in the home - Yes    Self Breast Exam - Yes    Pap test up to date -  Yes    Eye exam up to date -  No    Dental exam up to date -  No    DEXA scan up to date -  Not Applicable    Flex Sig/Colonoscopy up to date -  Not Applicable    Mammography up to date -  Not Applicable    Immunizations reviewed and up to date - needs tdap at postpartum    Abuse: Current or Past (Physical, Sexual or Emotional) - No    Do you feel safe in your environment - Yes    Do you cope well with stress - sometimes    Do you suffer from insomnia - No    Last updated by: Joanne Gilligan RN 12/16/10                            ASSESSMENT  Labs results   Labs Ordered and Resulted from Time of ED Arrival Up to the Time of Departure from the ED   HCG QUALITATIVE URINE   DRUG ABUSE SCREEN 6 CHEM DEP URINE ([facility])      Imaging Studies: No results found for this or any previous visit (from the  past 24 hour(s)).   Most recent vital signs /75   Temp 98.7  F (37.1  C) (Oral)   Resp 16   Wt 74.4 kg (164 lb)   LMP 01/24/2019   SpO2 98%   BMI 29.05 kg/m     Abnormal labs/tests/findings requiring intervention:---   Pain control: pt had none  Nausea control: pt had none    RECOMMENDATION  Are any infection precautions needed (MRSA, VRE, etc.)? No If yes, what infection? --  ---  Does the patient have mobility issues? independently. If yes, what device does the pt use? ---  ---  Is patient on 72 hour hold or commitment? Yes If on 72 hour hold, have hold and rights been given to patient? Yes  Are admitting orders written if after 10 p.m. ?N/A  Tasks needing to be completed:---     Janie webb-- [phone] [facility]   [phone] [facility] ED

## 2019-02-13 LAB
ALBUMIN SERPL-MCNC: 3.4 G/DL (ref 3.4–5)
ALP SERPL-CCNC: 56 U/L (ref 40–150)
ALT SERPL W P-5'-P-CCNC: 13 U/L (ref 0–50)
ANION GAP SERPL CALCULATED.3IONS-SCNC: 6 MMOL/L (ref 3–14)
AST SERPL W P-5'-P-CCNC: 15 U/L (ref 0–45)
BILIRUB SERPL-MCNC: 0.4 MG/DL (ref 0.2–1.3)
BUN SERPL-MCNC: 18 MG/DL (ref 7–30)
CALCIUM SERPL-MCNC: 8.7 MG/DL (ref 8.5–10.1)
CHLORIDE SERPL-SCNC: 105 MMOL/L (ref 94–109)
CHOLEST SERPL-MCNC: 164 MG/DL
CO2 SERPL-SCNC: 28 MMOL/L (ref 20–32)
CREAT SERPL-MCNC: 0.53 MG/DL (ref 0.52–1.04)
FOLATE SERPL-MCNC: 17.1 NG/ML
GFR SERPL CREATININE-BSD FRML MDRD: >90 ML/MIN/{1.73_M2}
GLUCOSE SERPL-MCNC: 95 MG/DL (ref 70–99)
HCT VFR BLD AUTO: 38.2 % (ref 35–47)
HDLC SERPL-MCNC: 59 MG/DL
LDLC SERPL CALC-MCNC: 92 MG/DL
NONHDLC SERPL-MCNC: 105 MG/DL
POTASSIUM SERPL-SCNC: 4 MMOL/L (ref 3.4–5.3)
PROT SERPL-MCNC: 7.8 G/DL (ref 6.8–8.8)
SODIUM SERPL-SCNC: 139 MMOL/L (ref 133–144)
TRIGL SERPL-MCNC: 63 MG/DL
TSH SERPL DL<=0.005 MIU/L-ACNC: 0.45 MU/L (ref 0.4–4)
VIT B12 SERPL-MCNC: 405 PG/ML (ref 193–986)

## 2019-02-13 PROCEDURE — 85014 HEMATOCRIT: CPT | Performed by: PSYCHIATRY & NEUROLOGY

## 2019-02-13 PROCEDURE — 80061 LIPID PANEL: CPT | Performed by: PSYCHIATRY & NEUROLOGY

## 2019-02-13 PROCEDURE — 84443 ASSAY THYROID STIM HORMONE: CPT | Performed by: PSYCHIATRY & NEUROLOGY

## 2019-02-13 PROCEDURE — 12400002 ZZH R&B MH SENIOR/ADOLESCENT

## 2019-02-13 PROCEDURE — 80053 COMPREHEN METABOLIC PANEL: CPT | Performed by: PSYCHIATRY & NEUROLOGY

## 2019-02-13 PROCEDURE — 82607 VITAMIN B-12: CPT | Performed by: PSYCHIATRY & NEUROLOGY

## 2019-02-13 PROCEDURE — 82746 ASSAY OF FOLIC ACID SERUM: CPT | Performed by: PSYCHIATRY & NEUROLOGY

## 2019-02-13 PROCEDURE — 25000132 ZZH RX MED GY IP 250 OP 250 PS 637: Performed by: PSYCHIATRY & NEUROLOGY

## 2019-02-13 PROCEDURE — 36415 COLL VENOUS BLD VENIPUNCTURE: CPT | Performed by: PSYCHIATRY & NEUROLOGY

## 2019-02-13 PROCEDURE — 25000128 H RX IP 250 OP 636: Performed by: PSYCHIATRY & NEUROLOGY

## 2019-02-13 RX ORDER — CLOZAPINE 25 MG/1
50 TABLET ORAL AT BEDTIME
Status: DISCONTINUED | OUTPATIENT
Start: 2019-02-13 | End: 2019-02-13

## 2019-02-13 RX ADMIN — Medication 37.5 MG: at 21:13

## 2019-02-13 RX ADMIN — TRAZODONE HYDROCHLORIDE 50 MG: 50 TABLET ORAL at 21:16

## 2019-02-13 RX ADMIN — ARIPIPRAZOLE 400 MG: KIT at 11:03

## 2019-02-13 ASSESSMENT — ACTIVITIES OF DAILY LIVING (ADL)
DRESS: INDEPENDENT
LAUNDRY: UNABLE TO COMPLETE
HYGIENE/GROOMING: INDEPENDENT
HYGIENE/GROOMING: INDEPENDENT
LAUNDRY: UNABLE TO COMPLETE
ORAL_HYGIENE: INDEPENDENT
DRESS: INDEPENDENT
ORAL_HYGIENE: INDEPENDENT

## 2019-02-13 NOTE — PROGRESS NOTES
02/13/19 0539   Sleep/Rest/Relaxation   Night Time # Hours 7 hours   Behavioral Health   Hallucinations denies / not responding to hallucinations   Thinking intact   Orientation person: oriented;place: oriented;date: oriented;time: oriented   Memory baseline memory   Insight insight appropriate to situation   Judgement impaired   Eye Contact at examiner   Affect full range affect   Mood mood is calm   Physical Appearance/Attire attire appropriate to age and situation   Hygiene well groomed   Suicidality other (see comments)  (denied)   1. Wish to be Dead No   2. Non-Specific Active Suicidal Thoughts  No   Self Injury other (see comment)  (denied)   Psychomotor / Gait balanced;steady   Psycho Education   Type of Intervention structured groups   Response refuses   Activities of Daily Living   Hygiene/Grooming independent   Oral Hygiene independent   Dress independent   Laundry unable to complete   Room Organization independent   Activity   Activity Assistance Provided independent     Pt denied SI/SIB/HI. Pt reported reported that she slept well and that she is trying to lose weight because she has been gaining weight. Pt was short in conversation. Pt is anxious to leave. Pt denied auditory/ visual hallucinations. Pt was present in milieu and social with peers/staff. Pt refused to go to all groups. No concerns or issues to report otherwise.

## 2019-02-13 NOTE — PHARMACY
Pharmacy Clozapine Note    Date of Service: 2019  Patient's : 1980  38 year old, female    Current clozapine regimen: PTA clozapine dosing was 100mg po daily  Has there been a known interruption in therapy for greater than/equal to 48 hours? Yes, has not taken for about 5 days prior to admission    Recent ANC Value(s):  Recent Labs   Lab Test 19  1939 18  0752 17  0757 17  0737 17  1917 17  0732   ANEU 3.0 2.4 2.8 2.7 2.8 2.8 2.7       Is the patient enrolled in the clozapine REMS program? Yes  Ordering prescriber: Dr. Aguirre  Is this provider certified in the clozapine REMS program? Yes  Is the ANC within recommended limits? Yes  Does the patient have any signs or symptoms of infection, including fever or sore throat? No    Plan:  1. Clozapine therapy was appropriately restarted at 25mg po at bedtime last night due to the patient missing 5 doses of clozapine PTA , increased to 37.5mg po at bedtime for tonight.   2. A WBC with differential will be ordered at least weekly.  ANC values will be entered into the REMS program.  3. Signs/symptoms of infection will be monitored daily.    Courtney Velasco RPH  Phone: [phone]

## 2019-02-13 NOTE — PROVIDER NOTIFICATION
02/12/19 9815   Patient Belongings   Did you bring any home meds/supplements to the hospital?  No   Patient Belongings remains with patient;locker;sent to security per site process   Patient Belongings Remaining with Patient clothing   Patient Belongings Put in Hospital Secure Location (Security or Locker, etc.) clothing;shoes;other (see comments);purse/wallet   Belongings Search Yes     Items sent to security: Envelope #- [id number]- Visa Debit Card, 6 Clipped Drivers Licenses', 1 Drivers License, Social Security card, $36.00 cash.   Items locked in locker: Black boots, Black jacket, Cell phone, Cell phone , Maroon/Leopard purse, Insurance cards, lotion/cream.   Items with pt: Yellow/red scarf, Black silver scarf, Teal/red scarf, Blue/black scarf, Black/white dress, Black dress, Purple/black dress, orange slip, bra and underwear.     A               Admission:  I am responsible for any personal items that are not sent to the safe or pharmacy.  [facility] is not responsible for loss, theft or damage of any property in my possession.    Signature:  _________________________________ Date: _______  Time: _____                                              Staff Signature:  ____________________________ Date: ________  Time: _____      2nd Staff person, if patient is unable/unwilling to sign:    Signature: ________________________________ Date: ________  Time: _____     Discharge:  [facility] has returned all of my personal belongings:    Signature: _________________________________ Date: ________  Time: _____                                          Staff Signature:  ____________________________ Date: ________  Time: _____

## 2019-02-13 NOTE — PROGRESS NOTES
Phone call with Vicky Harrington,  of patient's ACT team. Vicky reported that patient is on a commitment and the PD was revoked. Patient will need a provisional discharge.

## 2019-02-13 NOTE — PHARMACY
Prescriber Notification Note    The pharmacist has communicated with this patient's provider regarding a concern or therapy recommendation.    Notified Person:  Dr. Moulton   Date/Time of Notification: today @ 7 pm   Interaction: phone  Concern/Recommendation:  Pt has not been taking Clozaril since last Thursday. MD restarted home dose of Clozaril. Ok to change to 25 mg at bedtime for today. Dr. Moulton would not be following this patient, so pt primary doctor would need to order the rest of the taper. OK to check CBC/ANC tonight.      Sarah Callahan, PharmD

## 2019-02-13 NOTE — H&P
Admitted:     02/11/2019      HISTORY OF PRESENT ILLNESS:  Ms. Nona Finley is a 38-year-old year old woman [facility].  She was admitted to the hospital due to hearing voices and having auditory and visual hallucinations with voices telling her to kill her youngest child, who is a daughter age 2.  She became quite distressed by this symptom and sought help.  She notes that the voices worsened or recurred when she stopped taking her medication several days ago.  She apparently stopped taking her medication due to a combination of difficulty having it delivered in the bad weather and snow, as well as having a belief it was contaminated with marijuana.  She was due for an Abilify Maintena shot that was not given, and went off of her oral neuroleptic Clozaril 100 mg per day.      She notes voices got worse without this.      Schizophrenia had onset at about age 16.  She currently has a petition for commitment and is on a provisional discharge, and a Reyes order that lists Clozaril, Invega, Abilify, and Haldol in all forms.      Her diagnosis has been schizoaffective illness, bipolar type.      VITAL SIGNS:  Show temperature 98.7, heart rate 94, respirations 16, blood pressure slightly low at 96/67.      MENTAL STATUS EXAMINATION:  Shows an alert woman.  Affect is fair.  Eye contact is good.  Motor behavior is normal and there is no cogwheel rigidity, tremor, or abnormal movements.  She does note the voices listed above and states that she is sorry she went off her medications.  She does not appear to be having delusions, and is having hallucinations as noted above, but they are a bit better with starting Clozaril again last night.        DIAGNOSTIC ASSESSMENT:  Schizoaffective illness, bipolar type.      PLAN:  Plan is to gradually taper up on the Clozaril.  With her low blood pressure today, we will give her 37.5 tonight,  she started with 25 last night.         DELANO MENDEZ MD             D: 2019   T: 2019   MT: JOSE      Name:     BHAVIK CHRIS   MRN:      [id number]        Account:      [id number]   :      1980        Admitted:     2019                   Document: [id number]       cc: EMEKA HANKS MD

## 2019-02-13 NOTE — PLAN OF CARE
"Admission Notes:    Admitted in [facility] this 38 year old female patient from ED with the chief complaint of auditory/visual hallucinations and decompensation. Patient reports that he hears voices telling her \"to kill my young daughter\". Also patient reports that \"I have been seeing scary things\" which are not there. Patient has Hx of Schizophrenia with multiple inpatient admission in [facility] in the [place] and her last admission was in May 2018. Patient also reports that there are two persons or individuals telling her that her medications were contaminated and told her not to take them. Patient is receiving Clozaril 100 mgs at bedtime but she stopped taking it last Friday 2/8/2019. The last dose she took was last Thursday 2/7/2019. Patient says that she is receiving Abilify injections once a month (every 28 days) and the next dose was supposedly yesterday 2/11/2019. However, she told her scheduling RN that she does not want the injection anymore because of the above reason. Her ACT Team will bring the dose here in the hospital on 2/13/2019 per report.    Patient currently denies SI/SIB. \"I am not suicidal\" patient said. She is pleasant and cooperative. Patient is alert and oriented x 3. Patient is living with her  and 6 children. She denies chemical nor tobacco use. Patient denies suicide attempts in the past. Patient made comfortable in bed after a brief orientation to the unit.  "

## 2019-02-13 NOTE — PLAN OF CARE
Adult Behavioral Health Plan of Care  Patient-Specific Goal (Individualization)  2/13/2019 1445 by Geovanna Hyman LPC  Flowsheets  Taken 2/13/2019 1445   Patient Personal Strengths  ability to maintain sobriety;family/social support;independent living skills;parenting skills;no history of violence;motivated for recovery;spiritual/Jehovah's witness support;stable living environment  Patient Vulnerabilities  Medication non-compliance   Illness Management Recovery model: Personal Plan of Care    Patient completed Personal Plan of Care, identifying reasons for hospitalization and goals for discharge. Form reviewed in team meeting and signed by patient, physician, writer. Form given to [facility] to be scanned into EPIC.    The patient completed the reasons for admit and goals for discharge in the personal plan of care.   Reasons for admit:  1)  Stopped my medication  2)  Symptoms returned  3)  Hearing voices and seeing people    Goals for discharge:  1)  Medication management and compliance

## 2019-02-13 NOTE — PLAN OF CARE
OT General Care Plan  OT Goal 1  Description  Will attend OT groups and set goal focus.   2/13/2019 1231 by Roselyn Miller, OT  Note  Pt has not attended OT groups yet. They will be encouraged to attend groups and be provided a Self Assessment form.  OT staff will explain the value of OT, including them in their treatment plan and offer options to meet their needs and identify goals.

## 2019-02-13 NOTE — PLAN OF CARE
BEHAVIORAL TEAM DISCUSSION    Participants: Dr. Walter Aguirre MD, Francois Paz RN, Darren Hyman [facility]  Progress: New patient  Continued Stay Criteria/Rationale: Auditory & visual hallucinations  Medical/Physical: See medical notes  Precautions:   Behavioral Orders   Procedures    Code 1 - Restrict to Unit    Routine Programming     As clinically indicated    Status 15     Every 15 minutes.     Plan: Psychiatric Assessment. Medication Management. Therapeutic Mileu. Individual and group support.   Rationale for change in precautions or plan: Initial plan

## 2019-02-13 NOTE — PROGRESS NOTES
Urine specimen sent to lab for HCG.     Trazodone 50 mgs and Hydroxyzine 25 mgs given orally to patient for sleep and anxiety respectively per patient's request.

## 2019-02-13 NOTE — PROGRESS NOTES
"Initial Psychosocial Assessment    I have reviewed the chart, met with the patient, and developed Care Plan.  Information for assessment was obtained from: chart and patient      Presenting Problem:  Patient is a 38-year-old year old female admitted on a 72 hour hold due to auditory and visual hallucinations with voices telling her to kill her 2-year-old daughter. She became quite distressed by this symptom and sought help from her ACT team who brought her to the hospital. She notes that the voices worsened or recurred when she stopped taking her medication several days ago. The ACT team revoked her PD.    History of Mental Health and Chemical Dependency:  Patient has a history of schizoaffective disorder, bipolar type, onset at age 16. She currently has a petition for commitment with a Reyes order and is on a provisional discharge. Patient has a history of multiple inpatient admissions and most recent admission was in May 2018 at [facility]. Patient denies alcohol and drug use.    Family Description (Constellation, Family Psychiatric History):  Pt was born in [facility] and lived there until coming to the [place] in  when she was 20.  She was raised by both parents, the youngest of 8 children. Pt is  and has 6 children, all girls.  Her oldest daughter has autism and is nonverbal. She has a brother with mental health problems similar to her own.     Significant Life Events (Illness, Abuse, Trauma, Death):  She lived through the war in her home country. Pt's father  in .     Living Situation:  Pt lives in a house with her  and 6 children.     Educational Background:  Complete school up to 5th grade.  4 years of ESL.     Occupational History:  Patient had a job in retail cashiering.  She states \"I couldn't handle it.\"      Financial Status:  Stable,  works and supports the family.     Legal Issues:  Current Civil Commitment expires July 3, 2019. PD was revoked.     Ethnic/Cultural Issues:  [place]   "   Spiritual Orientation:  Church      Service History:  No      Social Functioning (organization, interests):  No friends. Patient has a supportive brother in [place] who calls her frequently     Current Treatment Providers are:  Psychiatry: Dr. Walter Gaviria MD, Re-Entry ACT [phone]  ACT : Vicky Harrington. [phone]  ACT : Selena, [phone]  ACT team RN: Sherry: [phone]  PCP- Johanne Tracy MD, [phone].    Social Service Assessment/Plan:  Hospital staff will provide a safe environment and a therapeutic milieu. Pt will have psychiatric assessment and medication management by the psychiatrist. CTC will do individual inpatient treatment planning and after care planning. Staff will continue to assess pt as needed. Patient will participate in unit groups and activities. Pt will receive individual and group support on the unit.      Patient admitted for safety/stabilization.  Medication will be reviewed, adjusted per MD's as indicated.   Will contact outpatient providers for care coordination.  Will discuss options for increased community supports.  Will continue to assess, coordinate care, and ensure appropriate f/u care is in placePt is in need of psychiatric evaluation and stabilization.   CTC will coordinate care with ACT Team  and family.

## 2019-02-14 PROCEDURE — G0177 OPPS/PHP; TRAIN & EDUC SERV: HCPCS

## 2019-02-14 PROCEDURE — 25000132 ZZH RX MED GY IP 250 OP 250 PS 637: Performed by: PSYCHIATRY & NEUROLOGY

## 2019-02-14 PROCEDURE — 12400002 ZZH R&B MH SENIOR/ADOLESCENT

## 2019-02-14 PROCEDURE — H2032 ACTIVITY THERAPY, PER 15 MIN: HCPCS

## 2019-02-14 PROCEDURE — 25000128 H RX IP 250 OP 636: Performed by: PSYCHIATRY & NEUROLOGY

## 2019-02-14 PROCEDURE — 90686 IIV4 VACC NO PRSV 0.5 ML IM: CPT | Performed by: PSYCHIATRY & NEUROLOGY

## 2019-02-14 RX ADMIN — TRAZODONE HYDROCHLORIDE 50 MG: 50 TABLET ORAL at 21:51

## 2019-02-14 RX ADMIN — INFLUENZA A VIRUS A/MICHIGAN/45/2015 X-275 (H1N1) ANTIGEN (FORMALDEHYDE INACTIVATED), INFLUENZA A VIRUS A/SINGAPORE/INFIMH-16-0019/2016 IVR-186 (H3N2) ANTIGEN (FORMALDEHYDE INACTIVATED), INFLUENZA B VIRUS B/PHUKET/3073/2013 ANTIGEN (FORMALDEHYDE INACTIVATED), AND INFLUENZA B VIRUS B/MARYLAND/15/2016 BX-69A ANTIGEN (FORMALDEHYDE INACTIVATED) 0.5 ML: 15; 15; 15; 15 INJECTION, SUSPENSION INTRAMUSCULAR at 08:50

## 2019-02-14 RX ADMIN — Medication 37.5 MG: at 21:55

## 2019-02-14 ASSESSMENT — ACTIVITIES OF DAILY LIVING (ADL)
LAUNDRY: UNABLE TO COMPLETE
ORAL_HYGIENE: INDEPENDENT
HYGIENE/GROOMING: INDEPENDENT
DRESS: INDEPENDENT;STREET CLOTHES
HYGIENE/GROOMING: HANDWASHING;INDEPENDENT
LAUNDRY: UNABLE TO COMPLETE
ORAL_HYGIENE: INDEPENDENT
DRESS: INDEPENDENT

## 2019-02-14 ASSESSMENT — MIFFLIN-ST. JEOR: SCORE: 1381.66

## 2019-02-14 NOTE — PROGRESS NOTES
Pt chose a location peripheral to the group [place] to participate, though smiled and moved along with others.       02/14/19 1130   Dance Movement Therapy   Type of Intervention structured groups   Response participates with encouragement   Hours 0.5

## 2019-02-14 NOTE — PROGRESS NOTES
Pt has been up and about . Pt present for breakfast,ate well. Pt compliant with Flu shot. Pt continues to decline group involvement. Pt denies hallucinating,denies homicidal ideation or suicidal ideation.

## 2019-02-14 NOTE — PROGRESS NOTES
"   02/14/19 1200   General Information   Date Initially Attended OT 02/14/19   Clinical Impression   Affect Appropriate to situation   Orientation Oriented to person, place and time   Appearance and ADLs General cleanliness observed in most areas   Attention to Internal Stimuli No observed signs   Interaction Skills Initiates appropriately with staff;Interacts appropriately with peers   Ability to Communicate Needs Independent   Verbal Content Clear;Appropriate to topic   Ability to Maintain Boundaries Maintains appropriate physical boundaries;Maintains appropriate verbal boundaries   Participation Initiates participation   Concentration Concentrates 50 minutes   Ability to Concentrate Needs further assessment;Without difficulty   Follows and Comprehends Directions Independently follows 1 step verbal directions   Memory Delayed and immediate recall intact;Needs further assessment   Organization Independently organizes simple tasks   Decision Making Independent   Planning and Problem Solving Independently plans ahead;Needs further assessment   Ability to Apply and Learn Concepts Applies within group structure   Frustrations / Stress Tolerance Independently identifies skills    Level of Insight No insight   Self Esteem Needs further assessment   Social Supports Identifies utilizing supports   General Observation/Plan   General Observations/Plan See Comments   Attended 1 of 2 OT groups. She was pleasant and quick to answer and add in comments on invitation. She offered direct eye contact and seemed interested in being present and participating. She chose a less complex and 1 step task to work on. Stated the need to keep it less complex. She stated the reason for admission as \"my symptoms\". Changes she hopes for at time of discharge was \"my symptoms\". OT goals she chose to focus on included finish what she begins, express feelings better and improve concentration. Plan: Provide structure, support, and encouragement " through attendance to groups. Assist pt to increase self awareness regarding mental health issues and expand helpful coping strategies. Encourage asking assistance as needed and work on more challenging opportunities to build concentration.

## 2019-02-14 NOTE — PROGRESS NOTES
"Pt has been out in the milieu, withdrawn, but social on approach. Pt states she is feeling, \"good.\" Full range affect. Sleep and appetite have been good. Denies SI/SIB. Denies both auditory and visual hallucinations. No paranoia present. Pt has been medication compliant. Pleasant and cooperative. She states, \"as soon as I take my medications my symptoms are gone.\"  Pt is anxious to discharge. BP has been running low. Encouraged fluids. Will continue to monitor and assess.     Last reading at 19:52- BP sitting (103/64) HR (80).     Pt requested and was given PRN Trazodone 50mg at 21:16 for sleep.   "

## 2019-02-15 PROCEDURE — 25000132 ZZH RX MED GY IP 250 OP 250 PS 637: Performed by: PSYCHIATRY & NEUROLOGY

## 2019-02-15 PROCEDURE — 93010 ELECTROCARDIOGRAM REPORT: CPT | Performed by: INTERNAL MEDICINE

## 2019-02-15 PROCEDURE — 99231 SBSQ HOSP IP/OBS SF/LOW 25: CPT | Performed by: PHYSICIAN ASSISTANT

## 2019-02-15 PROCEDURE — G0177 OPPS/PHP; TRAIN & EDUC SERV: HCPCS

## 2019-02-15 PROCEDURE — 93005 ELECTROCARDIOGRAM TRACING: CPT

## 2019-02-15 PROCEDURE — 12400002 ZZH R&B MH SENIOR/ADOLESCENT

## 2019-02-15 RX ORDER — CLOZAPINE 25 MG/1
50 TABLET ORAL AT BEDTIME
Status: DISCONTINUED | OUTPATIENT
Start: 2019-02-15 | End: 2019-02-18 | Stop reason: HOSPADM

## 2019-02-15 RX ADMIN — TRAZODONE HYDROCHLORIDE 50 MG: 50 TABLET ORAL at 20:02

## 2019-02-15 RX ADMIN — CLOZAPINE 50 MG: 25 TABLET ORAL at 20:02

## 2019-02-15 ASSESSMENT — ACTIVITIES OF DAILY LIVING (ADL)
ORAL_HYGIENE: INDEPENDENT
DRESS: INDEPENDENT
ORAL_HYGIENE: INDEPENDENT
DRESS: STREET CLOTHES
HYGIENE/GROOMING: INDEPENDENT
HYGIENE/GROOMING: INDEPENDENT
LAUNDRY: WITH SUPERVISION

## 2019-02-15 NOTE — PROGRESS NOTES
LAURAM with ACT team  Vicky Waggoner and JOSEPHINE Powell to report patient will discharge on Monday.    Faxed Provisional Discharge Agreement to Vicky for her signature. (Fax: [phone])

## 2019-02-15 NOTE — PROGRESS NOTES
"Patient seen, chart reviewed, care discussed with staff.  Blood pressure 106/70, pulse 101, temperature 97.5  F (36.4  C), temperature source Tympanic, resp. rate 16, height 1.575 m (5' 2\"), weight 75.8 kg (167 lb 3.2 oz), last menstrual period 01/24/2019, SpO2 100 %, not currently breastfeeding.    Pulse was 120 earlier  Medical saw her and ordered EKG.      Denies current hallucinations.  No EPSE of abnormal movements.    Plan:  Increase Clozaril to 50mg  Coordinate with ACT Team  Project Discharge on Monday.    Recent Results (from the past 168 hour(s))   HCG qualitative urine    Collection Time: 02/11/19  5:43 PM   Result Value Ref Range    HCG Qual Urine Negative NEG^Negative   Drug abuse screen 6 urine (tox)    Collection Time: 02/11/19  5:43 PM   Result Value Ref Range    Amphetamine Qual Urine Negative NEG^Negative    Barbiturates Qual Urine Negative NEG^Negative    Benzodiazepine Qual Urine Negative NEG^Negative    Cannabinoids Qual Urine Negative NEG^Negative    Cocaine Qual Urine Negative NEG^Negative    Ethanol Qual Urine Negative NEG^Negative    Opiates Qualitative Urine Negative NEG^Negative   CBC with platelets differential    Collection Time: 02/12/19  7:39 PM   Result Value Ref Range    WBC 6.1 4.0 - 11.0 10e9/L    RBC Count 4.20 3.8 - 5.2 10e12/L    Hemoglobin 12.8 11.7 - 15.7 g/dL    Hematocrit 39.0 35.0 - 47.0 %    MCV 93 78 - 100 fl    MCH 30.5 26.5 - 33.0 pg    MCHC 32.8 31.5 - 36.5 g/dL    RDW 13.0 10.0 - 15.0 %    Platelet Count 304 150 - 450 10e9/L    Diff Method Automated Method     % Neutrophils 49.7 %    % Lymphocytes 38.1 %    % Monocytes 7.2 %    % Eosinophils 4.3 %    % Basophils 0.5 %    % Immature Granulocytes 0.2 %    Nucleated RBCs 0 0 /100    Absolute Neutrophil 3.0 1.6 - 8.3 10e9/L    Absolute Lymphocytes 2.3 0.8 - 5.3 10e9/L    Absolute Monocytes 0.4 0.0 - 1.3 10e9/L    Absolute Eosinophils 0.3 0.0 - 0.7 10e9/L    Absolute Basophils 0.0 0.0 - 0.2 10e9/L    Abs Immature " Granulocytes 0.0 0 - 0.4 10e9/L    Absolute Nucleated RBC 0.0    HCG qualitative urine    Collection Time: 02/12/19  9:18 PM   Result Value Ref Range    HCG Qual Urine Negative NEG^Negative   Comprehensive metabolic panel    Collection Time: 02/13/19  8:51 AM   Result Value Ref Range    Sodium 139 133 - 144 mmol/L    Potassium 4.0 3.4 - 5.3 mmol/L    Chloride 105 94 - 109 mmol/L    Carbon Dioxide 28 20 - 32 mmol/L    Anion Gap 6 3 - 14 mmol/L    Glucose 95 70 - 99 mg/dL    Urea Nitrogen 18 7 - 30 mg/dL    Creatinine 0.53 0.52 - 1.04 mg/dL    GFR Estimate >90 >60 mL/min/[1.73_m2]    GFR Estimate If Black >90 >60 mL/min/[1.73_m2]    Calcium 8.7 8.5 - 10.1 mg/dL    Bilirubin Total 0.4 0.2 - 1.3 mg/dL    Albumin 3.4 3.4 - 5.0 g/dL    Protein Total 7.8 6.8 - 8.8 g/dL    Alkaline Phosphatase 56 40 - 150 U/L    ALT 13 0 - 50 U/L    AST 15 0 - 45 U/L   Lipid panel    Collection Time: 02/13/19  8:51 AM   Result Value Ref Range    Cholesterol 164 <200 mg/dL    Triglycerides 63 <150 mg/dL    HDL Cholesterol 59 >49 mg/dL    LDL Cholesterol Calculated 92 <100 mg/dL    Non HDL Cholesterol 105 <130 mg/dL   TSH with free T4 reflex and/or T3 as indicated    Collection Time: 02/13/19  8:51 AM   Result Value Ref Range    TSH 0.45 0.40 - 4.00 mU/L   Folate    Collection Time: 02/13/19  8:51 AM   Result Value Ref Range    Folate 17.1 >5.4 ng/mL   Hematocrit    Collection Time: 02/13/19  8:51 AM   Result Value Ref Range    Hematocrit 38.2 35.0 - 47.0 %   Vitamin B12    Collection Time: 02/13/19  8:51 AM   Result Value Ref Range    Vitamin B12 405 193 - 986 pg/mL   EKG 12-lead, complete    Collection Time: 02/15/19 10:41 AM   Result Value Ref Range    Interpretation ECG Click View Image link to view waveform and result        Current Facility-Administered Medications:      acetaminophen (TYLENOL) tablet 650 mg, 650 mg, Oral, Q4H PRN, Andish, Valentino Kheyr, MD     alum & mag hydroxide-simethicone (MYLANTA ES/MAALOX  ES) suspension 30 mL, 30  mL, Oral, Q4H PRN, Valentino Moulton MD     bisacodyl (DULCOLAX) Suppository 10 mg, 10 mg, Rectal, Daily PRN, Valentino Moulton MD     cloZAPine (CLOZARIL) half-tab 37.5 mg, 37.5 mg, Oral, At Bedtime, Walter Aguirre MD, 37.5 mg at 02/14/19 2155     hydrOXYzine (ATARAX) tablet 25 mg, 25 mg, Oral, Q4H PRN, Valentino Moulton MD, 25 mg at 02/12/19 1958     magnesium hydroxide (MILK OF MAGNESIA) suspension 30 mL, 30 mL, Oral, At Bedtime PRN, Valentino Moulton MD     OLANZapine (zyPREXA) tablet 10 mg, 10 mg, Oral, Q2H PRN **OR** OLANZapine (zyPREXA) injection 10 mg, 10 mg, Intramuscular, Q2H PRN, Valentino Moulton MD     traZODone (DESYREL) tablet 50 mg, 50 mg, Oral, At Bedtime PRN, Valentino Moulton MD, 50 mg at 02/14/19 2151

## 2019-02-15 NOTE — PROGRESS NOTES
"Brief Medicine Note    Contacted by nursing regarding patient with tachycardia with . During this HR, patient was eating breakfast and denied any symptoms or concerns.     On my assessment, patient states she is feeling well. She denies any chest pain, SOB, palpitations, fevers/chills, dizziness, or lightheadedness. She denies any pain or anxiety. She reports she is eating well and drinking adequate fluids.     Today's vital signs, medications, and nursing notes were reviewed.     /70   Pulse 101   Temp 97.5  F (36.4  C) (Tympanic)   Resp 16   Ht 1.575 m (5' 2\")   Wt 75.8 kg (167 lb 3.2 oz)   LMP 01/24/2019   SpO2 100%   BMI 30.58 kg/m    GENERAL: Alert and oriented x 3. Appears comfortable. Answering questions appropriately.   CV: RRR. S1, S2. No murmurs appreciated.   RESPIRATORY: Effort normal on room air. Lungs CTAB with no wheezing, rales, rhonchi.   NEUROLOGICAL: No focal deficits. Moves all extremities.   EXTREMITIES: No peripheral edema. Intact bilateral pedal pulses.     A/P:  Tachycardia; resolved: Patient with HR of 125 this AM. Other VSS. No acute symptoms or complaints. On my assessment, HR had improved to 100. Given her hx of schizophrenia on clozaril and HR, ECG was obtained. ECG with sinus bradycardia with HR of 57, with sinus arrhythmia. There was biatrial enlargement. No ischemic changes. QTc was not prolonged with QTc of 385.   -Encourage fluid intake  -Continue to monitor HR  -Please call medicine if BP is <90/60 or if HR is >120s for a persistent basis or if patient becomes symptomatic including chest pain, SOB, dizziness, lightheadedness, presyncope, etc.     No further medical intervention is required at this time. Medicine signing off. Please feel free to call with any questions.     Patricia Barahona PA-C  Hospitalist Service  Pager [phone]      "

## 2019-02-15 NOTE — PLAN OF CARE
OT General Care Plan  OT Goal 2  Description  Will develop ideas for coping strategies and identify symptoms of when using them would be beneficial.    2/15/2019 1226 by Roselyn Miller, OT  Note  Attended 2 of 2 OT groups. She initiated work on a more complex task though stated it to be too difficult and requested to change to an activity of more familiarity. She is pleasant on approach, smiles with interactions and appears interested in being involved with opportunities more familiar to her and in her comfort range. She also seems comfortable asking for help as needed as demonstrated when having difficulties on task.

## 2019-02-15 NOTE — PROGRESS NOTES
Pt stated she feels good. Pt stated she is looking forward to returning home on Monday. Pt present in milieu and groups though generally withdrawn with peers. Pt denied depression, anxiety, SI/SIB, HI, and hallucinations.       02/15/19 1233   Behavioral Health   Hallucinations denies / not responding to hallucinations   Thinking intact   Orientation place: oriented;date: oriented;time: oriented;person: oriented   Memory baseline memory   Insight insight appropriate to situation;insight appropriate to events   Judgement intact   Eye Contact at examiner   Affect full range affect   Mood mood is calm   Physical Appearance/Attire attire appropriate to age and situation   Hygiene well groomed   Suicidality other (see comments)  (Denies)   1. Wish to be Dead No   2. Non-Specific Active Suicidal Thoughts  No   Self Injury other (see comment)  (Denies)   Elopement (None)   Activity withdrawn;other (see comment)  (Present in milieu)   Speech clear;coherent   Medication Sensitivity no observed side effects;no stated side effects   Psychomotor / Gait balanced;steady   Psycho Education   Type of Intervention 1:1 intervention   Response participates, initiates socially appropriate   Hours 0.5   Treatment Detail Check-in   Safety   Suicidality Status 15   Activities of Daily Living   Hygiene/Grooming independent   Oral Hygiene independent   Dress independent   Room Organization independent   Activity   Activity Assistance Provided independent

## 2019-02-15 NOTE — PLAN OF CARE
"Pt has been present in the milieu. Withdrawn, but social on approach. Pt states her mood, \"good.\" Denies anxiety and depression. Denies hallucinations and SI/SIB. Denies homicidal ideation. She states she is, \"doing much better.\" Reports, \"I am happy and I am ready to go home.\" Full range affect. Pt states she did attend one group today, otherwise keeps to herself. Pt has been walking the halls some this evening. Reports both sleep and appetite are good. Denies any medication side effects. She is looking forward to getting back home to her family. Compliant with medications. Will continue to monitor and assess.   "

## 2019-02-16 PROCEDURE — 25000132 ZZH RX MED GY IP 250 OP 250 PS 637: Performed by: PSYCHIATRY & NEUROLOGY

## 2019-02-16 PROCEDURE — 93010 ELECTROCARDIOGRAM REPORT: CPT | Performed by: INTERNAL MEDICINE

## 2019-02-16 PROCEDURE — 93005 ELECTROCARDIOGRAM TRACING: CPT

## 2019-02-16 PROCEDURE — 12400002 ZZH R&B MH SENIOR/ADOLESCENT

## 2019-02-16 RX ADMIN — CLOZAPINE 50 MG: 25 TABLET ORAL at 20:43

## 2019-02-16 RX ADMIN — ACETAMINOPHEN 650 MG: 325 TABLET, FILM COATED ORAL at 13:08

## 2019-02-16 RX ADMIN — TRAZODONE HYDROCHLORIDE 50 MG: 50 TABLET ORAL at 20:43

## 2019-02-16 ASSESSMENT — ACTIVITIES OF DAILY LIVING (ADL)
LAUNDRY: WITH SUPERVISION
ORAL_HYGIENE: INDEPENDENT
DRESS: INDEPENDENT;SCRUBS (BEHAVIORAL HEALTH)
HYGIENE/GROOMING: INDEPENDENT
LAUNDRY: WITH SUPERVISION
HYGIENE/GROOMING: INDEPENDENT
DRESS: INDEPENDENT
ORAL_HYGIENE: INDEPENDENT

## 2019-02-16 NOTE — PLAN OF CARE
"Pt had a good shift this evening. Pt was visible in the milieu and mildly social with peers. Pt had a bright affect and displayed full range emotions. Pt seemed restless at times with pacing the hallways, but was able to sit still for meals and went to sleep around 9:15 PM. Pt requested her medication to be given early at 8 PM and also requested trazodone for sleep.     When checking-in with the patient, she was resting comfortably in bed. She stated \"I'm doing good. The voices are gone!\" with a smile. Pt explained she is no longer having visual hallucinations either. She denied SI, SIB, HI, anxiety and depression. Pt ate a good dinner.   "

## 2019-02-16 NOTE — PROGRESS NOTES
"   02/16/19 1045   Behavioral Health   Hallucinations denies / not responding to hallucinations   Thinking intact   Orientation person: oriented;place: oriented;date: oriented;time: oriented   Memory baseline memory   Insight insight appropriate to situation;insight appropriate to events   Judgement intact   Eye Contact at examiner   Affect full range affect   Mood mood is calm   Physical Appearance/Attire attire appropriate to age and situation   Hygiene well groomed   Suicidality other (see comments)  (pt denies)   1. Wish to be Dead No   2. Non-Specific Active Suicidal Thoughts  No   Self Injury other (see comment)  (pt denies)   Elopement (none noted)   Activity other (see comment)  (present in milieu)   Speech clear;coherent   Medication Sensitivity no stated side effects;no observed side effects   Psychomotor / Gait balanced;steady   Coping/Psychosocial   Verbalized Emotional State acceptance   Plan of Care Reviewed With patient   Patient Agreement with Plan of Care agrees   Psycho Education   Type of Intervention 1:1 intervention   Response participates, initiates socially appropriate   Hours 0.5   Treatment Detail check in   Safety   Suicidality Status 15   Activities of Daily Living   Hygiene/Grooming independent   Oral Hygiene independent   Dress independent   Laundry with supervision   Room Organization independent   Activity   Activity Assistance Provided independent     Pt is up for meals and eating well. Pt affect bright with calm mood. Pt has been walking/pacing in hallway and upon approach pt asked what she would be doing at home and she responded \"I would be getting my kids breakfast and helping with homework. In the mornings I get then ready for school and clean up the house.\" Pt asked about discharge plans and she stated \"My doctor said Monday afternoon. I cannot wait to see my kids\" Pt seemed happy about this stating she missed them. Pt received phone call from  who she stated was ready " for her to come home. Pt denies SI/SIB/HI. Pt denied AH. Pt sat out in lounge and rocked in rocking chair for a period of time this shift. Pt calm, cooperative, and pleasant during check in.

## 2019-02-17 PROCEDURE — 25000132 ZZH RX MED GY IP 250 OP 250 PS 637: Performed by: PSYCHIATRY & NEUROLOGY

## 2019-02-17 PROCEDURE — 12400002 ZZH R&B MH SENIOR/ADOLESCENT

## 2019-02-17 RX ADMIN — TRAZODONE HYDROCHLORIDE 50 MG: 50 TABLET ORAL at 21:04

## 2019-02-17 RX ADMIN — CLOZAPINE 50 MG: 25 TABLET ORAL at 21:04

## 2019-02-17 ASSESSMENT — ACTIVITIES OF DAILY LIVING (ADL)
DRESS: INDEPENDENT
ORAL_HYGIENE: INDEPENDENT
HYGIENE/GROOMING: INDEPENDENT
LAUNDRY: UNABLE TO COMPLETE
DRESS: INDEPENDENT
HYGIENE/GROOMING: INDEPENDENT
LAUNDRY: UNABLE TO COMPLETE
ORAL_HYGIENE: INDEPENDENT

## 2019-02-17 ASSESSMENT — MIFFLIN-ST. JEOR: SCORE: 1391.64

## 2019-02-17 NOTE — PLAN OF CARE
Pt reporting she is discharging tomorrow. She denies hallucinations or thoughts to harm others. She has bright affect with conversation, but does share her sadness about being solely responsible for . Appears to feel she does not have options. She comments about waiting for day when it is just her and her spouse.     She reports good sleep, but meds make her feel tired all day.   She is walking halls and lounge.   Good eye contact, interacts with others with expression.   No PRN requested

## 2019-02-17 NOTE — PROGRESS NOTES
"   02/16/19 1835   Behavioral Health   Hallucinations denies / not responding to hallucinations   Thinking intact   Orientation person: oriented;place: oriented;date: oriented;time: oriented   Memory baseline memory   Insight admits / accepts   Judgement intact   Eye Contact at examiner   Affect full range affect   Mood mood is calm   Physical Appearance/Attire attire appropriate to age and situation   Hygiene well groomed   Suicidality other (see comments)  (denied)   1. Wish to be Dead No   2. Non-Specific Active Suicidal Thoughts  No   Self Injury other (see comment)  (denied)   Activity other (see comment)  (denied)   Speech clear;coherent   Psychomotor / Gait balanced;steady   Coping/Psychosocial   Verbalized Emotional State acceptance   Plan of Care Reviewed With patient   Patient Agreement with Plan of Care agrees   Psycho Education   Type of Intervention 1:1 intervention   Response participates, initiates socially appropriate   Hours 0.5   Activities of Daily Living   Hygiene/Grooming independent   Oral Hygiene independent   Dress independent;scrubs (behavioral health)   Laundry with supervision   Room Organization independent   Activity   Activity Assistance Provided independent     Pt denied SI/SIB/HI. Pt stated that she plans to discharge Monday afternoon. Writer had a very pleasant and insightful conversation with pt. Pt spent most of shift walking hallways due to \"boredom\". Pt feels ready for discharge and very excited to get home to her family. No concerns or issues to report otherwise.   "

## 2019-02-18 VITALS
HEART RATE: 85 BPM | HEIGHT: 62 IN | OXYGEN SATURATION: 100 % | BODY MASS INDEX: 31.17 KG/M2 | DIASTOLIC BLOOD PRESSURE: 63 MMHG | WEIGHT: 169.4 LBS | RESPIRATION RATE: 16 BRPM | TEMPERATURE: 96.7 F | SYSTOLIC BLOOD PRESSURE: 113 MMHG

## 2019-02-18 LAB
INTERPRETATION ECG - MUSE: NORMAL
INTERPRETATION ECG - MUSE: NORMAL

## 2019-02-18 ASSESSMENT — ACTIVITIES OF DAILY LIVING (ADL)
HYGIENE/GROOMING: INDEPENDENT
ORAL_HYGIENE: INDEPENDENT
DRESS: INDEPENDENT;STREET CLOTHES

## 2019-02-18 NOTE — DISCHARGE INSTRUCTIONS
Behavioral Discharge Planning and Instructions      Summary:  You were admitted on 2/11/2019  due to Psychotic Symptomology.  You were treated by Dr. Walter Aguirre MD and discharged on 2/18/2019 from [facility] Home     You were committed to the [facility] on June 20, 2018.  You are being discharged on a Provisional Discharge Agreement which shall remain in effect for the duration of the Commitment.  Your Commitment expires on July 3, 2019.  You were also court ordered to take the medications the doctor ordered for you.       Principal Diagnosis:   Schizoaffective disorder, bipolar type    Health Care Follow-up Appointments:   Re-Entry ACT Team  Psychiatry: Dr. Walter Gaviria M.D., [phone]  ACT : Vicky Harrington  [phone]  Fax: [phone]  ACT : Selena [phone]  ACT team RN: Sherry: [phone]  PCP- Johanne Tracy MD, [phone].    If no appointments scheduled, explain:  You are followed by an ACT team who will resume your care on discharge.  Attend all scheduled appointments with your outpatient providers. Call at least 24 hours in advance if you need to reschedule an appointment to ensure continued access to your outpatient providers.   Major Treatments, Procedures and Findings:  You were provided with: a psychiatric assessment, assessed for medical stability, medication evaluation and/or management, group therapy, milieu management and medical interventions    Symptoms to Report: feeling more aggressive, increased confusion, losing more sleep, mood getting worse or thoughts of suicide    Early warning signs can include: increased depression or anxiety sleep disturbances increased thoughts or behaviors of suicide or self-harm  increased unusual thinking, such as paranoia or hearing voices    Safety and Wellness:  Take all medicines as directed.  Make no changes unless your doctor suggests them.  Follow treatment recommendations.  Refrain from  alcohol and non-prescribed drugs.  If there is a concern for safety, call 911.    Resources:   Crisis Intervention: [phone] or [phone] (TTY: [phone]).  Call anytime for help.  [employer] [place] on Mental Illness (www.mn.doron.org): 389.162.6786 or 887-776-1487.  [facility] Crisis (COPE) Response - Adult [phone]      The treatment team has appreciated the opportunity to work with you.     If you have any questions or concerns our unit number is [phone]  You may be receiving a follow-up phone call within the next three days from a representative from behavioral health.    You have identified the best phone number to reach you as [phone]

## 2019-02-18 NOTE — PROGRESS NOTES
Patient is discharging today per MD order. Discharge instructions given to patient and she verbalized understanding. All belongings and items were given to her. Patient denied SI/SIB nor hallucinations. Patient has identified her coping skills.     4:55 p.m. Patient discharged ambulatory accompanied by the staff to the main entrance. Patient is picked up by a taxi cab to home.

## 2019-02-18 NOTE — PLAN OF CARE
"Pt present in milieu, social with staff. Pt feels ready for discharge tomorrow, she is \"excited\" to see her children. Pt verbalized she gets \"angry\" and \"a little crazy\" when she is not taking her medication. Pt has a long term goal to go back to get her GED when her youngest daughter is in school full time. Will continue to monitor.   "

## 2019-02-18 NOTE — PROGRESS NOTES
Met with patient to go over the Provisional Discharge Agreement. Patient was in agreement and signed the PD.

## 2019-02-18 NOTE — DISCHARGE SUMMARY
Walter Aguirre MD   Physician   Psychiatry   H&P   Signed   Date of Service:  2/13/2019 10:03 AM   Creation Time:  2/13/2019 11:31 AM                       []Hide copied text    []Rupa for details      Admitted:     02/11/2019      HISTORY OF PRESENT ILLNESS:  Ms. Nona Finley is a 38-year-old year old woman admitted to [facility] Psychiatry Unit (and overflow) on 2/11/2019.  She was admitted to the hospital due to hearing voices and having auditory and visual hallucinations with voices telling her to kill her youngest child, who is a daughter age 2.  She became quite distressed by this symptom and sought help.  She notes that the voices worsened or recurred when she stopped taking her medication several days ago.  She apparently stopped taking her medication due to a combination of difficulty having it delivered in the bad weather and snow, as well as having a belief it was contaminated with marijuana.  She was due for an Abilify Maintena shot that was not given, and went off of her oral neuroleptic Clozaril 100 mg per day.      She notes voices got worse without this.      Schizophrenia had onset at about age 16.  She currently has a petition for commitment and is on a provisional discharge, and a Reyes order that lists Clozaril, Invega, Abilify, and Haldol in all forms.      Her diagnosis has been schizoaffective illness, bipolar type.      VITAL SIGNS:  Show temperature 98.7, heart rate 94, respirations 16, blood pressure slightly low at 96/67.      MENTAL STATUS EXAMINATION:  Shows an alert woman.  Affect is fair.  Eye contact is good.  Motor behavior is normal and there is no cogwheel rigidity, tremor, or abnormal movements.  She does note the voices listed above and states that she is sorry she went off her medications.  She does not appear to be having delusions, and is having hallucinations as noted above, but they are a bit better with starting Clozaril again last  night.        DIAGNOSTIC ASSESSMENT:  Schizoaffective illness, bipolar type.      PLAN:  Plan is to gradually taper up on the Clozaril.  With her low blood pressure today, we will give her 37.5 tonight, she started with 25 last night.     She was titrated back onto Clozaril to 50mg, and to resume 100mg upon discharge.  Addendum: psychotic symptoms completely resolved.      Current Facility-Administered Medications:      acetaminophen (TYLENOL) tablet 650 mg, 650 mg, Oral, Q4H PRN, Valentino Moulton MD, 650 mg at 02/16/19 1308     alum & mag hydroxide-simethicone (MYLANTA ES/MAALOX  ES) suspension 30 mL, 30 mL, Oral, Q4H PRN, Valentino Moulton MD     bisacodyl (DULCOLAX) Suppository 10 mg, 10 mg, Rectal, Daily PRN, Valentino Moulton MD     cloZAPine (CLOZARIL) tablet 50 mg, 50 mg, Oral, At Bedtime, Walter Aguirre MD, 50 mg at 02/17/19 2104     hydrOXYzine (ATARAX) tablet 25 mg, 25 mg, Oral, Q4H PRN, Valentino Moulton MD, 25 mg at 02/12/19 1958     magnesium hydroxide (MILK OF MAGNESIA) suspension 30 mL, 30 mL, Oral, At Bedtime PRN, Valentino Moulton MD     OLANZapine (zyPREXA) tablet 10 mg, 10 mg, Oral, Q2H PRN **OR** OLANZapine (zyPREXA) injection 10 mg, 10 mg, Intramuscular, Q2H PRN, Valentino Moulton MD     traZODone (DESYREL) tablet 50 mg, 50 mg, Oral, At Bedtime PRN, Valentino Moulton MD, 50 mg at 02/17/19 2104  Recent Results (from the past 336 hour(s))   HCG qualitative urine    Collection Time: 02/11/19  5:43 PM   Result Value Ref Range    HCG Qual Urine Negative NEG^Negative   Drug abuse screen 6 urine (tox)    Collection Time: 02/11/19  5:43 PM   Result Value Ref Range    Amphetamine Qual Urine Negative NEG^Negative    Barbiturates Qual Urine Negative NEG^Negative    Benzodiazepine Qual Urine Negative NEG^Negative    Cannabinoids Qual Urine Negative NEG^Negative    Cocaine Qual Urine Negative NEG^Negative    Ethanol Qual Urine Negative NEG^Negative    Opiates Qualitative Urine Negative  NEG^Negative   CBC with platelets differential    Collection Time: 02/12/19  7:39 PM   Result Value Ref Range    WBC 6.1 4.0 - 11.0 10e9/L    RBC Count 4.20 3.8 - 5.2 10e12/L    Hemoglobin 12.8 11.7 - 15.7 g/dL    Hematocrit 39.0 35.0 - 47.0 %    MCV 93 78 - 100 fl    MCH 30.5 26.5 - 33.0 pg    MCHC 32.8 31.5 - 36.5 g/dL    RDW 13.0 10.0 - 15.0 %    Platelet Count 304 150 - 450 10e9/L    Diff Method Automated Method     % Neutrophils 49.7 %    % Lymphocytes 38.1 %    % Monocytes 7.2 %    % Eosinophils 4.3 %    % Basophils 0.5 %    % Immature Granulocytes 0.2 %    Nucleated RBCs 0 0 /100    Absolute Neutrophil 3.0 1.6 - 8.3 10e9/L    Absolute Lymphocytes 2.3 0.8 - 5.3 10e9/L    Absolute Monocytes 0.4 0.0 - 1.3 10e9/L    Absolute Eosinophils 0.3 0.0 - 0.7 10e9/L    Absolute Basophils 0.0 0.0 - 0.2 10e9/L    Abs Immature Granulocytes 0.0 0 - 0.4 10e9/L    Absolute Nucleated RBC 0.0    HCG qualitative urine    Collection Time: 02/12/19  9:18 PM   Result Value Ref Range    HCG Qual Urine Negative NEG^Negative   Comprehensive metabolic panel    Collection Time: 02/13/19  8:51 AM   Result Value Ref Range    Sodium 139 133 - 144 mmol/L    Potassium 4.0 3.4 - 5.3 mmol/L    Chloride 105 94 - 109 mmol/L    Carbon Dioxide 28 20 - 32 mmol/L    Anion Gap 6 3 - 14 mmol/L    Glucose 95 70 - 99 mg/dL    Urea Nitrogen 18 7 - 30 mg/dL    Creatinine 0.53 0.52 - 1.04 mg/dL    GFR Estimate >90 >60 mL/min/[1.73_m2]    GFR Estimate If Black >90 >60 mL/min/[1.73_m2]    Calcium 8.7 8.5 - 10.1 mg/dL    Bilirubin Total 0.4 0.2 - 1.3 mg/dL    Albumin 3.4 3.4 - 5.0 g/dL    Protein Total 7.8 6.8 - 8.8 g/dL    Alkaline Phosphatase 56 40 - 150 U/L    ALT 13 0 - 50 U/L    AST 15 0 - 45 U/L   Lipid panel    Collection Time: 02/13/19  8:51 AM   Result Value Ref Range    Cholesterol 164 <200 mg/dL    Triglycerides 63 <150 mg/dL    HDL Cholesterol 59 >49 mg/dL    LDL Cholesterol Calculated 92 <100 mg/dL    Non HDL Cholesterol 105 <130 mg/dL   TSH with  free T4 reflex and/or T3 as indicated    Collection Time: 02/13/19  8:51 AM   Result Value Ref Range    TSH 0.45 0.40 - 4.00 mU/L   Folate    Collection Time: 02/13/19  8:51 AM   Result Value Ref Range    Folate 17.1 >5.4 ng/mL   Hematocrit    Collection Time: 02/13/19  8:51 AM   Result Value Ref Range    Hematocrit 38.2 35.0 - 47.0 %   Vitamin B12    Collection Time: 02/13/19  8:51 AM   Result Value Ref Range    Vitamin B12 405 193 - 986 pg/mL   EKG 12-lead, complete    Collection Time: 02/15/19 10:41 AM   Result Value Ref Range    Interpretation ECG Click View Image link to view waveform and result    EKG 12-lead, complete    Collection Time: 02/16/19  6:41 AM   Result Value Ref Range    Interpretation ECG Click View Image link to view waveform and result              DELANO MENDEZ MD

## 2019-03-19 ENCOUNTER — HOSPITAL ENCOUNTER (INPATIENT)
Facility: CLINIC | Age: 39
LOS: 7 days | Discharge: HOME | DRG: 885 | End: 2019-03-26
Attending: FAMILY MEDICINE | Admitting: PSYCHIATRY & NEUROLOGY
Payer: COMMERCIAL

## 2019-03-19 DIAGNOSIS — Z91.148 PAIN MEDICATION AGREEMENT BROKEN: ICD-10-CM

## 2019-03-19 DIAGNOSIS — R45.851 SUICIDAL IDEATION: ICD-10-CM

## 2019-03-19 DIAGNOSIS — R44.3 HALLUCINATIONS: ICD-10-CM

## 2019-03-19 DIAGNOSIS — F25.0 SCHIZOAFFECTIVE DISORDER, BIPOLAR TYPE (H): ICD-10-CM

## 2019-03-19 DIAGNOSIS — F20.9 SCHIZOPHRENIA, UNSPECIFIED TYPE (H): ICD-10-CM

## 2019-03-19 DIAGNOSIS — Z91.148 NONCOMPLIANCE WITH MEDICATION REGIMEN: ICD-10-CM

## 2019-03-19 PROBLEM — F29 PSYCHOSIS (H): Status: ACTIVE | Noted: 2018-05-25

## 2019-03-19 LAB
ALBUMIN SERPL-MCNC: 3.5 G/DL (ref 3.4–5)
ALP SERPL-CCNC: 58 U/L (ref 40–150)
ALT SERPL W P-5'-P-CCNC: 22 U/L (ref 0–50)
AMPHETAMINES UR QL SCN: NEGATIVE
ANION GAP SERPL CALCULATED.3IONS-SCNC: 10 MMOL/L (ref 3–14)
AST SERPL W P-5'-P-CCNC: 17 U/L (ref 0–45)
BARBITURATES UR QL: NEGATIVE
BASOPHILS # BLD AUTO: 0 10E9/L (ref 0–0.2)
BASOPHILS NFR BLD AUTO: 0.6 %
BENZODIAZ UR QL: NEGATIVE
BILIRUB SERPL-MCNC: 0.3 MG/DL (ref 0.2–1.3)
BUN SERPL-MCNC: 11 MG/DL (ref 7–30)
CALCIUM SERPL-MCNC: 8.8 MG/DL (ref 8.5–10.1)
CANNABINOIDS UR QL SCN: NEGATIVE
CHLORIDE SERPL-SCNC: 109 MMOL/L (ref 94–109)
CO2 SERPL-SCNC: 24 MMOL/L (ref 20–32)
COCAINE UR QL: NEGATIVE
CREAT SERPL-MCNC: 0.7 MG/DL (ref 0.52–1.04)
DIFFERENTIAL METHOD BLD: NORMAL
EOSINOPHIL # BLD AUTO: 0.1 10E9/L (ref 0–0.7)
EOSINOPHIL NFR BLD AUTO: 1.6 %
ERYTHROCYTE [DISTWIDTH] IN BLOOD BY AUTOMATED COUNT: 12.8 % (ref 10–15)
ETHANOL UR QL SCN: NEGATIVE
GFR SERPL CREATININE-BSD FRML MDRD: >90 ML/MIN/{1.73_M2}
GLUCOSE SERPL-MCNC: 118 MG/DL (ref 70–99)
HCG UR QL: NEGATIVE
HCT VFR BLD AUTO: 39.1 % (ref 35–47)
HGB BLD-MCNC: 13.2 G/DL (ref 11.7–15.7)
IMM GRANULOCYTES # BLD: 0 10E9/L (ref 0–0.4)
IMM GRANULOCYTES NFR BLD: 0.2 %
LYMPHOCYTES # BLD AUTO: 2.1 10E9/L (ref 0.8–5.3)
LYMPHOCYTES NFR BLD AUTO: 34.2 %
MCH RBC QN AUTO: 31 PG (ref 26.5–33)
MCHC RBC AUTO-ENTMCNC: 33.8 G/DL (ref 31.5–36.5)
MCV RBC AUTO: 92 FL (ref 78–100)
MISCELLANEOUS TEST: NORMAL
MONOCYTES # BLD AUTO: 0.5 10E9/L (ref 0–1.3)
MONOCYTES NFR BLD AUTO: 7.4 %
NEUTROPHILS # BLD AUTO: 3.5 10E9/L (ref 1.6–8.3)
NEUTROPHILS NFR BLD AUTO: 56 %
NRBC # BLD AUTO: 0 10*3/UL
NRBC BLD AUTO-RTO: 0 /100
OPIATES UR QL SCN: NEGATIVE
PLATELET # BLD AUTO: 332 10E9/L (ref 150–450)
POTASSIUM SERPL-SCNC: 3.7 MMOL/L (ref 3.4–5.3)
PROT SERPL-MCNC: 7.9 G/DL (ref 6.8–8.8)
RBC # BLD AUTO: 4.26 10E12/L (ref 3.8–5.2)
SODIUM SERPL-SCNC: 143 MMOL/L (ref 133–144)
WBC # BLD AUTO: 6.3 10E9/L (ref 4–11)

## 2019-03-19 PROCEDURE — 93005 ELECTROCARDIOGRAM TRACING: CPT | Performed by: STUDENT IN AN ORGANIZED HEALTH CARE EDUCATION/TRAINING PROGRAM

## 2019-03-19 PROCEDURE — 25000132 ZZH RX MED GY IP 250 OP 250 PS 637: Performed by: STUDENT IN AN ORGANIZED HEALTH CARE EDUCATION/TRAINING PROGRAM

## 2019-03-19 PROCEDURE — 81025 URINE PREGNANCY TEST: CPT | Performed by: FAMILY MEDICINE

## 2019-03-19 PROCEDURE — 80320 DRUG SCREEN QUANTALCOHOLS: CPT | Performed by: FAMILY MEDICINE

## 2019-03-19 PROCEDURE — 36415 COLL VENOUS BLD VENIPUNCTURE: CPT | Performed by: STUDENT IN AN ORGANIZED HEALTH CARE EDUCATION/TRAINING PROGRAM

## 2019-03-19 PROCEDURE — 93010 ELECTROCARDIOGRAM REPORT: CPT | Performed by: INTERNAL MEDICINE

## 2019-03-19 PROCEDURE — 99285 EMERGENCY DEPT VISIT HI MDM: CPT | Mod: 25 | Performed by: FAMILY MEDICINE

## 2019-03-19 PROCEDURE — 85025 COMPLETE CBC W/AUTO DIFF WBC: CPT | Performed by: FAMILY MEDICINE

## 2019-03-19 PROCEDURE — 85025 COMPLETE CBC W/AUTO DIFF WBC: CPT | Performed by: STUDENT IN AN ORGANIZED HEALTH CARE EDUCATION/TRAINING PROGRAM

## 2019-03-19 PROCEDURE — 80053 COMPREHEN METABOLIC PANEL: CPT | Performed by: STUDENT IN AN ORGANIZED HEALTH CARE EDUCATION/TRAINING PROGRAM

## 2019-03-19 PROCEDURE — 90791 PSYCH DIAGNOSTIC EVALUATION: CPT

## 2019-03-19 PROCEDURE — 12400001 ZZH R&B MH UMMC

## 2019-03-19 PROCEDURE — 80342 ANTIPSYCHOTICS NOS 1-3: CPT | Performed by: STUDENT IN AN ORGANIZED HEALTH CARE EDUCATION/TRAINING PROGRAM

## 2019-03-19 PROCEDURE — 93005 ELECTROCARDIOGRAM TRACING: CPT | Performed by: PHYSICIAN ASSISTANT

## 2019-03-19 PROCEDURE — 84999 UNLISTED CHEMISTRY PROCEDURE: CPT | Performed by: STUDENT IN AN ORGANIZED HEALTH CARE EDUCATION/TRAINING PROGRAM

## 2019-03-19 PROCEDURE — 99285 EMERGENCY DEPT VISIT HI MDM: CPT | Mod: Z6 | Performed by: FAMILY MEDICINE

## 2019-03-19 PROCEDURE — 80307 DRUG TEST PRSMV CHEM ANLYZR: CPT | Performed by: FAMILY MEDICINE

## 2019-03-19 RX ORDER — TRAZODONE HYDROCHLORIDE 50 MG/1
50 TABLET, FILM COATED ORAL
Status: DISCONTINUED | OUTPATIENT
Start: 2019-03-19 | End: 2019-03-26 | Stop reason: HOSPADM

## 2019-03-19 RX ORDER — ACETAMINOPHEN 325 MG/1
650 TABLET ORAL EVERY 4 HOURS PRN
Status: DISCONTINUED | OUTPATIENT
Start: 2019-03-19 | End: 2019-03-26 | Stop reason: HOSPADM

## 2019-03-19 RX ORDER — OLANZAPINE 10 MG/2ML
10 INJECTION, POWDER, FOR SOLUTION INTRAMUSCULAR
Status: DISCONTINUED | OUTPATIENT
Start: 2019-03-19 | End: 2019-03-26 | Stop reason: HOSPADM

## 2019-03-19 RX ORDER — OLANZAPINE 10 MG/1
10 TABLET ORAL
Status: DISCONTINUED | OUTPATIENT
Start: 2019-03-19 | End: 2019-03-26 | Stop reason: HOSPADM

## 2019-03-19 RX ORDER — CLOZAPINE 25 MG/1
12.5 TABLET ORAL AT BEDTIME
Status: DISCONTINUED | OUTPATIENT
Start: 2019-03-19 | End: 2019-03-20

## 2019-03-19 RX ORDER — HYDROXYZINE HYDROCHLORIDE 25 MG/1
25 TABLET, FILM COATED ORAL EVERY 4 HOURS PRN
Status: DISCONTINUED | OUTPATIENT
Start: 2019-03-19 | End: 2019-03-26 | Stop reason: HOSPADM

## 2019-03-19 RX ADMIN — Medication 12.5 MG: at 21:09

## 2019-03-19 ASSESSMENT — ACTIVITIES OF DAILY LIVING (ADL)
TOILETING: 0-->INDEPENDENT
SWALLOWING: 0-->SWALLOWS FOODS/LIQUIDS WITHOUT DIFFICULTY
BATHING: 0-->INDEPENDENT
COGNITION: 0 - NO COGNITION ISSUES REPORTED
DRESS: 0-->INDEPENDENT
RETIRED_EATING: 0-->INDEPENDENT
AMBULATION: 0-->INDEPENDENT
FALL_HISTORY_WITHIN_LAST_SIX_MONTHS: NO
TRANSFERRING: 0-->INDEPENDENT
RETIRED_COMMUNICATION: 0-->UNDERSTANDS/COMMUNICATES WITHOUT DIFFICULTY

## 2019-03-19 ASSESSMENT — ENCOUNTER SYMPTOMS
SEIZURES: 0
ACTIVITY CHANGE: 0
ARTHRALGIAS: 0
HALLUCINATIONS: 1
DYSURIA: 0
ABDOMINAL PAIN: 0
DYSPHORIC MOOD: 1
FEVER: 0
WOUND: 0
SHORTNESS OF BREATH: 0
BRUISES/BLEEDS EASILY: 0
WEAKNESS: 0
DECREASED CONCENTRATION: 1
APPETITE CHANGE: 0
TROUBLE SWALLOWING: 0

## 2019-03-19 ASSESSMENT — MIFFLIN-ST. JEOR: SCORE: 1369.42

## 2019-03-19 NOTE — PLAN OF CARE
"ADMISSION:     Pt was admitted to station 20 at about 5:30 Pm 3/19/19 for increasing visual hallucinations and auditory hallucinations telling her to kill herself and her young child. Pt lives at home with her  and their 5 children. Pt is admitted voluntarily, but has a history of commitment with Eric on a provisional discharge (per chart review). Pt denies having any suicidal thoughts herself, but states that it is just the voices putting in her head. She has never wished to be dead or had suicidal thoughts on her own. She states she has had auditory hallucinations since age 16.    Pt states she has not taken her clozaril since Thursday. Her last injection of Abilify was 3/13. Pt states that when she does not take the medications, she does not sleep well. She endorses \"worrying all the time.\"   "

## 2019-03-19 NOTE — PHARMACY-ADMISSION MEDICATION HISTORY
Admission medication history for the March 19, 2019 admission has been completed by pharmacy.     Interview sources:  Re[facility]Entry ACT Team ([phone]) and the patient     Reliability of source: Good, ACT team manages medications     Medication compliance: variable     Additional medication history information:   - No changes made to PTA med list.   - Abilify Maintena 400mg IM Q28 days: Confirmed with Re-Entry ACT team ([phone]) last dose on 3/13/19.   - Clozapine 100mg PO HS: Patient states stopped taking it on 3/14/19, which is consistent with ACT team report. ACT team RN goes to house nightly to give clozapine dose; has been verbally refusing since 3/14/19 but  believes she was cheeking prior to this.      Prior to Admission Medication List:  Prior to Admission medications    Medication Sig Last Dose Taking? Auth Provider   ARIPiprazole ER (ABILIFY MAINTENA) 400 MG extended release inj syringe Inject 400 mg into the muscle every 28 days  3/13/19 Yes Aly Lane MD   cloZAPine (CLOZARIL) 100 MG tablet Take 1 tablet (100 mg) by mouth At Bedtime at least since 3/14/19 at Unknown time Yes Aly Lane MD     Time spent: 20 minutes    Medication history completed by:   Karrie Payton, PharmD, [facility]  [facility]  Available daily from 1-9 PM: phone [phone], ascom *[phone], pager [phone]

## 2019-03-19 NOTE — H&P
"History and Physical    Nona Finley MRN# [id number]   Age: 38 year old YOB: 1980     Date of Admission:  3/19/2019        Primary Outpatient Psychiatrist: Dr. Gaviria, [facility] team with [facility]   Primary Physician:  Alcira Duncan  Therapist: [facility] : Vicky (unknown last name)  Family Members: Spouse, 6 children         Chief Complaint:   \"I've been hearing voices\"         History of Present Illness:   History obtained from patient interview, chart review.  Pt interviewed on 3/19/19 at approximately 4:00 pm.    This patient is a 38 year old female with a history of schizoaffective  who presented on 03/19/2019 due to increasing SI and AH. Patient has been declining her clozaril for the last 6 days because she believes it is tainted with a \"chemical.\" She believes that people are doing this, but did not state any specific person. She states that she has always felt suspicious of her medications, but \"forces myself to take it.\" Since she has been off the medication, she has had increasing AH telling her to jump off a bridge and kill herself, and she \"sees people with knives.\" She states her voices first started in 2011. At that time, the voices told her to jump off a bridge and into a river, but she was stopped by her . She has previously had AH telling her to hurt her children but she has never acted on these hallucinations and states that this is against her values and held beliefs. She was hospitalized most recently from 2/11/19-2/18/19 for these symptoms also in the setting of poor clozapine medication compliance.    She has been taking Clozaril for about 3 years and states that it has been helpful and she has experienced complete cessation of the voices for a time when taking it consistently. Her last Abilify injection was 1 week ago. Stated that she has taken Risperdal and Haldol in the past. Haldol was helpful but was stopped by her provider for an unknown " reason.    Her mood has been very depressed for the past year. She was taking Prozac but stopped a number of years ago. She endorses recent decreased energy, poor sleep (5 hr/night), difficulty concentrating, and decreased appetite and food intake. She says that these symptoms are worse when she is not taking Clozaril.    In the emergency department, she was medically evaluated. She received no medications. She endorsed the above symptoms and was agreeable to admission. She was medically cleared for admission to inpatient psychiatric unit.    The risks, benefits, alternatives and side effects have been discussed and are understood by the patient and other caregivers.       Psychiatric Review of Systems:   Depressive Sx: Low mood, Decreased appetite, Decreased energy and Concentration issues  Manic Sx: none  Psychosis: AH VH paranoia  Anxiety Sx: worried about going to [facility] hospital  PTSD: none  ADHD: none  Antisocial: none  ASD: none  ED: none  Cluster B: none         Medical Review of Systems:   The Review of Systems is negative other than noted in the HPI         Psychiatric History:     Prior diagnoses:   - Schizoaffective disorder, bipolar type  - Bipolar 1 disorder  - Postpartum depression    Hospitalizations:   - 2/11/19-2/18/19 at [facility] for psychosis with command AH, 5/26/18-5/29/18 for psychosis, and 3/21/17-4/14/17 for AH, VH, and SI due to medication non-compliance.    Suicide attempts: one reported attempt of walking towards a river with the plan of jumping in and dying, stopped by     Self-injurious behavior: none    Violence: none    ECT/TMS: Received series of ECT in 2012    Past medications: Prozac, Risperdal, Haldol         Substance Use History:     Nicotine: none    Alcohol: none           Cannabis: none    Others: none    Prior CD treatments: none         Past Medical History:     Past Medical History:   Diagnosis Date     Bipolar 1 disorder (H)      Bipolar affective disorder, depressed,  severe, with psychotic behavior (H)      NONSPECIFIC MEDICAL HISTORY     h/o +PPD. Neg CXR 2006     Postpartum depression 8/18/2011     Schizo-affective schizophrenia (H)      Tuberculosis      Varicosities      Past Surgical History:   Procedure Laterality Date     NO HISTORY OF SURGERY       NO HISTORY OF SURGERY  06/13/2013    Per patient reported this     No History of seizures or head trauma.       Allergies:    No Known Allergies       Medications:     No current outpatient medications on file.           Social History:     Upbringing: Grew up in [place], left in [place]. First went to [place] before coming to [place].     Family/Relationships:  in the [place] in [place]. Has 6 children (oldest 15, youngest 2.5). Has a daughter with ASD who is non-verbal.    Living Situation: Lives in [place] with her family. Her mother lives near by and visits regularly.    Education: Never finished highschool.    Occupation: Stays at home.    Legal: Never arrested. Currently on a stay of committment    Abuse/Trauma: No history of abuse.    : None           Family History:   No history of suicides.  No history of bipolar disorder. Brother has possibly schizophrenia.  No history of chemical dependency.     Family History   Problem Relation Age of Onset     Respiratory Father         asthma     Asthma Father      Cerebrovascular Disease Mother      Diabetes Mother      Neurologic Disorder Brother         mental health problem?     Neurologic Disorder Sister         seizures (half sister)     Respiratory Paternal Grandfather         asthma     Respiratory Sister         asthma     Respiratory Brother         asthma     Neurologic Disorder Daughter         autism      Hypertension Maternal Grandmother      Neurologic Disorder Sister         seizures     Diabetes Maternal Grandfather      Coronary Artery Disease Son             Labs:     Recent Results (from the past 24 hour(s))   Drug abuse screen 6 urine  "(chem dep) ([facility])    Collection Time: 03/19/19  1:50 PM   Result Value Ref Range    Amphetamine Qual Urine Negative NEG^Negative    Barbiturates Qual Urine Negative NEG^Negative    Benzodiazepine Qual Urine Negative NEG^Negative    Cannabinoids Qual Urine Negative NEG^Negative    Cocaine Qual Urine Negative NEG^Negative    Ethanol Qual Urine Negative NEG^Negative    Opiates Qualitative Urine Negative NEG^Negative   HCG qualitative urine    Collection Time: 03/19/19  1:50 PM   Result Value Ref Range    HCG Qual Urine Negative NEG^Negative   CBC with platelets differential    Collection Time: 03/19/19  4:01 PM   Result Value Ref Range    WBC 6.3 4.0 - 11.0 10e9/L    RBC Count 4.26 3.8 - 5.2 10e12/L    Hemoglobin 13.2 11.7 - 15.7 g/dL    Hematocrit 39.1 35.0 - 47.0 %    MCV 92 78 - 100 fl    MCH 31.0 26.5 - 33.0 pg    MCHC 33.8 31.5 - 36.5 g/dL    RDW 12.8 10.0 - 15.0 %    Platelet Count 332 150 - 450 10e9/L    Diff Method Automated Method     % Neutrophils 56.0 %    % Lymphocytes 34.2 %    % Monocytes 7.4 %    % Eosinophils 1.6 %    % Basophils 0.6 %    % Immature Granulocytes 0.2 %    Nucleated RBCs 0 0 /100    Absolute Neutrophil 3.5 1.6 - 8.3 10e9/L    Absolute Lymphocytes 2.1 0.8 - 5.3 10e9/L    Absolute Monocytes 0.5 0.0 - 1.3 10e9/L    Absolute Eosinophils 0.1 0.0 - 0.7 10e9/L    Absolute Basophils 0.0 0.0 - 0.2 10e9/L    Abs Immature Granulocytes 0.0 0 - 0.4 10e9/L    Absolute Nucleated RBC 0.0             Psychiatric Examination:     /72   Pulse 80   Temp 98.7  F (37.1  C) (Oral)   Resp 16   Ht 1.6 m (5' 3\")   Wt 73 kg (161 lb)   SpO2 100%   BMI 28.52 kg/m      Appearance:  awake, alert, appeared as age stated and well groomed  Attitude:  cooperative  Eye Contact:  fair  Mood:  \"depressed\"  Affect:  mood congruent, tearful and nonreactive  Speech:  clear, coherent  Psychomotor Behavior:  no evidence of tardive dyskinesia, dystonia, or tics  Thought Process:  linear and illogical at times when " "discussing her medications  Associations:  no loose associations  Thought Content:  no evidence of suicidal ideation or homicidal ideation, auditory hallucinations present and delusional/paranoid about medication as described in HPI  Insight:  fair  Judgment:  poor  Oriented to:  time, person, and place  Attention Span and Concentration:  intact  Recent and Remote Memory:  intact  Language:  English with appropriate syntax and vocabulary  Fund of Knowledge: appropriate  Muscle Strength and Tone: normal  Gait and Station: Normal         Physical Exam:     See ED assessment note by Jose Guadalupe Jack on 03/19/2019          Assessment   Nona Finley is a 38 year old female with a history of schizoaffective disorder, bipolar type on clozapine and abilify who presented to the ED on 03/19/2019 due to AH telling her to jump off a bridge since declining her clozapine for the past 6 days due to delusions that the medication is tainted with \"chemicals.\" The patient's last psychiatric hospitalization was in Feburary 2019.  The patient is currently followed by Dr. Gaviria with [facility] and does not follow with a therapist. Family history is notable for schizophrenia in her brother. Current psychosocial stressors previous diagnosis of schizoaffective disorder, immigration from [place] during the war, 6 children including a daughter with ASD. The patient denies drug abuse or self injurious behaviors. The MSE is notable for a well-groomed woman appearing of stated age who is tearful with depressed mood and having auditory hallucinations. The patient's reported symptoms of depressed mood, decreased energy, decreased appetite, poor concentration coupled with AH and delusions when not taking medication are consistent with historical diagnosis of schizoaffective disorder. The patient's delusions of medication tainting interfere with her ability to adhere with the treatment plan as she is reluctant to re-start clozapine. Since " she has not taken Clozapine for 6 days, will re-start at low dose of 12.5mg tonight. Given severe command hallucinations, patient warrants inpatient psychiatric hospitalization to maintain her safety and resolve her symptoms. Disposition pending clinical stabilization, medication optimization and development of an appropriate discharge plan.    Suicide Risk Assessment:  Heightened. At the time of this assessment, Nona Finley was determined to be an immediate danger to herself because of command hallucinations to kill herself.  Preventative factors: social supports, children, , stable housing, history of seeking help, motivated to seek treatment.  Acute risk factors: Severity of symptoms, SI, psychosis  Chronic Risk Factors: Past suicide attempts, Psychiatric diagnosis: Schizoaffective Disorder.    Reason for inpatient hospitalization is severe AH. Disposition pending clinical stabilization, medication optimization, and formulation of safe discharge plan.          Plan   Admit to Unit 20 with Attending Physician Dr. Farzana Santoyo  Legal Status: Voluntary, has a stay of commitment active    Safety Assessment:   Behavioral Orders   Procedures     Code 1 - Restrict to Unit     Routine Programming     As clinically indicated     Status 15     Every 15 minutes.     Suicide precautions     Patients on Suicide Precautions should have a Combination Diet ordered that includes a Diet selection(s) AND a Behavioral Tray selection for Safe Tray - with utensils, or Safe Tray - NO utensils       Pt has not required locked seclusion or restraints in the past 24 hours to maintain safety, please refer to RN documentation for further details.    Precautions: none    Principal psychiatric diagnosis:   # Schizoaffective disorder, bipolar type    Secondary psychiatric diagnoses:   None    Medications:   Outpatient medications held:  none    Outpatient medications continued:   - Re-start Clozapine at 12.5 mg due to medication  non-adherance    New medications initiated:   - IM/PO Olanzapine 10mg Q2H PRN aggression/agitation  - Hydroxyzine 25mg Q4H PRN  - Trazodone 50mg at bedtime PRN    - Patient will be treated in therapeutic milieu with appropriate individual and group therapies.  - medications as above    Medical diagnoses:   # GI/FEN  - Maalox and milk of magnesia PRN    # Neuro  - APAP PRN    Consult: none  Labs: Urine pregnancy test negative, Utox negative, CBC pending     Dispo: unknown pending medication management and clinical stabilization    -------------------------------------------------------  Kee Salazar, MS3    To be seen and staffed by Dr. Santoyo in the AM   Yefri Way MD  Psychiatry PGY-1    For attending attestation statement, see progress note dated March 20, 2019.   Farzana Santoyo MD

## 2019-03-19 NOTE — ED PROVIDER NOTES
History     Chief Complaint   Patient presents with     Hallucinations     stopped taking her meds s few days ago and now she's having auditory and visuaal hallucinations     HPI  Nona [place] is a 38 year old female who presents emergency room with increasing suicidal thoughts auditory hallucinations.  Patient has history of schizoaffective disorder.  Has on a stay of commitment currently at this point.  Patient lives at home with her  along with this has 6 children.  Patient is followed by the [facility] team also for medications etc.  Patient does get a monthly Abilify shot which she got a week ago.  Over the last 6 days patient has been refusing her medication primarily Clazuril.  Patient states she thinks it could be tainted with some other chemical she states last time she took it she felt very sleepy therefore she is does not want to take it anymore.  Since then over the last 6 days she has been having increasing auditory hallucinations telling her to kill herself thoughts about jumping off a bridge.  She sees people with a knife also.  Patient now sent here for admission.  Patient does agree at this point.  In the past patient has had auditory hallucinations telling her to hurt her children she states she is never acted on this at this point.  There is a heightened safety concern with her currently with her med noncompliant.  Patient does agree with admission though.  Denies any other physical complaints at this point.    I have reviewed the Medications, Allergies, Past Medical and Surgical History, and Social History in the Epic system.    Review of Systems   Constitutional: Negative for activity change, appetite change and fever.   HENT: Negative for trouble swallowing.    Eyes: Negative for visual disturbance.   Respiratory: Negative for shortness of breath.    Cardiovascular: Negative for chest pain.   Gastrointestinal: Negative for abdominal pain.   Genitourinary: Negative for dysuria.  "  Musculoskeletal: Negative for arthralgias.   Skin: Negative for wound.   Allergic/Immunologic: Negative for immunocompromised state.   Neurological: Negative for seizures, syncope and weakness.   Hematological: Does not bruise/bleed easily.   Psychiatric/Behavioral: Positive for decreased concentration, dysphoric mood, hallucinations and suicidal ideas.   All other systems reviewed and are negative.      Physical Exam   BP: 116/66  Pulse: 89  Temp: 98.4  F (36.9  C)  Resp: 18  Height: 160 cm (5' 3\")  Weight: 74.8 kg (165 lb)  SpO2: 100 %      Physical Exam   Constitutional: She is oriented to person, place, and time. She appears well-developed and well-nourished. She appears distressed.   Patient is cooperative here in the ER.  Flattened affect at this point is not threatening otherwise does agree with admission.  Admits to hallucinations reported.   HENT:   Head: Normocephalic and atraumatic.   Eyes: EOM are normal. Pupils are equal, round, and reactive to light. No scleral icterus.   Neck: Normal range of motion. Neck supple.   Cardiovascular: Normal rate.   Pulmonary/Chest: No respiratory distress.   Abdominal: There is no guarding.   Musculoskeletal: She exhibits no deformity.   Neurological: She is alert and oriented to person, place, and time.   Nonfocal   Skin: Skin is warm and dry. Capillary refill takes less than 2 seconds. No rash noted. She is not diaphoretic. No erythema. No pallor.   Psychiatric:   Somewhat flat and withdrawn sad affect noted here in the ER is labile tearful.  Admits to suicidal ideations auditory command hallucinations noted as reported above thoughts of jumping off bridge.  Patient also reports seeing people with knives but not currently.   Nursing note and vitals reviewed.      ED Course        Procedures        Patient evaluated here in the ER at this point she is cooperative.  We did order a drug screen along with pregnancy test.  I plan at this point she was seen by our  " and agree to the plan at this point to admit the patient to the hospital she is currently voluntary.    Labs ordered with cbc with hx of clozaril use also.      Critical Care time:  none             Labs Ordered and Resulted from Time of ED Arrival Up to the Time of Departure from the ED   DRUG ABUSE SCREEN 6 CHEM DEP URINE ([facility])   HCG QUALITATIVE URINE     Results for orders placed or performed during the hospital encounter of 03/19/19   Drug abuse screen 6 urine (chem dep) ([facility])   Result Value Ref Range    Amphetamine Qual Urine Negative NEG^Negative    Barbiturates Qual Urine Negative NEG^Negative    Benzodiazepine Qual Urine Negative NEG^Negative    Cannabinoids Qual Urine Negative NEG^Negative    Cocaine Qual Urine Negative NEG^Negative    Ethanol Qual Urine Negative NEG^Negative    Opiates Qualitative Urine Negative NEG^Negative   HCG qualitative urine   Result Value Ref Range    HCG Qual Urine Negative NEG^Negative   CBC with platelets differential   Result Value Ref Range    WBC 6.3 4.0 - 11.0 10e9/L    RBC Count 4.26 3.8 - 5.2 10e12/L    Hemoglobin 13.2 11.7 - 15.7 g/dL    Hematocrit 39.1 35.0 - 47.0 %    MCV 92 78 - 100 fl    MCH 31.0 26.5 - 33.0 pg    MCHC 33.8 31.5 - 36.5 g/dL    RDW 12.8 10.0 - 15.0 %    Platelet Count 332 150 - 450 10e9/L    Diff Method Automated Method     % Neutrophils 56.0 %    % Lymphocytes 34.2 %    % Monocytes 7.4 %    % Eosinophils 1.6 %    % Basophils 0.6 %    % Immature Granulocytes 0.2 %    Nucleated RBCs 0 0 /100    Absolute Neutrophil 3.5 1.6 - 8.3 10e9/L    Absolute Lymphocytes 2.1 0.8 - 5.3 10e9/L    Absolute Monocytes 0.5 0.0 - 1.3 10e9/L    Absolute Eosinophils 0.1 0.0 - 0.7 10e9/L    Absolute Basophils 0.0 0.0 - 0.2 10e9/L    Abs Immature Granulocytes 0.0 0 - 0.4 10e9/L    Absolute Nucleated RBC 0.0    Clozapine and metabolite, please send for STAT 4-hour turn around at [facility] in [place]: Laboratory Miscellaneous Order   Result Value Ref Range    Miscellaneous Test          Specimen Received, Reordered and sent to Performing laboratory - Report to follow upon   completion.     Comprehensive metabolic panel   Result Value Ref Range    Sodium 143 133 - 144 mmol/L    Potassium 3.7 3.4 - 5.3 mmol/L    Chloride 109 94 - 109 mmol/L    Carbon Dioxide 24 20 - 32 mmol/L    Anion Gap 10 3 - 14 mmol/L    Glucose 118 (H) 70 - 99 mg/dL    Urea Nitrogen 11 7 - 30 mg/dL    Creatinine 0.70 0.52 - 1.04 mg/dL    GFR Estimate >90 >60 mL/min/[1.73_m2]    GFR Estimate If Black >90 >60 mL/min/[1.73_m2]    Calcium 8.8 8.5 - 10.1 mg/dL    Bilirubin Total 0.3 0.2 - 1.3 mg/dL    Albumin 3.5 3.4 - 5.0 g/dL    Protein Total 7.9 6.8 - 8.8 g/dL    Alkaline Phosphatase 58 40 - 150 U/L    ALT 22 0 - 50 U/L    AST 17 0 - 45 U/L   Send outs misc test   Result Value Ref Range    Test Name CLOZAPINE AND NORCLOZAPINE     Send Outs Misc Test Code 54     Send Outs Misc Test Specimen SERUM     Location Performed [facility]     Result PENDING     Normal Range for Send Outs Misc Test PENDING    EKG 12-lead, complete   Result Value Ref Range    Interpretation ECG Click View Image link to view waveform and result             Assessments & Plan (with Medical Decision Making)  Patient 38-year-old female history of schizophrenia presents now not taking her Clozaril for the last week.  Now increasing auditory hallucinations with suicidal ideations to jump off bridge. Patient cooperative and voluntary to be admitted.         I have reviewed the nursing notes.    I have reviewed the findings, diagnosis, plan and need for follow up with the patient.       Medication List      There are no discharge medications for this visit.         Final diagnoses:   Schizophrenia, unspecified type (H)   Suicidal ideation   Hallucinations   Noncompliance with medication regimen       3/19/2019   [facility], [place], EMERGENCY DEPARTMENT     Shree Jack MD  03/19/19 7480

## 2019-03-19 NOTE — ED NOTES
Pt reports that she quit taking her meds several days ago. She is now having auditory hallucinations

## 2019-03-20 LAB — INTERPRETATION ECG - MUSE: NORMAL

## 2019-03-20 PROCEDURE — 99222 1ST HOSP IP/OBS MODERATE 55: CPT | Mod: AI | Performed by: PSYCHIATRY & NEUROLOGY

## 2019-03-20 PROCEDURE — 25000132 ZZH RX MED GY IP 250 OP 250 PS 637

## 2019-03-20 PROCEDURE — 12400001 ZZH R&B MH UMMC

## 2019-03-20 RX ORDER — CLOZAPINE 25 MG/1
50 TABLET ORAL AT BEDTIME
Status: DISCONTINUED | OUTPATIENT
Start: 2019-03-21 | End: 2019-03-21

## 2019-03-20 RX ORDER — CLOZAPINE 100 MG/1
100 TABLET ORAL AT BEDTIME
Status: DISCONTINUED | OUTPATIENT
Start: 2019-03-23 | End: 2019-03-21

## 2019-03-20 RX ORDER — CLOZAPINE 25 MG/1
75 TABLET ORAL AT BEDTIME
Status: DISCONTINUED | OUTPATIENT
Start: 2019-03-22 | End: 2019-03-21

## 2019-03-20 ASSESSMENT — ACTIVITIES OF DAILY LIVING (ADL)
HYGIENE/GROOMING: INDEPENDENT
LAUNDRY: UNABLE TO COMPLETE
ORAL_HYGIENE: INDEPENDENT
DRESS: INDEPENDENT

## 2019-03-20 NOTE — PROGRESS NOTES
"    ----------------------------------------------------------------------------------------------------------  [facility], [place]   Psychiatric Progress Note  Hospital Day #1     Interim History:   The patient's care was discussed with the treatment team and chart notes were reviewed.    Sleep: 7 hrs  Scheduled Medications: taken as prescribed  PRNs: none  Staff Report: Patient continuing to have AH and SI. Patient would like to wear headscarf, primary team notified and order placed.      Patient Interview: Patient was interviewed in her room on Station 20. She continues to have AH and has seen no improvement since taking Clozapine last night. She does not want to act on the commands to kill herself, but she does say that she doesn't want to live anymore. She also said that she feels \"very sad.\" She does not want to be in the hospital, but knows that it is where she needs to be and will continue to work with her treatment team. She has not noticed any physical symptoms/side effects since re-starting clozapine.    She says that in the past her symptoms are best controlled when she was taking clozapine at the highest dose, but over the past month she has had AH and fear of the medication being tainted despite being at her highest dose of clozapine. Treatment team validated her fears and reassured her that the medications here are safe.     The risks, benefits, alternatives and side effects of any medication changes have been discussed and are understood by the patient and other caregivers.    Review of systems:     ROS was negative unless noted above.          Allergies:   No Known Allergies         Psychiatric Examination:   /72   Pulse 80   Temp 98.3  F (36.8  C) (Oral)   Resp 16   Ht 1.6 m (5' 3\")   Wt 73 kg (161 lb)   SpO2 100%   BMI 28.52 kg/m    Weight is 161 lbs 0 oz  Body mass index is 28.52 kg/m .    Appearance:  awake, alert, adequately groomed, dressed in hospital " "scrubs and personal headscarf, appeared as age stated and well groomed  Attitude:  cooperative and guarded  Eye Contact:  fair  Mood:  \"very sad\"  Affect:  mood congruent, intensity is flat, tearful and nonreactive  Speech:  clear, coherent  Psychomotor Behavior:  no evidence of tardive dyskinesia, dystonia, or tics  Thought Process:  linear, illogical only when discussing fears of medications   Associations:  no loose associations  Thought Content:  passive suicidal ideation present and auditory hallucinations present  Insight:  good  Judgment:  fair  Oriented to:  time, person, and place  Attention Span and Concentration:  intact  Recent and Remote Memory:  intact  Language: speaks English clearly and coherently  Fund of Knowledge: appropriate  Muscle Strength and Tone: normal  Gait and Station: Normal         Labs:     Recent Results (from the past 24 hour(s))   Drug abuse screen 6 urine (chem dep) ([facility])    Collection Time: 03/19/19  1:50 PM   Result Value Ref Range    Amphetamine Qual Urine Negative NEG^Negative    Barbiturates Qual Urine Negative NEG^Negative    Benzodiazepine Qual Urine Negative NEG^Negative    Cannabinoids Qual Urine Negative NEG^Negative    Cocaine Qual Urine Negative NEG^Negative    Ethanol Qual Urine Negative NEG^Negative    Opiates Qualitative Urine Negative NEG^Negative   HCG qualitative urine    Collection Time: 03/19/19  1:50 PM   Result Value Ref Range    HCG Qual Urine Negative NEG^Negative   CBC with platelets differential    Collection Time: 03/19/19  4:01 PM   Result Value Ref Range    WBC 6.3 4.0 - 11.0 10e9/L    RBC Count 4.26 3.8 - 5.2 10e12/L    Hemoglobin 13.2 11.7 - 15.7 g/dL    Hematocrit 39.1 35.0 - 47.0 %    MCV 92 78 - 100 fl    MCH 31.0 26.5 - 33.0 pg    MCHC 33.8 31.5 - 36.5 g/dL    RDW 12.8 10.0 - 15.0 %    Platelet Count 332 150 - 450 10e9/L    Diff Method Automated Method     % Neutrophils 56.0 %    % Lymphocytes 34.2 %    % Monocytes 7.4 %    % Eosinophils 1.6 % "    % Basophils 0.6 %    % Immature Granulocytes 0.2 %    Nucleated RBCs 0 0 /100    Absolute Neutrophil 3.5 1.6 - 8.3 10e9/L    Absolute Lymphocytes 2.1 0.8 - 5.3 10e9/L    Absolute Monocytes 0.5 0.0 - 1.3 10e9/L    Absolute Eosinophils 0.1 0.0 - 0.7 10e9/L    Absolute Basophils 0.0 0.0 - 0.2 10e9/L    Abs Immature Granulocytes 0.0 0 - 0.4 10e9/L    Absolute Nucleated RBC 0.0    Clozapine and metabolite, please send for STAT 4-hour turn around at [facility] in [place]: Laboratory Miscellaneous Order    Collection Time: 03/19/19  7:53 PM   Result Value Ref Range    Miscellaneous Test         Specimen Received, Reordered and sent to Performing laboratory - Report to follow upon   completion.     Comprehensive metabolic panel    Collection Time: 03/19/19  7:53 PM   Result Value Ref Range    Sodium 143 133 - 144 mmol/L    Potassium 3.7 3.4 - 5.3 mmol/L    Chloride 109 94 - 109 mmol/L    Carbon Dioxide 24 20 - 32 mmol/L    Anion Gap 10 3 - 14 mmol/L    Glucose 118 (H) 70 - 99 mg/dL    Urea Nitrogen 11 7 - 30 mg/dL    Creatinine 0.70 0.52 - 1.04 mg/dL    GFR Estimate >90 >60 mL/min/[1.73_m2]    GFR Estimate If Black >90 >60 mL/min/[1.73_m2]    Calcium 8.8 8.5 - 10.1 mg/dL    Bilirubin Total 0.3 0.2 - 1.3 mg/dL    Albumin 3.5 3.4 - 5.0 g/dL    Protein Total 7.9 6.8 - 8.8 g/dL    Alkaline Phosphatase 58 40 - 150 U/L    ALT 22 0 - 50 U/L    AST 17 0 - 45 U/L   Send outs misc test    Collection Time: 03/19/19  7:53 PM   Result Value Ref Range    Test Name CLOZAPINE AND NORCLOZAPINE     Send Outs Misc Test Code 54     Send Outs Misc Test Specimen SERUM     Location Performed [facility]     Result PENDING     Normal Range for Send Outs Misc Test PENDING    EKG 12-lead, complete    Collection Time: 03/19/19  8:40 PM   Result Value Ref Range    Interpretation ECG Click View Image link to view waveform and result         Assessment    Diagnostic Impression: Nona [place] is a 38 year old female with a history of schizoaffective  "disorder, bipolar type on clozapine and abilify who presented to the ED on 03/19/2019 due to AH telling her to jump off a bridge since declining her clozapine for the past 6 days due to delusions that the medication is tainted with \"chemicals.\" The patient's last psychiatric hospitalization was in Feburary 2019.  The patient is currently followed by Dr. Gaviria with [facility] and does not follow with a therapist. Family history is notable for schizophrenia in her brother. Current psychosocial stressors include previous diagnosis of schizoaffective disorder, immigration from [place] during the war, 6 children including a daughter with ASD. The patient denies drug abuse or self injurious behaviors. The MSE is notable for a well-groomed woman appearing of stated age who is tearful with depressed mood and having auditory hallucinations. The patient's reported symptoms of depressed mood, decreased energy, decreased appetite, poor concentration coupled with AH and delusions when not taking medication are consistent with historical diagnosis of schizoaffective disorder. The patient's delusions of medication tainting interfere with her ability to adhere with the treatment plan as she was reluctant to re-start clozapine. Since she has not taken Clozapine for 6 days, it was re-started at low dose of 12.5mg and titrated up with eventual goal of  mg daily. Given severe command hallucinations and SI, patient warrants inpatient psychiatric hospitalization to maintain her safety and resolve her symptoms.     Hospital course: Nona [place] was admitted due to command AH and SI to station 20 as a voluntary patient with attending, Dr. Farzana Santoyo on 3/19/19. Clozapine was re-started at 12.5 mg and titration was started (goal of PTA dose of 100mg).     Discontinued Medications (& Rationale): none    Medical course: Pt was medically cleared prior to admission to inpatient psychiatry. Pt did not report any acute " medical concerns.     Plan   Principal psychiatric diagnosis:   # Schizoaffective disorder, bipolar type     Secondary psychiatric diagnoses:   None     Medications:    Modify medications:   - Increase Clozapine to 37.5 mg at bedtime today, 50mg at bedtime 3/21, 75mg at bedtime 3/22, and 100mg at bedtime 3/23 pending no SEs     Continue medications:   - IM/PO Olanzapine 10mg Q2H PRN aggression/agitation  - Hydroxyzine 25mg Q4H PRN  - Trazodone 50mg at bedtime PRN     - Patient will be treated in therapeutic milieu with appropriate individual and group therapies.  - medications as above     Medical diagnoses:   # GI/FEN  - Maalox and milk of magnesia PRN     # Neuro  - APAP PRN     Consult: none  Labs: Urine pregnancy test negative, Utox negative, ANC 3.5 (wnl)    Legal Status: Voluntary    Safety Assessment:   Behavioral Orders   Procedures     Code 1 - Restrict to Unit     Routine Programming     As clinically indicated     Status 15     Every 15 minutes.     Suicide precautions     Patients on Suicide Precautions should have a Combination Diet ordered that includes a Diet selection(s) AND a Behavioral Tray selection for Safe Tray - with utensils, or Safe Tray - NO utensils         Disposition: TBD pending clinical stabilization, medication optimization, and development of an appropriate discharge plan.    Kee Salazar, MS3    I was present with the medical student who participated in the service and documentation of the note. I have verified the history and personally performed the physical/mental status exam and medical decision making. I agree with the assessment and plan documented in the note.    Marilin Way MD  PGY-1    Attestation:  I, Farzana Santoyo, have personally performed an examination of this patient and I have reviewed the resident's documentation.  I have edited the note to reflect all relevant changes.  I have discussed this patient with the house staff on 3/20/2019.  I agree with resident  findings and plan in today's note and yesterdays resident H&P.  I have reviewed all vitals and laboratory findings.    Farzana Santoyo MD

## 2019-03-20 NOTE — PROGRESS NOTES
03/20/19 1504   General Information   Has Not Attended OT as of: 03/20/19     OT staff will meet with pt to review the role of occupational therapy and explain the value of having them involved in their treatment plan including options to meet current needs/self-identified goals. As group attendance is established, Pt will be given self-assessment to inform OT initial assessment.

## 2019-03-20 NOTE — PROGRESS NOTES
19 0318   Patient Belongings   Did you bring any home meds/supplements to the hospital?  No   Patient Belongings locker;sent to security per site process   Patient Belongings Put in Hospital Secure Location (Security or Locker, etc.) cash/credit card;cell phone/electronics;clothing;earrings;jewelry;money (see comment);necklace;purse/wallet;shoes;wallet;other (see comments)   Belongings Search Yes   Comment Jacket, boots, hijab, earrings, necklace, leggings, dresses x3, 2 scarves, purse, wallet, drivers license, social security card, [employer] bank card, $20 cash, 6 exppired drivers license, 3 medica cards, 3 [employer] cards, 3 [employer] cards, 6 [employer] cards, phone, phone , Mediven cream, Clovate cream, deodarant     IN Locker:  - Jacket  - Boots  - hijab  - dresses x3  - scarves x2  - purse  - wallet  - $20 cash  - 6  drivers license  - 3 [employer] cards  - 3 [employer] cards  - 3 [employer] cards  - 6 MHCP cards  - phone  - phone   - Medivan cream  - Clovate Cream  - deodarant    SENT TO SECURITY: Envelope #[id number]  - Drivers license  - Social Security card  - [employer] card  - pair of earrings  - necklace    A               Admission:  I am responsible for any personal items that are not sent to the safe or pharmacy.  [facility] is not responsible for loss, theft or damage of any property in my possession.    Signature:  _________________________________ Date: _______  Time: _____                                              Staff Signature:  ____________________________ Date: ________  Time: _____      2nd Staff person, if patient is unable/unwilling to sign:    Signature: ________________________________ Date: ________  Time: _____     Discharge:  [facility] has returned all of my personal belongings:    Signature: _________________________________ Date: ________  Time: _____                                          Staff Signature:  ____________________________ Date: ________  Time: _____

## 2019-03-20 NOTE — PROGRESS NOTES
Initial Psychosocial Assessment  I have reviewed the chart, met with the patient, and developed Care Plan. Information for assessment was obtained from the patient, the patient's medical chart, and the patient's DEC assessment.      Presenting Problem: Nona Finley is a 38 year old female who presents emergency room with increasing suicidal thoughts auditory hallucinations.  Patient has history of schizoaffective disorder.  Has on a stay of commitment currently at this point.  Patient lives at home with her  along with this has 6 children.  Patient is followed by the ACT team also for medications etc.  Patient does get a monthly Abilify shot which she got a week ago.  Over the last 6 days patient has been refusing her medication primarily Clazuril.  Patient states she thinks it could be tainted with some other chemical she states last time she took it she felt very sleepy therefore she is does not want to take it anymore.  Since then over the last 6 days she has been having increasing auditory hallucinations telling her to kill herself thoughts about jumping off a bridge.  She sees people with a knife also.  Patient now sent here for admission.  Patient does agree at this point.  In the past patient has had auditory hallucinations telling her to hurt her children she states she is never acted on this at this point.  There is a heightened safety concern with her currently with her med noncompliant.  Patient does agree with admission though.  Denies any other physical complaints at this point. - ED Provider Note (Shree Jack MD, 03/19/19)    When asked about what was the cause for this hospitalization, the patient stated that she stopped taking her medications because she thought there were drugs in them.      History of Mental Health and Chemical Dependency: The patient has had multiple hospitalizations with the last being in February 2019 at [facility]. The patient also has a history of being committed and  Jarvised. She is currently under civil commitment until July 3, 2019. The patient denies any illicit drug use.     Significant Life Events  (Illness, Abuse, Trauma, Death): The patient is from [place] and and lived through the war there. The patient also reported that her father passed in 2016.      Family/Living Situation: The patient currently lives at home with her  and six children. According to the DEC assessment, the patient's in-laws have been helping in the home with the patient's children as the patient's  works and the patient has been unable to care for them. According to the patient, her oldest son needs extra care as he has a history of Autism.      Educational Background: The patient reports that highest level of education completed is 5th grade     Financial Status: The patient reported that she is currently not employed. Her  works full time and is the financial provider for the family.       Legal Issues: The patient is currently committed and Jarvised.       Ethnic/Cultural Issues: None,      Spiritual Orientation: The patient is Oriental orthodox.       Service History: None.      Social Functioning (organization, interests): The patient reported that she does not know at this time.      Current Treatment Providers are:  Primary Care: The patient reports that she has a PCP at [facility] but does not recall their name.   Therapist: None.     ACT Team  : Vicky Harrington ([phone])  Psychiatry: Dr. Walter Gaviria ([phone])  : Selena ([phone])   RN: Sherry ([phone])    Social Service Assessment/Plan: CTC will explore options for follow up care and  provide a psychological assessment.Patient will be provided with a safe environment, medication management, as well as offered groups for coping skills.

## 2019-03-20 NOTE — PROGRESS NOTES
Pharmacy Clozapine Note    Date of Service: 3/19/2019  Patient's : 1980  38 year old, female    Current clozapine regimen: 100mg PO HS (non-adherent since at least 3/14/19 per ACT team)  Has there been a known interruption in therapy for greater than/equal to 48 hours? Yes    Recent ANC Value(s):  Recent Labs   Lab Test 19  1601 19  1939 18  0752 17  0757 17  0737 17  1917   ANEU 3.5 3.0 2.4 2.8 2.7 2.8 2.8       Is the patient enrolled in the clozapine REMS program? Yes  Ordering prescriber: Dr. Yefri Way   Is this provider certified in the clozapine REMS program? Yes  Is the ANC within recommended limits? Yes  Does the patient have any signs or symptoms of infection, including fever or sore throat? No    Plan:  1. Re-titrating clozapine therapy starting at 12.5mg PO tonight.  2. A WBC with differential will be ordered at least weekly.  ANC values will be entered into the REMS program.  3. Signs/symptoms of infection will be monitored daily.    Karrie Payton [facility]

## 2019-03-20 NOTE — PROGRESS NOTES
retrieved a release of information from the patient for her ACT team.  called the patient's , Vicky Harrington ([phone]) and left a voicemail informing her that the patient is currently inpatient.  stated that she would like to coordinate with her, provided her contact information and requested a return call.

## 2019-03-20 NOTE — PROGRESS NOTES
Patient requesting to wear head scarf for Restorationist reasons. Assessed patient for safety. She currently denies any SI or thoughts of wanting to die. States she feels she will be able to be safe wearing scarf and will talk with staff if this changes. Dr. Way informed of patient request and placed patient care order for patient to wear her scarf during the day.

## 2019-03-20 NOTE — PLAN OF CARE
BEHAVIORAL TEAM DISCUSSION    Participants: Dr. Santoyo, Dr. Way, Todd Castorena ([facility]), Jeannie (RN)  Progress: Continuing to assess.   Continued Stay Criteria/Rationale: Assessment, evaluation, and recommendations.   Medical/Physical: Please see medical notes.   Precautions:   Behavioral Orders   Procedures     Code 1 - Restrict to Unit     Routine Programming     As clinically indicated     Status 15     Every 15 minutes.     Suicide precautions     Patients on Suicide Precautions should have a Combination Diet ordered that includes a Diet selection(s) AND a Behavioral Tray selection for Safe Tray - with utensils, or Safe Tray - NO utensils       Plan: The plan is to assess the patient for mental health and medication needs. The patient will be prescribed medications to treat the identified symptoms. Patient will participate in therapeutic skill building groups on the unit. [facility] to coordinate discharge/after care planning.     Rationale for change in precautions or plan: None.

## 2019-03-20 NOTE — PROVIDER NOTIFICATION
"   03/20/19 [facility]   Behavioral Health   Suicidality (WDL) ex     Patient reporting increase in command auditory hallucinations telling her to kill herself. Stated they were not specific. She denied wanting to be dead or any plan or intent to kill herself. Stated she feels she continues to be safe wearing her head scarf and would not use this to harm self. She was offered prn medications with explanation that they would help with AH, however she declined, despite multiple attempts to explain benefit of taking prn at that time. She was tearful and stated she was worried there would be \"something in the medication\". Assured her this was not the case and offered to let her look at packaging prior to opening medication, however she continued to decline.   "

## 2019-03-21 PROCEDURE — 99232 SBSQ HOSP IP/OBS MODERATE 35: CPT | Mod: GC | Performed by: PSYCHIATRY & NEUROLOGY

## 2019-03-21 PROCEDURE — 12400001 ZZH R&B MH UMMC

## 2019-03-21 PROCEDURE — 25000132 ZZH RX MED GY IP 250 OP 250 PS 637

## 2019-03-21 RX ORDER — CLOZAPINE 25 MG/1
12.5 TABLET ORAL AT BEDTIME
Status: DISCONTINUED | OUTPATIENT
Start: 2019-03-21 | End: 2019-03-21

## 2019-03-21 RX ORDER — CLOZAPINE 25 MG/1
50 TABLET ORAL AT BEDTIME
Status: COMPLETED | OUTPATIENT
Start: 2019-03-22 | End: 2019-03-22

## 2019-03-21 RX ORDER — CLOZAPINE 25 MG/1
25 TABLET ORAL AT BEDTIME
Status: COMPLETED | OUTPATIENT
Start: 2019-03-21 | End: 2019-03-21

## 2019-03-21 RX ORDER — CLOZAPINE 25 MG/1
75 TABLET ORAL AT BEDTIME
Status: COMPLETED | OUTPATIENT
Start: 2019-03-23 | End: 2019-03-23

## 2019-03-21 RX ORDER — CLOZAPINE 100 MG/1
100 TABLET ORAL AT BEDTIME
Status: DISCONTINUED | OUTPATIENT
Start: 2019-03-24 | End: 2019-03-25

## 2019-03-21 RX ADMIN — CLOZAPINE 25 MG: 25 TABLET ORAL at 20:41

## 2019-03-21 RX ADMIN — OLANZAPINE 10 MG: 10 TABLET, FILM COATED ORAL at 08:07

## 2019-03-21 RX ADMIN — OLANZAPINE 10 MG: 10 TABLET, FILM COATED ORAL at 20:42

## 2019-03-21 ASSESSMENT — MIFFLIN-ST. JEOR: SCORE: 1378.49

## 2019-03-21 ASSESSMENT — ACTIVITIES OF DAILY LIVING (ADL)
HYGIENE/GROOMING: INDEPENDENT
ORAL_HYGIENE: INDEPENDENT
DRESS: INDEPENDENT

## 2019-03-21 NOTE — PROGRESS NOTES
03/21/19 1400   Behavioral Health   Hallucinations denies / not responding to hallucinations   Thinking intact   Orientation person: oriented;place: oriented;date: oriented;time: oriented   Memory baseline memory   Insight poor   Judgement impaired   Eye Contact at examiner   Affect full range affect   Mood mood is calm   Physical Appearance/Attire attire appropriate to age and situation   Hygiene well groomed   Suicidality other (see comments)  (Denies)   1. Wish to be Dead No   2. Non-Specific Active Suicidal Thoughts  No   Enviromental Risk Factors None   Self Injury other (see comment)  (Denies)   Elopement (None indicated)   Activity other (see comment)  (Walking the halls constantly )   Speech clear;coherent   Medication Sensitivity no stated side effects;no observed side effects   Psychomotor / Gait balanced;steady     The patient spent much of the shift pacing the halls. She took several phone calls, had no visitors and showered during the shift. She had a good shift, went to groups and reported having no feelings of SI or SIB.

## 2019-03-21 NOTE — PROGRESS NOTES
Pt was transferred to station 22 per orders. Pt denied SI/SIB. Pt endorses audio hallucinations and anxiety. Pt offered prn medication to help with the voices but patient refused. Reported to the receiving nurse on station 22.

## 2019-03-21 NOTE — PROGRESS NOTES
"    ----------------------------------------------------------------------------------------------------------  [facility], [place]   Psychiatric Progress Note  Hospital Day #2     Interim History:   The patient's care was discussed with the treatment team and chart notes were reviewed.    Sleep: 7 hrs  Scheduled Medications: declined evening clozapine  PRNs: oral Zyprexa x1  Staff Report: Patient was transferred to station 22 from station 20 in the evening. She was calm and pleasant, but paranoid about taking her medications and declined evening clozapine. Pt reported AH on and off and denied SI. She also reported she's been under a lot of stress and that she's bothered by memories of the war in [facility].     Patient Interview: Patient was interviewed in her room on Station 22. She reported that last night she was having more concerns about her clozapine being tainted, which is why she didn't take it. She also said it doesn't work for the AH until it's at 75mg. She did take a prn zyprexa this morning for her AH, with effect. She reported she's feeling much better after the zyprexa and now the AH are gone. She feels like she'll be able to take her clozapine going forward.     We also discussed past ECT. Pt reported she'd had ECT back in 2011, and it had been helpful for her at that time. She reported she felt like she wouldn't need it this time since she thinks she'll be able to take her medications.    The risks, benefits, alternatives and side effects of any medication changes have been discussed and are understood by the patient and other caregivers.    Review of systems:     ROS was negative unless noted above.          Allergies:   No Known Allergies         Psychiatric Examination:   /71   Pulse 90   Temp 98.2  F (36.8  C) (Oral)   Resp 16   Ht 1.6 m (5' 3\")   Wt 73.9 kg (163 lb)   SpO2 98%   BMI 28.87 kg/m    Weight is 163 lbs 0 oz  Body mass index is 28.87 " "kg/m .    Appearance:  awake, alert, adequately groomed, dressed in hospital scrubs and personal headscarf, appeared as age stated and well groomed  Attitude: cooperative  Eye Contact:  fair- good  Mood: \"much better\"  Affect:  Mood congruent. Slightly soft and quiet voice. Appears much less distressed.  Speech:  clear, coherent  Psychomotor Behavior:  no evidence of tardive dyskinesia, dystonia, or tics  Thought Process:  Linear, logical  Associations:  no loose associations  Thought Content: no evidence of SI. Pt denies current AH. Does not appear to be responding to internal stimuli.   Insight:  good  Judgment:  fair  Oriented to:  time, person, and place  Attention Span and Concentration:  intact  Recent and Remote Memory:  intact  Language: speaks English clearly and coherently  Fund of Knowledge: appropriate  Muscle Strength and Tone: normal  Gait and Station: Sitting in bed, did not assess gait.         Labs:     No results found for this or any previous visit (from the past 24 hour(s)).     Assessment    Diagnostic Impression: Nona Finley is a 38 year old female with a history of schizoaffective disorder, bipolar type on clozapine and abilify who presented to the ED on 03/19/2019 due to AH telling her to jump off a bridge since declining her clozapine for the past 6 days due to delusions that the medication is tainted with \"chemicals.\" The patient's last psychiatric hospitalization was in Feburary 2019.  The patient is currently followed by Dr. Gaviria with [facility] and does not follow with a therapist. Family history is notable for schizophrenia in her brother. Current psychosocial stressors include previous diagnosis of schizoaffective disorder, immigration from [place] during the war, 6 children including a daughter with ASD. The patient denies drug abuse or self injurious behaviors. The MSE is notable for a well-groomed woman appearing of stated age who is tearful with depressed mood and " having auditory hallucinations. The patient's reported symptoms of depressed mood, decreased energy, decreased appetite, poor concentration coupled with AH and delusions when not taking medication are consistent with historical diagnosis of schizoaffective disorder. The patient's delusions of medication tainting interfere with her ability to adhere with the treatment plan as she was reluctant to re-start clozapine. Since she has not taken Clozapine for 6 days, it was re-started at low dose of 12.5mg and titrated up with eventual goal of  mg daily. Given severe command hallucinations and SI, patient warrants inpatient psychiatric hospitalization to maintain her safety and resolve her symptoms.     Hospital course: Nona Finley was admitted due to command AH and SI to station 20 as a voluntary patient with attending, Dr. Farzana Santoyo on 3/19/19. Clozapine was re-started at 12.5 mg and titration was started (goal of PTA dose of 100mg).     Discontinued Medications (& Rationale): none    Medical course: Pt was medically cleared prior to admission to inpatient psychiatry. Pt did not report any acute medical concerns.     Plan   Principal psychiatric diagnosis:   # Schizoaffective disorder, bipolar type     Secondary psychiatric diagnoses:   None     Medications:    Modify medications:   - Increase Clozapine to 25mg at bedtime today, 50mg at bedtime 3/22, 75mg at bedtime 3/23, and 100mg at bedtime 3/24 pending no SEs     Continue medications:   - IM/PO Olanzapine 10mg Q2H PRN aggression/agitation  - Hydroxyzine 25mg Q4H PRN  - Trazodone 50mg at bedtime PRN     - Patient will be treated in therapeutic milieu with appropriate individual and group therapies.  - medications as above     Medical diagnoses:   # GI/FEN  - Maalox and milk of magnesia PRN     # Neuro  - APAP PRN     Consult: none  Labs: Urine pregnancy test negative, Utox negative, ANC 3.5 (wnl)    Legal Status: Voluntary    Safety Assessment:    Behavioral Orders   Procedures     Code 1 - Restrict to Unit     Routine Programming     As clinically indicated     Status 15     Every 15 minutes.     Suicide precautions     Patients on Suicide Precautions should have a Combination Diet ordered that includes a Diet selection(s) AND a Behavioral Tray selection for Safe Tray - with utensils, or Safe Tray - NO utensils         Disposition: TBD pending clinical stabilization, medication optimization, and development of an appropriate discharge plan.    Marilin Way MD  PGY-1    Attestation:  This patient has been seen and evaluated by me, Farzana Santoyo.  I have discussed this patient with the psychiatry resident and I agree with the findings and plan in this note.    I have reviewed today's vital signs, medications, labs and imaging.   Farzana Santoyo MD.

## 2019-03-21 NOTE — PLAN OF CARE
Pt transferred from Station 20 at 1900 . Pt is calm and pleasant but paranoid about taking her meds , she refused the hs Clozaril . She states she was not taking it at home either and she was suppose to take 100 mg . Pt states she hears voices off and on , denies si . . She has been under a lot of stress , has 6 children ages 3 to 15 and she says the teenagers age getting difficult and she is also bothered by memories of war in [place] . She is resting comfortably in her bed

## 2019-03-22 PROCEDURE — 25000132 ZZH RX MED GY IP 250 OP 250 PS 637

## 2019-03-22 PROCEDURE — G0177 OPPS/PHP; TRAIN & EDUC SERV: HCPCS

## 2019-03-22 PROCEDURE — 12400001 ZZH R&B MH UMMC

## 2019-03-22 RX ADMIN — CLOZAPINE 50 MG: 25 TABLET ORAL at 21:27

## 2019-03-22 RX ADMIN — OLANZAPINE 10 MG: 10 TABLET, FILM COATED ORAL at 20:41

## 2019-03-22 ASSESSMENT — ACTIVITIES OF DAILY LIVING (ADL)
ORAL_HYGIENE: INDEPENDENT
DRESS: INDEPENDENT
DRESS: SCRUBS (BEHAVIORAL HEALTH)
HYGIENE/GROOMING: INDEPENDENT
LAUNDRY: UNABLE TO COMPLETE
ORAL_HYGIENE: INDEPENDENT
HYGIENE/GROOMING: INDEPENDENT

## 2019-03-22 NOTE — PROGRESS NOTES
Pt was observed in the periphery of the milieu throughout the evening walking the halls and eating meals. During check-in with writer pt stated that they have not heard any voices today. They explained that they took medicine this morning and feel that it is working well for them. Pt stated that they are still in the hospital to ensure that there are no negative side effects from the medication. Pt went on to say that they will be possibly discharging on Monday back to their home with their family which they explain as a strong support system. Pt denies SI and SIB as well as anxiety and depression.      03/21/19 2135   Behavioral Health   Hallucinations denies / not responding to hallucinations   Thinking intact   Orientation person: oriented;place: oriented;date: oriented   Memory baseline memory   Insight insight appropriate to situation   Judgement impaired   Eye Contact at examiner   Affect full range affect   Mood mood is calm   Physical Appearance/Attire attire appropriate to age and situation   Hygiene well groomed   Suicidality (Pt denies)   1. Wish to be Dead No   2. Non-Specific Active Suicidal Thoughts  No   Self Injury (None stated or observed)   Elopement (None observed)   Activity withdrawn   Speech clear;coherent   Medication Sensitivity no stated side effects;no observed side effects   Psychomotor / Gait balanced;steady   Psycho Education   Type of Intervention 1:1 intervention   Response participates with encouragement   Hours 0.5   Treatment Detail Check-in   Activities of Daily Living   Hygiene/Grooming independent   Oral Hygiene independent   Dress independent   Room Organization independent

## 2019-03-22 NOTE — PLAN OF CARE
.Initially seen by OT on this date. Nona  attended 3 of 3 OT groups today. Blunted. Polite and warm. Withdrawn. Would like to read a Koran. Will be given a written self-assessment upon increased group attendance. More observation needed to complete initial evaluation at this time.

## 2019-03-22 NOTE — PROGRESS NOTES
"Coordination with out patient providers/ACT team   Re-Entry ACT team - Team RN Johanne   Per Johanne -   They will be changing her clozapine to dissolvable - wanted to make sure there is no discharge plan for the weekend. Informed her that there is no plan for her to discharge this weekend. Per Johanne she learned from speaking with patient's  that she had been \"cheeking\" the oral clozapine at home which is why they want to change to dissolvable formulation.      Per Johanne they will reach out Monday again to hospital team. She asks that they be made aware when discharge is discussed.        "

## 2019-03-22 NOTE — PROGRESS NOTES
"Pt has been visible out in the milieu. Pt appropriately interacts with both staff and peers. She has attended most of the groups. During check in, pt appeared to have a lot of insight regarding her stay this admission. Some of the statements she made were \" I have been feeling depressed lately and I have schizoaffective disorder. One of the reasons I came into the hospital was to get back on the right dose of medication. I feel like I am doing better but would like to get an increase in my dosage\". Pt denies any current HI/SI/SIB.        03/22/19 1400   Behavioral Health   Hallucinations denies / not responding to hallucinations   Thinking intact   Orientation person: oriented;place: oriented   Memory baseline memory   Insight admits / accepts   Judgement intact   Eye Contact at examiner   Affect blunted, flat   Mood mood is calm   Physical Appearance/Attire appears stated age   Hygiene well groomed   Activities of Daily Living   Hygiene/Grooming independent   Oral Hygiene independent   Dress scrubs (behavioral health)   Room Organization independent     "

## 2019-03-22 NOTE — PROGRESS NOTES
"  -----------------------------------------------------------------------------------------------------------  Psychiatry Cross Cover note      Nona Finley MRN# [id number]   Age: 38 year old YOB: 1980   3         Subjective:   Writer was notified by staff that Nona has been taking her clozapine and wants the dose increased. She stated that she is feeling much better and denied side effects including orthostasis, constipation, or drooling. Denied AH and did not appear to be responding. Did not express paranoid ideas about her medication.           Objective:    /73   Pulse 80   Temp 97.5  F (36.4  C) (Oral)   Resp 12   Ht 1.6 m (5' 3\")   Wt 73.9 kg (163 lb)   SpO2 99%   BMI 28.87 kg/m    163 lbs 0 oz  Body mass index is 28.87 kg/m .  Mental Status Exam:  Appearance:  awake, alert and adequately groomed  Attitude:  cooperative  Eye Contact:  good  Mood: \" better\"  Affect:  mood congruent and intensity is blunted  Speech:  clear, coherent  Psychomotor Behavior:  no evidence of tardive dyskinesia, dystonia, or tics  Thought Process:  linear and goal oriented  Associations:  no loose associations  Thought Content:  denied SI/HI and AH  Insight:  fair; reported having depression and schizoaffective disorder to staff  Judgment:  limited  Oriented to:  time, person, and place  Attention Span and Concentration:  adeqaute for brief interview  Recent and Remote Memory:  intact  Language:  english with appropriate syntax and vocabulary  Fund of Knowledge: appropriate  Muscle Strength and Tone: normal  Gait and Station: Normal         Relevent information:     Past Medical History:   Diagnosis Date     Bipolar 1 disorder (H)      Bipolar affective disorder, depressed, severe, with psychotic behavior (H)      NONSPECIFIC MEDICAL HISTORY     h/o +PPD. Neg CXR 2006     Postpartum depression 8/18/2011     Schizo-affective schizophrenia (H)      Tuberculosis      Varicosities        No Known " Allergies    No results found for this or any previous visit (from the past 24 hour(s)).         Assessment and Plan:   Nona Finley is a 38 year old female with a history of schizoaffective disorder, admitted on 3/19/19 with CAH to jump off a bridge in the context of paranoia about her medications leading to nonadherence. Clozapine titration was restarted at 12.5 mg. Today, patient stated she is feeling well, wants her clozapine dose increased, and denies any side effects. Stable vitals, last ANC 3.5.    -Continue clozapine titration per primary team.    =============================================================================  Lexy Elias MD  PGY-2 Psychiatry Resident

## 2019-03-23 PROCEDURE — 25000132 ZZH RX MED GY IP 250 OP 250 PS 637

## 2019-03-23 PROCEDURE — 12400001 ZZH R&B MH UMMC

## 2019-03-23 RX ADMIN — CLOZAPINE 75 MG: 25 TABLET ORAL at 20:25

## 2019-03-23 ASSESSMENT — ACTIVITIES OF DAILY LIVING (ADL)
LAUNDRY: WITH SUPERVISION
HYGIENE/GROOMING: INDEPENDENT
DRESS: INDEPENDENT
ORAL_HYGIENE: INDEPENDENT
HYGIENE/GROOMING: INDEPENDENT
LAUNDRY: WITH SUPERVISION
ORAL_HYGIENE: INDEPENDENT
DRESS: INDEPENDENT

## 2019-03-23 NOTE — PLAN OF CARE
Pt has been visible in milieu much of this pm,she has paced in the flores at times she has expressed frustration that it is taking so long to bring up her therapeutic level of clozaril as she would like to return home to her 12 children as soon as possible,she denies both suicidality and hallucinations,and remains on suicide precautions,mouth checks were performed following,med administration

## 2019-03-24 PROCEDURE — 12400001 ZZH R&B MH UMMC

## 2019-03-24 PROCEDURE — 25000132 ZZH RX MED GY IP 250 OP 250 PS 637

## 2019-03-24 RX ADMIN — CLOZAPINE 100 MG: 100 TABLET ORAL at 20:52

## 2019-03-24 ASSESSMENT — ACTIVITIES OF DAILY LIVING (ADL)
ORAL_HYGIENE: INDEPENDENT
DRESS: INDEPENDENT
LAUNDRY: WITH SUPERVISION
HYGIENE/GROOMING: INDEPENDENT

## 2019-03-24 ASSESSMENT — MIFFLIN-ST. JEOR: SCORE: 1392.1

## 2019-03-24 NOTE — PROGRESS NOTES
Pt had a really good shift states that she is feeling better and excited about going home soon. She also presents very brightly and respectfully.

## 2019-03-25 LAB
BASOPHILS # BLD AUTO: 0 10E9/L (ref 0–0.2)
BASOPHILS NFR BLD AUTO: 0.7 %
DIFFERENTIAL METHOD BLD: NORMAL
EOSINOPHIL # BLD AUTO: 0.2 10E9/L (ref 0–0.7)
EOSINOPHIL NFR BLD AUTO: 5.2 %
ERYTHROCYTE [DISTWIDTH] IN BLOOD BY AUTOMATED COUNT: 12.7 % (ref 10–15)
HCT VFR BLD AUTO: 37.8 % (ref 35–47)
HGB BLD-MCNC: 12.6 G/DL (ref 11.7–15.7)
IMM GRANULOCYTES # BLD: 0 10E9/L (ref 0–0.4)
IMM GRANULOCYTES NFR BLD: 0 %
LYMPHOCYTES # BLD AUTO: 1.8 10E9/L (ref 0.8–5.3)
LYMPHOCYTES NFR BLD AUTO: 39 %
MCH RBC QN AUTO: 30.6 PG (ref 26.5–33)
MCHC RBC AUTO-ENTMCNC: 33.3 G/DL (ref 31.5–36.5)
MCV RBC AUTO: 92 FL (ref 78–100)
MONOCYTES # BLD AUTO: 0.3 10E9/L (ref 0–1.3)
MONOCYTES NFR BLD AUTO: 6.5 %
NEUTROPHILS # BLD AUTO: 2.2 10E9/L (ref 1.6–8.3)
NEUTROPHILS NFR BLD AUTO: 48.6 %
NRBC # BLD AUTO: 0 10*3/UL
NRBC BLD AUTO-RTO: 0 /100
PLATELET # BLD AUTO: 282 10E9/L (ref 150–450)
RBC # BLD AUTO: 4.12 10E12/L (ref 3.8–5.2)
WBC # BLD AUTO: 4.6 10E9/L (ref 4–11)

## 2019-03-25 PROCEDURE — 12400001 ZZH R&B MH UMMC

## 2019-03-25 PROCEDURE — 25000132 ZZH RX MED GY IP 250 OP 250 PS 637

## 2019-03-25 PROCEDURE — 36415 COLL VENOUS BLD VENIPUNCTURE: CPT

## 2019-03-25 PROCEDURE — 99232 SBSQ HOSP IP/OBS MODERATE 35: CPT | Mod: GC | Performed by: PSYCHIATRY & NEUROLOGY

## 2019-03-25 PROCEDURE — 85025 COMPLETE CBC W/AUTO DIFF WBC: CPT

## 2019-03-25 RX ORDER — CLOZAPINE 100 MG/1
100 TABLET, ORALLY DISINTEGRATING ORAL AT BEDTIME
Status: DISCONTINUED | OUTPATIENT
Start: 2019-03-25 | End: 2019-03-26 | Stop reason: HOSPADM

## 2019-03-25 RX ORDER — CLOZAPINE 100 MG/1
100 TABLET, ORALLY DISINTEGRATING ORAL AT BEDTIME
Qty: 3 TABLET | Refills: 0 | Status: ON HOLD | OUTPATIENT
Start: 2019-03-25 | End: 2019-05-30

## 2019-03-25 RX ADMIN — CLOZAPINE 100 MG: 100 TABLET, ORALLY DISINTEGRATING ORAL at 19:39

## 2019-03-25 RX ADMIN — ACETAMINOPHEN 650 MG: 325 TABLET, FILM COATED ORAL at 16:42

## 2019-03-25 ASSESSMENT — ACTIVITIES OF DAILY LIVING (ADL)
HYGIENE/GROOMING: INDEPENDENT
DRESS: INDEPENDENT
ORAL_HYGIENE: INDEPENDENT

## 2019-03-25 NOTE — PLAN OF CARE
Pt visible,interactive,brighter,did not attend community meeting,denies si,verbalizes that she believes she will be going home soon since she takes clozaril 100mg now, and child visited this am,she denies negative beliefs around clozaril,suicide precautions remain in place,defer to team,pt informed that additional labs may be needed to assess readiness to return home on clozaril

## 2019-03-25 NOTE — PROGRESS NOTES
"    ----------------------------------------------------------------------------------------------------------  [facility], [place]   Psychiatric Progress Note  Hospital Day #6     Interim History:   The patient's care was discussed with the treatment team and chart notes were reviewed.    Sleep: 6.75 hrs  Scheduled Medications: taken as prescribed  PRNs: none  Staff Report: Patient much improved over weekend. Her affect is very bright and she is feeling ready for home. She has not had any AH or delusions about clozapine being tainted.     Patient Interview: Patient was interviewed in the  [facility] conference room. She is feeling much better compared to last week and denies any command AH or delusions since receiving Zyprexa on 3/20. She says that she gets \"bored\" here and would like to go home but is open to staying here if it is recommended by the treatment team. She was open to changing to the dissolvable formulation of clozapine.     Collateral information from MIRI Whiting Team RN:  has concerns that patient has been \"cheeking\" her clozapine for some time and thinks that a dissolvable tablet would be better for her.    The risks, benefits, alternatives and side effects of any medication changes have been discussed and are understood by the patient and other caregivers.    Review of systems:     ROS was negative unless noted above.          Allergies:   No Known Allergies         Psychiatric Examination:   /74 (BP Location: Left arm)   Pulse 90   Temp 96.7  F (35.9  C) (Tympanic)   Resp 16   Ht 1.6 m (5' 3\")   Wt 75.3 kg (166 lb)   SpO2 100%   BMI 29.41 kg/m    Weight is 166 lbs 0 oz  Body mass index is 29.41 kg/m .    Appearance:  awake, alert, adequately groomed, dressed in personal clothes and headscarf, appeared as age stated and well groomed  Attitude: cooperative  Eye Contact: good  Mood: \"very good\"  Affect:  Mood congruent. Slightly soft and quiet " "voice. Appears cheerful and not distressed.  Speech:  clear, coherent  Psychomotor Behavior:  no evidence of tardive dyskinesia, dystonia, or tics  Thought Process:  Linear, logical  Associations:  no loose associations  Thought Content: no evidence of SI. Pt denies current AH. Does not appear to be responding to internal stimuli.   Insight:  good  Judgment:  intact  Oriented to:  time, person, and place  Attention Span and Concentration:  intact  Recent and Remote Memory:  intact  Language: speaks English clearly and coherently  Fund of Knowledge: appropriate  Muscle Strength and Tone: normal  Gait and Station: Normal gait         Labs:     Recent Results (from the past 24 hour(s))   CBC with platelets differential    Collection Time: 03/25/19  8:53 AM   Result Value Ref Range    WBC 4.6 4.0 - 11.0 10e9/L    RBC Count 4.12 3.8 - 5.2 10e12/L    Hemoglobin 12.6 11.7 - 15.7 g/dL    Hematocrit 37.8 35.0 - 47.0 %    MCV 92 78 - 100 fl    MCH 30.6 26.5 - 33.0 pg    MCHC 33.3 31.5 - 36.5 g/dL    RDW 12.7 10.0 - 15.0 %    Platelet Count 282 150 - 450 10e9/L    Diff Method Automated Method     % Neutrophils 48.6 %    % Lymphocytes 39.0 %    % Monocytes 6.5 %    % Eosinophils 5.2 %    % Basophils 0.7 %    % Immature Granulocytes 0.0 %    Nucleated RBCs 0 0 /100    Absolute Neutrophil 2.2 1.6 - 8.3 10e9/L    Absolute Lymphocytes 1.8 0.8 - 5.3 10e9/L    Absolute Monocytes 0.3 0.0 - 1.3 10e9/L    Absolute Eosinophils 0.2 0.0 - 0.7 10e9/L    Absolute Basophils 0.0 0.0 - 0.2 10e9/L    Abs Immature Granulocytes 0.0 0 - 0.4 10e9/L    Absolute Nucleated RBC 0.0         Assessment    Diagnostic Impression: Nona Finley is a 38 year old female with a history of schizoaffective disorder, bipolar type on clozapine and abilify who presented to the ED on 03/19/2019 due to AH telling her to jump off a bridge since declining her clozapine for the past 6 days due to delusions that the medication is tainted with \"chemicals.\" The " patient's last psychiatric hospitalization was in Feburary 2019.  The patient is currently followed by Dr. Gaviria [facility] and does not follow with a therapist. Family history is notable for schizophrenia in her brother. Current psychosocial stressors include previous diagnosis of schizoaffective disorder, immigration from [place] during the war, 6 children including a daughter with ASD. The patient denies drug abuse or self injurious behaviors. The MSE is notable for a well-groomed woman appearing of stated age who is tearful with depressed mood and having auditory hallucinations. The patient's reported symptoms of depressed mood, decreased energy, decreased appetite, poor concentration coupled with AH and delusions when not taking medication are consistent with historical diagnosis of schizoaffective disorder. The patient's delusions of medication tainting interfere with her ability to adhere with the treatment plan as she was reluctant to re-start clozapine. Since she had not taken Clozapine for 6 days, it was re-started at low dose of 12.5mg and titrated up to her PTA dose of 100 mg daily. Given severe command hallucinations and SI, patient warranted inpatient psychiatric hospitalization to maintain her safety and resolve her symptoms.     Hospital course: Nona Finley was admitted due to command AH and SI to station 20 as a voluntary patient with attending, Dr. Farzana Santoyo on 3/19/19. She was subsequently transferred to station 22. Clozapine was re-started at 12.5 mg and titrated to goal of PTA dose of 100mg with effect.    Discontinued Medications (& Rationale): none    Medical course: Pt was medically cleared prior to admission to inpatient psychiatry. Pt did not report any acute medical concerns.     Plan   Principal psychiatric diagnosis:   # Schizoaffective disorder, bipolar type     Secondary psychiatric diagnoses:   None     Medications:    Modify medications:   - Change clozapine 100  mg from PO to ODT     Continue medications:   - IM/PO Olanzapine 10mg Q2H PRN aggression/agitation  - Hydroxyzine 25mg Q4H PRN  - Trazodone 50mg at bedtime PRN     - Patient will be treated in therapeutic milieu with appropriate individual and group therapies.  - medications as above     Medical diagnoses:   # GI/FEN  - Maalox and milk of magnesia PRN     # Neuro  - APAP PRN     Consult: none  Labs: Urine pregnancy test negative, Utox negative  ANC 3.5 (wnl), repeat 3/25/19 was 2.2 (wnl)    Legal Status: Voluntary    Safety Assessment:   Behavioral Orders   Procedures     Code 1 - Restrict to Unit     Routine Programming     As clinically indicated     Status 15     Every 15 minutes.     Suicide precautions     Patients on Suicide Precautions should have a Combination Diet ordered that includes a Diet selection(s) AND a Behavioral Tray selection for Safe Tray - with utensils, or Safe Tray - NO utensils         Disposition: Likely tomorrow pending tolerance of ODT clozapine and continued resolution of symptoms.    Kee Salazar, MS3    I was present with the medical student who participated in the service and documentation of the note. I have verified the history and personally performed the physical/mental status exam and medical decision making. I agree with the assessment and plan documented in the note.    Marilin Way MD  PGY-1    Attestation:  This patient has been seen and evaluated by me, Farzana Santoyo.  I have discussed this patient with the psychiatry resident and I agree with the findings and plan in this note.    I have reviewed today's vital signs, medications, labs and imaging.   Farzana Santoyo MD.

## 2019-03-25 NOTE — DISCHARGE SUMMARY
"    Psychiatric Discharge Summary    Hospital Day #6    Nona Finley MRN# [id number]   Age: 38 year old YOB: 1980     Date of Admission:  3/19/2019  Date of Discharge:  3/26/2019  Admitting Physician:  Farzana Santoyo MD  Discharge Physician:  Farzana Santoyo MD         Event Leading to Hospitalization:   HPI from 3/19/19 H&P    \"This patient is a 38 year old female with a history of schizoaffective  who presented on 03/19/2019 due to increasing SI and AH. Patient has been declining her clozaril for the last 6 days because she believes it is tainted with a \"chemical.\" She believes that people are doing this, but did not state any specific person. She states that she has always felt suspicious of her medications, but \"forces myself to take it.\" Since she has been off the medication, she has had increasing AH telling her to jump off a bridge and kill herself, and she \"sees people with knives.\" She states her voices first started in 2011. At that time, the voices told her to jump off a bridge and into a river, but she was stopped by her . She has previously had AH telling her to hurt her children but she has never acted on these hallucinations and states that this is against her values and held beliefs. She was hospitalized most recently from 2/11/19-2/18/19 for these symptoms also in the setting of poor clozapine medication compliance.     She has been taking Clozaril for about 3 years and states that it has been helpful and she has experienced complete cessation of the voices for a time when taking it consistently. Her last Abilify injection was 1 week ago. Stated that she has taken Risperdal and Haldol in the past. Haldol was helpful but was stopped by her provider for an unknown reason.     Her mood has been very depressed for the past year. She was taking Prozac but stopped a number of years ago. She endorses recent decreased energy, poor sleep (5 hr/night), " "difficulty concentrating, and decreased appetite and food intake. She says that these symptoms are worse when she is not taking Clozaril.     In the emergency department, she was medically evaluated. She received no medications. She endorsed the above symptoms and was agreeable to admission. She was medically cleared for admission to inpatient psychiatric unit.\"       See Admission note by Dr. Farzana Santoyo on 3/19/19 for additional details.          Diagnoses:   Schizoaffective disorder, bipolar type, current episode depressed         Consults:     None         Hospital Course:   Diagnostic Impression: Nona Finley is a 38 year old female with a history of schizoaffective disorder, bipolar type on clozapine and abilify who presented to the ED on 03/19/2019 due to AH telling her to jump off a bridge since declining her clozapine for the past 6 days due to delusions that the medication is tainted with \"chemicals.\" The patient's last psychiatric hospitalization was in Feburary 2019.  The patient is currently followed by Dr. Gaviria with [facility] and does not follow with a therapist. Family history was notable for schizophrenia in her brother. Current psychosocial stressors included previous diagnosis of schizoaffective disorder, immigration from [place] during the war, and having 6 children including a daughter with ASD. The patient denied drug abuse or self injurious behaviors. The MSE was notable for a well-groomed woman appearing of stated age who is tearful with depressed mood and having auditory hallucinations. The patient's reported symptoms of depressed mood, decreased energy, decreased appetite, poor concentration coupled with AH and delusions when not taking medication are consistent with historical diagnosis of schizoaffective disorder. The patient's delusions of medication tainting interfere with her ability to adhere with the treatment plan as she was reluctant to re-start clozapine. Given " severe command hallucinations and SI, patient warranted inpatient psychiatric hospitalization to maintain her safety and address her symptoms.      Hospital course: Nona Finley was admitted due to command AH and SI to station 20 as a voluntary patient with attending, Dr. Farzana Santoyo on 3/19/19. Since she had not taken Clozapine for 6 days prior to admission, it was re-started at low dose of 12.5mg and titrated up to PTA dose of 100 mg daily with effect. Clozapine was then changed to ODT tabs due to concerns about medication adherence prior to admission. Pt denied any side effects during the titration. During her stay, her AH and SI resolved, she engaged with the treatment team, and attended groups.    Discontinued Medications (& Rationale): none    Nona Finley was discharged to home. At the time of discharge Nona Finley was determined to not be a danger to herself or others.     Medical course: Pt was medically cleared prior to admission to inpatient psychiatry. ANC on admission was 3.5 and on repeat prior to discharge was 2.2. Prior to admission, pt was receiving monthly CBCs, which were continued at discharge. Pt also noted a SE of sialorrhea from her clozapine and planned to talk to her ACT team about starting a medication for this SE after discharge. Pt did not report any other acute medical concerns.     Risk Assessment:  Nona Finley has notable risk factors for self-harm, including psychosis and a prior suicide attempt. However, risk is mitigated by commitment to family, ability to volunteer a safety plan, and improvement in psychosis. Additional steps taken to minimize risk include: discussing her safety plan, involving her outpatient team in her care, and coordinating outpatient follow-up. Therefore, based on all available evidence including the factors cited above, Nona Finley does not appear to be at imminent risk for self-harm, and is appropriate for outpatient level of  "care.     This document serves as a transfer of care to [facility] Bernice Finley's outpatient providers.         Discharge Medications:     Current Discharge Medication List      START taking these medications    Details   cloZAPine (FAZACLO) 100 MG ODT Take 1 tablet (100 mg) by mouth At Bedtime  Qty: 3 tablet, Refills: 0    Associated Diagnoses: Schizoaffective disorder, bipolar type (H)         CONTINUE these medications which have NOT CHANGED    Details   ARIPiprazole ER (ABILIFY MAINTENA) 400 MG extended release inj syringe Inject 400 mg into the muscle every 28 days   Qty: 1 Syringe, Refills: 0    Associated Diagnoses: Schizoaffective disorder, depressive type (H)         STOP taking these medications       cloZAPine (CLOZARIL) 100 MG tablet Comments:   Reason for Stopping:                      Psychiatric Examination:   Appearance:  awake, alert and adequately groomed  Attitude:  cooperative  Eye Contact:  good  Mood:  \"really good\"  Affect:  appropriate and in normal range and mood congruent  Speech:  clear, coherent  Psychomotor Behavior:  no evidence of tardive dyskinesia, dystonia, or tics  Thought Process:  logical, linear and goal oriented  Associations:  no loose associations  Thought Content:  no evidence of suicidal ideation or homicidal ideation. No AH. Does not appear to be responding to internal stimuli.  Insight:  good  Judgment:  intact  Oriented to:  time, person, and place  Attention Span and Concentration:  intact  Recent and Remote Memory:  intact  Language: Speaks English clearly and coherently  Fund of Knowledge: appropriate  Muscle Strength and Tone: normal  Gait and Station: Normal         Discharge Plan:   Health Care Follow-up Appointments:       Tuesday March 26, 2019 - ACT team RN to  for discharge from hospital    Continue working with your ACT team daily     [facility] ([facility])  [address] [place] [address] Main " ph: [phone]  Fax: [phone]  Psychiatrist - Dr. Gaviria  Nurse - Sherry Craft, JOSEPHINE Harrington MA - Mental Health Practitioner     Pt seen and discussed with my attending, Dr. Farzana Santoyo.   Marilin Way MD  PGY-1 Resident    Attestation:   The patient has been seen and evaluated by me, Farzana Santoyo. I have examined the patient today and reviewed the discharge plan with the resident. I agree with the final assessment and plan, as noted in the discharge summary. I have reviewed today's vital signs, medications, labs and imaging.  Total time discharge plannin minutes  Farzana Santoyo MD                Appendix A: All Labs This Admission:     Results for orders placed or performed during the hospital encounter of 19   Drug abuse screen 6 urine (chem dep) ([facility])   Result Value Ref Range    Amphetamine Qual Urine Negative NEG^Negative    Barbiturates Qual Urine Negative NEG^Negative    Benzodiazepine Qual Urine Negative NEG^Negative    Cannabinoids Qual Urine Negative NEG^Negative    Cocaine Qual Urine Negative NEG^Negative    Ethanol Qual Urine Negative NEG^Negative    Opiates Qualitative Urine Negative NEG^Negative   HCG qualitative urine   Result Value Ref Range    HCG Qual Urine Negative NEG^Negative   CBC with platelets differential   Result Value Ref Range    WBC 6.3 4.0 - 11.0 10e9/L    RBC Count 4.26 3.8 - 5.2 10e12/L    Hemoglobin 13.2 11.7 - 15.7 g/dL    Hematocrit 39.1 35.0 - 47.0 %    MCV 92 78 - 100 fl    MCH 31.0 26.5 - 33.0 pg    MCHC 33.8 31.5 - 36.5 g/dL    RDW 12.8 10.0 - 15.0 %    Platelet Count 332 150 - 450 10e9/L    Diff Method Automated Method     % Neutrophils 56.0 %    % Lymphocytes 34.2 %    % Monocytes 7.4 %    % Eosinophils 1.6 %    % Basophils 0.6 %    % Immature Granulocytes 0.2 %    Nucleated RBCs 0 0 /100    Absolute Neutrophil 3.5 1.6 - 8.3 10e9/L    Absolute Lymphocytes 2.1 0.8 - 5.3 10e9/L    Absolute Monocytes 0.5 0.0 - 1.3 10e9/L    Absolute Eosinophils 0.1 0.0  - 0.7 10e9/L    Absolute Basophils 0.0 0.0 - 0.2 10e9/L    Abs Immature Granulocytes 0.0 0 - 0.4 10e9/L    Absolute Nucleated RBC 0.0    Clozapine and metabolite, please send for STAT 4-hour turn around at [facility] in [place]: Laboratory Miscellaneous Order   Result Value Ref Range    Miscellaneous Test         Specimen Received, Reordered and sent to Performing laboratory - Report to follow upon   completion.     Comprehensive metabolic panel   Result Value Ref Range    Sodium 143 133 - 144 mmol/L    Potassium 3.7 3.4 - 5.3 mmol/L    Chloride 109 94 - 109 mmol/L    Carbon Dioxide 24 20 - 32 mmol/L    Anion Gap 10 3 - 14 mmol/L    Glucose 118 (H) 70 - 99 mg/dL    Urea Nitrogen 11 7 - 30 mg/dL    Creatinine 0.70 0.52 - 1.04 mg/dL    GFR Estimate >90 >60 mL/min/[1.73_m2]    GFR Estimate If Black >90 >60 mL/min/[1.73_m2]    Calcium 8.8 8.5 - 10.1 mg/dL    Bilirubin Total 0.3 0.2 - 1.3 mg/dL    Albumin 3.5 3.4 - 5.0 g/dL    Protein Total 7.9 6.8 - 8.8 g/dL    Alkaline Phosphatase 58 40 - 150 U/L    ALT 22 0 - 50 U/L    AST 17 0 - 45 U/L   Send outs misc test   Result Value Ref Range    Test Name CLOZAPINE AND NORCLOZAPINE     Send Outs Misc Test Code 54     Send Outs Misc Test Specimen SERUM     Location Performed [facility]     Result PENDING     Normal Range for Send Outs Misc Test PENDING    CBC with platelets differential   Result Value Ref Range    WBC 4.6 4.0 - 11.0 10e9/L    RBC Count 4.12 3.8 - 5.2 10e12/L    Hemoglobin 12.6 11.7 - 15.7 g/dL    Hematocrit 37.8 35.0 - 47.0 %    MCV 92 78 - 100 fl    MCH 30.6 26.5 - 33.0 pg    MCHC 33.3 31.5 - 36.5 g/dL    RDW 12.7 10.0 - 15.0 %    Platelet Count 282 150 - 450 10e9/L    Diff Method Automated Method     % Neutrophils 48.6 %    % Lymphocytes 39.0 %    % Monocytes 6.5 %    % Eosinophils 5.2 %    % Basophils 0.7 %    % Immature Granulocytes 0.0 %    Nucleated RBCs 0 0 /100    Absolute Neutrophil 2.2 1.6 - 8.3 10e9/L    Absolute Lymphocytes 1.8 0.8 - 5.3 10e9/L    Absolute  Monocytes 0.3 0.0 - 1.3 10e9/L    Absolute Eosinophils 0.2 0.0 - 0.7 10e9/L    Absolute Basophils 0.0 0.0 - 0.2 10e9/L    Abs Immature Granulocytes 0.0 0 - 0.4 10e9/L    Absolute Nucleated RBC 0.0    EKG 12-lead, complete   Result Value Ref Range    Interpretation ECG Click View Image link to view waveform and result

## 2019-03-26 VITALS
WEIGHT: 166 LBS | BODY MASS INDEX: 29.41 KG/M2 | RESPIRATION RATE: 12 BRPM | HEART RATE: 90 BPM | HEIGHT: 63 IN | OXYGEN SATURATION: 98 % | SYSTOLIC BLOOD PRESSURE: 113 MMHG | TEMPERATURE: 98.3 F | DIASTOLIC BLOOD PRESSURE: 74 MMHG

## 2019-03-26 PROCEDURE — 99238 HOSP IP/OBS DSCHRG MGMT 30/<: CPT | Mod: GC | Performed by: PSYCHIATRY & NEUROLOGY

## 2019-03-26 ASSESSMENT — ACTIVITIES OF DAILY LIVING (ADL)
DRESS: STREET CLOTHES;INDEPENDENT
ORAL_HYGIENE: INDEPENDENT
HYGIENE/GROOMING: INDEPENDENT

## 2019-03-26 NOTE — DISCHARGE INSTRUCTIONS
Behavioral Discharge Planning and Instructions      Summary:  You were admitted on 3/19/2019  due to suicidal ideation and increased audiotry hallucinations due to recent non adherence with clozapine medication..  You were treated by Dr. Farzana Santoyo MD and discharged on 3/26/19 from [facility] to Home      Principal Diagnosis: Schizoaffective disorder, bipolar type     Health Care Follow-up Appointments:     Tuesday March 26, 2019 - ACT team RN to  for discharge from hospital    Continue working with your ACT team daily   [facility] ([facility])  [address] [place] [address] Main ph: [phone]  Fax: [phone]  Psychiatrist - Dr. Gaviria  Nurse - JOSEPHINE Pettit MA - Mental Health Practitioner     Attend all scheduled appointments with your outpatient providers. Call at least 24 hours in advance if you need to reschedule an appointment to ensure continued access to your outpatient providers.   Major Treatments, Procedures and Findings:  You were provided with: a psychiatric assessment, assessed for medical stability, medication evaluation and/or management, group therapy and milieu management    Symptoms to Report: feeling more aggressive, increased confusion, losing more sleep, mood getting worse or thoughts of suicide    Early warning signs can include: increased depression or anxiety sleep disturbances increased thoughts or behaviors of suicide or self-harm  increased unusual thinking, such as paranoia or hearing voices    Safety and Wellness:  Take all medicines as directed.  Make no changes unless your doctor suggests them.      Follow treatment recommendations.  Refrain from alcohol and non-prescribed drugs.     Resources:   Crisis Intervention: 506.217.1038 or 392-008-1985 (TTY: [phone]).  Call anytime for help.  [employer] [place] [employer] (www.[place].doron.org): 111.986.6235 or 988-168-1389.  [facility] (COPE) Response - Adult [phone]    The treatment team has appreciated the opportunity to work with you.     If you have any questions or concerns our unit number is [phone]

## 2019-03-26 NOTE — PLAN OF CARE
"\"I'm better, I'm not hearing voices no more\". Denies depression and anxiety. \"I was crying and sad when I first came in\". Thoughts are more clear and is able to focus and concentrate better. \"I went to one group yesterday\". States is social with peers. Read over discharge instructions with patient and answered questions. Was discharged with ACT team nurse at 1045.   "

## 2019-03-27 ENCOUNTER — TELEPHONE (OUTPATIENT)
Dept: FAMILY MEDICINE | Facility: CLINIC | Age: 39
End: 2019-03-27

## 2019-03-27 NOTE — TELEPHONE ENCOUNTER
MTM referral from: Transitions of Care (recent hospital discharge or ED visit)    MTM referral outreach attempt #1 on March 27, 2019 at 12:29 PM      Outcome: Patient is not interested at this time because they are a smileys patient, will route to MTM Pharmacist/Provider as an FYI. Thank you for the referral.     Birdie Freeman, MTM Coordinator

## 2019-05-21 ENCOUNTER — TELEPHONE (OUTPATIENT)
Dept: BEHAVIORAL HEALTH | Facility: CLINIC | Age: 39
End: 2019-05-21

## 2019-05-21 ENCOUNTER — HOSPITAL ENCOUNTER (INPATIENT)
Facility: CLINIC | Age: 39
LOS: 9 days | Discharge: HOME | DRG: 885 | End: 2019-05-31
Attending: PSYCHIATRY & NEUROLOGY | Admitting: PSYCHIATRY & NEUROLOGY
Payer: COMMERCIAL

## 2019-05-21 DIAGNOSIS — G47.00 INSOMNIA, UNSPECIFIED TYPE: Primary | ICD-10-CM

## 2019-05-21 DIAGNOSIS — F25.0 SCHIZOAFFECTIVE DISORDER, BIPOLAR TYPE (H): ICD-10-CM

## 2019-05-21 DIAGNOSIS — F25.8 OTHER SCHIZOAFFECTIVE DISORDERS (H): ICD-10-CM

## 2019-05-21 PROCEDURE — 99285 EMERGENCY DEPT VISIT HI MDM: CPT | Performed by: PSYCHIATRY & NEUROLOGY

## 2019-05-21 PROCEDURE — 80307 DRUG TEST PRSMV CHEM ANLYZR: CPT | Performed by: PSYCHIATRY & NEUROLOGY

## 2019-05-21 PROCEDURE — 99285 EMERGENCY DEPT VISIT HI MDM: CPT | Mod: Z6 | Performed by: PSYCHIATRY & NEUROLOGY

## 2019-05-21 PROCEDURE — 80320 DRUG SCREEN QUANTALCOHOLS: CPT | Performed by: PSYCHIATRY & NEUROLOGY

## 2019-05-21 PROCEDURE — 81025 URINE PREGNANCY TEST: CPT | Performed by: PSYCHIATRY & NEUROLOGY

## 2019-05-21 ASSESSMENT — ENCOUNTER SYMPTOMS
NERVOUS/ANXIOUS: 1
SHORTNESS OF BREATH: 0
DYSPHORIC MOOD: 0
HALLUCINATIONS: 1
SLEEP DISTURBANCE: 1
DIZZINESS: 0
ABDOMINAL PAIN: 0
FEVER: 0
CHEST TIGHTNESS: 0
BACK PAIN: 0

## 2019-05-21 NOTE — ED PROVIDER NOTES
History     Chief Complaint   Patient presents with     Suicidal     Pt has been off of her clozapine for 4 days. Auditory Hallucinations telling her to kill herself. Unwilling to resume taking her meds. Pt hasn't slept in 3 days, voices are constant     The history is provided by the patient and medical records.     Nona Finley is a 38 year old female who comes in due to her worsening symptoms.  She was brought in by her ACT team RN.  She has stopped taking her clozaril due to concerns it has marijuana in it.  She has started to hear voices telling her to kill herself.  She is scared she will not be able to ignore the voices as they are constant.  She has also started seeing people. She has a history of schizoaffective disorder and has had a similar pattern in the past where she stops meds due to some delusion about the medications causing worsening symptoms and needing hospitalization. Her last admit was in March 2019.  She is not sleeping currently.  She lives with her  and kids.  She denies drug use.  She feels scared and is not sure she can stay safe.  She is not sure she wants to take medications but is willing to come into the hospital to be in a safe spot.      I have reviewed the Medications, Allergies, Past Medical and Surgical History, and Social History in the Epic system.    Review of Systems   Constitutional: Negative for fever.   Eyes: Negative for visual disturbance.   Respiratory: Negative for chest tightness and shortness of breath.    Cardiovascular: Negative for chest pain.   Gastrointestinal: Negative for abdominal pain.   Musculoskeletal: Negative for back pain.   Neurological: Negative for dizziness.   Psychiatric/Behavioral: Positive for hallucinations, sleep disturbance and suicidal ideas. Negative for dysphoric mood and self-injury. The patient is nervous/anxious.    All other systems reviewed and are negative.      Physical Exam   BP: 106/60  Pulse: 88  Temp: 98.3  F (36.8   C)  Resp: 16  Weight: 76.2 kg (168 lb)  SpO2: 98 %      Physical Exam   Constitutional: She is oriented to person, place, and time. She appears well-developed and well-nourished.   Cardiovascular: Normal rate, regular rhythm and normal heart sounds.   Pulmonary/Chest: Effort normal and breath sounds normal. No respiratory distress.   Neurological: She is alert and oriented to person, place, and time.   Psychiatric: Her speech is normal and behavior is normal. Judgment normal. Her mood appears anxious. She is actively hallucinating. Thought content is delusional. Thought content is not paranoid. Cognition and memory are normal. She expresses suicidal ideation. She expresses no homicidal ideation. She expresses no suicidal plans and no homicidal plans.   Nona is a 37 y/o female who looks her age.  She is well groomed with good eye contact.   Nursing note and vitals reviewed.      ED Course        Procedures         Labs Ordered and Resulted from Time of ED Arrival Up to the Time of Departure from the ED - No data to display         Assessments & Plan (with Medical Decision Making)   Nona will be admitted to the hospital due to her medication noncompliance leading to her worsening symptoms and suicidal thoughts.  At the time of this note, there was no bed available.  She will stay in the ED until a bed is available.      I have reviewed the nursing notes.    I have reviewed the findings, diagnosis, plan and need for follow up with the patient.       Medication List      There are no discharge medications for this visit.       ADDENDUM:  A bed opened up on station 20 under Dr. Freed.      Final diagnoses:   Schizoaffective disorder, bipolar type (H)       5/21/2019   [facility], [place], EMERGENCY DEPARTMENT     Volodymyr Santos MD  05/21/19 0465       Volodymyr Santos MD  05/21/19 0973

## 2019-05-21 NOTE — TELEPHONE ENCOUNTER
"S: Dr Santos gave clinical saying pt was bib her ACT team worker to er due to psychosis.  B: hx with Dr Santoyo in March. Hx of schizoaffective d/o. Pt says she stopped her clozaril 4 days ago b/c \"someone put jean mariej in it.\" She said for the last 2 days she has had aud flores's telling her to kill herself. She reports seeing people who are not actually there. No sleep for past 2 days. Pt denies drug use. Utox pending collection. Preg test pending collection. No chronic med prob's.   A: vol, coop, med cleared, SI, appears delusional, walking indep  R: pt reports she is willing to admit elsewhere in the [facility] if no bed available here; pagesavanna Freed at 2:12 pm    #20/virgilio accepted for himself                            Unit RN informed at 2:15 pm,   Er informed at 2:18 pm  "

## 2019-05-21 NOTE — ED NOTES
Pt was brought in by her ACT RN Johanne Craft and can be reached at [phone]  Or her CM Deanna can also be reached at that number.

## 2019-05-21 NOTE — ED NOTES
ED to Behavioral Floor Handoff    SITUATION  Nona Finley is a 38 year old female who speaks Tristanian and lives in a home with family members The patient arrived in the ED by private car from home with a complaint of Suicidal (Pt has been off of her clozapine for 4 days. Auditory Hallucinations telling her to kill herself. Unwilling to resume taking her meds. Pt hasn't slept in 3 days, voices are constant)  .The patient's current symptoms started/worsened 4 day(s) ago and during this time the symptoms have increased.   In the ED, pt was diagnosed with   Final diagnoses:   Schizoaffective disorder, bipolar type (H)        Initial vitals were: BP: 106/60  Pulse: 88  Temp: 98.3  F (36.8  C)  Resp: 16  Weight: 76.2 kg (168 lb)  SpO2: 98 %   --------  Is the patient diabetic? No   If yes, last blood glucose? --     If yes, was this treated in the ED? --  --------  Is the patient inebriated (ETOH) No or Impaired on other substances? No  MSSA done? No  Last MSSA score: --    Were withdrawal symptoms treated? No  Does the patient have a seizure history? No. If yes, date of most recent seizure--  --------  Is the patient patient experiencing suicidal ideation? denies current or recent suicidal ideation     Homicidal ideation? denies current or recent homicidal ideation or behaviors.    Self-injurious behavior/urges? denies current or recent self injurious behavior or ideation.  ------  Was pt aggressive in the ED No  Was a code called No  Is the pt now cooperative? Yes  -------  Meds given in ED: Medications - No data to display   Family present during ED course? No  Family currently present? No    BACKGROUND  Does the patient have a cognitive impairment or developmental disability? No  Allergies: No Known Allergies.   Social demographics are   Social History     Socioeconomic History     Marital status:      Spouse name: None     Number of children: 2     Years of education: None     Highest education level: None    Occupational History     Employer: NONE    Social Needs     Financial resource strain: None     Food insecurity:     Worry: None     Inability: None     Transportation needs:     Medical: None     Non-medical: None   Tobacco Use     Smoking status: Never Smoker     Smokeless tobacco: Never Used   Substance and Sexual Activity     Alcohol use: No     Drug use: No     Sexual activity: Yes     Partners: Male     Birth control/protection: None   Lifestyle     Physical activity:     Days per week: None     Minutes per session: None     Stress: None   Relationships     Social connections:     Talks on phone: None     Gets together: None     Attends Shinto service: None     Active member of club or organization: None     Attends meetings of clubs or organizations: None     Relationship status: None     Intimate partner violence:     Fear of current or ex partner: None     Emotionally abused: None     Physically abused: None     Forced sexual activity: None   Other Topics Concern      Service Not Asked     Blood Transfusions No     Caffeine Concern Not Asked     Occupational Exposure No     Hobby Hazards Not Asked     Sleep Concern Not Asked     Stress Concern Not Asked     Weight Concern Not Asked     Special Diet Not Asked     Back Care Not Asked     Exercise Not Asked     Bike Helmet Not Asked     Seat Belt Not Asked     Self-Exams Not Asked     Parent/sibling w/ CABG, MI or angioplasty before 65F 55M? Not Asked   Social History Narrative    Caffeine intake/servings daily - 2 times a week    Calcium intake/servings daily - 1-2    Exercise no times weekly - describe active with children    Sunscreen used - soemtimes    Seatbelts used - Yes    Guns stored in the home - Yes    Self Breast Exam - Yes    Pap test up to date -  Yes    Eye exam up to date -  No    Dental exam up to date -  No    DEXA scan up to date -  Not Applicable    Flex Sig/Colonoscopy up to date -  Not Applicable    Mammography up to date -   Not Applicable    Immunizations reviewed and up to date - needs tdap at postpartum    Abuse: Current or Past (Physical, Sexual or Emotional) - No    Do you feel safe in your environment - Yes    Do you cope well with stress - sometimes    Do you suffer from insomnia - No    Last updated by: Joanne Gilligan RN 12/16/10                            ASSESSMENT  Labs results Labs Ordered and Resulted from Time of ED Arrival Up to the Time of Departure from the ED - No data to display   Imaging Studies: No results found for this or any previous visit (from the past 24 hour(s)).   Most recent vital signs /60   Pulse 88   Temp 98.3  F (36.8  C) (Oral)   Resp 16   Wt 76.2 kg (168 lb)   LMP 05/14/2019 (Approximate)   SpO2 98%   BMI 29.76 kg/m     Abnormal labs/tests/findings requiring intervention:---   Pain control: pt had none  Nausea control: pt had none    RECOMMENDATION  Are any infection precautions needed (MRSA, VRE, etc.)? No If yes, what infection? --  ---  Does the patient have mobility issues? independently. If yes, what device does the pt use? ---  ---  Is patient on 72 hour hold or commitment? No If on 72 hour hold, have hold and rights been given to patient? No  Are admitting orders written if after 10 p.m. ?No  Tasks needing to be completed:---     Luisa webb--    [phone] [place] ED   [phone] [facility] ED

## 2019-05-21 NOTE — PHARMACY-ADMISSION MEDICATION HISTORY
Admission medication history for the May 21, 2019 admission is complete.     Interview sources: ACT team - [phone]    Reliability of source: Good    Medication compliance: Poor - patient seemingly stopped taking clozapine a few days ago.    Preferred Outpatient Pharmacy: [facility] [phone]     Changes made to PTA medication list (reason)  Added: none  Deleted: none  Changed: none    Additional medication history information:     Per ACT team last Maintena dose was 5/7/19.  Clozapine was delivered and signed to patient on 5/19/19, however, ACT team could not endorse patient was taking medication.    Prior to Admission Medication List:  Prior to Admission medications    Medication Sig Last Dose Taking? Auth Provider   cloZAPine (FAZACLO) 100 MG ODT Take 1 tablet (100 mg) by mouth At Bedtime 5/17/2019 at Unknown time Yes Farzana Santoyo MD   ARIPiprazole ER (ABILIFY MAINTENA) 400 MG extended release inj syringe Inject 400 mg into the muscle every 28 days  5/7/2019  Aly Lane MD     Time spent: 15 minutes    Medication history completed by:   Alba Perez, Pharm.D. [facility]  Available daily from 1-9 pm  Phone: [phone] Ascom:*[phone] Pager: [phone]

## 2019-05-22 LAB
BASOPHILS # BLD AUTO: 0 10E9/L (ref 0–0.2)
BASOPHILS NFR BLD AUTO: 0.4 %
DIFFERENTIAL METHOD BLD: NORMAL
EOSINOPHIL # BLD AUTO: 0.1 10E9/L (ref 0–0.7)
EOSINOPHIL NFR BLD AUTO: 2.6 %
IMM GRANULOCYTES # BLD: 0 10E9/L (ref 0–0.4)
IMM GRANULOCYTES NFR BLD: 0.2 %
LYMPHOCYTES # BLD AUTO: 2.3 10E9/L (ref 0.8–5.3)
LYMPHOCYTES NFR BLD AUTO: 42.8 %
MONOCYTES # BLD AUTO: 0.4 10E9/L (ref 0–1.3)
MONOCYTES NFR BLD AUTO: 6.7 %
NEUTROPHILS # BLD AUTO: 2.6 10E9/L (ref 1.6–8.3)
NEUTROPHILS NFR BLD AUTO: 47.3 %
NRBC # BLD AUTO: 0 10*3/UL
NRBC BLD AUTO-RTO: 0 /100
WBC # BLD AUTO: 5.4 10E9/L (ref 4–11)

## 2019-05-22 PROCEDURE — 99223 1ST HOSP IP/OBS HIGH 75: CPT | Mod: AI | Performed by: PSYCHIATRY & NEUROLOGY

## 2019-05-22 PROCEDURE — 25000132 ZZH RX MED GY IP 250 OP 250 PS 637: Performed by: PSYCHIATRY & NEUROLOGY

## 2019-05-22 PROCEDURE — 85048 AUTOMATED LEUKOCYTE COUNT: CPT | Performed by: PSYCHIATRY & NEUROLOGY

## 2019-05-22 PROCEDURE — 12400001 ZZH R&B MH UMMC

## 2019-05-22 PROCEDURE — 85004 AUTOMATED DIFF WBC COUNT: CPT | Performed by: PSYCHIATRY & NEUROLOGY

## 2019-05-22 PROCEDURE — 36415 COLL VENOUS BLD VENIPUNCTURE: CPT | Performed by: PSYCHIATRY & NEUROLOGY

## 2019-05-22 PROCEDURE — 99207 ZZC CDG-UP CODE -MED NECESSITY: CPT | Performed by: PSYCHIATRY & NEUROLOGY

## 2019-05-22 RX ORDER — ALUMINA, MAGNESIA, AND SIMETHICONE 2400; 2400; 240 MG/30ML; MG/30ML; MG/30ML
30 SUSPENSION ORAL EVERY 4 HOURS PRN
Status: DISCONTINUED | OUTPATIENT
Start: 2019-05-22 | End: 2019-05-31 | Stop reason: HOSPADM

## 2019-05-22 RX ORDER — OLANZAPINE 5 MG/1
5 TABLET ORAL
Status: DISCONTINUED | OUTPATIENT
Start: 2019-05-22 | End: 2019-05-31 | Stop reason: HOSPADM

## 2019-05-22 RX ORDER — BISACODYL 10 MG
10 SUPPOSITORY, RECTAL RECTAL DAILY PRN
Status: DISCONTINUED | OUTPATIENT
Start: 2019-05-22 | End: 2019-05-31 | Stop reason: HOSPADM

## 2019-05-22 RX ORDER — TRAZODONE HYDROCHLORIDE 50 MG/1
50 TABLET, FILM COATED ORAL
Status: DISCONTINUED | OUTPATIENT
Start: 2019-05-22 | End: 2019-05-31 | Stop reason: HOSPADM

## 2019-05-22 RX ORDER — HYDROXYZINE HYDROCHLORIDE 25 MG/1
25 TABLET, FILM COATED ORAL EVERY 4 HOURS PRN
Status: DISCONTINUED | OUTPATIENT
Start: 2019-05-22 | End: 2019-05-31 | Stop reason: HOSPADM

## 2019-05-22 RX ORDER — ACETAMINOPHEN 325 MG/1
650 TABLET ORAL EVERY 4 HOURS PRN
Status: DISCONTINUED | OUTPATIENT
Start: 2019-05-22 | End: 2019-05-31 | Stop reason: HOSPADM

## 2019-05-22 RX ORDER — OLANZAPINE 10 MG/2ML
5 INJECTION, POWDER, FOR SOLUTION INTRAMUSCULAR
Status: DISCONTINUED | OUTPATIENT
Start: 2019-05-22 | End: 2019-05-31 | Stop reason: HOSPADM

## 2019-05-22 RX ORDER — CLOZAPINE 25 MG/1
25 TABLET, ORALLY DISINTEGRATING ORAL AT BEDTIME
Status: DISCONTINUED | OUTPATIENT
Start: 2019-05-22 | End: 2019-05-24

## 2019-05-22 RX ADMIN — CLOZAPINE 25 MG: 25 TABLET, ORALLY DISINTEGRATING ORAL at 22:06

## 2019-05-22 RX ADMIN — TRAZODONE HYDROCHLORIDE 50 MG: 50 TABLET ORAL at 22:06

## 2019-05-22 ASSESSMENT — ACTIVITIES OF DAILY LIVING (ADL)
FALL_HISTORY_WITHIN_LAST_SIX_MONTHS: NO
COGNITION: 0 - NO COGNITION ISSUES REPORTED
LAUNDRY: WITH SUPERVISION
RETIRED_EATING: 0-->INDEPENDENT
BATHING: 0-->INDEPENDENT
AMBULATION: 0-->INDEPENDENT
SWALLOWING: 0-->SWALLOWS FOODS/LIQUIDS WITHOUT DIFFICULTY
TRANSFERRING: 0-->INDEPENDENT
RETIRED_EATING: 0-->INDEPENDENT
RETIRED_COMMUNICATION: 0-->UNDERSTANDS/COMMUNICATES WITHOUT DIFFICULTY
DRESS: INDEPENDENT
TRANSFERRING: 0-->INDEPENDENT
AMBULATION: 0-->INDEPENDENT
COGNITION: 0 - NO COGNITION ISSUES REPORTED
ORAL_HYGIENE: INDEPENDENT
TOILETING: 0-->INDEPENDENT
HYGIENE/GROOMING: INDEPENDENT
DRESS: 0-->INDEPENDENT
BATHING: 0-->INDEPENDENT
FALL_HISTORY_WITHIN_LAST_SIX_MONTHS: NO
TOILETING: 0-->INDEPENDENT
RETIRED_COMMUNICATION: 0-->UNDERSTANDS/COMMUNICATES WITHOUT DIFFICULTY
SWALLOWING: 0-->SWALLOWS FOODS/LIQUIDS WITHOUT DIFFICULTY
DRESS: 0-->INDEPENDENT

## 2019-05-22 NOTE — PROGRESS NOTES
19 0914   Patient Belongings   Did you bring any home meds/supplements to the hospital?  No   Patient Belongings remains with patient;sent to security per site process;locker   Patient Belongings Remaining with Patient necklace;earrings;clothing   Patient Belongings Put in Hospital Secure Location (Security or Locker, etc.) cash/credit card;money (see comment)   Belongings Search Yes   Clothing Search Yes   Second Staff Randa Machado sent to security (envelope #[phone]) : $51 cash (2-$20's, 2-$5's, 1-$1), [place] drivers license, social security, [facility] Visa - [phone]    Belongings in locker or with pt as appropriate: sweater, deodorant, 2 dresses, skirts, several scarves, purse, bra, shoes, 2 gold-tone earring, gold-tone necklace, loose change, cell phone, cell phone , several various insurance cards, several receipts, hospital paperwork, 2 tubes of skin cream, wallet, several  licenses    A               Admission:  I am responsible for any personal items that are not sent to the safe or pharmacy.  [facility] is not responsible for loss, theft or damage of any property in my possession.    Signature:  _________________________________ Date: _______  Time: _____                                              Staff Signature:  ____________________________ Date: ________  Time: _____      2nd Staff person, if patient is unable/unwilling to sign:    Signature: ________________________________ Date: ________  Time: _____     Discharge:  [facility] has returned all of my personal belongings:    Signature: _________________________________ Date: ________  Time: _____                                          Staff Signature:  ____________________________ Date: ________  Time: _____

## 2019-05-22 NOTE — PLAN OF CARE
A 38 year old female pt admitted to stn: 20 from the the ED due to command auditory hallucinations telling her to kill herself.  Pt stopped taking her clozaril due to concerns that it has marijuana.  Pt has a history of schizoaffective disorder and has had a similar pattern in the past where she stops meds due to some delusion about the medications causing worsening symptoms and needing hospitalization. Her last admit was in March 2019.  According to report, pt has not been sleeping.  Pt lives with her  and her children.  Pt was calm and co-operative with the admission process and search protocol.  Pt contracts for safety on the unit and says she feels safe in the hospital.

## 2019-05-22 NOTE — H&P
"[facility], [place]   Psychiatric History and Physical  Admission date: 5/21/2019        Chief Complaint:   \"I stopped the Clozaril because something was in it.\"        HPI:     The patient is a 37yo female with a history of schizoaffective disorder who was admitted after endorsing CAH to harm herself after stopping her Clozaril. Says that she stopped it because there is marijuana in it. Does admit that this could be her illness. Somewhat agreeable to restart it. Does have a history of recent admission under similar circumstances in March. Mood is depressed. Not sleeping well. Appetite is good. Does endorse CAH and is tearful when speaking about this. Denies SI.          Past Psychiatric History:     Follows with an ACT team. History of multiple hospitalizations including in March of this year. History of ECT in 2012.         Substance Use and History:     Denies alcohol or drug use. Non-smoker.         Past Medical History:   PAST MEDICAL HISTORY:   Past Medical History:   Diagnosis Date     Bipolar 1 disorder (H)      Bipolar affective disorder, depressed, severe, with psychotic behavior (H)      NONSPECIFIC MEDICAL HISTORY     h/o +PPD. Neg CXR 2006     Postpartum depression 8/18/2011     Schizo-affective schizophrenia (H)      Tuberculosis      Varicosities        PAST SURGICAL HISTORY:   Past Surgical History:   Procedure Laterality Date     NO HISTORY OF SURGERY       NO HISTORY OF SURGERY  06/13/2013    Per patient reported this             Family History:   FAMILY HISTORY:   Family History   Problem Relation Age of Onset     Respiratory Father         asthma     Asthma Father      Cerebrovascular Disease Mother      Diabetes Mother      Neurologic Disorder Brother         mental health problem?     Neurologic Disorder Sister         seizures (half sister)     Respiratory Paternal Grandfather         asthma     Respiratory Sister         asthma     Respiratory Brother         " asthma     Neurologic Disorder Daughter         autism      Hypertension Maternal Grandmother      Neurologic Disorder Sister         seizures     Diabetes Maternal Grandfather      Coronary Artery Disease Son            Social History:   Please see the full psychosocial profile from the clinical treatment coordinator.   SOCIAL HISTORY:   Social History     Tobacco Use     Smoking status: Never Smoker     Smokeless tobacco: Never Used   Substance Use Topics     Alcohol use: No            Physical ROS:   The patient endorsed poor sleep. The remainder of 10-point review of systems was negative except as noted in HPI.         PTA Medications:     Medications Prior to Admission   Medication Sig Dispense Refill Last Dose     cloZAPine (FAZACLO) 100 MG ODT Take 1 tablet (100 mg) by mouth At Bedtime 3 tablet 0 5/17/2019 at Unknown time     ARIPiprazole ER (ABILIFY MAINTENA) 400 MG extended release inj syringe Inject 400 mg into the muscle every 28 days  1 Syringe 0 5/7/2019          Allergies:   No Known Allergies       Labs:     Recent Results (from the past 48 hour(s))   HCG qualitative urine (UPT)    Collection Time: 05/21/19  3:29 PM   Result Value Ref Range    HCG Qual Urine Negative NEG^Negative   Drug abuse screen 6 urine (chem dep)    Collection Time: 05/21/19  3:29 PM   Result Value Ref Range    Amphetamine Qual Urine Negative NEG^Negative    Barbiturates Qual Urine Negative NEG^Negative    Benzodiazepine Qual Urine Negative NEG^Negative    Cannabinoids Qual Urine Negative NEG^Negative    Cocaine Qual Urine Negative NEG^Negative    Ethanol Qual Urine Negative NEG^Negative    Opiates Qualitative Urine Negative NEG^Negative   WBC and differential    Collection Time: 05/22/19 12:19 PM   Result Value Ref Range    WBC 5.4 4.0 - 11.0 10e9/L    Diff Method Automated Method     % Neutrophils 47.3 %    % Lymphocytes 42.8 %    % Monocytes 6.7 %    % Eosinophils 2.6 %    % Basophils 0.4 %    % Immature Granulocytes 0.2 %     Nucleated RBCs 0 0 /100    Absolute Neutrophil 2.6 1.6 - 8.3 10e9/L    Absolute Lymphocytes 2.3 0.8 - 5.3 10e9/L    Absolute Monocytes 0.4 0.0 - 1.3 10e9/L    Absolute Eosinophils 0.1 0.0 - 0.7 10e9/L    Absolute Basophils 0.0 0.0 - 0.2 10e9/L    Abs Immature Granulocytes 0.0 0 - 0.4 10e9/L    Absolute Nucleated RBC 0.0           Physical and Psychiatric Examination:     /62   Pulse 88   Temp 97.9  F (36.6  C) (Oral)   Resp 16   Wt 76.2 kg (168 lb)   LMP 05/14/2019 (Approximate)   SpO2 98%   BMI 29.76 kg/m    Weight is 168 lbs 0 oz  Body mass index is 29.76 kg/m .    Physical Exam:  I have reviewed the physical exam as documented by by the medical team and agree with findings and assessment and have no additional findings to add at this time.    Mental Status Exam:  Appearance: awake, alert and adequately groomed  Attitude:  somewhat cooperative  Eye Contact:  intense  Mood:  depressed  Affect:  intensity is blunted  Speech:  paucity of speech  Language: fluent and intact in English  Psychomotor, Gait, Musculoskeletal:  physical retardation  Thought Process:  illogical  Associations:  no loose associations  Thought Content:  no evidence of suicidal ideation or homicidal ideation, auditory hallucinations present and obsessions present  Insight:  partial  Judgement:  poor  Oriented to:  time, person, and place  Attention Span and Concentration:  fair  Recent and Remote Memory:  fair  Fund of Knowledge:  appropriate         Admission Diagnoses:   Schizoaffective disorder, bipolar type (H)         Assessment & Plan:     1) Restart Clozaril at 25mg at bedtime.   2) Continue Abilify Maintena - last dose was 5/7/19    Disposition Plan   Reason for ongoing admission: is unable to care for self due to severe psychosis or david  Discharge location: TBD  Discharge Medications: not ordered  Follow-up Appointments: not scheduled  Legal Status: voluntary  Entered by: Kobe Freed on 5/22/2019 at 1:14 PM

## 2019-05-22 NOTE — PLAN OF CARE
BEHAVIORAL TEAM DISCUSSION    Participants: Todd Short (CTC), Gloria Naqvi (OT), Janelle Davidson (RN)  Progress: Continuing to assess.   Continued Stay Criteria/Rationale: Assessment, evaluation, and recommendations.   Medical/Physical: None.   Precautions:   Behavioral Orders   Procedures     Code 1 - Restrict to Unit     Routine Programming     As clinically indicated     Status 15     Every 15 minutes.     Plan: The plan is to assess the patient for mental health and medication needs. The patient will be prescribed medications to treat the identified symptoms. Patient will participate in therapeutic skill building groups on the unit. CTC to coordinate discharge/after care planning.     Rationale for change in precautions or plan: None.

## 2019-05-22 NOTE — PROGRESS NOTES
Initial Psychosocial Assessment  I have reviewed the chart, met with the patient, and developed Care Plan. Information for assessment was obtained from the patient, the patient's medical chart, and the patient's DEC assessment.      Presenting Problem: Nona Finley is a 38 year old female who comes in due to her worsening symptoms.  She was brought in by her ACT team RN.  She has stopped taking her clozaril due to concerns it has marijuana in it.  She has started to hear voices telling her to kill herself.  She is scared she will not be able to ignore the voices as they are constant.  She has also started seeing people. She has a history of schizoaffective disorder and has had a similar pattern in the past where she stops meds due to some delusion about the medications causing worsening symptoms and needing hospitalization. Her last admit was in March 2019.  She is not sleeping currently.  She lives with her  and kids.  She denies drug use.  She feels scared and is not sure she can stay safe.  She is not sure she wants to take medications but is willing to come into the hospital to be in a safe spot. - ED Provider Note (Volodymyr Santos MD, 05/21/19)    History of Mental Health and Chemical Dependency: The patient has a history of multiple mental health hospitalizations with the last being in March 2019 at [facility]. The patient denies any drug use.     Significant Life Events  (Illness, Abuse, Trauma, Death): The patient has lived through war in [place]. The patient also experienced the passing of her father in 2016.      Family/Living Situation: The patient lives at home with her  and 6 children. The patient's chart indicates that the patient's  is the primary income provider and is going away soon for a residency program. The patient also reports that her oldest son has Autism.      Educational Background: The patient's highest level of education completed is 5th grade.      Financial Status:  The patient is not currently employed.      Legal Issues: The patient is hospitalized voluntarily. She is also currently committed with a [facility]. The commitment is set to end on July 3rd, 2019.       Ethnic/Cultural Considerations: The patient is from [place].      Spiritual Orientation: Adventist.       Service History: None.      Social Functioning (organization, interests): Unable to assess.      Current Treatment Providers are:  Primary Care: [facility]  Psychiatry: Khadra Navarrete with [facility]   ACT Team RN: Sherry ([phone])  ACT : Selena ([phone])  Other Providers:     Social Service Assessment/Plan: CTC will explore options for follow up care and  provide a psychological assessment.Patient will be provided with a safe environment, medication management, as well as offered groups for coping skills.

## 2019-05-22 NOTE — ED NOTES
ED to Behavioral Floor Handoff    SITUATION  Nona Finley is a 38 year old female who speaks Chinese and lives in a home with family members The patient arrived in the ED by private car from home with a complaint of Suicidal (Pt has been off of her clozapine for 4 days. Auditory Hallucinations telling her to kill herself. Unwilling to resume taking her meds. Pt hasn't slept in 3 days, voices are constant)  .The patient's current symptoms started/worsened 1 week(s) ago and during this time the symptoms have increased.   In the ED, pt was diagnosed with   Final diagnoses:   Schizoaffective disorder, bipolar type (H)        Initial vitals were: BP: 106/60  Pulse: 88  Temp: 98.3  F (36.8  C)  Resp: 16  Weight: 76.2 kg (168 lb)  SpO2: 98 %   --------  Is the patient diabetic? No   If yes, last blood glucose? --     If yes, was this treated in the ED? --  --------  Is the patient inebriated (ETOH) No or Impaired on other substances? No  MSSA done? N/A  Last MSSA score: --    Were withdrawal symptoms treated? N/A  Does the patient have a seizure history? No. If yes, date of most recent seizure--  --------  Is the patient patient experiencing suicidal ideation? denies current or recent suicidal ideation     Homicidal ideation? denies current or recent homicidal ideation or behaviors.    Self-injurious behavior/urges? denies current or recent self injurious behavior or ideation.  ------  Was pt aggressive in the ED No  Was a code called No  Is the pt now cooperative? Yes  -------  Meds given in ED: Medications - No data to display   Family present during ED course? No  Family currently present? No    BACKGROUND  Does the patient have a cognitive impairment or developmental disability? No  Allergies: No Known Allergies.   Social demographics are   Social History     Socioeconomic History     Marital status:      Spouse name: None     Number of children: 2     Years of education: None     Highest education level:  None   Occupational History     Employer: NONE    Social Needs     Financial resource strain: None     Food insecurity:     Worry: None     Inability: None     Transportation needs:     Medical: None     Non-medical: None   Tobacco Use     Smoking status: Never Smoker     Smokeless tobacco: Never Used   Substance and Sexual Activity     Alcohol use: No     Drug use: No     Sexual activity: Yes     Partners: Male     Birth control/protection: None   Lifestyle     Physical activity:     Days per week: None     Minutes per session: None     Stress: None   Relationships     Social connections:     Talks on phone: None     Gets together: None     Attends Latter day service: None     Active member of club or organization: None     Attends meetings of clubs or organizations: None     Relationship status: None     Intimate partner violence:     Fear of current or ex partner: None     Emotionally abused: None     Physically abused: None     Forced sexual activity: None   Other Topics Concern      Service Not Asked     Blood Transfusions No     Caffeine Concern Not Asked     Occupational Exposure No     Hobby Hazards Not Asked     Sleep Concern Not Asked     Stress Concern Not Asked     Weight Concern Not Asked     Special Diet Not Asked     Back Care Not Asked     Exercise Not Asked     Bike Helmet Not Asked     Seat Belt Not Asked     Self-Exams Not Asked     Parent/sibling w/ CABG, MI or angioplasty before 65F 55M? Not Asked   Social History Narrative    Caffeine intake/servings daily - 2 times a week    Calcium intake/servings daily - 1-2    Exercise no times weekly - describe active with children    Sunscreen used - soemtimes    Seatbelts used - Yes    Guns stored in the home - Yes    Self Breast Exam - Yes    Pap test up to date -  Yes    Eye exam up to date -  No    Dental exam up to date -  No    DEXA scan up to date -  Not Applicable    Flex Sig/Colonoscopy up to date -  Not Applicable    Mammography up to  date -  Not Applicable    Immunizations reviewed and up to date - needs tdap at postpartum    Abuse: Current or Past (Physical, Sexual or Emotional) - No    Do you feel safe in your environment - Yes    Do you cope well with stress - sometimes    Do you suffer from insomnia - No    Last updated by: Joanne Gilligan RN 12/16/10                            ASSESSMENT  Labs results   Labs Ordered and Resulted from Time of ED Arrival Up to the Time of Departure from the ED   HCG QUALITATIVE URINE   DRUG ABUSE SCREEN 6 CHEM DEP URINE ([facility])      Imaging Studies: No results found for this or any previous visit (from the past 24 hour(s)).   Most recent vital signs /60   Pulse 88   Temp 98.3  F (36.8  C) (Oral)   Resp 16   Wt 76.2 kg (168 lb)   LMP 05/14/2019 (Approximate)   SpO2 98%   BMI 29.76 kg/m     Abnormal labs/tests/findings requiring intervention:---   Pain control: pt had none  Nausea control: pt had none    RECOMMENDATION  Are any infection precautions needed (MRSA, VRE, etc.)? No If yes, what infection? --  ---  Does the patient have mobility issues? independently. If yes, what device does the pt use? ---  ---  Is patient on 72 hour hold or commitment? No If on 72 hour hold, have hold and rights been given to patient? N/A  Are admitting orders written if after 10 p.m. ?N/A  Tasks needing to be completed:---     Carmela Quiñones   [facility] [id number]   [phone]   [phone] [facility] ED

## 2019-05-23 ENCOUNTER — TELEPHONE (OUTPATIENT)
Dept: FAMILY MEDICINE | Facility: CLINIC | Age: 39
End: 2019-05-23

## 2019-05-23 PROCEDURE — 12400001 ZZH R&B MH UMMC

## 2019-05-23 PROCEDURE — 25000132 ZZH RX MED GY IP 250 OP 250 PS 637: Performed by: PSYCHIATRY & NEUROLOGY

## 2019-05-23 RX ADMIN — CLOZAPINE 25 MG: 25 TABLET, ORALLY DISINTEGRATING ORAL at 20:22

## 2019-05-23 RX ADMIN — TRAZODONE HYDROCHLORIDE 50 MG: 50 TABLET ORAL at 20:22

## 2019-05-23 ASSESSMENT — ACTIVITIES OF DAILY LIVING (ADL)
DRESS: INDEPENDENT
HYGIENE/GROOMING: INDEPENDENT
DRESS: INDEPENDENT
LAUNDRY: WITH SUPERVISION
ORAL_HYGIENE: INDEPENDENT
ORAL_HYGIENE: INDEPENDENT
HYGIENE/GROOMING: INDEPENDENT

## 2019-05-23 NOTE — PROGRESS NOTES
Writer spoke with the patient to check-in. The patient reported that she is feeling a little better compared to yesterday. The patient continues to pace up and down the hallway and has minimum interaction with peers. Writer informed the patient that the attending is off today but will return tomorrow.

## 2019-05-23 NOTE — PROGRESS NOTES
05/23/19 0600   Sleep/Rest/Relaxation   Sleep/Rest/Relaxation appears asleep   Night Time # Hours 7 hours   Behavioral Health   Hallucinations auditory   Thinking poor concentration   Orientation place: oriented;date: oriented;person: oriented;time: oriented   Memory baseline memory   Insight poor   Judgement impaired   Affect blunted, flat;sad   Mood mood is calm   Physical Appearance/Attire appears stated age   Hygiene well groomed   Suicidality other (see comments)  (denies)   1. Wish to be Dead No   2. Non-Specific Active Suicidal Thoughts  No   Self Injury other (see comment)  (denies)   Elopement   (no value)   Activity isolative   Speech clear;coherent   Medication Sensitivity no stated side effects;no observed side effects   Psychomotor / Gait balanced;steady   Activities of Daily Living   Hygiene/Grooming independent   Oral Hygiene independent   Dress independent   Laundry with supervision   Room Organization independent         Patient is calm and cooperative. Pt Isolated and withdrawn did not go to groups. Pt denied of any mental health symptoms; but appeared to be responding to .Pt had looked sad. Pt had a good day and ate meals. No SI or SIB.

## 2019-05-23 NOTE — PLAN OF CARE
"Pt has been visible in the milieu but isolative most of the time sitting in the hallway. Pt didn't interact with peers but when approached by staff was responding. Pt presents with a flat affect and reported \"feeling sad\". Pt endorses auditory hallucinations that are telling her that she is \"worthless\" and that the medication has marijuana in it. Pt was reports feeling depressed and anxious \"10\". Pt states she wishes to be dead but denies having any plans to do anything. Pt contracted for safety while in the hospital. Pt was getting teary as writer was talking to her. Pt took her HS Clozapine and prn Trazodone for sleep.  "

## 2019-05-23 NOTE — PROGRESS NOTES
05/23/19 1536   General Information   Has Not Attended OT as of: 05/23/19     OT staff will meet with pt to review the role of occupational therapy and explain the value of having them involved in their treatment plan including options to meet current needs/self-identified goals. As group attendance is established, Pt will be given self-assessment to inform OT initial assessment.

## 2019-05-23 NOTE — TELEPHONE ENCOUNTER
MTM referral from: Transitions of Care (recent hospital discharge or ED visit)    MTM referral outreach attempt #1 on May 23, 2019 at 8:55 AM      Outcome: Patient is not interested at this time because they are a smileys patient, will route to MTM Pharmacist/Provider as an FYI. Thank you for the referral.     Birdie Freeman, MTM Coordinator

## 2019-05-24 PROCEDURE — 12400001 ZZH R&B MH UMMC

## 2019-05-24 PROCEDURE — 25000132 ZZH RX MED GY IP 250 OP 250 PS 637: Performed by: PSYCHIATRY & NEUROLOGY

## 2019-05-24 PROCEDURE — 99232 SBSQ HOSP IP/OBS MODERATE 35: CPT | Performed by: PSYCHIATRY & NEUROLOGY

## 2019-05-24 RX ORDER — CLOZAPINE 25 MG/1
50 TABLET, ORALLY DISINTEGRATING ORAL AT BEDTIME
Status: COMPLETED | OUTPATIENT
Start: 2019-05-24 | End: 2019-05-24

## 2019-05-24 RX ORDER — CLOZAPINE 100 MG/1
100 TABLET ORAL AT BEDTIME
Status: DISCONTINUED | OUTPATIENT
Start: 2019-05-26 | End: 2019-05-31 | Stop reason: HOSPADM

## 2019-05-24 RX ORDER — CLOZAPINE 25 MG/1
75 TABLET ORAL AT BEDTIME
Status: COMPLETED | OUTPATIENT
Start: 2019-05-25 | End: 2019-05-25

## 2019-05-24 RX ORDER — CLOZAPINE 25 MG/1
50 TABLET, ORALLY DISINTEGRATING ORAL AT BEDTIME
Status: DISCONTINUED | OUTPATIENT
Start: 2019-05-24 | End: 2019-05-24

## 2019-05-24 RX ADMIN — CLOZAPINE 50 MG: 25 TABLET, ORALLY DISINTEGRATING ORAL at 21:00

## 2019-05-24 RX ADMIN — ACETAMINOPHEN 650 MG: 325 TABLET, FILM COATED ORAL at 12:21

## 2019-05-24 ASSESSMENT — ACTIVITIES OF DAILY LIVING (ADL)
DRESS: INDEPENDENT
ORAL_HYGIENE: INDEPENDENT
LAUNDRY: WITH SUPERVISION
DRESS: STREET CLOTHES;INDEPENDENT
HYGIENE/GROOMING: INDEPENDENT
HYGIENE/GROOMING: HANDWASHING;SHOWER;INDEPENDENT
ORAL_HYGIENE: INDEPENDENT

## 2019-05-24 NOTE — PLAN OF CARE
"Pt was calm and pleasant on day shift; had somewhat bland affect. She stated her mood was \"a little better.\" She doesn't go to grps, walks some in [place], also naps; keeps to self. She denies si or s/sx psychosis, is a/o. Pt stated her goal for the day was \"to go home.\"  "

## 2019-05-24 NOTE — PROGRESS NOTES
received a voicemail from the patient's , Vicky. Vicky stated that she would like to touch base about the patient and what has been going on with the patient. She provided her contact information and requested a return call.      left a voicemail for Vicky and requested a return call.

## 2019-05-24 NOTE — PROGRESS NOTES
05/23/19 2200   Significant Event   Significant Event Other (see comments)  (shift summary)     Pt was visible in the milieu, calm, cooperative, withdrawn from peers, quiet, and watched TV.  Sad, auditory hallucinations and depressed.  No stated SI, and SIB.  Independent with ADLS and ate dinner.

## 2019-05-24 NOTE — PROGRESS NOTES
received a voicemail from Cindi KAUFFMAN who stated that she is covering for the patient's primary . Cindi stated that they are in the middle of revoking the patient's PD and would like for her to remain in the hospital. Cindi stated that she will be in the office until this afternoon and provided her contact for a return call.     Anabellar returned a called the patient's ACT team and spoke with Talat. Anabellar requested a copy of the patient's commitment paperwork and revocation request. Anabellar also spoke with Cindi who stated that she is planning to file the paperwork within the next hour or so for the patient's revocation. Writer informed Cindi that the patient is currently here voluntarily.

## 2019-05-25 PROCEDURE — 25000132 ZZH RX MED GY IP 250 OP 250 PS 637: Performed by: PSYCHIATRY & NEUROLOGY

## 2019-05-25 PROCEDURE — 12400001 ZZH R&B MH UMMC

## 2019-05-25 RX ADMIN — TRAZODONE HYDROCHLORIDE 50 MG: 50 TABLET ORAL at 20:54

## 2019-05-25 RX ADMIN — CLOZAPINE 75 MG: 25 TABLET ORAL at 20:54

## 2019-05-25 ASSESSMENT — ACTIVITIES OF DAILY LIVING (ADL)
LAUNDRY: WITH SUPERVISION
HYGIENE/GROOMING: WITH SUPERVISION
ORAL_HYGIENE: PROMPTS
LAUNDRY: WITH SUPERVISION
ORAL_HYGIENE: INDEPENDENT
DRESS: INDEPENDENT
HYGIENE/GROOMING: INDEPENDENT
DRESS: WITH SUPERVISION

## 2019-05-25 NOTE — PROGRESS NOTES
"   05/24/19 2000   Behavioral Health   Hallucinations denies / not responding to hallucinations   Thinking distractable   Orientation person: oriented;place: oriented   Memory baseline memory   Insight insight appropriate to situation   Judgement impaired   Eye Contact at examiner   Affect full range affect   Mood mood is calm   Physical Appearance/Attire neat   Hygiene well groomed   Suicidality other (see comments)  (denies )   1. Wish to be Dead No   2. Non-Specific Active Suicidal Thoughts  No   Self Injury other (see comment)  (denies )   Activity other (see comment)  (visible in the milieu )   Speech clear;coherent   Activities of Daily Living   Hygiene/Grooming independent   Oral Hygiene independent   Dress independent   Laundry with supervision   Room Organization independent       Pt denied SI and SIB.  Pt seems calm, ate supper and was visible in the milieu.  Pt reported \" feeling good.\"  Pt is independent with ADL's.  Pt reported no nutritional concerns.    "

## 2019-05-25 NOTE — PROGRESS NOTES
Patient slept through breakfast. Patient pacing hallway, calm but had a blunt affect. Stated medication is working and that she feels good. Denied SI/SIB and hallucinations.      05/25/19 1428   Behavioral Health   Hallucinations denies / not responding to hallucinations   Thinking distractable   Orientation person: oriented;place: oriented;date: oriented;time: oriented   Memory baseline memory   Insight admits / accepts;insight appropriate to situation   Judgement impaired   Eye Contact at examiner   Affect blunted, flat   Mood mood is calm   Physical Appearance/Attire appears stated age   Hygiene well groomed   Suicidality other (see comments)  (denied )   1. Wish to be Dead No   2. Non-Specific Active Suicidal Thoughts  No   Self Injury   (denied )   Elopement   (none observed/mentioned )   Activity withdrawn   Speech clear;coherent   Medication Sensitivity no stated side effects   Psychomotor / Gait balanced;steady   Psycho Education   Type of Intervention 1:1 intervention   Response participates, initiates socially appropriate   Hours 0.5   Treatment Detail   (check-in)   Safety   Suicidality Status 15;Room close to team care station (desk)   Activities of Daily Living   Hygiene/Grooming independent   Oral Hygiene independent   Dress independent   Laundry with supervision   Room Organization independent   Groups   Details   (did not attend groups)

## 2019-05-25 NOTE — PROGRESS NOTES
[facility], [place]   Psychiatric Progress Note  Hospital Day: 2        Interim History:   The patient's care was discussed with the treatment team during the daily team meeting and/or staff's chart notes were reviewed.  Staff report patient has been isolative. No behavioral issues.    Upon interview, the patient states that her voices are better with her one dose of clozapine. She asks about leaving soon. I explain that her outpatient CM is revoking her PD. I also indicate that we have to increase her clozapine to previous dose and will want to see some ongoing improvement of her symptoms.    Psychiatric Symptoms: hallucinations    Medication side effects reported: none    Other issues reported by patient: none         Medications:       [START ON 5/26/2019] cloZAPine  100 mg Oral At Bedtime     [START ON 5/25/2019] cloZAPine  75 mg Oral At Bedtime     cloZAPine  50 mg Oral At Bedtime          Allergies:   No Known Allergies       Labs:   No results found for this or any previous visit (from the past 48 hour(s)).       Psychiatric Examination:     /60   Pulse 87   Temp 98.4  F (36.9  C) (Oral)   Resp 16   Wt 76.2 kg (168 lb)   LMP 05/14/2019 (Approximate)   SpO2 98%   BMI 29.76 kg/m    Weight is 168 lbs 0 oz  Body mass index is 29.76 kg/m .    Orthostatic Vitals       Most Recent      Sitting Orthostatic /61 05/24 1006    Sitting Orthostatic Pulse (bpm) 89 05/24 1006    Standing Orthostatic /55 05/24 1006    Standing Orthostatic Pulse (bpm) 116 05/24 1006            Appearance: awake, alert, adequately groomed and casually dressed  Attitude:  cooperative  Eye Contact:  good  Mood:  good  Affect:  mood congruent  Speech:  clear, coherent and normal prosody  Language: fluent and intact in English  Psychomotor, Gait, Musculoskeletal:  no evidence of tardive dyskinesia, dystonia, or tics and intact station, gait and muscle tone  Throught Process:  goal  oriented  Associations:  no loose associations  Thought Content:  no evidence of suicidal ideation or homicidal ideation and no evidence of psychotic thought  Insight:  limited  Judgement:  fair  Oriented to:  time, person, and place  Attention Span and Concentration:  intact  Recent and Remote Memory:  intact  Fund of Knowledge:  appropriate    Clinical Global Impressions  First:  Considering your total clinical experience with this particular patient population, how severe are the patient's symptoms at this time?: 7 (05/22/19 1313)  Compared to the patient's condition at the START of treatment, this patient's condition is:: 4 (05/22/19 1313)  Most recent:  Considering your total clinical experience with this particular patient population, how severe are the patient's symptoms at this time?: 7 (05/22/19 1313)  Compared to the patient's condition at the START of treatment, this patient's condition is:: 4 (05/22/19 1313)           Precautions:     Behavioral Orders   Procedures     Code 1 - Restrict to Unit     Routine Programming     As clinically indicated     Status 15     Every 15 minutes.          Diagnoses:   Schizoaffective disorder, bipolar type (H)         Assessment & Plan:       Target psychiatric symptoms and interventions:  Schizoaffective disorder  -continue clozapine titration. Written to increase to 100 mg throughout weekend.  -continue Abilify Maintena. Likely will need to get following discharge    Medical Problems and Treatments:  None    Behavioral/Psychological/Social:  Encourage unit programming        Disposition Plan   Reason for ongoing admission: is unable to care for self due to severe psychosis or david  Discharge location: TBD  Discharge Medications: not ordered  Follow-up Appointments: not scheduled  Legal Status: outpatient  revoking PD  Entered by: Nathan Block on 5/24/2019 at 8:49 PM

## 2019-05-26 PROCEDURE — 25000132 ZZH RX MED GY IP 250 OP 250 PS 637: Performed by: PSYCHIATRY & NEUROLOGY

## 2019-05-26 PROCEDURE — 12400001 ZZH R&B MH UMMC

## 2019-05-26 RX ADMIN — CLOZAPINE 100 MG: 100 TABLET ORAL at 21:11

## 2019-05-26 ASSESSMENT — ACTIVITIES OF DAILY LIVING (ADL)
HYGIENE/GROOMING: INDEPENDENT
DRESS: INDEPENDENT
LAUNDRY: WITH SUPERVISION
ORAL_HYGIENE: INDEPENDENT

## 2019-05-26 NOTE — PLAN OF CARE
Pt was visible in the milieu but withdrawn. Pt was out in the milieu didn't socialize with peers. But opened to staff when approached. Presents with flat affect. Denies SI/SIB/AH/VH or any other psychotic symptoms. Pt says she feels better. Ate dinner ok.

## 2019-05-26 NOTE — PROVIDER NOTIFICATION
05/26/19 1425   Sitting Orthostatic BP   Sitting Orthostatic /63   Sitting Orthostatic Pulse 106 bpm   Standing Orthostatic BP   Standing Orthostatic /49   Standing Orthostatic Pulse 145 bpm   Oxygen Therapy   SpO2 100 %   O2 Device None (Room air)     Pt has been walking the hallways. Pt has bright affect upon approach. Pt endorses feeling dizzy when she gets up from bed or chair. Pt reported drinking plenty of fluids and ate all of her lunch.

## 2019-05-26 NOTE — PROVIDER NOTIFICATION
05/26/19 0830   Sitting Orthostatic BP   Sitting Orthostatic /62   Sitting Orthostatic Pulse 104 bpm   Standing Orthostatic BP   Standing Orthostatic BP 80/45   Standing Orthostatic Pulse 147 bpm     Rechecked BP and pulse after breakfast and fluids. See below     05/26/19 0900   Sitting Orthostatic BP   Sitting Orthostatic /65   Sitting Orthostatic Pulse 90 bpm   Standing Orthostatic BP   Standing Orthostatic BP 92/63   Standing Orthostatic Pulse 130 bpm     Pt reports dizziness when getting up from bed or chair. Writer gave pt water and encouraged pt to drink more fluids.     Appetite= pt reported eating majority of breakfast    Pt reports that her BP usually drops whenever she is back in the hospital.    Pt is on clozaril and it is being increased to her previous dose (per Dr Block's note 5/24/2019).    Notified on-call provider at 10:18am via [employer] per Order for pulse >110. P is 130 after rechecking.

## 2019-05-27 PROCEDURE — 25000132 ZZH RX MED GY IP 250 OP 250 PS 637: Performed by: PSYCHIATRY & NEUROLOGY

## 2019-05-27 PROCEDURE — 12400001 ZZH R&B MH UMMC

## 2019-05-27 RX ADMIN — CLOZAPINE 100 MG: 100 TABLET ORAL at 21:39

## 2019-05-27 ASSESSMENT — ACTIVITIES OF DAILY LIVING (ADL)
DRESS: INDEPENDENT
HYGIENE/GROOMING: INDEPENDENT
LAUNDRY: WITH SUPERVISION
ORAL_HYGIENE: INDEPENDENT

## 2019-05-27 NOTE — PLAN OF CARE
"48 hour nursing assessment: Pt evaluation continues. Assessed mood, anxiety, thoughts and behavior. Is progressing toward goals. Encourage participation in groups and developing healthy coping skills. Will continue current plan of care. Pt denies SI/SIB or hallucinations. Pt was seen pacing the halls for most of the shift. Pt didn't attend groups stating \" I don't like talking\". Pt states goal for today is to talk with her family which is part of her coping skills. Pt states she doesn't want to go to a  when she discharges, but instead would like to go to her families home. Pt was calm, cooperative, and pleasant.  "

## 2019-05-27 NOTE — PROGRESS NOTES
05/26/19 2300   Behavioral Health   Hallucinations denies / not responding to hallucinations   Thinking poor concentration   Orientation person: oriented;place: oriented   Memory baseline memory   Insight poor   Judgement impaired   Eye Contact at examiner   Affect full range affect   Mood mood is calm   Physical Appearance/Attire neat   Hygiene well groomed   Suicidality other (see comments)  (denies)   1. Wish to be Dead No   2. Non-Specific Active Suicidal Thoughts  No   Self Injury other (see comment)  (denies)   Activity other (see comment)  (visible in the milieu and socialized with others )   Speech clear;coherent   Activities of Daily Living   Hygiene/Grooming independent   Oral Hygiene independent   Dress independent   Laundry with supervision   Room Organization independent       Pt denied SI and SIB.  Pt seems calm, ate supper, pacing and was visible in the milieu.

## 2019-05-28 PROCEDURE — 25000132 ZZH RX MED GY IP 250 OP 250 PS 637: Performed by: PSYCHIATRY & NEUROLOGY

## 2019-05-28 PROCEDURE — 12400001 ZZH R&B MH UMMC

## 2019-05-28 PROCEDURE — 99231 SBSQ HOSP IP/OBS SF/LOW 25: CPT | Performed by: PSYCHIATRY & NEUROLOGY

## 2019-05-28 RX ADMIN — CLOZAPINE 100 MG: 100 TABLET ORAL at 20:23

## 2019-05-28 ASSESSMENT — ACTIVITIES OF DAILY LIVING (ADL)
LAUNDRY: WITH SUPERVISION
HYGIENE/GROOMING: INDEPENDENT
LAUNDRY: WITH SUPERVISION
ORAL_HYGIENE: INDEPENDENT
DRESS: INDEPENDENT
HYGIENE/GROOMING: INDEPENDENT
DRESS: STREET CLOTHES
ORAL_HYGIENE: INDEPENDENT

## 2019-05-28 NOTE — PROGRESS NOTES
Pt observed pacing in [place] and she wants to be discharged. She denies suicidal ideations ,being depressed or hearing voices. She has minimal interactions with others but when approached she is cooperative and pleasant. She stated that she doing much better and she wants to be home with her children.     05/28/19 1406   Behavioral Health   Hallucinations denies / not responding to hallucinations   Thinking distractable   Orientation time: oriented;date: oriented;place: oriented;person: oriented   Memory baseline memory   Insight poor   Judgement impaired   Eye Contact at examiner   Affect full range affect   Mood mood is calm   Physical Appearance/Attire appears stated age   Hygiene well groomed   Suicidality other (see comments)  (Denies)   1. Wish to be Dead No   2. Non-Specific Active Suicidal Thoughts  No   Self Injury other (see comment)  (Denies)   Elopement Statements about wanting to leave   Activity isolative;withdrawn   Speech clear;coherent   Medication Sensitivity no stated side effects   Psychomotor / Gait balanced;steady   Psycho Education   Type of Intervention 1:1 intervention   Response participates, initiates socially appropriate   Hours 0.5   Activities of Daily Living   Hygiene/Grooming independent   Oral Hygiene independent   Dress street clothes   Laundry with supervision   Room Organization independent   Activity   Activity Assistance Provided independent

## 2019-05-28 NOTE — PROGRESS NOTES
received a voicemail from the patient's , Vicky. Vicky stated that she is returning 's call from last week. She stated that she has a meeting that she will be in but will inform staff that she is waiting for a call from . Vicky provided her contact information and requested a return call.      returned a call to the patient's , Vicky.  left a voicemail for the patient's CM giving her an update and requesting information about what the plan will be for the patient.  provided her contact information and requested a return call..

## 2019-05-28 NOTE — PLAN OF CARE
Patient was alert and oriented, denies SI and SIB, was visible in the milieu and spent sometime in her room, had a bright affect, mood was calm, medication complaint, ate dinner well, admits and accepts, attire was appropriate, she is independent with her ADLs, stable at baseline will continue to monitor for changes.

## 2019-05-29 LAB
BASOPHILS # BLD AUTO: 0 10E9/L (ref 0–0.2)
BASOPHILS NFR BLD AUTO: 0.4 %
DIFFERENTIAL METHOD BLD: NORMAL
EOSINOPHIL # BLD AUTO: 0.2 10E9/L (ref 0–0.7)
EOSINOPHIL NFR BLD AUTO: 4.4 %
IMM GRANULOCYTES # BLD: 0 10E9/L (ref 0–0.4)
IMM GRANULOCYTES NFR BLD: 0.2 %
LYMPHOCYTES # BLD AUTO: 2.2 10E9/L (ref 0.8–5.3)
LYMPHOCYTES NFR BLD AUTO: 40.8 %
MONOCYTES # BLD AUTO: 0.5 10E9/L (ref 0–1.3)
MONOCYTES NFR BLD AUTO: 8.8 %
NEUTROPHILS # BLD AUTO: 2.5 10E9/L (ref 1.6–8.3)
NEUTROPHILS NFR BLD AUTO: 45.4 %
NRBC # BLD AUTO: 0 10*3/UL
NRBC BLD AUTO-RTO: 0 /100
WBC # BLD AUTO: 5.5 10E9/L (ref 4–11)

## 2019-05-29 PROCEDURE — 25000132 ZZH RX MED GY IP 250 OP 250 PS 637: Performed by: PSYCHIATRY & NEUROLOGY

## 2019-05-29 PROCEDURE — 85004 AUTOMATED DIFF WBC COUNT: CPT | Performed by: PSYCHIATRY & NEUROLOGY

## 2019-05-29 PROCEDURE — 36415 COLL VENOUS BLD VENIPUNCTURE: CPT | Performed by: PSYCHIATRY & NEUROLOGY

## 2019-05-29 PROCEDURE — 85048 AUTOMATED LEUKOCYTE COUNT: CPT | Performed by: PSYCHIATRY & NEUROLOGY

## 2019-05-29 PROCEDURE — 12400001 ZZH R&B MH UMMC

## 2019-05-29 RX ADMIN — ACETAMINOPHEN 650 MG: 325 TABLET, FILM COATED ORAL at 14:18

## 2019-05-29 RX ADMIN — CLOZAPINE 100 MG: 100 TABLET ORAL at 20:19

## 2019-05-29 ASSESSMENT — ACTIVITIES OF DAILY LIVING (ADL)
ORAL_HYGIENE: INDEPENDENT
ORAL_HYGIENE: INDEPENDENT
LAUNDRY: WITH SUPERVISION
DRESS: INDEPENDENT
HYGIENE/GROOMING: INDEPENDENT
HYGIENE/GROOMING: INDEPENDENT
DRESS: INDEPENDENT

## 2019-05-29 NOTE — PROGRESS NOTES
Writer received a voicemail from Sarita with [facility]. She stated that she was the person working on the patient's revocation and just got word that the patient is in the hospital. She requested progress notes for the patient and provided her contact info. Writer faxed the requested documentation to Sarita.

## 2019-05-29 NOTE — PROGRESS NOTES
[facility], [place]   Psychiatric Progress Note  Hospital Day: 6        Interim History:   The patient's care was discussed with the treatment team during the daily team meeting and/or staff's chart notes were reviewed.  Staff report patient has been isolative. No behavioral issues.    Upon interview, the patient continues to report that she is doing well. Feels ready to go home.    Psychiatric Symptoms: denies    Medication side effects reported: none    Other issues reported by patient: none         Medications:       cloZAPine  100 mg Oral At Bedtime          Allergies:   No Known Allergies       Labs:   No results found for this or any previous visit (from the past 48 hour(s)).       Psychiatric Examination:     /69 (BP Location: Right arm)   Pulse 109   Temp 98.4  F (36.9  C) (Oral)   Resp 16   Wt 76.2 kg (168 lb)   LMP 05/14/2019 (Approximate)   SpO2 100%   BMI 29.76 kg/m    Weight is 168 lbs 0 oz  Body mass index is 29.76 kg/m .    Orthostatic Vitals       Most Recent      Sitting Orthostatic /67 05/27 1600    Sitting Orthostatic Pulse (bpm) 96 05/27 1600    Standing Orthostatic BP 90/54 05/28 0931    Standing Orthostatic Pulse (bpm) 105 05/28 0931            Appearance: awake, alert, adequately groomed and casually dressed  Attitude:  cooperative  Eye Contact:  good  Mood:  good  Affect:  mood congruent  Speech:  clear, coherent and normal prosody  Language: fluent and intact in English  Psychomotor, Gait, Musculoskeletal:  no evidence of tardive dyskinesia, dystonia, or tics and intact station, gait and muscle tone  Throught Process:  goal oriented  Associations:  no loose associations  Thought Content:  no evidence of suicidal ideation or homicidal ideation and no evidence of psychotic thought  Insight:  fair  Judgement:  intact  Oriented to:  time, person, and place  Attention Span and Concentration:  intact  Recent and Remote Memory:  intact  Fund of  Knowledge:  appropriate    Clinical Global Impressions  First:  Considering your total clinical experience with this particular patient population, how severe are the patient's symptoms at this time?: 7 (05/22/19 1313)  Compared to the patient's condition at the START of treatment, this patient's condition is:: 4 (05/22/19 1313)  Most recent:  Considering your total clinical experience with this particular patient population, how severe are the patient's symptoms at this time?: 7 (05/22/19 1313)  Compared to the patient's condition at the START of treatment, this patient's condition is:: 4 (05/22/19 1313)           Precautions:     Behavioral Orders   Procedures     Code 1 - Restrict to Unit     Routine Programming     As clinically indicated     Status 15     Every 15 minutes.          Diagnoses:   Schizoaffective disorder, bipolar type (H)         Assessment & Plan:       Target psychiatric symptoms and interventions:  Schizoaffective disorder  -continue current medications    Medical Problems and Treatments:  None    Behavioral/Psychological/Social:  Encourage unit programming      Disposition:  Patient was originally planned to need increased level of care outside of the hospital. She is doing well, but outpatient CM revoked PD. Will work with outpatient CM on getting patient home at this time.    Disposition Plan   Reason for ongoing admission: is unable to care for self due to severe psychosis or david  Discharge location: home with family  Discharge Medications: not ordered  Follow-up Appointments: not scheduled  Legal Status: outpatient  revoking PD  Entered by: Nathan Block on 5/28/2019 at 11:01 PM

## 2019-05-29 NOTE — PROGRESS NOTES
received a return call from the patient's  yesterday, Vicky with the [facility] team. Vicky stated that she received writer's voicemail about the patient and wanted to connect.  inquired about what the plan is going to be for the patient.  explained that the patient is nervous that the ACT team is trying to send her to a group home.  stated that the patient relayed she has to be home with her children while her  is out of the state for his residency program. Vicky stated that this is one of the concerns of the ACT team for the patient. She stated that the ACT team was planning to have a planning meeting with the patient and her  prior to him leaving to establish a plan for the patient. Vicky stated that this was not able to happen as the patient stopped allowing the ACT team to watch her take medications, partially due to Ramadan, and ended up back in the hospital.  explained that the patient is doing well here in the hospital and psychiatrically, is closer to being ready for discharge. Vicky requested that  speak with the patient about allowing the ACT team to watch her take meds for discharge.  and Vicky agreed to a Friday discharge for the patient as a member of the ACT team will be able to pick the patient up and that gives time for the patient's PD to be completed and signed.      spoke with the patient about the plan for her. She was in agreeance with a Friday discharge and taking her medications with her ACT team watching.  also informed the patient that the ACT team is planning to have a meeting with her to develop a plan for when her  leaves for residency.  gave the patient a copy of the revocation order. During a check-in with , the patient stated that she is feeling much better. She exhibited some insight about knowing that she should take her medications. After her reading her revocation order, the  patient had some concern about the order having that she is committed to [facility] on there. She reported that she has never been to [facility] and feels that her commitment should be to [facility]. Writer reached out to the patient's  for clarification and will plan to follow-up later.

## 2019-05-29 NOTE — PROGRESS NOTES
05/28/19 2000   Behavioral Health   Hallucinations denies / not responding to hallucinations   Thinking intact   Orientation person: oriented;place: oriented   Memory baseline memory   Insight insight appropriate to situation   Judgement impaired   Eye Contact at examiner   Affect full range affect   Mood mood is calm   Physical Appearance/Attire neat   Hygiene well groomed   Suicidality other (see comments)  (denies)   1. Wish to be Dead No   2. Non-Specific Active Suicidal Thoughts  No   Self Injury other (see comment)  (denies)   Activity other (see comment)  (visible in the milieu and socialized with others )   Speech clear;coherent   Activities of Daily Living   Hygiene/Grooming independent   Oral Hygiene independent   Dress independent   Laundry with supervision   Room Organization independent       Pt denied SI and SIB.  Pt seems calm, ate supper, visible in the milieu and socialized with others.

## 2019-05-29 NOTE — PROGRESS NOTES
05/29/19 1238   Behavioral Health   Hallucinations denies / not responding to hallucinations   Thinking poor concentration   Orientation person: oriented;date: oriented;place: oriented   Memory baseline memory   Judgement impaired   Affect blunted, flat   Mood mood is calm   Physical Appearance/Attire attire appropriate to age and situation   Hygiene well groomed   Suicidality other (see comments)  (denies)   1. Wish to be Dead No   2. Non-Specific Active Suicidal Thoughts  No   Self Injury other (see comment)  (denies)   Elopement   (nothing to report)   Activity isolative;withdrawn   Speech clear;coherent   Medication Sensitivity no observed side effects   Psychomotor / Gait steady;balanced   Psycho Education   Type of Intervention 1:1 intervention   Response participates, initiates socially appropriate   Hours 0.5   Treatment Detail   (check in)   Activities of Daily Living   Hygiene/Grooming independent   Oral Hygiene independent   Dress independent   Room Organization independent   The patient was isolative from peers this shift and was not attending groups.  The patient was quite and did not have any needs from staff this shift.  The patient spent the second half of the morning up to lunch in her room napping.  The patient denies SI and SiB.  The patient reports she is going home on Friday and feels ready to go.

## 2019-05-30 PROCEDURE — 25000132 ZZH RX MED GY IP 250 OP 250 PS 637: Performed by: PSYCHIATRY & NEUROLOGY

## 2019-05-30 PROCEDURE — 12400001 ZZH R&B MH UMMC

## 2019-05-30 PROCEDURE — 99207 ZZC CDG-MDM COMPONENT: MEETS LOW - DOWN CODED: CPT | Performed by: PSYCHIATRY & NEUROLOGY

## 2019-05-30 PROCEDURE — 99231 SBSQ HOSP IP/OBS SF/LOW 25: CPT | Performed by: PSYCHIATRY & NEUROLOGY

## 2019-05-30 RX ORDER — TRAZODONE HYDROCHLORIDE 50 MG/1
50 TABLET, FILM COATED ORAL
Qty: 30 TABLET | Refills: 0 | Status: SHIPPED | OUTPATIENT
Start: 2019-05-30 | End: 2019-09-19

## 2019-05-30 RX ORDER — CLOZAPINE 100 MG/1
100 TABLET ORAL AT BEDTIME
Qty: 30 TABLET | Refills: 0 | Status: ON HOLD | OUTPATIENT
Start: 2019-05-30 | End: 2020-07-22

## 2019-05-30 RX ADMIN — ACETAMINOPHEN 650 MG: 325 TABLET, FILM COATED ORAL at 14:18

## 2019-05-30 RX ADMIN — CLOZAPINE 100 MG: 100 TABLET ORAL at 20:15

## 2019-05-30 ASSESSMENT — ACTIVITIES OF DAILY LIVING (ADL)
HYGIENE/GROOMING: INDEPENDENT
HYGIENE/GROOMING: INDEPENDENT
DRESS: INDEPENDENT
ORAL_HYGIENE: INDEPENDENT
LAUNDRY: WITH SUPERVISION
ORAL_HYGIENE: INDEPENDENT
LAUNDRY: WITH SUPERVISION
DRESS: INDEPENDENT

## 2019-05-30 NOTE — PROGRESS NOTES
"[facility], [place]   Psychiatric Progress Note  Hospital Day: 8        Interim History:   The patient's care was discussed with the treatment team during the daily team meeting and/or staff's chart notes were reviewed.  Staff report patient is cooperating with unit rules and her meds. No behavioral issues.    Upon interview, the patient continues to report that she is doing well. Feels ready and eager to go home. States that she is here: \"because I stopped my meds, now ACT team will help me not to forget to take them\". Denied having any med side effects.     Psychiatric Symptoms: denies    Medication side effects reported: none    Other issues reported by patient: none         Medications:       cloZAPine  100 mg Oral At Bedtime          Allergies:   No Known Allergies       Labs:     Recent Results (from the past 48 hour(s))   WBC and differential    Collection Time: 05/29/19  7:38 AM   Result Value Ref Range    WBC 5.5 4.0 - 11.0 10e9/L    Diff Method Automated Method     % Neutrophils 45.4 %    % Lymphocytes 40.8 %    % Monocytes 8.8 %    % Eosinophils 4.4 %    % Basophils 0.4 %    % Immature Granulocytes 0.2 %    Nucleated RBCs 0 0 /100    Absolute Neutrophil 2.5 1.6 - 8.3 10e9/L    Absolute Lymphocytes 2.2 0.8 - 5.3 10e9/L    Absolute Monocytes 0.5 0.0 - 1.3 10e9/L    Absolute Eosinophils 0.2 0.0 - 0.7 10e9/L    Absolute Basophils 0.0 0.0 - 0.2 10e9/L    Abs Immature Granulocytes 0.0 0 - 0.4 10e9/L    Absolute Nucleated RBC 0.0           Psychiatric Examination:     /64   Pulse 93   Temp 98.3  F (36.8  C) (Oral)   Resp 16   Wt 76.2 kg (168 lb)   LMP 05/14/2019 (Approximate)   SpO2 100%   BMI 29.76 kg/m    Weight is 168 lbs 0 oz  Body mass index is 29.76 kg/m .    Orthostatic Vitals       Most Recent      Sitting Orthostatic BP 99/65 05/30 1221    Sitting Orthostatic Pulse (bpm) 102 05/30 1221    Standing Orthostatic /65 05/30 1221    Standing Orthostatic Pulse " (bpm) 112 05/30 1221           Appearance: awake, alert, adequately groomed and casually dressed  Attitude:  cooperative  Eye Contact:  good  Mood:  good  Affect:  mood congruent  Speech:  clear, coherent and normal prosody  Language: fluent and intact in English  Psychomotor, Gait, Musculoskeletal:  no evidence of tardive dyskinesia, dystonia, or tics and intact station, gait and muscle tone  Throught Process:  goal oriented  Associations:  no loose associations  Thought Content:  no evidence of suicidal ideation or homicidal ideation and no evidence of psychotic thought  Insight:  fair  Judgement:  intact  Oriented to:  time, person, and place  Attention Span and Concentration:  intact  Recent and Remote Memory:  intact  Fund of Knowledge:  appropriate    Clinical Global Impressions  First:  Considering your total clinical experience with this particular patient population, how severe are the patient's symptoms at this time?: 7 (05/22/19 1313)  Compared to the patient's condition at the START of treatment, this patient's condition is:: 4 (05/22/19 1313)  Most recent:  Considering your total clinical experience with this particular patient population, how severe are the patient's symptoms at this time?: 4 (05/30/19)  Compared to the patient's condition at the START of treatment, this patient's condition is:: 4 (5/30/2019 )           Precautions:     Behavioral Orders   Procedures     Code 1 - Restrict to Unit     Routine Programming     As clinically indicated     Status 15     Every 15 minutes.          Diagnoses:   Schizoaffective disorder, bipolar type (H)         Assessment & Plan:       Target psychiatric symptoms and interventions:  Schizoaffective disorder  -continue current medications    Medical Problems and Treatments:  None    Behavioral/Psychological/Social:  Encourage unit programming      Disposition:  Patient was originally planned to need increased level of care outside of the hospital. She is doing  well, but outpatient CM revoked PD.     Disposition Plan   Reason for ongoing admission: is unable to care for self due to severe psychosis or david, she is better and scheduled for discharge tomorrow.   Discharge location: home with family  Discharge Medications: ordered  Follow-up Appointments: not scheduled  Legal Status: outpatient  revoking PD  Entered by: Sandeep Cancino on 5/28/2019 at 5:56 PM

## 2019-05-30 NOTE — PLAN OF CARE
48 hour nursing assessment: Pt evaluation continues. Assessed mood, anxiety, thoughts and behavior. Is progressing toward goals. Encourage participation in groups and developing healthy coping skills. Will continue current plan of care. Pt denied SI/SIB or hallucinations. Pt spent most of the shift pacing the halls. Pt didn't attend any groups. Pt is bright upon check-in but remains isolative and withdrawn. A discharge order has been placed for this patient, and will be discharging tomorrow at 11 am to home. Pt denies SI/SIB or hallucinations.

## 2019-05-30 NOTE — DISCHARGE INSTRUCTIONS
Behavioral Discharge Planning and Instructions  Summary:  You were admitted on 5/21/2019  due to Increased Psychotic Symptomology .  You were treated by Dr. Sandeep Cancino MD and Dr. Nathan Block MD and discharged on 05/31/2019 from [facility] to Home at [address] [place] [address].     Principal Diagnosis:   Schizoaffective disorder, Bipolar Type     Health Care Follow-up Appointments:   Case Management   Please continue to work with your , Vicky Harrington. Vicky is scheduled to meet with you at home on Friday, May 31st in the evening. If you need to speak with Vicky directly, please call her at [phone].     Medication Management   Wednesday, June 5th at 3:00pm with Dr. Gaviria  Phone: [phone]  Please note that you will be meeting with your psychiatry provider at home during the scheduled time listed above. If you need to reschedule, please do so by calling at least 24 hours in advance to do so.     Attend all scheduled appointments with your outpatient providers. Call at least 24 hours in advance if you need to reschedule an appointment to ensure continued access to your outpatient providers.   Major Treatments, Procedures and Findings:  You were provided with: a psychiatric assessment, medication evaluation and/or management, group therapy and milieu management    Symptoms to Report: feeling more aggressive, increased confusion, losing more sleep, mood getting worse or thoughts of suicide    Early warning signs can include: increased depression or anxiety sleep disturbances increased thoughts or behaviors of suicide or self-harm  increased unusual thinking, such as paranoia or hearing voices    Safety and Wellness:  Take all medicines as directed.  Make no changes unless your doctor suggests them.  Follow treatment recommendations. Refrain from alcohol and non-prescribed drugs.  Ask your support system to help you reduce your access to items that could harm yourself or  "others. Items could include:    - Firearms  - Medicines (both prescribed and over-the-counter)  - Knives and other sharp objects  - Ropes and like materials  - Car keys  If there is a concern for safety, call 911. If there is a concern for safety, call 911.    Resources:   [facility] Crisis (COPE) Response - Adult 629-449-1827  Crisis Intervention: 397.626.3577 or 369-725-9793 (TTY: 384.727.4881).  Call anytime for help.  [employer] [place] on Mental Illness (www.[place].doron.org): 332.426.1007 or 773-523-6585.  Suicide Awareness Voices of Education (SAVE) (www.save.org): 052-514-QRZE (0623)  National Suicide Prevention Line (www.mentalhealthmn.org): 231-590-ZIUV (0056)  Mental Health Consumer/Survivor Network of [place] (www.mhcsn.net): 938.215.2802 or 303-349-9701  Mental Health Association of [place] (www.mentalhealth.org): 405.987.2292 or 076-432-1689  Self- Management and Recovery Training., SMART-- Toll free: 125.167.4693  www.LabNow.DDRdrive  Text 4 Life: txt \"LIFE\" to 92747 for immediate support and crisis intervention  Crisis text line: Text \"[place]\" to 338295. Free, confidential, 24/7.  Crisis Intervention: 508.670.1753 or 635-867-1406. Call anytime for help.     The treatment team has appreciated the opportunity to work with you.     If you have any questions or concerns our unit number is [phone].   You may be receiving a follow-up phone call within the next three days from a representative from behavioral health.      "

## 2019-05-30 NOTE — PROGRESS NOTES
received the signed PD for the patient from her , Vicky.  placed a copy in the HIM to be scanned in, the patient's paper chart, and a copy given to the patient. Vicky also stated that there was a scheduling conflict for staff to  the patient for discharge. She requested that the patient be cabbed home and that she will visit with the patient at her home tomorrow evening.  will inform the clinical team tomorrow. The patient's AVS has been completed.

## 2019-05-30 NOTE — PLAN OF CARE
BEHAVIORAL TEAM DISCUSSION    Participants: Todd Sena (CTC), Gloria Naqvi (OT), Matt (RN)  Progress: Progressing.   Continued Stay Criteria/Rationale: Continued stabilization.   Medical/Physical: None.   Precautions:   Behavioral Orders   Procedures     Code 1 - Restrict to Unit     Routine Programming     As clinically indicated     Status 15     Every 15 minutes.     Plan: [facility] will continue to coordinate with the patient's outpatient . A discharge has been planned for the patient for tomorrow at 11am.     Rationale for change in precautions or plan: None.

## 2019-05-30 NOTE — PROGRESS NOTES
Patient was observed pacing the hallway or resting in their room throughout the evening shift. Her mood was calm and she presented with full range affect. Her answers were blunt, but she was approachable and cooperative. Patient denies SI/SIB and hallucinations.        05/29/19 2100   Behavioral Health   Hallucinations denies / not responding to hallucinations   Thinking intact   Orientation person: oriented;place: oriented;date: oriented;time: oriented   Memory baseline memory   Insight insight appropriate to events;insight appropriate to situation   Judgement   (BOBBY)   Eye Contact at examiner   Affect blunted, flat   Mood mood is calm   Physical Appearance/Attire attire appropriate to age and situation   Hygiene well groomed   Suicidality other (see comments)  (Denies)   1. Wish to be Dead No   2. Non-Specific Active Suicidal Thoughts  No   Activities of Daily Living   Hygiene/Grooming independent   Oral Hygiene independent   Dress independent   Laundry with supervision   Room Organization independent

## 2019-05-30 NOTE — PROGRESS NOTES
Writer confirmed that the patient is committed to [facility] and not [facility]. Anabellar will plan to complete the patient's  PD this morning and fax it to Vicky.     Anabellar completed the patient's PD and faxed it to the patient's , Vicky.  also called and e-mailed the patient's  to inform her and confirm the patient's discharge at 11am.

## 2019-05-31 VITALS
WEIGHT: 168 LBS | TEMPERATURE: 98.6 F | BODY MASS INDEX: 29.76 KG/M2 | RESPIRATION RATE: 16 BRPM | HEART RATE: 97 BPM | OXYGEN SATURATION: 100 % | DIASTOLIC BLOOD PRESSURE: 65 MMHG | SYSTOLIC BLOOD PRESSURE: 106 MMHG

## 2019-05-31 PROCEDURE — 99239 HOSP IP/OBS DSCHRG MGMT >30: CPT | Performed by: PSYCHIATRY & NEUROLOGY

## 2019-05-31 NOTE — PROGRESS NOTES
Pt denied SI/SIB and hallucinations. Pt spent most of the shift pacing the halls. Pt did not attend groups. Pt is pleasant and cooperative but remains withdrawn from her peers. Pt states she feels ready to go home.          05/30/19 2211   Behavioral Health   Hallucinations denies / not responding to hallucinations   Thinking intact   Orientation person: oriented;place: oriented;date: oriented;time: oriented   Memory baseline memory   Insight admits / accepts   Judgement intact   Eye Contact at examiner   Affect full range affect   Mood mood is calm   Physical Appearance/Attire neat   Hygiene well groomed   Suicidality other (see comments)  (denies)   1. Wish to be Dead No   2. Non-Specific Active Suicidal Thoughts  No   Self Injury other (see comment)  (denies)   Elopement   (none observed)   Activity isolative;withdrawn   Speech clear;coherent   Medication Sensitivity no stated side effects   Psychomotor / Gait balanced;steady   Activities of Daily Living   Hygiene/Grooming independent   Oral Hygiene independent   Dress independent   Laundry with supervision   Room Organization independent

## 2019-05-31 NOTE — DISCHARGE SUMMARY
Patient was discharged at about 1125. She was given discharge instructions/education and medications were handed to her with instructions. She denies any mental health symptoms at the time of discharge. All belongings were signed off and handed to patient.

## 2019-06-01 NOTE — DISCHARGE SUMMARY
Admit Date:     05/21/2019   Discharge Date:     05/31/2019      The patient was hospitalized at this facility between 05/21 and 05/31/2017.  On the day of discharge, I spent with the patient, 40 minutes, greater than 50% of the time was spent on counseling and coordinating care clarifying diagnostic prognostic issues and discussing the importance of compliance with medications.  We specifically talked about the patient's Clozaril and about [facility] Assertive Community Team coming and helping her to take it.      CHIEF COMPLAINT AND REASON FOR ADMISSION:  The patient is a 38-year-old female with history of schizoaffective disorder who was admitted after endorsing command and auditory hallucinations to harm herself after stopping her Clozaril.  The patient reports that she stopped because she believes that there was marijuana and in it.  She then admitted that it could be her illness.  Has a history of poor compliance with medications.  Previous hospitalizations under similar circumstances.  Mood was depressed, not sleeping well.  Appetite is good, but denied suicidal thoughts.  For more details, please see Dr. Freed's note from 5/22/2019.      DISCHARGE DIAGNOSIS:  Schizoaffective disorder, bipolar type.      CONSULTS:  There were no consults performed during this brief hospital stay.      LABORATORY:  Urine pregnancy test negative.  White blood cell count was checked on 2 occasions on 05/22 was 5.4 and a 05/29 was 5.5.  CBC with differential on the same date may 05/22/2019 were all within normal limits.  Urine drug screen was negative.      HOSPITAL COURSE: The patient was initially followed by Dr. Block and seen by the author of this note for the last couple of days of her hospitalization.  She was restarted on Clozaril dose was gradually increased.  The patient reported having no side effects.  Reported auditory hallucinations have gradually decreased and then disappeared completely.  In fact, she was in a pretty  good mood.  Her provisional discharge was revoked.  The patient indicated that she would like to go back to her family.  Says that her  would leave work in a different state.  However, she would rely on help from reentry.  ACT staff reported during hospitalization, the patient has been isolative; however, had no behavioral outbursts or bizarre behavior.  Throughout hospitalization, she denied presence of suicidal or homicidal thoughts.  During last 2 days of hospitalization, the patient was pretty pleasant, cooperative during visits with maintenance denied presence of suicidal or homicidal thoughts completely denied any psychotic symptoms, was smiling and appropriate during our visits.  Denied having any side effects.      DISCHARGE MEDICATIONS:     1.  Clozapine 100 mg at bedtime dose will be adjusted based on the patient's symptoms by her outpatient team.     2.  Trazodone 50 mg nightly as needed for sleep.     3.  Abilify extended release (Mantena), inject 400 mg into the muscle every 28 days.  The patient is due for her next shot on  2019.     4.  All followup appointments are for case management with Vicky Harrington.  Visit with the primary  scheduled on  in the evening.        The patient's home medication management on  with Dr. Gaviria at 3:00 p.m. We will continue to follow up with ACT team.         JANETTE SPENCER MD             D: 2019   T: 2019   MT: MALIA      Name:     BHAVIK CHRIS   MRN:      [id number]        Account:        [id number]   :      1980           Admit Date:     2019                                  Discharge Date: 2019      Document: [id number]

## 2019-06-07 NOTE — TELEPHONE ENCOUNTER
11/30/17    Spoke with patient regarding results of fingernail culture.  Grew candida albicans/dublinienses.  No sensitivities performed.  I let her know that terbinafine, the medication I prescribed, was not the most effective medication for this type of yeast.  However, I would like her to finish the full 6 week course and see her back in clinic.  If the nail infection has not resolved, we can start itraconizole for 6 weeks.  Patient agreed to plan.    Carmela Neff MD  [facility] Resident PGY - 1    musculoskeletal

## 2019-06-14 ENCOUNTER — MEDICAL CORRESPONDENCE (OUTPATIENT)
Dept: HEALTH INFORMATION MANAGEMENT | Facility: CLINIC | Age: 39
End: 2019-06-14

## 2019-09-19 ENCOUNTER — OFFICE VISIT (OUTPATIENT)
Dept: FAMILY MEDICINE | Facility: CLINIC | Age: 39
End: 2019-09-19
Payer: COMMERCIAL

## 2019-09-19 VITALS
HEIGHT: 64 IN | BODY MASS INDEX: 28.17 KG/M2 | WEIGHT: 165 LBS | SYSTOLIC BLOOD PRESSURE: 113 MMHG | RESPIRATION RATE: 16 BRPM | OXYGEN SATURATION: 99 % | HEART RATE: 114 BPM | DIASTOLIC BLOOD PRESSURE: 75 MMHG | TEMPERATURE: 97.8 F

## 2019-09-19 DIAGNOSIS — B35.1 ONYCHOMYCOSIS: ICD-10-CM

## 2019-09-19 DIAGNOSIS — F25.1 SCHIZOAFFECTIVE DISORDER, DEPRESSIVE TYPE (H): ICD-10-CM

## 2019-09-19 DIAGNOSIS — Z00.00 ROUTINE GENERAL MEDICAL EXAMINATION AT A HEALTH CARE FACILITY: Primary | ICD-10-CM

## 2019-09-19 RX ORDER — TERBINAFINE HYDROCHLORIDE 250 MG/1
250 TABLET ORAL DAILY
Qty: 42 TABLET | Refills: 0 | Status: SHIPPED | OUTPATIENT
Start: 2019-09-19 | End: 2019-12-27

## 2019-09-19 RX ORDER — MULTIVITAMIN
1 TABLET ORAL DAILY
Qty: 90 TABLET | Refills: 3 | Status: SHIPPED | OUTPATIENT
Start: 2019-09-19 | End: 2019-12-27

## 2019-09-19 ASSESSMENT — MIFFLIN-ST. JEOR: SCORE: 1398.44

## 2019-09-19 NOTE — PROGRESS NOTES
Female Physical Note          HPI         Concerns today: loosing nail on middle finger of right hand. Been that way for a long period. No injury to the nail.     Psych hospitalization 5/2019: Back on her medication and has been better for last 3 month. No suicidal thoughts or auditory hallucinations currently and when on medication. Under commitment to her psychiatry team for 1 year. Can switch next July. Interested in someone closer to her. Spoke about resident psychiatry clinic on the [facility].    Patient Active Problem List   Diagnosis     Varicose veins of bilateral lower extremities with pain     child with autism     Mantoux: positive     Schizoaffective disorder, depressive type (H)     Suicidal ideation     Encounter for female birth control     Fungal nail infection     Psychosis (H)     Schizophrenia (H)       Past Medical History:   Diagnosis Date     Bipolar 1 disorder (H)      Bipolar affective disorder, depressed, severe, with psychotic behavior (H)      NONSPECIFIC MEDICAL HISTORY     h/o +PPD. Neg CXR 2006     Postpartum depression 8/18/2011     Schizo-affective schizophrenia (H)      Tuberculosis      Varicosities         Family History   Problem Relation Age of Onset     Respiratory Father         asthma     Asthma Father      Cerebrovascular Disease Mother      Diabetes Mother      Neurologic Disorder Brother         mental health problem?     Neurologic Disorder Sister         seizures (half sister)     Respiratory Paternal Grandfather         asthma     Respiratory Sister         asthma     Respiratory Brother         asthma     Neurologic Disorder Daughter         autism      Hypertension Maternal Grandmother      Neurologic Disorder Sister         seizures     Diabetes Maternal Grandfather      Coronary Artery Disease Son               Review of Systems:     Review of Systems:  CONSTITUTIONAL: NEGATIVE for fever, chills, change in weight  INTEGUMENTARY/SKIN: NEGATIVE for worrisome rashes,  moles or lesions, POSITIVE for fungus on right middle finger nail  EYES: NEGATIVE for vision changes or irritation  ENT/MOUTH: NEGATIVE for ear, mouth and throat problems  RESP: NEGATIVE for significant cough or SOB  BREAST: NEGATIVE for masses, tenderness or discharge  CV: NEGATIVE for chest pain, palpitations or peripheral edema  GI: NEGATIVE for nausea, abdominal pain, heartburn, or change in bowel habits  : NEGATIVE for frequency, dysuria, or hematuria  MUSCULOSKELETAL: NEGATIVE for significant arthralgias or myalgia  NEURO: NEGATIVE for weakness, dizziness or paresthesias  ENDOCRINE: NEGATIVE for temperature intolerance, skin/hair changes  HEME/ALLERGY: NEGATIVE for bleeding problems  PSYCHIATRIC: NEGATIVE for changes in mood or affect, no suicidal thoughts or hallucinations while on medications  Sleep:   Do you snore most or the night (as reported by a family member)? Yes  Do you feel sleepy or extremely tired during most of the day? Yes, thinks it's the medication though             Social History     Social History     Socioeconomic History     Marital status:      Spouse name: Not on file     Number of children: 2     Years of education: Not on file     Highest education level: Not on file   Occupational History     Employer: NONE    Social Needs     Financial resource strain: Not on file     Food insecurity:     Worry: Not on file     Inability: Not on file     Transportation needs:     Medical: Not on file     Non-medical: Not on file   Tobacco Use     Smoking status: Never Smoker     Smokeless tobacco: Never Used   Substance and Sexual Activity     Alcohol use: No     Drug use: No     Sexual activity: Yes     Partners: Male     Birth control/protection: None   Lifestyle     Physical activity:     Days per week: Not on file     Minutes per session: Not on file     Stress: Not on file   Relationships     Social connections:     Talks on phone: Not on file     Gets together: Not on file     Attends  Faith service: Not on file     Active member of club or organization: Not on file     Attends meetings of clubs or organizations: Not on file     Relationship status: Not on file     Intimate partner violence:     Fear of current or ex partner: Not on file     Emotionally abused: Not on file     Physically abused: Not on file     Forced sexual activity: Not on file   Other Topics Concern      Service Not Asked     Blood Transfusions No     Caffeine Concern Not Asked     Occupational Exposure No     Hobby Hazards Not Asked     Sleep Concern Not Asked     Stress Concern Not Asked     Weight Concern Not Asked     Special Diet Not Asked     Back Care Not Asked     Exercise Not Asked     Bike Helmet Not Asked     Seat Belt Not Asked     Self-Exams Not Asked     Parent/sibling w/ CABG, MI or angioplasty before 65F 55M? Not Asked   Social History Narrative    Caffeine intake/servings daily - 2 times a week    Calcium intake/servings daily - 1-2    Exercise no times weekly - describe active with children    Sunscreen used - soemtimes    Seatbelts used - Yes    Guns stored in the home - Yes    Self Breast Exam - Yes    Pap test up to date -  Yes    Eye exam up to date -  No    Dental exam up to date -  No    DEXA scan up to date -  Not Applicable    Flex Sig/Colonoscopy up to date -  Not Applicable    Mammography up to date -  Not Applicable    Immunizations reviewed and up to date - needs tdap at postpartum    Abuse: Current or Past (Physical, Sexual or Emotional) - No    Do you feel safe in your environment - Yes    Do you cope well with stress - sometimes    Do you suffer from insomnia - No    Last updated by: Joanne Gilligan RN 12/16/10                           Marital Status:  Who lives in your household? Her 6 children, her  is in [place] for his  residency (just started)    Has anyone hurt you physically, for example by pushing, hitting, slapping or kicking you or forcing you to have  "sex? Denies  Do you feel threatened or controlled by a partner, ex-partner or anyone in your life? Denies    Sexual Health     Sexual concerns: Not interested in sex when she is on her medication, states that she doesn't like to have sex, but denies strain on relationship.  STI History: Neg  Pregnancy History:   LMP No LMP recorded. last week was LMP, 6 day menses  Last Pap Smear Date:   Lab Results   Component Value Date    PAP NIL 2014    PAP NIL 2011    PAP NIL 11/10/2008     Abnormal Pap History: None    Recommended Screening     Pap/HPV cotest every 5 years for women 30-65   Testing not indicated, due 2019          Physical Exam:     Vitals: /75   Pulse 114   Temp 97.8  F (36.6  C) (Oral)   Resp 16   Ht 1.626 m (5' 4\")   Wt 74.8 kg (165 lb)   SpO2 99%   BMI 28.32 kg/m    BMI= Body mass index is 28.32 kg/m .   GENERAL: healthy, alert and no distress  EYES: Eyes grossly normal to inspection, extraocular movements - intact, and PERRL  HENT: ear canals- normal; TMs- normal; Nose- normal; Mouth- no ulcers, no lesions  NECK: no tenderness, no adenopathy, no asymmetry, no masses, no stiffness; thyroid- normal to palpation  RESP: lungs clear to auscultation - no rales, no rhonchi, no wheezes  CV: regular rates and rhythm, normal S1 S2, no S3 or S4 and no murmur, no click or rub -  ABDOMEN: soft, no tenderness, no  hepatosplenomegaly, no masses, normal bowel sounds  MS: extremities- no gross deformities noted, no edema  SKIN: onychomycosis noted on fingernails  NEURO: strength and tone- normal, sensory exam- grossly normal, mentation- intact, speech- normal, reflexes- symmetric  BACK: no CVA tenderness, no paralumbar tenderness  PSYCH: Alert and oriented times 3; speech- coherent , normal rate and volume; able to articulate logical thoughts, able to abstract reason, no tangential thoughts, no hallucinations or delusions, affect- blunted  LYMPHATICS: ant. cervical- normal, post. " cervical- normal, supraclavicular- normal      Assessment and Plan      Nona was seen today for physical.    Diagnoses and all orders for this visit:    Routine general medical examination at a health care facility  Schizoaffective disorder, depressive type  Patient is up-to-date on all of her health maintenance screenings.  Gets blood draws once a month with her psychiatrist.  Schizophrenia well controlled on her medication when she takes it.  Is under commitment psychiatry team until next July.  Seems to have good support from psychiatry team, but concerned because she is at home alone with her 6 kids while her  is in [place] for his internal medicine residency that he just started.  -     multivitamin (ONE-DAILY) tablet; Take 1 tablet by mouth daily    Onychomycosis - Will prescribe terbinafine for 6 weeks, 1 tablet daily.  Some concern with increased blood concentration with her psych medications.  Will make sure her psychiatrist is aware.  -     terbinafine (LAMISIL) 250 MG tablet; Take 1 tablet (250 mg) by mouth daily        Options for treatment and follow-up care were reviewed with the patient . Nona Finley and/or guardian engaged in the decision making process and verbalized understanding of the options discussed and agreed with the final plan.    Carmela Neff MD   PGY-3

## 2019-09-19 NOTE — Clinical Note
Not sure who to ask for help from. I am having trouble finding her psychiatry team within her records. I just need to let them know that terbinafine (medication for her nail fungus) can cause increase serum concentrations of her psych meds. Are you able to find out who her psychiatrist is?Carmela

## 2019-09-19 NOTE — PROGRESS NOTES
Preceptor Attestation:   Patient seen, evaluated and discussed with the resident. I have verified the content of the note, which accurately reflects my assessment of the patient and the plan of care.   Supervising Physician:  Rossana Lomeli MD MD

## 2019-09-19 NOTE — PATIENT INSTRUCTIONS
Here is the plan from today's visit    1. Routine general medical examination at a health care facility  Take vitamin daily.  - multivitamin (ONE-DAILY) tablet; Take 1 tablet by mouth daily  Dispense: 90 tablet; Refill: 3    2. Onychomycosis  Take one tablet every day for 6 weeks.  - terbinafine (LAMISIL) 250 MG tablet; Take 1 tablet (250 mg) by mouth daily  Dispense: 42 tablet; Refill: 0      Please call or return to clinic if your symptoms don't go away.    Follow up plan  Please make a clinic appointment for follow up with your primary physician Alcira Duncan MD in 1 year for physical or sooner with any issues.    Thank you for coming to [facility] today.  Lab Testing:  **If you had lab testing today and your results are reassuring or normal they will be mailed to you or sent through "[employer] within 7 days.   **If the lab tests need quick action we will call you with the results.  The phone number we will call with results is # [phone] (home) none (work). If this is not the best number please call our clinic and change the number.  Medication Refills:  If you need any refills please call your pharmacy and they will contact us.   If you need to  your refill at a new pharmacy, please contact the new pharmacy directly. The new pharmacy will help you get your medications transferred faster.   Scheduling:  If you have any concerns about today's visit or wish to schedule another appointment please call our office during normal business hours [phone] (8-5:00 M-F)  If a referral was made to a [facility] and you don't get a call from central scheduling please call [phone].  If a Mammogram was ordered for you at [facility] call [phone] to schedule or change your appointment.  If you had an XRay/CT/Ultrasound/MRI ordered the number is [phone] to schedule or change your radiology appointment.   Medical Concerns:  If you have urgent medical concerns please  call [phone] at any time of the day.    Carmela Neff MD  Preventive Health Recommendations  Female Ages 40 to 49    Yearly exam:     See your health care provider every year in order to  1. Review health changes.   2. Discuss preventive care.    3. Review your medicines if your doctor prescribed any.      Get a Pap test every three years (unless you have an abnormal result and your provider advises testing more often).      If you get Pap tests with HPV test, you only need to test every 5 years, unless you have an abnormal result. You do not need a Pap test if your uterus was removed (hysterectomy) and you have not had cancer.      You should be tested each year for STDs (sexually transmitted diseases), if you're at risk.     Ask your doctor if you should have a mammogram.      Have a colonoscopy (test for colon cancer) if someone in your family has had colon cancer or polyps before age 50.       Have a cholesterol test every 5 years.       Have a diabetes test (fasting glucose) after age 45. If you are at risk for diabetes, you should have this test every 3 years.    Shots: Get a flu shot each year. Get a tetanus shot every 10 years.     Nutrition:     Eat at least 5 servings of fruits and vegetables each day.    Eat whole-grain bread, whole-wheat pasta and brown rice instead of white grains and rice.    Get adequate Calcium and Vitamin D.      Lifestyle    Exercise at least 150 minutes a week (an average of 30 minutes a day, 5 days a week). This will help you control your weight and prevent disease.    Limit alcohol to one drink per day.    No smoking.     Wear sunscreen to prevent skin cancer.    See your dentist every six months for an exam and cleaning.

## 2019-10-29 ENCOUNTER — ALLIED HEALTH/NURSE VISIT (OUTPATIENT)
Dept: FAMILY MEDICINE | Facility: CLINIC | Age: 39
End: 2019-10-29
Payer: COMMERCIAL

## 2019-10-29 DIAGNOSIS — Z23 NEED FOR PROPHYLACTIC VACCINATION AND INOCULATION AGAINST INFLUENZA: Primary | ICD-10-CM

## 2019-11-05 ENCOUNTER — HEALTH MAINTENANCE LETTER (OUTPATIENT)
Age: 39
End: 2019-11-05

## 2019-11-05 NOTE — PROGRESS NOTES
Pt visible in milieu, social with peers and staff. Pt ate meals and snacks in dining area. Pt denies hallucinations. Pt denies SI/SIB. Full range affect, mood is calm. Cooperative and pleasant with peers and staff.       04/04/17 2200   Behavioral Health   Hallucinations denies / not responding to hallucinations   Thinking intact   Orientation person: oriented;place: oriented;date: oriented;time: oriented   Memory baseline memory   Insight admits / accepts   Judgement impaired   Eye Contact at examiner   Affect full range affect   Mood mood is calm   Physical Appearance/Attire attire appropriate to age and situation   Hygiene well groomed   Suicidality other (see comments)  (denies )   Self Injury other (see comment)  (denies)   Activity other (see comment)  (visible in milieu)   Speech clear;coherent   Medication Sensitivity no stated side effects   Psychomotor / Gait balanced;steady   Psycho Education   Type of Intervention 1:1 intervention   Response participates, initiates socially appropriate   Hours 0.5   Treatment Detail wellness strategies   Activities of Daily Living   Hygiene/Grooming independent   Oral Hygiene independent   Dress independent;street clothes   Laundry with supervision   Room Organization independent   Activity   Activity Level of Assistance independent          Refill provided for a 10 day supply of Pregabalin 150 mg twice a day.  Establish care with a primary care provider for further medication refills, and an appointment was scheduled for you tomorrow at 2:20apm with Ginger Tamez NP here at the [facility].

## 2019-12-03 NOTE — PROGRESS NOTES
"   02/28/17 1203   Behavioral Health   Hallucinations denies / not responding to hallucinations   Thinking intact   Orientation time: oriented;date: oriented;place: oriented;person: oriented   Memory baseline memory   Insight insight appropriate to situation;other (see comment);admits / accepts  (IMR)   Judgement intact   Eye Contact at examiner   Affect full range affect   Mood mood is calm   Physical Appearance/Attire neat;attire appropriate to age and situation;appears stated age   Hygiene well groomed   Suicidality other (see comments)  (None Stated)   Self Injury other (see comment)  (None Stated)   Activity other (see comment)  (visible, paces in hallway, shower, check in, group)   Speech clear;coherent   Medication Sensitivity no observed side effects;no stated side effects   Psychomotor / Gait steady;balanced     Pt. Took a shower. Pt.'s IMR of Wellness Strategies was \"watch TV.\" Pt.'s discharge plan is \"[facility].\" Pt. Stated having no bad thoughts today. Pt.'s concerns was \"worried about not seeing 5 month old child.\" Pt. Would like handout on [facility].   " Internal Medicine

## 2019-12-27 ENCOUNTER — HOSPITAL ENCOUNTER (INPATIENT)
Facility: CLINIC | Age: 39
LOS: 4 days | Discharge: HOME | DRG: 885 | End: 2019-12-31
Attending: PSYCHIATRY & NEUROLOGY | Admitting: PSYCHIATRY & NEUROLOGY
Payer: COMMERCIAL

## 2019-12-27 DIAGNOSIS — R45.851 SUICIDAL IDEATION: ICD-10-CM

## 2019-12-27 DIAGNOSIS — F25.1 SCHIZOAFFECTIVE DISORDER, DEPRESSIVE TYPE (H): ICD-10-CM

## 2019-12-27 DIAGNOSIS — F41.1 GENERALIZED ANXIETY DISORDER: ICD-10-CM

## 2019-12-27 DIAGNOSIS — F29 PSYCHOSIS, UNSPECIFIED PSYCHOSIS TYPE (H): ICD-10-CM

## 2019-12-27 DIAGNOSIS — Z91.128 PATIENT'S INTENTL UNDRDOSE OF MEDS REGIMEN FOR OTH REASON: ICD-10-CM

## 2019-12-27 DIAGNOSIS — T50.996A UNDERDOSING OF OTHER DRUGS, MEDICAMENTS AND BIOLOGICAL SUBSTANCES, INITIAL ENCOUNTER: ICD-10-CM

## 2019-12-27 LAB
AMPHETAMINES UR QL SCN: NEGATIVE
BARBITURATES UR QL: NEGATIVE
BASOPHILS # BLD AUTO: 0 10E9/L (ref 0–0.2)
BASOPHILS NFR BLD AUTO: 0.6 %
BENZODIAZ UR QL: NEGATIVE
CANNABINOIDS UR QL SCN: NEGATIVE
COCAINE UR QL: NEGATIVE
DIFFERENTIAL METHOD BLD: NORMAL
EOSINOPHIL # BLD AUTO: 0.1 10E9/L (ref 0–0.7)
EOSINOPHIL NFR BLD AUTO: 2.1 %
ERYTHROCYTE [DISTWIDTH] IN BLOOD BY AUTOMATED COUNT: 13.3 % (ref 10–15)
ETHANOL UR QL SCN: NEGATIVE
HCG UR QL: NEGATIVE
HCT VFR BLD AUTO: 35.6 % (ref 35–47)
HGB BLD-MCNC: 11.8 G/DL (ref 11.7–15.7)
IMM GRANULOCYTES # BLD: 0 10E9/L (ref 0–0.4)
IMM GRANULOCYTES NFR BLD: 0.1 %
LYMPHOCYTES # BLD AUTO: 2.6 10E9/L (ref 0.8–5.3)
LYMPHOCYTES NFR BLD AUTO: 38.9 %
MCH RBC QN AUTO: 30.6 PG (ref 26.5–33)
MCHC RBC AUTO-ENTMCNC: 33.1 G/DL (ref 31.5–36.5)
MCV RBC AUTO: 92 FL (ref 78–100)
MONOCYTES # BLD AUTO: 0.5 10E9/L (ref 0–1.3)
MONOCYTES NFR BLD AUTO: 7 %
NEUTROPHILS # BLD AUTO: 3.4 10E9/L (ref 1.6–8.3)
NEUTROPHILS NFR BLD AUTO: 51.3 %
NRBC # BLD AUTO: 0 10*3/UL
NRBC BLD AUTO-RTO: 0 /100
OPIATES UR QL SCN: NEGATIVE
PLATELET # BLD AUTO: 280 10E9/L (ref 150–450)
RBC # BLD AUTO: 3.86 10E12/L (ref 3.8–5.2)
WBC # BLD AUTO: 6.7 10E9/L (ref 4–11)

## 2019-12-27 PROCEDURE — 81025 URINE PREGNANCY TEST: CPT | Performed by: EMERGENCY MEDICINE

## 2019-12-27 PROCEDURE — 85025 COMPLETE CBC W/AUTO DIFF WBC: CPT | Performed by: PSYCHIATRY & NEUROLOGY

## 2019-12-27 PROCEDURE — 36415 COLL VENOUS BLD VENIPUNCTURE: CPT | Performed by: PSYCHIATRY & NEUROLOGY

## 2019-12-27 PROCEDURE — 99285 EMERGENCY DEPT VISIT HI MDM: CPT | Mod: 25 | Performed by: EMERGENCY MEDICINE

## 2019-12-27 PROCEDURE — 12400001 ZZH R&B MH UMMC

## 2019-12-27 PROCEDURE — 90791 PSYCH DIAGNOSTIC EVALUATION: CPT

## 2019-12-27 PROCEDURE — 80307 DRUG TEST PRSMV CHEM ANLYZR: CPT | Performed by: EMERGENCY MEDICINE

## 2019-12-27 PROCEDURE — 80320 DRUG SCREEN QUANTALCOHOLS: CPT | Performed by: EMERGENCY MEDICINE

## 2019-12-27 PROCEDURE — 99285 EMERGENCY DEPT VISIT HI MDM: CPT | Mod: Z6 | Performed by: EMERGENCY MEDICINE

## 2019-12-27 RX ORDER — ALUMINA, MAGNESIA, AND SIMETHICONE 2400; 2400; 240 MG/30ML; MG/30ML; MG/30ML
30 SUSPENSION ORAL EVERY 4 HOURS PRN
Status: DISCONTINUED | OUTPATIENT
Start: 2019-12-27 | End: 2019-12-31 | Stop reason: HOSPADM

## 2019-12-27 RX ORDER — METFORMIN HCL 500 MG
500 TABLET, EXTENDED RELEASE 24 HR ORAL AT BEDTIME
Status: DISCONTINUED | OUTPATIENT
Start: 2019-12-27 | End: 2019-12-31 | Stop reason: HOSPADM

## 2019-12-27 RX ORDER — OLANZAPINE 10 MG/2ML
10 INJECTION, POWDER, FOR SOLUTION INTRAMUSCULAR
Status: DISCONTINUED | OUTPATIENT
Start: 2019-12-27 | End: 2019-12-31 | Stop reason: HOSPADM

## 2019-12-27 RX ORDER — ACETAMINOPHEN 325 MG/1
650 TABLET ORAL EVERY 4 HOURS PRN
Status: DISCONTINUED | OUTPATIENT
Start: 2019-12-27 | End: 2019-12-31 | Stop reason: HOSPADM

## 2019-12-27 RX ORDER — METFORMIN HCL 500 MG
1000 TABLET, EXTENDED RELEASE 24 HR ORAL
Status: ON HOLD | COMMUNITY
End: 2021-03-08

## 2019-12-27 RX ORDER — OLANZAPINE 10 MG/1
10 TABLET ORAL
Status: DISCONTINUED | OUTPATIENT
Start: 2019-12-27 | End: 2019-12-31 | Stop reason: HOSPADM

## 2019-12-27 RX ORDER — TRAZODONE HYDROCHLORIDE 50 MG/1
50 TABLET, FILM COATED ORAL
Status: DISCONTINUED | OUTPATIENT
Start: 2019-12-27 | End: 2019-12-31 | Stop reason: HOSPADM

## 2019-12-27 RX ORDER — CLOZAPINE 25 MG/1
25 TABLET ORAL AT BEDTIME
Status: DISCONTINUED | OUTPATIENT
Start: 2019-12-28 | End: 2019-12-28

## 2019-12-27 RX ORDER — HYDROXYZINE HYDROCHLORIDE 25 MG/1
25 TABLET, FILM COATED ORAL EVERY 4 HOURS PRN
Status: DISCONTINUED | OUTPATIENT
Start: 2019-12-27 | End: 2019-12-31 | Stop reason: HOSPADM

## 2019-12-27 ASSESSMENT — ACTIVITIES OF DAILY LIVING (ADL)
TRANSFERRING: 0-->INDEPENDENT
AMBULATION: 0-->INDEPENDENT
DRESS: 0-->INDEPENDENT
RETIRED_COMMUNICATION: 0-->UNDERSTANDS/COMMUNICATES WITHOUT DIFFICULTY
BATHING: 0-->INDEPENDENT
RETIRED_EATING: 0-->INDEPENDENT
FALL_HISTORY_WITHIN_LAST_SIX_MONTHS: NO
COGNITION: 0 - NO COGNITION ISSUES REPORTED
SWALLOWING: 0-->SWALLOWS FOODS/LIQUIDS WITHOUT DIFFICULTY
TOILETING: 0-->INDEPENDENT

## 2019-12-27 NOTE — ED NOTES
Increasing Halluciantions telling patient to jump from a bridge. Patient is not SI at this time, but disturbed by the increasing hallucinations. Patient's  is doing Medical Residency in [place], will be gone for three years. Is currently home on a vacation, but will be leaving Sunday morning. Patient is very tearful discussing this. Patient lives with her 6 children.

## 2019-12-28 LAB
ALBUMIN SERPL-MCNC: 3.4 G/DL (ref 3.4–5)
ALP SERPL-CCNC: 48 U/L (ref 40–150)
ALT SERPL W P-5'-P-CCNC: 16 U/L (ref 0–50)
ANION GAP SERPL CALCULATED.3IONS-SCNC: 6 MMOL/L (ref 3–14)
AST SERPL W P-5'-P-CCNC: 17 U/L (ref 0–45)
BILIRUB SERPL-MCNC: 0.4 MG/DL (ref 0.2–1.3)
BUN SERPL-MCNC: 16 MG/DL (ref 7–30)
CALCIUM SERPL-MCNC: 8.7 MG/DL (ref 8.5–10.1)
CHLORIDE SERPL-SCNC: 109 MMOL/L (ref 94–109)
CHOLEST SERPL-MCNC: 161 MG/DL
CO2 SERPL-SCNC: 26 MMOL/L (ref 20–32)
CREAT SERPL-MCNC: 0.51 MG/DL (ref 0.52–1.04)
ERYTHROCYTE [DISTWIDTH] IN BLOOD BY AUTOMATED COUNT: 13.4 % (ref 10–15)
GFR SERPL CREATININE-BSD FRML MDRD: >90 ML/MIN/{1.73_M2}
GLUCOSE SERPL-MCNC: 95 MG/DL (ref 70–99)
HCT VFR BLD AUTO: 37.5 % (ref 35–47)
HDLC SERPL-MCNC: 51 MG/DL
HGB BLD-MCNC: 12.2 G/DL (ref 11.7–15.7)
LDLC SERPL CALC-MCNC: 96 MG/DL
MCH RBC QN AUTO: 29.8 PG (ref 26.5–33)
MCHC RBC AUTO-ENTMCNC: 32.5 G/DL (ref 31.5–36.5)
MCV RBC AUTO: 92 FL (ref 78–100)
NONHDLC SERPL-MCNC: 110 MG/DL
PLATELET # BLD AUTO: 301 10E9/L (ref 150–450)
POTASSIUM SERPL-SCNC: 4 MMOL/L (ref 3.4–5.3)
PROT SERPL-MCNC: 7.6 G/DL (ref 6.8–8.8)
RBC # BLD AUTO: 4.09 10E12/L (ref 3.8–5.2)
SODIUM SERPL-SCNC: 141 MMOL/L (ref 133–144)
TRIGL SERPL-MCNC: 71 MG/DL
TSH SERPL DL<=0.005 MIU/L-ACNC: 0.65 MU/L (ref 0.4–4)
WBC # BLD AUTO: 4.9 10E9/L (ref 4–11)

## 2019-12-28 PROCEDURE — 85027 COMPLETE CBC AUTOMATED: CPT | Performed by: STUDENT IN AN ORGANIZED HEALTH CARE EDUCATION/TRAINING PROGRAM

## 2019-12-28 PROCEDURE — 25000132 ZZH RX MED GY IP 250 OP 250 PS 637: Performed by: STUDENT IN AN ORGANIZED HEALTH CARE EDUCATION/TRAINING PROGRAM

## 2019-12-28 PROCEDURE — 80053 COMPREHEN METABOLIC PANEL: CPT | Performed by: STUDENT IN AN ORGANIZED HEALTH CARE EDUCATION/TRAINING PROGRAM

## 2019-12-28 PROCEDURE — 84443 ASSAY THYROID STIM HORMONE: CPT | Performed by: STUDENT IN AN ORGANIZED HEALTH CARE EDUCATION/TRAINING PROGRAM

## 2019-12-28 PROCEDURE — 36415 COLL VENOUS BLD VENIPUNCTURE: CPT | Performed by: STUDENT IN AN ORGANIZED HEALTH CARE EDUCATION/TRAINING PROGRAM

## 2019-12-28 PROCEDURE — 80061 LIPID PANEL: CPT | Performed by: STUDENT IN AN ORGANIZED HEALTH CARE EDUCATION/TRAINING PROGRAM

## 2019-12-28 PROCEDURE — 12400001 ZZH R&B MH UMMC

## 2019-12-28 RX ORDER — CLOZAPINE 100 MG/1
100 TABLET ORAL AT BEDTIME
Status: DISCONTINUED | OUTPATIENT
Start: 2019-12-30 | End: 2019-12-31 | Stop reason: HOSPADM

## 2019-12-28 RX ORDER — CLOZAPINE 25 MG/1
50 TABLET ORAL AT BEDTIME
Status: COMPLETED | OUTPATIENT
Start: 2019-12-28 | End: 2019-12-28

## 2019-12-28 RX ORDER — CLOZAPINE 25 MG/1
75 TABLET ORAL AT BEDTIME
Status: COMPLETED | OUTPATIENT
Start: 2019-12-29 | End: 2019-12-29

## 2019-12-28 RX ADMIN — CLOZAPINE 50 MG: 25 TABLET ORAL at 20:53

## 2019-12-28 RX ADMIN — CLOZAPINE 25 MG: 25 TABLET ORAL at 00:17

## 2019-12-28 RX ADMIN — METFORMIN HYDROCHLORIDE 500 MG: 500 TABLET, EXTENDED RELEASE ORAL at 21:15

## 2019-12-28 NOTE — PROGRESS NOTES
Pt asked writer if she would be getting her injectable Abilify soon.  Per chart review, ACT team needs to be contacted to see if it is scheduled or if they want it given while the pt is hospitalized.  Pt was due for an injection on 12/24, per chart review.  Pt states that she would like to get her injection.  Writer encouraged pt to continue taking Clozapine for now and to follow up with her provider in regards to Abilify.    Pt is making multiple requests to increase her clozapine dose so that she can discharge.  She seems to be very motivated to take her meds.  She says she wants to see her kids.  Pt states that she used to not take meds regularly, but that she has learned that she needs to take them now.

## 2019-12-28 NOTE — PROGRESS NOTES
"Initial Psychosocial Assessment    I have reviewed the chart, met with the patient, and developed Care Plan.  Information for assessment was obtained from:     Patient: Interview and Chart: Chart    Presenting Problem:    Per DEC report:\"Nona was brought to [facility] ED by her ACT team  due to decompensation of psychosis and SI. She reported worsened depressive symptoms including low mood, hopelessness, guilt, frequent crying, poor sleep, low appetite, and SI. She also endorsed anxiety, nightmares, and delusions (Medications, food are poisoned), paranoia (feels followed), and command AH to jump off a bridge. In the past she has had CAH to harm her children, but she denied this in interview. Nona reported major stressors as being mother to 6 children and her  leaving to resume medical residency in [place] on Sunday. \"    Patient reports current stressors include  leaving this coming Sunday for residency (he will return for break in the summer).  She reports she will be with children (ages 16, 13, 12, 11, 8, &  2).  Pt reports she stopped taking her medications and she started hearing voices, last time she took the medication was Tuesday.  She felt that she didn't need in anymore and than the voices told her there was something in the medications.  She continues to express concerns re: taking medications and what might be in them.    History of Mental Health and Chemical Dependency:  From H & P:  Prior diagnoses: Schizoaffective Disorder, Bipolar Type     Hospitalizations: Several, most recently May 2019 at [facility] and March 2019.     Suicide attempts: One in past, attempted to jump off bridge but  stopped her     Self-injurious behavior: none     Violence: none     ECT/TMS: none     Past medications: Many anti-psychotics, reportedly quickly stabilizes once clozapine is titrated to PTA dose    Dec notes history of command auditory hallucinations which tell her to harm her " children.    Family Description (Constellation, Family Psychiatric History):      Brother has has MH, she is not sure what diagnoses would be, she reports it is similar to what she has.  He thinks there is something in the food and doesn't take it.    Her 16 year old daughter is diagnosed with autism-she has a speech therapist in school.  They have in home support as well.     Significant Life Events (Illness, Abuse, Trauma, Death):The patient has lived through war in [place]. The patient also experienced the passing of her father in 2016.     Living Situation:  Currently lives in [place], [place]. She is the primary caretaker for her 6 children. She does not work. Her  is a medical resident in [place]. She has a brother, mother-in-law, and sister-in-law in the area who help with her children.     Educational Background: The patient's highest level of education completed is 5th grade.      Financial Status: The patient is not currently employed.  Pt report she stays home with the kids.     Legal Issues:  Pt current under  Commitment with [facility] order, pt reports she thinks it expires in the June 2020.       Ethnic/Cultural Considerations: The patient is from [facility].      Spiritual Orientation: Voodoo.       Service History: None.        Social Functioning (organization, interests):  None reported, she reports she likes to take care of the kids.      Current Treatment Providers are:  [facility]  Re-Entry ACT Team  Psychiatrist: Dr. Gaviria at Phone: [phone]  Fax: [phone] and Vicky VEGAS)    Social Service Assessment/Plan:  The plan is to assess the patient for mental health and medication needs. The patient will be prescribed medications to treat the identified symptoms. Patient will participate in therapeutic skill building groups on the unit. CTC to coordinate discharge/after care planning.

## 2019-12-28 NOTE — PHARMACY-ADMISSION MEDICATION HISTORY
Admission medication history for the December 27, 2019 admission is complete.     Interview sources:  patient, ACT team on-call [phone]    Reliability of source: good - patient reliable historian and confirmed medications with ACT team    Medication compliance: missed Abilify Maintena dose due 12/24/19 due to pending outpatient prior authorization. ACT team not currently able to provide outpatient supply until prior authorization resolved (if resuming therapy while hospitalized). Pt has missed two days of clozapine therapy.    Changes made to PTA medication list (reason)  Added: N/A  Deleted: N/A  Changed: N/A    Additional medication history information: N/A    Prior to Admission Medications   Prescriptions Last Dose Informant Patient Reported? Taking?   ARIPiprazole ER (ABILIFY MAINTENA) 400 MG extended release inj syringe 11/26/2019 Other Yes Yes   Sig: Inject 400 mg into the muscle every 28 days    cloZAPine (CLOZARIL) 100 MG tablet 12/24/2019  No Yes   Sig: Take 1 tablet (100 mg) by mouth At Bedtime   metFORMIN (GLUCOPHAGE-XR) 500 MG 24 hr tablet 12/26/2019 Self Yes Yes   Sig: Take 500 mg by mouth At Bedtime      Facility-Administered Medications: None       Time spent: 20 minutes    Medication history completed by:   Aly June, Maxi  [facility]  Emergency Department: Ascom *[id number]

## 2019-12-28 NOTE — PROGRESS NOTES
12/27/19 2241   Patient Belongings   Did you bring any home meds/supplements to the hospital?  No   Patient Belongings locker;sent to security per site process   Patient Belongings Put in Hospital Secure Location (Security or Locker, etc.) cell phone/electronics;clothing;money (see comment);purse/wallet   Belongings Search Yes  (Shaun KUO)   Clothing Search   (Leana MIRANDA and Angela SANDERS)     Items brought in on admission (12/27/19):   Purse, wallet (w/various cards including drivers licenses and social security card), passport, change totaling ($2.71 left in purse), phone, phone , 6 scarves, hair scrunchy, boots w/laces, and winter coat. 2 cards and cash totaling $513.00 sent to security in envelope #[id number].         A               Admission:  I am responsible for any personal items that are not sent to the safe or pharmacy.  [facility] is not responsible for loss, theft or damage of any property in my possession.    Signature:  _________________________________ Date: _______  Time: _____                                              Staff Signature:  ____________________________ Date: ________  Time: _____      2nd Staff person, if patient is unable/unwilling to sign:    Signature: ________________________________ Date: ________  Time: _____     Discharge:  [facility] has returned all of my personal belongings:    Signature: _________________________________ Date: ________  Time: _____                                          Staff Signature:  ____________________________ Date: ________  Time: _____

## 2019-12-28 NOTE — PLAN OF CARE
Pt is 39 years old female transferred from [facility] ED and admitted to station 22. Pt is [facility] and committed; calm and cooperative with search.    Pt has previous hx of IP and out patient mental health treatment on different units including station 22. Pt has hx of    Pt reported major stressors are caring for her 6 children and dealing with voices. Pt reported having a good support for her , mother -in -law and sister- in -law.        Reason for admission: decompensation, suicidal ideation with plan to jump off a bridge, increasing auditory hallucination command in nature and paranoia of getting poison. Pt reported having difficulty of trusting her medication thinking that her medication is contaminated with marijuana.     During admission, pt agreed to take her medication if she is able to see her medication not open and seen taking out from the pexis; pt denied hearing voices and SI/SIB and contracted for safety. Pt stated that things will get better if I able to take my medication as ordered.     Pt didn't take her scheduled medication waiting on CBC  lab draw result for Clozaril administration. Writer will pass to the night shift nurse to follow and administer her medication.

## 2019-12-28 NOTE — ED NOTES
ED to Behavioral Floor Handoff    SITUATION  Nona Finley is a 39 year old female who speaks Chilean and lives in a home unknown The patient arrived in the ED by private car from home with a complaint of Hallucinations (hearing voices, HX schizo. Voices telling her to kill herself.)  .The patient's current symptoms started/worsened 1 month(s) ago and during this time the symptoms have increased.   In the ED, pt was diagnosed with   Final diagnoses:   Psychosis, unspecified psychosis type (H)        Initial vitals were: BP: 118/65  Pulse: 80  Temp: 98.1  F (36.7  C)  Resp: 16  Weight: 74.4 kg (164 lb)  SpO2: 99 %   --------  Is the patient diabetic? No   If yes, last blood glucose? --     If yes, was this treated in the ED? --  --------  Is the patient inebriated (ETOH) No or Impaired on other substances? No  MSSA done? N/A  Last MSSA score: --    Were withdrawal symptoms treated? N/A  Does the patient have a seizure history? No. If yes, date of most recent seizure--  --------  Is the patient patient experiencing suicidal ideation? denies current or recent suicidal ideation     Homicidal ideation? denies current or recent homicidal ideation or behaviors.    Self-injurious behavior/urges? denies current or recent self injurious behavior or ideation.  ------  Was pt aggressive in the ED No  Was a code called No  Is the pt now cooperative? Yes  -------  Meds given in ED: Medications - No data to display   Family present during ED course? No  Family currently present? No    BACKGROUND  Does the patient have a cognitive impairment or developmental disability? No  Allergies: No Known Allergies.   Social demographics are   Social History     Socioeconomic History     Marital status:      Spouse name: None     Number of children: 2     Years of education: None     Highest education level: None   Occupational History     Employer: NONE    Social Needs     Financial resource strain: None     Food insecurity:      Worry: None     Inability: None     Transportation needs:     Medical: None     Non-medical: None   Tobacco Use     Smoking status: Never Smoker     Smokeless tobacco: Never Used   Substance and Sexual Activity     Alcohol use: No     Drug use: No     Sexual activity: Yes     Partners: Male     Birth control/protection: None   Lifestyle     Physical activity:     Days per week: None     Minutes per session: None     Stress: None   Relationships     Social connections:     Talks on phone: None     Gets together: None     Attends Restorationist service: None     Active member of club or organization: None     Attends meetings of clubs or organizations: None     Relationship status: None     Intimate partner violence:     Fear of current or ex partner: None     Emotionally abused: None     Physically abused: None     Forced sexual activity: None   Other Topics Concern      Service Not Asked     Blood Transfusions No     Caffeine Concern Not Asked     Occupational Exposure No     Hobby Hazards Not Asked     Sleep Concern Not Asked     Stress Concern Not Asked     Weight Concern Not Asked     Special Diet Not Asked     Back Care Not Asked     Exercise Not Asked     Bike Helmet Not Asked     Seat Belt Not Asked     Self-Exams Not Asked     Parent/sibling w/ CABG, MI or angioplasty before 65F 55M? Not Asked   Social History Narrative    Caffeine intake/servings daily - 2 times a week    Calcium intake/servings daily - 1-2    Exercise no times weekly - describe active with children    Sunscreen used - soemtimes    Seatbelts used - Yes    Guns stored in the home - Yes    Self Breast Exam - Yes    Pap test up to date -  Yes    Eye exam up to date -  No    Dental exam up to date -  No    DEXA scan up to date -  Not Applicable    Flex Sig/Colonoscopy up to date -  Not Applicable    Mammography up to date -  Not Applicable    Immunizations reviewed and up to date - needs tdap at postpartum    Abuse: Current or Past (Physical,  Sexual or Emotional) - No    Do you feel safe in your environment - Yes    Do you cope well with stress - sometimes    Do you suffer from insomnia - No    Last updated by: Joanne Gilligan RN 12/16/10                            ASSESSMENT  Labs results   Labs Ordered and Resulted from Time of ED Arrival Up to the Time of Departure from the ED   HCG QUALITATIVE URINE   DRUG ABUSE SCREEN 6 CHEM DEP URINE ([facility])      Imaging Studies: No results found for this or any previous visit (from the past 24 hour(s)).   Most recent vital signs /65   Pulse 80   Temp 98.1  F (36.7  C) (Oral)   Resp 16   Wt 74.4 kg (164 lb)   SpO2 99%   BMI 28.15 kg/m     Abnormal labs/tests/findings requiring intervention:---   Pain control: good  Nausea control: good    RECOMMENDATION  Are any infection precautions needed (MRSA, VRE, etc.)? No If yes, what infection? --  ---  Does the patient have mobility issues? independently. If yes, what device does the pt use? ---  ---  Is patient on 72 hour hold or commitment? No If on 72 hour hold, have hold and rights been given to patient? N/A  Are admitting orders written if after 10 p.m. ?N/A  Tasks needing to be completed:---     Caty Fernández RN   [facility]-- [phone]   [phone] [facility]

## 2019-12-28 NOTE — PROGRESS NOTES
12/28/19 1322   Behavioral Health   Hallucinations auditory   Thinking distractable   Orientation person: oriented;place: oriented;date: oriented   Insight insight appropriate to situation   Judgement impaired   Eye Contact at examiner   Affect blunted, flat   Mood anxious   Physical Appearance/Attire attire appropriate to age and situation   Hygiene well groomed   Suicidality other (see comments)  (denies)   1. Wish to be Dead (Recent) No   2. Non-Specific Active Suicidal Thoughts (Recent) No   Self Injury other (see comment)  (None observed or reported )   Elopement   (None observed or reported )   Activity isolative;withdrawn   Speech coherent   Medication Sensitivity no stated side effects   Psychomotor / Gait balanced     Pt has been in and out of her room this shift. Affect is neutral to down. Calm and controlled behavior. Pt endorses hearing voices today, telling her not to take her medications. Pt was able orient herself about where she is, and understands not to belive the voices. She was observed to pace in the halls, listen to music, and rested in room. Denies thoughts to hurt herself or others. Contracts for safety.

## 2019-12-28 NOTE — H&P
"History and Physical    Nona Finley MRN# [id number]   Age: 39 year old YOB: 1980     Date of Admission:  12/27/2019        Primary Outpatient Psychiatrist: Dr Gaviria, Re-Entry ACT Team   Primary Physician:  [facility]  Therapist: Re-Entry ACT Team  [facility] : Through Re-Entry ACT team         Chief Complaint:   \"I'm hearing voices\"         History of Present Illness:   History obtained from patient interview, chart review.  Pt interviewed on December 27, 2019  at approximately 8:00 PM.    This patient is a 39 year old female with past diagnoses of schizoaffective disorder, bipolar type, who presented on 12/27/2019 due to decompensation of psychosis and SI. She was medically cleared for admission to inpatient psychiatric unit.    Per DEC report: Nona was brought to [facility] ED by her ACT team  due to decompensation of psychosis and SI. She reported worsened depressive symptoms including low mood, hopelessness, guilt, frequent crying, poor sleep, low appetite, and SI. She also endorsed anxiety, nightmares, and delusions (Medications, food are poisoned), paranoia (feels followed), and command AH to jump off a bridge. In the past she has had CAH to harm her children, but she denied this in interview. Nona reported major stressors as being mother to 6 children and her  leaving to resume medical residency in [place] on Sunday.     Per patient report:  Nona agrees with above history. She states she stopped taking her clozapine 4 nights ago because she thought she didn't need it anymore as her voices had improved. The next morning she started experiencing auditory hallucinations that told her, among other things, to jump off the [place] and that her clozapine has marijuana in it so she can't take it. She says she is still unsure if clozapine has marijuana in it and that it is difficult to believe people. She denies suicidal intent but " just says her AH are telling her to jump off a bridge. She agrees with the depressive symptoms listed above, but states they have only been present for 3 days, since stopping her medication. She denies any recent substance use, medication changes, or medical problems.     Per ACT Team: ACT team RN reported Nona has refused 3 doses of clozapine and reported missing another dose.  She has a history of poor compliance with medication and promptly decompensates, often leading to admission. Nona is due for her Abilify injection currently but the ACT team is awaiting prior auth. They recommended re-titrating clozapine as she has missed at least 4 doses.       The risks, benefits, alternatives and side effects have been discussed and are understood by the patient and other caregivers.       Psychiatric Review of Systems:   Depressive Sx: Low mood, Insomnia, Anhedonia, Guilt, Decreased appetite, Decreased energy, Concentration issues and SI  Manic Sx: none  Psychosis: AH VH paranoia delusional thinking  Anxiety Sx: worries  PTSD: none  ADHD: none  Antisocial: none  ASD: none  ED: none  Cluster B: none         Medical Review of Systems:   The Review of Systems is negative other than noted in the HPI         Psychiatric History:     Prior diagnoses: Schizoaffective Disorder, Bipolar Type    Hospitalizations: Several, most recently May 2019 at [facility]    Suicide attempts: One in past, attempted to jump off bridge but  stopped her    Self-injurious behavior: none    Violence: none    ECT/TMS: none    Past medications: Many anti-psychotics, reportedly quickly stabilizes once clozapine is titrated to PTA dose         Substance Use History:     Nicotine: Denies    Alcohol: denies           Cannabis: denies    Others: denies    Prior CD treatments: none         Past Medical History:     Past Medical History:   Diagnosis Date     Bipolar 1 disorder (H)      Bipolar affective disorder, depressed, severe, with psychotic  behavior (H)      NONSPECIFIC MEDICAL HISTORY     h/o +PPD. Neg CXR 2006     Postpartum depression 8/18/2011     Schizo-affective schizophrenia (H)      Tuberculosis      Varicosities      Past Surgical History:   Procedure Laterality Date     NO HISTORY OF SURGERY       NO HISTORY OF SURGERY  06/13/2013    Per patient reported this     No History of seizures or head trauma.       Allergies:    No Known Allergies       Medications:     Current Outpatient Medications   Medication Sig Dispense Refill     ARIPiprazole ER (ABILIFY MAINTENA) 400 MG extended release inj syringe Inject 400 mg into the muscle every 28 days  1 Syringe 0     cloZAPine (CLOZARIL) 100 MG tablet Take 1 tablet (100 mg) by mouth At Bedtime 30 tablet 0     metFORMIN (GLUCOPHAGE-XR) 500 MG 24 hr tablet Take 500 mg by mouth At Bedtime             Social History:     Currently lives in [place], [place]. She is the primary caretaker for her 6 children. She does not work. Her  is a medical resident in [place]. She has a brother, mother-in-law, and sister-in-law in the area who help with her children.          Family History:     Brother has an unknown mental illness, daughter has autism    Family History   Problem Relation Age of Onset     Respiratory Father         asthma     Asthma Father      Cerebrovascular Disease Mother      Diabetes Mother      Neurologic Disorder Brother         mental health problem?     Neurologic Disorder Sister         seizures (half sister)     Respiratory Paternal Grandfather         asthma     Respiratory Sister         asthma     Respiratory Brother         asthma     Neurologic Disorder Daughter         autism      Hypertension Maternal Grandmother      Neurologic Disorder Sister         seizures     Diabetes Maternal Grandfather      Coronary Artery Disease Son             Labs:     Recent Results (from the past 24 hour(s))   HCG qualitative urine (UPT)    Collection Time: 12/27/19  6:23 PM   Result Value  Ref Range    HCG Qual Urine Negative NEG^Negative   Drug abuse screen 6 urine (chem dep)    Collection Time: 12/27/19  6:23 PM   Result Value Ref Range    Amphetamine Qual Urine Negative NEG^Negative    Barbiturates Qual Urine Negative NEG^Negative    Benzodiazepine Qual Urine Negative NEG^Negative    Cannabinoids Qual Urine Negative NEG^Negative    Cocaine Qual Urine Negative NEG^Negative    Ethanol Qual Urine Negative NEG^Negative    Opiates Qualitative Urine Negative NEG^Negative            Psychiatric Examination:     /65   Pulse 80   Temp 98.1  F (36.7  C) (Oral)   Resp 16   Wt 74.4 kg (164 lb)   SpO2 99%   BMI 28.15 kg/m      Appearance:  awake, alert, adequately groomed and casually dressed  Attitude:  cooperative, calm  Eye Contact:  good  Mood:  sad   Affect:  mood congruent and intensity is blunted  Speech:  clear, coherent  Psychomotor Behavior:  no evidence of tardive dyskinesia, dystonia, or tics  Thought Process:  logical, linear and goal oriented  Associations:  no loose associations  Thought Content:  auditory hallucinations present, delusions that her medication has THC in it  Insight:  limited  Judgment:  limited  Oriented to:  time, person, and place  Attention Span and Concentration:  fair  Recent and Remote Memory:  fair  Language:  english with appropriate syntax and vocabulary  Fund of Knowledge: appropriate  Muscle Strength and Tone: normal  Gait and Station: Normal         Physical Exam:     See ED assessment note by Dr Shoemaker on 12/27/2019          Assessment   This patient is a 39 year old female with history of schizoaffective disorder bipolar type who presented to [facility] ED on 12/27/2019 due to decompensation of psychotic symptoms after stopping her scheduled clozapine. She has an ACT team and they report she has refused her clozapine for several days due to thinking it is tainted with THC. This has resulted in CAH to kill self and not take her medication, paranoia, and  depressive symptoms such as sadness, crying, and guilt. This is in the context of her 's upcoming move to [place] for medical residency.  Family history is notable for unknown mental illness in brother. MSE was notable for a calm [place] female with blunted affect endorsing AH and delusions that her clozapine is still tainted. She denies any suicidal intent or HI. She denies any substance use. The return of her psychotic symptoms is consistent with past diagnosis of schizoaffective disorder and could be expected from discontinuing anti-psychotic medication. She is reporting depressive symptoms however these have only been present for 3 days since stopping her medication so do not yet meet criteria for depressive episode. No manic symptoms.      Reason for inpatient hospitalization is CAH to harm self. Disposition pending clinical stabilization, medication optimization, and formulation of safe discharge plan.          Plan   Admit to Unit 22 with Attending Physician Dr Grimm  Legal Status: Committed, presented to ED voluntarily    Safety Assessment:      Pt has not required locked seclusion or restraints in the past 24 hours to maintain safety, please refer to RN documentation for further details.    Precautions: suicide, self injury    Principal psychiatric diagnosis:   # Schizoaffective disorder, bipolar type    Secondary psychiatric diagnoses:   # none    Medications:   Outpatient medications held:    - Abilify injection: Would clarify with ACT team if they have it scheduled or want to give it while hospitalized    Outpatient medications continued:   Clozapine- Reduced dose from 100mg PTA to 25mg at bedtime due to not taking for several days. Can titrate back up to PTA dose.     Metformin 500mg at bedtime     New medications initiated:   - IM/PO Olanzapine 10mg Q2H PRN aggression/agitation  - Trazodone, hydroxyzine prn    - Patient will be treated in therapeutic milieu with appropriate individual and  group therapies.  - medications as above    Medical diagnoses:      #None    Consult: None  Labs: AM TSH, CBC, CMP     Dispo: unknown pending medication management and clinical stabilization    -------------------------------------------------------    Mason Ling MD  Psychiatry PGY-2    Attestation:

## 2019-12-29 PROCEDURE — 25000132 ZZH RX MED GY IP 250 OP 250 PS 637: Performed by: STUDENT IN AN ORGANIZED HEALTH CARE EDUCATION/TRAINING PROGRAM

## 2019-12-29 PROCEDURE — 12400001 ZZH R&B MH UMMC

## 2019-12-29 RX ADMIN — METFORMIN HYDROCHLORIDE 500 MG: 500 TABLET, EXTENDED RELEASE ORAL at 20:07

## 2019-12-29 RX ADMIN — CLOZAPINE 75 MG: 25 TABLET ORAL at 20:07

## 2019-12-29 ASSESSMENT — ACTIVITIES OF DAILY LIVING (ADL)
ORAL_HYGIENE: INDEPENDENT
HYGIENE/GROOMING: INDEPENDENT
DRESS: INDEPENDENT
LAUNDRY: WITH SUPERVISION

## 2019-12-29 NOTE — PROGRESS NOTES
Nona was present in milieu, slowly walking the halls and sometimes standing and watching a little television, but not interacting with peers. She is pleasant and polite upon approach. She asked for help retrieving two numbers from her phone and honored the terms of use set by staff. She called several people using the unit phone.  She was visited by her  this evening, which appeared to go well. She ate dinner. She reports that today the voices were not as bad as yesterday, but still present. Denies SIB/HI/VH. She contracts for safety. She was in bed by 2115. She told writer that she hopes to return home as soon as she is able.     12/28/19 2222   Behavioral Health   Hallucinations auditory   Thinking distractable   Orientation person: oriented;place: oriented;date: oriented;time: oriented   Insight insight appropriate to situation   Judgement impaired   Eye Contact at examiner;cultural specific   Affect blunted, flat   Mood mood is calm;anxious   Physical Appearance/Attire attire appropriate to age and situation   Hygiene other (see comment)  (acceptable)   Suicidality thoughts only   1. Wish to be Dead (Recent) Yes   Wish to be Dead Description (Recent)   (reports voices are better, but return sometimes)   2. Non-Specific Active Suicidal Thoughts (Recent) No   Self Injury other (see comment)  (denies)   Elopement   (Said she would like to go home, no concerning behaviors)   Activity isolative   Speech coherent   Medication Sensitivity no stated side effects   Psychomotor / Gait balanced;slow   Psycho Education   Type of Intervention 1:1 intervention   Response participates with encouragement   Hours 0.5   Treatment Detail check-in

## 2019-12-29 NOTE — PROGRESS NOTES
CROSS COVER    Patient still refusing EKG per RN. Reviewed prior EKG from March 2019. This previous EKG was also obtained in the context of recent clozapine discontinuation. It revealed no structural abnormalities other than possible LAE, and all intervals were WNL. Patient has not been having side effects associated with clozapine restart. Also no reports of chest pain, palpitations, dyspnea, fever, flu-like symptoms.    Of note, records show that Abilify maintena is now overdue by 5 days. ACT team was reportedly unable to give injection due on 12/24/19 in the outpatient setting due to issue with prior authorization. Spoke with pharmacy who stated they don't yet have a definitive record of last dose of maintena or whether patient receiving any doses of maintena as outpatient.    /82 (BP Location: Right arm)   Pulse 63   Temp 96.9  F (36.1  C) (Tympanic)   Resp 16   Wt 71.7 kg (158 lb)   SpO2 98%   BMI 27.12 kg/m      A/P:  Ideally would like to have new EKG in context of clozapine restart, however patient refusing. Prior EKG was not concerning for congenital LQTS or major cardiac pathology. No symptoms to raise suspicion for myocarditis. No electrolyte abnormalities or other known risk factors for QT prolongation, and has not required additional QT prolonging medications as PRN's (e.g., zyprexa and hydroxyzine).   - Will defer EKG for now until patient accepts    For the abilify, unclear situation with prior auth. She has already improved symptomatically with clozapine restart (e.g., reduction in AH, calm behaviors, no agitation).   - Will defer to day team for whether to restart Abilify.     Chilango Carrillo MD  PGY-2 Psychiatry Resident

## 2019-12-29 NOTE — PROVIDER NOTIFICATION
Pt refused EKG in AM.  EKG staff called to say that they will need a new order if the pt is still going to get an EKG some other time..

## 2019-12-29 NOTE — PLAN OF CARE
"  Problem: Suicidal Behavior  Goal: Suicidal Behavior is Absent or Managed  Outcome: Improving     Per pt, was receiving Abilify IM every 28 days per ACT Team, and was due on 12/24/19 per Pharmicist review on admission. Provider spoke with Pharmacy [see his note]. Team to discuss tomorrow.  Since pt is improving on Clozaril, will continue to titrate dose up. Only stated side effects are fatigue and weight gain. Today she report the voices are \"much better\" and she is feeling better. Asking about discharge.    Pt has been calm, pleasant, cooperative. No physical complaints. Walks halls calmly, keeps to herself.   Patient denies depression, anxiety, SI/SIB/HI, manic symptoms, racing thoughts or auditory/visual hallucinations. Requested no PRNs when offered.     Reports a bowel movement today that was WNL; no diarrhea or constipation.      "

## 2019-12-29 NOTE — PROGRESS NOTES
Psychiatry Cross Coverage note    Patient was restarted on clozapine last night at 25 mg due to 3+ days of non-adherence. Tonight, pt requested to have dose increased. Not neutropenic: ANC from 12/27 was 3.4.     Ordered series of clozapine doses to titrate back to previous dose of clozapine 100 mg at bedtime    Clozapine 50 mg ordered for one dose tonight (12/28)  Clozapine 75 mg ordered for one dose tomorrow night  Clozapine 100 mg ordered to start 12/30 at bedtime and continue thereafter.    Lars Beard MD  Psychiatry Resident PGY-2

## 2019-12-30 PROCEDURE — 99232 SBSQ HOSP IP/OBS MODERATE 35: CPT | Mod: GC | Performed by: PSYCHIATRY & NEUROLOGY

## 2019-12-30 PROCEDURE — 12400001 ZZH R&B MH UMMC

## 2019-12-30 PROCEDURE — G0177 OPPS/PHP; TRAIN & EDUC SERV: HCPCS

## 2019-12-30 PROCEDURE — 25000132 ZZH RX MED GY IP 250 OP 250 PS 637: Performed by: STUDENT IN AN ORGANIZED HEALTH CARE EDUCATION/TRAINING PROGRAM

## 2019-12-30 RX ADMIN — ACETAMINOPHEN 650 MG: 325 TABLET, FILM COATED ORAL at 20:07

## 2019-12-30 RX ADMIN — CLOZAPINE 100 MG: 100 TABLET ORAL at 20:08

## 2019-12-30 RX ADMIN — METFORMIN HYDROCHLORIDE 500 MG: 500 TABLET, EXTENDED RELEASE ORAL at 20:08

## 2019-12-30 ASSESSMENT — ACTIVITIES OF DAILY LIVING (ADL)
LAUNDRY: WITH SUPERVISION
HYGIENE/GROOMING: INDEPENDENT
HYGIENE/GROOMING: INDEPENDENT
DRESS: INDEPENDENT
ORAL_HYGIENE: INDEPENDENT
ORAL_HYGIENE: INDEPENDENT
DRESS: INDEPENDENT

## 2019-12-30 NOTE — PROGRESS NOTES
Pt was visible in the milieu, spent the day pacing the halls. Pt was calm and cooperative throughout the day. Pt mentioned the importance of taking their medication. Stated they understand that even though they felt better, it's neccessary to keep taking her medication for her mental health. Pt hopes to discharge soon to return to her children, as her  is out of town for work in [place]. Pt denies SI/SIB, hallucinations and medication side effects.      12/30/19 1200 Behavioral Health   Hallucinations denies / not responding to hallucinations   Thinking intact   Orientation person: oriented;place: oriented;date: oriented;time: oriented   Memory baseline memory   Insight insight appropriate to situation   Judgement intact   Eye Contact at examiner   Affect full range affect   Mood mood is calm   Physical Appearance/Attire attire appropriate to age and situation   Hygiene well groomed   Suicidality other (see comments)  (denies)   1. Wish to be Dead (Recent) No   2. Non-Specific Active Suicidal Thoughts (Recent) No   Activity isolative;withdrawn   Speech clear;coherent   Medication Sensitivity no stated side effects;no observed side effects   Psychomotor / Gait balanced;steady;paces   Psycho Education   Type of Intervention 1:1 intervention   Response participates, initiates socially appropriate   Hours 0.5   Activities of Daily Living   Hygiene/Grooming independent   Oral Hygiene independent   Dress independent   Laundry with supervision   Room Organization independent   Activity   Activity Assistance Provided independent

## 2019-12-30 NOTE — PROGRESS NOTES
Initially seen by OT on this date. Nona   attended 1 of 3 OT groups today. Quiet and attentive. Very pleasant, per usual. Made a musical selection to share with the group; chatted sociably with peers. More observation needed to complete initial evaluation at this time.      12/30/19 1500   Occupational Therapy   Type of Intervention structured groups   Response Quiet but attentive   Hours 1

## 2019-12-30 NOTE — PROGRESS NOTES
Pt stated they had a good day.  They spent their time resting in their room and pacing the flores.  During check in pt stated they are feeling very good and are no longer hallucinating.  Pt stated they are worried about their children because pt's mother in law is watching them but cannot drive to get groceries.  Pt ate dinner and snacks.  PT denies SI, SIB, and hallucinations.         12/29/19 2105   Behavioral Health   Hallucinations denies / not responding to hallucinations   Thinking intact   Orientation person: oriented;place: oriented;date: oriented;time: oriented   Memory baseline memory   Insight insight appropriate to situation   Judgement impaired   Eye Contact at examiner   Affect full range affect   Mood mood is calm   Physical Appearance/Attire attire appropriate to age and situation   Hygiene well groomed   1. Wish to be Dead (Recent) No   2. Non-Specific Active Suicidal Thoughts (Recent) No   Activity isolative;withdrawn   Speech clear;coherent   Psychomotor / Gait balanced;steady;paces   Psycho Education   Type of Intervention 1:1 intervention   Response participates, initiates socially appropriate   Hours 0.5   Treatment Detail Check-in   Activities of Daily Living   Hygiene/Grooming independent   Oral Hygiene independent   Dress independent   Laundry with supervision   Room Organization independent

## 2019-12-30 NOTE — PROGRESS NOTES
"    ----------------------------------------------------------------------------------------------------------  [facility], [place]   Psychiatric Progress Note     Interim History:   The patient's care was discussed with the treatment team and chart notes were reviewed.     Sleep: 7hrs  Scheduled meds: adherent  PRN meds: none    Per Staff report: Reports AH improved, requesting to discharge.    Patient interview: Patient interviewed in the conference room. Reports that her AH have improved greatly since Saturday and that she currently hears no voices. She reports that she discontinued her Clozaril because she was \"feeling better\" and did not think she needed medication. She is concerned about her children being at home with her mother-in-law as she can't drive.     Call w/ ACT Team  Spoke with patient's ACT team. Reported that patient typically stabilizes in the hospital after 1-3 days. She missed her last Abilify dose as the team is waiting for pre-auth to be improved. Also reported that patient has a brother in the [facility] who may be able to help with her children until she can return home.    The risks, benefits, alternatives and side effects of any medication changes have been discussed and are understood by the patient and other caregivers.    Psychiatric Review of systems:     The Review of Systems is negative other than noted in the HPI         Psychiatric Examination:   /66 (BP Location: Left arm)   Pulse 77   Temp 97.7  F (36.5  C) (Oral)   Resp 16   Wt 71.7 kg (158 lb)   SpO2 100%   BMI 27.12 kg/m    Weight is 158 lbs 0 oz  Body mass index is 27.12 kg/m .    Appearance:  awake, alert and adequately groomed, wearing clothes from home  Attitude:  cooperative  Eye Contact:  good  Mood:  good  Affect:  appropriate and in normal range and mood congruent  Speech:  clear, coherent  Psychomotor Behavior:  no evidence of tardive dyskinesia, dystonia, or tics  Thought " Process:  logical, linear and goal oriented  Associations:  no loose associations  Thought Content:  no evidence of suicidal ideation or homicidal ideation and no evidence of psychotic thought  Insight:  fair, as demonstrated by understanding of worsening symptoms with medication discontinuation  Judgment:  fair, as demonstrated by decision to discontinue medication  Oriented to:  time, person, and place  Attention Span and Concentration:  intact for interview  Recent and Remote Memory:  intact for interview  Language: English with appropriate syntax  Fund of Knowledge: appropriate  Muscle Strength and Tone: grossly normal  Gait and Station: grossly normal     Assessment    Principal Diagnosis:   # Schizoaffective disorder, bipolar type    Diagnostic Impression: Nona Finley, a 39 year old female, with history of schizoaffective disorder bipolar type who presented to [facility] ED on 12/27/2019 due to decompensation of psychotic symptoms after stopping her scheduled clozapine. She has an ACT team and they report she has refused her clozapine for several days due to thinking it is tainted with THC. This has resulted in CAH to kill self and not take her medication, paranoia, and depressive symptoms such as sadness, crying, and guilt. This is in the context of her 's upcoming move to [place] for medical residency.  Family history is notable for unknown mental illness in brother. MSE on admission was notable for a calm Georgian female with blunted affect endorsing AH and delusions that her clozapine is still tainted. She denied any suicidal intent or HI. She denied any substance use and UDS was negative on admission. The return of her psychotic symptoms is consistent with past diagnosis of schizoaffective disorder and could be expected from discontinuing anti-psychotic medication. She is reporting depressive symptoms however these have only been present for a few days since stopping her medication so do not  yet meet criteria for depressive episode. No manic symptoms.      Hospital course: Nona Finley was admitted to station 22 as a voluntary patient. She was restarted on Clozapine 25mg 12/27 and titrated to her PTA dose of 100mg on 12/30. Patient is 4 days past due on her Abilify Maintena injection, per outpatient team prior authorization may take several more days. Currently Abilify Maintena is not on our formulary and we are unable to provide this medication. She denied any side effects from restarting her Clozapine and has had significant improvement in her AH over the course of hospitalization.    Medical course: Nona Finley was medically cleared by the ED prior to admission to the unit. Admission labs were unremarkable and UDS was negative. PTA medications were continued.    Plan     Today's Changes:   - Increase Clozaril to 100mg qPM (PTA dose)    Continue:  - Metformin 500mg qPM for prevention of weight gain    - Tylenol 650mg q4h PRN for pain  - Hydroxyzine 25mg q4h PRN for anxiety  - Trazodone 50mg qPM for insomnia  - Olanzapine 10mg IM/PO q2h PRN for agitation  - Trazodone 50mg PO qPM for insomnia  - Milk of magnesia 30mL PO qPM PRN for constipation  - Mylanta/Maaloc 30mL PO q4h PRN for indigestion      - Patient will be treated in therapeutic milieu with appropriate individual and group therapies as described.  The patient requires continued inpatient hospitalization and disposition is pending due to medication optimization, inpatient stabilization, and appropriate discharge planning.     Laboratory/Imaging: none pending    Pertinent Medical diagnoses: n/a    Consults: none    Legal Status: Voluntary    Safety Assessment:   Behavioral Orders   Procedures     Code 1 - Restrict to Unit     Routine Programming     As clinically indicated     Self Injury Precaution     Single Room     Status 15     Every 15 minutes.     Suicide precautions     Patients on Suicide Precautions should have a  Combination Diet ordered that includes a Diet selection(s) AND a Behavioral Tray selection for Safe Tray - with utensils, or Safe Tray - NO utensils           This patient was seen and discussed with my attending physician.   Myla Anders MD  PGY1 Resident, Psychiatry        Attending Physician Attestation:  I, Fady Ko, saw and evaluated the patient with the resident physician.  I agree with the findings and plan of care as documented in the resident note.  I have reviewed all labs and vital signs.

## 2019-12-31 VITALS
OXYGEN SATURATION: 99 % | DIASTOLIC BLOOD PRESSURE: 62 MMHG | WEIGHT: 158 LBS | SYSTOLIC BLOOD PRESSURE: 99 MMHG | HEART RATE: 81 BPM | RESPIRATION RATE: 16 BRPM | TEMPERATURE: 98 F | BODY MASS INDEX: 27.12 KG/M2

## 2019-12-31 PROCEDURE — G0177 OPPS/PHP; TRAIN & EDUC SERV: HCPCS

## 2019-12-31 PROCEDURE — 99238 HOSP IP/OBS DSCHRG MGMT 30/<: CPT | Mod: GC | Performed by: PSYCHIATRY & NEUROLOGY

## 2019-12-31 ASSESSMENT — ACTIVITIES OF DAILY LIVING (ADL)
DRESS: INDEPENDENT
HYGIENE/GROOMING: INDEPENDENT
ORAL_HYGIENE: INDEPENDENT

## 2019-12-31 NOTE — DISCHARGE SUMMARY
Psychiatric Discharge Summary    Hospital Day #4    Nona Finley MRN# [id number]   Age: 39 year old YOB: 1980     Date of Admission:  12/27/2019  Date of Discharge:  12/31/2019 12:57 PM  Admitting Physician:  Danish Levi MD  Discharge Physician:  Fady Ko MD         Event Leading to Hospitalization:     From admission H&P:    This patient is a 39 year old female with past diagnoses of schizoaffective disorder, bipolar type, who presented on 12/27/2019 due to decompensation of psychosis and SI. She was medically cleared for admission to inpatient psychiatric unit.     Per DEC report: Nona was brought to [facility] ED by her ACT team  due to decompensation of psychosis and SI. She reported worsened depressive symptoms including low mood, hopelessness, guilt, frequent crying, poor sleep, low appetite, and SI. She also endorsed anxiety, nightmares, and delusions (Medications, food are poisoned), paranoia (feels followed), and command AH to jump off a bridge. In the past she has had CAH to harm her children, but she denied this in interview. Nona reported major stressors as being mother to 6 children and her  leaving to resume medical residency in [place] on Sunday.      Per patient report:  Nona agrees with above history. She states she stopped taking her clozapine 4 nights ago because she thought she didn't need it anymore as her voices had improved. The next morning she started experiencing auditory hallucinations that told her, among other things, to jump off the [place] and that her clozapine has marijuana in it so she can't take it. She says she is still unsure if clozapine has marijuana in it and that it is difficult to believe people. She denies suicidal intent but just says her AH are telling her to jump off a bridge. She agrees with the depressive symptoms listed above, but states they have only been present for 3 days, since stopping her  medication. She denies any recent substance use, medication changes, or medical problems.      Per ACT Team: ACT team RN reported Nona has refused 3 doses of clozapine and reported missing another dose.  She has a history of poor compliance with medication and promptly decompensates, often leading to admission. Nona is due for her Abilify injection currently but the ACT team is awaiting prior auth. They recommended re-titrating clozapine as she has missed at least 4 doses.                Diagnoses:     Principal Diagnosis:   # Schizoaffective disorder, bipolar type     Diagnostic Impression: Nona Finley, a 39 year old female, with history of schizoaffective disorder bipolar type who presented to [facility] ED on 12/27/2019 due to decompensation of psychotic symptoms after stopping her scheduled clozapine. She has an ACT team and they report she has refused her clozapine for several days due to thinking it is tainted with THC. This has resulted in CAH to kill self and not take her medication, paranoia, and depressive symptoms such as sadness, crying, and guilt. This is in the context of her 's upcoming move to [place] for medical residency.  Family history is notable for unknown mental illness in brother. MSE on admission was notable for a calm Peruvian female with blunted affect endorsing AH and delusions that her clozapine is still tainted. She denied any suicidal intent or HI. She denied any substance use and UDS was negative on admission. The return of her psychotic symptoms is consistent with past diagnosis of schizoaffective disorder and could be expected from discontinuing anti-psychotic medication. She is reporting depressive symptoms however these have only been present for a few days since stopping her medication so do not yet meet criteria for depressive episode. No manic symptoms.           Consults:     - none         Hospital Course:   Psychiatric Course: Nona Finley was admitted  to station 22 as a voluntary patient (patient committed at time of admission but presented voluntarily to the hospital.)   1. Medication Trials and Changes:   a. PTA Clozaril restarted and titrated to home dose of 100mg qPM - patient denied side effects and reported improvement in AH  b. PTA Ollie Lovetenna unable to be administered in hospital as it was not on furmulary, at time of admission patient was several days overdue for nexst injection. Per outpatient team medication delayed due to prior authorization claim.  2. Medication adherence: adherent  3. Change in psychiatric symptoms: Over the course of this hospitalization the patient's AH and suicidal ideation greatly improved.  4. Behaviors and group Attendance: The patientdid not require chemical/physical restraints during admission. The patient was safe and appropriate. They were cooperative with cares and participated in groups on the unit.  5. Legal Status: Committed    Medical Course: Nona Finley was medically cleared by the ED prior to admission to the unit. Admission labs were unremarkable and UDS was negative. PTA medications were continued.    Risk Assessment:  Nona Finley was discharged to home. At the time of discharge Nona Finley was determined to not be a danger to herself or others.    Nona Finley has notable risk factors for self-harm, including psychosis. However, risk is mitigated by commitment to family, absence of past attempts and ability to volunteer a safety plan.Additional steps taken to minimize risk include: coordination with outpatient mental health team. Therefore, based on all available evidence including the factors cited above, Nona Finley does not appear to be at imminent risk for self-harm, and is appropriate for outpatient level of care.     This document serves as a transfer of care to Nona Finley's outpatient providers.         Discharge Medications:     Discharge Medication List as  of 12/31/2019 12:44 PM      CONTINUE these medications which have NOT CHANGED    Details   ARIPiprazole ER (ABILIFY MAINTENA) 400 MG extended release inj syringe Inject 400 mg into the muscle every 28 days , Disp-1 Syringe, R-0, Historical      cloZAPine (CLOZARIL) 100 MG tablet Take 1 tablet (100 mg) by mouth At Bedtime, Disp-30 tablet, R-0, E-Prescribe      metFORMIN (GLUCOPHAGE-XR) 500 MG 24 hr tablet Take 500 mg by mouth At Bedtime, Historical                  Psychiatric Examination:   Appearance:  awake, alert and adequately groomed  Attitude:  cooperative  Eye Contact:  good  Mood:  euthymic  Affect:  mood congruent, reactive  Speech:  clear, coherent  Psychomotor Behavior:  no evidence of tardive dyskinesia, dystonia, or tics  Thought Process:  logical and linear, goal oriented  Associations:  no loose associations  Thought Content:  no evidence of suicidal ideation or homicidal ideation and no evidence of psychotic thought  Insight:  Good, as demonstrated by patient's statement that she needed to take medications to improve her psychotic symptoms  Judgment:  fair  Oriented to:  time, person, and place  Attention Span and Concentration:  intact  Recent and Remote Memory:  intact  Language: English with appropriate  Fund of Knowledge: appropriate  Muscle Strength and Tone: normal  Gait and Station: Normal         Discharge Plan:     ReEntry ACT Team [phone]  Dr. Gaviria  An ACT team member comes to your house every day to assist with medication.     Pt seen and discussed with my attending, MD Myla Carmen MD  PGY1 Resident, Psychiatry        Attestation:  IFady, saw and evaluated the patient with the resident physician.  I agree with the findings and plan of care as documented in the resident note.  I have reviewed all labs and vital signs.  I, Fady Ko, have reviewed this summary and agree with the findings and discharge plan as written.                Appendix A:  All Labs This Admission:     Results for orders placed or performed during the hospital encounter of 12/27/19   HCG qualitative urine (UPT)     Status: None   Result Value Ref Range    HCG Qual Urine Negative NEG^Negative   Drug abuse screen 6 urine (chem dep)     Status: None   Result Value Ref Range    Amphetamine Qual Urine Negative NEG^Negative    Barbiturates Qual Urine Negative NEG^Negative    Benzodiazepine Qual Urine Negative NEG^Negative    Cannabinoids Qual Urine Negative NEG^Negative    Cocaine Qual Urine Negative NEG^Negative    Ethanol Qual Urine Negative NEG^Negative    Opiates Qualitative Urine Negative NEG^Negative   CBC with platelets differential     Status: None   Result Value Ref Range    WBC 6.7 4.0 - 11.0 10e9/L    RBC Count 3.86 3.8 - 5.2 10e12/L    Hemoglobin 11.8 11.7 - 15.7 g/dL    Hematocrit 35.6 35.0 - 47.0 %    MCV 92 78 - 100 fl    MCH 30.6 26.5 - 33.0 pg    MCHC 33.1 31.5 - 36.5 g/dL    RDW 13.3 10.0 - 15.0 %    Platelet Count 280 150 - 450 10e9/L    Diff Method Automated Method     % Neutrophils 51.3 %    % Lymphocytes 38.9 %    % Monocytes 7.0 %    % Eosinophils 2.1 %    % Basophils 0.6 %    % Immature Granulocytes 0.1 %    Nucleated RBCs 0 0 /100    Absolute Neutrophil 3.4 1.6 - 8.3 10e9/L    Absolute Lymphocytes 2.6 0.8 - 5.3 10e9/L    Absolute Monocytes 0.5 0.0 - 1.3 10e9/L    Absolute Eosinophils 0.1 0.0 - 0.7 10e9/L    Absolute Basophils 0.0 0.0 - 0.2 10e9/L    Abs Immature Granulocytes 0.0 0 - 0.4 10e9/L    Absolute Nucleated RBC 0.0    CBC with platelets     Status: None   Result Value Ref Range    WBC 4.9 4.0 - 11.0 10e9/L    RBC Count 4.09 3.8 - 5.2 10e12/L    Hemoglobin 12.2 11.7 - 15.7 g/dL    Hematocrit 37.5 35.0 - 47.0 %    MCV 92 78 - 100 fl    MCH 29.8 26.5 - 33.0 pg    MCHC 32.5 31.5 - 36.5 g/dL    RDW 13.4 10.0 - 15.0 %    Platelet Count 301 150 - 450 10e9/L   Comprehensive metabolic panel     Status: Abnormal   Result Value Ref Range    Sodium 141 133 - 144 mmol/L     Potassium 4.0 3.4 - 5.3 mmol/L    Chloride 109 94 - 109 mmol/L    Carbon Dioxide 26 20 - 32 mmol/L    Anion Gap 6 3 - 14 mmol/L    Glucose 95 70 - 99 mg/dL    Urea Nitrogen 16 7 - 30 mg/dL    Creatinine 0.51 (L) 0.52 - 1.04 mg/dL    GFR Estimate >90 >60 mL/min/[1.73_m2]    GFR Estimate If Black >90 >60 mL/min/[1.73_m2]    Calcium 8.7 8.5 - 10.1 mg/dL    Bilirubin Total 0.4 0.2 - 1.3 mg/dL    Albumin 3.4 3.4 - 5.0 g/dL    Protein Total 7.6 6.8 - 8.8 g/dL    Alkaline Phosphatase 48 40 - 150 U/L    ALT 16 0 - 50 U/L    AST 17 0 - 45 U/L   TSH with free T4 reflex and/or T3 as indicated     Status: None   Result Value Ref Range    TSH 0.65 0.40 - 4.00 mU/L   Lipid panel     Status: None   Result Value Ref Range    Cholesterol 161 <200 mg/dL    Triglycerides 71 <150 mg/dL    HDL Cholesterol 51 >49 mg/dL    LDL Cholesterol Calculated 96 <100 mg/dL    Non HDL Cholesterol 110 <130 mg/dL

## 2019-12-31 NOTE — PROGRESS NOTES
Pt was observed present in the milieu throughout the evening watching TV briefly, walking the halls, and eating meals. Pt did not talk much to staff or peers but was pleasant upon approach. Pt reported to staff that they had a bowel movement this evening. Pt also reported that their brother is in town but they are unsure if he will be coming to visit them. Pt denies SI and SIB.      12/30/19 2240   Behavioral Health   Hallucinations denies / not responding to hallucinations   Thinking intact   Orientation person: oriented;place: oriented;date: oriented;time: oriented   Memory baseline memory   Insight insight appropriate to situation   Judgement intact   Eye Contact at examiner   Affect full range affect   Mood mood is calm   Physical Appearance/Attire attire appropriate to age and situation   Hygiene well groomed   Suicidality   (Pt denies)   1. Wish to be Dead (Recent) No   2. Non-Specific Active Suicidal Thoughts (Recent) No   Self Injury   (Pt denies)   Elopement   (None stated or observed)   Activity isolative;withdrawn   Speech clear;coherent   Medication Sensitivity no stated side effects;no observed side effects   Psychomotor / Gait balanced;steady   Psycho Education   Type of Intervention 1:1 intervention   Response participates, initiates socially appropriate   Hours 0.5   Treatment Detail Check-in   Activities of Daily Living   Hygiene/Grooming independent   Oral Hygiene independent   Dress independent   Room Organization independent

## 2019-12-31 NOTE — DISCHARGE SUMMARY
Pt is requesting discharge and is being discharged at this time. Pt is returning home. Pt has been calm, cooperative, and pleasant this shift. Pt denies depression and anxiety as well as SI/SIB. Pt denies all other mental health symptoms including hallucinations and has not been observed exhibiting such symptoms. Pt states that the medication has been helpful and that she is excited to get back home to her children whom she misses a lot. Pt expresses understanding of the discharge instructions. Pt did not leave with any medication as she states she has her medication at home. Pt is aware of and in agreement with therapeutic plan in place.

## 2019-12-31 NOTE — ED PROVIDER NOTES
History     Chief Complaint   Patient presents with     Hallucinations     hearing voices, HX schizo. Voices telling her to kill herself.     HPI  Nona Finley is a 39 year old female with a history of schizoaffective disorder who presents with her ACT  due to psychosis and suicidal ideation.  Nona states that she has not been taking her medications because she believes that there are other drugs mixed with her medications, such as THC.  She reports both visual and command auditory hallucinations. The voices are commanding her to harm her children.  She has had thoughts of suicide, including jumping off of a bridge.  Of note, she had an attempt of jumping off a bridge in 2011, but her  intervened.       Allergies:  No Known Allergies    Problem List:    Patient Active Problem List    Diagnosis Date Noted     Schizophrenia (H) 02/12/2019     Priority: Medium     Psychosis (H) 05/25/2018     Priority: Medium     Fungal nail infection 11/02/2017     Priority: Medium     Encounter for female birth control 06/14/2017     Priority: Medium     6/14/2017  Plan Documentation  Service ordered Depo Provera injection (150mg IM) may be given every 3 months for one year per loretta.  Plan and order should be renewed at a visit no later than 6/14/18     Vanessa Thompson MD               Suicidal ideation 02/16/2017     Priority: Medium     Schizoaffective disorder, depressive type (H) 11/09/2016     Priority: Medium     [facility] : Jett Correa (ph [phone])       Mantoux: positive 11/10/2008     Priority: Medium     2006- pos PPD, did not fini/sh INH, neg CXR  Started treatment 8/2015- completed 4 months of Tx.  Stopped in Jan2016         child with autism 06/20/2008     Priority: Medium     1 st child has autism.  Has personal care assistant 16 hrs per week.  11/10/08- pt left her with her grandmother in [facility]       Varicose veins of bilateral lower extremities with pain      Priority:  Medium     Varicosities both legs, Jobst ordered   vascular surgery consult  8/18/11: Declines surgery at PP visit--encouraged to continue to wear support hose.  Problem list name updated by automated process. Provider to review          Past Medical History:    Past Medical History:   Diagnosis Date     Bipolar 1 disorder (H)      Bipolar affective disorder, depressed, severe, with psychotic behavior (H)      NONSPECIFIC MEDICAL HISTORY      Postpartum depression 8/18/2011     Schizo-affective schizophrenia (H)      Tuberculosis      Varicosities        Past Surgical History:    Past Surgical History:   Procedure Laterality Date     NO HISTORY OF SURGERY       NO HISTORY OF SURGERY  06/13/2013    Per patient reported this       Family History:    Family History   Problem Relation Age of Onset     Respiratory Father         asthma     Asthma Father      Cerebrovascular Disease Mother      Diabetes Mother      Neurologic Disorder Brother         mental health problem?     Neurologic Disorder Sister         seizures (half sister)     Respiratory Paternal Grandfather         asthma     Respiratory Sister         asthma     Respiratory Brother         asthma     Neurologic Disorder Daughter         autism      Hypertension Maternal Grandmother      Neurologic Disorder Sister         seizures     Diabetes Maternal Grandfather      Coronary Artery Disease Son        Social History:  Marital Status:   [2]  Social History     Tobacco Use     Smoking status: Never Smoker     Smokeless tobacco: Never Used   Substance Use Topics     Alcohol use: No     Drug use: No        Medications:    No current outpatient medications on file.        Review of Systems   All other systems reviewed and are negative.      Physical Exam   BP: 118/65  Pulse: 80  Temp: 98.1  F (36.7  C)  Resp: 16  Height: (5' 4'')  Weight: 74.4 kg (164 lb)  SpO2: 99 %  Lying Orthostatic BP: 104/74  Lying Orthostatic Pulse: 77 bpm  Sitting Orthostatic BP:  115/59  Sitting Orthostatic Pulse: 109 bpm  Standing Orthostatic BP: 98/57  Standing Orthostatic Pulse: 119 bpm      Physical Exam  Vitals signs and nursing note reviewed.   Constitutional:       General: She is not in acute distress.     Appearance: She is not diaphoretic.   HENT:      Head: Normocephalic and atraumatic.   Neck:      Musculoskeletal: Normal range of motion and neck supple.   Cardiovascular:      Rate and Rhythm: Normal rate.   Pulmonary:      Effort: Pulmonary effort is normal. No respiratory distress.   Musculoskeletal: Normal range of motion.   Skin:     General: Skin is warm and dry.   Neurological:      Mental Status: She is alert and oriented to person, place, and time.   Psychiatric:         Behavior: Behavior normal.      Comments: Auditory and visual hallucinations.  Suicidal ideation         ED Course        Procedures               Critical Care time:  none               No results found for this or any previous visit (from the past 24 hour(s)).    Medications   metFORMIN (GLUCOPHAGE-XR) 24 hr tablet 500 mg (500 mg Oral Given 12/30/19 2008)   hydrOXYzine (ATARAX) tablet 25 mg (has no administration in time range)   acetaminophen (TYLENOL) tablet 650 mg (650 mg Oral Given 12/30/19 2007)   alum & mag hydroxide-simethicone (MYLANTA ES/MAALOX  ES) suspension 30 mL (has no administration in time range)   magnesium hydroxide (MILK OF MAGNESIA) suspension 30 mL (has no administration in time range)   traZODone (DESYREL) tablet 50 mg (has no administration in time range)   OLANZapine (zyPREXA) tablet 10 mg (has no administration in time range)     Or   OLANZapine (zyPREXA) injection 10 mg (has no administration in time range)   cloZAPine (CLOZARIL) tablet 100 mg (100 mg Oral Given 12/30/19 2008)   cloZAPine (CLOZARIL) tablet 50 mg (50 mg Oral Given 12/28/19 2053)   cloZAPine (CLOZARIL) tablet 75 mg (75 mg Oral Given 12/29/19 2007)       Assessments & Plan (with Medical Decision Making)    39-year-old female with schizoaffective disorder who presents with auditory and visual hallucinations along with suicidal ideation and thoughts of harming her children.  She has not been taking her medications because she believes they are laced with other substances.  She is cooperative and voluntary for admission to psychiatry.       I have reviewed the nursing notes.    I have reviewed the findings, diagnosis, plan and need for follow up with the patient.       Current Discharge Medication List          Final diagnoses:   Psychosis, unspecified psychosis type (H)       12/27/2019   UR 22NB     Dexter Shoemaker MD  12/31/19 1045

## 2020-01-07 ENCOUNTER — ALLIED HEALTH/NURSE VISIT (OUTPATIENT)
Dept: PHARMACY | Facility: CLINIC | Age: 40
End: 2020-01-07
Payer: COMMERCIAL

## 2020-01-07 DIAGNOSIS — F25.1 SCHIZOAFFECTIVE DISORDER, DEPRESSIVE TYPE (H): Primary | ICD-10-CM

## 2020-01-07 DIAGNOSIS — R73.9 HYPERGLYCEMIA: ICD-10-CM

## 2020-01-07 PROCEDURE — 99605 MTMS BY PHARM NP 15 MIN: CPT | Performed by: PHARMACIST

## 2020-01-07 NOTE — PROGRESS NOTES
SUBJECTIVE/OBJECTIVE:                Nona Finley is a 39 year old female called for a transitions of care visit.  She was discharged from [facility] on 12/31 for psychosis and suicidal ideation.     Chief Complaint: No concerns.    Allergies/ADRs: None  Tobacco:  reports that she has never smoked. She has never used smokeless tobacco.  Alcohol: not currently using  Caffeine: no caffeine  PMH: Reviewed in Epic    Medication Adherence/Access:  no issues reported- see comments under SD    Schizoaffective Disorder: Pt appears to see psychiatry outside our system and she has intended to schedule with them for a follow up but has not done so yet.     Patient states she is taking clozapine 100 mg nightly at bedtime with the assistance of ACT team.  She reports that she has not missed any doses since leaving the hospital.  She also reports she received her Abilify shot 2 days ago.  She reports she is not having hallucinations either auditory or visual and also that she is not having suicidal ideation or homicidal ideation.  She does not report any other concerns either.    Hyperglycemia: Pt is taking metformin XR 500mg daily and reports she has mild GI upset but no other issues.  Lab Results   Component Value Date    GLC 95 12/28/2019     03/19/2019    GLC 95 02/13/2019     Today's Vitals: There were no vitals taken for this visit.    ASSESSMENT:                 Medication Adherence: good, no issues identified    Schizoaffective Disorder: Stable per patient. Pt encouraged to continue to follow up with ACT team and also to schedule with her psychiatrist after our call today.    Hyperglycemia: Stable. BG in normal limits during hospitalization.    PLAN:                  Post Discharge Medication Reconciliation Status: discharge medications reconciled, continue medications without change.    1. Pt to schedule with psychiatry as planned.    I spent 15 minutes with this patient today. I offer these suggestions  with the understanding that I don't fully understand Nona's past medical history and the complexity of her health conditions. Nona should make no changes without the approval of her physician.     Will follow up in 1 week via phone.    The patient was mailed a summary of these recommendations as an after visit summary.    Leela Lemons PharmD, [facility]  Medication Therapy Management Provider  Phone: [phone]  [email or web address]

## 2020-01-07 NOTE — PATIENT INSTRUCTIONS
Recommendations from today's MTM visit:                                                    MTM (medication therapy management) is a service provided by a clinical pharmacist designed to help you get the most of out of your medicines.   Today we reviewed what your medicines are for, how to know if they are working, that your medicines are safe and how to make your medicine regimen as easy as possible.     1. Remember to schedule with psychiatry.    It was great to speak with you today.  I value your experience and would be very thankful for your time with providing feedback on our clinic survey. You may receive a survey via email or text message in the next few days.     Next MTM visit: 1/14 at 2pm over the phone    To schedule another MTM appointment, please call the clinic directly or you may call the MTM scheduling line at [phone] or toll-free at [phone].     My Clinical Pharmacist's contact information:                                                      It was a pleasure talking with you today!  Please feel free to contact me with any questions or concerns you have.      Leela Lemons PharmD, [facility]  Medication Therapy Management Provider  Phone: [phone]  [email or web address]

## 2020-01-14 ENCOUNTER — TELEPHONE (OUTPATIENT)
Dept: PHARMACY | Facility: CLINIC | Age: 40
End: 2020-01-14

## 2020-01-14 NOTE — LETTER
January 14, 2020      Nona Finley  [address]  [place] [place] [facility] [address]        Dear Nona,       Thank you for reviewing your medications with me last week. Please reach out anytime if you have questions in the future.      As a friendly reminder, please remember to schedule a visit with your psychiatrist if you have not done so already.    Thanks again!    Sincerely,        Leela Lemons, PharmD  Medication Therapy Management Practitioner  [email or web address]  [phone]

## 2020-01-14 NOTE — TELEPHONE ENCOUNTER
Called pt to follow up today after DAVIN last week. She reports she has no concerns and is following up with ACT team as planned. She has not called psychiatry so I encouraged her to call to make an appointment today. I encouraged her to call anytime with questions (she is not covered for ongoing MTM visits but questions are no charge).    Leela Lemons PharmD, BCACP  Medication Therapy Management Provider  Phone: [phone]  [email or web address]

## 2020-02-16 ENCOUNTER — HEALTH MAINTENANCE LETTER (OUTPATIENT)
Age: 40
End: 2020-02-16

## 2020-04-30 LAB — LAB SCANNED RESULT: ABNORMAL

## 2020-07-10 ENCOUNTER — HOSPITAL ENCOUNTER (INPATIENT)
Facility: CLINIC | Age: 40
LOS: 4 days | Discharge: HOME | DRG: 885 | End: 2020-07-14
Attending: FAMILY MEDICINE | Admitting: PSYCHIATRY & NEUROLOGY
Payer: COMMERCIAL

## 2020-07-10 DIAGNOSIS — Z03.818 ENCNTR FOR OBS FOR SUSP EXPSR TO OTH BIOLG AGENTS RULED OUT: ICD-10-CM

## 2020-07-10 DIAGNOSIS — F25.0 SCHIZOAFFECTIVE DISORDER, BIPOLAR TYPE (H): ICD-10-CM

## 2020-07-10 PROBLEM — R44.0 AUDITORY HALLUCINATIONS: Status: ACTIVE | Noted: 2020-07-10

## 2020-07-10 LAB
ALBUMIN SERPL-MCNC: 3.6 G/DL (ref 3.4–5)
ALP SERPL-CCNC: 63 U/L (ref 40–150)
ALT SERPL W P-5'-P-CCNC: 17 U/L (ref 0–50)
AMPHETAMINES UR QL SCN: NEGATIVE
ANION GAP SERPL CALCULATED.3IONS-SCNC: 5 MMOL/L (ref 3–14)
AST SERPL W P-5'-P-CCNC: 17 U/L (ref 0–45)
BARBITURATES UR QL: NEGATIVE
BASOPHILS # BLD AUTO: 0 10E9/L (ref 0–0.2)
BASOPHILS NFR BLD AUTO: 0.6 %
BENZODIAZ UR QL: NEGATIVE
BILIRUB SERPL-MCNC: 0.3 MG/DL (ref 0.2–1.3)
BUN SERPL-MCNC: 9 MG/DL (ref 7–30)
CALCIUM SERPL-MCNC: 8.8 MG/DL (ref 8.5–10.1)
CANNABINOIDS UR QL SCN: NEGATIVE
CHLORIDE SERPL-SCNC: 106 MMOL/L (ref 94–109)
CHOLEST SERPL-MCNC: 152 MG/DL
CO2 SERPL-SCNC: 27 MMOL/L (ref 20–32)
COCAINE UR QL: NEGATIVE
CREAT SERPL-MCNC: 0.57 MG/DL (ref 0.52–1.04)
DIFFERENTIAL METHOD BLD: NORMAL
EOSINOPHIL # BLD AUTO: 0.2 10E9/L (ref 0–0.7)
EOSINOPHIL NFR BLD AUTO: 3.3 %
ERYTHROCYTE [DISTWIDTH] IN BLOOD BY AUTOMATED COUNT: 13.7 % (ref 10–15)
ETHANOL UR QL SCN: NEGATIVE
GFR SERPL CREATININE-BSD FRML MDRD: >90 ML/MIN/{1.73_M2}
GLUCOSE SERPL-MCNC: 147 MG/DL (ref 70–99)
HCG UR QL: NEGATIVE
HCT VFR BLD AUTO: 36.8 % (ref 35–47)
HDLC SERPL-MCNC: 59 MG/DL
HGB BLD-MCNC: 12.2 G/DL (ref 11.7–15.7)
IMM GRANULOCYTES # BLD: 0 10E9/L (ref 0–0.4)
IMM GRANULOCYTES NFR BLD: 0.1 %
LDLC SERPL CALC-MCNC: 77 MG/DL
LYMPHOCYTES # BLD AUTO: 2.2 10E9/L (ref 0.8–5.3)
LYMPHOCYTES NFR BLD AUTO: 29.6 %
MCH RBC QN AUTO: 30.3 PG (ref 26.5–33)
MCHC RBC AUTO-ENTMCNC: 33.2 G/DL (ref 31.5–36.5)
MCV RBC AUTO: 92 FL (ref 78–100)
MONOCYTES # BLD AUTO: 0.3 10E9/L (ref 0–1.3)
MONOCYTES NFR BLD AUTO: 3.7 %
NEUTROPHILS # BLD AUTO: 4.6 10E9/L (ref 1.6–8.3)
NEUTROPHILS NFR BLD AUTO: 62.7 %
NONHDLC SERPL-MCNC: 93 MG/DL
NRBC # BLD AUTO: 0 10*3/UL
NRBC BLD AUTO-RTO: 0 /100
OPIATES UR QL SCN: NEGATIVE
PLATELET # BLD AUTO: 308 10E9/L (ref 150–450)
POTASSIUM SERPL-SCNC: 3.6 MMOL/L (ref 3.4–5.3)
PROT SERPL-MCNC: 8.1 G/DL (ref 6.8–8.8)
RBC # BLD AUTO: 4.02 10E12/L (ref 3.8–5.2)
SARS-COV-2 PCR COMMENT: NORMAL
SARS-COV-2 RNA SPEC QL NAA+PROBE: NEGATIVE
SARS-COV-2 RNA SPEC QL NAA+PROBE: NORMAL
SODIUM SERPL-SCNC: 138 MMOL/L (ref 133–144)
SPECIMEN SOURCE: NORMAL
SPECIMEN SOURCE: NORMAL
TRIGL SERPL-MCNC: 80 MG/DL
TSH SERPL DL<=0.005 MIU/L-ACNC: 0.84 MU/L (ref 0.4–4)
WBC # BLD AUTO: 7.3 10E9/L (ref 4–11)

## 2020-07-10 PROCEDURE — 12400001 ZZH R&B MH UMMC

## 2020-07-10 PROCEDURE — 80307 DRUG TEST PRSMV CHEM ANLYZR: CPT | Performed by: FAMILY MEDICINE

## 2020-07-10 PROCEDURE — 80053 COMPREHEN METABOLIC PANEL: CPT | Performed by: PSYCHIATRY & NEUROLOGY

## 2020-07-10 PROCEDURE — 36415 COLL VENOUS BLD VENIPUNCTURE: CPT | Performed by: PSYCHIATRY & NEUROLOGY

## 2020-07-10 PROCEDURE — 85025 COMPLETE CBC W/AUTO DIFF WBC: CPT | Performed by: PSYCHIATRY & NEUROLOGY

## 2020-07-10 PROCEDURE — 84443 ASSAY THYROID STIM HORMONE: CPT | Performed by: PSYCHIATRY & NEUROLOGY

## 2020-07-10 PROCEDURE — 25000132 ZZH RX MED GY IP 250 OP 250 PS 637: Performed by: PSYCHIATRY & NEUROLOGY

## 2020-07-10 PROCEDURE — 99285 EMERGENCY DEPT VISIT HI MDM: CPT | Mod: 25 | Performed by: FAMILY MEDICINE

## 2020-07-10 PROCEDURE — 99285 EMERGENCY DEPT VISIT HI MDM: CPT | Mod: Z6 | Performed by: FAMILY MEDICINE

## 2020-07-10 PROCEDURE — U0003 INFECTIOUS AGENT DETECTION BY NUCLEIC ACID (DNA OR RNA); SEVERE ACUTE RESPIRATORY SYNDROME CORONAVIRUS 2 (SARS-COV-2) (CORONAVIRUS DISEASE [COVID-19]), AMPLIFIED PROBE TECHNIQUE, MAKING USE OF HIGH THROUGHPUT TECHNOLOGIES AS DESCRIBED BY CMS-2020-01-R: HCPCS | Performed by: FAMILY MEDICINE

## 2020-07-10 PROCEDURE — C9803 HOPD COVID-19 SPEC COLLECT: HCPCS | Performed by: FAMILY MEDICINE

## 2020-07-10 PROCEDURE — 80061 LIPID PANEL: CPT | Performed by: PSYCHIATRY & NEUROLOGY

## 2020-07-10 PROCEDURE — 81025 URINE PREGNANCY TEST: CPT | Performed by: FAMILY MEDICINE

## 2020-07-10 PROCEDURE — 80320 DRUG SCREEN QUANTALCOHOLS: CPT | Performed by: FAMILY MEDICINE

## 2020-07-10 PROCEDURE — 93005 ELECTROCARDIOGRAM TRACING: CPT | Performed by: FAMILY MEDICINE

## 2020-07-10 PROCEDURE — 90791 PSYCH DIAGNOSTIC EVALUATION: CPT

## 2020-07-10 RX ORDER — BISACODYL 10 MG
10 SUPPOSITORY, RECTAL RECTAL DAILY PRN
Status: DISCONTINUED | OUTPATIENT
Start: 2020-07-10 | End: 2020-07-14 | Stop reason: HOSPADM

## 2020-07-10 RX ORDER — ACETAMINOPHEN 325 MG/1
650 TABLET ORAL EVERY 4 HOURS PRN
Status: DISCONTINUED | OUTPATIENT
Start: 2020-07-10 | End: 2020-07-14 | Stop reason: HOSPADM

## 2020-07-10 RX ORDER — HYDROXYZINE HYDROCHLORIDE 25 MG/1
25 TABLET, FILM COATED ORAL EVERY 4 HOURS PRN
Status: DISCONTINUED | OUTPATIENT
Start: 2020-07-10 | End: 2020-07-14 | Stop reason: HOSPADM

## 2020-07-10 RX ORDER — OLANZAPINE 10 MG/1
10 TABLET ORAL
Status: DISCONTINUED | OUTPATIENT
Start: 2020-07-10 | End: 2020-07-13

## 2020-07-10 RX ORDER — TRAZODONE HYDROCHLORIDE 50 MG/1
50 TABLET, FILM COATED ORAL
Status: DISCONTINUED | OUTPATIENT
Start: 2020-07-10 | End: 2020-07-14 | Stop reason: HOSPADM

## 2020-07-10 RX ORDER — METFORMIN HCL 500 MG
500 TABLET, EXTENDED RELEASE 24 HR ORAL AT BEDTIME
Status: DISCONTINUED | OUTPATIENT
Start: 2020-07-10 | End: 2020-07-14 | Stop reason: HOSPADM

## 2020-07-10 RX ORDER — OLANZAPINE 10 MG/2ML
10 INJECTION, POWDER, FOR SOLUTION INTRAMUSCULAR
Status: DISCONTINUED | OUTPATIENT
Start: 2020-07-10 | End: 2020-07-13

## 2020-07-10 RX ORDER — CLOZAPINE 100 MG/1
100 TABLET ORAL AT BEDTIME
Status: DISCONTINUED | OUTPATIENT
Start: 2020-07-10 | End: 2020-07-11

## 2020-07-10 RX ORDER — ALUMINA, MAGNESIA, AND SIMETHICONE 2400; 2400; 240 MG/30ML; MG/30ML; MG/30ML
30 SUSPENSION ORAL EVERY 4 HOURS PRN
Status: DISCONTINUED | OUTPATIENT
Start: 2020-07-10 | End: 2020-07-14 | Stop reason: HOSPADM

## 2020-07-10 RX ADMIN — METFORMIN HYDROCHLORIDE 500 MG: 500 TABLET, EXTENDED RELEASE ORAL at 22:30

## 2020-07-10 RX ADMIN — CLOZAPINE 100 MG: 100 TABLET ORAL at 22:30

## 2020-07-10 ASSESSMENT — ACTIVITIES OF DAILY LIVING (ADL)
RETIRED_COMMUNICATION: 0-->UNDERSTANDS/COMMUNICATES WITHOUT DIFFICULTY
DRESS: INDEPENDENT
DRESS: 0-->INDEPENDENT
AMBULATION: 0-->INDEPENDENT
ORAL_HYGIENE: INDEPENDENT
TRANSFERRING: 0-->INDEPENDENT
RETIRED_EATING: 0-->INDEPENDENT
LAUNDRY: WITH SUPERVISION
SWALLOWING: 0-->SWALLOWS FOODS/LIQUIDS WITHOUT DIFFICULTY
HYGIENE/GROOMING: INDEPENDENT
COGNITION: 0 - NO COGNITION ISSUES REPORTED
TOILETING: 0-->INDEPENDENT
FALL_HISTORY_WITHIN_LAST_SIX_MONTHS: NO
BATHING: 0-->INDEPENDENT

## 2020-07-10 ASSESSMENT — ENCOUNTER SYMPTOMS
HALLUCINATIONS: 1
SLEEP DISTURBANCE: 1
DYSPHORIC MOOD: 1

## 2020-07-10 ASSESSMENT — MIFFLIN-ST. JEOR: SCORE: 1364.86

## 2020-07-10 NOTE — ED PROVIDER NOTES
[facility] EMERGENCY DEPARTMENT ([facility])     July 10, 2020  History     Chief Complaint   Patient presents with     Psychiatric Evaluation     HPI  Nona Finley is a 40 year old female with a medical history significant for schizoaffective bipolar type, suicidal ideations, multiple psychiatric related hospitalizations, and noncompliance of medicine who presents the ED today for psychiatric evaluation.  Patient's ACT team states she needs to come in because she stopped taking her meds and is now having hallucinations telling her to hurt herself and her children.  Patient believes her medication is poisoned or laced with marijuana.  ACT team said they noticed symptoms started about a week ago, however patient reports she stopped taking her medication 3 days ago.  Patient has not slept in 3 days since stopping her medication.  Patient is currently taking an Abilify injection every 28 days (up-to-date), 100 mg clozapine, and 500 mg Metformin for weight loss.  Patient's act team has concern for starting clozapine again.    I have reviewed the Medications, Allergies, Past Medical and Surgical History, and Social History in the [employer] system.  PAST MEDICAL HISTORY:   Past Medical History:   Diagnosis Date     Bipolar 1 disorder (H)      Bipolar affective disorder, depressed, severe, with psychotic behavior (H)      NONSPECIFIC MEDICAL HISTORY     h/o +PPD. Neg CXR 2006     Postpartum depression 8/18/2011     Schizo-affective schizophrenia (H)      Tuberculosis      Varicosities        PAST SURGICAL HISTORY:   Past Surgical History:   Procedure Laterality Date     NO HISTORY OF SURGERY       NO HISTORY OF SURGERY  06/13/2013    Per patient reported this       Past medical history, past surgical history, medications, and allergies were reviewed with the patient. Additional pertinent items: None    FAMILY HISTORY:   Family History   Problem Relation Age of Onset     Respiratory Father         asthma      Asthma Father      Cerebrovascular Disease Mother      Diabetes Mother      Neurologic Disorder Brother         mental health problem?     Neurologic Disorder Sister         seizures (half sister)     Respiratory Paternal Grandfather         asthma     Respiratory Sister         asthma     Respiratory Brother         asthma     Neurologic Disorder Daughter         autism      Hypertension Maternal Grandmother      Neurologic Disorder Sister         seizures     Diabetes Maternal Grandfather      Coronary Artery Disease Son        SOCIAL HISTORY:   Social History     Tobacco Use     Smoking status: Never Smoker     Smokeless tobacco: Never Used   Substance Use Topics     Alcohol use: No     Social history was reviewed with the patient. Additional pertinent items: None      Patient's Medications   New Prescriptions    No medications on file   Previous Medications    ARIPIPRAZOLE ER (ABILIFY MAINTENA) 400 MG EXTENDED RELEASE INJ SYRINGE    Inject 400 mg into the muscle every 28 days     CLOZAPINE (CLOZARIL) 100 MG TABLET    Take 1 tablet (100 mg) by mouth At Bedtime    METFORMIN (GLUCOPHAGE-XR) 500 MG 24 HR TABLET    Take 500 mg by mouth At Bedtime   Modified Medications    No medications on file   Discontinued Medications    No medications on file        No Known Allergies     Review of Systems   Constitutional:        (Positive for medication noncompliance)   Psychiatric/Behavioral: Positive for dysphoric mood, hallucinations, sleep disturbance and suicidal ideas.        (Positive for homicidal ideations)   All other systems reviewed and are negative.    A complete review of systems was performed with pertinent positives and negatives noted in the HPI, and all other systems negative.    Physical Exam   BP: 122/73  Pulse: 140  Temp: 98.1  F (36.7  C)  Resp: 16  SpO2: 99 %      Physical Exam  Constitutional:       General: She is not in acute distress.     Appearance: She is not diaphoretic.   HENT:      Head:  Atraumatic.      Mouth/Throat:      Pharynx: No oropharyngeal exudate.   Eyes:      General: No scleral icterus.     Pupils: Pupils are equal, round, and reactive to light.   Cardiovascular:      Heart sounds: Normal heart sounds.   Pulmonary:      Effort: No respiratory distress.      Breath sounds: Normal breath sounds.   Abdominal:      General: Bowel sounds are normal.      Palpations: Abdomen is soft.      Tenderness: There is no abdominal tenderness.   Musculoskeletal:         General: No tenderness.   Skin:     General: Skin is warm.      Findings: No rash.   Neurological:      General: No focal deficit present.      Mental Status: She is oriented to person, place, and time.      Cranial Nerves: No cranial nerve deficit.      Motor: No weakness.      Coordination: Coordination normal.   Psychiatric:         Attention and Perception: She perceives auditory and visual hallucinations.         Mood and Affect: Mood is anxious and depressed.         Behavior: Behavior is cooperative.         Judgment: Judgment is impulsive.         ED Course        Procedures           Patient was seen and evaluated by the  please refer to their documentation in the note section of the epic chart dated 7/10/2020      Assessments & Plan (with Medical Decision Making)         I have reviewed the nursing notes.    I have reviewed the findings, diagnosis, plan and need for follow up with the patient.    Patient with history of schizoaffective disorder bipolar type at this time off medications decompensating increased hallucinations he is voluntarily being readmitted to be started back on Clazuril.    Final diagnoses:   Schizoaffective disorder, bipolar type (H)   Preston MEZA, am serving as a trained medical scribe to document services personally performed by Pérez Matthews MD, based on the provider's statements to me.     IPérez MD, was physically present and have reviewed and verified the  accuracy of this note documented by Preston Stevenson.      7/10/2020   [facility], [place], EMERGENCY DEPARTMENT     Pérez Matthews MD  07/10/20 0799

## 2020-07-10 NOTE — PHARMACY-ADMISSION MEDICATION HISTORY
Admission Medication History Completed by Pharmacy    See [facility] Admission Navigator for allergy information, preferred outpatient pharmacy, prior to admission medications and immunization status.     Medication history sources:  [facility] ACT Team [phone], Jovannirileann dispense history    Changes made to PTA medication list (reason)  Added: N/A  Deleted: N/A  Changed: N/A    Additional medication history information:   - Medication list provided by ACT team - patient not interviewed as part of this medication history. Last Abilify Maintena Injection administered on 6/16/20 per ACT team.    Prior to Admission medications    Medication Sig Last Dose Taking? Auth Provider   cloZAPine (CLOZARIL) 100 MG tablet Take 1 tablet (100 mg) by mouth At Bedtime Past Week Yes Sandeep Cancino MD   metFORMIN (GLUCOPHAGE-XR) 500 MG 24 hr tablet Take 500 mg by mouth At Bedtime Past Week Yes Reported, Patient   ARIPiprazole ER (ABILIFY MAINTENA) 400 MG extended release inj syringe Inject 400 mg into the muscle every 28 days  6/16/2020  Aly Lane MD       Date completed: 07/10/20    Medication history completed by:   Aly June PharmD  [facility]  Emergency Department: Ascom *[id number]

## 2020-07-10 NOTE — ED TRIAGE NOTES
Pt states she stopped taking her psych meds while ago because she was concerned that there was something in them.  Pt states recently she started having auditory and visual hallucinations. Pt states the voices are telling her to kill herself.  Pt denies being suicidal and or having taken anything in an attempt to harm herself.

## 2020-07-11 LAB — INTERPRETATION ECG - MUSE: NORMAL

## 2020-07-11 PROCEDURE — 99207 ZZC CDG-HISTORY COMP: MEETS EXP. PROBLEM FOCUSED-DOWN CODED LACK OF ROS: CPT | Performed by: PSYCHIATRY & NEUROLOGY

## 2020-07-11 PROCEDURE — 25000132 ZZH RX MED GY IP 250 OP 250 PS 637: Performed by: PSYCHIATRY & NEUROLOGY

## 2020-07-11 PROCEDURE — 99221 1ST HOSP IP/OBS SF/LOW 40: CPT | Mod: 95 | Performed by: PSYCHIATRY & NEUROLOGY

## 2020-07-11 PROCEDURE — 12400001 ZZH R&B MH UMMC

## 2020-07-11 RX ORDER — HALOPERIDOL 5 MG/ML
5 INJECTION INTRAMUSCULAR
Status: DISCONTINUED | OUTPATIENT
Start: 2020-07-11 | End: 2020-07-11

## 2020-07-11 RX ORDER — CLOZAPINE 100 MG/1
100 TABLET ORAL AT BEDTIME
Status: DISCONTINUED | OUTPATIENT
Start: 2020-07-11 | End: 2020-07-14 | Stop reason: HOSPADM

## 2020-07-11 RX ORDER — HALOPERIDOL 5 MG/ML
5 INJECTION INTRAMUSCULAR AT BEDTIME
Status: DISCONTINUED | OUTPATIENT
Start: 2020-07-11 | End: 2020-07-14 | Stop reason: HOSPADM

## 2020-07-11 RX ADMIN — METFORMIN HYDROCHLORIDE 500 MG: 500 TABLET, EXTENDED RELEASE ORAL at 21:34

## 2020-07-11 RX ADMIN — CLOZAPINE 100 MG: 100 TABLET ORAL at 21:34

## 2020-07-11 NOTE — PLAN OF CARE
Admission:     Admitted voluntarily to [facility] for increasing auditory and visual hallucinations. Reports voices telling her to harm her daughter and herself. Pt states she stopped her medication 3 days ago. Per ED notes, ACT team noticed changes about a week ago. Pt is currently under a civil commitment. [facility] States she has had 4-5 previous admissions for mental health. She denies any suicidal ideation or thoughts to harm self and/or others. States she has been feeling overwhelmed with childcare responsibilities lately, otherwise denies any other stressors. (See chart review for further details and hx)    Presents with blunted affect. Well groomed. Culturally appropriate eye contact. Is calm, polite, and cooperative completing admission. Speech is clear and coherent. Pt denies need for . Provided snacks. Oriented to room and unit. Encouraged to notify staff of needs, questions, and concerns. Pt verbalizes understanding.

## 2020-07-11 NOTE — H&P
Admitted:     07/10/2020      The patient was seen between 12 and 12:30 p.m. via telemedicine ([employer] ulices) on 07/11/2020.  The patient was hospitalized at [facility] station 10.  This provider was at his home office in front of his computer.      CHIEF COMPLAINT AND REASON FOR HOSPITALIZATION:  The patient is a 40-year-old   female who was admitted in care due to acute decompensation.  The patient was recommended to come in by her assertive community training team.      HISTORY OF PRESENT ILLNESS:  The patient presented as a reasonably reliable historian, said that she stopped taking her medications 3 days ago because she was afraid that her meds were poisoned and laced with marijuana, admitted that voices in her head were telling her that.  She said that after she stopped taking medication her voices started telling her to kill herself and also kill her children.  The patient was not very forthcoming with information.  Did not tell me that, but on talking to the DEC  patient said that she convinced her ACT team to stop coming 3 days ago, said that she would take medication on her own, but then stopped taking her medication.  She reported not sleeping in 3 days, eating only fruits, vegetables, and water because she believes that all other food was also poisoned with bleach.  Denied any substance or alcohol use.  She is not seeing a therapist and does not want to.  She is currently under mental health commitment with Reyes order until 04/02/2021.  Reyes medications are Clozaril, Invega, Haldol, and Abilify.      PAST PSYCHIATRIC HISTORY:  The patient has a history of hospitalizations due to similar pattern of stopping her medications and becoming psychotic, hearing voices to hurt herself and her children.  Last hospitalization was in 12/2019 for 4 days.  The patient's ACT team (Johanne) stated that she had noticed patient deteriorating for the last week.  The  patient admitted that she was having thoughts of killing herself and killing her family.  The patient's home medications are supposed to be Abilify injection 400 mg, last injection was on 06/16, clozapine, and metformin.  The patient sees psychiatrist, Dr. Gaviria, through ACT team associated with program called [facility].      FAMILY AND SOCIAL HISTORY:  The patient is originally from [place] [facility] [place].  Said that most of her family is still in [place] [facility] [place].  Here in [place] she relies on help of family of her , who currently lives in [place].  He is [employer] through his residency in Internal Medicine.  He is a physician.  The patient is not employed, taking care of their 6 children.  Medical records show that the patient's brother had suspected undiagnosed mental health issues.      PAST MEDICAL HISTORY:  Tuberculosis, schizoaffective disorder, varicosities.      PAST SURGICAL HISTORY:  Insignificant.      ALLERGIES:  THE PATIENT DENIED ANY KNOWN MEDICAL ALLERGIES.      HOME MEDICATIONS:     1.  Clozapine 100 mg at bedtime.  The patient said that she has not been taking.   2.  Metformin  mg at bedtime.   3.  Abilify ER Maintena 400 mg into muscle every 28 days.  She is due for next injection on 07/14/2020.      For physical examination and 12-point review of systems, please refer to Dr. Eduard Palmer from 07/10/2020.  I reviewed this note and agree with it.      VITAL SIGNS:  Temperature 98.1, respirations 16, heart rate 99, blood pressure 114/75.      MENTAL STATUS EXAMINATION:  The patient is an -American female dressed in hospital garbs who exhibits some psychomotor retardation.  Speech is slow, monotone, accented.  Recognized myself from being under my care a few months ago.  Reported above-mentioned auditory and visual hallucinations.  Reported suicidal/homicidal ideation, but contracted for safety here.  Voiced delusional ideas about food being poisoned and about THC being in her tablets.  Thought  process is poorly organized.  Associations somewhat loose.  Admitted to feeling depressed.  Affect was blunted, congruent with mood.  She was alert and oriented x 3.  Fund of knowledge based on vocabulary appeared to be average.  Ability to focus and concentrate, immediate short and long-term memories were all mildly impaired.  Insight poor, judgment poor.      IMPRESSION:  Historical diagnosis of schizoaffective disorder, depressive, with psychosis.      TREATMENT PLAN:  The patient is committed and Jarvised.  She will stay at this hospital to get more stable on her medications.  Will restart taking Clozaril and if she refuses will back this medication up with injectable Haldol 5 mg IM.  Given the fact that the patient has pretty poor compliance with her medications, she might benefit from a different neuroleptic such as Haldol in the future.         JANETTE SPENCER MD             D: 2020   T: 2020   MT:       Name:     BHAVIK CHRIS   MRN:      [id number]        Account:      [id number]   :      1980        Admitted:     07/10/2020                   Document: [id number]

## 2020-07-11 NOTE — PROVIDER NOTIFICATION
07/10/20 2000   Patient Belongings   Did you bring any home meds/supplements to the hospital?  No   Patient Belongings remains with patient;locker;sent to security per site process   Patient Belongings Remaining with Patient clothing   Patient Belongings Put in Hospital Secure Location (Security or Locker, etc.) clothing;wallet   Belongings Search Yes   Clothing Search Yes   Second Staff Geovanna RN     With pt: red dress, pink scarf, bra  In locker: brown sandals, blue skirt, black leggings, red sweatshirt, black dress, pink dress x2, pink scarf, black bra, deodorant, black scarf, red/brown purse, phone , tooth brush, tooth paste, white phone in tan case, black wallet,  Sent to security (envelope #[id number]): passport, social security card, $51.50 cash, visa [phone], WIC [id number]      A               Admission:  I am responsible for any personal items that are not sent to the safe or pharmacy.  [facility] is not responsible for loss, theft or damage of any property in my possession.    Signature:  _________________________________ Date: _______  Time: _____                                              Staff Signature:  ____________________________ Date: ________  Time: _____      2nd Staff person, if patient is unable/unwilling to sign:    Signature: ________________________________ Date: ________  Time: _____     Discharge:  [facility] has returned all of my personal belongings:    Signature: _________________________________ Date: ________  Time: _____                                          Staff Signature:  ____________________________ Date: ________  Time: _____

## 2020-07-11 NOTE — PROGRESS NOTES
Initial Psychosocial Assessment    I have reviewed the chart, met with the patient, and developed Care Plan.  Information for assessment was obtained from:     Patient: Interviewed and Chart: Reviewe     Presenting Problem:  Pt has a long mental health history positive for several inpatient hospitalizations and civil commitment. Pt reports that she stopped her medication several days ago and has began to have symptoms. Pt reports auditory hallucinations telling her the medication is poison and to harm her children.     History of Mental Health and Chemical Dependency:  From H & P:  Prior diagnoses: Schizoaffective Disorder, Bipolar Type     Hospitalizations: Several, 4x at [facility] in 2019     Suicide attempts: One in past, attempted to jump off bridge but  stopped her     Past medications: Many anti-psychotics, reportedly quickly stabilizes once clozapine is titrated to PTA dose     Family Description (Constellation, Family Psychiatric History):  Brother has has MH, she is not sure what diagnoses would be, she reports it is similar to what she has.  He thinks there is something in the food and doesn't take it.    Her 16 year old daughter is diagnosed with autism-she has a speech therapist in school.  They have in home support as well.      Significant Life Events (Illness, Abuse, Trauma, Death):The patient has lived through war in [place].      Living Situation:  Currently lives in [place], [place]. She is the primary caretaker for her 6 children. She does not work. Her  is a medical resident in [place]. She has a brother, mother-in-law, and sister-in-law in the area who help with her children.      Educational Background: The patient's highest level of education completed is 5th grade.      Financial Status: The patient is not currently employed.  Pt report she stays home with the kids.      Legal Issues:  Pt current under MH Commitment with Re[facility] order, pt reports she thinks it expires in the  March 2021.        Ethnic/Cultural Considerations: Somalin      Spiritual Orientation: Jewish.       Service History: None.         Social Functioning (organization, interests):  None reported     Current Treatment Providers are:  [facility]  [facility] ACT Team  Psychiatrist: Dr. Gaviria at Phone: [phone]  Fax: [phone] and Vicky (WALTER)    Social Service Assessment/Plan:  Patient has been admitted for psychiatric stabilization for this acute crisis. Patient will meet with treatment team including a psychiatrist to have psychiatric assessment and medication management. Team will coordinate with the any outpatient medication providers to review and adjust medications as appropriate. Staff will provide therapeutic programming and structure to help maintain a safe environment for healing. Staff will continue to assess patient as needed. Patient will participate in unit groups and activities. Patient will receive individual and group support on the unit. CTC will coordinate with outpatient providers and will place referrals to ensure appropriate follow up care is in place.

## 2020-07-11 NOTE — ED NOTES
ED to Behavioral Floor Handoff    SITUATION  Nona Finley is a 40 year old female who speaks Namibian and lives in a home with others The patient arrived in the ED by private car from home with a complaint of Psychiatric Evaluation  .The patient's current symptoms started/worsened 3 day(s) ago and during this time the symptoms have increased.   In the ED, pt was diagnosed with   Final diagnoses:   Schizoaffective disorder, bipolar type (H)        Initial vitals were: BP: 122/73  Pulse: 140  Temp: 98.1  F (36.7  C)  Resp: 16  SpO2: 99 %   --------  Is the patient diabetic? No   If yes, last blood glucose? --     If yes, was this treated in the ED? --  --------  Is the patient inebriated (ETOH) No or Impaired on other substances? No  MSSA done? N/A  Last MSSA score: --    Were withdrawal symptoms treated? N/A  Does the patient have a seizure history? No. If yes, date of most recent seizure--  --------  Is the patient patient experiencing suicidal ideation? denies current or recent suicidal ideation     Homicidal ideation? denies current or recent homicidal ideation or behaviors.    Self-injurious behavior/urges? denies current or recent self injurious behavior or ideation.  ------  Was pt aggressive in the ED No  Was a code called No  Is the pt now cooperative? Yes  -------  Meds given in ED: Medications - No data to display   Family present during ED course? No  Family currently present? No    BACKGROUND  Does the patient have a cognitive impairment or developmental disability? No  Allergies: No Known Allergies.   Social demographics are   Social History     Socioeconomic History     Marital status:      Spouse name: None     Number of children: 2     Years of education: None     Highest education level: None   Occupational History     Employer: NONE    Social Needs     Financial resource strain: None     Food insecurity     Worry: None     Inability: None     Transportation needs     Medical: None      Non-medical: None   Tobacco Use     Smoking status: Never Smoker     Smokeless tobacco: Never Used   Substance and Sexual Activity     Alcohol use: No     Drug use: No     Sexual activity: Yes     Partners: Male     Birth control/protection: None   Lifestyle     Physical activity     Days per week: None     Minutes per session: None     Stress: None   Relationships     Social connections     Talks on phone: None     Gets together: None     Attends Sikhism service: None     Active member of club or organization: None     Attends meetings of clubs or organizations: None     Relationship status: None     Intimate partner violence     Fear of current or ex partner: None     Emotionally abused: None     Physically abused: None     Forced sexual activity: None   Other Topics Concern      Service Not Asked     Blood Transfusions No     Caffeine Concern Not Asked     Occupational Exposure No     Hobby Hazards Not Asked     Sleep Concern Not Asked     Stress Concern Not Asked     Weight Concern Not Asked     Special Diet Not Asked     Back Care Not Asked     Exercise Not Asked     Bike Helmet Not Asked     Seat Belt Not Asked     Self-Exams Not Asked     Parent/sibling w/ CABG, MI or angioplasty before 65F 55M? Not Asked   Social History Narrative    Caffeine intake/servings daily - 2 times a week    Calcium intake/servings daily - 1-2    Exercise no times weekly - describe active with children    Sunscreen used - soemtimes    Seatbelts used - Yes    Guns stored in the home - Yes    Self Breast Exam - Yes    Pap test up to date -  Yes    Eye exam up to date -  No    Dental exam up to date -  No    DEXA scan up to date -  Not Applicable    Flex Sig/Colonoscopy up to date -  Not Applicable    Mammography up to date -  Not Applicable    Immunizations reviewed and up to date - needs tdap at postpartum    Abuse: Current or Past (Physical, Sexual or Emotional) - No    Do you feel safe in your environment - Yes    Do you  cope well with stress - sometimes    Do you suffer from insomnia - No    Last updated by: Joanne Gilligan RN 12/16/10                            ASSESSMENT  Labs results   Labs Ordered and Resulted from Time of ED Arrival Up to the Time of Departure from the ED   COVID-19 VIRUS (CORONAVIRUS) BY PCR   DRUG ABUSE SCREEN 6 CHEM DEP URINE ([facility])   HCG QUALITATIVE URINE      Imaging Studies: No results found for this or any previous visit (from the past 24 hour(s)).   Most recent vital signs /71   Pulse 84   Temp 97.9  F (36.6  C) (Oral)   Resp 16   LMP 07/01/2020   SpO2 100%    Abnormal labs/tests/findings requiring intervention:---   Pain control: pt had none  Nausea control: pt had none    RECOMMENDATION  Are any infection precautions needed (MRSA, VRE, etc.)? No If yes, what infection? --  ---  Does the patient have mobility issues? independently. If yes, what device does the pt use? ---  ---  Is patient on 72 hour hold or commitment? No If on 72 hour hold, have hold and rights been given to patient? N/A  Are admitting orders written if after 10 p.m. ?N/A  Tasks needing to be completed:---     Phani Vaughn RN   ascom--    [phone] [facility]   [phone] [facility]

## 2020-07-11 NOTE — PHARMACY
Pharmacy Clozapine Note    Date of Service: 2020  Patient's : 1980  40 year old, female    Current clozapine regimen: 100mg at bedtime  Has there been a known interruption in therapy for greater than/equal to 48 hours? Unsure, per med hx last dose in the past week    Recent ANC Value(s):  Recent Labs   Lab Test 07/10/20  2110 19  2338 19  0738 19  1219 19  0853 19  1601 19  1939   ANEU 4.6 3.4 2.5 2.6 2.2 3.5 3.0     Is the patient enrolled in the clozapine REMS program? Yes  Ordering prescriber: Julita  Is this provider certified in the clozapine REMS program? Yes  Is the ANC within recommended limits? Yes  Does the patient have any signs or symptoms of infection, including fever or sore throat? No    Plan:  1. Continue clozapine therapy at 100 mg PO at bedtime.  2. A WBC with differential will be ordered at least weekly.  ANC values will be entered into the REMS program.  3. Signs/symptoms of infection will be monitored daily.    Courtney Velasco, PharmD, BCPS  Phone: [phone]

## 2020-07-12 PROCEDURE — 25000132 ZZH RX MED GY IP 250 OP 250 PS 637: Performed by: PSYCHIATRY & NEUROLOGY

## 2020-07-12 PROCEDURE — 12400001 ZZH R&B MH UMMC

## 2020-07-12 RX ADMIN — CLOZAPINE 100 MG: 100 TABLET ORAL at 19:49

## 2020-07-12 RX ADMIN — METFORMIN HYDROCHLORIDE 500 MG: 500 TABLET, EXTENDED RELEASE ORAL at 19:49

## 2020-07-12 ASSESSMENT — ACTIVITIES OF DAILY LIVING (ADL)
ORAL_HYGIENE: INDEPENDENT
LAUNDRY: WITH SUPERVISION
HYGIENE/GROOMING: INDEPENDENT
DRESS: INDEPENDENT
DRESS: INDEPENDENT
LAUNDRY: WITH SUPERVISION
HYGIENE/GROOMING: INDEPENDENT
ORAL_HYGIENE: INDEPENDENT

## 2020-07-12 NOTE — PLAN OF CARE
"RN48/     Patient is hopeful stating \"after taking medication the voices are gone\" and stated GOAL is \"home.\"    Patient rates depression 0/10* c/o   Patient rates anxiety at 0/10* c/o   Patient describes mood as \"description consistent with euthymia\" and affect is Appropriate to situation    Patient is cooperative, pleasant, and calm appearing Appropriate/mood-congruent. Patient is med-compliant, is eating 75% and reports \"sleeps through the night\". Patient is attending groups.    Patient denies current or recent suicidal ideation    SIB denies    HI: denies current or recent homicidal ideation or behaviors.  Patient denies  auditory hallucinations.  Patient denies  visual hallucinations.    VS reviewed: /76   Pulse 100   Temp 97.7  F (36.5  C)   Resp 16   Ht 1.651 m (5' 5\")   Wt 70.4 kg (155 lb 3.2 oz)   LMP 07/01/2020   SpO2 100%   BMI 25.83 kg/m   . Patient denies  pain.    Patient evaluation continues. Assessed mood,anxiety,thoughts and behavior. Patient is progressing towards goals. Patient is encouraged to participate in groups and assisted to develop healthy coping skills.     Length of stay: 2    Refer to daily team meeting notes for individualized plan of care. Nursing will continue to assess.    * Scale is offered as scale of 1 to 10 with 10 being the worst                                                                          "

## 2020-07-12 NOTE — PROGRESS NOTES
See Mini's note on Nona for complete progress note. At the recommendation of pt's staff, writer continued to monitor pt due to her reporting command hallucinations. When asked, pt told writer that she continues to experience command AH. When asked what the AH are telling her, along with harming her family, she told writer that they are telling her to kill herself by jumping off a bridge. RN notified. When asked why she willing to throw up her medicine, she told writer that she was afraid that it would hurt her. Writer offered support and pt thanked writer. She continues to be poite towards staff but isolative.    ADDENDUM: Pt acknowledged that medication does seem to help with the voices and told writer she would take her medication.

## 2020-07-12 NOTE — PROGRESS NOTES
Reviewed chart.  PT is followed by an ACT team  [facility] Team  Psychiatrist: Dr. Gaviria and Johanne Craft RN  Phone: [phone]  Fax: [phone] and Vicky (WALTER)    This CTC placed call to the phone number listed above and call was received by VM of Johanne Craft RN.  LVM requesting pt's CM call back and confirmation of status of civil commitment.

## 2020-07-12 NOTE — PROGRESS NOTES
Pt isolated to room.  Pt reports voices makes her sad and depressed. She states voices tell her to hurt her children. Pt affect is flat. But reports she stopped taking her medication a few days ago. Pt reports hopeful medication will help with the voices. Pt reports last evening when she took medication she was threw it up. She does not want it to happen this evening.     07/11/20 1951   Behavioral Health   Hallucinations auditory   Thinking distractable;delusional;paranoid   Orientation person: oriented   Memory baseline memory   Insight admits / accepts;poor   Judgement impaired   Affect blunted, flat;sad   Mood depressed   Suicidality other (see comments)  (pt denies)   Self Injury other (see comment)  (pt denies)   Activity isolative

## 2020-07-13 VITALS
OXYGEN SATURATION: 100 % | DIASTOLIC BLOOD PRESSURE: 68 MMHG | WEIGHT: 155.2 LBS | SYSTOLIC BLOOD PRESSURE: 97 MMHG | HEIGHT: 65 IN | TEMPERATURE: 97.9 F | RESPIRATION RATE: 16 BRPM | BODY MASS INDEX: 25.86 KG/M2 | HEART RATE: 94 BPM

## 2020-07-13 PROCEDURE — 12400001 ZZH R&B MH UMMC

## 2020-07-13 PROCEDURE — 25000132 ZZH RX MED GY IP 250 OP 250 PS 637: Performed by: PSYCHIATRY & NEUROLOGY

## 2020-07-13 RX ORDER — HALOPERIDOL 5 MG/1
5 TABLET ORAL EVERY 6 HOURS PRN
Status: DISCONTINUED | OUTPATIENT
Start: 2020-07-13 | End: 2020-07-14 | Stop reason: HOSPADM

## 2020-07-13 RX ORDER — HALOPERIDOL 5 MG/ML
5 INJECTION INTRAMUSCULAR EVERY 6 HOURS PRN
Status: DISCONTINUED | OUTPATIENT
Start: 2020-07-13 | End: 2020-07-14 | Stop reason: HOSPADM

## 2020-07-13 RX ADMIN — CLOZAPINE 100 MG: 100 TABLET ORAL at 19:51

## 2020-07-13 RX ADMIN — METFORMIN HYDROCHLORIDE 500 MG: 500 TABLET, EXTENDED RELEASE ORAL at 19:51

## 2020-07-13 ASSESSMENT — ACTIVITIES OF DAILY LIVING (ADL)
ORAL_HYGIENE: INDEPENDENT
LAUNDRY: WITH SUPERVISION
HYGIENE/GROOMING: INDEPENDENT
DRESS: STREET CLOTHES
DRESS: STREET CLOTHES
ORAL_HYGIENE: INDEPENDENT
LAUNDRY: WITH SUPERVISION
HYGIENE/GROOMING: INDEPENDENT

## 2020-07-13 NOTE — PROGRESS NOTES
Placed call to pt's ACT team WALTER Harrington and she returned the call. She is aware of pt's discharge and will visit pt tomorrow

## 2020-07-13 NOTE — PLAN OF CARE
Problem: Adult Behavioral Health Plan of Care  Goal: Patient-Specific Goal (Individualization)  Flowsheets (Taken 7/13/2020 4388)  Patient Personal Strengths:   community support   coping skills   expressive of needs   family/social support   independent living skills   insight into illness/situation   stable living environment  Patient Vulnerabilities: limited insight, some struggles with medication compliance    Personal Plan of Care:  Reasons for admission  Symptoms of psychosis    Goals for discharge  Restart medications  Stable mental health

## 2020-07-13 NOTE — PROGRESS NOTES
"   07/12/20 2100   Behavioral Health   Hallucinations denies / not responding to hallucinations   Thinking intact   Orientation person: oriented;place: oriented   Memory baseline memory   Insight insight appropriate to situation   Judgement intact   Affect other (see comments);full range affect  (some blunting, less than yesterday)   Mood mood is calm   Physical Appearance/Attire attire appropriate to age and situation   Hygiene well groomed   Suicidality other (see comments)  (pt denies)   1. Wish to be Dead (Recent) No   2. Non-Specific Active Suicidal Thoughts (Recent) No   Change in Protective Factors? No   Enviromental Risk Factors None   Self Injury other (see comment)  (denies at this time)   Activity withdrawn;other (see comment)  (pleasant upon approach)   Speech coherent   Medication Sensitivity other (see comment)  (excess \"drooling\")   Psychomotor / Gait balanced;steady  (slowly walking about the unit)   Psycho Education   Type of Intervention 1:1 intervention   Response participates, initiates socially appropriate   Hours 0.5   Treatment Detail check in   Activities of Daily Living   Hygiene/Grooming independent   Oral Hygiene independent   Dress independent   Laundry with supervision   Room Organization independent   Activity   Activity Assistance Provided independent     Nona has been visible in the milieu, although still somewhat withdrawn. She was observed watching television and slowly walking about the unit. She is polite upon approach and appeared to participate fully in check-in. She made at least two phone calls, one to the family member looking after her children. She seems slightly guarded but pleasant. Nona reports AH have stopped completely, which is improvement over yesterday. She denies SI//VH/SIB/HI including thoughts about harming her family. Nona verbalizes that she believes this is the result of taking her medication. She reports excess drooling, including at night. She reports " using a towel as she sleeps. Writer informed ARMIN Castellano also expressed that she would like to leave the hospital tomorrow. Denies any physical pain. Says she is not feeling depressed or anxious.

## 2020-07-13 NOTE — PROGRESS NOTES
[facility]   Psychiatric Progress Note      Impression:     Nona Finley is a 40-year-old female admitted to [facility] on 7/10/2020.  She was admitted as a voluntary patient through the ER due to psychosis.  She stopped taking her medications about 3 days prior to admission because auditory hallucinations said they were poisoned and laced with marijuana.  After she stopped taking them, auditory hallucinations instructed her to kill herself and her children.  She was eating only fruits and vegetables because she thought other foods were poisoned with bleach.  She is currently under commitment with Reyes.  Since admission, Clozaril was restarted.  Her next Abilify Maintena injection is due 7/14.  PRNs of Trazodone, Haldol and Hydroxyzine are available.  She is currently denying all psychotic symptoms.           DIagnoses:     Schizoaffective disorder, bipolar type         Plan:     Medications:  Continue Clozaril 100 mg at HS.  Continue PRNs of Trazodone and Hydroxyzine.  Discontinued PRN Zyprexa as it is not on her Reyes and begin PRN Haldol.  Abilify Maintena 400 mg IM is due 7/14 and ACT team brought it to the hospital.        Discharge to home tomorrow after she receives Abilify Maintena, if ACT team approves.  She reports that she sees them almost daily.  She is currently under MI commitment with [employer] (Clozaril, Invega, Haldol, Abilify) until 4/2/2021.  She receives Clozaril through [facility] with her next blood draw scheduled August 3rd per her report.      Telemedicine Visit: The patient's condition can be safely assessed and treated via synchronous audio and visual telemedicine encounter.      Start Time: 1015  Stop Time: 1023    Reason for Telemedicine Visit: COVID-19 precautions    Originating Site (Patient Location): [facility]    Distant Site (Provider Location): Provider Remote Setting    Consent:  The patient/guardian has  "verbally consented to: the potential risks and benefits of telemedicine (video visit) versus in person care; bill my insurance or make self-payment for services provided; and responsibility for payment of non-covered services.     Mode of Communication:  Video Conference via Poly[employer]    As the provider I attest to compliance with applicable laws and regulations related to telemedicine.     Attestation:  Patient has been seen and evaluated by me, RAHEEM Perry CNP  The patient was counseled on nature of illness and treatment plan/options  Care was coordinated with treatment team, left [facility] for ACT RN    Clinical Global Impressions  First:  Considering your total clinical experience with this particular patient population, how severe are the patient's symptoms at this time?: 7 (07/13/20 0519)  Compared to the patient's condition at the START of treatment, this patient's condition is: 4 (07/13/20 0519)  Most recent:  Considering your total clinical experience with this particular patient population, how severe are the patient's symptoms at this time?: 7 (07/13/20 0519)  Compared to the patient's condition at the START of treatment, this patient's condition is: 4 (07/13/20 0519)            Interim History:     The patient's care was discussed with the treatment team and chart notes were reviewed.  Pt was documented as sleeping 7.5 hours during each of the weekend overnight shifts.  Behavior has been calm and cooperative.  Pt is currently denying all psychotic symptoms and expressed insight into the fact that medication noncompliance led to her decompensation.  She said her mood is \"good\" and exhibited a full range of affect.  States she has sialorrhea from Clozaril.  Pt offered SL Atropine but says that she was previously told that it was not covered by insurance and declines further investigation into this.  She denies suicidal and homicidal ideation.  She has been eating and sleeping well.  Pt " "requested discharge.  Awaiting call back from ACT team to coordinate.  Possible discharge tomorrow after she receives her Abilify Maintena injection, which the ACT team delivered to the hospital.         Medications:     Current Facility-Administered Medications   Medication     acetaminophen (TYLENOL) tablet 650 mg     alum & mag hydroxide-simethicone (MAALOX  ES) suspension 30 mL     [START ON 7/14/2020] ARIPiprazole ER (ABILIFY MAINTENA) extended release inj syringe 400 mg     bisacodyl (DULCOLAX) Suppository 10 mg     cloZAPine (CLOZARIL) tablet 100 mg    Or     haloperidol lactate (HALDOL) injection 5 mg     haloperidol (HALDOL) tablet 5 mg    Or     haloperidol lactate (HALDOL) injection 5 mg     hydrOXYzine (ATARAX) tablet 25 mg     magnesium hydroxide (MILK OF MAGNESIA) suspension 30 mL     metFORMIN (GLUCOPHAGE-XR) 24 hr tablet 500 mg     traZODone (DESYREL) tablet 50 mg             Allergies:   No Known Allergies         Psychiatric Examination:     BP 97/68   Pulse 94   Temp 98.1  F (36.7  C) (Oral)   Resp 16   Ht 1.651 m (5' 5\")   Wt 70.4 kg (155 lb 3.2 oz)   LMP 07/01/2020   SpO2 100%   BMI 25.83 kg/m      Appearance:  awake, alert and adequately groomed  Attitude:  cooperative  Eye Contact:  good  Mood:  \"good\"  Affect:  appropriate and in normal range  Speech:  clear, coherent  Psychomotor Behavior:  no evidence of tardive dyskinesia, dystonia, or tics  Thought Process:  logical, linear and goal oriented  Associations:  no loose associations  Thought Content:  no evidence of suicidal ideation or homicidal ideation, denies all psychotic symptoms  Insight:  good  Judgment:  intact  Oriented to:  date, time, person, and place  Attention Span and Concentration:  fair  Recent and Remote Memory:  intact  Language:  intact  Fund of Knowledge:  appropriate  Muscle Strength and Tone:  normal  Gait and Station:  normal         Labs:     No results found for this or any previous visit (from the past 24 " hour(s)).

## 2020-07-13 NOTE — PLAN OF CARE
BEHAVIORAL TEAM DISCUSSION    Participants: Kayla Payton NP; Penny CABELLO; Maryann CABELLO; Zoila Anne RN  Progress: Improving  Anticipated length of stay: 3 days  Continued Stay Criteria/Rationale: Patient was admitted on the weekend, having been off medications.  Medical/Physical: No acute medical issues.  Precautions:   Behavioral Orders   Procedures     Code 1 - Restrict to Unit     Routine Programming     As clinically indicated     Status 15     Every 15 minutes.     Plan: Medications have been restarted, treatment team communicating with ACT team, likely discharge Tuesday.  Rationale for change in precautions or plan: initial plan, patient stable and ready for discharge.

## 2020-07-13 NOTE — PLAN OF CARE
Problem: Cognitive Impairment (Psychotic Signs/Symptoms)  Goal: Improved Thought Clarity and Organization (Psychotic Signs/Symptoms)  Outcome: Improving     RN 48 hour assessment:  Patient said that she is good with the potential plan of receiving her Abilify Maintena tomorrow and then being discharged. Patient's ACT team brought the Abilify Maintena to the hospital this shift and it was verified by our inpatient pharmacy. Patient denied having thoughts of harming herself or others. Patient denied hearing voices today.

## 2020-07-14 PROCEDURE — 99239 HOSP IP/OBS DSCHRG MGMT >30: CPT | Mod: 95 | Performed by: NURSE PRACTITIONER

## 2020-07-14 ASSESSMENT — ACTIVITIES OF DAILY LIVING (ADL)
HYGIENE/GROOMING: INDEPENDENT
LAUNDRY: WITH SUPERVISION
ORAL_HYGIENE: INDEPENDENT
DRESS: STREET CLOTHES

## 2020-07-14 NOTE — DISCHARGE INSTRUCTIONS
" Behavioral Discharge Planning and Instructions        Summary:    You were admitted on 7/10/2020  due to psychotic symptoms.  You were treated by Kymberly Payton NP and discharged on 7/14/2020 from [facility] to Home. Ollie Aguilera was administered by RN on 7/14/2020 at 1019 prior to discharge.      Principal Diagnosis:     Schizoaffective disorder, bipolar type      Health Care Follow-up Appointments:     [facility]    Re-Entry ACT Team (Your treatment team will follow up with you on the evening of your discharge.  They are aware you were admitted and will resume outpatient care.)  Psychiatrist: Dr. Gaviria and Johanne Craft RN, and Vicky Harrington ()  Phone: [phone]  Fax: [phone]      Attend all scheduled appointments with your outpatient providers. Call at least 24 hours in advance if you need to reschedule an appointment to ensure continued access to your outpatient providers.   Major Treatments, Procedures and Findings:  You were provided with: a psychiatric assessment, medication evaluation and/or management and milieu management    Symptoms to Report: increased confusion or auditory hallucinations    Early warning signs can include: increased depression or anxiety sleep disturbances increased thoughts or behaviors of suicide or self-harm  increased unusual thinking, such as paranoia or hearing voices    Safety and Wellness:  Take all medicines as directed.  Make no changes unless your doctor suggests them.   Follow treatment recommendations.  Refrain from alcohol and non-prescribed drugs.  If there is a concern for safety, call 911.    Resources:   Crisis Intervention: 598.978.9713 or [phone] (TTY: 898.912.3723).  Call anytime for help.  National [place] [employer] Mental Illness (www.[place].doron.org): 197.530.9077 or 032-335-7740.  [facility] Crisis (COPE) Response - Adult [phone]  Crisis text line: Text \"[place]\" to 193582. Free, confidential, 24/7.  Crisis Intervention: " 603.465.5699 or 890-584-6099. Call anytime for help.       The treatment team has appreciated the opportunity to work with you.     If you have any questions or concerns our unit number is [phone]

## 2020-07-14 NOTE — PROGRESS NOTES
Patient was discharged from Station [facility] today to home. Discharge instructions were reviewed with patient and she verbalized understanding them. Patient denied having suicidal thoughts and denied having thoughts to hurt others. Patient was given Abilify Maintena 400 mg IM as ordered prior to discharge. Patient did not have medications filled for discharge as she had no med changes while inpatient. All personal belongings were returned to her at time of discharge.

## 2020-07-14 NOTE — PROGRESS NOTES
Work Completed:  CTC reviewed chart, met with pt.  She feels ready for discharge.  She will need her abilify injection today, then she will discharge to home  Pt is requesting a taxi due to her  will not be available and her ACT team is not transporting during COVID.    Discharge plan or goal: see above                Barriers to discharge: none

## 2020-07-14 NOTE — PROGRESS NOTES
Pt denies SI/SIB. Pt denies AH/VH. Pt rates depression and hopelessness 0/10. Pt stated that she is ready for discharge tomorrow. Pt is visible in the milieu. Pt is not social with peers and staff. Pt ate dinner and snacks. Pt's mood is calm. Pt was smiling and laughing with writer after speaking in [place]. Pt stated that she is determined to take her medications and not discontinue use.        07/13/20 2200   Behavioral Health   Hallucinations denies / not responding to hallucinations   Thinking intact   Orientation time: oriented;date: oriented;place: oriented   Memory baseline memory   Insight admits / accepts   Judgement intact   Eye Contact at examiner   Affect full range affect   Mood mood is calm   Physical Appearance/Attire attire appropriate to age and situation   Hygiene well groomed   Suicidality other (see comments)  (Pt denies )   1. Wish to be Dead (Recent) No   2. Non-Specific Active Suicidal Thoughts (Recent) No   3. Active Sucidal Ideation with any Methods (Not Plan) Without Intent to Act (Recent) No   4. Active Suicidal Ideation with Some Intent to Act, Without Specific Plan (Recent) No   5. Active Suicidal Ideation with Specific Plan and Intent (Recent) No   Self Injury other (see comment)  (None stated/observed)   Elopement   (None stated/observed)   Activity withdrawn   Speech clear;coherent   Medication Sensitivity other (see comment)  (Extremely tired in the morning/drooling.)   Psychomotor / Gait steady;balanced   Psycho Education   Type of Intervention 1:1 intervention   Response participates with encouragement   Hours 0.5   Treatment Detail Check In   Activities of Daily Living   Hygiene/Grooming independent   Oral Hygiene independent   Dress street clothes   Laundry with supervision   Room Organization independent

## 2020-07-14 NOTE — DISCHARGE SUMMARY
Psychiatric Discharge Summary    Nona Finley MRN# [id number]   Age: 40 year old YOB: 1980     Date of Admission:  7/10/2020  Date of Discharge:  7/14/2020  Admitting Physician:  Valentino Moulton MD  Discharge Physician:  RAHEEM Perry CNP (Contact: [phone])         Event Leading to Hospitalization:      Nona Finley is a 40-year-old female admitted to [address] on 7/10/2020.  She was admitted as a voluntary patient through the ER due to psychosis.  She stopped taking her medications about 3 days prior to admission because auditory hallucinations said they were poisoned and laced with marijuana.  After she stopped taking them, auditory hallucinations instructed her to kill herself and her children.  She was eating only fruits and vegetables because she thought other foods were poisoned with bleach.  She is currently under commitment with [employer].      From H&P 7/11/2020:    CHIEF COMPLAINT AND REASON FOR HOSPITALIZATION:  The patient is a 40-year-old   female who was admitted in care due to acute decompensation.  The patient was recommended to come in by her assertive community training team.      HISTORY OF PRESENT ILLNESS:  The patient presented as a reasonably reliable historian, said that she stopped taking her medications 3 days ago because she was afraid that her meds were poisoned and laced with marijuana, admitted that voices in her head were telling her that.  She said that after she stopped taking medication her voices started telling her to kill herself and also kill her children.  The patient was not very forthcoming with information.  Did not tell me that, but on talking to the DEC  patient said that she convinced her ACT team to stop coming 3 days ago, said that she would take medication on her own, but then stopped taking her medication.  She reported not sleeping in 3 days, eating only fruits, vegetables, and water  because she believes that all other food was also poisoned with bleach.  Denied any substance or alcohol use.  She is not seeing a therapist and does not want to.  She is currently under mental health commitment with [facility]yes order until 04/02/2021.  Reyes medications are Clozaril, Invega, Haldol, and Abilify.     See full admission note by Dr. Cancino 7/11/2020 for details.           Diagnoses:     Schizoaffective disorder, bipolar type         Labs:      Ref. Range 7/10/2020 21:10   Sodium Latest Ref Range: 133 - 144 mmol/L 138   Potassium Latest Ref Range: 3.4 - 5.3 mmol/L 3.6   Chloride Latest Ref Range: 94 - 109 mmol/L 106   Carbon Dioxide Latest Ref Range: 20 - 32 mmol/L 27   Urea Nitrogen Latest Ref Range: 7 - 30 mg/dL 9   Creatinine Latest Ref Range: 0.52 - 1.04 mg/dL 0.57   GFR Estimate Latest Ref Range: >60 mL/min/1.73_m2 >90   GFR Estimate If Black Latest Ref Range: >60 mL/min/1.73_m2 >90   Calcium Latest Ref Range: 8.5 - 10.1 mg/dL 8.8   Anion Gap Latest Ref Range: 3 - 14 mmol/L 5   Albumin Latest Ref Range: 3.4 - 5.0 g/dL 3.6   Protein Total Latest Ref Range: 6.8 - 8.8 g/dL 8.1   Bilirubin Total Latest Ref Range: 0.2 - 1.3 mg/dL 0.3   Alkaline Phosphatase Latest Ref Range: 40 - 150 U/L 63   ALT Latest Ref Range: 0 - 50 U/L 17   AST Latest Ref Range: 0 - 45 U/L 17   Cholesterol Latest Ref Range: <200 mg/dL 152   HDL Cholesterol Latest Ref Range: >49 mg/dL 59   LDL Cholesterol Calculated Latest Ref Range: <100 mg/dL 77   Non HDL Cholesterol Latest Ref Range: <130 mg/dL 93   Triglycerides Latest Ref Range: <150 mg/dL 80   TSH Latest Ref Range: 0.40 - 4.00 mU/L 0.84   Glucose Latest Ref Range: 70 - 99 mg/dL 147 (H)   WBC Latest Ref Range: 4.0 - 11.0 10e9/L 7.3   Hemoglobin Latest Ref Range: 11.7 - 15.7 g/dL 12.2   Hematocrit Latest Ref Range: 35.0 - 47.0 % 36.8   Platelet Count Latest Ref Range: 150 - 450 10e9/L 308   RBC Count Latest Ref Range: 3.8 - 5.2 10e12/L 4.02   MCV Latest Ref Range: 78 - 100 fl  92   MCH Latest Ref Range: 26.5 - 33.0 pg 30.3   MCHC Latest Ref Range: 31.5 - 36.5 g/dL 33.2   RDW Latest Ref Range: 10.0 - 15.0 % 13.7   Diff Method Unknown Automated Method   % Neutrophils Latest Units: % 62.7   % Lymphocytes Latest Units: % 29.6   % Monocytes Latest Units: % 3.7   % Eosinophils Latest Units: % 3.3   % Basophils Latest Units: % 0.6   % Immature Granulocytes Latest Units: % 0.1   Nucleated RBCs Latest Ref Range: 0 /100 0   Absolute Neutrophil Latest Ref Range: 1.6 - 8.3 10e9/L 4.6   Absolute Lymphocytes Latest Ref Range: 0.8 - 5.3 10e9/L 2.2   Absolute Monocytes Latest Ref Range: 0.0 - 1.3 10e9/L 0.3   Absolute Eosinophils Latest Ref Range: 0.0 - 0.7 10e9/L 0.2   Absolute Basophils Latest Ref Range: 0.0 - 0.2 10e9/L 0.0   Abs Immature Granulocytes Latest Ref Range: 0 - 0.4 10e9/L 0.0   Absolute Nucleated RBC Unknown 0.0            Consults:     No consultations were requested during this admission.         Hospital Course:     Nona Finley was admitted to [facility] with attending Valentino Moulton MD, under the direct care of Kymberly Payton NP as a voluntary patient.  She is also under civil commitment and [facility]; her ACT team did not revoke her provisional discharge.  The patient was placed under status 15 (15 minute checks) to ensure patient safety.     Clozaril 100 mg at HS was restarted.  Her ACT team brought Abilify Maintena 400 mg to the hospital and it was administered on 7/14 prior to her discharge.  She reported sialorrhea from Clozaril and was offered SL Atropine but declined, stating that in the past she had been informed that her insurance would not cover it.  She did not feel that any further investigation into insurance coverage for SL Atropine was warranted.    Nona Finley did not participate in groups.  She was visible in the milieu.  Behavior was calm and cooperative.  She appeared motivated for treatment and recovery.  She expressed good insight into the necessity of  "taking medications to avoid decompensation and hospitalizations.    The patient's symptoms of psychosis improved quickly, which is typical for her after she stops and then restarts Clozaril.  She denied hallucinations.  She denied paranoid/delusional thought content.  Thoughts were well organized.  Her mood was euthymic.  She ate and slept well.  She denied suicidal and homicidal ideation.      Nona Finley was released to home.  At the time of discharge Nona Finley was determined to not be a danger to herself or others.          Discharge Medications:      Nona Finley   Home Medication Instructions RUTH:[id number]    Printed on:07/14/20 0621   Medication Information                      ARIPiprazole ER (ABILIFY MAINTENA) 400 MG extended release inj syringe  Inject 400 mg into the muscle every 28 days              cloZAPine (CLOZARIL) 100 MG tablet  Take 1 tablet (100 mg) by mouth At Bedtime             metFORMIN (GLUCOPHAGE-XR) 500 MG 24 hr tablet  Take 500 mg by mouth At Bedtime                      Psychiatric Examination:     Appearance:  awake, alert and adequately groomed  Attitude:  cooperative  Eye Contact:  good  Mood:  \"good\"  Affect:  appropriate and in normal range, smiling  Speech:  clear, coherent  Psychomotor Behavior:  no evidence of tardive dyskinesia, dystonia, or tics  Thought Process:  logical, linear and goal oriented  Associations:  no loose associations  Thought Content:  no evidence of suicidal ideation or homicidal ideation, denies all psychotic symptoms  Insight:  good  Judgment:  intact  Oriented to:  date, time, person, and place  Attention Span and Concentration:  fair  Recent and Remote Memory:  intact  Language:  intact  Fund of Knowledge:  appropriate  Muscle Strength and Tone:  normal  Gait and Station:  normal    BP 97/68   Pulse 94   Temp 97.9  F (36.6  C) (Tympanic)   Resp 16   Ht 1.651 m (5' 5\")   Wt 70.4 kg (155 lb 3.2 oz)   LMP 07/01/2020   SpO2 100% "   BMI 25.83 kg/m           Discharge Plan:     Health Care Follow-up Appointments:     [facility]    Re-Entry ACT Team (Your treatment team will follow up with you on the evening of your discharge.  They are aware you were admitted and will resume outpatient care.)  Psychiatrist: Dr. Gaviria and Johanne Craft RN, and Vicky Harrington (WALTER)  Phone: [phone]  Fax: [phone]       No medications were provided at the time of discharge as none had changed and she has an ample supply at home.  She was advised to take her medications as prescribed.        Telemedicine Visit: The patient's condition can be safely assessed and treated via synchronous audio and visual telemedicine encounter.       Start Time: 0850  Stop Time: 0853     Reason for Telemedicine Visit: COVID-19 precautions     Originating Site (Patient Location): [facility]     Distant Site (Provider Location): Provider Remote Setting     Consent:  The patient/guardian has verbally consented to: the potential risks and benefits of telemedicine (video visit) versus in person care; bill my insurance or make self-payment for services provided; and responsibility for payment of non-covered services.      Mode of Communication:  Video Conference via [employer]     As the provider I attest to compliance with applicable laws and regulations related to telemedicine.      Attestation:  Patient has been seen and evaluated by me, RAHEEM Perry CNP  Discharge time > 30 minutes

## 2020-07-17 ENCOUNTER — TELEPHONE (OUTPATIENT)
Dept: BEHAVIORAL HEALTH | Facility: CLINIC | Age: 40
End: 2020-07-17

## 2020-07-17 ENCOUNTER — HOSPITAL ENCOUNTER (INPATIENT)
Facility: CLINIC | Age: 40
LOS: 6 days | Discharge: HOME | DRG: 885 | End: 2020-07-23
Attending: FAMILY MEDICINE | Admitting: PSYCHIATRY & NEUROLOGY
Payer: COMMERCIAL

## 2020-07-17 DIAGNOSIS — K11.7 SIALORRHEA: ICD-10-CM

## 2020-07-17 DIAGNOSIS — F20.9 SCHIZOPHRENIA, UNSPECIFIED TYPE (H): ICD-10-CM

## 2020-07-17 DIAGNOSIS — F29 PSYCHOSIS, UNSPECIFIED PSYCHOSIS TYPE (H): ICD-10-CM

## 2020-07-17 DIAGNOSIS — F25.1 SCHIZOAFFECTIVE DISORDER, DEPRESSIVE TYPE (H): ICD-10-CM

## 2020-07-17 DIAGNOSIS — F20.89 OTHER SCHIZOPHRENIA (H): ICD-10-CM

## 2020-07-17 DIAGNOSIS — E55.9 VITAMIN D DEFICIENCY: Primary | ICD-10-CM

## 2020-07-17 DIAGNOSIS — F25.8 OTHER SCHIZOAFFECTIVE DISORDERS (H): ICD-10-CM

## 2020-07-17 DIAGNOSIS — Z03.818 ENCNTR FOR OBS FOR SUSP EXPSR TO OTH BIOLG AGENTS RULED OUT: ICD-10-CM

## 2020-07-17 LAB
LABORATORY COMMENT REPORT: NORMAL
SARS-COV-2 RNA SPEC QL NAA+PROBE: NEGATIVE
SARS-COV-2 RNA SPEC QL NAA+PROBE: NORMAL
SPECIMEN SOURCE: NORMAL
SPECIMEN SOURCE: NORMAL

## 2020-07-17 PROCEDURE — C9803 HOPD COVID-19 SPEC COLLECT: HCPCS | Performed by: FAMILY MEDICINE

## 2020-07-17 PROCEDURE — 99285 EMERGENCY DEPT VISIT HI MDM: CPT | Mod: 25 | Performed by: FAMILY MEDICINE

## 2020-07-17 PROCEDURE — 90791 PSYCH DIAGNOSTIC EVALUATION: CPT

## 2020-07-17 PROCEDURE — 99285 EMERGENCY DEPT VISIT HI MDM: CPT | Mod: Z6 | Performed by: FAMILY MEDICINE

## 2020-07-17 PROCEDURE — U0003 INFECTIOUS AGENT DETECTION BY NUCLEIC ACID (DNA OR RNA); SEVERE ACUTE RESPIRATORY SYNDROME CORONAVIRUS 2 (SARS-COV-2) (CORONAVIRUS DISEASE [COVID-19]), AMPLIFIED PROBE TECHNIQUE, MAKING USE OF HIGH THROUGHPUT TECHNOLOGIES AS DESCRIBED BY CMS-2020-01-R: HCPCS | Performed by: FAMILY MEDICINE

## 2020-07-17 PROCEDURE — 12400001 ZZH R&B MH UMMC

## 2020-07-17 RX ORDER — ALUMINA, MAGNESIA, AND SIMETHICONE 2400; 2400; 240 MG/30ML; MG/30ML; MG/30ML
30 SUSPENSION ORAL EVERY 4 HOURS PRN
Status: DISCONTINUED | OUTPATIENT
Start: 2020-07-17 | End: 2020-07-23 | Stop reason: HOSPADM

## 2020-07-17 RX ORDER — TRAZODONE HYDROCHLORIDE 50 MG/1
50 TABLET, FILM COATED ORAL
Status: DISCONTINUED | OUTPATIENT
Start: 2020-07-17 | End: 2020-07-23 | Stop reason: HOSPADM

## 2020-07-17 RX ORDER — BISACODYL 10 MG
10 SUPPOSITORY, RECTAL RECTAL DAILY PRN
Status: DISCONTINUED | OUTPATIENT
Start: 2020-07-17 | End: 2020-07-23 | Stop reason: HOSPADM

## 2020-07-17 RX ORDER — ACETAMINOPHEN 325 MG/1
650 TABLET ORAL EVERY 4 HOURS PRN
Status: DISCONTINUED | OUTPATIENT
Start: 2020-07-17 | End: 2020-07-23 | Stop reason: HOSPADM

## 2020-07-17 RX ORDER — OLANZAPINE 10 MG/2ML
10 INJECTION, POWDER, FOR SOLUTION INTRAMUSCULAR
Status: DISCONTINUED | OUTPATIENT
Start: 2020-07-17 | End: 2020-07-21

## 2020-07-17 RX ORDER — CLOZAPINE 100 MG/1
100 TABLET ORAL AT BEDTIME
Status: DISCONTINUED | OUTPATIENT
Start: 2020-07-17 | End: 2020-07-20

## 2020-07-17 RX ORDER — HYDROXYZINE HYDROCHLORIDE 25 MG/1
25 TABLET, FILM COATED ORAL EVERY 4 HOURS PRN
Status: DISCONTINUED | OUTPATIENT
Start: 2020-07-17 | End: 2020-07-23 | Stop reason: HOSPADM

## 2020-07-17 RX ORDER — METFORMIN HCL 500 MG
500 TABLET, EXTENDED RELEASE 24 HR ORAL AT BEDTIME
Status: DISCONTINUED | OUTPATIENT
Start: 2020-07-17 | End: 2020-07-23 | Stop reason: HOSPADM

## 2020-07-17 RX ORDER — OLANZAPINE 10 MG/1
10 TABLET ORAL
Status: DISCONTINUED | OUTPATIENT
Start: 2020-07-17 | End: 2020-07-21

## 2020-07-17 ASSESSMENT — ACTIVITIES OF DAILY LIVING (ADL)
BATHING: 0-->INDEPENDENT
DRESS: 0-->INDEPENDENT
TOILETING: 0-->INDEPENDENT
AMBULATION: 0-->INDEPENDENT
FALL_HISTORY_WITHIN_LAST_SIX_MONTHS: NO
RETIRED_COMMUNICATION: 0-->UNDERSTANDS/COMMUNICATES WITHOUT DIFFICULTY
SWALLOWING: 0-->SWALLOWS FOODS/LIQUIDS WITHOUT DIFFICULTY
TRANSFERRING: 0-->INDEPENDENT
HYGIENE/GROOMING: INDEPENDENT
COGNITION: 0 - NO COGNITION ISSUES REPORTED
ORAL_HYGIENE: INDEPENDENT
LAUNDRY: WITH SUPERVISION
RETIRED_EATING: 0-->INDEPENDENT
DRESS: INDEPENDENT

## 2020-07-17 ASSESSMENT — MIFFLIN-ST. JEOR: SCORE: 1359.42

## 2020-07-17 ASSESSMENT — ENCOUNTER SYMPTOMS
APPETITE CHANGE: 1
HALLUCINATIONS: 1
SLEEP DISTURBANCE: 1

## 2020-07-17 NOTE — ED TRIAGE NOTES
Pt presents to the ED with complaints of auditory and visual hallucinations for the past 3 days. Pt has been having thoughts of suicidal ideations, denies any plans. Denies homicidal ideations.

## 2020-07-17 NOTE — TELEPHONE ENCOUNTER
S: Marielena, [facility], 40/F, psychosis     DEC requested @ 422pm, initiated @ 502pm     B: pt discharged from 32 3 days ago   Hx of schizoaffective w psychosis   Pt stopped taking her meds, came into hospital and was discharged  Pt reports the last 3 days she is throwing up her medications because they are laced w marijuana  AH to kill her kids while they are sleeping and then kill herself after  No intent to hurt her kids, SI w plan to jump off a bridge  VH seeing people with a knife, smelling bleach on the food  Pt reports sleep disturbance, missing meals   Pt reports no substance use  No need for , but prefers [place] citizen     Medically cleared, eating, drinking, ambulating indep   Patient cleared and ready for behavioral bed placement: Yes   No covid concerns, swab not yet ordered     A: Voluntary   Commitment and ellis  ACT has been checking in w pt and rec pt come in for assessment     R: Guera/Sami    529pm - Oksana, on call provider, paged   536pm - Oksana accepts   Pt placed in que   539pm - unit charge unavailable, will call intake back when able   556pm - covid swab ordered  609pm - unit charge notified, report @ 630pm   611pm - [facility] notified

## 2020-07-17 NOTE — ED NOTES
ED to Behavioral Floor Handoff    SITUATION  Nona Finley is a 40 year old female who speaks [place] and lives in a home with family members The patient arrived in the ED by private car from home with a complaint of Hallucinations (auditoy and visual hallucinations for the past 3 days) and Suicidal  .The patient's current symptoms started/worsened 1 week(s) ago and during this time the symptoms have increased.   In the ED, pt was diagnosed with   Final diagnoses:   Schizophrenia, unspecified type (H)   Other schizoaffective disorders (H)        Initial vitals were: BP: 103/69  Pulse: 125  Temp: 98.7  F (37.1  C)  Weight: 70.3 kg (155 lb)  SpO2: 99 %   --------  Is the patient diabetic? No   If yes, last blood glucose? --     If yes, was this treated in the ED? --  --------  Is the patient inebriated (ETOH) No or Impaired on other substances? No  MSSA done? N/A  Last MSSA score: --    Were withdrawal symptoms treated? N/A  Does the patient have a seizure history? No. If yes, date of most recent seizure--  --------  Is the patient patient experiencing suicidal ideation? SI without a plan.     Homicidal ideation? reports current or recent homicidal ideation or behaviors including pt denies HI but reports command hallucinations telling her to kill her children.    Self-injurious behavior/urges? denies current or recent self injurious behavior or ideation.  ------  Was pt aggressive in the ED No  Was a code called No  Is the pt now cooperative? Yes  -------  Meds given in ED: Medications - No data to display   Family present during ED course? No  Family currently present? No    BACKGROUND  Does the patient have a cognitive impairment or developmental disability? No  Allergies: No Known Allergies.   Social demographics are   Social History     Socioeconomic History     Marital status:      Spouse name: None     Number of children: 2     Years of education: None     Highest education level: None   Occupational  History     Employer: NONE    Social Needs     Financial resource strain: None     Food insecurity     Worry: None     Inability: None     Transportation needs     Medical: None     Non-medical: None   Tobacco Use     Smoking status: Never Smoker     Smokeless tobacco: Never Used   Substance and Sexual Activity     Alcohol use: No     Drug use: No     Sexual activity: Yes     Partners: Male     Birth control/protection: None   Lifestyle     Physical activity     Days per week: None     Minutes per session: None     Stress: None   Relationships     Social connections     Talks on phone: None     Gets together: None     Attends Orthodoxy service: None     Active member of club or organization: None     Attends meetings of clubs or organizations: None     Relationship status: None     Intimate partner violence     Fear of current or ex partner: None     Emotionally abused: None     Physically abused: None     Forced sexual activity: None   Other Topics Concern      Service Not Asked     Blood Transfusions No     Caffeine Concern Not Asked     Occupational Exposure No     Hobby Hazards Not Asked     Sleep Concern Not Asked     Stress Concern Not Asked     Weight Concern Not Asked     Special Diet Not Asked     Back Care Not Asked     Exercise Not Asked     Bike Helmet Not Asked     Seat Belt Not Asked     Self-Exams Not Asked     Parent/sibling w/ CABG, MI or angioplasty before 65F 55M? Not Asked   Social History Narrative    Caffeine intake/servings daily - 2 times a week    Calcium intake/servings daily - 1-2    Exercise no times weekly - describe active with children    Sunscreen used - soemtimes    Seatbelts used - Yes    Guns stored in the home - Yes    Self Breast Exam - Yes    Pap test up to date -  Yes    Eye exam up to date -  No    Dental exam up to date -  No    DEXA scan up to date -  Not Applicable    Flex Sig/Colonoscopy up to date -  Not Applicable    Mammography up to date -  Not Applicable     Immunizations reviewed and up to date - needs tdap at postpartum    Abuse: Current or Past (Physical, Sexual or Emotional) - No    Do you feel safe in your environment - Yes    Do you cope well with stress - sometimes    Do you suffer from insomnia - No    Last updated by: Joanne Gilligan RN 12/16/10                            ASSESSMENT  Labs results Labs Ordered and Resulted from Time of ED Arrival Up to the Time of Departure from the ED - No data to display   Imaging Studies: No results found for this or any previous visit (from the past 24 hour(s)).   Most recent vital signs /69   Pulse 125   Temp 98.7  F (37.1  C) (Oral)   Wt 70.3 kg (155 lb)   LMP 07/01/2020   SpO2 99%   BMI 25.79 kg/m     Abnormal labs/tests/findings requiring intervention:---   Pain control: pt had none  Nausea control: pt had none    RECOMMENDATION  Are any infection precautions needed (MRSA, VRE, etc.)? No If yes, what infection? --  ---  Does the patient have mobility issues? independently. If yes, what device does the pt use? ---  ---  Is patient on 72 hour hold or commitment? No If on 72 hour hold, have hold and rights been given to patient? N/A  Are admitting orders written if after 10 p.m. ?N/A  Tasks needing to be completed:---     HIGINIO CHAPMAN RN    [phone] [place] [facility]   [phone] [facility]

## 2020-07-17 NOTE — ED PROVIDER NOTES
[facility] EMERGENCY DEPARTMENT ([facility])     July 17, 2020  History     Chief Complaint   Patient presents with     Hallucinations     auditoy and visual hallucinations for the past 3 days     Suicidal     The history is provided by the patient and medical records.     Nona Finley is a 40 year old female with a medical history significant for severe bipolar 1 disorder with psychotic behavior, depressive type schizoaffective disorder, auditory hallucinations, suicidal ideations, psychosis, postpartum depression, and tuberculosis who presents the ED today for mental health evaluation. She was brought in by her sister for behavioral evaluation. Patient had recent hospitalization from 7/10-7/14/20 in setting of destabilization and medication nonadherence.  She was restarted on Clozaril 100 mg HS as well as Abilify Maintena 400 mg during this hospitalization, and was placed under commitment and Reyes.  Over the last 2 days she has been deliberately vomiting her medications, paranoid that they're tainted with marijuana and other medications.  She is experiencing auditory hallucinations telling her to kill her kids and herself. She denies plan or intent to kill kids but has had thoughts to jump off bridge to kill herself. She is also experiencing visual hallucinations and seeing people with a knife, as well as olfactory hallucinations with smelling bleach on her food. She has only been sleeping 2-3 hours a night, skipping meals  She has been speaking to her ACT team daily and was told to come to the ER for evaluation.  At this time patient had received 1 mg of Ativan in the emergency room from previous evaluation by Dr. akins now and had marked improvement of her symptoms patient denied any intent to harm and is interested in increased outpatient services.    I have reviewed the Medications, Allergies, Past Medical and Surgical History, and Social History in the [employer] system.  PAST MEDICAL HISTORY:   Past  Medical History:   Diagnosis Date     Bipolar 1 disorder (H)      Bipolar affective disorder, depressed, severe, with psychotic behavior (H)      NONSPECIFIC MEDICAL HISTORY     h/o +PPD. Neg CXR 2006     Postpartum depression 8/18/2011     Schizo-affective schizophrenia (H)      Tuberculosis      Varicosities        PAST SURGICAL HISTORY:   Past Surgical History:   Procedure Laterality Date     NO HISTORY OF SURGERY       NO HISTORY OF SURGERY  06/13/2013    Per patient reported this       Past medical history, past surgical history, medications, and allergies were reviewed with the patient. Additional pertinent items: None    FAMILY HISTORY:   Family History   Problem Relation Age of Onset     Respiratory Father         asthma     Asthma Father      Cerebrovascular Disease Mother      Diabetes Mother      Neurologic Disorder Brother         mental health problem?     Neurologic Disorder Sister         seizures (half sister)     Respiratory Paternal Grandfather         asthma     Respiratory Sister         asthma     Respiratory Brother         asthma     Neurologic Disorder Daughter         autism      Hypertension Maternal Grandmother      Neurologic Disorder Sister         seizures     Diabetes Maternal Grandfather      Coronary Artery Disease Son        SOCIAL HISTORY:   Social History     Tobacco Use     Smoking status: Never Smoker     Smokeless tobacco: Never Used   Substance Use Topics     Alcohol use: No     Social history was reviewed with the patient. Additional pertinent items: None      Current Discharge Medication List      CONTINUE these medications which have NOT CHANGED    Details   ARIPiprazole ER (ABILIFY MAINTENA) 400 MG extended release inj syringe Inject 400 mg into the muscle every 28 days   Qty: 1 Syringe, Refills: 0    Associated Diagnoses: Schizoaffective disorder, depressive type (H)      cloZAPine (CLOZARIL) 100 MG tablet Take 1 tablet (100 mg) by mouth At Bedtime  Qty: 30 tablet,  "Refills: 0    Associated Diagnoses: Other schizoaffective disorders (H)      metFORMIN (GLUCOPHAGE-XR) 500 MG 24 hr tablet Take 500 mg by mouth At Bedtime              No Known Allergies     Review of Systems   Constitutional: Positive for appetite change.   Psychiatric/Behavioral: Positive for hallucinations, sleep disturbance and suicidal ideas.     A complete review of systems was performed with pertinent positives and negatives noted in the HPI, and all other systems negative.    Physical Exam   BP: 103/69  Pulse: 125  Temp: 98.7  F (37.1  C)  Height: 165.1 cm (5' 5\")  Weight: 70.3 kg (155 lb)  SpO2: 99 %      Physical Exam  Constitutional:       General: She is not in acute distress.     Appearance: She is not diaphoretic.   HENT:      Head: Atraumatic.      Mouth/Throat:      Pharynx: No oropharyngeal exudate.   Eyes:      General: No scleral icterus.     Pupils: Pupils are equal, round, and reactive to light.   Cardiovascular:      Heart sounds: Normal heart sounds.   Pulmonary:      Effort: No respiratory distress.      Breath sounds: Normal breath sounds.   Abdominal:      General: Bowel sounds are normal.      Palpations: Abdomen is soft.      Tenderness: There is no abdominal tenderness.   Musculoskeletal:         General: No tenderness.   Skin:     General: Skin is warm.      Findings: No rash.   Neurological:      General: No focal deficit present.      Mental Status: She is oriented to person, place, and time.      Motor: No weakness.      Coordination: Coordination normal.   Psychiatric:         Mood and Affect: Mood is anxious.         Speech: Speech normal.         Behavior: Behavior is cooperative.         Thought Content: Thought content does not include suicidal ideation. Thought content does not include suicidal plan.         ED Course        Procedures     Patient was seen and evaluated by the  please refer to her documentation in the note section of the epic chart dated 7/17/2020   "     Results for orders placed or performed during the hospital encounter of 07/17/20 (from the past 24 hour(s))   Asymptomatic COVID-19 Virus (Coronavirus) by PCR    Specimen: Nasopharyngeal   Result Value Ref Range    COVID-19 Virus PCR to [facility] - Source Nasopharyngeal     COVID-19 Virus PCR to [facility] - Result       Test received-See reflex to [facility] test SARS CoV2 (COVID-19) Virus RT-PCR     Medications   ARIPiprazole ER (ABILIFY MAINTENA) extended release inj syringe 400 mg (has no administration in time range)   cloZAPine (CLOZARIL) tablet 100 mg (has no administration in time range)   metFORMIN (GLUCOPHAGE-XR) 24 hr tablet 500 mg (has no administration in time range)   hydrOXYzine (ATARAX) tablet 25 mg (has no administration in time range)   acetaminophen (TYLENOL) tablet 650 mg (has no administration in time range)   alum & mag hydroxide-simethicone (MAALOX  ES) suspension 30 mL (has no administration in time range)   magnesium hydroxide (MILK OF MAGNESIA) suspension 30 mL (has no administration in time range)   bisacodyl (DULCOLAX) Suppository 10 mg (has no administration in time range)   traZODone (DESYREL) tablet 50 mg (has no administration in time range)   OLANZapine (zyPREXA) tablet 10 mg (has no administration in time range)     Or   OLANZapine (zyPREXA) injection 10 mg (has no administration in time range)             Assessments & Plan (with Medical Decision Making)       I have reviewed the nursing notes.    I have reviewed the findings, diagnosis, plan and need for follow up with the patient.    Patient with schizophrenia schizoaffective disorder patient presenting with some medication changes and recent difficulty with sleep patient did not react well to Seroquel and will be stopping the Seroquel she will be placed back on Zyprexa as per discussion with her normal psychiatrist patient also was given 5 doses of Ativan to be given over the next 1 to 2 days that she is just to getting back on her  Zyprexa.  Patient understands the importance of close follow-up and would return to the emergency room if there is any increased risk of harming herself or others.    Final diagnoses:   Schizophrenia, unspecified type (H)   Other schizoaffective disorders (H)   SUSANA, Leila De Jesus, am serving as a trained medical scribe to document services personally performed by Pérez Matthews MD based on the provider's statements to me on July 17, 2020.  This document has been checked and approved by the attending provider.    Pérez MEZA MD, was physically present and have reviewed and verified the accuracy of this note documented by Leila De Jesus, medical scribe.       7/17/2020   [facility], [place], EMERGENCY DEPARTMENT     Pérez Matthews MD  07/17/20 [id number]

## 2020-07-18 LAB
BASOPHILS # BLD AUTO: 0 10E9/L (ref 0–0.2)
BASOPHILS NFR BLD AUTO: 0.3 %
DIFFERENTIAL METHOD BLD: NORMAL
EOSINOPHIL # BLD AUTO: 0.5 10E9/L (ref 0–0.7)
EOSINOPHIL NFR BLD AUTO: 7.5 %
ERYTHROCYTE [DISTWIDTH] IN BLOOD BY AUTOMATED COUNT: 13.7 % (ref 10–15)
HCT VFR BLD AUTO: 35 % (ref 35–47)
HGB BLD-MCNC: 11.9 G/DL (ref 11.7–15.7)
IMM GRANULOCYTES # BLD: 0 10E9/L (ref 0–0.4)
IMM GRANULOCYTES NFR BLD: 0.2 %
LYMPHOCYTES # BLD AUTO: 2 10E9/L (ref 0.8–5.3)
LYMPHOCYTES NFR BLD AUTO: 32.8 %
MCH RBC QN AUTO: 30.8 PG (ref 26.5–33)
MCHC RBC AUTO-ENTMCNC: 34 G/DL (ref 31.5–36.5)
MCV RBC AUTO: 91 FL (ref 78–100)
MONOCYTES # BLD AUTO: 0.6 10E9/L (ref 0–1.3)
MONOCYTES NFR BLD AUTO: 9.7 %
NEUTROPHILS # BLD AUTO: 3 10E9/L (ref 1.6–8.3)
NEUTROPHILS NFR BLD AUTO: 49.5 %
NRBC # BLD AUTO: 0 10*3/UL
NRBC BLD AUTO-RTO: 0 /100
PLATELET # BLD AUTO: 231 10E9/L (ref 150–450)
RBC # BLD AUTO: 3.86 10E12/L (ref 3.8–5.2)
WBC # BLD AUTO: 6 10E9/L (ref 4–11)

## 2020-07-18 PROCEDURE — 25000132 ZZH RX MED GY IP 250 OP 250 PS 637: Performed by: PSYCHIATRY & NEUROLOGY

## 2020-07-18 PROCEDURE — 99223 1ST HOSP IP/OBS HIGH 75: CPT | Mod: 95 | Performed by: NURSE PRACTITIONER

## 2020-07-18 PROCEDURE — 85025 COMPLETE CBC W/AUTO DIFF WBC: CPT | Performed by: PSYCHIATRY & NEUROLOGY

## 2020-07-18 PROCEDURE — 12400001 ZZH R&B MH UMMC

## 2020-07-18 PROCEDURE — 36415 COLL VENOUS BLD VENIPUNCTURE: CPT | Performed by: PSYCHIATRY & NEUROLOGY

## 2020-07-18 RX ADMIN — CLOZAPINE 100 MG: 100 TABLET ORAL at 21:58

## 2020-07-18 RX ADMIN — METFORMIN HYDROCHLORIDE 500 MG: 500 TABLET, EXTENDED RELEASE ORAL at 21:58

## 2020-07-18 ASSESSMENT — ACTIVITIES OF DAILY LIVING (ADL)
DRESS: INDEPENDENT
HYGIENE/GROOMING: INDEPENDENT
ORAL_HYGIENE: INDEPENDENT

## 2020-07-18 NOTE — H&P
"Telemedicine Visit: The patient's condition can be safely assessed and treated via synchronous audio and visual telemedicine encounter.    Start time: 1200  Stop time: 1223  Reason for Telemedicine Visit: Covid-19   Originating Site (Patient Location): [facility]  Distant Site (Provider Location): home office   Consent:  The patient/guardian has verbally consented to: the potential risks and benefits of telemedicine (video visit) versus in person care; bill my insurance or make self-payment for services provided; and responsibility for payment of non-covered services.    Mode of Communication:  Video Conference via Skype   As the provider I attest to compliance with applicable laws and regulations related to telemedicine.     DATE OF ADMISSION: 7/17/2020                                     PATIENT'S [id number]   DATE OF SERVICE: 7/18/2020                                           PATIENT'S: 1980  ADMITTING PROVIDER: Kobe Freed MD  ATTENDING PROVIDER: Bonny MACIEL CNP  LEGAL STATUS: Was provisionally discharged few days ago.  SOURCES OF INFORMATION: Information was obtained from the patient and available records.  CHIEF COMPLAIN: \"I stoped my medications\".  HISTORY F PRESENT ILLNESS: Per ED note: Nona Finley is a 40 year old female with a medical history significant for severe bipolar 1 disorder with psychotic behavior, depressive type schizoaffective disorder, auditory hallucinations, suicidal ideations, psychosis, postpartum depression, and tuberculosis who presents the ED today for mental health evaluation. She was brought in by her sister for behavioral evaluation. Patient had recent hospitalization from 7/10-7/14/20 in setting of destabilization and medication nonadherence.  She was restarted on Clozaril 100 mg HS as well as Abilify Maintena 400 mg during this hospitalization, and was placed under commitment and [place].  Over the last 2 days she has been deliberately vomiting her " "medications, paranoid that they're tainted with marijuana and other medications.  She is experiencing auditory hallucinations telling her to kill her kids and herself. She denies plan or intent to kill kids but has had thoughts to jump off bridge to kill herself. She is also experiencing visual hallucinations and seeing people with a knife, as well as olfactory hallucinations with smelling bleach on her food. She has only been sleeping 2-3 hours a night, skipping meals. She has been speaking to her ACT team daily and was told to come to the ER for evaluation.  At this time patient had received 1 mg of Ativan in the emergency room from previous evaluation by Dr. akins now and had marked improvement of her symptoms patient denied any intent to harm and is interested in increased outpatient services.  Nona Finley is a very pleasant, German female.  She is tearful throughout the interview.  States that that the reason for admission is stopping her medications as soon as she was discharged 4 days ago.  She believes that her medications were laced with marijuana and she was having a hard time swallowing them.  She decompensated rather quickly.  She started hearing voices urging her to kill herself and her children.  She started having visual hallucinations, \"scary faces\" especially at night.  She was feeling paranoid believing that people are out to get her.  Currently feels very depressed.  She is sleeping about 3 hours a night.  She is not taking naps during the day.  Her energy is low, appetite is poor, she is feeling hopeless and worthless.  Reports a lot of worries about her family.  Denies manic episodes.  Denies history of PTSD, OCD, and eating disorders.  Denies suicide attempts and self injury behaviors. Denies seizures, head injuries, and loss of consciousness.  The patient seems to have a good insight into her illness.  States that she understands that she needs to take medications in order for her to stay " out of the hospital and be able to function.  The patient knows that if she is not taking the medications she will be given injections, per court order.  She is aware of her diagnosis of her schizoaffective disorder.  States she was initially diagnosed in 2011.  Prior to that she had never had any problems.  SUBSTANCE USE HISTORY:   Denies.    PSYCHIATRIC HISTORY:   The patient has a history of schizoaffective disorder, depressed type.  Her last hospitalization was about 4 days ago at this hospital.  The soon as she was discharged, she stopped taking her medications.  This is not the first time she is noncompliant with medications.  She was discharged on Clazuril 100 mg at bedtime and Abilify Maintena 400 mg every 28 days.  Her last injection was on 7/14 prior to discharge.  The patient has been hospitalized multiple times.  She had ECT treatment in 2011 for auditory hallucinations.  States it was helpful.  She is currently committed in [place].  The patient has ACT team   The patient sees psychiatrist, Dr. Gaviria, through ACT team associated with program called [facility].  Denies suicide attempts.  PAST MEDICAL HISTORY:   Past Medical History:   Diagnosis Date     Bipolar 1 disorder (H)      Bipolar affective disorder, depressed, severe, with psychotic behavior (H)      NONSPECIFIC MEDICAL HISTORY     h/o +PPD. Neg CXR 2006     Postpartum depression 8/18/2011     Schizo-affective schizophrenia (H)      Tuberculosis      Varicosities        Past Surgical History:   Procedure Laterality Date     NO HISTORY OF SURGERY       NO HISTORY OF SURGERY  06/13/2013    Per patient reported this       ALLERGIES:  No Known Allergies  FAMILY HISTORY:  Family history is positive for psychotic disorder in her brother who currently lives in [facility].  Family History   Problem Relation Age of Onset     Respiratory Father         asthma     Asthma Father      Cerebrovascular Disease Mother      Diabetes Mother      Neurologic Disorder  Brother         mental health problem?     Neurologic Disorder Sister         seizures (half sister)     Respiratory Paternal Grandfather         asthma     Respiratory Sister         asthma     Respiratory Brother         asthma     Neurologic Disorder Daughter         autism      Hypertension Maternal Grandmother      Neurologic Disorder Sister         seizures     Diabetes Maternal Grandfather      Coronary Artery Disease Son        SOCIAL HISTORY:   Per chart: The patient is originally from [place].  Said that most of her family is still in [place].  Here in [place] she relies on help of family of her , who currently lives in [place].  He is [employer] through his residency in Internal Medicine.  He is a physician.  The patient is not employed, taking care of their 6 children.  She lives with her mother-in-law. Medical records show that the patient's brother had suspected undiagnosed mental health issues.        MEDICAL REVIEW OF SYSTEM: Please refer to the review of systems done by Pérez Matthews MD on 7/17/2020, which I reviewed and confirmed. The remaining 10-point systems review was negative based on my exam.   MEDICATIONS PRIOR TO ADMISSION:   Prior to Admission medications    Medication Sig Start Date End Date Taking? Authorizing Provider   ARIPiprazole ER (ABILIFY MAINTENA) 400 MG extended release inj syringe Inject 400 mg into the muscle every 28 days  5/29/18  Yes Aly Lane MD   cloZAPine (CLOZARIL) 100 MG tablet Take 1 tablet (100 mg) by mouth At Bedtime 5/30/19  Yes Sandeep Cancino MD   metFORMIN (GLUCOPHAGE-XR) 500 MG 24 hr tablet Take 500 mg by mouth At Bedtime   Yes Reported, Patient     LABORATORY DATA:   Recent Results (from the past 672 hour(s))   Drug abuse screen 6 urine (tox)    Collection Time: 07/10/20  5:56 PM   Result Value Ref Range    Amphetamine Qual Urine Negative NEG^Negative    Barbiturates Qual Urine Negative NEG^Negative     Benzodiazepine Qual Urine Negative NEG^Negative    Cannabinoids Qual Urine Negative NEG^Negative    Cocaine Qual Urine Negative NEG^Negative    Ethanol Qual Urine Negative NEG^Negative    Opiates Qualitative Urine Negative NEG^Negative   HCG qualitative urine (UPT)    Collection Time: 07/10/20  5:56 PM   Result Value Ref Range    HCG Qual Urine Negative NEG^Negative   Asymptomatic COVID-19 Virus (Coronavirus) by PCR    Collection Time: 07/10/20  5:57 PM    Specimen: Nasopharyngeal   Result Value Ref Range    COVID-19 Virus PCR to [facility] - Source Nasopharyngeal     COVID-19 Virus PCR to [facility] - Result       Test received-See reflex to IDDL test SARS CoV2 (COVID-19) Virus RT-PCR   SARS-CoV-2 COVID-19 Virus (Coronavirus) RT-PCR Nasopharyngeal    Collection Time: 07/10/20  5:57 PM    Specimen: Nasopharyngeal   Result Value Ref Range    SARS-CoV-2 Virus Specimen Source Nasopharyngeal     SARS-CoV-2 PCR Result NEGATIVE     SARS-CoV-2 PCR Comment       Testing was performed using the Xpert Xpress SARS-CoV-2 Assay on the Cepheid Gene-Xpert   Instrument Systems. Additional information about this Emergency Use Authorization (EUA)   assay can be found via the Lab Guide.     EKG 12-lead, tracing only    Collection Time: 07/10/20  6:18 PM   Result Value Ref Range    Interpretation ECG Click View Image link to view waveform and result    CBC with platelets differential    Collection Time: 07/10/20  9:10 PM   Result Value Ref Range    WBC 7.3 4.0 - 11.0 10e9/L    RBC Count 4.02 3.8 - 5.2 10e12/L    Hemoglobin 12.2 11.7 - 15.7 g/dL    Hematocrit 36.8 35.0 - 47.0 %    MCV 92 78 - 100 fl    MCH 30.3 26.5 - 33.0 pg    MCHC 33.2 31.5 - 36.5 g/dL    RDW 13.7 10.0 - 15.0 %    Platelet Count 308 150 - 450 10e9/L    Diff Method Automated Method     % Neutrophils 62.7 %    % Lymphocytes 29.6 %    % Monocytes 3.7 %    % Eosinophils 3.3 %    % Basophils 0.6 %    % Immature Granulocytes 0.1 %    Nucleated RBCs 0 0 /100    Absolute  Neutrophil 4.6 1.6 - 8.3 10e9/L    Absolute Lymphocytes 2.2 0.8 - 5.3 10e9/L    Absolute Monocytes 0.3 0.0 - 1.3 10e9/L    Absolute Eosinophils 0.2 0.0 - 0.7 10e9/L    Absolute Basophils 0.0 0.0 - 0.2 10e9/L    Abs Immature Granulocytes 0.0 0 - 0.4 10e9/L    Absolute Nucleated RBC 0.0    Comprehensive metabolic panel    Collection Time: 07/10/20  9:10 PM   Result Value Ref Range    Sodium 138 133 - 144 mmol/L    Potassium 3.6 3.4 - 5.3 mmol/L    Chloride 106 94 - 109 mmol/L    Carbon Dioxide 27 20 - 32 mmol/L    Anion Gap 5 3 - 14 mmol/L    Glucose 147 (H) 70 - 99 mg/dL    Urea Nitrogen 9 7 - 30 mg/dL    Creatinine 0.57 0.52 - 1.04 mg/dL    GFR Estimate >90 >60 mL/min/[1.73_m2]    GFR Estimate If Black >90 >60 mL/min/[1.73_m2]    Calcium 8.8 8.5 - 10.1 mg/dL    Bilirubin Total 0.3 0.2 - 1.3 mg/dL    Albumin 3.6 3.4 - 5.0 g/dL    Protein Total 8.1 6.8 - 8.8 g/dL    Alkaline Phosphatase 63 40 - 150 U/L    ALT 17 0 - 50 U/L    AST 17 0 - 45 U/L   Lipid panel reflex to direct LDL    Collection Time: 07/10/20  9:10 PM   Result Value Ref Range    Cholesterol 152 <200 mg/dL    Triglycerides 80 <150 mg/dL    HDL Cholesterol 59 >49 mg/dL    LDL Cholesterol Calculated 77 <100 mg/dL    Non HDL Cholesterol 93 <130 mg/dL   TSH    Collection Time: 07/10/20  9:10 PM   Result Value Ref Range    TSH 0.84 0.40 - 4.00 mU/L   Asymptomatic COVID-19 Virus (Coronavirus) by PCR    Collection Time: 07/17/20  6:14 PM    Specimen: Nasopharyngeal   Result Value Ref Range    COVID-19 Virus PCR to [facility] - Source Nasopharyngeal     COVID-19 Virus PCR to [facility] - Result       Test received-See reflex to IDDL test SARS CoV2 (COVID-19) Virus RT-PCR   SARS-CoV-2 COVID-19 Virus (Coronavirus) RT-PCR Nasopharyngeal    Collection Time: 07/17/20  6:14 PM    Specimen: Nasopharyngeal   Result Value Ref Range    SARS-CoV-2 Virus Specimen Source Nasopharyngeal     SARS-CoV-2 PCR Result NEGATIVE     SARS-CoV-2 PCR Comment       Testing was performed using  "the Xpert Xpress SARS-CoV-2 Assay on the Cepheid Gene-Xpert   Instrument Systems. Additional information about this Emergency Use Authorization (EUA)   assay can be found via the Lab Guide.     CBC with platelets differential    Collection Time: 07/18/20  7:42 AM   Result Value Ref Range    WBC 6.0 4.0 - 11.0 10e9/L    RBC Count 3.86 3.8 - 5.2 10e12/L    Hemoglobin 11.9 11.7 - 15.7 g/dL    Hematocrit 35.0 35.0 - 47.0 %    MCV 91 78 - 100 fl    MCH 30.8 26.5 - 33.0 pg    MCHC 34.0 31.5 - 36.5 g/dL    RDW 13.7 10.0 - 15.0 %    Platelet Count 231 150 - 450 10e9/L    Diff Method Automated Method     % Neutrophils 49.5 %    % Lymphocytes 32.8 %    % Monocytes 9.7 %    % Eosinophils 7.5 %    % Basophils 0.3 %    % Immature Granulocytes 0.2 %    Nucleated RBCs 0 0 /100    Absolute Neutrophil 3.0 1.6 - 8.3 10e9/L    Absolute Lymphocytes 2.0 0.8 - 5.3 10e9/L    Absolute Monocytes 0.6 0.0 - 1.3 10e9/L    Absolute Eosinophils 0.5 0.0 - 0.7 10e9/L    Absolute Basophils 0.0 0.0 - 0.2 10e9/L    Abs Immature Granulocytes 0.0 0 - 0.4 10e9/L    Absolute Nucleated RBC 0.0      PHYSICAL EXAMINATON:   Temp: 98.4  F (36.9  C) Temp src: Oral BP: 113/75 Pulse: 73   Resp: 16 SpO2: 100 % O2 Device: None (Room air)    5' 5\" 154 lbs 0 oz Body mass index is 25.63 kg/m .  MENTAL STATUS EXAM: The patient is a very pleasant, female, from [place] descent.  She speaks English very well.  She is crying on and off during the interview.  She appears quite sad.  Eye contact is good, mood is depressed, affect is mood congruent, speech is clear and coherent, psychomotor behavior is negative for agitation retardation, thought processes logical and goal oriented, no loose associations, thought content is positive for auditory and visual hallucinations as well as paranoia, negative for suicidal and homicidal ideation, insight and judgment are fair, she is oriented to self, date, place, situation, attention span and concentration are fair, recent remote " "memory are fair, she has no problems expressing herself, fund of knowledge is adequate for the level of education and training.      DIAGNOSIS:  1.  Schizoaffective disorder, depressed type, severe, with psychosis  2.  Postpartum psychosis, per history  PLAN AND RECOMMENDATIONS: The patient is a very pleasant female from [place] descent.  She was admitted with auditory visual hallucinations urging her to harm herself and her children.  Currently does not feel suicidal or homicidal.  She is very sad.  States she is depressed.  Discussed her medications.  She wants to take all of her medications at bedtime.  She is not sure if she needs an antidepressant.  A small dose of Prozac might be beneficial as she appears to be quite depressed. ECT might be helpful as well.  We will leave it to the primary team on Monday to discuss starting an antidepressant.  Blood work was reviewed.  In addition, will check vitamin D, B12, folate, heavy metal, RPR, and Lyme's disease.  The patient's goal for this hospitalization is to \"feel better\".  Medications will include the following: Restart Clazuril 100 mg at bedtime.  Restart Abilify Maintena, last injection on May 14, 2020, next injection 8/14/2020.  Additional medications will include hydroxyzine, trazodone, and Zyprexa.  The patient is under commitment and [place].  She was provisionally discharged 4 days ago.  This will likely be revoked.  Estimated length of stay 5 to 7 days.  Disposition, to home. The patient was consulted on nature of illness and treatment options. Care was coordinated with the treatment team.  Attestation: Patient has been seen and evaluated by darrius MACIEL, CNP  7/18/2020  11:57 AM  This note was created with the help of Dragon dictation system. All grammatical/typing errors or context distortion are unintentional and inherent to software.    "

## 2020-07-18 NOTE — PROGRESS NOTES
20 1936   Patient Belongings   Did you bring any home meds/supplements to the hospital?  No   Patient Belongings remains with patient;locker;sent to security per site process   Belongings Search Yes   Clothing Search Yes   Second Staff Mini     Remains with Patient:  3 Dresses  1 Sweater  2 Bras  1 Hijab  Black leggings    Locker:  Animal print purse  Black wallet containing: 3 [employer] insurance cards, 3 [employer] insurance cards, 2 [employer] cards  White iPhone with case  Phone   Tan face mask  Pink scarf  Black scarf  Toothpaste  Deodorant     Sent to Security:  1 Passport  1 [place]   4  [place] Licenses   1  [place]   Blue WIC card  White/red/yellow [employer] debit card ([id number])  Social Security card    A               Admission:  I am responsible for any personal items that are not sent to the safe or pharmacy.  [facility] is not responsible for loss, theft or damage of any property in my possession.    Signature:  _________________________________ Date: _______  Time: _____                                              Staff Signature:  ____________________________ Date: ________  Time: _____      2nd Staff person, if patient is unable/unwilling to sign:    Signature: ________________________________ Date: ________  Time: _____     Discharge:  [facility] has returned all of my personal belongings:    Signature: _________________________________ Date: ________  Time: _____                                          Staff Signature:  ____________________________ Date: ________  Time: _____

## 2020-07-18 NOTE — PLAN OF CARE
"S: Pt admitted to station 30 from [facility] , psychosis      B:  According to the admission notes, pt discharged from 32 3 days ago   Hx of schizoaffective w psychosis   Pt stopped taking her meds, came into hospital and was discharged  Pt reports the last 3 days she is throwing up her medications because they are laced w marijuana  AH to kill her kids while they are sleeping and then kill herself after  No intent to hurt her kids, SI w plan to jump off a bridge  VH seeing people with a knife, smelling bleach on the food  Pt reports sleep disturbance, missing meals   Pt reports no substance use    Admitted on the unit at 1930 and reported feeling \"still very worried. Pt reports having no value for life and reports having no much hope of things getting better and says, \"maybe 55% is all I trust things can get better.\" Pt asked writer whether she has to take meds and was educated on the importance of adhering to medication plan and was agreeable. Pt denies anxiety and depression and rates them at 0/10. Pt did say that the voices, \"are telling me bad things, to kill my kids, and that I need to die.\" Pt reported taking her prescribed medication last night. At HS pt refused her scheduled Metformin and Clozaril. Pt also has Labs scheduled for tomorrow and Utox and HCG stat lab and staff will follow up to their completion.       A: Continue to encourage medication compliance.          R: Pt looks forward to discussion concerns with provider.   "

## 2020-07-18 NOTE — PROGRESS NOTES
Pt spent several hours walking up and down the flores.She ate meals yet did not attend groups and she requested to shower. Pt did not interact with peers or staff voluntarily yet was polite when spoken to, displayed a flat and sad affect.     07/18/20 1400   Behavioral Health   Hallucinations appears responding   Thinking poor concentration   Orientation person: oriented;place: oriented;date: oriented   Memory baseline memory   Insight poor   Judgement impaired   Eye Contact at examiner   Affect sad;blunted, flat   Mood mood is calm   Physical Appearance/Attire attire appropriate to age and situation   Hygiene well groomed   1. Wish to be Dead (Recent) No   2. Non-Specific Active Suicidal Thoughts (Recent) No

## 2020-07-18 NOTE — PHARMACY
Pharmacy Clozapine Note    Date of Service: 2020  Patient's : 1980  40 year old, female    Current clozapine regimen: 100 mg at bedtime   Has there been a known interruption in therapy for greater than/equal to 48 hours? Yes    Recent ANC Value(s):  Recent Labs   Lab Test 07/10/20  2110 19  2338 19  0738 19  1219 19  0853 19  1601 19  1939   ANEU 4.6 3.4 2.5 2.6 2.2 3.5 3.0       Is the patient enrolled in the clozapine REMS program? Yes  Ordering prescriber: Dr. Gandhi  Is this provider certified in the clozapine REMS program? Yes  Is the ANC within recommended limits? Yes  Does the patient have any signs or symptoms of infection, including fever or sore throat? No    Plan:  1. Continue clozapine therapy at 100 mg PO.  2. A WBC with differential will be ordered at least weekly.  ANC values will be entered into the REMS program.  3. Signs/symptoms of infection will be monitored daily.    [facility]  Phone / Pager: [phone]

## 2020-07-18 NOTE — PHARMACY-ADMISSION MEDICATION HISTORY
Admission medication history interview status for the 7/17/2020 admission is complete. See Epic admission navigator for allergy information, pharmacy, prior to admission medications and immunization status.     Medication history interview sources:  7/14 Hospital discharge summary    Changes made to PTA medication list (reason)  Added: None  Deleted: None  Changed: None    Additional medication history information (including reliability of information, actions taken by pharmacist):  -Patient not interviewed due to current disorganized behaviors.   -Last known clozapine dose on 7/13 prior to hospital discharge. Per DEC assessment, patient has been intentionally making herself vomit since discharge to avoid taking her medications, as she believes they contain marijuana.  -Patient received Abilify Maintena injection prior to discharge on 7/14.       Prior to Admission medications    Medication Sig Last Dose Taking? Auth Provider   ARIPiprazole ER (ABILIFY MAINTENA) 400 MG extended release inj syringe Inject 400 mg into the muscle every 28 days  7/14/2020 at Unknown time Yes Aly Lane MD   cloZAPine (CLOZARIL) 100 MG tablet Take 1 tablet (100 mg) by mouth At Bedtime 7/13/2020 at pm Yes Sandeep Cancino MD   metFORMIN (GLUCOPHAGE-XR) 500 MG 24 hr tablet Take 500 mg by mouth At Bedtime Past Week at Unknown time Yes Reported, Patient           Medication history completed by: Jeovany Alcocer, ShandaD

## 2020-07-18 NOTE — PROGRESS NOTES
Initial Psychosocial Assessment    I have reviewed the chart, met with the patient, and developed Care Plan.  Information for assessment was obtained from patient and chart notes.     Presenting Problem:  Patient was admitted on a voluntary basis with auditory and visual hallucinations and suicidal ideation.  She was recently discharged from station 32, on 7/14/20.  She was advised by her ACT team to return to the hospital with her ongoing symptoms. Today patient indicates she is hearing voices.  She has informed RN of this and was wearing headphones for music to help with this.    History of Mental Health and Chemical Dependency:  Patient has a history of schizoaffective disorder, bipolar 1 with psychotic features and depression.  Multiple prior admissions including 4 at [facility] in 2019 and recent admission in 2020.  One prior suicide attempt.  Current commitment with Reyes.    Family Description (Constellation, Family Psychiatric History):  Patient I  with 6 children.  Brother with likely mental illness.  One child with autism.    Significant Life Events (Illness, Abuse, Trauma, Death):  Patient survived war in [place].    Living Situation:  Patient lives with her mother-in-law, sister-in-law and patient's 6 children.    Educational Background:  Patient completed 5th grade    Occupational History:  Patient is not employed.    Financial Status:  Family supports.    Legal Issues:  Current commitment and Reyes.    Ethnic/Cultural Issues:  Afghan     Spiritual Orientation:  Moravian      Service History:  None     Social Functioning (organization, interests):  Unable to assess    Current Treatment Providers are:  [facility] ACT Team Johanne Seymour RN and Vicky Harrington  [phone]; [phone] fax  PCP is Dr. Carmela De Los Santos at [facility] [phone].    Social Service Assessment/Plan:  Patient will be seen by the on-call psychiatric provider.  Medications will be evaluated and  adjusted as appropriate. Care will be coordinated with ACT team.  Patient will meet with the treatment team on Monday to further coordinate plan of care. CTC available to assist as needed to ensure appropriate aftercare is in place prior to discharge.

## 2020-07-18 NOTE — PROGRESS NOTES
Patient reports command hallucination telling her to hurt herself. She paced the flores much of the shift. PRN offered but she refused it. Patient reported feeling safe in the unit. Staff will continue to monitor.

## 2020-07-19 LAB
ANION GAP SERPL CALCULATED.3IONS-SCNC: 9 MMOL/L (ref 3–14)
BUN SERPL-MCNC: 13 MG/DL (ref 7–30)
CALCIUM SERPL-MCNC: 8.5 MG/DL (ref 8.5–10.1)
CHLORIDE SERPL-SCNC: 107 MMOL/L (ref 94–109)
CO2 SERPL-SCNC: 25 MMOL/L (ref 20–32)
CREAT SERPL-MCNC: 0.51 MG/DL (ref 0.52–1.04)
FOLATE SERPL-MCNC: 16.4 NG/ML
GFR SERPL CREATININE-BSD FRML MDRD: >90 ML/MIN/{1.73_M2}
GLUCOSE SERPL-MCNC: 81 MG/DL (ref 70–99)
POTASSIUM SERPL-SCNC: 4.2 MMOL/L (ref 3.4–5.3)
SODIUM SERPL-SCNC: 141 MMOL/L (ref 133–144)
T PALLIDUM AB SER QL: REACTIVE
VIT B12 SERPL-MCNC: 410 PG/ML (ref 193–986)

## 2020-07-19 PROCEDURE — 82607 VITAMIN B-12: CPT | Performed by: NURSE PRACTITIONER

## 2020-07-19 PROCEDURE — 83655 ASSAY OF LEAD: CPT | Performed by: NURSE PRACTITIONER

## 2020-07-19 PROCEDURE — 82306 VITAMIN D 25 HYDROXY: CPT | Performed by: NURSE PRACTITIONER

## 2020-07-19 PROCEDURE — 12400001 ZZH R&B MH UMMC

## 2020-07-19 PROCEDURE — 86780 TREPONEMA PALLIDUM: CPT | Performed by: NURSE PRACTITIONER

## 2020-07-19 PROCEDURE — 36415 COLL VENOUS BLD VENIPUNCTURE: CPT | Performed by: NURSE PRACTITIONER

## 2020-07-19 PROCEDURE — 86592 SYPHILIS TEST NON-TREP QUAL: CPT | Performed by: NURSE PRACTITIONER

## 2020-07-19 PROCEDURE — 25800030 ZZH RX IP 258 OP 636: Performed by: PHYSICIAN ASSISTANT

## 2020-07-19 PROCEDURE — 86618 LYME DISEASE ANTIBODY: CPT | Performed by: NURSE PRACTITIONER

## 2020-07-19 PROCEDURE — 82175 ASSAY OF ARSENIC: CPT | Performed by: NURSE PRACTITIONER

## 2020-07-19 PROCEDURE — 82746 ASSAY OF FOLIC ACID SERUM: CPT | Performed by: NURSE PRACTITIONER

## 2020-07-19 PROCEDURE — 83825 ASSAY OF MERCURY: CPT | Performed by: NURSE PRACTITIONER

## 2020-07-19 PROCEDURE — 80048 BASIC METABOLIC PNL TOTAL CA: CPT | Performed by: PSYCHIATRY & NEUROLOGY

## 2020-07-19 RX ORDER — LIDOCAINE 40 MG/G
CREAM TOPICAL
Status: DISCONTINUED | OUTPATIENT
Start: 2020-07-19 | End: 2020-07-21

## 2020-07-19 RX ORDER — LIDOCAINE 40 MG/G
CREAM TOPICAL
Status: DISCONTINUED | OUTPATIENT
Start: 2020-07-19 | End: 2020-07-23 | Stop reason: HOSPADM

## 2020-07-19 RX ADMIN — SODIUM CHLORIDE, POTASSIUM CHLORIDE, SODIUM LACTATE AND CALCIUM CHLORIDE 1000 ML: 600; 310; 30; 20 INJECTION, SOLUTION INTRAVENOUS at 13:42

## 2020-07-19 ASSESSMENT — ACTIVITIES OF DAILY LIVING (ADL)
DRESS: INDEPENDENT;STREET CLOTHES
ORAL_HYGIENE: INDEPENDENT
LAUNDRY: WITH SUPERVISION
ORAL_HYGIENE: INDEPENDENT
HYGIENE/GROOMING: INDEPENDENT;HANDWASHING
LAUNDRY: WITH SUPERVISION
DRESS: INDEPENDENT
HYGIENE/GROOMING: INDEPENDENT

## 2020-07-19 NOTE — PROGRESS NOTES
"48 hour assessment:    Pt is observed pacing the halls this morning. She states she ate breakfast and is drinking fluids. Pt reports dizziness and staff report noticing pt slightly unbalanced when standing still. Writer assessed orthostatic VS and note orthostatic hypotension with systolic drop from 109 to 89. Pulse increases from 96 to 134 on standing. Writer observed pt drink about 16 oz of water at this time per writer's request. Pt educated on safe ambulation. Will reassess VS after lunch or if condition changes. Pt states she believes her PM clozaril makes her dizzy.     At lunch time pt is lying in bed resting, awake. Pt reports still very dizzy and no appetite. Pt continues to endorse AH \"that I need to kill myself when I leave the hospital\" and that the severity of these hallucinations has stayed the same. Pt states she has no plans or intent to harm herself and that she is safe here. Pt states she does not want to die. VS reassessed, see flow sheet. Pt remains hypotensive and tachycardic. Pt could not stand for more than 1 minute. IM paged, 1L IV LR ordered and started at 1345 to run for 2 hours.     Pt reports feeling \"very worried\" about her kids, though they are safe at home with family. Pt also states she is \"very, very sad\" that she can not be at home and that she is sick. Pt reports paranoid and delusional thinking, stating \"I think the pills have marijuana in them\". Pt shows no understanding of delusional thinking as part of mental health presentation.     During interactions pt is calm, cooperative and pleasant. She speaks softly and content is linear and coherent. Pt spent much of the shift resting in her room.     No other concerns at this time. Nursing will continue to monitor and assess.     "

## 2020-07-19 NOTE — PROGRESS NOTES
Pt reports voices are telling her to harm herself. She reports she feels safe. She reports the voices are strong. Pt isolative to room - pt ate dinner.  Pt affect is flat.     07/18/20 2046   Behavioral Health   Hallucinations auditory   Thinking distractable   Orientation person: oriented   Memory baseline memory   Insight poor   Judgement impaired   Eye Contact at examiner   Affect blunted, flat;sad   Mood depressed   Suicidality other (see comments)  (pt denies)   Self Injury other (see comment)  (pt denies)   Activity isolative   Activities of Daily Living   Hygiene/Grooming independent   Oral Hygiene independent   Dress independent   Room Organization independent

## 2020-07-19 NOTE — CONSULTS
Consult Date:  07/19/2020      HISTORY OF PRESENT ILLNESS:  The patient is a 40-year-old female with reported history of schizoaffective disorder, admitted to station 30 yesterday for active hallucinations to kill her children and then kill herself.  An Internal Medicine consultation was ordered by Dr. Gandhi to assess medical problems including orthostatic hypotension and lightheadedness.  At this time, Nona is observed lying in bed supine in no acute distress and currently denies acute physical concerns including lightheadedness.  Within the past few hours, however, patient has developed significant lightheadedness when arising to a stand from a sitting position to the point where she was not able to maintain her balance for up to 1 minute before needing to lie down again.  States this came on last night after taking her Clozaril dose.  Denies rain fainting or falling.  Denies associated symptoms including fever, chills, chest pain, shortness of breath, abdominal pain, nausea, bowel or bladder concerns.  Denies other concerns at this time.      PAST MEDICAL HISTORY:     1.  Schizoaffective disorder and other psychiatric history per Dr. Gandhi.   2.  Denies history of major medical problems and surgeries including cardiopulmonary disease, hypertension and diabetes.      ADMISSION MEDICATIONS:  Reviewed and listed in the medication reconciliation list.      ALLERGIES:  NO KNOWN DRUG ALLERGIES.      SOCIAL HISTORY:   with 6 children and lives in [place].      FAMILY HISTORY:  Reviewed and noncontributory.      REVIEW OF SYSTEMS:  Ten-point review of systems negative except as stated above in history of present illness.      PHYSICAL EXAMINATION:   GENERAL:  Otherwise healthy-appearing woman in no acute distress.   VITAL SIGNS:  Most recently, systolic blood pressure went from 109 sitting to 89 standing, and diastolic with her heart rate increased from 96 sitting to 134 on standing.  Temperature is  afebrile, oxygen saturation normal on room air.   HEENT:  Negative.   NECK:  Supple.  No cervical lymphadenopathy or thyromegaly.   LUNGS:  Clear.   CARDIOVASCULAR:  Regular rate and rhythm.   ABDOMEN:  Soft, nontender.   EXTREMITIES:  No edema.   SKIN:  No rash on exposed areas.   NEUROLOGIC:  She is awake, alert and oriented x 3.  Motor strength is symmetric.  She is not tremulous.      LABORATORY DATA:  From today, a CBC with differential.  Folate and vitamin B12 level normal.  COVID-19 testing negative.      ASSESSMENT:   1.  Psychosis per Dr. Gandhi.   2.  Acute lightheadedness, most likely secondary to orthostatic hypotension from being intravascularly dry and exacerbated by side effect of Clozaril.   3.  Otherwise, overall apparent normally good physical health.      PLAN:  Will give patient a bolus of Lactaid Ringer's IV over 2 hours.  Will also obtain a routine basic metabolic panel to ensure her kidney function and electrolytes are normal.  Continue to encourage patient to increase her p.o. fluid intake.  If this persists, would recommend decreasing the Clozaril dose or discontinuing and changing to another antipsychotic.  Please encourage patient to wait a few seconds when arising to a stand from a sitting or lying position to ensure that the lightheadedness does not recur.  If it does, then continue to have her lying down.  Medicine will continue to follow.  Please feel free to call with questions.      Thank you for this consultation.         JANINE SANDOVAL PA-C             D: 2020   T: 2020   MT:       Name:     BHAVIK CHRIS   MRN:      [id number]        Account:       [id number]   :      1980           Consult Date:  2020      Document: [id number]       cc: Sita Gandhi MD

## 2020-07-20 LAB
B BURGDOR IGG+IGM SER QL: 0.27 (ref 0–0.89)
DEPRECATED CALCIDIOL+CALCIFEROL SERPL-MC: 17 UG/L (ref 20–75)
RPR SER QL: NONREACTIVE

## 2020-07-20 PROCEDURE — 25000132 ZZH RX MED GY IP 250 OP 250 PS 637: Performed by: PSYCHIATRY & NEUROLOGY

## 2020-07-20 PROCEDURE — 99232 SBSQ HOSP IP/OBS MODERATE 35: CPT | Mod: 95 | Performed by: PSYCHIATRY & NEUROLOGY

## 2020-07-20 PROCEDURE — 12400001 ZZH R&B MH UMMC

## 2020-07-20 PROCEDURE — 25000132 ZZH RX MED GY IP 250 OP 250 PS 637: Performed by: PHYSICIAN ASSISTANT

## 2020-07-20 RX ORDER — VITAMIN B COMPLEX
25 TABLET ORAL DAILY
Status: DISCONTINUED | OUTPATIENT
Start: 2020-07-20 | End: 2020-07-23 | Stop reason: HOSPADM

## 2020-07-20 RX ORDER — BENZTROPINE MESYLATE 1 MG/ML
1 INJECTION, SOLUTION INTRAMUSCULAR; INTRAVENOUS EVERY 4 HOURS PRN
Status: DISCONTINUED | OUTPATIENT
Start: 2020-07-20 | End: 2020-07-23 | Stop reason: HOSPADM

## 2020-07-20 RX ORDER — HALOPERIDOL 5 MG/ML
5 INJECTION INTRAMUSCULAR AT BEDTIME
Status: DISCONTINUED | OUTPATIENT
Start: 2020-07-20 | End: 2020-07-23 | Stop reason: HOSPADM

## 2020-07-20 RX ORDER — ARIPIPRAZOLE 5 MG/1
5 TABLET ORAL DAILY PRN
Status: DISCONTINUED | OUTPATIENT
Start: 2020-07-20 | End: 2020-07-23 | Stop reason: HOSPADM

## 2020-07-20 RX ORDER — CLOZAPINE 25 MG/1
75 TABLET ORAL AT BEDTIME
Status: DISCONTINUED | OUTPATIENT
Start: 2020-07-20 | End: 2020-07-23 | Stop reason: HOSPADM

## 2020-07-20 RX ORDER — BENZTROPINE MESYLATE 1 MG/1
1 TABLET ORAL EVERY 4 HOURS PRN
Status: DISCONTINUED | OUTPATIENT
Start: 2020-07-20 | End: 2020-07-23 | Stop reason: HOSPADM

## 2020-07-20 RX ADMIN — OLANZAPINE 10 MG: 10 TABLET, FILM COATED ORAL at 11:04

## 2020-07-20 RX ADMIN — HYDROXYZINE HYDROCHLORIDE 25 MG: 25 TABLET, FILM COATED ORAL at 21:23

## 2020-07-20 RX ADMIN — CLOZAPINE 75 MG: 25 TABLET ORAL at 20:21

## 2020-07-20 RX ADMIN — MELATONIN 25 MCG: at 18:09

## 2020-07-20 RX ADMIN — METFORMIN HYDROCHLORIDE 500 MG: 500 TABLET, EXTENDED RELEASE ORAL at 20:21

## 2020-07-20 ASSESSMENT — ACTIVITIES OF DAILY LIVING (ADL)
DRESS: INDEPENDENT
ORAL_HYGIENE: INDEPENDENT
HYGIENE/GROOMING: HANDWASHING;INDEPENDENT

## 2020-07-20 NOTE — PROGRESS NOTES
"Nona had an unremarkable shift. She spent the majority of the shift on the periphery of the common areas, not social or engaged with peers, and slowly pacing the hallway at times. Upon check-in, Nona continues to endorse auditory hallucinations. She reports that many intrusive, negative voices are telling her to \"kill my children when I leave the hospital\" and yell loudly that she is \"worthless\". She continues to be paranoid and delusional, believing that the medications here are laced with marijuana. She also believes the food is poisoned but forces herself to eat. She continues to decline PRN medications to help with her hallucinations, although she admits these meds have been helpful at quieting them in the past. She denied any dizziness or weakness after her IV fluids. Nona reports feeling sad, anxious, and worried about the future and for her children. She also endorsed visual hallucinations stating that she sees \"scary faces\" and \"people holding knives\" that often keep her awake at night. She currently denies SI and SIB. ADL's are independent, however poor and neglected. She was encouraged to shower, however declined. Nona ate roughly 50% of her dinner this but later ate snacks and drank fluids. Nona reports poor sleep due to AH/VH that keep her awake. She reports improved appetite. She would like to speak with her treatment team about discharge planning, stating she is scared she will be sent to [place]. Nona reports having an ACT team outside of the hospital which is very beneficial and helpful. No further concerns at this time.      07/19/20 2109   Behavioral Health   Hallucinations auditory   Thinking distractable;paranoid;poor concentration   Orientation person: oriented;place: oriented   Memory baseline memory   Insight poor   Judgement impaired   Eye Contact at examiner   Affect blunted, flat;tense;other (see comments)  (Constricted)   Mood anxious;depressed   Physical Appearance/Attire " multiple layers   Hygiene neglected grooming - unclean body, hair, teeth   Suicidality other (see comments)  (Patient denies)   1. Wish to be Dead (Recent) No   2. Non-Specific Active Suicidal Thoughts (Recent) No   Self Injury other (see comment)  (Nothing stated or observed)   Elopement   (No indicators this shift / Pt does not appear to be a risk)   Activity withdrawn;other (see comment)  (Visible on outskirts of common areas, not social)   Speech clear;coherent   Medication Sensitivity no observed side effects   Psychomotor / Gait balanced;steady;slow;paces   Psycho Education   Type of Intervention 1:1 intervention   Response participates with encouragement   Hours 0.5   Treatment Detail Check-In   Activities of Daily Living   Hygiene/Grooming independent;handwashing   Oral Hygiene independent   Dress independent;street clothes   Laundry with supervision   Room Organization independent

## 2020-07-20 NOTE — PROGRESS NOTES
[facility], [place]   Psychiatric Progress Note  Hospital Day: 3  Telemedicine Visit: The patient's condition can be safely assessed and treated via synchronous audio and visual telemedicine encounter.      Start Time: 0958  Stop Time: 1008    Reason for Telemedicine Visit: Covid-19    Originating Site (Patient Location): [facility] 30    Distant Site (Provider Location): Provider Remote Setting    Consent:  The patient/guardian has verbally consented to: the potential risks and benefits of telemedicine (video visit) versus in person care; bill my insurance or make self-payment for services provided; and responsibility for payment of non-covered services.     Mode of Communication:  Video Conference via [employer]    As the provider I attest to compliance with applicable laws and regulations related to telemedicine.           Interim History:   The patient's care was discussed with the treatment team during the daily team meeting and/or staff's chart notes were reviewed.  Staff report patient was visible in the milieu but isolated to self, did not interact with peers, continues to report CAH telling her to harm children, denies SI. Reporting ongoing paranoia about food and medications but has been eating okay and taking medications as prescribed. No acute events overnight.     Upon interview, the patient reports that she has not been feeling well since admission, reports she is feeling paranoid of the food and medications. She has not eaten much today, reports she did eat last night, offered Kosher/Sealed meals but she does not believe this will help. She was agreeable to taking some PRN medication for AH and paranoia following interview. She reports clozapine makes her too sedated, discussed decreasing dose to 75mg and increase slowly given orthostatiuc changes and sedation, she understands this will be linked to IM medication per her Reyes if she declines. She  reports her AH tell her to harm her children and this is extremely upsetting to her as she has not plan/intent to do this. She states she is feeling weak due to lack of oral intake, has been drinking water, declined Boost and other nutritional supplements. She did request to have PRN Abilify available as she finds this medication helpful and has been on the MORENO for many months. No additional concerns at this time.            Medications:       [START ON 8/11/2020] ARIPiprazole ER  400 mg Intramuscular Q28 Days     cloZAPine  100 mg Oral At Bedtime     metFORMIN  500 mg Oral At Bedtime     sodium chloride (PF)  3 mL Intracatheter Q8H          Allergies:   No Known Allergies       Labs:     Recent Results (from the past 48 hour(s))   CBC with platelets differential    Collection Time: 07/18/20  7:42 AM   Result Value Ref Range    WBC 6.0 4.0 - 11.0 10e9/L    RBC Count 3.86 3.8 - 5.2 10e12/L    Hemoglobin 11.9 11.7 - 15.7 g/dL    Hematocrit 35.0 35.0 - 47.0 %    MCV 91 78 - 100 fl    MCH 30.8 26.5 - 33.0 pg    MCHC 34.0 31.5 - 36.5 g/dL    RDW 13.7 10.0 - 15.0 %    Platelet Count 231 150 - 450 10e9/L    Diff Method Automated Method     % Neutrophils 49.5 %    % Lymphocytes 32.8 %    % Monocytes 9.7 %    % Eosinophils 7.5 %    % Basophils 0.3 %    % Immature Granulocytes 0.2 %    Nucleated RBCs 0 0 /100    Absolute Neutrophil 3.0 1.6 - 8.3 10e9/L    Absolute Lymphocytes 2.0 0.8 - 5.3 10e9/L    Absolute Monocytes 0.6 0.0 - 1.3 10e9/L    Absolute Eosinophils 0.5 0.0 - 0.7 10e9/L    Absolute Basophils 0.0 0.0 - 0.2 10e9/L    Abs Immature Granulocytes 0.0 0 - 0.4 10e9/L    Absolute Nucleated RBC 0.0    Basic metabolic panel    Collection Time: 07/19/20  7:37 AM   Result Value Ref Range    Sodium 141 133 - 144 mmol/L    Potassium 4.2 3.4 - 5.3 mmol/L    Chloride 107 94 - 109 mmol/L    Carbon Dioxide 25 20 - 32 mmol/L    Anion Gap 9 3 - 14 mmol/L    Glucose 81 70 - 99 mg/dL    Urea Nitrogen 13 7 - 30 mg/dL    Creatinine 0.51  "(L) 0.52 - 1.04 mg/dL    GFR Estimate >90 >60 mL/min/[1.73_m2]    GFR Estimate If Black >90 >60 mL/min/[1.73_m2]    Calcium 8.5 8.5 - 10.1 mg/dL   Vitamin B12    Collection Time: 07/19/20  7:38 AM   Result Value Ref Range    Vitamin B12 410 193 - 986 pg/mL   Folate    Collection Time: 07/19/20  7:38 AM   Result Value Ref Range    Folate 16.4 >5.4 ng/mL   Treponema Abs w Reflex to RPR and Titer    Collection Time: 07/19/20  7:38 AM   Result Value Ref Range    Treponema Antibodies Reactive (A) NR^Nonreactive          Psychiatric Examination:     /74 (BP Location: Left arm)   Pulse 79   Temp 97.6  F (36.4  C) (Oral)   Resp 16   Ht 1.651 m (5' 5\")   Wt 69.9 kg (154 lb)   LMP 07/01/2020   SpO2 100%   BMI 25.63 kg/m    Weight is 154 lbs 0 oz  Body mass index is 25.63 kg/m .    Orthostatic Vitals       Most Recent      Lying Orthostatic BP 92/60 07/19 1100    Lying Orthostatic Pulse (bpm) 89 07/19 1100    Sitting Orthostatic BP 96/66 07/19 1936    Sitting Orthostatic Pulse (bpm) 80 07/19 1936    Standing Orthostatic BP 88/60 07/19 1936    Standing Orthostatic Pulse (bpm) 109 07/19 1936        Appearance: awake, alert and adequately groomed  Attitude:  cooperative, guarded somewhat  Eye Contact:  fair  Mood:  anxious  Affect:  mood congruent and tearful at times  Speech:  clear, coherent and normal prosody  Language: fluent and intact in English  Psychomotor, Gait, Musculoskeletal:  no evidence of tardive dyskinesia, dystonia, or tics  Thought Process:  linear and goal oriented  Associations:  no loose associations  Thought Content:  no evidence of suicidal ideation or homicidal ideation and does have CAH telling her to harm children, these are egodystonic, paranoia around medications and food persists  Insight:  limited  Judgement:  limited  Oriented to:  time, person, and place  Attention Span and Concentration:  intact  Recent and Remote Memory:  intact  Fund of Knowledge:  appropriate    Clinical Global " Impressions  First: 7     Most recent: 4                Precautions:     Behavioral Orders   Procedures     Code 1 - Restrict to Unit     Routine Programming     As clinically indicated     Status 15     Every 15 minutes.     Suicide precautions     Patients on Suicide Precautions should have a Combination Diet ordered that includes a Diet selection(s) AND a Behavioral Tray selection for Safe Tray - with utensils, or Safe Tray - NO utensils            Diagnoses:      Schizoaffective disorder, depressed type, severe with active psychosis symptoms  History of post partum psychosis         Assessment & Plan:   Assessment and hospital summary:  39 yo female with history of schizoaffective disorder who presented to the ED with reports of thoughts to harm self and children and ongoing AH in context of recent discharge from [facility] on 7/14 and medication non-adherence. Pt was restarted on PTA medications. She is under commitment and Reyes but was admitted as a voluntary patient. She is followed by ACT team. Psychosis work up labs ordered by admitting provider, some results still pending. Pt reported symptomatic hypotension, IM consult placed and she was given IVF with improvement in symptoms.     Psychiatric treatment/inteventions:  Medications:   -continue PTA clozapine at reduced dose of 75mg at bedtime, linked with IM Haldol per Reyes  -continue PTA Abilify Maintena, last received injection July 14 2020  -start Abilify 5mg daily PRN psychosis as patient requests this for PRN medication     Laboratory/Imaging: no new labs per psychiatry, continue CBC for clozapine monitoring weekly on Fridays     Patient will be treated in therapeutic milieu with appropriate individual and group therapies as described.     Medical treatment/interventions:  Medical concerns:  1) orthostatic hypotension:  IM consult placed on admission 2/2 pt reporting symptomatic orthostatic hypotension, see consult note dated 7/19, agree with IM's  assessment and plan.    2) Treponema antibody resulted as reactive but RPR non-reactive indicating past infection, IM ordering additional test to confirm      This note was created by undersigned using a Dragon dictation system. All typing errors or contextual distortion are unintentional and software inherent.     Disposition Plan   Reason for ongoing admission: is unable to care for self due to severe psychosis or david  Discharge location: TBD  Discharge Medications: not ordered  Follow-up Appointments: not scheduled  Legal Status: voluntary (on provisional discharge not revoked)  Entered by: Alison Gallardo on 7/20/2020 at 6:45 AM

## 2020-07-20 NOTE — PROGRESS NOTES
"Brief medicine note:     Followed up on patients orthostatic blood pressure and BMP.     Today's vital signs, medications and nursing notes were reviewed.     /74 (BP Location: Left arm)   Pulse 79   Temp 98  F (36.7  C)   Resp 16   Ht 1.651 m (5' 5\")   Wt 69.9 kg (154 lb)   LMP 07/01/2020   SpO2 100%   BMI 25.63 kg/m      ROUTINE IP LABS (Last four results)  Recent Labs   Lab 07/19/20  0737      POTASSIUM 4.2   CHLORIDE 107   CO2 25   ANIONGAP 9   GLC 81   BUN 13   CR 0.51*   BRIANNA 8.5        Recent Labs   Lab 07/18/20  0742   WBC 6.0   RBC 3.86   HGB 11.9   HCT 35.0   MCV 91   MCH 30.8   MCHC 34.0   RDW 13.7               Orthostasis   Received 1L LR yesterday due to patient being intravascularly dry. Improving. Orthostatic blood pressures this AM with sitting 113/76, standing with appropriate response 118/80. BMP unremarkable. CR wnl.   - Continue to monitor, if persistent would recommend decreasing Clozaril dose or discontinue and change to another antipsychotic  - Encourage patient to increase PO fluid intake     Positive treponema ab   RPR negative. Treponema pallidum antibody confirmation test in process. Per protocol, \"If the Anti-Treponema Antibody is positive and the RPR is negative, then a   second Treponema specific test (TP-PA) will be performed to determine whether   the antibody test is falsely positive or is detecting early infection.  If the latter is suspected, repeat testing in approximately two weeks is recommended.\"    - Follow up confirmation ab testing     Vitamin D Deficiency   Vitamin D screen 17.   - Initiate Vitamin D3 1000 units daily   - Repeat Vitamin D level in 3 months with PCP     Medicine will continue to follow pending treponema pallidum antibody confirmation test, please call with additional questions.     Liseth Salazar PA-C  Internal Medicine [facility] Hospitalist   [phone]    "

## 2020-07-20 NOTE — PLAN OF CARE
BEHAVIORAL TEAM DISCUSSION    Participants: Alison Gallardo MD; Janelle Davidson RN; Maryann PHILLIPS  Progress: minimal   Anticipated length of stay: TBD  Continued Stay Criteria/Rationale: Patient is newly admitted with continued symptoms of psychosis, after recent hospital discharge.  Medical/Physical: no acute medical issues  Precautions:   Behavioral Orders   Procedures     Code 1 - Restrict to Unit     Routine Programming     As clinically indicated     Status 15     Every 15 minutes.     Suicide precautions     Patients on Suicide Precautions should have a Combination Diet ordered that includes a Diet selection(s) AND a Behavioral Tray selection for Safe Tray - with utensils, or Safe Tray - NO utensils       Plan: Continued hospitalization with likely medication changes.  Coordinate care with patient's ACT team.  Rationale for change in precautions or plan: initial plan.

## 2020-07-20 NOTE — PROVIDER NOTIFICATION
"   07/20/20 1354   Sitting Orthostatic BP   Sitting Orthostatic BP 94/63   Sitting Orthostatic Pulse 98 bpm     While pacing the flores pt complained of feeling dizzy.  Staff asked pt to sit and pt was given water to drink.  Pt reports she does not feel dizzy when sitting or lying in bed.  \"This happens only when I am walking\".  Pt reports eating lunch and has been drinking fluids.  Pt is currently sitting in the lounge.  Will continue to encourage pt to drink fluids.  Will continue to monitor pt closely.    "

## 2020-07-20 NOTE — PLAN OF CARE
Problem: Adult Behavioral Health Plan of Care  Goal: Patient-Specific Goal (Individualization)  Flowsheets (Taken 7/20/2020 5212)  Patient Personal Strengths:   community support   expressive of emotions   expressive of needs   stable living environment   motivated for treatment   motivated for recovery  Patient Vulnerabilities: poor medication compliance, limited insight.    Patient unable to complete Personal Plan of Care at this time due to level of illness.

## 2020-07-21 LAB — T PALLIDUM AB SER QL AGGL: NON REACTIVE

## 2020-07-21 PROCEDURE — 25000125 ZZHC RX 250: Performed by: PSYCHIATRY & NEUROLOGY

## 2020-07-21 PROCEDURE — 12400001 ZZH R&B MH UMMC

## 2020-07-21 PROCEDURE — 25000132 ZZH RX MED GY IP 250 OP 250 PS 637: Performed by: PSYCHIATRY & NEUROLOGY

## 2020-07-21 PROCEDURE — 25000132 ZZH RX MED GY IP 250 OP 250 PS 637: Performed by: PHYSICIAN ASSISTANT

## 2020-07-21 PROCEDURE — 99232 SBSQ HOSP IP/OBS MODERATE 35: CPT | Mod: 95 | Performed by: PSYCHIATRY & NEUROLOGY

## 2020-07-21 RX ORDER — HALOPERIDOL 5 MG/1
5 TABLET ORAL EVERY 6 HOURS PRN
Status: DISCONTINUED | OUTPATIENT
Start: 2020-07-21 | End: 2020-07-23 | Stop reason: HOSPADM

## 2020-07-21 RX ORDER — ATROPINE SULFATE 10 MG/ML
2 SOLUTION/ DROPS OPHTHALMIC
Status: DISCONTINUED | OUTPATIENT
Start: 2020-07-21 | End: 2020-07-23 | Stop reason: HOSPADM

## 2020-07-21 RX ORDER — HALOPERIDOL 5 MG/ML
5 INJECTION INTRAMUSCULAR EVERY 6 HOURS PRN
Status: DISCONTINUED | OUTPATIENT
Start: 2020-07-21 | End: 2020-07-23 | Stop reason: HOSPADM

## 2020-07-21 RX ADMIN — HYDROXYZINE HYDROCHLORIDE 25 MG: 25 TABLET, FILM COATED ORAL at 20:08

## 2020-07-21 RX ADMIN — METFORMIN HYDROCHLORIDE 500 MG: 500 TABLET, EXTENDED RELEASE ORAL at 20:08

## 2020-07-21 RX ADMIN — MELATONIN 25 MCG: at 08:46

## 2020-07-21 RX ADMIN — ATROPINE SULFATE 2 DROP: 10 SOLUTION/ DROPS OPHTHALMIC at 21:17

## 2020-07-21 RX ADMIN — CLOZAPINE 75 MG: 25 TABLET ORAL at 20:08

## 2020-07-21 ASSESSMENT — ACTIVITIES OF DAILY LIVING (ADL)
ORAL_HYGIENE: INDEPENDENT
LAUNDRY: WITH SUPERVISION
DRESS: INDEPENDENT;STREET CLOTHES
HYGIENE/GROOMING: INDEPENDENT

## 2020-07-21 ASSESSMENT — MIFFLIN-ST. JEOR: SCORE: 1409.32

## 2020-07-21 NOTE — PROGRESS NOTES
Patient participated in some group involving movement and stress reduction. She reported feeling better but was not in group long enough for group charge.

## 2020-07-21 NOTE — PROGRESS NOTES
Work Completed:   Chart review  Team meeting  Copy of current recommitment order has been received, copy placed in chart and copy sent for scanning.  [facility] left message for ACT team to alert them to upcoming discharge. [phone]    Discharge plan or goal: Patient will return home with ACT support when stable, possibly Wednesday or Thursday of this week.                Barriers to discharge: Ongoing treatment for mental health symptoms.

## 2020-07-21 NOTE — PROGRESS NOTES
"Nona had a great evening, she was in the milieu for majority of the shift watching movie with peers. He was very pleasant upon approach, she was clam and cooperative. Nona stated that \"I felt better and did not take my med and that is the reason why I am here\" she did stated that she will take her med now. Nona also stated that she wants to be discharge tomorrow because she feels ready and I am feeling much better. Nona steven all mental health symptoms including SI/SIB/HI and hallucination. Nona is ADL\"s independent and had all her meal.       07/20/20 2105   Behavioral Health   Hallucinations denies / not responding to hallucinations   Thinking intact   Orientation time: oriented;date: oriented;place: oriented;person: oriented   Memory baseline memory   Insight insight appropriate to situation;insight appropriate to events   Judgement impaired   Eye Contact at examiner   Affect full range affect   Mood mood is calm   Physical Appearance/Attire attire appropriate to age and situation   Hygiene well groomed   1. Wish to be Dead (Recent) No   2. Non-Specific Active Suicidal Thoughts (Recent) No   Speech clear   Psycho Education   Type of Intervention 1:1 intervention   Response participates, initiates socially appropriate   Hours 0.5   Activities of Daily Living   Hygiene/Grooming handwashing;independent   Oral Hygiene independent   Dress independent   Room Organization independent     "

## 2020-07-21 NOTE — PROGRESS NOTES
Encouraged oral intake of fluids this evening. Pt stated she was able to drink 4 cups of water this shift. Denied feeling dizzy or lightheaded.  Blood pressure at HS was 97/69.    Pt reported drooling while sleeping because of taking clozaril.

## 2020-07-21 NOTE — PLAN OF CARE
Pt has been visible in the milieu, spent time pacing the flores.  Pt denies auditory hallucinations today.  Pt denies all mental health symptoms.  Pt ate all her meals and is drinking fluids.  No c/o of feeling dizzy.  Will continue to assess.

## 2020-07-21 NOTE — PROGRESS NOTES
[facility]   Psychiatric Progress Note  Hospital Day: 4  Telemedicine Visit: The patient's condition can be safely assessed and treated via synchronous audio and visual telemedicine encounter.      Start Time: 1000  Stop Time: 1010    Reason for Telemedicine Visit: Covid-19    Originating Site (Patient Location): [place] [address]    Distant Site (Provider Location): Provider Remote Setting    Consent:  The patient/guardian has verbally consented to: the potential risks and benefits of telemedicine (video visit) versus in person care; bill my insurance or make self-payment for services provided; and responsibility for payment of non-covered services.     Mode of Communication:  Video Conference via [employer]    As the provider I attest to compliance with applicable laws and regulations related to telemedicine.           Interim History:   The patient's care was discussed with the treatment team during the daily team meeting and/or staff's chart notes were reviewed.  Staff report patient was visible in the milieu, taking medications as prescribed, reporting some improvement in symptoms, denied having further lightheadedness/dizziness, drinking adequate fluids, no acute events overnight.     Upon interview, the patient reports she is feeling better, states paranoia has improved and she has been able to eat 2 meals yesterday and breakfast today. Denies any ongoing lightheadedness. Reports CAH have also resolved. Tolerating medications, reporting some sialorrhea with clozapine, agreeable to atropine drops PRN. States sedation was less with 75mg dose and her CAH have not yet returned. Denies SI or HI. Would like to remain at this dose at this time. Inquired as to ability to discharge, discussed given recently admission shortly after discharge would advise a few more days for ongoing stabilization and then discharge home and she is in agreement with this plan. No  "additional concerns at this time.          Medications:       [START ON 8/11/2020] ARIPiprazole ER  400 mg Intramuscular Q28 Days     cloZAPine  75 mg Oral At Bedtime    Or     haloperidol lactate  5 mg Intramuscular At Bedtime     metFORMIN  500 mg Oral At Bedtime     sodium chloride (PF)  3 mL Intracatheter Q8H     cholecalciferol  25 mcg Oral Daily          Allergies:   No Known Allergies       Labs:     No results found for this or any previous visit (from the past 48 hour(s)).       Psychiatric Examination:     BP 97/69 (BP Location: Left leg)   Pulse 86   Temp 98.2  F (36.8  C) (Oral)   Resp 16   Ht 1.651 m (5' 5\")   Wt 69.9 kg (154 lb)   LMP 07/01/2020   SpO2 100%   BMI 25.63 kg/m    Weight is 154 lbs 0 oz  Body mass index is 25.63 kg/m .    Orthostatic Vitals       Most Recent      Lying Orthostatic BP 92/60 07/19 1100    Lying Orthostatic Pulse (bpm) 89 07/19 1100    Sitting Orthostatic BP 96/66 07/19 1936    Sitting Orthostatic Pulse (bpm) 80 07/19 1936    Standing Orthostatic BP 88/60 07/19 1936    Standing Orthostatic Pulse (bpm) 109 07/19 1936        Appearance: awake, alert and adequately groomed  Attitude:  cooperative, less guarded  Eye Contact:  good  Mood:  \"better\"  Affect:  appropriate and in normal range and mood congruent  Speech:  clear, coherent and normal prosody  Language: fluent and intact in English  Psychomotor, Gait, Musculoskeletal:  no evidence of tardive dyskinesia, dystonia, or tics  Thought Process:  linear and goal oriented  Associations:  no loose associations  Thought Content:  no evidence of suicidal ideation or homicidal ideation and no evidence of psychotic thought  Insight:  partial  Judgement:  fair  Oriented to:  time, person, and place  Attention Span and Concentration:  intact  Recent and Remote Memory:  intact  Fund of Knowledge:  appropriate    Clinical Global Impressions  First:  Considering your total clinical experience with this particular patient " population, how severe are the patient's symptoms at this time?: 6 (07/21/20 1014)  Compared to the patient's condition at the START of treatment, this patient's condition is: 3 (07/21/20 1014)  Most recent:  Considering your total clinical experience with this particular patient population, how severe are the patient's symptoms at this time?: 6 (07/21/20 1014)  Compared to the patient's condition at the START of treatment, this patient's condition is: 3 (07/21/20 1014)         Precautions:     Behavioral Orders   Procedures     Code 1 - Restrict to Unit     Routine Programming     As clinically indicated     Status 15     Every 15 minutes.     Suicide precautions     Patients on Suicide Precautions should have a Combination Diet ordered that includes a Diet selection(s) AND a Behavioral Tray selection for Safe Tray - with utensils, or Safe Tray - NO utensils            Diagnoses:      Schizoaffective disorder, depressed type, severe with active psychosis symptoms  History of post partum psychosis         Assessment & Plan:   Assessment and hospital summary:  41 yo female with history of schizoaffective disorder who presented to the ED with reports of thoughts to harm self and children and ongoing AH in context of recent discharge from [facility] on 7/14 and medication non-adherence. Pt was restarted on PTA medications. She is under commitment and Reyes but was admitted as a voluntary patient. She is followed by ACT team. Psychosis work up labs ordered by admitting provider, some results still pending. Pt reported symptomatic hypotension, IM consult placed and she was given IV fluids with improvement in symptoms. She reported oversedation with 100mg dose of clozapine and noted this was part of nonadherence when outpatient, agreeable to decreasing dose to 75mg. Reporting improvement in CAH and paranoia 7/21.     Psychiatric treatment/inteventions:  Medications:   -continue PTA clozapine at reduced dose of 75mg at  bedtime, linked with IM Haldol per Reyes  -continue PTA Abilify Ale, last received injection July 14 2020  -start Abilify 5mg daily PRN psychosis as patient requests this for PRN medication  -discontinued PRN olanzapine ordered on admission 2/2 receiving updated Reyes, changed to Haldol PRN   -start atropine drops PRN sialorrhea 2/2 clozapine    Laboratory/Imaging: no new labs per psychiatry, continue CBC for clozapine monitoring weekly on Fridays     Patient will be treated in therapeutic milieu with appropriate individual and group therapies as described.     Medical treatment/interventions:  Medical concerns:  1) orthostatic hypotension:  IM consult placed on admission 2/2 pt reporting symptomatic orthostatic hypotension, see consult note dated 7/19, agree with IM's assessment and plan.    2) Treponema antibody resulted as reactive but RPR non-reactive indicating past infection, IM ordering additional test to confirm      This note was created by undersigned using a Dragon dictation system. All typing errors or contextual distortion are unintentional and software inherent.     Disposition Plan   Reason for ongoing admission: ongoing stabilization and monitoring for tolerability of medications  Discharge location: home with family, likely Wed or Thursday pending stabilization  Discharge Medications: not ordered  Follow-up Appointments: not scheduled  Legal Status: voluntary (on provisional discharge not revoked)  Entered by: Alison Gallardo on 7/21/2020 at 8:02 AM

## 2020-07-22 LAB
ARSENIC BLD-MCNC: <10 UG/L
LEAD BLDV-MCNC: <2 UG/DL
MERCURY BLD-MCNC: <2.5 UG/L

## 2020-07-22 PROCEDURE — 99232 SBSQ HOSP IP/OBS MODERATE 35: CPT | Mod: 95 | Performed by: PSYCHIATRY & NEUROLOGY

## 2020-07-22 PROCEDURE — 12400001 ZZH R&B MH UMMC

## 2020-07-22 PROCEDURE — 25000132 ZZH RX MED GY IP 250 OP 250 PS 637: Performed by: PSYCHIATRY & NEUROLOGY

## 2020-07-22 PROCEDURE — 25000132 ZZH RX MED GY IP 250 OP 250 PS 637: Performed by: PHYSICIAN ASSISTANT

## 2020-07-22 RX ORDER — CLOZAPINE 25 MG/1
75 TABLET ORAL AT BEDTIME
Qty: 21 TABLET | Refills: 1 | Status: SHIPPED | OUTPATIENT
Start: 2020-07-22 | End: 2020-07-23

## 2020-07-22 RX ORDER — ATROPINE SULFATE 10 MG/ML
SOLUTION/ DROPS OPHTHALMIC
Qty: 1 BOTTLE | Refills: 0 | Status: SHIPPED | OUTPATIENT
Start: 2020-07-22 | End: 2020-12-01

## 2020-07-22 RX ORDER — VITAMIN B COMPLEX
25 TABLET ORAL DAILY
Qty: 30 TABLET | Refills: 0 | Status: SHIPPED | OUTPATIENT
Start: 2020-07-23 | End: 2020-08-10

## 2020-07-22 RX ADMIN — MELATONIN 25 MCG: at 09:50

## 2020-07-22 RX ADMIN — METFORMIN HYDROCHLORIDE 500 MG: 500 TABLET, EXTENDED RELEASE ORAL at 20:13

## 2020-07-22 RX ADMIN — CLOZAPINE 75 MG: 25 TABLET ORAL at 20:13

## 2020-07-22 RX ADMIN — HYDROXYZINE HYDROCHLORIDE 25 MG: 25 TABLET, FILM COATED ORAL at 20:12

## 2020-07-22 RX ADMIN — HYDROXYZINE HYDROCHLORIDE 25 MG: 25 TABLET, FILM COATED ORAL at 20:13

## 2020-07-22 ASSESSMENT — ACTIVITIES OF DAILY LIVING (ADL)
DRESS: INDEPENDENT
LAUNDRY: WITH SUPERVISION
HYGIENE/GROOMING: INDEPENDENT
LAUNDRY: WITH SUPERVISION
ORAL_HYGIENE: INDEPENDENT
ORAL_HYGIENE: INDEPENDENT
DRESS: INDEPENDENT;SCRUBS (BEHAVIORAL HEALTH);STREET CLOTHES
HYGIENE/GROOMING: INDEPENDENT;HANDWASHING

## 2020-07-22 NOTE — DISCHARGE INSTRUCTIONS
" Behavioral Discharge Planning and Instructions      Summary:  You were admitted on 7/17/2020  due to Psychotic Symptomology.  You were treated by Dr. Alison Gallardo MD and discharged on 7/23/20 from [facility] to Home.      Principal Diagnosis: Schizoaffective disorder, depressed type, severe with active psychosis symptoms      Health Care Follow-up Appointments:   [facility] Dr. Gavir[facility], Johanne Craft RN and Vicky Harrington  [phone]; [phone] fax    PCP:Dr. Carmela De Los Santos at [facility] [phone].     Attend all scheduled appointments with your outpatient providers. Call at least 24 hours in advance if you need to reschedule an appointment to ensure continued access to your outpatient providers.   Major Treatments, Procedures and Findings:  You were provided with: a psychiatric assessment, assessed for medical stability, medication evaluation and/or management and group therapy    Symptoms to Report: feeling more aggressive, increased confusion, losing more sleep, mood getting worse or thoughts of suicide    Early warning signs can include: increased depression or anxiety sleep disturbances increased thoughts or behaviors of suicide or self-harm  increased unusual thinking, such as paranoia or hearing voices    Safety and Wellness:  Take all medicines as directed.  Make no changes unless your doctor suggests them.      Follow treatment recommendations.  Refrain from alcohol and non-prescribed drugs.  If there is a concern for safety, call 911.    Resources:   Crisis Intervention: 420.829.1373 or 299-760-4634 (TTY: 588.545.9533).  Call anytime for help.  National [employer]ness (www.mn.doron.org): 461.338.3791 or 459-548-5722.  [facility] Crisis (COPE) Response - Adult [phone]  Text 4 Life: txt \"LIFE\" to 97932 for immediate support and crisis intervention      The treatment team has appreciated the opportunity to work with you.     If you have any questions " or concerns our unit number is [phone].

## 2020-07-22 NOTE — PROGRESS NOTES
Nona seemed to have a good day today. Pt was observed pacing the hallway for most of the shift. Pt continues to present with a blunted affect, but is cooperative and brightens upon approach. Pt denies SI/SIB and states that she is feeling better today. Pt has reported that she has not had any commanding voices today. Pt stated she hasn't felt dizzy at all today and has expressed interest in being discharged soon. Pt ate at dinner only. No other concerns noted.     07/21/20 2302   Behavioral Health   Hallucinations denies / not responding to hallucinations   Thinking poor concentration   Orientation person: oriented;place: oriented   Memory baseline memory   Insight poor   Judgement impaired   Eye Contact at examiner   Affect blunted, flat   Mood mood is calm   Physical Appearance/Attire attire appropriate to age and situation   Hygiene well groomed   Suicidality other (see comments)  (Pt denies SI)   1. Wish to be Dead (Recent) No   2. Non-Specific Active Suicidal Thoughts (Recent) No   Self Injury other (see comment)  (Pt denies SIB)   Elopement   (No observed behaviors/statements of concern)   Activity withdrawn;other (see comment)  (Visible pacing the hallway)   Speech clear;coherent   Medication Sensitivity no stated side effects   Psychomotor / Gait steady;balanced   Psycho Education   Type of Intervention 1:1 intervention   Response participates, initiates socially appropriate   Hours 0.5   Treatment Detail Check in   Daily Care   Activity up ad dayna   Patient Performed Hygiene dressed   Activities of Daily Living   Hygiene/Grooming independent   Oral Hygiene independent   Dress independent;street clothes   Laundry with supervision   Room Organization independent   Activity   Activity Assistance Provided independent

## 2020-07-22 NOTE — PROGRESS NOTES
"Patient spent parts on the shift pacing the hallway and other times she was in her room resting. Patient states that she feels ready for discharge tomorrow. Denies suicidal ideation and self injurious thoughts. Denies anxiety and depression. Denies auditory and visual hallucinations. Ate meals and med compliant. Affect is bright on approach, smiling and engaging in conversation.     Patient evaluation continues. Assessed mood,anxiety,thoughts and behavior.     Patient gradually progressing towards goals.    Patient is encouraged to participate in groups and assisted to develop healthy coping skills.     VS reviewed: BP 94/62 (BP Location: Left arm)   Pulse 72   Temp 97.5  F (36.4  C) (Oral)   Resp 16   Ht 1.651 m (5' 5\")   Wt 74.8 kg (165 lb)   LMP 07/01/2020   SpO2 100%   BMI 27.46 kg/m      Length of stay: 5    Refer to daily team meeting notes for individualized plan of care. Nursing will continue to assess.      "

## 2020-07-22 NOTE — PROGRESS NOTES
Spoke to pt's ACT team , Vicky, via phone.  Updated her on med changes and discharge plan for tomorrow.  She said that they are not allowed to have clients in their cars right now, so pt will need to take a taxi home.  She requested that CTC take a look to see if pt has bags under her eyes, as that is historically one of the ways to determine how pt is doing. Writer agreed to check on that and call Vicky back.      Writer met with pt to discuss discharge plans. Pt stated that she was in agreement with discharge and that she was feeling much better.  She feels comfortable taking a taxi home tomorrow.  She appeared well rested as she didn't have large dark circles under her eyes.      Writer called Vicky back and left a VM to relay observations and confirm discharge for tomorrow.

## 2020-07-22 NOTE — PROGRESS NOTES
"Brief medicine consult note     Positive treponema ab   RPR negative. Treponema pallidum antibody confirmation test non reactive.. Per protocol, \"If the Anti-Treponema Antibody is positive and the RPR is negative, then a second Treponema specific test (TP-PA) will be performed to determine whether the antibody test is falsely positive or is detecting early infection.  If the latter is suspected, repeat testing in approximately two weeks is recommended.\"    - Consider repeat testing in approximately two weeks if high suspicion for syphilis or no improvement in symptoms.     Orthostasis   Received 1L LR 7/19 due to patient being intravascularly dry. Orthostatic blood pressures this AM with sitting 113/76, standing with appropriate response 118/80. BMP unremarkable. CR wnl. Again today, patient with positive orthostatic blood pressures.   - Continue to monitor, if persistent would recommend decreasing Clozaril dose or discontinue and change to another antipsychotic  - Encourage patient to increase PO fluid intake       Liseth Salazar PA-C  Hospitalist Service  [phone]    "

## 2020-07-22 NOTE — PROGRESS NOTES
"[facility], [place]   Psychiatric Progress Note  Hospital Day: 5  Telemedicine Visit: The patient's condition can be safely assessed and treated via synchronous audio and visual telemedicine encounter.      Start Time: 0955  Stop Time: 1005    Reason for Telemedicine Visit: Covid-19    Originating Site (Patient Location): [facility]    Distant Site (Provider Location): Provider Remote Setting    Consent:  The patient/guardian has verbally consented to: the potential risks and benefits of telemedicine (video visit) versus in person care; bill my insurance or make self-payment for services provided; and responsibility for payment of non-covered services.     Mode of Communication:  Video Conference via [employer]    As the provider I attest to compliance with applicable laws and regulations related to telemedicine.           Interim History:   The patient's care was discussed with the treatment team during the daily team meeting and/or staff's chart notes were reviewed.  Staff report patient was more visible in the milieu, attending parts of groups, denying CAH, denying SI or HI, taking medications as prescribed, able to eat all 3 meals, no further complaints of dizziness, no acute events overnight.     Upon interview, the patient reports her voices have not returned, her mood is \"good\". She has been able to eat all meals and denies further paranoia. Tolerating medications, denies dizziness/lightheadedness. Atropine drops helps with drooling at night. Denies SI or HI. Reports she is feeling ready to go home, agreeable to discharging tomorrow and team will contact ACT team.           Medications:       [START ON 8/11/2020] ARIPiprazole ER  400 mg Intramuscular Q28 Days     cloZAPine  75 mg Oral At Bedtime    Or     haloperidol lactate  5 mg Intramuscular At Bedtime     metFORMIN  500 mg Oral At Bedtime     cholecalciferol  25 mcg Oral Daily          Allergies:   No " "Known Allergies       Labs:     No results found for this or any previous visit (from the past 48 hour(s)).       Psychiatric Examination:     BP 94/62 (BP Location: Left arm)   Pulse 72   Temp 98.2  F (36.8  C) (Oral)   Resp 16   Ht 1.651 m (5' 5\")   Wt 74.8 kg (165 lb)   LMP 07/01/2020   SpO2 100%   BMI 27.46 kg/m    Weight is 165 lbs 0 oz  Body mass index is 27.46 kg/m .    Orthostatic Vitals       Most Recent      Sitting Orthostatic /79 07/22 0854    Sitting Orthostatic Pulse (bpm) 96 07/22 0854    Standing Orthostatic BP 92/60 07/22 0854    Standing Orthostatic Pulse (bpm) 150 07/22 0854        Appearance: awake, alert and adequately groomed  Attitude:  cooperative  Eye Contact:  good  Mood:  \"good\"  Affect:  appropriate and in normal range and mood congruent  Speech:  clear, coherent and normal prosody  Language: fluent and intact in English  Psychomotor, Gait, Musculoskeletal:  no evidence of tardive dyskinesia, dystonia, or tics  Thought Process:  linear and goal oriented  Associations:  no loose associations  Thought Content:  no evidence of suicidal ideation or homicidal ideation and no evidence of psychotic thought  Insight:  partial  Judgement:  fair  Oriented to:  time, person, and place  Attention Span and Concentration:  intact  Recent and Remote Memory:  intact  Fund of Knowledge:  appropriate    Clinical Global Impressions  First:  Considering your total clinical experience with this particular patient population, how severe are the patient's symptoms at this time?: 6 (07/21/20 1014)  Compared to the patient's condition at the START of treatment, this patient's condition is: 3 (07/21/20 1014)  Most recent:  Considering your total clinical experience with this particular patient population, how severe are the patient's symptoms at this time?: 6 (07/21/20 1014)  Compared to the patient's condition at the START of treatment, this patient's condition is: 3 (07/21/20 1014)         " Precautions:     Behavioral Orders   Procedures     Code 1 - Restrict to Unit     Routine Programming     As clinically indicated     Status 15     Every 15 minutes.     Suicide precautions     Patients on Suicide Precautions should have a Combination Diet ordered that includes a Diet selection(s) AND a Behavioral Tray selection for Safe Tray - with utensils, or Safe Tray - NO utensils            Diagnoses:      Schizoaffective disorder, depressed type, severe with active psychosis symptoms  History of post partum psychosis         Assessment & Plan:   Assessment and hospital summary:  41 yo female with history of schizoaffective disorder who presented to the ED with reports of thoughts to harm self and children and ongoing AH in context of recent discharge from [facility] on 7/14 and medication non-adherence. Pt was restarted on PTA medications. She is under commitment and Reyes but was admitted as a voluntary patient. She is followed by ACT team. Psychosis work up labs ordered by admitting provider, some results still pending. Pt reported symptomatic hypotension, IM consult placed and she was given IV fluids with improvement in symptoms. She reported oversedation with 100mg dose of clozapine and noted this was part of nonadherence when outpatient, agreeable to decreasing dose to 75mg. Reporting improvement in CAH and paranoia 7/21.     Psychiatric treatment/inteventions:  Medications:   -continue PTA clozapine at reduced dose of 75mg at bedtime, linked with IM Haldol per Eric  -continue PTA Abilify Maintena, last received injection July 14 2020  -continue Abilify 5mg daily PRN psychosis as patient requests this for PRN medication  -continue atropine drops PRN sialorrhea 2/2 clozapine    Laboratory/Imaging: no new labs per psychiatry, continue CBC for clozapine monitoring weekly on Fridays     Patient will be treated in therapeutic milieu with appropriate individual and group therapies as described.     Medical  treatment/interventions:  Medical concerns:  1) orthostatic hypotension:  IM consult placed on admission 2/2 pt reporting symptomatic orthostatic hypotension, see consult note dated 7/19, agree with IM's assessment and plan.    2) Treponema antibody resulted as reactive but RPR non-reactive indicating past infection, IM ordering additional test to confirm-- T. Pallidum nonreactive, see IM note 7/22 for additional details      This note was created by undersigned using a Dragon dictation system. All typing errors or contextual distortion are unintentional and software inherent.     Disposition Plan   Reason for ongoing admission: ongoing stabilization and monitoring for tolerability of medications  Discharge location: home with family tomorrow  Discharge Medications: ordered.  Follow-up Appointments: not scheduled  Legal Status: voluntary (on provisional discharge not revoked)  Entered by: Alison Gallardo on 7/22/2020 at 2:44 PM

## 2020-07-23 VITALS
RESPIRATION RATE: 16 BRPM | DIASTOLIC BLOOD PRESSURE: 62 MMHG | BODY MASS INDEX: 25.83 KG/M2 | WEIGHT: 155 LBS | SYSTOLIC BLOOD PRESSURE: 94 MMHG | OXYGEN SATURATION: 100 % | HEIGHT: 65 IN | TEMPERATURE: 97.7 F | HEART RATE: 72 BPM

## 2020-07-23 LAB
BASOPHILS # BLD AUTO: 0 10E9/L (ref 0–0.2)
BASOPHILS NFR BLD AUTO: 0.4 %
DIFFERENTIAL METHOD BLD: NORMAL
EOSINOPHIL # BLD AUTO: 0.5 10E9/L (ref 0–0.7)
EOSINOPHIL NFR BLD AUTO: 9.8 %
ERYTHROCYTE [DISTWIDTH] IN BLOOD BY AUTOMATED COUNT: 13.5 % (ref 10–15)
HCT VFR BLD AUTO: 37.1 % (ref 35–47)
HGB BLD-MCNC: 12.5 G/DL (ref 11.7–15.7)
IMM GRANULOCYTES # BLD: 0 10E9/L (ref 0–0.4)
IMM GRANULOCYTES NFR BLD: 0 %
LYMPHOCYTES # BLD AUTO: 1.9 10E9/L (ref 0.8–5.3)
LYMPHOCYTES NFR BLD AUTO: 36.3 %
MCH RBC QN AUTO: 30.6 PG (ref 26.5–33)
MCHC RBC AUTO-ENTMCNC: 33.7 G/DL (ref 31.5–36.5)
MCV RBC AUTO: 91 FL (ref 78–100)
MONOCYTES # BLD AUTO: 0.4 10E9/L (ref 0–1.3)
MONOCYTES NFR BLD AUTO: 7.4 %
NEUTROPHILS # BLD AUTO: 2.4 10E9/L (ref 1.6–8.3)
NEUTROPHILS NFR BLD AUTO: 46.1 %
NRBC # BLD AUTO: 0 10*3/UL
NRBC BLD AUTO-RTO: 0 /100
PLATELET # BLD AUTO: 253 10E9/L (ref 150–450)
RBC # BLD AUTO: 4.08 10E12/L (ref 3.8–5.2)
WBC # BLD AUTO: 5.1 10E9/L (ref 4–11)

## 2020-07-23 PROCEDURE — 25000132 ZZH RX MED GY IP 250 OP 250 PS 637: Performed by: PHYSICIAN ASSISTANT

## 2020-07-23 PROCEDURE — 99239 HOSP IP/OBS DSCHRG MGMT >30: CPT | Mod: 95 | Performed by: PSYCHIATRY & NEUROLOGY

## 2020-07-23 PROCEDURE — 85025 COMPLETE CBC W/AUTO DIFF WBC: CPT | Performed by: PSYCHIATRY & NEUROLOGY

## 2020-07-23 PROCEDURE — 36415 COLL VENOUS BLD VENIPUNCTURE: CPT | Performed by: PSYCHIATRY & NEUROLOGY

## 2020-07-23 RX ORDER — CLOZAPINE 25 MG/1
75 TABLET ORAL AT BEDTIME
Qty: 21 TABLET | Refills: 0 | Status: SHIPPED | OUTPATIENT
Start: 2020-07-23 | End: 2020-12-03 | Stop reason: DRUGHIGH

## 2020-07-23 RX ORDER — ARIPIPRAZOLE 5 MG/1
5 TABLET ORAL DAILY PRN
Qty: 30 TABLET | Refills: 0 | Status: SHIPPED | OUTPATIENT
Start: 2020-07-23 | End: 2020-08-21

## 2020-07-23 RX ADMIN — MELATONIN 25 MCG: at 08:19

## 2020-07-23 ASSESSMENT — MIFFLIN-ST. JEOR: SCORE: 1363.96

## 2020-07-23 NOTE — PLAN OF CARE
Pt given copy of their discharge instructions and medication administration instructions. All discharge plans were discussed with patient. Pt reports no questions at this time regarding discharge plans.  Pt denies any suicidal ideation, plans or intent at this time. Pt has received all her belongings.  Pt was sent home via a cab.

## 2020-07-23 NOTE — PROGRESS NOTES
Nona had a pleasant, unremarkable shift. She was visible in the milieu for the majority of the shift watching TV/movies with her peers, mostly on the periphery of the common areas, not social with peers, slowly paced the hallway, and made phone calls home from time to time throughout the evening. Upon check-in, Nona reports she is feeling excited and happy to discharge home tomorrow. She stated that her medications are working well and she no longer fears they have been tampered with. She denies SI/SIB/HI/AH/VH, as well as depression and anxiety. She reports adequate sleep and appetite. She ate the majority of her dinner. ADL's are independent, no nutrition concerns. No further concerns at this time.        07/22/20 2048   Behavioral Health   Hallucinations denies / not responding to hallucinations   Thinking poor concentration   Orientation person: oriented;place: oriented;date: oriented;time: oriented   Memory baseline memory   Insight poor   Judgement impaired   Eye Contact at examiner   Affect blunted, flat;other (see comments)  (Brightens upon interaction)   Mood mood is calm   Physical Appearance/Attire multiple layers;attire appropriate to age and situation   Hygiene other (see comment)  (Adequate)   Suicidality other (see comments)  (Patient denies)   1. Wish to be Dead (Recent) No   2. Non-Specific Active Suicidal Thoughts (Recent) No   Self Injury other (see comment)  (Patient denies)   Elopement   (No indicators this shift)   Activity withdrawn;other (see comment)  (Visible in milieu, not social with peers)   Speech clear;coherent   Medication Sensitivity no stated side effects;no observed side effects   Psychomotor / Gait balanced;steady;slow;paces   Psycho Education   Type of Intervention 1:1 intervention   Response participates with encouragement   Hours 0.5   Treatment Detail Check-In   Activities of Daily Living   Hygiene/Grooming independent;handwashing   Oral Hygiene independent   Dress  independent;scrubs (behavioral health);street clothes   Laundry with supervision   Room Organization independent

## 2020-07-23 NOTE — DISCHARGE SUMMARY
Psychiatric Discharge Summary    Nona Finley MRN# [id number]   Age: 40 year old YOB: 1980     Date of Admission:  7/17/2020  Date of Discharge:  7/23/2020  Admitting Physician:  Kobe Freed MD  Discharge Physician:  Alison Gallardo DO  Telemedicine Visit: The patient's condition can be safely assessed and treated via synchronous audio and visual telemedicine encounter.      Start Time: 0830  Stop Time: 0840    Reason for Telemedicine Visit: Covid-19    Originating Site (Patient Location): [place] [id number]    Distant Site (Provider Location): Provider Remote Setting    Consent:  The patient/guardian has verbally consented to: the potential risks and benefits of telemedicine (video visit) versus in person care; bill my insurance or make self-payment for services provided; and responsibility for payment of non-covered services.     Mode of Communication:  Video Conference via [employer]    As the provider I attest to compliance with applicable laws and regulations related to telemedicine.          Event Leading to Hospitalization:   Per ED note: Nona Finley is a 40 year old female with a medical history significant for severe bipolar 1 disorder with psychotic behavior, depressive type schizoaffective disorder, auditory hallucinations, suicidal ideations, psychosis, postpartum depression, and tuberculosis who presents the ED today for mental health evaluation. She was brought in by her sister for behavioral evaluation. Patient had recent hospitalization from 7/10-7/14/20 in setting of destabilization and medication nonadherence.  She was restarted on Clozaril 100 mg HS as well as Abilify Maintena 400 mg during this hospitalization, and was placed under commitment and Reyes.  Over the last 2 days she has been deliberately vomiting her medications, paranoid that they're tainted with marijuana and other medications.  She is experiencing auditory hallucinations telling her  "to kill her kids and herself. She denies plan or intent to kill kids but has had thoughts to jump off bridge to kill herself. She is also experiencing visual hallucinations and seeing people with a knife, as well as olfactory hallucinations with smelling bleach on her food. She has only been sleeping 2-3 hours a night, skipping meals. She has been speaking to her ACT team daily and was told to come to the ER for evaluation.  At this time patient had received 1 mg of Ativan in the emergency room from previous evaluation by Dr. akins now and had marked improvement of her symptoms patient denied any intent to harm and is interested in increased outpatient services.  Nona Finley is a very pleasant, P[place] female.  She is tearful throughout the interview.  States that that the reason for admission is stopping her medications as soon as she was discharged 4 days ago.  She believes that her medications were laced with marijuana and she was having a hard time swallowing them.  She decompensated rather quickly.  She started hearing voices urging her to kill herself and her children.  She started having visual hallucinations, \"scary faces\" especially at night.  She was feeling paranoid believing that people are out to get her.  Currently feels very depressed.  She is sleeping about 3 hours a night.  She is not taking naps during the day.  Her energy is low, appetite is poor, she is feeling hopeless and worthless.  Reports a lot of worries about her family.  Denies manic episodes.  Denies history of PTSD, OCD, and eating disorders.  Denies suicide attempts and self injury behaviors. Denies seizures, head injuries, and loss of consciousness.  The patient seems to have a good insight into her illness.  States that she understands that she needs to take medications in order for her to stay out of the hospital and be able to function.  The patient knows that if she is not taking the medications she will be given injections, " per court order.  She is aware of her diagnosis of her schizoaffective disorder.  States she was initially diagnosed in 2011.  Prior to that she had never had any problems.       See Admission note by Bonny MACIEL CNP on 7/18/2020 for additional details.          Diagnoses:     Schizoaffective disorder, depressed type, severe with active psychosis symptoms  History of post partum psychosis         Labs:     Recent Results (from the past 336 hour(s))   Drug abuse screen 6 urine (tox)    Collection Time: 07/10/20  5:56 PM   Result Value Ref Range    Amphetamine Qual Urine Negative NEG^Negative    Barbiturates Qual Urine Negative NEG^Negative    Benzodiazepine Qual Urine Negative NEG^Negative    Cannabinoids Qual Urine Negative NEG^Negative    Cocaine Qual Urine Negative NEG^Negative    Ethanol Qual Urine Negative NEG^Negative    Opiates Qualitative Urine Negative NEG^Negative   HCG qualitative urine (UPT)    Collection Time: 07/10/20  5:56 PM   Result Value Ref Range    HCG Qual Urine Negative NEG^Negative   Asymptomatic COVID-19 Virus (Coronavirus) by PCR    Collection Time: 07/10/20  5:57 PM    Specimen: Nasopharyngeal   Result Value Ref Range    COVID-19 Virus PCR to [facility] - Source Nasopharyngeal     COVID-19 Virus PCR to [facility] - Result       Test received-See reflex to IDDL test SARS CoV2 (COVID-19) Virus RT-PCR   SARS-CoV-2 COVID-19 Virus (Coronavirus) RT-PCR Nasopharyngeal    Collection Time: 07/10/20  5:57 PM    Specimen: Nasopharyngeal   Result Value Ref Range    SARS-CoV-2 Virus Specimen Source Nasopharyngeal     SARS-CoV-2 PCR Result NEGATIVE     SARS-CoV-2 PCR Comment       Testing was performed using the Xpert Xpress SARS-CoV-2 Assay on the Cepheid Gene-Xpert   Instrument Systems. Additional information about this Emergency Use Authorization (EUA)   assay can be found via the Lab Guide.     EKG 12-lead, tracing only    Collection Time: 07/10/20  6:18 PM   Result Value Ref Range     Interpretation ECG Click View Image link to view waveform and result    CBC with platelets differential    Collection Time: 07/10/20  9:10 PM   Result Value Ref Range    WBC 7.3 4.0 - 11.0 10e9/L    RBC Count 4.02 3.8 - 5.2 10e12/L    Hemoglobin 12.2 11.7 - 15.7 g/dL    Hematocrit 36.8 35.0 - 47.0 %    MCV 92 78 - 100 fl    MCH 30.3 26.5 - 33.0 pg    MCHC 33.2 31.5 - 36.5 g/dL    RDW 13.7 10.0 - 15.0 %    Platelet Count 308 150 - 450 10e9/L    Diff Method Automated Method     % Neutrophils 62.7 %    % Lymphocytes 29.6 %    % Monocytes 3.7 %    % Eosinophils 3.3 %    % Basophils 0.6 %    % Immature Granulocytes 0.1 %    Nucleated RBCs 0 0 /100    Absolute Neutrophil 4.6 1.6 - 8.3 10e9/L    Absolute Lymphocytes 2.2 0.8 - 5.3 10e9/L    Absolute Monocytes 0.3 0.0 - 1.3 10e9/L    Absolute Eosinophils 0.2 0.0 - 0.7 10e9/L    Absolute Basophils 0.0 0.0 - 0.2 10e9/L    Abs Immature Granulocytes 0.0 0 - 0.4 10e9/L    Absolute Nucleated RBC 0.0    Comprehensive metabolic panel    Collection Time: 07/10/20  9:10 PM   Result Value Ref Range    Sodium 138 133 - 144 mmol/L    Potassium 3.6 3.4 - 5.3 mmol/L    Chloride 106 94 - 109 mmol/L    Carbon Dioxide 27 20 - 32 mmol/L    Anion Gap 5 3 - 14 mmol/L    Glucose 147 (H) 70 - 99 mg/dL    Urea Nitrogen 9 7 - 30 mg/dL    Creatinine 0.57 0.52 - 1.04 mg/dL    GFR Estimate >90 >60 mL/min/[1.73_m2]    GFR Estimate If Black >90 >60 mL/min/[1.73_m2]    Calcium 8.8 8.5 - 10.1 mg/dL    Bilirubin Total 0.3 0.2 - 1.3 mg/dL    Albumin 3.6 3.4 - 5.0 g/dL    Protein Total 8.1 6.8 - 8.8 g/dL    Alkaline Phosphatase 63 40 - 150 U/L    ALT 17 0 - 50 U/L    AST 17 0 - 45 U/L   Lipid panel reflex to direct LDL    Collection Time: 07/10/20  9:10 PM   Result Value Ref Range    Cholesterol 152 <200 mg/dL    Triglycerides 80 <150 mg/dL    HDL Cholesterol 59 >49 mg/dL    LDL Cholesterol Calculated 77 <100 mg/dL    Non HDL Cholesterol 93 <130 mg/dL   TSH    Collection Time: 07/10/20  9:10 PM   Result Value  Ref Range    TSH 0.84 0.40 - 4.00 mU/L   Asymptomatic COVID-19 Virus (Coronavirus) by PCR    Collection Time: 07/17/20  6:14 PM    Specimen: Nasopharyngeal   Result Value Ref Range    COVID-19 Virus PCR to [facility] - Source Nasopharyngeal     COVID-19 Virus PCR to [facility] - Result       Test received-See reflex to IDDL test SARS CoV2 (COVID-19) Virus RT-PCR   SARS-CoV-2 COVID-19 Virus (Coronavirus) RT-PCR Nasopharyngeal    Collection Time: 07/17/20  6:14 PM    Specimen: Nasopharyngeal   Result Value Ref Range    SARS-CoV-2 Virus Specimen Source Nasopharyngeal     SARS-CoV-2 PCR Result NEGATIVE     SARS-CoV-2 PCR Comment       Testing was performed using the Zipzoom Xpress SARS-CoV-2 Assay on the Cepheid Gene-Xpert   Instrument Systems. Additional information about this Emergency Use Authorization (EUA)   assay can be found via the Lab Guide.     CBC with platelets differential    Collection Time: 07/18/20  7:42 AM   Result Value Ref Range    WBC 6.0 4.0 - 11.0 10e9/L    RBC Count 3.86 3.8 - 5.2 10e12/L    Hemoglobin 11.9 11.7 - 15.7 g/dL    Hematocrit 35.0 35.0 - 47.0 %    MCV 91 78 - 100 fl    MCH 30.8 26.5 - 33.0 pg    MCHC 34.0 31.5 - 36.5 g/dL    RDW 13.7 10.0 - 15.0 %    Platelet Count 231 150 - 450 10e9/L    Diff Method Automated Method     % Neutrophils 49.5 %    % Lymphocytes 32.8 %    % Monocytes 9.7 %    % Eosinophils 7.5 %    % Basophils 0.3 %    % Immature Granulocytes 0.2 %    Nucleated RBCs 0 0 /100    Absolute Neutrophil 3.0 1.6 - 8.3 10e9/L    Absolute Lymphocytes 2.0 0.8 - 5.3 10e9/L    Absolute Monocytes 0.6 0.0 - 1.3 10e9/L    Absolute Eosinophils 0.5 0.0 - 0.7 10e9/L    Absolute Basophils 0.0 0.0 - 0.2 10e9/L    Abs Immature Granulocytes 0.0 0 - 0.4 10e9/L    Absolute Nucleated RBC 0.0    Basic metabolic panel    Collection Time: 07/19/20  7:37 AM   Result Value Ref Range    Sodium 141 133 - 144 mmol/L    Potassium 4.2 3.4 - 5.3 mmol/L    Chloride 107 94 - 109 mmol/L    Carbon Dioxide 25 20 - 32  mmol/L    Anion Gap 9 3 - 14 mmol/L    Glucose 81 70 - 99 mg/dL    Urea Nitrogen 13 7 - 30 mg/dL    Creatinine 0.51 (L) 0.52 - 1.04 mg/dL    GFR Estimate >90 >60 mL/min/[1.73_m2]    GFR Estimate If Black >90 >60 mL/min/[1.73_m2]    Calcium 8.5 8.5 - 10.1 mg/dL   Vitamin D    Collection Time: 07/19/20  7:38 AM   Result Value Ref Range    Vitamin D Deficiency screening 17 (L) 20 - 75 ug/L   Vitamin B12    Collection Time: 07/19/20  7:38 AM   Result Value Ref Range    Vitamin B12 410 193 - 986 pg/mL   Folate    Collection Time: 07/19/20  7:38 AM   Result Value Ref Range    Folate 16.4 >5.4 ng/mL   Treponema Abs w Reflex to RPR and Titer    Collection Time: 07/19/20  7:38 AM   Result Value Ref Range    Treponema Antibodies Reactive (A) NR^Nonreactive   Blood metal panel    Collection Time: 07/19/20  7:38 AM   Result Value Ref Range    Arsenic <10.0 <=12.0 ug/L    Lead Venous Blood <2.0 <=4.9 ug/dL    Mercury <2.5 <=10.0 ug/L   Lyme Disease Greta with reflex to WB Serum    Collection Time: 07/19/20  7:38 AM   Result Value Ref Range    Lyme Disease Antibodies Serum 0.27 0.00 - 0.89   Rapid Plasma Reagin w Rflx to TITER    Collection Time: 07/19/20  7:38 AM   Result Value Ref Range    Rapid Plasma Reagin Nonreactive NR^Nonreactive   Treponema pallidum antibody confirm    Collection Time: 07/19/20  7:38 AM   Result Value Ref Range    T Pallidum by TP-PA conf Non Reactive Non Reactive            Consults:   Consultation during this admission received from internal medicine:     Consult Date:  07/19/2020      HISTORY OF PRESENT ILLNESS:  The patient is a 40-year-old female with reported history of schizoaffective disorder, admitted to [facility] yesterday for active hallucinations to kill her children and then kill herself.  An Internal Medicine consultation was ordered by Dr. Gandhi to assess medical problems including orthostatic hypotension and lightheadedness.  At this time, Nona is observed lying in bed supine in no  acute distress and currently denies acute physical concerns including lightheadedness.  Within the past few hours, however, patient has developed significant lightheadedness when arising to a stand from a sitting position to the point where she was not able to maintain her balance for up to 1 minute before needing to lie down again.  States this came on last night after taking her Clozaril dose.  Denies rain fainting or falling.  Denies associated symptoms including fever, chills, chest pain, shortness of breath, abdominal pain, nausea, bowel or bladder concerns. Denies other concerns at this time.      PAST MEDICAL HISTORY:     1.  Schizoaffective disorder and other psychiatric history per Dr. Gandhi.   2.  Denies history of major medical problems and surgeries including cardiopulmonary disease, hypertension and diabetes.      ADMISSION MEDICATIONS:  Reviewed and listed in the medication reconciliation list.      ALLERGIES:  NO KNOWN DRUG ALLERGIES.      SOCIAL HISTORY:   with 6 children and lives in [place].      FAMILY HISTORY:  Reviewed and noncontributory.      REVIEW OF SYSTEMS:  Ten-point review of systems negative except as stated above in history of present illness.      PHYSICAL EXAMINATION:   GENERAL:  Otherwise healthy-appearing woman in no acute distress.   VITAL SIGNS:  Most recently, systolic blood pressure went from 109 sitting to 89 standing, and diastolic with her heart rate increased from 96 sitting to 134 on standing.  Temperature is afebrile, oxygen saturation normal on room air.   HEENT:  Negative.   NECK:  Supple.  No cervical lymphadenopathy or thyromegaly.   LUNGS:  Clear.   CARDIOVASCULAR:  Regular rate and rhythm.   ABDOMEN:  Soft, nontender.   EXTREMITIES:  No edema.   SKIN:  No rash on exposed areas.   NEUROLOGIC:  She is awake, alert and oriented x 3.  Motor strength is symmetric.  She is not tremulous.      LABORATORY DATA:  From today, a CBC with differential.  Folate and  "vitamin B12 level normal.  COVID-19 testing negative.      ASSESSMENT:   1.  Psychosis per Dr. Gandhi.   2.  Acute lightheadedness, most likely secondary to orthostatic hypotension from being intravascularly dry and exacerbated by side effect of Clozaril.   3.  Otherwise, overall apparent normally good physical health.      PLAN:  Will give patient a bolus of Lactaid Ringer's IV over 2 hours.  Will also obtain a routine basic metabolic panel to ensure her kidney function and electrolytes are normal.  Continue to encourage patient to increase her p.o. fluid intake.  If this persists, would recommend decreasing the Clozaril dose or discontinuing and changing to another antipsychotic.  Please encourage patient to wait a few seconds when arising to a stand from a sitting or lying position to ensure that the lightheadedness does not recur.  If it does, then continue to have her lying down.  Medicine will continue to follow.  Please feel free to call with questions.      Thank you for this consultation.         JANINE SANDOVAL PA-C        7/22/2020 Brief medicine consult note      Positive treponema ab   RPR negative. Treponema pallidum antibody confirmation test non reactive.. Per protocol, \"If the Anti-Treponema Antibody is positive and the RPR is negative, then a second Treponema specific test (TP-PA) will be performed to determine whether the antibody test is falsely positive or is detecting early infection.  If the latter is suspected, repeat testing in approximately two weeks is recommended.\"    - Consider repeat testing in approximately two weeks if high suspicion for syphilis or no improvement in symptoms.      Orthostasis   Received 1L LR 7/19 due to patient being intravascularly dry. Orthostatic blood pressures this AM with sitting 113/76, standing with appropriate response 118/80. BMP unremarkable. CR wnl. Again today, patient with positive orthostatic blood pressures.   - Continue to monitor, if " persistent would recommend decreasing Clozaril dose or discontinue and change to another antipsychotic  - Encourage patient to increase PO fluid intake         Liseth Salazar PA-C  Hospitalist Service            Hospital Course:   Nona Finley was admitted to [facility] with attending Alison Gallardo DO as a voluntary patient. The patient was placed under status 15 (15 minute checks) to ensure patient safety. Labs obtained as above, including additional lab work for psychosis work up. Pt was restarted on PTA medications. She is under commitment and Reyes but was admitted as a voluntary patient and PD was not revoked. She is followed by ACT team and team coordinated with them throughout hospitalization. Pt reported symptomatic hypotension early on in admission, IM consult placed and she was given IV fluids with improvement in symptoms. She reported oversedation with 100mg dose of clozapine and noted this was part of nonadherence when outpatient, agreeable to decreasing dose to 75mg and she remained adherent to medications and reported no further oversedation. She did report some sialorrhea and started on PRN atropine drops at bedtime with improvement. Reporting improvement in CAH and paranoia 7/21 and reported feeling ready to discharge home but agreeable to remaining in the hospital for further stabilization given short time before readmission following previous discharge. Her Treponema antibody resulted as reactive but RPR non-reactive indicating past infection, IM ordered additional test to confirm-- T. Pallidum nonreactive and recommended repeat testing in 2 weeks if high suspicion for syphillis or symptoms not improving. Patient continued to state her CAH had resolved and denied having any safety concerns and plans for discharge coordinated with ACT team.      Today Nona Finley reports she does not have any thoughts of harming self or others. In addition, she has notable risk factors for  "self-harm, including psychosis. However, risk is mitigated by commitment to family, sobriety, absence of past attempts, ability to volunteer a safety plan and history of seeking help when needed. Therefore, based on all available evidence including the factors cited above, she does not appear to be at imminent risk for self-harm, does not meet criteria for a 72-hr hold, and therefore remains appropriate for ongoing outpatient level of care.     Nona Finley was discharged to home. At the time of discharge Nona Finley was determined to not be a danger to herself or others.          Discharge Medications:      Nona Finley   Home Medication Instructions RUTH:[id number]    Printed on:07/23/20 9273   Medication Information                      ARIPiprazole (ABILIFY) 5 MG tablet  Take 1 tablet (5 mg) by mouth daily as needed (psychosis/paranoia)             ARIPiprazole ER (ABILIFY MAINTENA) 400 MG extended release inj syringe  Inject 400 mg into the muscle every 28 days              atropine 1 % ophthalmic solution  Place 1-2 drops under tongue at bedtime as needed for excess saliva/secretions.             cloZAPine (CLOZARIL) 25 MG tablet  Take 3 tablets (75 mg) by mouth At Bedtime             metFORMIN (GLUCOPHAGE-XR) 500 MG 24 hr tablet  Take 500 mg by mouth At Bedtime             Vitamin D3 (CHOLECALCIFEROL) 25 mcg (1000 units) tablet  Take 1 tablet (25 mcg) by mouth daily                      Psychiatric Examination:   Appearance: awake, alert and adequately groomed  Attitude:  cooperative  Eye Contact:  good  Mood:  \"better\"  Affect:  appropriate and in normal range and mood congruent  Speech:  clear, coherent and normal prosody  Language: fluent and intact in English  Psychomotor, Gait, Musculoskeletal:  no evidence of tardive dyskinesia, dystonia, or tics  Thought Process:  linear and goal oriented  Associations:  no loose associations  Thought Content:  no evidence of suicidal ideation or " homicidal ideation and no evidence of psychotic thought  Insight:  partial  Judgement:  fair, adequate for safety  Oriented to:  time, person, and place  Attention Span and Concentration:  intact  Recent and Remote Memory:  intact  Fund of Knowledge:  appropriate         Discharge Plan:   Health Care Follow-up Appointments:   ReEntry ACT Team: Johanne Seymour RN and Vicky Harrington  [phone]; [phone] fax     PCP:Dr. Carmela De Los Santos at [facility] [phone].      Attend all scheduled appointments with your outpatient providers. Call at least 24 hours in advance if you need to reschedule an appointment to ensure continued access to your outpatient providers.     Attestation:  Patient has been seen and evaluated by me, Alison Gallardo DO on day of discharge. 40 minutes were spent in coordination of discharge planning.

## 2020-07-24 ENCOUNTER — TELEPHONE (OUTPATIENT)
Dept: PHARMACY | Facility: OTHER | Age: 40
End: 2020-07-24

## 2020-07-24 NOTE — TELEPHONE ENCOUNTER
Hi there,  Is this patient going to be scheduled for a post hosp visit?  Could pharmacy be involved if yes.  Thanks  Sadiq

## 2020-07-24 NOTE — TELEPHONE ENCOUNTER
MTM referral from: Transitions of Care (recent hospital discharge or ED visit)    MTM referral outreach attempt #1 on July 24, 2020 at 8:47 AM      Outcome: Patient is not interested at this time because they are a [facility] patient and information was passed along to them upon discharge, will route to MTM Pharmacist/Provider as an FYI. Thank you for the referral.     Birdie Freeman, MTM Coordinator

## 2020-07-27 ENCOUNTER — PATIENT OUTREACH (OUTPATIENT)
Dept: FAMILY MEDICINE | Facility: CLINIC | Age: 40
End: 2020-07-27

## 2020-07-27 NOTE — TELEPHONE ENCOUNTER
[facility]: Post-Discharge Note  SITUATION                                                      Admission: 7/17/20        Discharge: 7/23/20     BACKGROUND                                                      Per ED note: Nona Finley is a 40 year old female with a medical history significant for severe bipolar 1 disorder with psychotic behavior, depressive type schizoaffective disorder, auditory hallucinations, suicidal ideations, psychosis, postpartum depression, and tuberculosis who presents the ED today for mental health evaluation. She was brought in by her sister for behavioral evaluation. Patient had recent hospitalization from 7/10-7/14/20 in setting of destabilization and medication nonadherence.  She was restarted on Clozaril 100 mg HS as well as Abilify Maintena 400 mg during this hospitalization, and was placed under commitment and Reyes.  Over the last 2 days she has been deliberately vomiting her medications, paranoid that they're tainted with marijuana and other medications.  She is experiencing auditory hallucinations telling her to kill her kids and herself. She denies plan or intent to kill kids but has had thoughts to jump off bridge to kill herself. She is also experiencing visual hallucinations and seeing people with a knife, as well as olfactory hallucinations with smelling bleach on her food. She has only been sleeping 2-3 hours a night, skipping meals. She has been speaking to her ACT team daily and was told to come to the ER for evaluation.  At this time patient had received 1 mg of Ativan in the emergency room from previous evaluation by Dr. akins now and had marked improvement of her symptoms patient denied any intent to harm and is interested in increased outpatient services.  Nona Finley is a very pleasant, Citizen of [place] female.  She is tearful throughout the interview.  States that that the reason for admission is stopping her medications as soon as she was  "discharged 4 days ago.  She believes that her medications were laced with marijuana and she was having a hard time swallowing them.  She decompensated rather quickly.  She started hearing voices urging her to kill herself and her children.  She started having visual hallucinations, \"scary faces\" especially at night.  She was feeling paranoid believing that people are out to get her.  Currently feels very depressed.  She is sleeping about 3 hours a night.  She is not taking naps during the day.  Her energy is low, appetite is poor, she is feeling hopeless and worthless.  Reports a lot of worries about her family.  Denies manic episodes.  Denies history of PTSD, OCD, and eating disorders.  Denies suicide attempts and self injury behaviors. Denies seizures, head injuries, and loss of consciousness.  The patient seems to have a good insight into her illness.  States that she understands that she needs to take medications in order for her to stay out of the hospital and be able to function.  The patient knows that if she is not taking the medications she will be given injections, per court order.  She is aware of her diagnosis of her schizoaffective disorder.  States she was initially diagnosed in 2011.  Prior to that she had never had any problems.           ASSESSMENT         Pt states she is doing better. Taking her medications. Denies any hallucinations    PLAN                                                      Outpatient Plan:  Health Care Follow-up Appointments:   [facility] ACT Team: Johanne Seymour RN and Vicky Harrington  [phone]; [phone] fax     PCP:Dr. Carmela De Los Santos at [facility] [address] [phone].  (PCP has graduated so scheduled with alternate provider)       Future Appointments   Date Time Provider Department Center   8/10/2020  1:00 PM Elle Villar MD [facility] Own[facility]       Taryn Guzman RN                "

## 2020-08-10 ENCOUNTER — OFFICE VISIT (OUTPATIENT)
Dept: PHARMACY | Facility: PHYSICIAN GROUP | Age: 40
End: 2020-08-10
Payer: COMMERCIAL

## 2020-08-10 ENCOUNTER — OFFICE VISIT (OUTPATIENT)
Dept: FAMILY MEDICINE | Facility: CLINIC | Age: 40
End: 2020-08-10
Payer: COMMERCIAL

## 2020-08-10 VITALS
BODY MASS INDEX: 25.79 KG/M2 | DIASTOLIC BLOOD PRESSURE: 77 MMHG | WEIGHT: 155 LBS | TEMPERATURE: 98.3 F | RESPIRATION RATE: 16 BRPM | HEART RATE: 64 BPM | OXYGEN SATURATION: 96 % | SYSTOLIC BLOOD PRESSURE: 109 MMHG

## 2020-08-10 DIAGNOSIS — F25.1 SCHIZOAFFECTIVE DISORDER, DEPRESSIVE TYPE (H): Primary | ICD-10-CM

## 2020-08-10 DIAGNOSIS — L60.9 NAIL ABNORMALITIES: ICD-10-CM

## 2020-08-10 DIAGNOSIS — F25.1 SCHIZOAFFECTIVE DISORDER, DEPRESSIVE TYPE (H): Primary | Chronic | ICD-10-CM

## 2020-08-10 DIAGNOSIS — E55.9 VITAMIN D DEFICIENCY: ICD-10-CM

## 2020-08-10 PROBLEM — B35.1 FUNGAL NAIL INFECTION: Status: ACTIVE | Noted: 2017-11-02

## 2020-08-10 PROBLEM — R45.851 SUICIDAL IDEATION: Status: RESOLVED | Noted: 2017-02-16 | Resolved: 2020-08-10

## 2020-08-10 PROBLEM — F20.9 SCHIZOPHRENIA (H): Status: RESOLVED | Noted: 2019-02-12 | Resolved: 2020-08-10

## 2020-08-10 PROBLEM — Z30.019 ENCOUNTER FOR FEMALE BIRTH CONTROL: Status: RESOLVED | Noted: 2017-06-14 | Resolved: 2020-08-10

## 2020-08-10 PROCEDURE — 99207 ZZC NO CHARGE LOS: CPT | Performed by: PHARMACIST

## 2020-08-10 RX ORDER — VITAMIN B COMPLEX
25 TABLET ORAL DAILY
Qty: 30 TABLET | Refills: 0 | Status: SHIPPED | OUTPATIENT
Start: 2020-08-10 | End: 2020-12-01

## 2020-08-10 RX ORDER — ARIPIPRAZOLE 400 MG
400 KIT INTRAMUSCULAR
Status: ON HOLD | COMMUNITY
Start: 2018-05-29 | End: 2021-03-08

## 2020-08-10 NOTE — PROGRESS NOTES
Preceptor Attestation:    Patient seen and evaluated in person. I discussed the patient with the resident. I have verified the content of the note, which accurately reflects my assessment of the patient and the plan of care.   Supervising Physician:  Avery Govea MD.

## 2020-08-10 NOTE — PROGRESS NOTES
Hospitalization Follow-up Visit         [facility]       Hospital Follow-up Visit:    Hospital:  [facility]  Date of Admission: 7/17/20  Date of Discharge: 7/23/20  Reason(s) for Admission: schizophrenia with acute decompensation             Problems taking medications regularly:  None       Post Discharge Medication Reconciliation: discharge medications reconciled, continue medications without change.       Problems adhering to non-medication therapy:  None       Medications reviewed by myself and PharmD.    Summary of hospitalization:  [facility] discharge summary reviewed  Diagnostic Tests/Treatments reviewed.  Follow up needed: none  Other Healthcare Providers Involved in Patient s Care: Care Coordination and MTM  Update since discharge: improved.   Plan of care communicated with patient            Additionally, Nona complains of chronic thick, discolored fingernails.  She was treated with a 1 month course of oral antifungal, without changes.  Denies trauma to the affected nails.  No drainage, swelling, discoloration, or other changes in the digits.  Refer to images below for details.         Review of Systems:   CONSTITUTIONAL: no fatigue, no unexpected change in weight  SKIN: no worrisome rashes, no worrisome moles, no worrisome lesions  EYES: no acute vision problems or changes  ENT: no ear problems, no mouth problems, no throat problems  RESP: no significant cough, no shortness of breath  CV: no chest pain, no palpitations, no new or worsening peripheral edema  GI: no nausea, no vomiting, no constipation, no diarrhea            Physical Exam:     Vitals:    08/10/20 1605   BP: 109/77   Pulse: 64   Resp: 16   Temp: 98.3  F (36.8  C)   TempSrc: Oral   SpO2: 96%   Weight: 70.3 kg (155 lb)     Body mass index is 25.79 kg/m .    General: Pleasant, calm, [place] woman. Well appearing. No acute distress  HEENT: No signs of trauma. No rhinorrhea. Moist membranes.   Eyes:  "Conjunctivae are normal. Pupils are equal.   Neck: Normal range of motion.    Resp: No respiratory distress.    MSK: Normal range of motion. No obvious deformity.  Neuro: The patient is alert and interactive. Speech normal. No gross focal findings.   Skin: Third fingernail on left significant for uneven discoloration, not thickened or desquamating. First and third fingernails on right hand are thickened and hyperpigmented, with superficial desquamation. No tenderness, erythema, cyanosis or other discoloration, nor swelling. Please refer to images below.   Psych: alert, adequately groomed. Attitude: cooperative. Eye Contact: good. Mood: \"ok\". Affect: appropriate, concordant with mood. Speech: clear, coherent Psychomotor Behavior: no evidence of tardive dyskinesia, dystonia, or tics. Thought Process: logical, linear and goal oriented. Associations: no loose associations. Thought Content: no evidence of suicidal ideation or homicidal ideation, denies auditory or visual hallucinations at this time. Insight: fair. Judgment: seems intact.                        Results:     Component Date Value Ref Range     SARS-CoV-2 PCR Result 07/17/2020 NEGATIVE       WBC 07/18/2020 6.0  4.0 - 11.0 10e9/L     Hemoglobin 07/18/2020 11.9  11.7 - 15.7 g/dL     MCV 07/18/2020 91  78 - 100 fl     MCHC 07/18/2020 34.0  31.5 - 36.5 g/dL     RDW 07/18/2020 13.7  10.0 - 15.0 %     Platelet Count 07/18/2020 231  150 - 450 10e9/L     Nucleated RBCs 07/18/2020 0  0 /100     Absolute Neutrophil 07/18/2020 3.0  1.6 - 8.3 10e9/L     Absolute Lymphocytes 07/18/2020 2.0  0.8 - 5.3 10e9/L     Absolute Monocytes 07/18/2020 0.6  0.0 - 1.3 10e9/L     Absolute Eosinophils 07/18/2020 0.5  0.0 - 0.7 10e9/L     Absolute Basophils 07/18/2020 0.0  0.0 - 0.2 10e9/L     Abs Immature Granulocytes 07/18/2020 0.0  0 - 0.4 10e9/L     Absolute Nucleated RBC 07/18/2020 0.0       Vitamin D Deficiency screening 07/19/2020 17* 20 - 75 ug/L     Vitamin B12 07/19/2020 410  " 193 - 986 pg/mL     Folate 07/19/2020 16.4  >5.4 ng/mL     Treponema Antibodies 07/19/2020 Reactive* NR^Nonreactive     Arsenic 07/19/2020 <10.0  <=12.0 ug/L     Lead Venous Blood 07/19/2020 <2.0  <=4.9 ug/dL     Mercury 07/19/2020 <2.5  <=10.0 ug/L     Lyme Disease Antibodies Serum 07/19/2020 0.27  0.00 - 0.89     Sodium 07/19/2020 141  133 - 144 mmol/L     Potassium 07/19/2020 4.2  3.4 - 5.3 mmol/L     Chloride 07/19/2020 107  94 - 109 mmol/L     Carbon Dioxide 07/19/2020 25  20 - 32 mmol/L     Anion Gap 07/19/2020 9  3 - 14 mmol/L     Glucose 07/19/2020 81  70 - 99 mg/dL     Urea Nitrogen 07/19/2020 13  7 - 30 mg/dL     Creatinine 07/19/2020 0.51* 0.52 - 1.04 mg/dL     GFR Estimate If Black 07/19/2020 >90  >60 mL/min/[1.73_m2]     Calcium 07/19/2020 8.5  8.5 - 10.1 mg/dL     Rapid Plasma Reagin 07/19/2020 Nonreactive  NR^Nonreactive     T Pallidum by TP-PA conf 07/19/2020 Non Reactive  Non Reactive     WBC 07/23/2020 5.1  4.0 - 11.0 10e9/L     Hemoglobin 07/23/2020 12.5  11.7 - 15.7 g/dL     MCV 07/23/2020 91  78 - 100 fl     MCHC 07/23/2020 33.7  31.5 - 36.5 g/dL     Platelet Count 07/23/2020 253  150 - 450 10e9/L       Assessment and Plan     Nona was seen today for recheck after 2 hospital admissions for psychiatric decompensation in the setting of poor compliance with medication.     Schizoaffective disorder, depressive type, severe  Continue taking medications as prescribed. Patient understands that she needs to be compliant with medication and Psychiatry visits. No evidence of acute psychosis or suicidality today. Feels well supported at home by mother in law and sister in law, who help with her 5 children, as  is working out of state.   - f/u with Psychiatrist, per discharge instructions  - no changes to medications     Nail abnormalities  Subungual hyperkeratosis with onycholysis and hyperpigmentation of 2 fingernails on right hand, uneven pigmentation with hypermelanotic patches on one  fingernail on the left. Unclear onset of symptoms, but no response to what sounds like appropriate anti-fungal treatment. Denies other skin or hair disorders.  Will refer to dermatology for further evaluation. No evidence of herpetic emelyn, paronychia. Doubt melanoma.   -     DERMATOLOGY REFERRAL - INTERNAL      E&M code to be billed if TCM cannot be: 62196    Type of decision making: Moderate complexity (52124)    Options for treatment and follow-up care were reviewed with the patient  Nonamundo Finley   engaged in the decision making process and verbalized understanding of the options discussed and agreed with the final plan.      Elle Villar MD  Family Medicine Resident [facility], PGY-3  Phone: [phone]

## 2020-08-10 NOTE — PATIENT INSTRUCTIONS
Here is the summary from today's visit.    Nona was seen today for recheck.    Diagnoses and all orders for this visit:    Schizoaffective disorder, depressive type, severe    History of latent tuberculosis    Follow up plan: September 20 for physical   REMEMBER TO TALK ABOUT BIRTH CONTROL    You are due for pap smear with HPV, diabetes check. Please call the  at [phone] to schedule an appointment.     Thank you for choosing [facility]!  Elle Villar MD   >>>>> <<<<<  If you had laboratory testing and results need quick action we will call you at # [phone] (home) none (work). If this is not the best number, please call our clinic or stop by the  to update your contact information.  If you need any refills please call your pharmacy and they will contact us. If you need to  your refill at a new pharmacy, please contact the new pharmacy directly. The new pharmacy will help you get your medications transferred faster.   If you have any concerns about today's visit or wish to schedule another appointment, please call our office during normal business hours [phone] (8-5:00 M-F)  If a referral was made to a [facility] and you don't get a call from central scheduling, please call [phone].  If a mammogram was ordered, call [facility] at [phone] to schedule or change your appointment.  If you had an XRay/CT/Ultrasound/MRI ordered, the number is [phone] to schedule or change your radiology appointment.     If you have urgent medical concerns please call [phone] at any time of the day.

## 2020-08-10 NOTE — PROGRESS NOTES
MTM ENCOUNTER  SUBJECTIVE/OBJECTIVE:                           Nona Finley is a 40 year old female coming in for a TCM visit.  She was discharged from [facility] on 7/23/20 for Schizophrenia with acute decompesation. Today's visit is a co-visit with Dr. Villar.     Chief Complaint: Hospital follow up.      Allergies/ADRs: Reviewed in EHR  Tobacco:  reports that she has never smoked. She has never used smokeless tobacco.  Alcohol: none  Caffeine: NA  Activity: NA  PMH: Reviewed in EHR    Medication Adherence/Access: no issues reported    Schizoaffective disorder:   Currently takes aripiprazole Depo injection once every 28 days.  This is administered by home care nurse.  Patient has been recently converted from orals to injection.  She is tolerating this well.   There is an aripiprazole 5 mg prescription tablet that is meant to be used as needed.  She does not have this tablet.  She declines needing this medication as well.  Nona continues to take clozapine 75 mg once at bedtime.  She notes that this medication makes her drowsy in the morning.  This was the reasoning for decreasing this dose from 100 to 75 mg.  Since the dose decrease she has been able to get out of bed and does feel that his symptoms are manageable.  She continues to have blood counts done through [facility] and home care nurse.    Metformin 500 mg daily.  Patient states that she is taking this to reduce weight. Nona states that she has some GI discomfort from the medication but as long as she takes it with food is able to tolerate the medication.  She denies any diarrhea with the medication.    Vitamin D 1000 units once daily.  She takes this for low vitamin D levels.  She has not had this up at home recently.  She is asked for home care nurse to deliver this medication.        Today's Vitals: There were no vitals taken for this visit.      ASSESSMENT:                              Medication Adherence: excellent, no issues  identified    Schizoaffective disorder: :   Current medication appears appropriate.  The patient is satisfied with the system of taking the medication as well as testing.  Home care nurse is enabling the patient to be compliant with the therapy as well as the packaging from general pharmacy.  Adverse effects from the atypical antipsychotics appear to be managed both by the recent dose reduction as well as the addition of metformin.  It is understood that metformin is likely on to address metabolic syndrome changes caused by the antipsychotics.  Patient tolerates the medication.      PLAN:                          Post Discharge Medication Reconciliation Status: discharge medications reconciled and changed, per note/orders.      I spent 20 minutes with this patient today. Dr. Villar was provided the recommendations above  in clinic today and Dr. Govea is the authorizing prescriber for this visit through the pharmacist collaborative practice agreement..     Will follow up with PCP.    The patient was given a summary of these recommendations. See Provider note/AVS from today.     Sadiq Gleason Pharm.D.

## 2020-08-13 ENCOUNTER — TELEPHONE (OUTPATIENT)
Dept: DERMATOLOGY | Facility: CLINIC | Age: 40
End: 2020-08-13

## 2020-08-13 NOTE — TELEPHONE ENCOUNTER
[facility] Call Center    Phone Message    May a detailed message be left on voicemail: yes     Reason for Call: Appointment Intake    Referring Provider Name: Elle Agrawalgrupo referring  Diagnosis and/or Symptoms: Nail abnormalities - please review, per guidelines    Action Taken: Message routed to:  [facility] Derm    Travel Screening: Not Applicable

## 2020-08-14 ENCOUNTER — APPOINTMENT (OUTPATIENT)
Dept: GENERAL RADIOLOGY | Facility: CLINIC | Age: 40
End: 2020-08-14
Attending: EMERGENCY MEDICINE
Payer: COMMERCIAL

## 2020-08-14 ENCOUNTER — HOSPITAL ENCOUNTER (EMERGENCY)
Facility: CLINIC | Age: 40
Discharge: HOME | End: 2020-08-14
Attending: EMERGENCY MEDICINE | Admitting: EMERGENCY MEDICINE
Payer: COMMERCIAL

## 2020-08-14 ENCOUNTER — APPOINTMENT (OUTPATIENT)
Dept: CT IMAGING | Facility: CLINIC | Age: 40
End: 2020-08-14
Attending: EMERGENCY MEDICINE
Payer: COMMERCIAL

## 2020-08-14 VITALS
RESPIRATION RATE: 20 BRPM | OXYGEN SATURATION: 98 % | BODY MASS INDEX: 25.69 KG/M2 | SYSTOLIC BLOOD PRESSURE: 103 MMHG | DIASTOLIC BLOOD PRESSURE: 70 MMHG | TEMPERATURE: 98.8 F | HEART RATE: 100 BPM | WEIGHT: 154.4 LBS

## 2020-08-14 DIAGNOSIS — I26.99 PULMONARY EMBOLISM, OTHER, UNSPECIFIED CHRONICITY, UNSPECIFIED WHETHER ACUTE COR PULMONALE PRESENT (H): ICD-10-CM

## 2020-08-14 DIAGNOSIS — Z20.828 EXPOSURE TO SARS-ASSOCIATED CORONAVIRUS: ICD-10-CM

## 2020-08-14 DIAGNOSIS — Z20.822 SUSPECTED COVID-19 VIRUS INFECTION: ICD-10-CM

## 2020-08-14 LAB
ANION GAP SERPL CALCULATED.3IONS-SCNC: 5 MMOL/L (ref 3–14)
BASOPHILS # BLD AUTO: 0 10E9/L (ref 0–0.2)
BASOPHILS NFR BLD AUTO: 0.4 %
BUN SERPL-MCNC: 14 MG/DL (ref 7–30)
CALCIUM SERPL-MCNC: 9.7 MG/DL (ref 8.5–10.1)
CHLORIDE SERPL-SCNC: 106 MMOL/L (ref 94–109)
CO2 SERPL-SCNC: 27 MMOL/L (ref 20–32)
CREAT SERPL-MCNC: 0.59 MG/DL (ref 0.52–1.04)
D DIMER PPP FEU-MCNC: 3.2 UG/ML FEU (ref 0–0.5)
DIFFERENTIAL METHOD BLD: NORMAL
EOSINOPHIL # BLD AUTO: 0.2 10E9/L (ref 0–0.7)
EOSINOPHIL NFR BLD AUTO: 3.1 %
ERYTHROCYTE [DISTWIDTH] IN BLOOD BY AUTOMATED COUNT: 13.2 % (ref 10–15)
GFR SERPL CREATININE-BSD FRML MDRD: >90 ML/MIN/{1.73_M2}
GLUCOSE SERPL-MCNC: 148 MG/DL (ref 70–99)
HCG UR QL: NEGATIVE
HCT VFR BLD AUTO: 40.3 % (ref 35–47)
HGB BLD-MCNC: 13.4 G/DL (ref 11.7–15.7)
IMM GRANULOCYTES # BLD: 0 10E9/L (ref 0–0.4)
IMM GRANULOCYTES NFR BLD: 0.3 %
INTERNAL QC OK POCT: YES
INTERPRETATION ECG - MUSE: NORMAL
LYMPHOCYTES # BLD AUTO: 1.5 10E9/L (ref 0.8–5.3)
LYMPHOCYTES NFR BLD AUTO: 22 %
MCH RBC QN AUTO: 30.5 PG (ref 26.5–33)
MCHC RBC AUTO-ENTMCNC: 33.3 G/DL (ref 31.5–36.5)
MCV RBC AUTO: 92 FL (ref 78–100)
MONOCYTES # BLD AUTO: 0.4 10E9/L (ref 0–1.3)
MONOCYTES NFR BLD AUTO: 5.2 %
NEUTROPHILS # BLD AUTO: 4.7 10E9/L (ref 1.6–8.3)
NEUTROPHILS NFR BLD AUTO: 69 %
NRBC # BLD AUTO: 0 10*3/UL
NRBC BLD AUTO-RTO: 0 /100
PLATELET # BLD AUTO: 266 10E9/L (ref 150–450)
POTASSIUM SERPL-SCNC: 3.7 MMOL/L (ref 3.4–5.3)
RADIOLOGIST FLAGS: ABNORMAL
RBC # BLD AUTO: 4.39 10E12/L (ref 3.8–5.2)
SODIUM SERPL-SCNC: 138 MMOL/L (ref 133–144)
TROPONIN I SERPL-MCNC: <0.015 UG/L (ref 0–0.04)
WBC # BLD AUTO: 6.9 10E9/L (ref 4–11)

## 2020-08-14 PROCEDURE — 25000125 ZZHC RX 250: Performed by: EMERGENCY MEDICINE

## 2020-08-14 PROCEDURE — 99285 EMERGENCY DEPT VISIT HI MDM: CPT | Mod: 25 | Performed by: EMERGENCY MEDICINE

## 2020-08-14 PROCEDURE — U0003 INFECTIOUS AGENT DETECTION BY NUCLEIC ACID (DNA OR RNA); SEVERE ACUTE RESPIRATORY SYNDROME CORONAVIRUS 2 (SARS-COV-2) (CORONAVIRUS DISEASE [COVID-19]), AMPLIFIED PROBE TECHNIQUE, MAKING USE OF HIGH THROUGHPUT TECHNOLOGIES AS DESCRIBED BY CMS-2020-01-R: HCPCS | Performed by: EMERGENCY MEDICINE

## 2020-08-14 PROCEDURE — 84484 ASSAY OF TROPONIN QUANT: CPT | Performed by: EMERGENCY MEDICINE

## 2020-08-14 PROCEDURE — C9803 HOPD COVID-19 SPEC COLLECT: HCPCS

## 2020-08-14 PROCEDURE — 71275 CT ANGIOGRAPHY CHEST: CPT

## 2020-08-14 PROCEDURE — 93010 ELECTROCARDIOGRAM REPORT: CPT | Mod: Z6 | Performed by: EMERGENCY MEDICINE

## 2020-08-14 PROCEDURE — 80048 BASIC METABOLIC PNL TOTAL CA: CPT | Performed by: EMERGENCY MEDICINE

## 2020-08-14 PROCEDURE — 25000132 ZZH RX MED GY IP 250 OP 250 PS 637: Performed by: EMERGENCY MEDICINE

## 2020-08-14 PROCEDURE — 85379 FIBRIN DEGRADATION QUANT: CPT | Performed by: EMERGENCY MEDICINE

## 2020-08-14 PROCEDURE — 71046 X-RAY EXAM CHEST 2 VIEWS: CPT

## 2020-08-14 PROCEDURE — 93005 ELECTROCARDIOGRAM TRACING: CPT

## 2020-08-14 PROCEDURE — 81025 URINE PREGNANCY TEST: CPT | Performed by: EMERGENCY MEDICINE

## 2020-08-14 PROCEDURE — 85025 COMPLETE CBC W/AUTO DIFF WBC: CPT | Performed by: EMERGENCY MEDICINE

## 2020-08-14 PROCEDURE — 99285 EMERGENCY DEPT VISIT HI MDM: CPT | Mod: 25

## 2020-08-14 PROCEDURE — 25000128 H RX IP 250 OP 636: Performed by: EMERGENCY MEDICINE

## 2020-08-14 RX ORDER — TRAMADOL HYDROCHLORIDE 50 MG/1
50 TABLET ORAL ONCE
Status: COMPLETED | OUTPATIENT
Start: 2020-08-14 | End: 2020-08-14

## 2020-08-14 RX ORDER — IOPAMIDOL 755 MG/ML
100 INJECTION, SOLUTION INTRAVASCULAR ONCE
Status: COMPLETED | OUTPATIENT
Start: 2020-08-14 | End: 2020-08-14

## 2020-08-14 RX ADMIN — RIVAROXABAN 15 MG: 15 TABLET, FILM COATED ORAL at 17:18

## 2020-08-14 RX ADMIN — SODIUM CHLORIDE 75 ML: 9 INJECTION, SOLUTION INTRAVENOUS at 14:09

## 2020-08-14 RX ADMIN — TRAMADOL HYDROCHLORIDE 50 MG: 50 TABLET, FILM COATED ORAL at 16:34

## 2020-08-14 RX ADMIN — IOPAMIDOL 54 ML: 755 INJECTION, SOLUTION INTRAVENOUS at 14:08

## 2020-08-14 ASSESSMENT — ENCOUNTER SYMPTOMS
SHORTNESS OF BREATH: 0
FEVER: 0
BACK PAIN: 1
COUGH: 0

## 2020-08-14 NOTE — ED AVS SNAPSHOT
[facility], [place], Emergency Department  [address]  [facility] [address]  Phone:  [phone]  Fax:  [phone]                                    Nona Finley   MRN: [id number]    Department:  [facility], Emergency Department   Date of Visit:  8/14/2020           After Visit Summary Signature Page    I have received my discharge instructions, and my questions have been answered. I have discussed any challenges I see with this plan with the nurse or doctor.    ..........................................................................................................................................  Patient/Patient Representative Signature      ..........................................................................................................................................  Patient Representative Print Name and Relationship to Patient    ..................................................               ................................................  Date                                   Time    ..........................................................................................................................................  Reviewed by Signature/Title    ...................................................              ..............................................  Date                                               Time          22EPIC Rev 08/18

## 2020-08-14 NOTE — ED PROVIDER NOTES
"    [facility] EMERGENCY DEPARTMENT ([facility])     August 14, 2020    History     Chief Complaint   Patient presents with     Chest Pain     States her heart is pounding and she has pain between her shoulder blades.     The history is provided by the patient and medical records.     Nona Finley is a 40 year old female with a history of schizophrenia who presents to the Emergency Department with left-sided chest pain. Patient reports she has had constant, burning pain under her left breast for the past 2 nights. She states she cannot sleep due to the pain and it is worse when laying down or breathing. Patient reports it hurts to touch. She states the pain radiates down into her abdomen. She states she has also had an \"upset stomach\" the past 4 days. Patient reports she took Tylenol with no relief. Patient is also having left shoulder blade/back pain. She denies pain with movement. Patient denies shortness of breath, cough, or fever. No masses or rash. No recent URIs. Patient denies history of HTN, HLD, or diabetes. Patient is not a smoker. Patient is not on oral contraceptives.    PAST MEDICAL HISTORY:   Past Medical History:   Diagnosis Date     Encounter for female birth control 6/14/2017 6/14/2017 Plan Documentation Service ordered Depo Provera injection (150mg IM) may be given every 3 months for one year per protoccol. Plan and order should be renewed at a visit no later than 6/14/18   Vanessa Thompson MD         Encounter for insertion or removal of intrauterine contraceptive device 1/3/2008    Paragard 1/08 removed 1/08.     Postpartum depression 8/18/2011     Schizophrenia (H) 2/12/2019     Suicidal ideation 2/16/2017       PAST SURGICAL HISTORY:   Past Surgical History:   Procedure Laterality Date     NO HISTORY OF SURGERY       NO HISTORY OF SURGERY  06/13/2013    Per patient reported this       Past medical history, past surgical history, medications, and allergies were reviewed with the " patient. Additional pertinent items: None    FAMILY HISTORY:   Family History   Problem Relation Age of Onset     Respiratory Father         asthma     Asthma Father      Cerebrovascular Disease Mother      Diabetes Mother      Neurologic Disorder Brother         mental health problem?     Neurologic Disorder Sister         seizures (half sister)     Respiratory Paternal Grandfather         asthma     Respiratory Sister         asthma     Respiratory Brother         asthma     Neurologic Disorder Daughter         autism      Hypertension Maternal Grandmother      Neurologic Disorder Sister         seizures     Diabetes Maternal Grandfather      Coronary Artery Disease Son        SOCIAL HISTORY:   Social History     Tobacco Use     Smoking status: Never Smoker     Smokeless tobacco: Never Used   Substance Use Topics     Alcohol use: No     Social history was reviewed with the patient. Additional pertinent items: None      Discharge Medication List as of 8/14/2020  4:35 PM      START taking these medications    Details   rivaroxaban ANTICOAGULANT (XARELTO ANTICOAGULANT) 15 MG TABS tablet Take 1 tablet (15 mg) by mouth 2 times daily (with meals) for 21 days, Disp-42 tablet,R-0, Local Print         CONTINUE these medications which have NOT CHANGED    Details   cloZAPine (CLOZARIL) 25 MG tablet Take 3 tablets (75 mg) by mouth At Bedtime, Disp-21 tablet,R-0, E-Prescribe      metFORMIN (GLUCOPHAGE-XR) 500 MG 24 hr tablet Take 500 mg by mouth At Bedtime, Historical      Vitamin D3 (CHOLECALCIFEROL) 25 mcg (1000 units) tablet Take 1 tablet (25 mcg) by mouth daily, Disp-30 tablet,R-0, E-Prescribe      ABILIFY MAINTENA 400 MG extended release injection Inject 400 mg into the muscle every 28 days, MARICRUZ, Historical      ARIPiprazole (ABILIFY) 5 MG tablet Take 1 tablet (5 mg) by mouth daily as needed (psychosis/paranoia), Disp-30 tablet,R-0, E-Prescribe      atropine 1 % ophthalmic solution Place 1-2 drops under tongue at bedtime  as needed for excess saliva/secretions., Disp-1 Bottle,R-0, E-Prescribe              No Known Allergies     Review of Systems   Constitutional: Negative for fever.   Respiratory: Negative for cough and shortness of breath.    Cardiovascular: Positive for chest pain (left-sided, below breast).   Musculoskeletal: Positive for back pain (left upper).   Skin: Negative for rash.   All other systems reviewed and are negative.    A complete review of systems was performed with pertinent positives and negatives noted in the HPI, and all other systems negative.    Physical Exam   BP: 103/74  Pulse: 130  Heart Rate: 90  Temp: 98.1  F (36.7  C)  Resp: 16  Weight: 70 kg (154 lb 6.4 oz)  SpO2: 99 %      Physical Exam  Constitutional:       General: She is not in acute distress.     Appearance: She is well-developed.   HENT:      Head: Normocephalic and atraumatic.   Eyes:      Extraocular Movements: Extraocular movements intact.   Cardiovascular:      Rate and Rhythm: Regular rhythm. Tachycardia present.      Heart sounds: Normal heart sounds.   Pulmonary:      Effort: Pulmonary effort is normal.   Chest:      Comments: Anterior tenderness to palpation underneath the left breast, no rashes no crepitus.  No palpable breast mass or lump  Abdominal:      Palpations: Abdomen is soft.      Tenderness: There is no abdominal tenderness.   Musculoskeletal: Normal range of motion.   Neurological:      General: No focal deficit present.      Mental Status: She is alert and oriented to person, place, and time.         ED Course   11:55 PM  The patient was seen and examined by James Hankins MD in Room ED02.        Procedures             EKG Interpretation:      Interpreted by James Hankins MD  Time reviewed: 1205  Symptoms at time of EKG: chest pain   Rhythm: normal sinus   Rate: 93  Axis: Normal  Ectopy: none  Conduction: normal  ST Segments/ T Waves: No acute ischemic changes  Q Waves: none  Comparison to prior: No old EKG  available    Clinical Impression: no acute changes       Results for orders placed or performed during the hospital encounter of 08/14/20 (from the past 24 hour(s))   EKG 12 lead   Result Value Ref Range    Interpretation ECG Click View Image link to view waveform and result    CBC with platelets differential   Result Value Ref Range    WBC 6.9 4.0 - 11.0 10e9/L    RBC Count 4.39 3.8 - 5.2 10e12/L    Hemoglobin 13.4 11.7 - 15.7 g/dL    Hematocrit 40.3 35.0 - 47.0 %    MCV 92 78 - 100 fl    MCH 30.5 26.5 - 33.0 pg    MCHC 33.3 31.5 - 36.5 g/dL    RDW 13.2 10.0 - 15.0 %    Platelet Count 266 150 - 450 10e9/L    Diff Method Automated Method     % Neutrophils 69.0 %    % Lymphocytes 22.0 %    % Monocytes 5.2 %    % Eosinophils 3.1 %    % Basophils 0.4 %    % Immature Granulocytes 0.3 %    Nucleated RBCs 0 0 /100    Absolute Neutrophil 4.7 1.6 - 8.3 10e9/L    Absolute Lymphocytes 1.5 0.8 - 5.3 10e9/L    Absolute Monocytes 0.4 0.0 - 1.3 10e9/L    Absolute Eosinophils 0.2 0.0 - 0.7 10e9/L    Absolute Basophils 0.0 0.0 - 0.2 10e9/L    Abs Immature Granulocytes 0.0 0 - 0.4 10e9/L    Absolute Nucleated RBC 0.0    D dimer quantitative   Result Value Ref Range    D Dimer 3.2 (H) 0.0 - 0.50 ug/ml FEU   Basic metabolic panel   Result Value Ref Range    Sodium 138 133 - 144 mmol/L    Potassium 3.7 3.4 - 5.3 mmol/L    Chloride 106 94 - 109 mmol/L    Carbon Dioxide 27 20 - 32 mmol/L    Anion Gap 5 3 - 14 mmol/L    Glucose 148 (H) 70 - 99 mg/dL    Urea Nitrogen 14 7 - 30 mg/dL    Creatinine 0.59 0.52 - 1.04 mg/dL    GFR Estimate >90 >60 mL/min/[1.73_m2]    GFR Estimate If Black >90 >60 mL/min/[1.73_m2]    Calcium 9.7 8.5 - 10.1 mg/dL   Troponin I   Result Value Ref Range    Troponin I ES <0.015 0.000 - 0.045 ug/L   hCG qual urine POCT   Result Value Ref Range    HCG Qual Urine Negative neg    Internal QC OK Yes    XR Chest 2 Views    Narrative    XR CHEST 2 VW  8/14/2020 12:51 PM       INDICATION: chest pain, L sided under breast and  to L back  COMPARISON: 2/28/2017       Impression    IMPRESSION: Negative chest.    CAITIE JALLOH MD   CT Chest Pulmonary Embolism w Contrast   Result Value Ref Range    Radiologist flags Pulmonary embolism (AA)     Narrative    CT CHEST PULMONARY EMBOLISM WITH CONTRAST 8/14/2020 2:18 PM    CLINICAL HISTORY: Pulmonary embolus suspected, intermediate  probability, positive D-dimer.     TECHNIQUE: CT angiogram chest during arterial phase injection IV  contrast. 2D and 3D MIP reconstructions were performed by the CT  technologist. Dose reduction techniques were used.     CONTRAST: 54 mL Isovue 370    COMPARISON: None.    FINDINGS:  ANGIOGRAM CHEST: Several areas of small pulmonary embolism identified  leading to the left lower lobe, left upper lobe, and right middle  lobe. Thoracic aorta is negative for dissection.    LUNGS AND PLEURA: Trace left pleural fluid. Some mild patchy  groundglass opacities noted bilaterally at the lower lobes. Mild  left-sided atelectasis at the left lung base. There is a partially  solid and partially groundglass nodular prominent opacity at the  anterior right upper lobe that is 2.3 cm, series 6 image 79.    MEDIASTINUM/AXILLAE: Normal.    UPPER ABDOMEN: No acute abnormality.    MUSCULOSKELETAL: Normal.      Impression    IMPRESSION:  1.  Small multiple bilateral pulmonary embolism.  2.  Trace left pleural fluid.  3.  Prominent focal solid and groundglass nodular opacity at the right  upper lobe. Other areas of patchy groundglass opacity noted  bilaterally. These findings could relate to the pulmonary embolism,  but an atypical infectious etiology such as COVID-19 pneumonia is not  excluded. The more diffuse groundglass opacities may also represent  edema. Recommend follow-up CT chest in three months for surveillance.    [Critical Result: Pulmonary embolism]    Finding was identified on 8/14/2020 2:27 PM.     Dr. Hankins was contacted by me on 8/14/2020 2:32 PM and  verbalized  understanding of the critical result.     LAURIE DONALDSON MD     Medications   iopamidol (ISOVUE-370) solution 100 mL (54 mLs Intravenous Given 8/14/20 1408)   sodium chloride 0.9 % bag 500mL for CT scan flush use (75 mLs As instructed Given 8/14/20 1409)   traMADol (ULTRAM) tablet 50 mg (50 mg Oral Given 8/14/20 1634)   rivaroxaban ANTICOAGULANT (XARELTO) tablet 15 mg (15 mg Oral Given 8/14/20 1718)             Assessments & Plan (with Medical Decision Making)   Patient presents for 2 days of burning chest pain.  She denies any cardiovascular or PE risk factors.  Exam is overall benign.  Vitals are stable.  Her history and symptoms intermittently sound like there could be a risk for PE however she is also reporting anterior tenderness to palpation which is like her normal pain which would not be consistent with this.  Regardless discussed with the patient that we would perform a cardiopulmonary evaluation.  EKG and labs were unremarkable d-dimer was elevated.  Chest x-ray was negative.  Discussed the findings with the patient.  We will obtain a CTA this showed small bilateral PEs and lung changes that could be consistent with either PE or possibly covid.  Given that she is asymptomatic we will send off a test.  Discussed that she will need to be treated with anticoagulation for PEs and will have a covid test sent.  Discussed with hematology to see if there is any updated information on PEs and possible covid patient's or if we could treat with Xarelto like usual PE diagnosis and hemodynamically stable patients.  No new updates on literature at this time.  Patient will be started on Xarelto.  I also notified her family physician clinic [facility] to arrange for close outpatient follow-up given the potential diagnosis of covid in the setting of new PEs that need to be treated.  They understand the results and will reach out for close outpatient follow-up.  Discussed signs and symptoms to return to the emergency  department with the patient.  She was given her first dose of Xarelto here and a prescription for this.  Given that she is hemodynamically stable no significant signs for compromise or any respiratory issues at this time feel outpatient management is appropriate.  Should she develop worsening symptoms such as shortness of breath difficulty breathing or breathing fashion to return for reevaluation.  Patient understands plan signs symptoms to return and is discharged in stable condition.    I have reviewed the nursing notes.    I have reviewed the findings, diagnosis, plan and need for follow up with the patient.    Discharge Medication List as of 8/14/2020  4:35 PM      START taking these medications    Details   rivaroxaban ANTICOAGULANT (XARELTO ANTICOAGULANT) 15 MG TABS tablet Take 1 tablet (15 mg) by mouth 2 times daily (with meals) for 21 days, Disp-42 tablet,R-0, Local Print             Final diagnoses:   Pulmonary embolism, other, unspecified chronicity, unspecified whether acute cor pulmonale present (H)   Suspected COVID-19 virus infection     I, Mariela Gamboa, am serving as a trained medical scribe to document services personally performed by James Hankins MD, based on the provider's statements to me.      IJames MD, was physically present and have reviewed and verified the accuracy of this note documented by Mariela Gamboa.     8/14/2020   [facility], [place], EMERGENCY DEPARTMENT     James Hankins MD  08/14/20 [id number]

## 2020-08-14 NOTE — DISCHARGE INSTRUCTIONS
Please make an appointment to follow up with Your Primary Care Provider, [facility] (phone: [phone]), and Hematology Oncology Clinic (phone: [phone]) call to schedule a follow-up appointment.    As discussed Xarelto anticoagulation will be 15 mg tablets twice a day for 3 weeks and you need to follow-up with your provider to then obtain the once a day 20 mg tablet prescription to continue to take daily.

## 2020-08-15 PROBLEM — L60.9 NAIL ABNORMALITIES: Status: ACTIVE | Noted: 2020-08-15

## 2020-08-16 ENCOUNTER — TELEPHONE (OUTPATIENT)
Dept: EMERGENCY MEDICINE | Facility: CLINIC | Age: 40
End: 2020-08-16

## 2020-08-16 NOTE — TELEPHONE ENCOUNTER
Coronavirus (COVID-19) Notification     Reason for call  Patient requesting results     Lab Result    Lab test 2019-nCoV rRt-PCR in process        RN Recommendations/Instructions per [facility]  Continue quarantee and following instructions until you receive the results     Please Contact your PCP clinic or return to the Emergency department if your:    Symptoms worsen or other concerning symptom's.     Patient informed that if test for COVID19 is POSITIVE,  you will receive a call typically within 48 hours from the test date (date lab collected).  If NEGATIVE result, you will receive a letter in the mail or [employer].      [RN/LPN Name]  Les Juárez RN  [facility]  Emergency Dept Lab Result RN  Ph# [phone]

## 2020-08-17 ENCOUNTER — TELEPHONE (OUTPATIENT)
Dept: FAMILY MEDICINE | Facility: CLINIC | Age: 40
End: 2020-08-17

## 2020-08-17 LAB
SARS-COV-2 RNA SPEC QL NAA+PROBE: NOT DETECTED
SPECIMEN SOURCE: NORMAL

## 2020-08-17 NOTE — TELEPHONE ENCOUNTER
RN called pt. Pt states she is feeling better and denies any SOB. COVID test is still pending. Pt requested Friday. RN scheduled. If covid positive will change to virtual and a sooner appt    Taryn Guzman RN

## 2020-08-17 NOTE — TELEPHONE ENCOUNTER
RN called pt to relay covid results are negative so can come in person Friday    Taryn Guzman RN

## 2020-08-17 NOTE — TELEPHONE ENCOUNTER
----- Message from Elle Villar MD sent at 8/17/2020  1:13 PM CDT -----  Regarding: ED follow-up  Please contact patient for ED follow-up, found to have PE after recent hospitalizations and started on anticoagulation.     ThanksMynor

## 2020-08-21 ENCOUNTER — TRANSCRIBE ORDERS (OUTPATIENT)
Dept: OTHER | Age: 40
End: 2020-08-21

## 2020-08-21 ENCOUNTER — OFFICE VISIT (OUTPATIENT)
Dept: FAMILY MEDICINE | Facility: CLINIC | Age: 40
End: 2020-08-21
Payer: COMMERCIAL

## 2020-08-21 VITALS
WEIGHT: 157 LBS | HEART RATE: 105 BPM | OXYGEN SATURATION: 98 % | TEMPERATURE: 98.3 F | SYSTOLIC BLOOD PRESSURE: 106 MMHG | BODY MASS INDEX: 26.13 KG/M2 | DIASTOLIC BLOOD PRESSURE: 73 MMHG

## 2020-08-21 DIAGNOSIS — R91.8 GROUND GLASS OPACITY PRESENT ON IMAGING OF LUNG: ICD-10-CM

## 2020-08-21 DIAGNOSIS — I26.99 PULMONARY EMBOLISM (H): Primary | ICD-10-CM

## 2020-08-21 DIAGNOSIS — I26.99 OTHER PULMONARY EMBOLISM WITHOUT ACUTE COR PULMONALE, UNSPECIFIED CHRONICITY (H): Primary | ICD-10-CM

## 2020-08-21 ASSESSMENT — ENCOUNTER SYMPTOMS
SHORTNESS OF BREATH: 0
PALPITATIONS: 0
COUGH: 0
ABDOMINAL PAIN: 0
HEADACHES: 1
FEVER: 0

## 2020-08-21 NOTE — PROGRESS NOTES
Preceptor Attestation:    Patient seen and evaluated in person. I discussed the patient with the resident. I have verified the content of the note, which accurately reflects my assessment of the patient and the plan of care.   Supervising Physician:  Aevry Govea MD.

## 2020-08-21 NOTE — PATIENT INSTRUCTIONS
1. Please follow up with hematology to decide on duration of the blood thinner, and any studies that need to be done    2. Please schedule CT scan of chest around November 14th, 2020    3. After you finish the 15 mg twice a day Xarelto pills (around 9/4, or 9/5), switch to 20 mg daily.    Lab Testing:  **If you had lab testing today and your results are reassuring or normal they will be mailed to you or sent through [employer] within 7 days.   **If the lab tests need quick action we will call you with the results.  The phone number we will call with results is # [phone] (home) none (work). If this is not the best number please call our clinic and change the number.  Medication Refills:  If you need any refills please call your pharmacy and they will contact us.   If you need to  your refill at a new pharmacy, please contact the new pharmacy directly. The new pharmacy will help you get your medications transferred faster.   Scheduling:  If you have any concerns about today's visit or wish to schedule another appointment please call our office during normal business hours [phone] (8-5:00 M-F)  If a referral was made to a [facility] and you don't get a call from central scheduling please call [phone].  If a Mammogram was ordered for you at [facility] call [phone] to schedule or change your appointment.  If you had an XRay/CT/Ultrasound/MRI ordered the number is [phone] to schedule or change your radiology appointment.   Medical Concerns:  If you have urgent medical concerns please call [phone] at any time of the day.    Mary Perez MD

## 2020-08-21 NOTE — PROGRESS NOTES
HPI       Nona Finley is a 40 year old  who presents for   Chief Complaint   Patient presents with     Follow Up     Seen in the ED 8/14, folloing up      ED 8/14 with new dx PE, BL, not subsegmental  No personal hx blood clot. Does have varicose veins.  No FH blood clots. No hx recurrent pregnancy losses.  Sittings lots at home with kids. 5 hrs at a time. Otherwise no prolonged sitting/travel.  Taking BID xarelto. Got 21 days from ED.    Varicose veins do not hurt.   Never had any calf pain or swelling.     No more chest pain.  Never had any SOB  No bleeding noted.  Some mild headaches in AMs after taking xarelto  +++++++      Problem, Medication and Allergy Lists were reviewed and updated if needed..    Patient is an established patient of this clinic..         Review of Systems:   Review of Systems   Constitutional: Negative for fever.   Respiratory: Negative for cough and shortness of breath.    Cardiovascular: Negative for chest pain and palpitations.   Gastrointestinal: Negative for abdominal pain.   Neurological: Positive for headaches.            Physical Exam:     Vitals:    08/21/20 1556   BP: 106/73   BP Location: Left arm   Patient Position: Sitting   Cuff Size: Adult Regular   Pulse: 105   Temp: 98.3  F (36.8  C)   TempSrc: Oral   SpO2: 98%   Weight: 71.2 kg (157 lb)     Body mass index is 26.13 kg/m .  Vitals were reviewed and were normal     Physical Exam  Constitutional:       General: She is not in acute distress.     Appearance: She is well-developed. She is not diaphoretic.   HENT:      Head: Normocephalic and atraumatic.   Eyes:      Conjunctiva/sclera: Conjunctivae normal.   Neck:      Musculoskeletal: Normal range of motion.   Cardiovascular:      Rate and Rhythm: Normal rate and regular rhythm.      Heart sounds: No murmur.   Pulmonary:      Effort: Pulmonary effort is normal. No respiratory distress.      Breath sounds: No wheezing or rales.   Musculoskeletal: Normal range of  motion.   Skin:     Comments: Multiple varicosities along both lower extremities. Non-tender.   Neurological:      General: No focal deficit present.      Mental Status: She is alert.           Results:   No testing ordered today    Assessment and Plan        Nona was seen today for follow up. ED visit 8/14 with cp, diagnosed with bilateral PEs after positive D-dimer. CT also revealed GGO. She was started on xarelto in ED and dishcharged home. COVID resulted NEGATIVE.    Other pulmonary embolism without acute cor pulmonale, unspecified chronicity (H)  Continue xarelto 15 mg BID for 21 days (1st day 8/14). Approx end-date for this dose is 9/4 or 9/5.  Rx'd today 20 mg BID, for a total course of 3 months.  Unclear precipitant for PE in history, though does have multiple varicose veins. Will refer to hematology for further evaluation of possible precipitating factors and to determine duration of treatment.  Consider COVID antibody test when she is >2 weeks from 8/14.  -     rivaroxaban ANTICOAGULANT (XARELTO ANTICOAGULANT) 20 MG TABS tablet; Take 1 tablet (20 mg) by mouth daily (with dinner) Start after around 9/4, when you run out of the 15 mg pills twice a day.  -     Oncology/Hematology Adult Referral; Future    Ground glass opacity present on imaging of lung  Recommended for repeat CT in 3 months. Ordered today. Patient will schedule around 11/14/2020. Exam of lungs clear today.   -     CT Chest w Contrast; Future       There are no discontinued medications.    Options for treatment and follow-up care were reviewed with the patient. Nona Finley  engaged in the decision making process and verbalized understanding of the options discussed and agreed with the final plan.    Mary Perez MD

## 2020-09-30 ENCOUNTER — VIRTUAL VISIT (OUTPATIENT)
Dept: HEMATOLOGY | Facility: CLINIC | Age: 40
End: 2020-09-30
Attending: INTERNAL MEDICINE
Payer: COMMERCIAL

## 2020-09-30 ENCOUNTER — TELEPHONE (OUTPATIENT)
Dept: HEMATOLOGY | Facility: CLINIC | Age: 40
End: 2020-09-30

## 2020-09-30 VITALS — BODY MASS INDEX: 25.79 KG/M2 | WEIGHT: 155 LBS

## 2020-09-30 DIAGNOSIS — I26.99 OTHER PULMONARY EMBOLISM WITHOUT ACUTE COR PULMONALE, UNSPECIFIED CHRONICITY (H): ICD-10-CM

## 2020-09-30 DIAGNOSIS — R91.8 PULMONARY NODULES: Primary | ICD-10-CM

## 2020-09-30 PROCEDURE — 99203 OFFICE O/P NEW LOW 30 MIN: CPT | Mod: 95 | Performed by: INTERNAL MEDICINE

## 2020-09-30 PROCEDURE — 40001009 ZZH VIDEO/TELEPHONE VISIT; NO CHARGE

## 2020-09-30 NOTE — TELEPHONE ENCOUNTER
RN spoke with Dr. Loza following visit with Nona.   He would like her to have a CT scan in November and a follow up visit in February.     RN called and spoke with Nona to schedule upcoming appointments. Nona wrote down appointment instructions but also requested an email be sent with the information.     The following was sent:  Mario Castellano,   Thank you for visiting with us today.   I have scheduled the following appointments for you. Please call [phone] if these do not work or if you have any questions, or concerns you need addressed.   Thanks,  Shantell    Appointments:    CT Scan:  Friday November 6th 2020 at 3:00pm   Please Check in at 2:30pm   Address: [address] [place] [address]    Doctor Follow up:  Thursday February 4th at 2:00pm   Video visit with Dr. Loza   Please log on to [employer] at 1:55pm    She has our call back number for any further questions, comments or concerns.     Shantell Urrutia, MSN, RN, PHN - Nurse Clinician - [facility] - [phone]

## 2020-09-30 NOTE — PROGRESS NOTES
Patient was contacted to complete the pre-visit call prior to their video visit with the provider.  The following statement was read:       This visit will be billed to your insurance the same as an in-person visit. Because of Coronavirus we are instituting video visits when possible to keep everyone safe. This video visit will be conducted between you and the provider.  This service lets us provide the care you need with a video conversation.  If a prescription is necessary, we can send it directly to your pharmacy.If lab work or other testing is needed, we can help arrange a place/time for that to be done at a later date.If during the course of the call the provider feels a video visit is not appropriate, then your insurance company will not be billed.       Allergies and medications were reviewed and travel screening complete.     A test connection was Completed with Pt? No    I thanked them for their time to cover this information     Kirit Cherry [facility]      [facility]  [address] [place], [place] [id number]  Main: [phone], Fax: [phone]    Patient seen at: [facility] at [address] [place] [address]    Video Virtual Visit Note:    Patient: Nona Finley  MRN: [id number]  : 1980  KIKO: 2020    Due to the ongoing COVID-19 outbreak, this visit was conducted by video, with the patient's approval.    Physician has received verbal consent for a Video Visit from the patient? Yes    Video Start Time: 105 PM    Reason for Consultation:  Nona Finley is a referred by Dr Perez for evaluation and treatment of pulmonary embolism.    History of Present Illness:  Nona Finley is a 40 year old woman with schizophrenia presented on Aug 21, 2020 with 3 days of worsening chest pain and found to have a new pulmonary embolism.     Given that she was hemodynamically stable she was discharged home from the emergency  department and started on Xarelto.  She was not on oral contraceptives at the time.  She reports no recent hospitalizations or surgeries in the 30 days prior to this episode.  She notes that she feels like she has been more immobile recently.  She has been several hours a day on the couch but spends the rest of her time taking of her children at home.    She has tolerated her anticoagulation without any bleeding problems.  The medication has not been financially toxic.  She notes no problems with heavy menstrual cycle since starting the medication.    Past Medical History:  Past Medical History:   Diagnosis Date     Encounter for female birth control 6/14/2017 6/14/2017 Plan Documentation Service ordered Depo Provera injection (150mg IM) may be given every 3 months for one year per protoccol. Plan and order should be renewed at a visit no later than 6/14/18   Vanessa Thompson MD         Encounter for insertion or removal of intrauterine contraceptive device 1/3/2008    Paragard 1/08 removed 1/08.     Postpartum depression 8/18/2011     Schizophrenia (H) 2/12/2019     Suicidal ideation 2/16/2017       Past Surgical History:  Past Surgical History:   Procedure Laterality Date     NO HISTORY OF SURGERY       NO HISTORY OF SURGERY  06/13/2013    Per patient reported this       Medications:  Current Outpatient Medications   Medication Sig     ABILIFY MAINTENA 400 MG extended release injection Inject 400 mg into the muscle every 28 days     atropine 1 % ophthalmic solution Place 1-2 drops under tongue at bedtime as needed for excess saliva/secretions.     cloZAPine (CLOZARIL) 25 MG tablet Take 3 tablets (75 mg) by mouth At Bedtime     metFORMIN (GLUCOPHAGE-XR) 500 MG 24 hr tablet Take 500 mg by mouth At Bedtime     rivaroxaban ANTICOAGULANT (XARELTO ANTICOAGULANT) 20 MG TABS tablet Take 1 tablet (20 mg) by mouth daily (with dinner) Start after around 9/4, when you run out of the 15 mg pills twice a day.     Vitamin D3  "(CHOLECALCIFEROL) 25 mcg (1000 units) tablet Take 1 tablet (25 mcg) by mouth daily     No current facility-administered medications for this visit.        Allergies:  No Known Allergies    ROS:  Denies any bleeding issues. No gum bleeding, No nose bleed. Denies any hematuria or blood in stools. Denies any ecchymosis. Denies any lower extremities swelling or pain. Denies any fever, no chest pain. Denies any shortness of breath.     Social History:  Denies any tobacco use. No significant alcohol use. Denies any illicit drug use.    Family History:  She is not aware of any blood clots in the family but they live in [facility] so she is not sure about this    Objective:    Estimated body mass index is 25.79 kg/m  as calculated from the following:    Height as of 7/17/20: 1.651 m (5' 5\").    Weight as of this encounter: 70.3 kg (155 lb).    Constitutional: Appears well, no distress  Eyes: no discharge, injection or icterus  Respiratory: no cough or labored breathing  Skin: no rashes or petechiae  Neurological: no deficits appreciated, speech is fluent  Psych: affect is a bit flat but appropriate      Labs:  Results for BHAVIK FINLEY (MRN [id number]) as of 9/30/2020 17:31   Ref. Range 8/14/2020 11:39   Creatinine Latest Ref Range: 0.52 - 1.04 mg/dL 0.59     Imaging:  Reviewed her CT scan demonstrating pulmonary embolism    Assessment:  In summary, Bhavik Finley is a 40 year old woman with a recent unprovoked pulmonary embolism.  She should continue treatment for a total of 6 months and then consider indefinite anticoagulation for secondary prophylaxis.  She is very concerned about her risk for recurrence and would prefer to remain on anticoagulation.  I think both her risk of recurrence is low based on the HERDOO2 score (no post thrombotic syndrome, BMI < 30, age < 60, but no D Dimer test yet), but also her bleeding risk is low as well.  Therefore I agree that for now it is reasonable to remain on anticoagulation. "  However, we will meet in Feb to discuss this again as she completes therapy.    Plan:  1. Majority of today's visit was spent counseling the patient regarding treatment, natural history and risk for recurrence of unprovoked pulmonary embolism.  2. Continue Xarelto until Feb 2021, a total of 6 months for treatment of pulmonary embolism   3. Meet in Feb 2021 to discuss comfort level with continuing secondary prophylaxis with Xarelto indefinitely.  Could consider change to 10mg Xarelto or ASA 81mg vs intermittent prophylaxis, only in times of high risk (discussed today and she was less comfortable with this).  4. Ordered and schedule follow up for pulmonary nodule seen incidentally on CT scan    The patient is given our center's contact information and is instructed to call if she should have any further questions or concerns.      Hiram Loza MD   of Medicine  [facility]       Video-Visit Details:    Type of service:  Video Visit    Video End Time (time video stopped): 132 PM    Originating Location (pt. Location): Home    Distant Location (provider location):  [facility]     Mode of Communication:  Video Conference via [employer]

## 2020-11-06 ENCOUNTER — HOSPITAL ENCOUNTER (OUTPATIENT)
Dept: CT IMAGING | Facility: CLINIC | Age: 40
Discharge: HOME | End: 2020-11-06
Attending: INTERNAL MEDICINE | Admitting: INTERNAL MEDICINE
Payer: COMMERCIAL

## 2020-11-06 DIAGNOSIS — R91.8 PULMONARY NODULES: ICD-10-CM

## 2020-11-06 PROCEDURE — 250N000009 HC RX 250: Performed by: INTERNAL MEDICINE

## 2020-11-06 PROCEDURE — 250N000011 HC RX IP 250 OP 636: Performed by: INTERNAL MEDICINE

## 2020-11-06 PROCEDURE — 71260 CT THORAX DX C+: CPT | Mod: 26 | Performed by: RADIOLOGY

## 2020-11-06 PROCEDURE — 71260 CT THORAX DX C+: CPT

## 2020-11-06 RX ORDER — IOPAMIDOL 755 MG/ML
100 INJECTION, SOLUTION INTRAVASCULAR ONCE
Status: COMPLETED | OUTPATIENT
Start: 2020-11-06 | End: 2020-11-06

## 2020-11-06 RX ADMIN — IOPAMIDOL 76 ML: 755 INJECTION, SOLUTION INTRAVENOUS at 14:35

## 2020-11-06 RX ADMIN — SODIUM CHLORIDE 65 ML: 9 INJECTION, SOLUTION INTRAVENOUS at 14:35

## 2020-11-12 ENCOUNTER — TELEPHONE (OUTPATIENT)
Dept: HEMATOLOGY | Facility: CLINIC | Age: 40
End: 2020-11-12

## 2020-11-12 NOTE — TELEPHONE ENCOUNTER
RN called Nona with imaging results from last Friday 11/6/2020. Per imaging report there is no pulmonary embolus.     Nona was appreciative and would like a copy of the report sent to her [employer] so her  can read it.     RN will send via [employer] and Nona will plan on following up with Dr. Loza in February as previously scheduled.     Shantell Urrutia, MSN, RN, PHN - Nurse Clinician - [facility] - [phone]

## 2020-11-22 ENCOUNTER — HEALTH MAINTENANCE LETTER (OUTPATIENT)
Age: 40
End: 2020-11-22

## 2020-12-01 ENCOUNTER — ALLIED HEALTH/NURSE VISIT (OUTPATIENT)
Dept: PHARMACY | Facility: CLINIC | Age: 40
End: 2020-12-01
Payer: COMMERCIAL

## 2020-12-01 DIAGNOSIS — Z86.711 HISTORY OF PULMONARY EMBOLISM: Primary | ICD-10-CM

## 2020-12-01 DIAGNOSIS — F25.1 SCHIZOAFFECTIVE DISORDER, DEPRESSIVE TYPE (H): ICD-10-CM

## 2020-12-01 DIAGNOSIS — E55.9 VITAMIN D DEFICIENCY: ICD-10-CM

## 2020-12-01 DIAGNOSIS — R73.9 HYPERGLYCEMIA: ICD-10-CM

## 2020-12-01 PROCEDURE — 99606 MTMS BY PHARM EST 15 MIN: CPT | Performed by: PHARMACIST

## 2020-12-01 PROCEDURE — 99607 MTMS BY PHARM ADDL 15 MIN: CPT | Performed by: PHARMACIST

## 2020-12-01 RX ORDER — VITAMIN B COMPLEX
25 TABLET ORAL DAILY
Qty: 30 TABLET | Refills: 11 | Status: SHIPPED | OUTPATIENT
Start: 2020-12-01 | End: 2020-12-01

## 2020-12-01 RX ORDER — ELECTROLYTES/DEXTROSE
1 SOLUTION, ORAL ORAL DAILY
Qty: 30 TABLET | Refills: 11 | Status: SHIPPED | OUTPATIENT
Start: 2020-12-01 | End: 2020-12-01

## 2020-12-01 RX ORDER — ELECTROLYTES/DEXTROSE
1 SOLUTION, ORAL ORAL DAILY
Qty: 30 TABLET | Refills: 11 | Status: ON HOLD | OUTPATIENT
Start: 2020-12-01 | End: 2021-03-08

## 2020-12-01 RX ORDER — VITAMIN B COMPLEX
25 TABLET ORAL DAILY
Qty: 30 TABLET | Refills: 11 | Status: ON HOLD | OUTPATIENT
Start: 2020-12-01 | End: 2021-03-08

## 2020-12-01 NOTE — PROGRESS NOTES
MTM ENCOUNTER  SUBJECTIVE/OBJECTIVE:                           Nona Finley is a 40 year old female called for a follow-up visit. She was referred to me from transitions of care. Patient was accompanied by . Today's visit is a follow-up MTM visit from 8/20 with Sadiq Gleason (I reached out in error as I saw the patient in July.     Reason for visit: Overall feels things are going well, no concerns.    Allergies/ADRs: Reviewed in chart  Tobacco: She reports that she has never smoked. She has never used smokeless tobacco.  Alcohol: not currently using    Past Medical History: Reviewed in chart  Social: Accidentally called patient with an  today but on the call patient reports she would prefer to have interpreters for future communications as she does not always understand what is being said.    Medication Adherence/Access: Patient reports uses bubble pack(s) supplied through [facility] and managed by her . Called [facility] and she is getting her clozapine from a specialized [facility] monitoring location in [place], her metformin from the [facility] location, and appears to be getting her Xarelto from [place] pharmacy.    Hx of PE: Patient is taking Xarelto 20mg once daily for recent history of bilateral PE. No concerns with bruising or bleeding. She has a visit scheduled with bleeding and clotting in February in which they will decide the length of therapy.    Weight loss: Patient is taking metformin XR 1000mg daily for weight loss (dosing taken from [facility]), though she has not noted much help with weight loss from it. She would like to stop taking it if possible. She is not interested in other therapies for weight loss.     Schizoaffective Disorder: Patient is currently taking clozapine 120mg daily (125mg per outside orders) and Abilify Maintena 400mg monthly. She says she gets this medication (and metformin as well) through [facility] ACT [facility] [phone]  [phone]. She is regularly contacted about clozapine monitoring and her  is Vicky at the above number. She was also using atropine solution for excess saliva but it was too drying so she stopped- feels she is doing fine without therapy for this. She reports she is not having visual or auditory hallucinations.    Other Supplements: Patient is not taking because she forgets as it is not in her pill packs. She also occasionally takes a multivitamin in the evening when she remembers, but this is also not in her pill pack.    ASSESSMENT:                              Medication Adherence: Patient would possibly benefit from some help organizing medications so that she can receive all but clozapine from one location in pill-pack form to help her remember to take (clozapine pharmacy reports they do not fill other meds).    Hx of PE: Stable.    Weight loss: Upon further review of chart and literature there appears to be decent evidence for use of metformin to reduce severity of weight gain and possibly hyperglycemia associated with clozapine- patient therefore would likely benefit from staying on this medication.    Schizoaffective Disorder: Stable per patient.    Other Supplements: Patient may benefit from orders for these OTCs to be put in her pill packs to improve adherence for better bone and general health.    PLAN:                             1. Orders for multivit and vitamin D 1000 international unit(s) sent to [facility].    2. I will reach out again to  Vicky on Tuesday next week to try to organize medications (get everything aside from clozapine in pillpaks from [facility]).    3. Patient chart status changed to \"needs \" and patient advised to sign waiver in clinic if she decides she does not want an  in the future.    I spent 50 minutes with this patient today. All changes were made via collaborative practice agreement with Dr. Govea. A copy of the visit " note was provided to the patient's primary care provider.    Will have patient follow up with Sadiq Gleason in 1-2 months or as deemed necessary by [facility]'s Team.    The patient was mailed a summary of these recommendations.     Shanda ChristieD, [facility]  Medication Therapy Management Provider  Phone: [phone]  [email or web address]    Patient consented to a telehealth visit: yes  Telemedicine Visit Details  Type of service:  Telephone visit  Start Time: 3:03 PM  End Time: 3:53 PM  Originating Location (patient location): Home  Distant Location (provider location):  [facility]  Mode of Communication:  Telephone

## 2020-12-01 NOTE — Clinical Note
Hi Dr.s Gleason and Xuan, I intended to check in with this patient after seeing her for a transitions of care visit a few months back and noted upon opening the encounter that she has been meeting with you. I let her know this and that she should see Sadiq next time ongoing MTM. I am going to reach out to her  once more next week to try to make sure her pharmacy orders are all coming from one pharmacy (except the clozapine). Sorry for the confusion!

## 2020-12-01 NOTE — PATIENT INSTRUCTIONS
Recommendations from today's MTM visit:                                                        1. Orders for multivitamin and vitamin D 1000 international unit(s) sent to [facility].    2. I will reach out again to  Vicky on Tuesday next week to try to organize medications (get everything aside from clozapine in pillpaks from [facility]) as I was unable to reach her today.    It was great to speak with you today.  I value your experience and would be very thankful for your time with providing feedback on our clinic survey. You may receive a survey via email or text message in the next few days.     Next MTM visit: I will have Sadiq Gleason reach out. Feel free to call me with anything in the meantime.    To schedule another MTM appointment, please call the clinic directly or you may call the MTM scheduling line at [phone] or toll-free at [phone].     My Clinical Pharmacist's contact information:                                                      It was a pleasure talking with you today!  Please feel free to contact me with any questions or concerns you have.      Leela Lemons PharmD, BCACP  Medication Therapy Management Provider  Phone: [phone]  [email or web address]

## 2020-12-03 RX ORDER — CLOZAPINE 100 MG/1
200 TABLET, ORALLY DISINTEGRATING ORAL AT BEDTIME
Status: ON HOLD | COMMUNITY
Start: 2020-11-13 | End: 2021-03-08

## 2020-12-03 RX ORDER — CLOZAPINE 25 MG/1
1 TABLET, ORALLY DISINTEGRATING ORAL DAILY
COMMUNITY
Start: 2020-11-21 | End: 2021-02-11

## 2020-12-10 ENCOUNTER — TELEPHONE (OUTPATIENT)
Dept: PHARMACY | Facility: CLINIC | Age: 40
End: 2020-12-10

## 2020-12-11 NOTE — TELEPHONE ENCOUNTER
Spoke with [facility] team  and she agreed we should consolidate pharmacies so I called [facility] and all of patient's meds aside from clozapine should be at [address], the clozapine at the [place] location (a clozapine monitoring pharmacy). Please send future orders there, as they do [place] setup for her meds.    Leela Lemons, ShandaD, [facility]  Medication Therapy Management Provider  Phone: [phone]  [email or web address]

## 2021-02-04 ENCOUNTER — OFFICE VISIT (OUTPATIENT)
Dept: HEMATOLOGY | Facility: CLINIC | Age: 41
End: 2021-02-04
Attending: INTERNAL MEDICINE
Payer: COMMERCIAL

## 2021-02-04 VITALS
WEIGHT: 165.2 LBS | TEMPERATURE: 98.3 F | DIASTOLIC BLOOD PRESSURE: 79 MMHG | OXYGEN SATURATION: 100 % | SYSTOLIC BLOOD PRESSURE: 112 MMHG | HEART RATE: 105 BPM | BODY MASS INDEX: 27.49 KG/M2

## 2021-02-04 DIAGNOSIS — N92.0 MENORRHAGIA WITH REGULAR CYCLE: ICD-10-CM

## 2021-02-04 DIAGNOSIS — I26.99 OTHER PULMONARY EMBOLISM WITHOUT ACUTE COR PULMONALE, UNSPECIFIED CHRONICITY (H): Primary | ICD-10-CM

## 2021-02-04 PROCEDURE — G0463 HOSPITAL OUTPT CLINIC VISIT: HCPCS

## 2021-02-04 PROCEDURE — 99214 OFFICE O/P EST MOD 30 MIN: CPT | Performed by: INTERNAL MEDICINE

## 2021-02-04 RX ORDER — TRANEXAMIC ACID 650 MG/1
1300 TABLET ORAL 3 TIMES DAILY
Qty: 60 TABLET | Refills: 11 | Status: ON HOLD | OUTPATIENT
Start: 2021-02-04 | End: 2021-03-08

## 2021-02-04 RX ORDER — TRANEXAMIC ACID 650 MG/1
1300 TABLET ORAL 3 TIMES DAILY
Qty: 60 TABLET | Refills: 11 | Status: SHIPPED | OUTPATIENT
Start: 2021-02-04 | End: 2021-02-04

## 2021-02-04 ASSESSMENT — PAIN SCALES - GENERAL: PAINLEVEL: NO PAIN (0)

## 2021-02-04 NOTE — PATIENT INSTRUCTIONS
Today's visit was to discuss treatment and prevention of future blood clots.  Treatment for your blood clot in the lung (pulmonary embolism) is now complete.  Since we can not predict whether you will have another clot in the future, I agree that we should continue your blood thinner so prevent another clot. I have also given you a new medicine to take when you are having you period to help with the heavy bleeding called Lysteda.  Our plan for today:  1. Continue your Xarelto, now to prevent blood clots  2. Try the Lysteda to see if it helps your heavy periods (sent to [facility])  3. I will see you back in September 4. Please call us with any questions or concerns you have!  [phone]

## 2021-02-11 ENCOUNTER — TELEPHONE (OUTPATIENT)
Dept: BEHAVIORAL HEALTH | Facility: CLINIC | Age: 41
End: 2021-02-11

## 2021-02-11 ENCOUNTER — HOSPITAL ENCOUNTER (INPATIENT)
Facility: CLINIC | Age: 41
LOS: 7 days | Discharge: HOME | DRG: 885 | End: 2021-02-18
Attending: EMERGENCY MEDICINE | Admitting: PSYCHIATRY & NEUROLOGY
Payer: COMMERCIAL

## 2021-02-11 DIAGNOSIS — F25.1 SCHIZOAFFECTIVE DISORDER, DEPRESSIVE TYPE (H): ICD-10-CM

## 2021-02-11 DIAGNOSIS — Z86.15 HISTORY OF LATENT TUBERCULOSIS: Primary | Chronic | ICD-10-CM

## 2021-02-11 DIAGNOSIS — Z86.711 HISTORY OF PULMONARY EMBOLISM: ICD-10-CM

## 2021-02-11 DIAGNOSIS — F25.1 SCHIZOAFFECTIVE DISORDER, DEPRESSIVE TYPE (H): Chronic | ICD-10-CM

## 2021-02-11 DIAGNOSIS — R44.3 HALLUCINATIONS: ICD-10-CM

## 2021-02-11 LAB
ALBUMIN SERPL-MCNC: 3.8 G/DL (ref 3.4–5)
ALP SERPL-CCNC: 58 U/L (ref 40–150)
ALT SERPL W P-5'-P-CCNC: 13 U/L (ref 0–50)
AMPHETAMINES UR QL SCN: NEGATIVE
ANION GAP SERPL CALCULATED.3IONS-SCNC: 6 MMOL/L (ref 3–14)
AST SERPL W P-5'-P-CCNC: 17 U/L (ref 0–45)
BARBITURATES UR QL: NEGATIVE
BASOPHILS # BLD AUTO: 0 10E9/L (ref 0–0.2)
BASOPHILS NFR BLD AUTO: 0.6 %
BENZODIAZ UR QL: NEGATIVE
BILIRUB SERPL-MCNC: 0.3 MG/DL (ref 0.2–1.3)
BUN SERPL-MCNC: 16 MG/DL (ref 7–30)
CALCIUM SERPL-MCNC: 8.8 MG/DL (ref 8.5–10.1)
CANNABINOIDS UR QL SCN: NEGATIVE
CHLORIDE SERPL-SCNC: 112 MMOL/L (ref 94–109)
CO2 SERPL-SCNC: 24 MMOL/L (ref 20–32)
COCAINE UR QL: NEGATIVE
CREAT SERPL-MCNC: 0.6 MG/DL (ref 0.52–1.04)
DIFFERENTIAL METHOD BLD: NORMAL
EOSINOPHIL # BLD AUTO: 0.1 10E9/L (ref 0–0.7)
EOSINOPHIL NFR BLD AUTO: 0.7 %
ERYTHROCYTE [DISTWIDTH] IN BLOOD BY AUTOMATED COUNT: 14.2 % (ref 10–15)
ETHANOL UR QL SCN: NEGATIVE
FLUAV RNA RESP QL NAA+PROBE: NEGATIVE
FLUBV RNA RESP QL NAA+PROBE: NEGATIVE
GFR SERPL CREATININE-BSD FRML MDRD: >90 ML/MIN/{1.73_M2}
GLUCOSE SERPL-MCNC: 106 MG/DL (ref 70–99)
HCG UR QL: NEGATIVE
HCT VFR BLD AUTO: 36.5 % (ref 35–47)
HGB BLD-MCNC: 12.1 G/DL (ref 11.7–15.7)
IMM GRANULOCYTES # BLD: 0 10E9/L (ref 0–0.4)
IMM GRANULOCYTES NFR BLD: 0.1 %
INR PPP: 1.45 (ref 0.86–1.14)
LABORATORY COMMENT REPORT: NORMAL
LYMPHOCYTES # BLD AUTO: 1.3 10E9/L (ref 0.8–5.3)
LYMPHOCYTES NFR BLD AUTO: 18.9 %
MCH RBC QN AUTO: 29.7 PG (ref 26.5–33)
MCHC RBC AUTO-ENTMCNC: 33.2 G/DL (ref 31.5–36.5)
MCV RBC AUTO: 90 FL (ref 78–100)
MONOCYTES # BLD AUTO: 0.4 10E9/L (ref 0–1.3)
MONOCYTES NFR BLD AUTO: 6 %
NEUTROPHILS # BLD AUTO: 4.9 10E9/L (ref 1.6–8.3)
NEUTROPHILS NFR BLD AUTO: 73.7 %
NRBC # BLD AUTO: 0 10*3/UL
NRBC BLD AUTO-RTO: 0 /100
OPIATES UR QL SCN: NEGATIVE
PLATELET # BLD AUTO: 316 10E9/L (ref 150–450)
POTASSIUM SERPL-SCNC: 4.2 MMOL/L (ref 3.4–5.3)
PROT SERPL-MCNC: 8.3 G/DL (ref 6.8–8.8)
RBC # BLD AUTO: 4.07 10E12/L (ref 3.8–5.2)
RSV RNA SPEC QL NAA+PROBE: NORMAL
SARS-COV-2 RNA RESP QL NAA+PROBE: NEGATIVE
SODIUM SERPL-SCNC: 142 MMOL/L (ref 133–144)
SPECIMEN SOURCE: NORMAL
WBC # BLD AUTO: 6.7 10E9/L (ref 4–11)

## 2021-02-11 PROCEDURE — 80320 DRUG SCREEN QUANTALCOHOLS: CPT | Performed by: EMERGENCY MEDICINE

## 2021-02-11 PROCEDURE — 85025 COMPLETE CBC W/AUTO DIFF WBC: CPT | Performed by: EMERGENCY MEDICINE

## 2021-02-11 PROCEDURE — C9803 HOPD COVID-19 SPEC COLLECT: HCPCS | Performed by: EMERGENCY MEDICINE

## 2021-02-11 PROCEDURE — 99285 EMERGENCY DEPT VISIT HI MDM: CPT | Performed by: EMERGENCY MEDICINE

## 2021-02-11 PROCEDURE — 80307 DRUG TEST PRSMV CHEM ANLYZR: CPT | Performed by: EMERGENCY MEDICINE

## 2021-02-11 PROCEDURE — 250N000013 HC RX MED GY IP 250 OP 250 PS 637: Performed by: PSYCHIATRY & NEUROLOGY

## 2021-02-11 PROCEDURE — 80053 COMPREHEN METABOLIC PANEL: CPT | Performed by: EMERGENCY MEDICINE

## 2021-02-11 PROCEDURE — 85610 PROTHROMBIN TIME: CPT | Performed by: EMERGENCY MEDICINE

## 2021-02-11 PROCEDURE — 87636 SARSCOV2 & INF A&B AMP PRB: CPT | Performed by: EMERGENCY MEDICINE

## 2021-02-11 PROCEDURE — 81025 URINE PREGNANCY TEST: CPT | Performed by: EMERGENCY MEDICINE

## 2021-02-11 PROCEDURE — 99285 EMERGENCY DEPT VISIT HI MDM: CPT | Mod: 25 | Performed by: EMERGENCY MEDICINE

## 2021-02-11 PROCEDURE — 90791 PSYCH DIAGNOSTIC EVALUATION: CPT

## 2021-02-11 PROCEDURE — 124N000002 HC R&B MH UMMC

## 2021-02-11 RX ORDER — LORAZEPAM 0.5 MG/1
0.5 TABLET ORAL AT BEDTIME
Status: DISCONTINUED | OUTPATIENT
Start: 2021-02-11 | End: 2021-02-18 | Stop reason: HOSPADM

## 2021-02-11 RX ORDER — MULTIPLE VITAMINS W/ MINERALS TAB 9MG-400MCG
1 TAB ORAL DAILY
Status: DISCONTINUED | OUTPATIENT
Start: 2021-02-11 | End: 2021-02-18 | Stop reason: HOSPADM

## 2021-02-11 RX ORDER — TRAZODONE HYDROCHLORIDE 50 MG/1
50 TABLET, FILM COATED ORAL
Status: DISCONTINUED | OUTPATIENT
Start: 2021-02-11 | End: 2021-02-18 | Stop reason: HOSPADM

## 2021-02-11 RX ORDER — OLANZAPINE 20 MG/1
20 TABLET ORAL
Status: ON HOLD | COMMUNITY
End: 2021-03-08

## 2021-02-11 RX ORDER — HYDROXYZINE HYDROCHLORIDE 25 MG/1
25 TABLET, FILM COATED ORAL EVERY 4 HOURS PRN
Status: DISCONTINUED | OUTPATIENT
Start: 2021-02-11 | End: 2021-02-18 | Stop reason: HOSPADM

## 2021-02-11 RX ORDER — MAGNESIUM HYDROXIDE/ALUMINUM HYDROXICE/SIMETHICONE 120; 1200; 1200 MG/30ML; MG/30ML; MG/30ML
30 SUSPENSION ORAL EVERY 4 HOURS PRN
Status: DISCONTINUED | OUTPATIENT
Start: 2021-02-11 | End: 2021-02-18 | Stop reason: HOSPADM

## 2021-02-11 RX ORDER — OLANZAPINE 10 MG/2ML
10 INJECTION, POWDER, FOR SOLUTION INTRAMUSCULAR 3 TIMES DAILY PRN
Status: DISCONTINUED | OUTPATIENT
Start: 2021-02-11 | End: 2021-02-12

## 2021-02-11 RX ORDER — METFORMIN HCL 500 MG
1000 TABLET, EXTENDED RELEASE 24 HR ORAL
Status: DISCONTINUED | OUTPATIENT
Start: 2021-02-11 | End: 2021-02-18 | Stop reason: HOSPADM

## 2021-02-11 RX ORDER — LORAZEPAM 0.5 MG/1
0.5 TABLET ORAL AT BEDTIME
Status: ON HOLD | COMMUNITY
End: 2021-03-08

## 2021-02-11 RX ORDER — OLANZAPINE 20 MG/1
20 TABLET ORAL
Status: DISCONTINUED | OUTPATIENT
Start: 2021-02-11 | End: 2021-02-18 | Stop reason: HOSPADM

## 2021-02-11 RX ORDER — ACETAMINOPHEN 325 MG/1
650 TABLET ORAL EVERY 4 HOURS PRN
Status: DISCONTINUED | OUTPATIENT
Start: 2021-02-11 | End: 2021-02-18 | Stop reason: HOSPADM

## 2021-02-11 RX ORDER — AMOXICILLIN 250 MG
1 CAPSULE ORAL 2 TIMES DAILY PRN
Status: DISCONTINUED | OUTPATIENT
Start: 2021-02-11 | End: 2021-02-18 | Stop reason: HOSPADM

## 2021-02-11 RX ORDER — OLANZAPINE 10 MG/1
10 TABLET ORAL 3 TIMES DAILY PRN
Status: DISCONTINUED | OUTPATIENT
Start: 2021-02-11 | End: 2021-02-12

## 2021-02-11 RX ORDER — TRANEXAMIC ACID 650 MG/1
1300 TABLET ORAL 3 TIMES DAILY
Status: DISCONTINUED | OUTPATIENT
Start: 2021-02-11 | End: 2021-02-11 | Stop reason: CLARIF

## 2021-02-11 RX ORDER — VITAMIN B COMPLEX
25 TABLET ORAL DAILY
Status: DISCONTINUED | OUTPATIENT
Start: 2021-02-11 | End: 2021-02-18 | Stop reason: HOSPADM

## 2021-02-11 RX ORDER — CLOZAPINE 100 MG/1
200 TABLET, ORALLY DISINTEGRATING ORAL AT BEDTIME
Status: DISCONTINUED | OUTPATIENT
Start: 2021-02-11 | End: 2021-02-18 | Stop reason: HOSPADM

## 2021-02-11 RX ADMIN — Medication 25 MCG: at 18:37

## 2021-02-11 RX ADMIN — RIVAROXABAN 20 MG: 10 TABLET, FILM COATED ORAL at 18:37

## 2021-02-11 RX ADMIN — LORAZEPAM 0.5 MG: 0.5 TABLET ORAL at 21:01

## 2021-02-11 RX ADMIN — MULTIPLE VITAMINS W/ MINERALS TAB 1 TABLET: TAB at 18:37

## 2021-02-11 RX ADMIN — METFORMIN ER 500 MG 1000 MG: 500 TABLET ORAL at 18:37

## 2021-02-11 RX ADMIN — CLOZAPINE 200 MG: 100 TABLET, ORALLY DISINTEGRATING ORAL at 21:01

## 2021-02-11 RX ADMIN — OLANZAPINE 20 MG: 20 TABLET, FILM COATED ORAL at 18:37

## 2021-02-11 ASSESSMENT — MIFFLIN-ST. JEOR
SCORE: 1379.83
SCORE: 1379.38

## 2021-02-11 ASSESSMENT — ACTIVITIES OF DAILY LIVING (ADL)
DIFFICULTY_COMMUNICATING: NO
DRESSING/BATHING_DIFFICULTY: NO
WALKING_OR_CLIMBING_STAIRS_DIFFICULTY: NO
ORAL_HYGIENE: INDEPENDENT
DIFFICULTY_EATING/SWALLOWING: NO
CONCENTRATING,_REMEMBERING_OR_MAKING_DECISIONS_DIFFICULTY: NO
ADL_ASSESSMENT: WDL
HEARING_DIFFICULTY_OR_DEAF: NO
DOING_ERRANDS_INDEPENDENTLY_DIFFICULTY: NO
DRESS: INDEPENDENT;SCRUBS (BEHAVIORAL HEALTH)
PATIENT_/_FAMILY_COMMUNICATION_STYLE: SPOKEN LANGUAGE (ENGLISH OR BILINGUAL)
FALL_HISTORY_WITHIN_LAST_SIX_MONTHS: NO
TOILETING_ISSUES: NO
INTERPRETER_SERVICES_OFFERED_TO_THE_PATIENT: NO
WEAR_GLASSES_OR_BLIND: NO
LAUNDRY: WITH SUPERVISION
HYGIENE/GROOMING: INDEPENDENT

## 2021-02-11 ASSESSMENT — ENCOUNTER SYMPTOMS
ABDOMINAL PAIN: 0
SHORTNESS OF BREATH: 0
HALLUCINATIONS: 1
FEVER: 0

## 2021-02-11 NOTE — SAFE
Nona Finley  February 11, 2021    Patient with a history of Schizoaffective Disorder. Followed by ACT team, on Clozapine and Abilify Maintenna IM. Increased AH and VH in past 2 weeks despite increase in Clozapine to 20 mgs. Command hallucinations tellling her that food and water poisoned and to kill her children, also thoughts of jumping from  bridge, Five admissions here in past 2 years. In-laws can care for her children. Seeking voluntary admission because she fears she will act on her suicidal Ideation and Auditory Hallucinations without intervention; med adjustments in the community have not mitigated symptoms. Sent here by her ACT team.       Current Suicidal Ideation/Self-Injurious Concerns/Methods: Jumping (from building, into traffic, etc.)    Inappropriate Sexual Behavior: No    Aggression/Homicidal Ideation: Rumination: Command hallucinations telling her to kill her children. Denies current intent.      For additional details see full DEC assessment.       Walter Polanco

## 2021-02-11 NOTE — TELEPHONE ENCOUNTER
Pt sent to [facility] ed by ACT team.  B: worsening psychosis 3 weeks. Pt has had med changes since the episode started but no improvement.  Pt hearing voices telling her that food and water is poisoned. Also to kill herself and her 6 children.  Has visual flores of scary faces. Pt does not want to kill self or children but the voices are becoming more disturbing, scared she may act.  in [place] for a residency, Mom helps as PCA.   A: Dx schizoaffective. Cooperative desires help, vol.  R: Robyn/32  Patient cleared and ready for behavioral bed placement: Yes   Asymptomatic, test pending

## 2021-02-11 NOTE — ED NOTES
"ED to Behavioral Floor Handoff    SITUATION  Nona Finley is a 40 year old female who speaks [place] and lives in a home with family members The patient arrived in the ED by private car from home with a complaint of Hallucinations and Suicidal  .The patient's current symptoms started/worsened 2 week(s) ago and during this time the symptoms have increased.   In the ED, pt was diagnosed with   Final diagnoses:   Schizoaffective disorder, depressive type (H)   Hallucinations        Initial vitals were: BP: 108/74  Pulse: 126  Temp: 96.8  F (36  C)  Resp: 16  Height: 162.6 cm (5' 4\")  Weight: 73.5 kg (162 lb)  SpO2: 100 %   --------  Is the patient diabetic? No   If yes, last blood glucose? --     If yes, was this treated in the ED? --  --------  Is the patient inebriated (ETOH) No or Impaired on other substances? No  MSSA done? N/A  Last MSSA score: --    Were withdrawal symptoms treated? N/A  Does the patient have a seizure history? No. If yes, date of most recent seizure--  --------  Is the patient patient experiencing suicidal ideation? reports occasional suicidal thoughts representing feeling that life is not worth feeling    Homicidal ideation? denies current or recent homicidal ideation or behaviors.    Self-injurious behavior/urges? denies current or recent self injurious behavior or ideation.  ------  Was pt aggressive in the ED No  Was a code called No  Is the pt now cooperative? Yes  -------  Meds given in ED: Medications - No data to display   Family present during ED course? No  Family currently present? No    BACKGROUND  Does the patient have a cognitive impairment or developmental disability? No  Allergies: No Known Allergies.   Social demographics are   Social History     Socioeconomic History     Marital status:      Spouse name: None     Number of children: 2     Years of education: None     Highest education level: None   Occupational History     Employer: NONE    Social Needs     Financial " resource strain: None     Food insecurity     Worry: None     Inability: None     Transportation needs     Medical: None     Non-medical: None   Tobacco Use     Smoking status: Never Smoker     Smokeless tobacco: Never Used   Substance and Sexual Activity     Alcohol use: No     Drug use: No     Sexual activity: Yes     Partners: Male     Birth control/protection: None   Lifestyle     Physical activity     Days per week: None     Minutes per session: None     Stress: None   Relationships     Social connections     Talks on phone: None     Gets together: None     Attends Shinto service: None     Active member of club or organization: None     Attends meetings of clubs or organizations: None     Relationship status: None     Intimate partner violence     Fear of current or ex partner: None     Emotionally abused: None     Physically abused: None     Forced sexual activity: None   Other Topics Concern      Service Not Asked     Blood Transfusions No     Caffeine Concern Not Asked     Occupational Exposure No     Hobby Hazards Not Asked     Sleep Concern Not Asked     Stress Concern Not Asked     Weight Concern Not Asked     Special Diet Not Asked     Back Care Not Asked     Exercise Not Asked     Bike Helmet Not Asked     Seat Belt Not Asked     Self-Exams Not Asked     Parent/sibling w/ CABG, MI or angioplasty before 65F 55M? Not Asked   Social History Narrative    Caffeine intake/servings daily - 2 times a week    Calcium intake/servings daily - 1-2    Exercise no times weekly - describe active with children    Sunscreen used - soemtimes    Seatbelts used - Yes    Guns stored in the home - Yes    Self Breast Exam - Yes    Pap test up to date -  Yes    Eye exam up to date -  No    Dental exam up to date -  No    DEXA scan up to date -  Not Applicable    Flex Sig/Colonoscopy up to date -  Not Applicable    Mammography up to date -  Not Applicable    Immunizations reviewed and up to date - needs tdap at  "postpartum    Abuse: Current or Past (Physical, Sexual or Emotional) - No    Do you feel safe in your environment - Yes    Do you cope well with stress - sometimes    Do you suffer from insomnia - No    Last updated by: Joanne Gilligan RN 12/16/10                            ASSESSMENT  Labs results   Labs Ordered and Resulted from Time of ED Arrival Up to the Time of Departure from the ED   COMPREHENSIVE METABOLIC PANEL - Abnormal; Notable for the following components:       Result Value    Chloride 112 (*)     Glucose 106 (*)     All other components within normal limits   INR - Abnormal; Notable for the following components:    INR 1.45 (*)     All other components within normal limits   CBC WITH PLATELETS DIFFERENTIAL   INFLUENZA A/B & SARS-COV2 PCR MULTIPLEX   DRUG ABUSE SCREEN 6 CHEM DEP URINE ([facility])   HCG QUALITATIVE URINE      Imaging Studies: No results found for this or any previous visit (from the past 24 hour(s)).   Most recent vital signs /74   Pulse 126   Temp 96.8  F (36  C) (Oral)   Resp 16   Ht 1.626 m (5' 4\")   Wt 73.5 kg (162 lb)   LMP 01/17/2020 (Exact Date)   SpO2 100%   Breastfeeding No   BMI 27.81 kg/m     Abnormal labs/tests/findings requiring intervention:---   Pain control: pt had none  Nausea control: pt had none    RECOMMENDATION  Are any infection precautions needed (MRSA, VRE, etc.)? No If yes, what infection? --  ---  Does the patient have mobility issues? independently. If yes, what device does the pt use? ---  ---  Is patient on 72 hour hold or commitment? No If on 72 hour hold, have hold and rights been given to patient? N/A  Are admitting orders written if after 10 p.m. ?N/A  Tasks needing to be completed:---     Martha Tello, RN   ascom--    [phone] [facility]   [phone] [facility] ED      "

## 2021-02-11 NOTE — PROGRESS NOTES
02/11/21 1643   Patient Belongings   Did you bring any home meds/supplements to the hospital?  No   Patient Belongings remains with patient;locker;sent to security per site process   Patient Belongings Remaining with Patient clothing;Hinduism item   Patient Belongings Put in Hospital Secure Location (Security or Locker, etc.) cash/credit card;cell phone/electronics;clothing;purse/wallet;shoes;wallet   Belongings Search Yes   Clothing Search Yes   Second Staff Geovanna CARVER RN       Patient: Bra, scarf, and pink jacket    Locker: 1 skirt  2 scrunchies  Pair of pants  Scarf  4 dresses  Mask  Purse  [employer] gift card  AVS paperwork  Wallet   Insurance cards   [place]  Phone +   Gray zip-up hoodie with strings    Security ([id number]): [facility] #[id number]  [facility] Visa debit #[phone]  [facility] [employer] Visa debit #[id number]  Social Security card  Passport  $78 cash (3-$20, 1-$10. 1-$5, and 3-$1)          A               Admission:  I am responsible for any personal items that are not sent to the safe or pharmacy.  [facility] is not responsible for loss, theft or damage of any property in my possession.    Signature:  _________________________________ Date: _______  Time: _____                                              Staff Signature:  ____________________________ Date: ________  Time: _____      2nd Staff person, if patient is unable/unwilling to sign:    Signature: ________________________________ Date: ________  Time: _____     Discharge:  [facility] has returned all of my personal belongings:    Signature: _________________________________ Date: ________  Time: _____                                          Staff Signature:  ____________________________ Date: ________  Time: _____

## 2021-02-11 NOTE — ED NOTES
Pt is having SI and hallucinations. Pt states auditory hallucinations are telling her that there is poison in the food and the water. She states they are also telling her to kill her children. She states she has no intention on acting on what these voices are telling ehr. She also states she has thoughts of going to a bridge and jumping but has no intention of acting on this either.

## 2021-02-11 NOTE — ED PROVIDER NOTES
ED Provider Note  [facility]      History     Chief Complaint   Patient presents with     Hallucinations     Suicidal     The history is provided by the patient and medical records.     Nona Finley is a 40 year old [place] female with a history of severe bipolar 1 disorder with psychotic behavior, depressive type schizoaffective disorder, auditory hallucinations, suicidal ideations, psychosis, postpartum depression, and tuberculosis  who presents to the ED today for evaluation of hallucinations. The patient reports that she has been hearing voices for 2 weeks telling her that her medication and food is poisoned. She states that this morning the voices told her to kill herself and her children. She states that she grabbed a knife to stab herself, but did not act. She states that her medication dose was recently increased, but is not working anymore. She states that she is feeling suicidal, but feels safe here in the ED and does not think she will hurt herself.     Social: The patient has 6 children. Her  is away for work and her in-laws come to her house daily.        Past Medical History  Past Medical History:   Diagnosis Date     Encounter for female birth control 6/14/2017 6/14/2017 Plan Documentation Service ordered Depo Provera injection (150mg IM) may be given every 3 months for one year per protoccol. Plan and order should be renewed at a visit no later than 6/14/18   Vanessa Thompson MD         Encounter for insertion or removal of intrauterine contraceptive device 1/3/2008    Paragard 1/08 removed 1/08.     Postpartum depression 8/18/2011     Schizophrenia (H) 2/12/2019     Suicidal ideation 2/16/2017     Past Surgical History:   Procedure Laterality Date     NO HISTORY OF SURGERY       NO HISTORY OF SURGERY  06/13/2013    Per patient reported this          cloZAPine (FAZACLO) 100 MG ODT       LORazepam (ATIVAN) 0.5 MG tablet       metFORMIN (GLUCOPHAGE-XR) 500 MG 24 hr  "tablet       Multiple Vitamins-Minerals (MULTIVITAMIN ADULT) TABS       rivaroxaban ANTICOAGULANT (XARELTO ANTICOAGULANT) 20 MG TABS tablet       Vitamin D3 (CHOLECALCIFEROL) 25 mcg (1000 units) tablet       ABILIFY MAINTENA 400 MG extended release injection       cloZAPine (FAZACLO) 25 MG ODT       tranexamic acid (LYSTEDA) 650 MG tablet      No Known Allergies  Family History  Family History   Problem Relation Age of Onset     Respiratory Father         asthma     Asthma Father      Cerebrovascular Disease Mother      Diabetes Mother      Neurologic Disorder Brother         mental health problem?     Neurologic Disorder Sister         seizures (half sister)     Respiratory Paternal Grandfather         asthma     Respiratory Sister         asthma     Respiratory Brother         asthma     Neurologic Disorder Daughter         autism      Hypertension Maternal Grandmother      Neurologic Disorder Sister         seizures     Diabetes Maternal Grandfather      Coronary Artery Disease Son      Social History   Social History     Tobacco Use     Smoking status: Never Smoker     Smokeless tobacco: Never Used   Substance Use Topics     Alcohol use: No     Drug use: No      Past medical history, past surgical history, medications, allergies, family history, and social history were reviewed with the patient. No additional pertinent items.       Review of Systems   Constitutional: Negative for fever.   Respiratory: Negative for shortness of breath.    Cardiovascular: Negative for chest pain.   Gastrointestinal: Negative for abdominal pain.   Psychiatric/Behavioral: Positive for hallucinations and suicidal ideas. Negative for self-injury.   All other systems reviewed and are negative.        Physical Exam   BP: 108/74  Pulse: 126  Temp: 96.8  F (36  C)  Resp: 16  Height: 162.6 cm (5' 4\")  Weight: 73.5 kg (162 lb)  SpO2: 100 %  Physical Exam  Physical Exam   Constitutional:   well nourished, well developed, resting comfortably "   HENT:   Head: Normocephalic and atraumatic.   Cardiovascular: regular rate and rhythm without murmurs or gallops  Pulmonary/Chest: Clear to auscultation bilaterally, with no wheezes or retractions.normal work of breathing  GI: Soft with good bowel sounds.  Non-tender, non-distended  Skin: Skin is warm and dry.   Neurological: alert and oriented to person, place, and time. Nonfocal exam  Psychiatric: her  speech is normal. Judgment and thought content normal. His mood appears flat   She is not agitated, not aggressive, not hyperactive,  and not combative  She having auditory hallucinations  Cognition and memory are normal. She expresses suicidal ideation.    ED Course      Procedures                      Results for orders placed or performed during the hospital encounter of 02/11/21   CBC with platelets differential     Status: None   Result Value Ref Range    WBC 6.7 4.0 - 11.0 10e9/L    RBC Count 4.07 3.8 - 5.2 10e12/L    Hemoglobin 12.1 11.7 - 15.7 g/dL    Hematocrit 36.5 35.0 - 47.0 %    MCV 90 78 - 100 fl    MCH 29.7 26.5 - 33.0 pg    MCHC 33.2 31.5 - 36.5 g/dL    RDW 14.2 10.0 - 15.0 %    Platelet Count 316 150 - 450 10e9/L    Diff Method Automated Method     % Neutrophils 73.7 %    % Lymphocytes 18.9 %    % Monocytes 6.0 %    % Eosinophils 0.7 %    % Basophils 0.6 %    % Immature Granulocytes 0.1 %    Nucleated RBCs 0 0 /100    Absolute Neutrophil 4.9 1.6 - 8.3 10e9/L    Absolute Lymphocytes 1.3 0.8 - 5.3 10e9/L    Absolute Monocytes 0.4 0.0 - 1.3 10e9/L    Absolute Eosinophils 0.1 0.0 - 0.7 10e9/L    Absolute Basophils 0.0 0.0 - 0.2 10e9/L    Abs Immature Granulocytes 0.0 0 - 0.4 10e9/L    Absolute Nucleated RBC 0.0    Comprehensive metabolic panel     Status: Abnormal   Result Value Ref Range    Sodium 142 133 - 144 mmol/L    Potassium 4.2 3.4 - 5.3 mmol/L    Chloride 112 (H) 94 - 109 mmol/L    Carbon Dioxide 24 20 - 32 mmol/L    Anion Gap 6 3 - 14 mmol/L    Glucose 106 (H) 70 - 99 mg/dL    Urea Nitrogen  16 7 - 30 mg/dL    Creatinine 0.60 0.52 - 1.04 mg/dL    GFR Estimate >90 >60 mL/min/[1.73_m2]    GFR Estimate If Black >90 >60 mL/min/[1.73_m2]    Calcium 8.8 8.5 - 10.1 mg/dL    Bilirubin Total 0.3 0.2 - 1.3 mg/dL    Albumin 3.8 3.4 - 5.0 g/dL    Protein Total 8.3 6.8 - 8.8 g/dL    Alkaline Phosphatase 58 40 - 150 U/L    ALT 13 0 - 50 U/L    AST 17 0 - 45 U/L   INR     Status: Abnormal   Result Value Ref Range    INR 1.45 (H) 0.86 - 1.14     Medications - No data to display     Assessments & Plan (with Medical Decision Making)       I have reviewed the nursing notes.   Emergency Department course:  The patient was seen and examined at 1020 am.    Patient was seen by [facility]  Walter, who gave additional history. The patient is followed by a care team at [facility]. Her psychiatrist there increased her 120 mg Clozapine to 200 mg 2 weeks ago for auditory and visual hallucinations. She states that her symptoms did not improve. She is hearing voices telling her to kill her children and jump off a bridge. She is also seeing scary faces and thinks that her food and water is poisoned. She is getting 5 hours of interrupted sleep a night (on 400 mg Abilify injection). She called her  today who then called a cab to her house to bring her here. She has had similar symptoms in the past that have lead to inpatient stays. She states that this time the symptoms are more intense and frequent and community help, including daily medication reminder calls have not been helping. She has 6 children with the youngest being 4, and one of whom has Autism and requires a lot of attention so her in-laws are paid PCAs who come daily to help with this. She reports that another significant stressor is that her  is a doctor doing residency in [place] since July and has not been able to come home since September.     Laboratory studies show an unremarkable CBC, with a WBC of 6.7, and essentially unremarkable  comprehensive metabolic panel.  INR is elevated at 1.45.  Urine drug screen is negative.  COVID-19 is negative.  hCG is negative.    The patient is a 40-year-old female with a history of schizoaffective disorder who presents with worsening auditory hallucinations that are telling her to hurt her children and herself.  This is despite having her medication increased about 2 weeks ago.  She will be admitted to the inpatient psychiatric service for stabilization.  She is voluntary for admission.  I have reviewed all of today's laboratory studies.       I have reviewed the findings, diagnosis, plan and need for follow up with the patient.    New Prescriptions    No medications on file       Final diagnoses:   Schizoaffective disorder, depressive type (H)   Hallucinations   I, Ashli Hendrix, am serving as a trained medical scribe to document services personally performed by Tere Francis MD, based on the provider's statements to me.     I, Tere Francis MD, was physically present and have reviewed and verified the accuracy of this note documented by Ashli Hendrix.  --This note was created in part by the use of Dragon voice recognition dictation system. Inadvertent grammatical errors and typographical errors may still exist.  MD Tere Mabry MD  [facility] EMERGENCY DEPARTMENT  2/11/2021     Tere Francis MD  02/11/21 [id number]

## 2021-02-11 NOTE — ED NOTES
[facility] Team Nurse called to give report and background on patient, she is a patient of theirs and is followed with their team 7 days a week. Medications verified with the nurse, nurse relayed that the patient needs stabilization on medication and adjustment of medication. Their concern is that the patient reports command hallucinations that have progressed and are now telling her to hurt her children. The [facility] Team is available at [phone].

## 2021-02-11 NOTE — ED NOTES
Pt is aware that she needs to provide a urine sample when she is ready. Pt has been unable to provide one so far.

## 2021-02-12 LAB
ALBUMIN UR-MCNC: NEGATIVE MG/DL
APPEARANCE UR: CLEAR
BACTERIA #/AREA URNS HPF: ABNORMAL /HPF
BILIRUB UR QL STRIP: NEGATIVE
CHOLEST SERPL-MCNC: 169 MG/DL
COLOR UR AUTO: YELLOW
DEPRECATED CALCIDIOL+CALCIFEROL SERPL-MC: 25 UG/L (ref 20–75)
FOLATE SERPL-MCNC: 25.4 NG/ML
GLUCOSE BLDC GLUCOMTR-MCNC: 101 MG/DL (ref 70–99)
GLUCOSE BLDC GLUCOMTR-MCNC: 94 MG/DL (ref 70–99)
GLUCOSE UR STRIP-MCNC: NEGATIVE MG/DL
HBA1C MFR BLD: 5.4 % (ref 0–5.6)
HDLC SERPL-MCNC: 65 MG/DL
HGB UR QL STRIP: ABNORMAL
KETONES UR STRIP-MCNC: NEGATIVE MG/DL
LDLC SERPL CALC-MCNC: 95 MG/DL
LEUKOCYTE ESTERASE UR QL STRIP: ABNORMAL
MUCOUS THREADS #/AREA URNS LPF: PRESENT /LPF
NITRATE UR QL: NEGATIVE
NONHDLC SERPL-MCNC: 104 MG/DL
PH UR STRIP: 6 PH (ref 5–7)
RBC #/AREA URNS AUTO: 8 /HPF (ref 0–2)
RPR SER QL: NONREACTIVE
SOURCE: ABNORMAL
SP GR UR STRIP: 1.01 (ref 1–1.03)
SQUAMOUS #/AREA URNS AUTO: 5 /HPF (ref 0–1)
T PALLIDUM AB SER QL: REACTIVE
TRIGL SERPL-MCNC: 46 MG/DL
TSH SERPL DL<=0.005 MIU/L-ACNC: 0.74 MU/L (ref 0.4–4)
UROBILINOGEN UR STRIP-MCNC: NORMAL MG/DL (ref 0–2)
VIT B12 SERPL-MCNC: 481 PG/ML (ref 193–986)
WBC #/AREA URNS AUTO: 4 /HPF (ref 0–5)

## 2021-02-12 PROCEDURE — 82746 ASSAY OF FOLIC ACID SERUM: CPT | Performed by: NURSE PRACTITIONER

## 2021-02-12 PROCEDURE — 999N001017 HC STATISTIC GLUCOSE BY METER IP

## 2021-02-12 PROCEDURE — 99207 PR CONSULT E&M CHANGED TO SUBSEQUENT LEVEL: CPT | Performed by: PHYSICIAN ASSISTANT

## 2021-02-12 PROCEDURE — 99223 1ST HOSP IP/OBS HIGH 75: CPT | Mod: 95 | Performed by: NURSE PRACTITIONER

## 2021-02-12 PROCEDURE — 87088 URINE BACTERIA CULTURE: CPT | Performed by: PSYCHIATRY & NEUROLOGY

## 2021-02-12 PROCEDURE — 250N000013 HC RX MED GY IP 250 OP 250 PS 637: Performed by: NURSE PRACTITIONER

## 2021-02-12 PROCEDURE — 93010 ELECTROCARDIOGRAM REPORT: CPT | Performed by: INTERNAL MEDICINE

## 2021-02-12 PROCEDURE — 93005 ELECTROCARDIOGRAM TRACING: CPT

## 2021-02-12 PROCEDURE — 80061 LIPID PANEL: CPT | Performed by: PSYCHIATRY & NEUROLOGY

## 2021-02-12 PROCEDURE — 83036 HEMOGLOBIN GLYCOSYLATED A1C: CPT | Performed by: PSYCHIATRY & NEUROLOGY

## 2021-02-12 PROCEDURE — 86592 SYPHILIS TEST NON-TREP QUAL: CPT | Performed by: PSYCHIATRY & NEUROLOGY

## 2021-02-12 PROCEDURE — 86780 TREPONEMA PALLIDUM: CPT | Performed by: PSYCHIATRY & NEUROLOGY

## 2021-02-12 PROCEDURE — 81001 URINALYSIS AUTO W/SCOPE: CPT | Performed by: NURSE PRACTITIONER

## 2021-02-12 PROCEDURE — 250N000013 HC RX MED GY IP 250 OP 250 PS 637: Performed by: PSYCHIATRY & NEUROLOGY

## 2021-02-12 PROCEDURE — 84443 ASSAY THYROID STIM HORMONE: CPT | Performed by: PSYCHIATRY & NEUROLOGY

## 2021-02-12 PROCEDURE — 82607 VITAMIN B-12: CPT | Performed by: PSYCHIATRY & NEUROLOGY

## 2021-02-12 PROCEDURE — 80159 DRUG ASSAY CLOZAPINE: CPT | Performed by: PSYCHIATRY & NEUROLOGY

## 2021-02-12 PROCEDURE — 36415 COLL VENOUS BLD VENIPUNCTURE: CPT | Performed by: PSYCHIATRY & NEUROLOGY

## 2021-02-12 PROCEDURE — 82306 VITAMIN D 25 HYDROXY: CPT | Performed by: PSYCHIATRY & NEUROLOGY

## 2021-02-12 PROCEDURE — 99232 SBSQ HOSP IP/OBS MODERATE 35: CPT | Performed by: PHYSICIAN ASSISTANT

## 2021-02-12 PROCEDURE — 87086 URINE CULTURE/COLONY COUNT: CPT | Performed by: PSYCHIATRY & NEUROLOGY

## 2021-02-12 PROCEDURE — 124N000002 HC R&B MH UMMC

## 2021-02-12 RX ORDER — OLANZAPINE 5 MG/1
5 TABLET ORAL EVERY MORNING
Status: DISCONTINUED | OUTPATIENT
Start: 2021-02-12 | End: 2021-02-12

## 2021-02-12 RX ORDER — DEXTROSE MONOHYDRATE 25 G/50ML
25-50 INJECTION, SOLUTION INTRAVENOUS
Status: DISCONTINUED | OUTPATIENT
Start: 2021-02-12 | End: 2021-02-18 | Stop reason: HOSPADM

## 2021-02-12 RX ORDER — HALOPERIDOL 5 MG/1
5 TABLET ORAL EVERY 8 HOURS PRN
Status: DISCONTINUED | OUTPATIENT
Start: 2021-02-12 | End: 2021-02-16

## 2021-02-12 RX ORDER — HALOPERIDOL 5 MG/ML
5 INJECTION INTRAMUSCULAR EVERY 8 HOURS PRN
Status: DISCONTINUED | OUTPATIENT
Start: 2021-02-12 | End: 2021-02-16

## 2021-02-12 RX ORDER — PERPHENAZINE 2 MG/1
2 TABLET ORAL 3 TIMES DAILY PRN
Status: DISCONTINUED | OUTPATIENT
Start: 2021-02-12 | End: 2021-02-18 | Stop reason: HOSPADM

## 2021-02-12 RX ORDER — NICOTINE POLACRILEX 4 MG
15-30 LOZENGE BUCCAL
Status: DISCONTINUED | OUTPATIENT
Start: 2021-02-12 | End: 2021-02-18 | Stop reason: HOSPADM

## 2021-02-12 RX ADMIN — MULTIPLE VITAMINS W/ MINERALS TAB 1 TABLET: TAB at 20:39

## 2021-02-12 RX ADMIN — Medication 25 MCG: at 20:39

## 2021-02-12 RX ADMIN — PERPHENAZINE 2 MG: 2 TABLET, FILM COATED ORAL at 14:18

## 2021-02-12 RX ADMIN — OLANZAPINE 20 MG: 20 TABLET, FILM COATED ORAL at 17:09

## 2021-02-12 RX ADMIN — CLOZAPINE 200 MG: 100 TABLET, ORALLY DISINTEGRATING ORAL at 22:13

## 2021-02-12 RX ADMIN — RIVAROXABAN 20 MG: 10 TABLET, FILM COATED ORAL at 17:09

## 2021-02-12 RX ADMIN — OLANZAPINE 5 MG: 5 TABLET, FILM COATED ORAL at 11:27

## 2021-02-12 RX ADMIN — LORAZEPAM 0.5 MG: 0.5 TABLET ORAL at 20:39

## 2021-02-12 RX ADMIN — METFORMIN ER 500 MG 1000 MG: 500 TABLET ORAL at 17:08

## 2021-02-12 ASSESSMENT — ACTIVITIES OF DAILY LIVING (ADL)
DRESS: INDEPENDENT
HYGIENE/GROOMING: INDEPENDENT
DRESS: INDEPENDENT
ORAL_HYGIENE: INDEPENDENT
LAUNDRY: WITH SUPERVISION
HYGIENE/GROOMING: INDEPENDENT
ORAL_HYGIENE: INDEPENDENT

## 2021-02-12 NOTE — PLAN OF CARE
Nona was out of her room about half of the shift. She was pacing the halls slowly, verbalized being bored. She was appropriately social with staff when approached, was able to ask to have her needs met such as asking to take a shower. She continues to endorse AH, and stated that what she is hearing is that the voices are wanting her dead. She stated that she feels safe in the hospital, able to contract for safety.  She was given perphenazine PRN for psychosis, as she stated that the scheduled zyprexa this morning was not helpful. The perphenazine was only recently given, so too soon to tell if it was effective or not.     Of note, an  was offered, but she declined, and stated that she would let me know if she felt like she needed an .     UC still pending.

## 2021-02-12 NOTE — H&P
"Video-Visit Details  Type of service:  Video Visit  Video Start Time (time video started): 1102  Video End Time (time video stopped): 1125  Originating Location (pt. Location): [address]  Distant Location (provider location): Provider remote location  Mode of Communication:  Video Conference via [employer]  Physician has received verbal consent for a Video Visit from the patient? Yes  RAHEEM Davis CNP    DATE OF ADMISSION: 2/11/2021                                     PATIENT'S [id number]   DATE OF SERVICE: 2/12/2021                                           PATIENT'S: 1980  ADMITTING PROVIDER: Kobe Freed MD  ATTENDING PROVIDER: Bonny MACIEL CNP  LEGAL STATUS:  Voluntary   SOURCES OF INFORMATION: Information was obtained from the patient and available records.  CHIEF COMPLAIN: \"I am having a tough time\".  HISTORY F PRESENT ILLNESS: Per ED note: Nona Finley is a 40 year old [place] female with a history of severe bipolar 1 disorder with psychotic behavior, depressive type schizoaffective disorder, auditory hallucinations, suicidal ideations, psychosis, postpartum depression, and tuberculosis  who presents to the ED today for evaluation of hallucinations. The patient reports that she has been hearing voices for 2 weeks telling her that her medication and food is poisoned. She states that this morning the voices told her to kill herself and her children. She states that she grabbed a knife to stab herself, but did not act. She states that her medication dose was recently increased, but is not working anymore. She states that she is feeling suicidal, but feels safe here in the ED and does not think she will hurt herself.  Social: The patient has 6 children. Her  is away for work and her in-laws come to her house daily.  Patient was seen by [facility]  Walter, who gave additional history. The patient is followed by a care team at [facility]. Her psychiatrist there increased " "her 120 mg Clozapine to 200 mg 2 weeks ago for auditory and visual hallucinations. She states that her symptoms did not improve. She is hearing voices telling her to kill her children and jump off a bridge. She is also seeing scary faces and thinks that her food and water is poisoned. She is getting 5 hours of interrupted sleep a night (on 400 mg Abilify injection). She called her  today who then called a cab to her house to bring her here.  She states that this time the symptoms are more intense and frequent and community help, including daily medication reminder calls have not been helping. She has 6 children with the youngest being 4, and one of whom has Autism and requires a lot of attention so her in-laws are paid PCAs who come daily to help with this. She reports that another significant stressor is that her  is a doctor doing residency in [place] since July and has not been able to come home since September.    Nona Finley is a 40 year old female with history of schizoaffective disorder, depressed type.  The patient is a good historian.  She confirms information in the previous paragraphs.  States that the reason for admission is struggling with auditory and visual hallucinations.  Reports that the hallucinations have been under control up until 2 weeks ago.  Denies any stressors except for a water pipe bursting and having a hard time finding anyone to fix it. Her  is away for residency training and will be done in 1.5 years. He will visit in May. Her in-laws are helping her with the kids.  The patient reports hearing voices urging her to harm her kids or kill herself.  The voices have been telling her that her food and medicine have been poisoned.  Reports that she was taking her medications as prescribed but was not eating.  She is also seeing \"scary faces coming towards me\".  She is hallucinating 24 hours a day.  She feels tired and scared.  She is crying all the time.  " "She does not want to live anymore.  Denies suicidal ideation, plan or intent to act on her thoughts.  \"I am just tired of living\".  She feels very depressed, hopeless, helpless, and worthless.  Sleep is poor, averaging few hours a night.  Some days she is able to take short naps.  Her energy is low, appetite is poor, memory and concentration are intact.  Reports high anxiety mainly related to worries about her children.  Denies panic attacks and manic symptoms.  Denies paranoia and delusions.  Denies PTSD, OCD, eating disorders, borderline personality disorder, suicide attempts, self injury behaviors.  The patient reports having dreams of being chased by snakes. Denies seizures, head injuries, and loss of consciousness.    Spoke pts psychiatrist Dr. Gaviria, phone #[phone].  States that the patient has been under a lot of  stress recently with her  visiting for the short-term and leaving again.  One of the patient's daughter has severe autism.  Even though the patient has help from her in-laws, she is practically raising her kids alone.  The ACT team meets with the patient via Zoom every evening and reminds her to take her medications. She has missed 2 days of medications, last week.  Historically, she decompensates within days of stopping her medications.     SUBSTANCE USE HISTORY:   Denies.     PSYCHIATRIC HISTORY:   The patient has a history of schizoaffective disorder, depressed type. Reports being diagnose with schizophrenia at age 16.  Her last hospitalization was in July of 2020.  She has been hospitalized a total of 12 times in the last 5 years.  In the last 3 years, she has been found a various combinations of Clozaril and Abilify.  The last 6 injection of Abilify Maintena was given on January 26.  She had tried Zyprexa.  She had ECT treatment in 2011 for auditory hallucinations.  States it was helpful.  She is currently committed in [facility], through 4/2/2021.  [facility] medications include " Clozaril, Invega, Haldol, and Abilify.  The patient has [facility] ACT team   The patient sees psychiatrist, Dr. Gaviria. Denies suicide attempts.  PAST MEDICAL HISTORY:   Past Medical History:   Diagnosis Date     Encounter for female birth control 6/14/2017 6/14/2017 Plan Documentation Service ordered Depo Provera injection (150mg IM) may be given every 3 months for one year per loretta. Plan and order should be renewed at a visit no later than 6/14/18   Vanessa Thompson MD         Encounter for insertion or removal of intrauterine contraceptive device 1/3/2008    Paragard 1/08 removed 1/08.     Postpartum depression 8/18/2011     Schizophrenia (H) 2/12/2019     Suicidal ideation 2/16/2017       Past Surgical History:   Procedure Laterality Date     NO HISTORY OF SURGERY       NO HISTORY OF SURGERY  06/13/2013    Per patient reported this       ALLERGIES:  No Known Allergies  FAMILY HISTORY:  Family history is positive for psychotic disorder in her brother who currently lives in [facility] [facility].  Family History   Problem Relation Age of Onset     Respiratory Father         asthma     Asthma Father      Cerebrovascular Disease Mother      Diabetes Mother      Neurologic Disorder Brother         mental health problem?     Neurologic Disorder Sister         seizures (half sister)     Respiratory Paternal Grandfather         asthma     Respiratory Sister         asthma     Respiratory Brother         asthma     Neurologic Disorder Daughter         autism      Hypertension Maternal Grandmother      Neurologic Disorder Sister         seizures     Diabetes Maternal Grandfather      Coronary Artery Disease Son        SOCIAL HISTORY:     Per chart: The patient is originally from [place].  Most of her family is still in [place].  Here in [place] she relies on help of family of her , who currently lives in [place].  He is alf through his residency in Internal Medicine. The patient is not employed, taking care of  their 6 children.  Her in laws help her with the children.  Medical records show that the patient's brother had suspected undiagnosed mental health issues.        MEDICAL REVIEW OF SYSTEM: Please refer to the review of systems done by Tere Francis MD  on 2/11/2021, which I reviewed and confirmed. The remaining 10-point systems review was negative based on my exam.   MEDICATIONS PRIOR TO ADMISSION:   Prior to Admission medications    Medication Sig Start Date End Date Taking? Authorizing Provider   ABILIFY MAINTENA 400 MG extended release injection Inject 400 mg into the muscle every 28 days 5/29/18  Yes Reported, Patient   cloZAPine (FAZACLO) 100 MG ODT Take 200 mg by mouth At Bedtime  11/13/20  Yes Reported, Patient   LORazepam (ATIVAN) 0.5 MG tablet Take 0.5 mg by mouth At Bedtime   Yes Reported, Patient   metFORMIN (GLUCOPHAGE-XR) 500 MG 24 hr tablet Take 1,000 mg by mouth daily (with dinner)    Yes Reported, Patient   Multiple Vitamins-Minerals (MULTIVITAMIN ADULT) TABS Take 1 tablet by mouth daily 12/1/20  Yes Avery Govea MD   OLANZapine (ZYPREXA) 20 MG tablet Take 20 mg by mouth daily (with dinner)   Yes Unknown, Entered By History   rivaroxaban ANTICOAGULANT (XARELTO ANTICOAGULANT) 20 MG TABS tablet Take 1 tablet (20 mg) by mouth daily (with dinner) Start after around 9/4, when you run out of the 15 mg pills twice a day. 9/30/20  Yes Hiram Loza MD   Vitamin D3 (CHOLECALCIFEROL) 25 mcg (1000 units) tablet Take 1 tablet (25 mcg) by mouth daily 12/1/20  Yes Avery Govea MD   tranexamic acid (LYSTEDA) 650 MG tablet Take 2 tablets (1,300 mg) by mouth 3 times daily During menstrual cycle 2/4/21   Hiram Loza MD     LABORATORY DATA:   Recent Results (from the past 672 hour(s))   CBC with platelets differential    Collection Time: 02/11/21 10:37 AM   Result Value Ref Range    WBC 6.7 4.0 - 11.0 10e9/L    RBC Count 4.07 3.8 - 5.2 10e12/L    Hemoglobin 12.1 11.7 - 15.7 g/dL    Hematocrit  36.5 35.0 - 47.0 %    MCV 90 78 - 100 fl    MCH 29.7 26.5 - 33.0 pg    MCHC 33.2 31.5 - 36.5 g/dL    RDW 14.2 10.0 - 15.0 %    Platelet Count 316 150 - 450 10e9/L    Diff Method Automated Method     % Neutrophils 73.7 %    % Lymphocytes 18.9 %    % Monocytes 6.0 %    % Eosinophils 0.7 %    % Basophils 0.6 %    % Immature Granulocytes 0.1 %    Nucleated RBCs 0 0 /100    Absolute Neutrophil 4.9 1.6 - 8.3 10e9/L    Absolute Lymphocytes 1.3 0.8 - 5.3 10e9/L    Absolute Monocytes 0.4 0.0 - 1.3 10e9/L    Absolute Eosinophils 0.1 0.0 - 0.7 10e9/L    Absolute Basophils 0.0 0.0 - 0.2 10e9/L    Abs Immature Granulocytes 0.0 0 - 0.4 10e9/L    Absolute Nucleated RBC 0.0    Comprehensive metabolic panel    Collection Time: 02/11/21 10:37 AM   Result Value Ref Range    Sodium 142 133 - 144 mmol/L    Potassium 4.2 3.4 - 5.3 mmol/L    Chloride 112 (H) 94 - 109 mmol/L    Carbon Dioxide 24 20 - 32 mmol/L    Anion Gap 6 3 - 14 mmol/L    Glucose 106 (H) 70 - 99 mg/dL    Urea Nitrogen 16 7 - 30 mg/dL    Creatinine 0.60 0.52 - 1.04 mg/dL    GFR Estimate >90 >60 mL/min/[1.73_m2]    GFR Estimate If Black >90 >60 mL/min/[1.73_m2]    Calcium 8.8 8.5 - 10.1 mg/dL    Bilirubin Total 0.3 0.2 - 1.3 mg/dL    Albumin 3.8 3.4 - 5.0 g/dL    Protein Total 8.3 6.8 - 8.8 g/dL    Alkaline Phosphatase 58 40 - 150 U/L    ALT 13 0 - 50 U/L    AST 17 0 - 45 U/L   INR    Collection Time: 02/11/21 10:37 AM   Result Value Ref Range    INR 1.45 (H) 0.86 - 1.14   Asymptomatic Influenza A/B & SARS-CoV2 (COVID-19) Virus PCR Multiplex    Collection Time: 02/11/21 11:55 AM    Specimen: Nasopharyngeal   Result Value Ref Range    Flu A/B & SARS-COV-2 PCR Source Nasopharyngeal     SARS-CoV-2 PCR Result NEGATIVE     Influenza A PCR Negative NEG^Negative    Influenza B PCR Negative NEG^Negative    Respiratory Syncytial Virus PCR (Note)     Flu A/B & SARS-CoV-2 PCR Comment (Note)    Drug abuse screen 6 urine (tox)    Collection Time: 02/11/21  1:24 PM   Result Value Ref  "Range    Amphetamine Qual Urine Negative NEG^Negative    Barbiturates Qual Urine Negative NEG^Negative    Benzodiazepine Qual Urine Negative NEG^Negative    Cannabinoids Qual Urine Negative NEG^Negative    Cocaine Qual Urine Negative NEG^Negative    Ethanol Qual Urine Negative NEG^Negative    Opiates Qualitative Urine Negative NEG^Negative   HCG qualitative urine    Collection Time: 02/11/21  1:24 PM   Result Value Ref Range    HCG Qual Urine Negative NEG^Negative     PHYSICAL EXAMINATON:   Temp: 98.2  F (36.8  C) Temp src: Oral BP: 117/81 Pulse: 97   Resp: 16 SpO2: 98 % O2 Device: None (Room air)    5' 5\" 158 lbs 6.4 oz Body mass index is 26.36 kg/m .  MENTAL STATUS EXAM: The patient is a very pleasant, female from [place] descent.  She is tearful on and off.  She is pleasant and cooperating with this interview.  Eye contact is good, mood is depressed and anxious, affect is sad, speech is clear and coherent, psychomotor behavior is negative for agitation and agitation, thought process is logical and goal oriented, no loose associations, thought content is positive for auditory and visual hallucinations, seeing faces coming towards her and hearing voices urging her to harm herself and her children, negative for suicidal and homicidal ideation, paranoia, delusions, insight and judgment are intact, she is oriented to self, date, place, situation, attention span and concentration are intact, recent and remote memory are intact, she has no problems expressing herself, and fund of knowledge is adequate for the level of education and training.      DIAGNOSIS:  Schizoaffective disorder, depressed type, severe with psychosis  History of post partum psychosis  Medical: Pulmonary embolism, pulmonary nodules, latent TB, prediabetes, history of orthostasis.  Management by internal medicine.  PLAN AND RECOMMENDATIONS: The patient is a 40 years old, very pleasant female from [place] descent who was admitted with increased auditory " and visual hallucinations, depression, and suicidal ideation.  The patient is adamantly denying suicidal thoughts, however, admits of hearing voices urging her to harm herself and her children.  The voices tell her that the food and medicine are poisoned.  Reports that she was taking her medications as prescribed however, considering her history of noncompliance with her medications, and paranoia regarding her food and medicine, it is possible that she was not taking them regularly. She was given Zyprexa 5 mg one time this morning without an improvement.  The patient is committed and [facility] for Haldol, Clozaril, Abilify, and Invega. The [facility] order should be amended to include Zyprexa. Her outpatient medications will be continued without change and will include the following:   --Abilify Maintena 400 mg q28 days, last injection was given on 1/26/2021.    --Continue clozapine 200 mg at bedtime.    --Continue lorazepam 0.5 mg at bedtime.    --Continue Zyprexa 20 mg with supper.    --PRN Haldol and a trial of perphenazine 2 mg  -- Discussed with the pharmacy the current medications and abnormal EKG.  If no improvement in symptoms by Monday, pharmacy consult would be beneficial.    --Spoke with internal medicine, Patricia Barahona PA-C,  regarding the EKG and the antipsychotic medications.  Patricia will discuss with Dr. Velasco and will call me back if there is any concerns.    --Blood work was reviewed and is unremarkable.    --EKG shows  and bilateral enlargement as well as nonspecific T waves which is worsened in the EKG in August 2020.    --Internal medicine consult was placed due to history of pulmonary embolism and pulmonary nodules.    --The patient was consulted on nature of illness and treatment options.   --Care was coordinated with the treatment team.  Attestation: Patient has been seen and evaluated by me Bonny MACIEL CNP  2/12/2021  4:47 AM  This note was created with the help of Edu  dictation system. All grammatical/typing errors or context distortion are unintentional and inherent to software.

## 2021-02-12 NOTE — PLAN OF CARE
Question arose concerning pt being on Zyprexa which is not on the [facility] order.     [facility] called Francois Martinez to discuss    [facility] attorney, [phone]. Francois Martinez was the  on record.    [facility] consulted with provider, sent letter requesting addition of Zyprexa and Prolixin.    Letter was sent to [facility] requesting addition.

## 2021-02-12 NOTE — PLAN OF CARE
[facility] reviewed the chart    Pt was given an order for recommit on 2020 for a period of 9 months. It will  2021.  The [facility] order will run concurrently. The [facility] order includes Clozaril, Invega, Haldol and Abilify.

## 2021-02-12 NOTE — CONSULTS
Internal Medicine Initial Visit      Nona Finley MRN# [id number]   YOB: 1980 Age: 40 year old   Date of Admission: 2/11/2021  PCP: Elle Villar    Referring Provider: Behavioral Health - Kobe Freed MD  Reason for Visit: Pulmonary embolism, pulmonary nodules, TB history and possible diabetes given she is on Metformin.          Assessment and Recommendations:   Nona Finley is a 40 year old female with a history of bipolar 1 disorder with psychotic behavior, schizoaffective disorder, depressive type, auditory/visual hallucinations, SI, history of post-partum depression and history of latent TB who is admitted to station [facility] with hallucinations for 2 weeks and suicidal ideation. Internal Medicine consultation was ordered by Dr. Freed for evaluation regarding pulmonary embolism, pulmonary nodules, TB history and possible diabetes given she is on Metformin.        Bipolar 1 disorder with psychotic behaviors  Schizoaffective disorder, depressive type  Auditory hallucinations  Suicidal ideation:  Follows with Psychiatry at [facility]. Recently her Clozapine was increased from 120mg to 200mg for auditory/visual hallucinations, which patient reports did not improve. Voices are telling her to kill her children and to jump off a bridge. Also reports seeing scary faces. Recent stressors are that she has 6 children, one with autism that requires a lot of attention (in laws are PCAs) and her  is a resident physician in [place] and hasn't been able to come home since September. Also on Ariprazole, Zyprexa, and Lorazepam.    -Management per psychiatry team.     Tachycardia  History of orthostasis:  HR 126bpm in the ED in setting of psychosis. Improved to 97bpm today, but now 121bpm. ECG from 2/11 with sinus rhythm with normal QTc and biatrial enlargement. Denies palpitations, chest pain and asymptomatic. Tolerating fluid intake. Does note that she occasionally gets  lightheaded when she stands. No lightheadedness currently. Has history of tachycardia at baseline per chart review. ECHO 2017 for tachycardia was normal with EF 60-65%. TSH wnl.   - Monitor   - Encourage fluid intake  - Please notify medicine if any chest pain, orthostasis, hypotension   - Addendum: Discussed case with Dr. Velasco, internal medicine regarding biatrial enlargement. At this time, reviewed ECG and noted to have atrial enlargement on previous ECG and ECHO at that time without structural abnormality. No indication to repeat ECHO at this time.   - Recommend follow-up with PCP for ongoing monitoring     History of pulmonary embolism:  Follows with Dr. Loza, Hematology last visit 2/4/21. She had a history of unprovoked small bilateral PE in 8/14/20 diagnosed in the ED. She is on Xarelto for chronic anticoagulation. She has a history of heavy bleeding during menstruation and was recently prescribed Lysteda by Hematology to take during her menses to see if this helps.   - Continue Xarelto 20mg daily with supper   - Follow up with Hematology as directed   - Patient can take Lysteda 1300mg by mouth three times daily during menstrual cycle (~2/17 is expected menses date) to prevent heavy menstrual bleeding per Hematology recommendations -> not ordered but please page medicine when on menses and can place order if having heavy bleeding     History of latent TB:  Has history of latent TB. Had positive PPD in 2006 with negative CXR and did not finish INH. From 8/2015-1/2016 she completed ~4 months treatment of INH per Family Practice Notes. At this time, is incompletely treated for latent TB. Currently, denies any symptoms of active TB including no night sweats, cough, hemoptysis, unexpected weight loss, fever. She is interested in following up for consideration of restarting treatment.  - Infectious Disease referral placed in discharge navigator for follow-up of latent TB and for treatment  - Follow up with PCP  for follow-up upon discharge  - Please notify medicine if any fever >100.4F, new cough, hemoptysis, night sweats. Would obtain CXR at that time.     History of pulmonary nodule and ground glass opacities, resolved:  Patient had prominent focal solid and ground glass nodule opacity at RUL seen on CT chest in 8/14/20. Recommended follow up CT chest in 3 months at that time for surveillance. Had follow-up CT chest on 11/6/2020 ordered by Hematology and CT showed that the previously described 2.3cm nodule groundglass opacity in the RUL has nearly completely resolved, likely representing prior infection.   - Follow up with PCP for follow-up     History of pre-diabetes:   On Metformin XL 1000mg daily. No recent HgbA1c on file. Glucose 106 on 2/11. Hemoglobin A1C checked today and 5.4% indicating pre-diabetes (Addendum: HgbA1c is actually in the normal range- she reports being on it for weight and in setting of antipsychotics).   - Continue Metformin XL 1000mg daily  - Follow up for continued monitoring of Hemoglobin A1c with PCP    Addendum: Will continue to monitor HR peripherally.  No further medical intervention is required at this time. Medicine signing off. Please feel free to call with any questions.     Patricia Barahona PA-C  Hospitalist Service   Pager: [phone]     Reason for Admission:   Hallucinations for 2 weeks and suicidal ideation         History of Present Illness:   History is obtained from the patient and medical record. Patient denied Latvian interpretor as she reports she can understand English.     Nona Finley is a 40 year old Latvian speaking female with a history of bipolar 1 disorder with psychotic behavior, schizoaffective disorder, depressive type, auditory/visual hallucinations, SI, history of post-partum depression and history of latent TB who is admitted to station 30[facility] with hallucinations for 2 weeks and suicidal ideation. Internal Medicine consultation was ordered by Dr. Freed for  evaluation regarding pulmonary embolism, pulmonary nodules, TB history and possible diabetes given she is on Metformin.     Patient was diagnosed with small bilateral PE in 8/2020. She was started on Xarelto at that time and follows with Dr. Loza, Hematology. Last seen on 2/4/2021 with plans for her to maintain on chronic anticoagulation for prevention of DVT/PE which she states she has been compliant with. On her CT Chest from 8/2020, there was noted to be a prominent focal solid and ground glass nodule opacity at RUL. It was recommended that she have a follow up CT chest in 3 months at that time for surveillance. She did have this follow-up CT chest on 11/6/2020 ordered by Hematology and CT showed that the previously described 2.3cm nodule groundglass opacity in the RUL has nearly completely resolved, likely representing prior infection.     She has a history of latent TB diagnosed in 2006. She underwent ~4 months of treatment with INH in [id number], but she reports she did not complete treatment course. She would like to follow-up for this for consideration of retreatment. She denies any active symptoms of TB including fever, nigh sweats, rigors, cough, hemoptysis or unexpected weight loss.     She reports taking metformin to prevent diabetes while on clozaril. She reports compliance. She reports chronic lightheadedness with standing occasionally, but no lightheadedness, dizziness, chest pain, SOB, abdominal pain, N/V/D, dysuria, hematochezia, or melena currently. She states she is due for her menses on 2/17.             Review of Systems:    ROS: 10 point ROS neg other than the symptoms noted above in the HPI.            Past Medical History:   Reviewed and updated in Epic.  Past Medical History:   Diagnosis Date     Encounter for female birth control 6/14/2017 6/14/2017 Plan Documentation Service ordered Depo Provera injection (150mg IM) may be given every 3 months for one year per protoccol. Plan and order  should be renewed at a visit no later than 6/14/18   Vanessa Thompson MD         Encounter for insertion or removal of intrauterine contraceptive device 1/3/2008    Paragard 1/08 removed 1/08.     Postpartum depression 8/18/2011     Schizophrenia (H) 2/12/2019     Suicidal ideation 2/16/2017             Past Surgical History:   Reviewed and updated in Epic.  Past Surgical History:   Procedure Laterality Date     NO HISTORY OF SURGERY       NO HISTORY OF SURGERY  06/13/2013    Per patient reported this             Social History:     Social History     Tobacco Use     Smoking status: Never Smoker     Smokeless tobacco: Never Used   Substance Use Topics     Alcohol use: No     Drug use: No             Family History:   Reviewed and updated in Epic.  Family History   Problem Relation Age of Onset     Respiratory Father         asthma     Asthma Father      Cerebrovascular Disease Mother      Diabetes Mother      Neurologic Disorder Brother         mental health problem?     Neurologic Disorder Sister         seizures (half sister)     Respiratory Paternal Grandfather         asthma     Respiratory Sister         asthma     Respiratory Brother         asthma     Neurologic Disorder Daughter         autism      Hypertension Maternal Grandmother      Neurologic Disorder Sister         seizures     Diabetes Maternal Grandfather      Coronary Artery Disease Son              Allergies:   No Known Allergies          Medications:     Medications Prior to Admission   Medication Sig Dispense Refill Last Dose     ABILIFY MAINTENA 400 MG extended release injection Inject 400 mg into the muscle every 28 days   1/26/2021 at Unknown time     cloZAPine (FAZACLO) 100 MG ODT Take 200 mg by mouth At Bedtime    2/10/2021     LORazepam (ATIVAN) 0.5 MG tablet Take 0.5 mg by mouth At Bedtime   2/10/2021     metFORMIN (GLUCOPHAGE-XR) 500 MG 24 hr tablet Take 1,000 mg by mouth daily (with dinner)    2/10/2021     Multiple Vitamins-Minerals  "(MULTIVITAMIN ADULT) TABS Take 1 tablet by mouth daily 30 tablet 11 2/10/2021     OLANZapine (ZYPREXA) 20 MG tablet Take 20 mg by mouth daily (with dinner)   2/10/2021     rivaroxaban ANTICOAGULANT (XARELTO ANTICOAGULANT) 20 MG TABS tablet Take 1 tablet (20 mg) by mouth daily (with dinner) Start after around 9/4, when you run out of the 15 mg pills twice a day. 30 tablet 11 2/10/2021     Vitamin D3 (CHOLECALCIFEROL) 25 mcg (1000 units) tablet Take 1 tablet (25 mcg) by mouth daily 30 tablet 11 2/10/2021     tranexamic acid (LYSTEDA) 650 MG tablet Take 2 tablets (1,300 mg) by mouth 3 times daily During menstrual cycle 60 tablet 11 Unknown at Unknown time        Current Facility-Administered Medications   Medication     acetaminophen (TYLENOL) tablet 650 mg     alum & mag hydroxide-simethicone (MAALOX) suspension 30 mL     ARIPiprazole ER (ABILIFY MAINTENA) extended release injection 400 mg     cloZAPine (FAZACLO) ODT tab 200 mg     glucose gel 15-30 g    Or     dextrose 50 % injection 25-50 mL    Or     glucagon kit 1 mg     hydrOXYzine (ATARAX) tablet 25 mg     LORazepam (ATIVAN) tablet 0.5 mg     metFORMIN (GLUCOPHAGE-XR) 24 hr tablet 1,000 mg     multivitamin w/minerals (THERA-VIT-M) tablet 1 tablet     OLANZapine (zyPREXA) tablet 10 mg    Or     OLANZapine (zyPREXA) injection 10 mg     OLANZapine (zyPREXA) tablet 20 mg     OLANZapine (zyPREXA) tablet 5 mg     rivaroxaban ANTICOAGULANT (XARELTO) tablet 20 mg     senna-docusate (SENOKOT-S/PERICOLACE) 8.6-50 MG per tablet 1 tablet     traZODone (DESYREL) tablet 50 mg     Vitamin D3 (CHOLECALCIFEROL) tablet 25 mcg            Physical Exam:   Blood pressure 116/73, pulse 121, temperature 97.6  F (36.4  C), temperature source Oral, resp. rate 16, height 1.651 m (5' 5\"), weight 71.8 kg (158 lb 6.4 oz), last menstrual period 01/17/2020, SpO2 98 %, not currently breastfeeding.  Body mass index is 26.36 kg/m .  GENERAL: Alert and oriented x 3. NAD. Sitting up in bed resting " comfortably.   HEENT: Anicteric sclera. Mucous membranes moist.   CV: Tachycardic, regular rhythm. S1, S2. No murmurs appreciated.   RESPIRATORY: Effort normal on room air. Lungs CTAB with no wheezing, rales, rhonchi.   GI: Abdomen soft, non distended, non tender. No guarding, rigidity or rebound tenderness.  MUSCULOSKELETAL: No joint swelling or tenderness.  NEUROLOGICAL: No focal deficits. Moves all extremities.   EXTREMITIES: No peripheral edema. Intact bilateral pedal pulses.   SKIN: No jaundice. No rashes.           Data:   CBC:  Recent Labs   Lab Test 02/11/21  1037   WBC 6.7   RBC 4.07   HGB 12.1   HCT 36.5   MCV 90   MCH 29.7   MCHC 33.2   RDW 14.2          CMP:  Recent Labs   Lab Test 02/11/21  1037      POTASSIUM 4.2   CHLORIDE 112*   BRIANNA 8.8   CO2 24   BUN 16   CR 0.60   *   AST 17   ALT 13   BILITOTAL 0.3   ALBUMIN 3.8   PROTTOTAL 8.3   ALKPHOS 58       TSH:  TSH   Date Value Ref Range Status   02/12/2021 0.74 0.40 - 4.00 mU/L Final       Tox screen: n/a    Unresulted Labs Ordered in the Past 30 Days of this Admission     Date and Time Order Name Status Description    2/12/2021 0501 Clozapine and Metabolites Quant In process     2/12/2021 0500 Treponema Abs w Reflex to RPR and Titer In process

## 2021-02-12 NOTE — PLAN OF CARE
Call back from Johanne, nurse for ACT team.     Last IM was 1/26/2021; due on 2/23/2021.    She will fax the med list but states won't have the IM on it.

## 2021-02-12 NOTE — PHARMACY-ADMISSION MEDICATION HISTORY
Admission Medication History status for the 2/11/2021 admission is complete.  See EPIC admission navigator for Prior to Admission medications.    Medication history sources:  [facility] Fill History, ACT Re-entry Team [phone]     Medication history source reliability: Good    Changes made to PTA medication list (reason)  Added: None  Deleted: None  Changed: None    Additional medication history information (including reliability of information, actions taken by pharmacist):    Pt last received her Abilify Maintena 400 mg injection on 1/26/1. Her next dose is due 2/23/20.    Time spent in this activity: 30 minutes    Medication history completed by: Sisi Carlson RP (Confirmed Abilify Maintean dose and last admin) and Sid June RPh (confirmed doses with [facility])    Prior to Admission medications    Medication Sig Last Dose Taking? Auth Provider   ABILIFY MAINTENA 400 MG extended release injection Inject 400 mg into the muscle every 28 days 1/26/2021 at Unknown time Yes Reported, Patient   cloZAPine (FAZACLO) 100 MG ODT Take 200 mg by mouth At Bedtime  2/10/2021 Yes Reported, Patient   LORazepam (ATIVAN) 0.5 MG tablet Take 0.5 mg by mouth At Bedtime 2/10/2021 Yes Reported, Patient   metFORMIN (GLUCOPHAGE-XR) 500 MG 24 hr tablet Take 1,000 mg by mouth daily (with dinner)  2/10/2021 Yes Reported, Patient   Multiple Vitamins-Minerals (MULTIVITAMIN ADULT) TABS Take 1 tablet by mouth daily 2/10/2021 Yes Avery Govea MD   OLANZapine (ZYPREXA) 20 MG tablet Take 20 mg by mouth daily (with dinner) 2/10/2021 Yes Unknown, Entered By History   rivaroxaban ANTICOAGULANT (XARELTO ANTICOAGULANT) 20 MG TABS tablet Take 1 tablet (20 mg) by mouth daily (with dinner) Start after around 9/4, when you run out of the 15 mg pills twice a day. 2/10/2021 Yes Hiram Loza MD   Vitamin D3 (CHOLECALCIFEROL) 25 mcg (1000 units) tablet Take 1 tablet (25 mcg) by mouth daily 2/10/2021 Yes Avery Govea MD    tranexamic acid (LYSTEDA) 650 MG tablet Take 2 tablets (1,300 mg) by mouth 3 times daily During menstrual cycle Unknown at Unknown time  Hiram Loza MD

## 2021-02-12 NOTE — PLAN OF CARE
"  INITIAL PSYCHOSOCIAL ASSESSMENT AND NOTE  I have reviewed the chart, interviewed the patient via phone, and developed Care Plan.   PRESENTING PROBLEM: Pt is a 41 yo [place]ian female currently followed by the ACT team.  She has a lengthy mental health hx with prior admissions.  [facility] due to worsening auditory hallucinations commanding her to harm herself and others.  She was able to resist the commands but now questions if her current medication is adequately treating her psychosis. Of note, pt reports that she called for help and is highly motivated to find a medication that works.  She noted that her medications used to be effective and she worries she will not find a new medication that works.   The following areas have been assessed:  History of Mental Health and Chemical Dependency: hx of bipolar 1 disorder with psychosis, SI, postpartum depression.  No hx of chemical or substance abuse.  Living Situation: lives with her six children with her in laws coming in daily to provide PCA services to her children.  Pt has a child with autism.  Currently, her in laws are caring for her children.  Pt's  is in a medical residency out of state.  He is reportedly half way through his residency.    Significant Life Events (Illness, Abuse, Trauma, Death): pt offers that she had to flee her home country when she was in the fifth grade, significant trauma hx.  She has Latent TB and requires Metformin suggesting glucose issues.  Family Description (Constellation, Family Psychiatric History): pt reports that her brother 'has mental health problems like me\".  Financial Status: pt reports that her financial needs are covered by her .  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     Service History: none  Legal Issues: denies  Ethnic/Cultural Issues: Bulgarian female, first generation immigrant with all the stressors that comes with this.  She is bilingual.    Spiritual Orientation: \"I pray and read the Koran\".    Social Functioning (organizations, interests): pt reports that she enjoys watching comedies occassionally when time permits.  Current Treatment providers:   She is followed by the ACT team and has psychiatry and case management through them but no therapist.  When asked if she would like therapy to be arranged, she declined.    Social Service Assessment/Plan:   Patient will have psychiatric assessment and medication management by psychiatry. . The treatment team will continue to assess and stabilize the patient's mental health symptoms with the use of medications and therapeutic programming. Hospital staff will provide a safe environment and a therapeutic milieu. Staff will continue to assess patient as needed. Patient will be encouraged to participate in unit groups and activities. CTC will assist with after care planning. CTC will discuss options for increasing community supports with the patient. CTC will coordinate with outpatient providers and will place referrals to ensure appropriate follow up care is in place.        "

## 2021-02-12 NOTE — PLAN OF CARE
S-(situation): Pt with increased SI and command hallucinations    B-(background): Pt 41 yo F admitted to station 32 from [facility] ED where she checked her self in after recommendation from ACT team following increased SI/AH/VH, voices telling pt to kill herself and her 6 kids ages 4 to 16. Pt with hx bipolar 1 depressive type, schizoaffective disorder, postpartum depression, SI, Psychosis.    A-(assessment): Pt arrived Station 32 at 1600 and appeared teary and depressed. Pt was cooperative throughout her admission. Pt told writer that she's been experiencing AH and VH and voiced telling her to kill herself and the kids. Pt reports that the voices also tell her not to eat or take medications because it is poisoned. She reports forcing herself to take medications against the command hallucinations. Pt is has lost hope with treatment and worries her current medications are not working. Pt goal while in the hospital is to recover. Pt mood was anxious and depressed, her affect was sad and tense. Pt says she is tired of living and is worried she will commit suicide once she discharge from the hospital, she has attempted to jump over the bridge in the past and  talked her out of it. Pt says her plan is to choke self with a rope or something once she discharge but contracts for safety while in the hospital. She rated anxiety at 9/10 and depression 10/10 prior to taking her medication. Pt went to bed at 9pm and affirmed she was feeling safe.     R-(recommendations): Per pt, she has been at [facility] for mental health assessment. Pt goal is to have the provider revisit her medication and or other treatment plans. in the past about 5x per

## 2021-02-12 NOTE — PLAN OF CARE
The patient completed the reasons for admit and goals for discharge in the personal plan of care.   Reasons for admit:  1)  The medication is not working, why is it not working?  2)  hallucinations    Goals for discharge:  1)  new medication that works

## 2021-02-12 NOTE — PHARMACY
Pharmacy Clozapine Note    Date of Service: 2021  Patient's : 1980  40 year old, female    Current clozapine regimen: 200 mg HS  Has there been a known interruption in therapy for greater than/equal to 48 hours? No    Recent ANC Value(s):  Recent Labs   Lab Test 21  1037 20  1139 20  0735 20  0742 07/10/20  2110 19  2338 19  0738   ANEU 4.9 4.7 2.4 3.0 4.6 3.4 2.5       Is the patient enrolled in the clozapine REMS program? Yes  Ordering prescriber: Julissa Faye MD  Is this provider certified in the clozapine REMS program? N/A - continuing PTA therapy  Is the ANC within recommended limits? Yes  Does the patient have any signs or symptoms of infection, including fever or sore throat? No    Plan:  1. Continue clozapine therapy at 200 mg HS.  2. A WBC with differential will be ordered at least weekly.  ANC values will be entered into the REMS program.  3. Signs/symptoms of infection will be monitored daily.    Aly June, PharmD, BCPS  [facility] Building: [place] *[id number]

## 2021-02-12 NOTE — PLAN OF CARE
VM from Re-entry ACT team, nurse Johanne. Vicky   Office: [phone]  Would like to give us a medication list if needed    Walter Gaviria, [phone] (this number is for providers only. NOT to be given to patient or family)    [facility] called nurse back, left message requesting med list and specificially when pt last received the abilify injection.

## 2021-02-13 LAB
BACTERIA SPEC CULT: ABNORMAL
Lab: ABNORMAL
SPECIMEN SOURCE: ABNORMAL

## 2021-02-13 PROCEDURE — 124N000002 HC R&B MH UMMC

## 2021-02-13 PROCEDURE — 250N000013 HC RX MED GY IP 250 OP 250 PS 637: Performed by: PSYCHIATRY & NEUROLOGY

## 2021-02-13 PROCEDURE — 250N000013 HC RX MED GY IP 250 OP 250 PS 637: Performed by: NURSE PRACTITIONER

## 2021-02-13 RX ADMIN — PERPHENAZINE 2 MG: 2 TABLET, FILM COATED ORAL at 14:03

## 2021-02-13 RX ADMIN — CLOZAPINE 200 MG: 100 TABLET, ORALLY DISINTEGRATING ORAL at 19:58

## 2021-02-13 RX ADMIN — Medication 25 MCG: at 19:57

## 2021-02-13 RX ADMIN — METFORMIN ER 500 MG 1000 MG: 500 TABLET ORAL at 18:29

## 2021-02-13 RX ADMIN — MULTIPLE VITAMINS W/ MINERALS TAB 1 TABLET: TAB at 19:57

## 2021-02-13 RX ADMIN — PERPHENAZINE 2 MG: 2 TABLET, FILM COATED ORAL at 19:57

## 2021-02-13 RX ADMIN — OLANZAPINE 20 MG: 20 TABLET, FILM COATED ORAL at 18:30

## 2021-02-13 RX ADMIN — RIVAROXABAN 20 MG: 10 TABLET, FILM COATED ORAL at 18:29

## 2021-02-13 RX ADMIN — PERPHENAZINE 2 MG: 2 TABLET, FILM COATED ORAL at 08:59

## 2021-02-13 RX ADMIN — LORAZEPAM 0.5 MG: 0.5 TABLET ORAL at 19:58

## 2021-02-13 ASSESSMENT — ACTIVITIES OF DAILY LIVING (ADL)
HYGIENE/GROOMING: INDEPENDENT
LAUNDRY: WITH SUPERVISION
DRESS: INDEPENDENT
DRESS: INDEPENDENT
ORAL_HYGIENE: INDEPENDENT
LAUNDRY: WITH SUPERVISION
HYGIENE/GROOMING: INDEPENDENT
ORAL_HYGIENE: INDEPENDENT

## 2021-02-13 NOTE — PLAN OF CARE
Pt asked for BG checks to be discontinued. She said she does not have diabetes and that she only takes metformin for weight loss. Her last two BG levels were 101 and 94. On-call provider, Dr. Figueroa, notified and order for BG checks was discontinued.    Pt reported experiencing AH and was given perphenazine 2 mg po. Pt later reported it did not help her AH. She said the voices are interfering with taking care of herself and are the cause of her SI. Pt denied wanting to be dead and denied SIB. Pt's affect was sad/flat. Mood was depressed. Encouraged pt to use distraction and pt contracted for safety. Notified the on-call provider. Pt was given a second dose of perphenazine 2 mg po at 1405.    Pt eating and drinking well. Pt denied pain and acute physical concerns. Pt denied medication side effects and none were observed. Will continue to monitor for safety.

## 2021-02-13 NOTE — PLAN OF CARE
At shift change patient reports continued command AH of a voice telling her to kill herself. States the two doses of PRN Perphenazine provided on day shift did not provide any relief of symptoms. Patient asked to take third available PRN dose with scheduled nighttime medications. Despite command AH, patient states she feels safe on the unit. States if the voice ever becomes too distressing, or she fears for her safety, she will alert staff. Denies HI and SIB thoughts. Mood is depressed. States AH make her feel anxious and frustrated. Ate dinner in dining room. Was out of room in the milieu, watching TV. Had conversations with select peers and staff.

## 2021-02-13 NOTE — PLAN OF CARE
"  Problem: Adult Inpatient Plan of Care  Goal: Plan of Care Review  Outcome: No Change  Flowsheets  Taken 2/12/2021 2228  Plan of Care Reviewed With: patient  Taken 2/12/2021 2200  Plan of Care Reviewed With: patient     Problem: Adult Inpatient Plan of Care  Goal: Optimal Comfort and Wellbeing  Intervention: Provide Person-Centered Care  Recent Flowsheet Documentation  Taken 2/12/2021 2200 by Rodriguez Navarro RN  Trust Relationship/Rapport:   care explained   choices provided   emotional support provided   empathic listening provided   questions answered   questions encouraged   reassurance provided   thoughts/feelings acknowledged  Patient was visible on the milieu but withdrawn to self. She had a sad affect and depressed mood; denied SI/SIB, anxiety and racing thoughts but verbalized being \"sad\" and hearing voices telling her to \"die.\" She was cooperative and pleasant when engaged. Compliant with medications, didn't attend any therapy group and denied having problems with sleep, her appetite and medication side effect. She didn't require any PRN during the shift. Her blood glucose readings were 101 and 94. She reported no concerns and no behavioral or safety issues observed or reported.     "

## 2021-02-14 LAB
CLOZAPINE AND METABOLITES TOTAL: 627 NG/ML
CLOZAPINE SERPL-MCNC: 474 NG/ML
CLOZAPINE-N-OXIDE QUANT: <100 NG/ML
NORCLOZAPINE SERPL-MCNC: 153 NG/ML
T PALLIDUM AB SER QL AGGL: NON REACTIVE

## 2021-02-14 PROCEDURE — 250N000013 HC RX MED GY IP 250 OP 250 PS 637: Performed by: NURSE PRACTITIONER

## 2021-02-14 PROCEDURE — 124N000002 HC R&B MH UMMC

## 2021-02-14 PROCEDURE — 250N000013 HC RX MED GY IP 250 OP 250 PS 637: Performed by: PSYCHIATRY & NEUROLOGY

## 2021-02-14 RX ADMIN — MULTIPLE VITAMINS W/ MINERALS TAB 1 TABLET: TAB at 20:12

## 2021-02-14 RX ADMIN — OLANZAPINE 20 MG: 20 TABLET, FILM COATED ORAL at 18:39

## 2021-02-14 RX ADMIN — PERPHENAZINE 2 MG: 2 TABLET, FILM COATED ORAL at 18:52

## 2021-02-14 RX ADMIN — LORAZEPAM 0.5 MG: 0.5 TABLET ORAL at 20:12

## 2021-02-14 RX ADMIN — METFORMIN ER 500 MG 1000 MG: 500 TABLET ORAL at 18:39

## 2021-02-14 RX ADMIN — RIVAROXABAN 20 MG: 10 TABLET, FILM COATED ORAL at 18:39

## 2021-02-14 RX ADMIN — CLOZAPINE 200 MG: 100 TABLET, ORALLY DISINTEGRATING ORAL at 20:12

## 2021-02-14 RX ADMIN — Medication 25 MCG: at 20:12

## 2021-02-14 ASSESSMENT — ACTIVITIES OF DAILY LIVING (ADL)
ORAL_HYGIENE: INDEPENDENT
HYGIENE/GROOMING: INDEPENDENT
LAUNDRY: WITH SUPERVISION
LAUNDRY: WITH SUPERVISION
DRESS: STREET CLOTHES;SCRUBS (BEHAVIORAL HEALTH)
HYGIENE/GROOMING: INDEPENDENT
ORAL_HYGIENE: INDEPENDENT
DRESS: STREET CLOTHES;SCRUBS (BEHAVIORAL HEALTH)

## 2021-02-14 ASSESSMENT — MIFFLIN-ST. JEOR: SCORE: 1392.99

## 2021-02-14 NOTE — PROGRESS NOTES
Brief Medicine Note    Medicine following for HR montoring. HR stable at 100s-110s. No acute events after reviewing RN notes. Continues to have active hallucinations.     Please notify medicine if HR consistently >120bpm or if any sign of dehydration, chest pain, SOB, dizziness or lightheadedness. Please see consult note on 2/13 for complete details.     No further medical intervention is required at this time. Medicine signing off. Please feel free to call with any questions.     Patricia Barahona PA-C  Hospitalist Service  Pager [phone]

## 2021-02-14 NOTE — PLAN OF CARE
"Patient was visible in the lounge this shift, typically walking around. Patient is withdrawn. She denied suicidal thoughts, depression, and anxiety. Patient said voices are getting better. Patient denied visual hallucinations. Another peer, TC, appeared to have pressured patient to give him her snack this afternoon. Patient has been very pleasant. She did not have any prn or scheduled medications this shift. Will continue to monitor.  Blood pressure 100/69, pulse 91, temperature 97.2  F (36.2  C), resp. rate 16, height 1.651 m (5' 5\"), weight 73.2 kg (161 lb 6.4 oz), last menstrual period 01/17/2020, SpO2 100 %, not currently breastfeeding.   "

## 2021-02-14 NOTE — PLAN OF CARE
"Patient out of room in lounge at start of shift, watching movie. Does not often interact with peers, will interact with staff. Appears brighter than yesterday. AT beginning of shift stated since she went to sleep last night she no longer has AH. \"They are gone now, I think the medicine is helping.\" Did not request PRN. Appeared to be discharged focused, stating, \"Please let the doctor know I am feeling better and would like to go home.\" At 18:50 patient told staff she is starting to hear voices again and requested, and received, PRN perphenazine 2 mg. On assessment at 20:00 patient states the perphenazine helped and she no longer hears the voices.     Asked writer to wash her dirty sweatshirt, which shows an increase in her organization. Denies SI, SIB, HI. Ate dinner 100%. Denied concerns with sleep. Did not shower.                "

## 2021-02-15 LAB — INTERPRETATION ECG - MUSE: NORMAL

## 2021-02-15 PROCEDURE — 124N000002 HC R&B MH UMMC

## 2021-02-15 PROCEDURE — 250N000013 HC RX MED GY IP 250 OP 250 PS 637: Performed by: PSYCHIATRY & NEUROLOGY

## 2021-02-15 PROCEDURE — 99232 SBSQ HOSP IP/OBS MODERATE 35: CPT | Performed by: NURSE PRACTITIONER

## 2021-02-15 PROCEDURE — 250N000013 HC RX MED GY IP 250 OP 250 PS 637: Performed by: NURSE PRACTITIONER

## 2021-02-15 RX ORDER — TRANEXAMIC ACID 650 MG/1
1300 TABLET ORAL 3 TIMES DAILY
Status: DISCONTINUED | OUTPATIENT
Start: 2021-02-15 | End: 2021-02-18 | Stop reason: HOSPADM

## 2021-02-15 RX ADMIN — RIVAROXABAN 20 MG: 10 TABLET, FILM COATED ORAL at 16:36

## 2021-02-15 RX ADMIN — Medication 25 MCG: at 19:36

## 2021-02-15 RX ADMIN — OLANZAPINE 20 MG: 20 TABLET, FILM COATED ORAL at 16:36

## 2021-02-15 RX ADMIN — LORAZEPAM 0.5 MG: 0.5 TABLET ORAL at 19:37

## 2021-02-15 RX ADMIN — METFORMIN ER 500 MG 1000 MG: 500 TABLET ORAL at 17:51

## 2021-02-15 RX ADMIN — TRANEXAMIC ACID 1300 MG: 650 TABLET ORAL at 16:36

## 2021-02-15 RX ADMIN — TRANEXAMIC ACID 1300 MG: 650 TABLET ORAL at 19:36

## 2021-02-15 RX ADMIN — MULTIPLE VITAMINS W/ MINERALS TAB 1 TABLET: TAB at 19:36

## 2021-02-15 RX ADMIN — CLOZAPINE 200 MG: 100 TABLET, ORALLY DISINTEGRATING ORAL at 19:37

## 2021-02-15 RX ADMIN — TRANEXAMIC ACID 1300 MG: 650 TABLET ORAL at 11:35

## 2021-02-15 ASSESSMENT — ACTIVITIES OF DAILY LIVING (ADL)
LAUNDRY: WITH SUPERVISION
LAUNDRY: WITH SUPERVISION
HYGIENE/GROOMING: INDEPENDENT
HYGIENE/GROOMING: INDEPENDENT
DRESS: STREET CLOTHES;SCRUBS (BEHAVIORAL HEALTH)
DRESS: STREET CLOTHES;SCRUBS (BEHAVIORAL HEALTH)
ORAL_HYGIENE: INDEPENDENT
ORAL_HYGIENE: INDEPENDENT

## 2021-02-15 NOTE — PLAN OF CARE
"Patient was visible this shift. She was withdrawn from peers. She didn't attend group activities. Patient denied auditory hallucinations this shift. She was reality based. She denied having thoughts of hurting others, visual hallucinations, and SI. Patient wanted to go home today, but is content to stay a few more days per the provider's recommendation. Patient said she had started her period and requested the medication Lysteda that she is supposed to take for heavy bleeding. Provider was notified and medication ordered. Will continue to monitor and offer support.  Blood pressure 104/70, pulse 112, temperature 98.9  F (37.2  C), temperature source Oral, resp. rate 16, height 1.651 m (5' 5\"), weight 73.2 kg (161 lb 6.4 oz), last menstrual period 01/17/2020, SpO2 100 %, not currently breastfeeding.    "

## 2021-02-15 NOTE — PLAN OF CARE
BEHAVIORAL TEAM DISCUSSION    Participants: Kymberly Payton, IVANIA; Penny Roa and Gila Coombs, CTC's; Keeley Flores RN  Progress: Pt is voluntary (has MI commitment in place and not revoked).  She was admitted due to  with commanding homicidal and suicidal voices.  She also presents with delusions.  She is cooperative with medications.  Mood is improving  Anticipated length of stay: 5 to 7 days  Continued Stay Criteria/Rationale: pt remains in need of acute level of care  Medical/Physical: see chart  Precautions:   Behavioral Orders   Procedures     Code 1 - Restrict to Unit     Discontinue 1:1 attendant for suicide risk     Order Specific Question:   I have performed an in person assessment of the patient     Answer:   Based on this assessment the patient no longer requires a one on one attendant at this point in time.     Order Specific Question:   Rationale     Answer:   Patient States able to remain safe in hospital     Routine Programming     As clinically indicated     Status 15     Every 15 minutes.     Suicide precautions     Patients on Suicide Precautions should have a Combination Diet ordered that includes a Diet selection(s) AND a Behavioral Tray selection for Safe Tray - with utensils, or Safe Tray - NO utensils       Plan: continue to meet with psychiatry for medication needs and adjustments.  CTC to address discharge planning.  Pt has established providers.  Rationale for change in precautions or plan: no change

## 2021-02-15 NOTE — PLAN OF CARE
VM from pt's , Vicky, wanting to check in.     [facility] called back, [phone], left message.

## 2021-02-15 NOTE — PROGRESS NOTES
[facility]  [address] [place] [id number]  Phone: [phone], Fax: [phone]      Outpatient Visit Note:    Patient: Nona Finley  MRN: [id number]  : 1980  KIKO: February 15, 2021    Nona Finley is a 40 year old woman with a history of unprovoked pulmonary embolism who returns for routine follow up.      Assessment:  In summary, Nona Finley is a 40 year old woman with schizophrenia and unprovoked pulmonary embolism who has now completed 6 months of anticoagulation and has anticoagulation-induced menorrhagia.  Based on the HERDOO2 risk scoring system, her risk of recurrence is likely low (no post thrombotic syndrome, age < 60, BMI < 30, D-dimer not checked).  Despite our discussion today that she is likely at low risk for recurrence she is very scared of recurrence and would like to remain on anticoagulation indefinitely.  I think this is a reasonable plan as she is at low risk for bleeding and we can manage her anticoagulation-induced menorrhagia.    Recommendations:  1. I counseled the patient about my assessment of her risk for recurrence and discussed the risks and benefits of continued anticoagulation.  Discussed testing for   2. Continue rivaroxaban 20 mg daily indefinitely for secondary prophylaxis  3. Lysteda Rx for menorrhagia  4. Deferred D-dimer and APS testing at this time  5. Follow up in 6 months - check APS panel on return    35 minutes spent on the date of the encounter doing chart review, review of test results, interpretation of tests, patient visit and documentation       Hiram Loza MD   of Medicine, Division of Hematology, Oncology and Transplantation  [facility] [employer]     --------------------------------------------------------  Forward History:  Aug 21, 2020 presented with unprovoked pulmonary embolism.  Noted more sedentary due to COVID but otherwise no provoking factors  Has pulm  nodule on Aug 2020 scan - repeated in 2021 and resolved, benign    History: Nona Finley is a 40 year old woman with schizophrenia and returns for routine follow up after completion of 6 months of anticoagulation for pulmonary embolism.  She reports she has been doing well since I saw her in Sept.  Notes menorrhagia since starting anticoagulation.  No chest pain, shortness of breath or dyspnea on exertion.  She is feeling very nervous about stopping anticoagulation.    Objective:  Vitals: B/P: 112/79, T: 98.3, P: 105, R: Data Unavailable, Wt: 165 lbs 3.2 oz  Exam:   Gen: Appears well, no distress  HEENT: no scleral icterus or hemorrhage, no wet purpura, no lymphadenopathy  CV: regular, no murmurs  Pulm: clear  Abd: soft, nontender, no splenomegaly  Ext: no edema    Labs:  none    Imaging:  No new imaging

## 2021-02-15 NOTE — PROGRESS NOTES
Pt appears to be sleeping most of the shift. No issues reported or noted. Will continue to monitor.

## 2021-02-16 PROCEDURE — 250N000013 HC RX MED GY IP 250 OP 250 PS 637: Performed by: NURSE PRACTITIONER

## 2021-02-16 PROCEDURE — G0177 OPPS/PHP; TRAIN & EDUC SERV: HCPCS

## 2021-02-16 PROCEDURE — 250N000013 HC RX MED GY IP 250 OP 250 PS 637: Performed by: PSYCHIATRY & NEUROLOGY

## 2021-02-16 PROCEDURE — 99232 SBSQ HOSP IP/OBS MODERATE 35: CPT | Performed by: NURSE PRACTITIONER

## 2021-02-16 PROCEDURE — 124N000002 HC R&B MH UMMC

## 2021-02-16 RX ADMIN — METFORMIN ER 500 MG 1000 MG: 500 TABLET ORAL at 19:42

## 2021-02-16 RX ADMIN — TRANEXAMIC ACID 1300 MG: 650 TABLET ORAL at 19:42

## 2021-02-16 RX ADMIN — OLANZAPINE 20 MG: 20 TABLET, FILM COATED ORAL at 19:43

## 2021-02-16 RX ADMIN — Medication 25 MCG: at 19:42

## 2021-02-16 RX ADMIN — CLOZAPINE 200 MG: 100 TABLET, ORALLY DISINTEGRATING ORAL at 22:57

## 2021-02-16 RX ADMIN — TRANEXAMIC ACID 1300 MG: 650 TABLET ORAL at 13:57

## 2021-02-16 RX ADMIN — RIVAROXABAN 20 MG: 10 TABLET, FILM COATED ORAL at 19:42

## 2021-02-16 RX ADMIN — TRANEXAMIC ACID 1300 MG: 650 TABLET ORAL at 09:00

## 2021-02-16 RX ADMIN — LORAZEPAM 0.5 MG: 0.5 TABLET ORAL at 22:58

## 2021-02-16 RX ADMIN — MULTIPLE VITAMINS W/ MINERALS TAB 1 TABLET: TAB at 19:42

## 2021-02-16 ASSESSMENT — ACTIVITIES OF DAILY LIVING (ADL)
ORAL_HYGIENE: INDEPENDENT
DRESS: STREET CLOTHES
LAUNDRY: WITH SUPERVISION
HYGIENE/GROOMING: INDEPENDENT

## 2021-02-16 ASSESSMENT — MIFFLIN-ST. JEOR: SCORE: 1392.53

## 2021-02-16 NOTE — PLAN OF CARE
Patient said that she was not hearing voices today. She said the last time she had auditory hallucinations was Sunday night. When she hears voices, she said she can get suicidal thoughts, thoughts to harm her children, and paranoia about food being poisoned. Patient denied having any of those thoughts today. Patient walked the unit a lot this shift, but at a slow-moderate rate. She didn't go to groups and didn't socialize with peers. She appeared to be in thought a lot, but not outwardly responding to internal stimuli. Will continue to monitor.

## 2021-02-16 NOTE — PROGRESS NOTES
[facility],  Psychiatric Progress Note      Impression:     Nona Finley is a 40-year-old female admitted to [facility] [address] on 2/11/2021.   She was admitted as a voluntary patient and under ongoing MI commitment with Eric (provisional discharge not revoked) due to auditory hallucinations commanding her to kill her children and herself as well as delusional thoughts that food and medications were poisoned.  Her  is a medical resident in [place].  She is raising 6 children ages 4-17, the oldest of whom has autism.  She had missed 2 days of medications the week prior to admission.  Since admission, Clozaril was continued.  Ativan was continued.  Zyprexa was continued.  Abilify Maintena was continued.  She is currently denying psychotic symptoms and reports that her mood is euthymic.           Diagnoses:     Schizoaffective disorder, depressive type         Plan:     Medications:  Continue Abilify Maintena 400 mg monthly (last received 1/26).  Continue Clozaril 200 mg at HS.  Continue Ativan 0.5 mg at HS.  Continue Zyprexa 20 mg with dinner.  Continue PRNs of Hydroxyzine and Trazodone.  Begin Lysteda TID x 12 doses.      She is under MI commitment with Eric.  Reyes order is currently for Haldol, Clozaril, Abilify and Invega.  It is being amended to include Zyprexa.  Provisional discharge had not been revoked.  Discharge to home when stable, possibly later this week.  Her in-laws visit the home daily.  She has an ACT team who observes her taking medications via Zoom videos nightly.       Attestation:  Patient has been seen and evaluated by me, Kayla Payton, RAHEEM CNP  The patient was counseled on nature of illness and treatment plan/options  Care was coordinated with treatment team       Clinical Global Impressions  First:  Considering your total clinical experience with this particular patient population, how severe are the patient's  symptoms at this time?: 7 (02/12/21 1150)  Compared to the patient's condition at the START of treatment, this patient's condition is: 7 (02/12/21 1150)  Most recent:  Considering your total clinical experience with this particular patient population, how severe are the patient's symptoms at this time?: 7 (02/12/21 1150)  Compared to the patient's condition at the START of treatment, this patient's condition is: 7 (02/12/21 1150)        Interim History:     The patient's care was discussed with the treatment team and chart notes were reviewed.  Pt was documented as sleeping 7 hours during each of the weekend overnight shifts.  States that her mood is improved and that she hasn't had any auditory hallucinations thus far today.  She denies suicidal and homicidal ideation.  She denies concerns about food or medications being poisoned.  She began menstruating today with heavy flow so will begin Lysteda.  She inquired about discharge.  Historically, her  has pressured her to discharge before she is stable.  Discussed that she is currently under commitment and the expectation that she exhibit a period of sustained improvement prior to discharge.  She was agreeable.           Medications:     Current Facility-Administered Medications   Medication     acetaminophen (TYLENOL) tablet 650 mg     alum & mag hydroxide-simethicone (MAALOX) suspension 30 mL     [START ON 2/23/2021] ARIPiprazole ER (ABILIFY MAINTENA) extended release injection 400 mg     cloZAPine (FAZACLO) ODT tab 200 mg     glucose gel 15-30 g    Or     dextrose 50 % injection 25-50 mL    Or     glucagon kit 1 mg     haloperidol (HALDOL) tablet 5 mg    Or     haloperidol lactate (HALDOL) injection 5 mg     hydrOXYzine (ATARAX) tablet 25 mg     LORazepam (ATIVAN) tablet 0.5 mg     metFORMIN (GLUCOPHAGE-XR) 24 hr tablet 1,000 mg     multivitamin w/minerals (THERA-VIT-M) tablet 1 tablet     OLANZapine (zyPREXA) tablet 20 mg     perphenazine tablet 2 mg      "rivaroxaban ANTICOAGULANT (XARELTO) tablet 20 mg     senna-docusate (SENOKOT-S/PERICOLACE) 8.6-50 MG per tablet 1 tablet     tranexamic acid (LYSTEDA) tablet 1,300 mg     traZODone (DESYREL) tablet 50 mg     Vitamin D3 (CHOLECALCIFEROL) tablet 25 mcg             Allergies:   No Known Allergies         Psychiatric Examination:     Appearance:  awake, alert and adequately groomed  Attitude:  cooperative  Eye Contact:  good  Mood:  \"good\"  Affect:  appropriate and in normal range  Speech:  clear, coherent  Psychomotor Behavior:  no evidence of tardive dyskinesia, dystonia, or tics  Thought Process:  linear and goal oriented  Associations:  no loose associations  Thought Content:  denies suicidal and homicidal ideation, states she has not experienced auditory hallucinations since 2/14, denies concerns about food being poisoned, or any other delusional thoughts   Insight:  fair  Judgment:  fair  Oriented to:  date, time, person, and place  Attention Span and Concentration:  fair  Recent and Remote Memory:  intact  Language:  intact, fluent English  Fund of Knowledge:  appropriate  Muscle Strength and Tone:  normal  Gait and Station:  normal     /71   Pulse 99   Temp 98.2  F (36.8  C) (Oral)   Resp 18   Ht 1.651 m (5' 5\")   Wt 73.2 kg (161 lb 6.4 oz)   LMP 01/17/2020 (Exact Date)   SpO2 100%   Breastfeeding No   BMI 26.86 kg/m             Labs:     No results found for this or any previous visit (from the past 24 hour(s)).  "

## 2021-02-16 NOTE — PLAN OF CARE
"  Problem: Suicidal Behavior  Goal: Suicidal Behavior is Absent or Managed  Outcome: Improving    Pt observed to be withdrawn, presents with blunt/flat affect. Described her day as \"It is a good day, I just have my period\". Claimed she is experiencing mild cramping around her hypogastric area due to menstruation. Pt is taking Tranexamic Acid in which the pt claimed is \"helping\" with her menstrual flow. Encouraged to be out in the milieu. Pt was visible in the milieu, watching television, interacted with selected peers. Denies SI/SIB/HI. Claimed that she is not experiencing any type hallucinations as of this time. Verbalized \"The voices are gone today. I think the medications are working. I am ok as long as I don't hear the voices\". Denies having anxiety and depression. Claimed her main goal is to discharge.  Consumed 100% of share of dinner and took in approximately 600cc of fluids.   Due medications given. Denies experiencing any side effects. Needs attended to. No further concerns noted as of this time.   /69   Pulse 100   Temp 97.7  F (36.5  C) (Oral)   Resp 18   Ht 1.651 m (5' 5\")   Wt 73.2 kg (161 lb 6.4 oz)   LMP 01/17/2020 (Exact Date)   SpO2 100%   Breastfeeding No   BMI 26.86 kg/m    "

## 2021-02-16 NOTE — PLAN OF CARE
The following letter was sent to Francois Ramírez.    2021    To:  Francois Ramírez  [facility] Attorney Office    Re:  Nona Finley   1980  Court File:  [id number] [id number] [phone]    Nona Cuevas was admitted to [facility] [address] [facility] on 2021.  She was admitted as a voluntary patient, as well as under ongoing MI civil commitment with Reyes.  She is followed by an ACT team and was prescribed Zyprexa prior to admission.  Zyprexa is not on her current Reyes order but is beneficial for her.  Additionally, Perphenazine was added as a PRN medication by the provider who saw her last week and has been beneficial.  Current Reyes medications are Haldol, Clozaril, Abilify and Invega.  The treatment team respectfully requests that Invega and Haldol be removed from her current Reyes order and that Zyprexa and Perphenazine be added.    Sincerely,        RAHEEM Morton, CNP  Hospitalist Psychiatry  [address] [address]  Phone:  [phone]  Fax:  [phone]

## 2021-02-16 NOTE — PLAN OF CARE
Patient slept 7.5 hours during the overnight shift; appeared comfortable with even and unlabored breathing through the night. No issues or concerns reported or observed. Nursing will continue to monitor.

## 2021-02-16 NOTE — PROGRESS NOTES
[facility],  Psychiatric Progress Note      Impression:     Nona Finley is a 40-year-old female admitted to [facility] [address] on 2/11/2021.   She was admitted as a voluntary patient and under ongoing MI commitment with Eric (provisional discharge not revoked) due to auditory hallucinations commanding her to kill her children and herself as well as delusional thoughts that food and medications were poisoned.  Her  is a medical resident in [place].  She is raising 6 children ages 4-17, the oldest of whom has autism.  She had missed 2 days of medications the week prior to admission.  Since admission, Clozaril was continued.  Ativan was continued.  Zyprexa was continued.  Abilify Maintena was continued.  She is currently denying psychotic symptoms and reports that her mood is euthymic.           Diagnoses:     Schizoaffective disorder, depressive type         Plan:     Medications:  Continue Abilify Maintena 400 mg monthly (last received 1/26).  Continue Clozaril 200 mg at HS.  Continue Ativan 0.5 mg at HS.  Continue Zyprexa 20 mg with dinner.  Continue PRNs of Perphenazine, Hydroxyzine and Trazodone.      She is under MI commitment with Eric.  Reyes order is currently for Haldol, Clozaril, Abilify and Invega.  It is being amended to include Zyprexa and Perphenazine in lieu of Haldol and Invega.  Provisional discharge had not been revoked.  Discharge to home when stable, goal for 2/18 if progress continues.  Her in-laws visit the home daily.  She has an ACT team who observes her taking medications via Zoom videos nightly.       Attestation:  Patient has been seen and evaluated by me, Kayla Payton, APRN CNP  The patient was counseled on nature of illness and treatment plan/options  Care was coordinated with treatment team       Clinical Global Impressions  First:  Considering your total clinical experience with this particular patient  "population, how severe are the patient's symptoms at this time?: 7 (02/12/21 1150)  Compared to the patient's condition at the START of treatment, this patient's condition is: 7 (02/12/21 1150)  Most recent:  Considering your total clinical experience with this particular patient population, how severe are the patient's symptoms at this time?: 7 (02/12/21 1150)  Compared to the patient's condition at the START of treatment, this patient's condition is: 7 (02/12/21 1150)        Interim History:     The patient's care was discussed with the treatment team and chart notes were reviewed.  Pt was documented as sleeping 7.5 hours during the overnight shift. She has been in the milieu but not attending groups.  States she does intend to attend groups.  She has been calm and cooperative.  She continues to deny all psychotic symptoms since the evening of 2/14, although reports experiencing auditory hallucinations 90% of the time prior to admission.  Discussed with her concerns that she could be minimizing, as her  has previously pressured her to return home before she is stable.  States she has not talked to her  since last week and that she has received no external pressure to return home, though does miss her children and states she would like to discharge as soon as possible.  She reports only missing 1 day of meds prior to admission.  Attributes alba improvements in psychotic symptoms to the fact that the hospital is a less stressful environment.  She says her mood is \"good.\"  She denies feeling anxious or irritable.  She has been eating well.  States her monthly outpatient blood draw for Clozaril was due to today; will need to notify [facility] when she discharges.  Additional Reyes amendment paperwork sent to  today.         Medications:     Current Facility-Administered Medications   Medication     acetaminophen (TYLENOL) tablet 650 mg     alum & mag hydroxide-simethicone (MAALOX) " "suspension 30 mL     [START ON 2/23/2021] ARIPiprazole ER (ABILIFY MAINTENA) extended release injection 400 mg     cloZAPine (FAZACLO) ODT tab 200 mg     glucose gel 15-30 g    Or     dextrose 50 % injection 25-50 mL    Or     glucagon kit 1 mg     hydrOXYzine (ATARAX) tablet 25 mg     LORazepam (ATIVAN) tablet 0.5 mg     metFORMIN (GLUCOPHAGE-XR) 24 hr tablet 1,000 mg     multivitamin w/minerals (THERA-VIT-M) tablet 1 tablet     OLANZapine (zyPREXA) tablet 20 mg     perphenazine tablet 2 mg     rivaroxaban ANTICOAGULANT (XARELTO) tablet 20 mg     senna-docusate (SENOKOT-S/PERICOLACE) 8.6-50 MG per tablet 1 tablet     tranexamic acid (LYSTEDA) tablet 1,300 mg     traZODone (DESYREL) tablet 50 mg     Vitamin D3 (CHOLECALCIFEROL) tablet 25 mcg             Allergies:   No Known Allergies         Psychiatric Examination:     Appearance:  awake, alert and adequately groomed  Attitude:  cooperative  Eye Contact:  good  Mood:  \"good\"  Affect:  appropriate and in normal range  Speech:  clear, coherent  Psychomotor Behavior:  no evidence of tardive dyskinesia, dystonia, or tics  Thought Process:  linear and goal oriented  Associations:  no loose associations  Thought Content:  denies suicidal and homicidal ideation, states she has not experienced auditory hallucinations since 2/14, denies concerns about food being poisoned, or any other delusional thoughts   Insight:  fair  Judgment:  fair  Oriented to:  date, time, person, and place  Attention Span and Concentration:  fair  Recent and Remote Memory:  intact  Language:  intact, fluent English  Fund of Knowledge:  appropriate  Muscle Strength and Tone:  normal  Gait and Station:  normal     /69   Pulse 119   Temp 96.1  F (35.6  C) (Tympanic)   Resp 18   Ht 1.651 m (5' 5\")   Wt 73.2 kg (161 lb 4.8 oz)   LMP 01/17/2020 (Exact Date)   SpO2 100%   Breastfeeding No   BMI 26.84 kg/m             Labs:     No results found for this or any previous visit (from the past " 24 hour(s)).

## 2021-02-16 NOTE — PLAN OF CARE
Attended 1 OT Creative Expression Group with 2 peers present.  Initiated group following NP encouragement. Requested simple coloring task to work on. Was focused x 40 minutes with good attention to details and planning. Social when asked about her children. Explained oldest child's need for a  PCA due to autism dx. Noted she wouldn't have had so many children if she knew she had a mental illness. Responsive to support and compassion. Initiated second group but, did not return when she left to go to restroom. Will continue to encourage and support consistent group attendance to explore healthy coping and resource awareness.

## 2021-02-17 PROCEDURE — G0177 OPPS/PHP; TRAIN & EDUC SERV: HCPCS

## 2021-02-17 PROCEDURE — 124N000002 HC R&B MH UMMC

## 2021-02-17 PROCEDURE — 250N000013 HC RX MED GY IP 250 OP 250 PS 637: Performed by: NURSE PRACTITIONER

## 2021-02-17 PROCEDURE — 99233 SBSQ HOSP IP/OBS HIGH 50: CPT | Performed by: NURSE PRACTITIONER

## 2021-02-17 PROCEDURE — 250N000013 HC RX MED GY IP 250 OP 250 PS 637: Performed by: PSYCHIATRY & NEUROLOGY

## 2021-02-17 RX ORDER — PERPHENAZINE 2 MG/1
2 TABLET ORAL 3 TIMES DAILY PRN
Qty: 60 TABLET | Refills: 1 | Status: ON HOLD | OUTPATIENT
Start: 2021-02-17 | End: 2021-03-08

## 2021-02-17 RX ADMIN — MULTIPLE VITAMINS W/ MINERALS TAB 1 TABLET: TAB at 21:23

## 2021-02-17 RX ADMIN — TRANEXAMIC ACID 1300 MG: 650 TABLET ORAL at 13:52

## 2021-02-17 RX ADMIN — Medication 25 MCG: at 21:23

## 2021-02-17 RX ADMIN — METFORMIN ER 500 MG 1000 MG: 500 TABLET ORAL at 18:24

## 2021-02-17 RX ADMIN — TRANEXAMIC ACID 1300 MG: 650 TABLET ORAL at 08:28

## 2021-02-17 RX ADMIN — CLOZAPINE 200 MG: 100 TABLET, ORALLY DISINTEGRATING ORAL at 21:24

## 2021-02-17 RX ADMIN — LORAZEPAM 0.5 MG: 0.5 TABLET ORAL at 21:23

## 2021-02-17 RX ADMIN — OLANZAPINE 20 MG: 20 TABLET, FILM COATED ORAL at 18:24

## 2021-02-17 RX ADMIN — RIVAROXABAN 20 MG: 10 TABLET, FILM COATED ORAL at 18:24

## 2021-02-17 RX ADMIN — TRANEXAMIC ACID 1300 MG: 650 TABLET ORAL at 21:24

## 2021-02-17 NOTE — PLAN OF CARE
Patient present in the lounge but quiet.  She denied auditory hallucinations, suicidal thoughts, and all other physical and mental health problems.  Eye contact is good.  Patient states she has no medication side effects.

## 2021-02-17 NOTE — DISCHARGE INSTRUCTIONS
Behavioral Discharge Planning and Instructions      Summary:  You were admitted on 2021  due to Delusional Thoughts and Psychotic Symptomology.  You were treated by Kymberly Payton NP and discharged on 21 from [facility] Home.    You were recommitted to the [facility] and the [facility] on 2020. Clive are here voluntarily and so remain on your last Provisional Discharge Agreement. Your commitment and Reyes order  on 2021.   You are also court ordered to take the medications that the doctor ordered for you.     Principal Diagnosis: Schizoaffective disorder, depressive type    Health Care Follow-up Appointments:   [facility] ACT team:  : Vicky Santos [phone]; fax: [phone]  Psychiatrist: Walter Gaviria, [phone]     Attend all scheduled appointments with your outpatient providers. Call at least 24 hours in advance if you need to reschedule an appointment to ensure continued access to your outpatient providers.   Major Treatments, Procedures and Findings:  You were provided with: a psychiatric assessment, assessed for medical stability, medication evaluation and/or management and milieu management    Symptoms to Report: feeling more aggressive, increased confusion, losing more sleep, mood getting worse or thoughts of suicide or harm towards others.     Early warning signs can include: increased depression or anxiety sleep disturbances increased thoughts or behaviors of suicide or self-harm  increased unusual thinking, such as paranoia or hearing voices    Safety and Wellness:  Take all medicines as directed.  Make no changes unless your doctor suggests them.      Follow treatment recommendations.  Refrain from alcohol and non-prescribed drugs.  If there is a concern for safety, call [phone].    Resources:   Crisis Intervention: 574.296.7467 or 564-583-9475 (TTY: 949.672.3979).  Call anytime for help.  National Page on Mental  Illness (www.mn.doron.org): 418-379-8472 or 976-189-2582.  Suicide Awareness Voices of Education (SAVE) (www.save.org): 367-386-SAVE (7223)  [facility] Crisis (COPE) Response - Adult [phone]    The treatment team has appreciated the opportunity to work with you.     If you have any questions or concerns our unit number is [phone]

## 2021-02-17 NOTE — PROGRESS NOTES
[facility],  Psychiatric Progress Note      Impression:     Nona Finley is a 40-year-old female admitted to [facility] [address] on 2/11/2021.   She was admitted as a voluntary patient and under ongoing MI commitment with Eric (provisional discharge not revoked) due to auditory hallucinations commanding her to kill her children and herself as well as delusional thoughts that food and medications were poisoned.  Her  is a medical resident in [place].  She is raising 6 children ages 4-17, the oldest of whom has autism.  She had missed 2 days of medications the week prior to admission.  Since admission, Clozaril was continued.  Ativan was continued.  Zyprexa was continued.  Abilify Maintena was continued.  She is currently denying psychotic symptoms and reports that her mood is euthymic.           Diagnoses:     Schizoaffective disorder, depressive type         Plan:     Medications:  Continue Abilify Maintena 400 mg monthly (last received 1/26).  Continue Clozaril 200 mg at HS.  Continue Ativan 0.5 mg at HS.  Continue Zyprexa 20 mg with dinner.  Continue PRNs of Perphenazine, Hydroxyzine and Trazodone.  Discussed adding medication for sialorrhea.  She previously took one but cannot recall whether it was Atropine or Ipratropium.  States she would be willing to try the one she didn't try previously.  Left message for ACT RN.    She is under MI commitment with Eric.  Reyes order is currently for Haldol, Clozaril, Abilify and Invega.  It is being amended to include Zyprexa and Perphenazine in lieu of Haldol and Invega.  Provisional discharge had not been revoked.  Discharge to home when stable, goal for 2/18 if progress continues.  Her in-laws visit the home daily.  She has an ACT team who observes her taking medications via Zoom videos nightly.       Attestation:  Patient has been seen and evaluated by me, RAHEEM Perry CNP  The  "patient was counseled on nature of illness and treatment plan/options  Care was coordinated with treatment team, Abebe, ACT team      Clinical Global Impressions  First:  Considering your total clinical experience with this particular patient population, how severe are the patient's symptoms at this time?: 7 (02/12/21 1150)  Compared to the patient's condition at the START of treatment, this patient's condition is: 7 (02/12/21 1150)  Most recent:  Considering your total clinical experience with this particular patient population, how severe are the patient's symptoms at this time?: 7 (02/12/21 1150)  Compared to the patient's condition at the START of treatment, this patient's condition is: 7 (02/12/21 1150)        Interim History:     The patient's care was discussed with the treatment team and chart notes were reviewed.  Pt was documented as sleeping 7.5 hours during the overnight shift.  She has been attending groups and spending time in the milieu.  She reports that groups are beneficial.  She complains of sialorrhea.  Previously tried a SL med for this, but it caused her mouth to be too dry.  States she would try a different medication.  Provider left message for ACT RN to determine whether she tired Atropine or Ipratropium previously.  Also reports feeling dizzy and tired for about an hour in the AM since her Clozaril increase a couple weeks ago.  This generally resolves after she consumes water.  She continues to deny all psychotic symptoms.  Her mood is \"good.\"  States that Perphenazine has been beneficial and would like it provided at the time of discharge.  Pt has about 4 doses of Clozaril at home.  Provider called Joycelyn to coordinate Clozaril since she missed her monthly blood draw yesterday.  WBC with differential from 2/11 faxed, and they will fill Clozaril at the [facility] pharmacy for the ACT team to deliver.  Discussed goal to discharge tomorrow morning provided her progress continues.           " " Medications:     Current Facility-Administered Medications   Medication     acetaminophen (TYLENOL) tablet 650 mg     alum & mag hydroxide-simethicone (MAALOX) suspension 30 mL     [START ON 2/23/2021] ARIPiprazole ER (ABILIFY MAINTENA) extended release injection 400 mg     cloZAPine (FAZACLO) ODT tab 200 mg     glucose gel 15-30 g    Or     dextrose 50 % injection 25-50 mL    Or     glucagon kit 1 mg     hydrOXYzine (ATARAX) tablet 25 mg     LORazepam (ATIVAN) tablet 0.5 mg     metFORMIN (GLUCOPHAGE-XR) 24 hr tablet 1,000 mg     multivitamin w/minerals (THERA-VIT-M) tablet 1 tablet     OLANZapine (zyPREXA) tablet 20 mg     perphenazine tablet 2 mg     rivaroxaban ANTICOAGULANT (XARELTO) tablet 20 mg     senna-docusate (SENOKOT-S/PERICOLACE) 8.6-50 MG per tablet 1 tablet     tranexamic acid (LYSTEDA) tablet 1,300 mg     traZODone (DESYREL) tablet 50 mg     Vitamin D3 (CHOLECALCIFEROL) tablet 25 mcg             Allergies:   No Known Allergies         Psychiatric Examination:     Appearance:  awake, alert and adequately groomed  Attitude:  cooperative  Eye Contact:  good  Mood:  \"good\"  Affect:  appropriate and in normal range  Speech:  clear, coherent  Psychomotor Behavior:  no evidence of tardive dyskinesia, dystonia, or tics  Thought Process:  linear and goal oriented  Associations:  no loose associations  Thought Content:  denies suicidal and homicidal ideation, states she has not experienced auditory hallucinations since 2/14, denies concerns about food being poisoned, or any other delusional thoughts   Insight:  fair  Judgment:  fair to good  Oriented to:  date, time, person, and place  Attention Span and Concentration:  fair  Recent and Remote Memory:  intact  Language:  intact, fluent English  Fund of Knowledge:  appropriate  Muscle Strength and Tone:  normal  Gait and Station:  normal     BP (!) 84/57   Pulse 120   Temp 96.2  F (35.7  C) (Tympanic)   Resp 16   Ht 1.651 m (5' 5\")   Wt 73.2 kg (161 lb 4.8 " oz)   LMP 01/17/2020 (Exact Date)   SpO2 100%   Breastfeeding No   BMI 26.84 kg/m             Labs:     No results found for this or any previous visit (from the past 24 hour(s)).

## 2021-02-17 NOTE — PLAN OF CARE
"RN    Patient reports \"I am not hearing voices anymore\" and has a warm affect. Patient states \"I am going home tomorrow and am excited to see my kids.\" Patient was med-compliant, ate >75% meals (fluids encouraged) and attended groups. Patient met with spiritual adviser and accepted prayer book.     Patient denies SI/SIB/HI.    Nursing will continue to monitor.      "

## 2021-02-17 NOTE — PLAN OF CARE
Attended 2 Creative Expression OT Groups x 45 minutes each session with 3 and 0 peers present. Initiated and actively worked on simple creative task. Initially quiet but, with encouragement socialized with writer. Discussed the stress surrounding rasing at least 5 children while her  has been away for over 6 years attending medical school in the [facility] and doing his residency in [place]. Further noted he is talking about doing a specialization that would require him being away even longer. Discussed activities to do with her children to encourage physical activity and to use energy positively and purposefully. Noted her meds make her tired and dizzy. Family and ACT team are supportive of her taking her meds and check daily to ensure compliance. Eager to return home tomorrow.

## 2021-02-17 NOTE — PLAN OF CARE
Pt slept for 7.5 hours this shift. No requests or concerns, no PRNs given. Will continue to monitor.

## 2021-02-17 NOTE — PROGRESS NOTES
"SPIRITUAL HEALTH SERVICES  SPIRITUAL ASSESSMENT Progress Note  [facility]       REFERRAL SOURCE: Self initiated  visit, Bahai specific.     DATA: Pt Nona Finley identifies as Bahai and is of [place] descent.     I spoke to Nona in the [facility] area of [facility]. She mentioned to me that her  is away in [place] for a residency program to become an MD. He visits her every 2 months for about one week. We talked about her sacrifice in her time for her  to take care of her 5 children and how difficult it can be.     Nona shared with me that, \"I am thankful that the medications have made the voices away\" and is hopeful to discharge 2/18.      I also provided Nona with an Islamic prayer booklet and card with a Prophetic supplication.       PLAN: I will follow up with Nona Finley for the duration of his stay. [facility] is available to Nona Finley per request.     Brown Todd  Lead Bahai   Pager [phone]    [facility] remains available 24/7 for emergent requests/referrals, either by having the switchboard page the on-call  or by entering an ASAP/STAT consult in Epic (this will also page the on-call ).    "

## 2021-02-17 NOTE — PLAN OF CARE
[facility] Daily Note:       Work Completed: Participated in team meeting. Completed chart review.  Called the [facility] Attorney's office to get update on Reyes amendment.  It is currently being processed and will be faxed to the unit when available. Left a VM for pt's ACT , Vicky, informing her that pt is scheduled to discharge tomorrow.     Discharge plan or goal: Scheduled to discharge home tomorrow with ongoing ACT team services.                 Barriers to discharge: Stabilization of mental health symptoms.

## 2021-02-18 VITALS
WEIGHT: 160.5 LBS | BODY MASS INDEX: 26.74 KG/M2 | DIASTOLIC BLOOD PRESSURE: 89 MMHG | RESPIRATION RATE: 16 BRPM | HEIGHT: 65 IN | HEART RATE: 98 BPM | TEMPERATURE: 97.3 F | SYSTOLIC BLOOD PRESSURE: 104 MMHG | OXYGEN SATURATION: 100 %

## 2021-02-18 LAB
BASOPHILS # BLD AUTO: 0 10E9/L (ref 0–0.2)
BASOPHILS NFR BLD AUTO: 0.5 %
DIFFERENTIAL METHOD BLD: NORMAL
EOSINOPHIL # BLD AUTO: 0.3 10E9/L (ref 0–0.7)
EOSINOPHIL NFR BLD AUTO: 4.1 %
IMM GRANULOCYTES # BLD: 0 10E9/L (ref 0–0.4)
IMM GRANULOCYTES NFR BLD: 0.3 %
LYMPHOCYTES # BLD AUTO: 1.8 10E9/L (ref 0.8–5.3)
LYMPHOCYTES NFR BLD AUTO: 27.9 %
MONOCYTES # BLD AUTO: 0.5 10E9/L (ref 0–1.3)
MONOCYTES NFR BLD AUTO: 8.6 %
NEUTROPHILS # BLD AUTO: 3.7 10E9/L (ref 1.6–8.3)
NEUTROPHILS NFR BLD AUTO: 58.6 %
NRBC # BLD AUTO: 0 10*3/UL
NRBC BLD AUTO-RTO: 0 /100
WBC # BLD AUTO: 6.3 10E9/L (ref 4–11)

## 2021-02-18 PROCEDURE — 250N000013 HC RX MED GY IP 250 OP 250 PS 637: Performed by: NURSE PRACTITIONER

## 2021-02-18 PROCEDURE — 85004 AUTOMATED DIFF WBC COUNT: CPT | Performed by: PSYCHIATRY & NEUROLOGY

## 2021-02-18 PROCEDURE — 85048 AUTOMATED LEUKOCYTE COUNT: CPT | Performed by: PSYCHIATRY & NEUROLOGY

## 2021-02-18 PROCEDURE — 99239 HOSP IP/OBS DSCHRG MGMT >30: CPT | Performed by: NURSE PRACTITIONER

## 2021-02-18 PROCEDURE — 36415 COLL VENOUS BLD VENIPUNCTURE: CPT | Performed by: PSYCHIATRY & NEUROLOGY

## 2021-02-18 RX ADMIN — TRANEXAMIC ACID 1300 MG: 650 TABLET ORAL at 07:53

## 2021-02-18 ASSESSMENT — ACTIVITIES OF DAILY LIVING (ADL)
DRESS: INDEPENDENT
ORAL_HYGIENE: INDEPENDENT
HYGIENE/GROOMING: INDEPENDENT

## 2021-02-18 ASSESSMENT — MIFFLIN-ST. JEOR: SCORE: 1388.9

## 2021-02-18 NOTE — PLAN OF CARE
Patient exhibits good eye contact and appropriate sense of humor; does not appear to be responding to internal stimuli.  She denied all mental and physical health problems, as well as medication side effects.  Patient has been quietly present in the lounge, although does not initiate interactions.  She states she is looking forward to discharge tomorrow and seeing her children again.

## 2021-02-18 NOTE — PLAN OF CARE
"\"I'm much better, the voices are gone so that's why I'm better.\" Denies depression, anxiety and racing thoughts. Thoughts are more clear and is able to focus and concentrate better. States attends groups and is social with peers. Denies suicidal thoughts, thoughts of wanting to kill her children and thoughts of the food and water being poisoned. States the voices were telling her to kill her children and those are gone now. Orthostatic BP is low, see flow sheet. Patient states she is dizzy when she stands up but not when she is walking or sitting. Kymberly Payton NP is aware of low BPs. Read over discharge instructions with patient. Patient was discharged home via cab at 1100..   "

## 2021-02-18 NOTE — DISCHARGE SUMMARY
"Psychiatric Discharge Summary    Nona Finley MRN# [id number]   Age: 40 year old YOB: 1980     Date of Admission:  2/11/2021  Date of Discharge:  2/18/2021  Admitting Physician:  Kobe Freed MD  Discharge Physician:  RAHEEM Perry CNP (Contact: [phone])         Event Leading to Hospitalization:      From H&P 2/12/2021:    CHIEF COMPLAIN: \"I am having a tough time\".    HISTORY OF PRESENT ILLNESS: Per ED note: Nona Finley is a 40 year old [place]ian female with a history of severe bipolar 1 disorder with psychotic behavior, depressive type schizoaffective disorder, auditory hallucinations, suicidal ideations, psychosis, postpartum depression, and tuberculosis  who presents to the ED today for evaluation of hallucinations. The patient reports that she has been hearing voices for 2 weeks telling her that her medication and food is poisoned. She states that this morning the voices told her to kill herself and her children. She states that she grabbed a knife to stab herself, but did not act. She states that her medication dose was recently increased, but is not working anymore. She states that she is feeling suicidal, but feels safe here in the ED and does not think she will hurt herself.  Social: The patient has 6 children. Her  is away for work and her in-laws come to her house daily.    Patient was seen by [facility]  Walter, who gave additional history. The patient is followed by a care team at [facility]. Her psychiatrist there increased her 120 mg Clozapine to 200 mg 2 weeks ago for auditory and visual hallucinations. She states that her symptoms did not improve. She is hearing voices telling her to kill her children and jump off a bridge. She is also seeing scary faces and thinks that her food and water is poisoned. She is getting 5 hours of interrupted sleep a night (on 400 mg Abilify injection). She called her  today who then called a cab to her " "house to bring her here.  She states that this time the symptoms are more intense and frequent and community help, including daily medication reminder calls have not been helping. She has 6 children with the youngest being 4, and one of whom has Autism and requires a lot of attention so her in-laws are paid PCAs who come daily to help with this. She reports that another significant stressor is that her  is a doctor doing residency in [place] since July and has not been able to come home since September.      Nona Finley is a 40 year old female with history of schizoaffective disorder, depressed type.  The patient is a good historian.  She confirms information in the previous paragraphs.  States that the reason for admission is struggling with auditory and visual hallucinations.  Reports that the hallucinations have been under control up until 2 weeks ago.  Denies any stressors except for a water pipe bursting and having a hard time finding anyone to fix it. Her  is away for residency training and will be done in 1.5 years. He will visit in May. Her in-laws are helping her with the kids.  The patient reports hearing voices urging her to harm her kids or kill herself.  The voices have been telling her that her food and medicine have been poisoned.  Reports that she was taking her medications as prescribed but was not eating.  She is also seeing \"scary faces coming towards me\".  She is hallucinating 24 hours a day.  She feels tired and scared.  She is crying all the time.  She does not want to live anymore.  Denies suicidal ideation, plan or intent to act on her thoughts.  \"I am just tired of living\".  She feels very depressed, hopeless, helpless, and worthless.  Sleep is poor, averaging few hours a night.  Some days she is able to take short naps.  Her energy is low, appetite is poor, memory and concentration are intact.  Reports high anxiety mainly related to worries about her children.  Denies " panic attacks and manic symptoms.  Denies paranoia and delusions.  Denies PTSD, OCD, eating disorders, borderline personality disorder, suicide attempts, self injury behaviors.  The patient reports having dreams of being chased by snakes. Denies seizures, head injuries, and loss of consciousness.     Spoke pts psychiatrist Dr. Gaviria, phone #[phone].  States that the patient has been under a lot of  stress recently with her  visiting for the short-term and leaving again.  One of the patient's daughter has severe autism.  Even though the patient has help from her in-laws, she is practically raising her kids alone.  The ACT team meets with the patient via Zoom every evening and reminds her to take her medications. She has missed 2 days of medications, last week.  Historically, she decompensates within days of stopping her medications.      See full admission note by RAHEEM Louis DNP on 2/12/2021 for details.          Diagnoses:     Schizoaffective disorder, depressive type         Labs:      Ref. Range 2/11/2021 10:37 2/11/2021 11:55 2/11/2021 13:24 2/12/2021 07:43   Sodium Latest Ref Range: 133 - 144 mmol/L 142      Potassium Latest Ref Range: 3.4 - 5.3 mmol/L 4.2      Chloride Latest Ref Range: 94 - 109 mmol/L 112 (H)      Carbon Dioxide Latest Ref Range: 20 - 32 mmol/L 24      Urea Nitrogen Latest Ref Range: 7 - 30 mg/dL 16      Creatinine Latest Ref Range: 0.52 - 1.04 mg/dL 0.60      GFR Estimate Latest Ref Range: >60 mL/min/1.73_m2 >90      GFR Estimate If Black Latest Ref Range: >60 mL/min/1.73_m2 >90      Calcium Latest Ref Range: 8.5 - 10.1 mg/dL 8.8      Anion Gap Latest Ref Range: 3 - 14 mmol/L 6      Albumin Latest Ref Range: 3.4 - 5.0 g/dL 3.8      Protein Total Latest Ref Range: 6.8 - 8.8 g/dL 8.3      Bilirubin Total Latest Ref Range: 0.2 - 1.3 mg/dL 0.3      Alkaline Phosphatase Latest Ref Range: 40 - 150 U/L 58      ALT Latest Ref Range: 0 - 50 U/L 13      AST Latest Ref Range:  0 - 45 U/L 17      Hemoglobin A1C Latest Ref Range: 0 - 5.6 %    5.4   Cholesterol Latest Ref Range: <200 mg/dL    169   Folate Latest Ref Range: >5.4 ng/mL    25.4   HCG Qual Urine Latest Ref Range: NEG^Negative    Negative    HDL Cholesterol Latest Ref Range: >49 mg/dL    65   LDL Cholesterol Calculated Latest Ref Range: <100 mg/dL    95   Non HDL Cholesterol Latest Ref Range: <130 mg/dL    104   Triglycerides Latest Ref Range: <150 mg/dL    46   TSH Latest Ref Range: 0.40 - 4.00 mU/L    0.74   Vitamin B12 Latest Ref Range: 193 - 986 pg/mL    481   Vitamin D Deficiency screening Latest Ref Range: 20 - 75 ug/L    25   Glucose Latest Ref Range: 70 - 99 mg/dL 106 (H)      WBC Latest Ref Range: 4.0 - 11.0 10e9/L 6.7      Hemoglobin Latest Ref Range: 11.7 - 15.7 g/dL 12.1      Hematocrit Latest Ref Range: 35.0 - 47.0 % 36.5      Platelet Count Latest Ref Range: 150 - 450 10e9/L 316      RBC Count Latest Ref Range: 3.8 - 5.2 10e12/L 4.07      MCV Latest Ref Range: 78 - 100 fl 90      MCH Latest Ref Range: 26.5 - 33.0 pg 29.7      MCHC Latest Ref Range: 31.5 - 36.5 g/dL 33.2      RDW Latest Ref Range: 10.0 - 15.0 % 14.2      Diff Method Unknown Automated Method      % Neutrophils Latest Units: % 73.7      % Lymphocytes Latest Units: % 18.9      % Monocytes Latest Units: % 6.0      % Eosinophils Latest Units: % 0.7      % Basophils Latest Units: % 0.6      % Immature Granulocytes Latest Units: % 0.1      Nucleated RBCs Latest Ref Range: 0 /100 0      Absolute Neutrophil Latest Ref Range: 1.6 - 8.3 10e9/L 4.9      Absolute Lymphocytes Latest Ref Range: 0.8 - 5.3 10e9/L 1.3      Absolute Monocytes Latest Ref Range: 0.0 - 1.3 10e9/L 0.4      Absolute Eosinophils Latest Ref Range: 0.0 - 0.7 10e9/L 0.1      Absolute Basophils Latest Ref Range: 0.0 - 0.2 10e9/L 0.0      Abs Immature Granulocytes Latest Ref Range: 0 - 0.4 10e9/L 0.0      Absolute Nucleated RBC Unknown 0.0      INR Latest Ref Range: 0.86 - 1.14  1.45 (H)       Rapid Plasma Reagin Latest Ref Range: NR^Nonreactive     Nonreactive   T Pallidum by TP-PA conf Latest Ref Range: Non Reactive     Non Reactive   Treponema Antibodies Latest Ref Range: NR^Nonreactive     Reactive (A)   Flu A/B & SARS-COV-2 PCR Source Unknown  Nasopharyngeal     SARS-CoV-2 PCR Result Unknown  NEGATIVE     Influenza A Latest Ref Range: NEG^Negative   Negative     Influenza B Latest Ref Range: NEG^Negative   Negative     Respiratory Syncytial Virus PCR Unknown  (Note)     Clozapine Quant Latest Units: ng/mL    474   Norclozapine Quant Latest Units: ng/mL    153   Clozapine-N-Oxide Quant Latest Units: ng/mL    <100   Clozapine and Metabolites Total Latest Ref Range: <=1,500 ng/mL    627   Amphetamine Qual Urine Latest Ref Range: NEG^Negative    Negative    Cocaine Qual Urine Latest Ref Range: NEG^Negative    Negative    Opiates Qualitative Urine Latest Ref Range: NEG^Negative    Negative    Cannabinoids Qual Urine Latest Ref Range: NEG^Negative    Negative    Barbiturates Qual Urine Latest Ref Range: NEG^Negative    Negative    Benzodiazepine Qual Urine Latest Ref Range: NEG^Negative    Negative    Ethanol Qual Urine Latest Ref Range: NEG^Negative    Negative       Ref. Range 2/12/2021 11:20 2/12/2021 11:20 2/12/2021 11:20 2/12/2021 16:36 2/12/2021 22:18   Glucose Latest Ref Range: 70 - 99 mg/dL    101 (H) 94   Color Urine Unknown Yellow       Appearance Urine Unknown Clear       Glucose Urine Latest Ref Range: NEG^Negative mg/dL Negative       Bilirubin Urine Latest Ref Range: NEG^Negative  Negative       Ketones Urine Latest Ref Range: NEG^Negative mg/dL Negative       Specific Gravity Urine Latest Ref Range: 1.003 - 1.035  1.014       pH Urine Latest Ref Range: 5.0 - 7.0 pH 6.0       Protein Albumin Urine Latest Ref Range: NEG^Negative mg/dL Negative       Urobilinogen mg/dL Latest Ref Range: 0.0 - 2.0 mg/dL Normal       Nitrite Urine Latest Ref Range: NEG^Negative  Negative       Blood Urine  Latest Ref Range: NEG^Negative  Trace (A)       Leukocyte Esterase Urine Latest Ref Range: NEG^Negative  Large (A)       Source Unknown Midstream Urine       WBC Urine Latest Ref Range: 0 - 5 /HPF 4       RBC Urine Latest Ref Range: 0 - 2 /HPF 8 (H)       Bacteria Urine Latest Ref Range: NEG^Negative /HPF Few (A)       Squamous Epithelial /HPF Urine Latest Ref Range: 0 - 1 /HPF 5 (H)       Mucous Urine Latest Ref Range: NEG^Negative /LPF Present (A)       Culture Micro Unknown Group B Streptoco... 10,000 to 50,000 ... <10,000 colonies/... (A)     Specimen Description Unknown Midstream Urine       URINE CULTURE AEROBIC BACTERIAL Unknown Rpt (A)                Consults:     Internal Medicine Consult 2/12/2021:    Assessment & Recommendations:    Nona Finley is a 40 year old female with a history of bipolar 1 disorder with psychotic behavior, schizoaffective disorder, depressive type, auditory/visual hallucinations, SI, history of post-partum depression and history of latent TB who is admitted to [facility] with hallucinations for 2 weeks and suicidal ideation. Internal Medicine consultation was ordered by Dr. Freed for evaluation regarding pulmonary embolism, pulmonary nodules, TB history and possible diabetes given she is on Metformin.        Bipolar 1 disorder with psychotic behaviors  Schizoaffective disorder, depressive type  Auditory hallucinations  Suicidal ideation:  Follows with Psychiatry at [facility]. Recently her Clozapine was increased from 120mg to 200mg for auditory/visual hallucinations, which patient reports did not improve. Voices are telling her to kill her children and to jump off a bridge. Also reports seeing scary faces. Recent stressors are that she has 6 children, one with autism that requires a lot of attention (in laws are PCAs) and her  is a resident physician in [place] and hasn't been able to come home since September. Also on Ariprazole, Zyprexa, and Lorazepam.     -Management per psychiatry team.      Tachycardia  History of orthostasis:  HR 126bpm in the ED in setting of psychosis. Improved to 97bpm today, but now 121bpm. ECG from 2/11 with sinus rhythm with normal QTc and biatrial enlargement. Denies palpitations, chest pain and asymptomatic. Tolerating fluid intake. Does note that she occasionally gets lightheaded when she stands. No lightheadedness currently. Has history of tachycardia at baseline per chart review. ECHO 2017 for tachycardia was normal with EF 60-65%. TSH wnl.   - Monitor   - Encourage fluid intake  - Please notify medicine if any chest pain, orthostasis, hypotension   - Addendum: Discussed case with Dr. Velasco, internal medicine regarding biatrial enlargement. At this time, reviewed ECG and noted to have atrial enlargement on previous ECG and ECHO at that time without structural abnormality. No indication to repeat ECHO at this time.   - Recommend follow-up with PCP for ongoing monitoring      History of pulmonary embolism:  Follows with Dr. Loza, Hematology last visit 2/4/21. She had a history of unprovoked small bilateral PE in 8/14/20 diagnosed in the ED. She is on Xarelto for chronic anticoagulation. She has a history of heavy bleeding during menstruation and was recently prescribed Lysteda by Hematology to take during her menses to see if this helps.   - Continue Xarelto 20mg daily with supper   - Follow up with Hematology as directed   - Patient can take Lysteda 1300mg by mouth three times daily during menstrual cycle (~2/17 is expected menses date) to prevent heavy menstrual bleeding per Hematology recommendations -> not ordered but please page medicine when on menses and can place order if having heavy bleeding      History of latent TB:  Has history of latent TB. Had positive PPD in 2006 with negative CXR and did not finish INH. From 8/2015-1/2016 she completed ~4 months treatment of INH per Family Practice Notes. At this time, is incompletely  treated for latent TB. Currently, denies any symptoms of active TB including no night sweats, cough, hemoptysis, unexpected weight loss, fever. She is interested in following up for consideration of restarting treatment.  - Infectious Disease referral placed in discharge navigator for follow-up of latent TB and for treatment  - Follow up with PCP for follow-up upon discharge  - Please notify medicine if any fever >100.4F, new cough, hemoptysis, night sweats. Would obtain CXR at that time.      History of pulmonary nodule and ground glass opacities, resolved:  Patient had prominent focal solid and ground glass nodule opacity at RUL seen on CT chest in 8/14/20. Recommended follow up CT chest in 3 months at that time for surveillance. Had follow-up CT chest on 11/6/2020 ordered by Hematology and CT showed that the previously described 2.3cm nodule groundglass opacity in the RUL has nearly completely resolved, likely representing prior infection.   - Follow up with PCP for follow-up      History of pre-diabetes:   On Metformin XL 1000mg daily. No recent HgbA1c on file. Glucose 106 on 2/11. Hemoglobin A1C checked today and 5.4% indicating pre-diabetes (Addendum: HgbA1c is actually in the normal range- she reports being on it for weight and in setting of antipsychotics).   - Continue Metformin XL 1000mg daily  - Follow up for continued monitoring of Hemoglobin A1c with PCP     Addendum: Will continue to monitor HR peripherally.    No further medical intervention is required at this time. Medicine signing off.    -----------------------------------------------------------------------------------------------    Brief Medicine Note 2/14/2021:     Medicine following for HR montoring. HR stable at 100s-110s. No acute events after reviewing RN notes. Continues to have active hallucinations.      Please notify medicine if HR consistently >120bpm or if any sign of dehydration, chest pain, SOB, dizziness or lightheadedness. Please  see consult note on 2/13 for complete details.      No further medical intervention is required at this time. Medicine signing off. Please feel free to call with any questions.           Hospital Course:     Nona Finley was admitted to [facility] with attending Kobe Freed MD, under the direct care of RAHEEM Louis DNP as a voluntary patient and under an ongoing civil commitment.  Provisional discharge was not revoked.  Care was later transferred to Dr. Block, under the direct care of Kymberly Payton NP.  The patient was placed under status 15 (15 minute checks) to ensure patient safety.     Ryees order is currently for Haldol, Clozaril, Abilify and Invega.  It is being amended to include Zyprexa and Perphenazine in lieu of Haldol and Invega. This was in process at the time of discharge.    Abilify Maintena 400 mg was most recently administered 1/26 and was continued.  Clozaril (Fazclo formulation) 200 mg at HS was continued.  Zyprexa 20 mg with dinner was continued.  Ativan 0.5 mg at HS was continued.  Perphenazine 2 mg TID PRN was initiated for psychosis; she took it during the first few days of her hospitalization and found it beneficial.  She reported sialorrhea (longstanding) as well as dizziness and low BP with tachycardia in the AM, improved with water consumption (reports this is new since ACT team increased Clozaril).  She otherwise tolerated medications well.     Nona Finley initially spent time in her room and did not interact with other patients when she was in the milieu.  With encouragement, she participated in groups.  Behavior was calm and cooperative.  She appeared motivated for treatment and recovery.    Historically her  has pressured her to return home before stability was achieved.  She denied that this was the case and stated she hadn't had contact with her  for several days.    The patient's symptoms of psychosis improved.  She denied all psychotic  "symptoms including paranoia and auditory hallucinations from the morning of 2/15 onward.  Thoughts were organized.  She attributed improvements to being away from home, in a less stressful environment.  She reported her mood was euthymic.  She denied suicidal and homicidal ideation.  She ate and slept well.    Nona Finley was released to home.  At the time of discharge Nona Finley was determined to not be a danger to herself or others.          Discharge Medications:      Nona Finley Medication Instructions RUTH:[id number]    Printed on:02/18/21 3624   Medication Information                      ABILIFY MAINTENA 400 MG extended release injection  Inject 400 mg into the muscle every 28 days             cloZAPine (FAZACLO) 100 MG ODT  Take 200 mg by mouth At Bedtime              LORazepam (ATIVAN) 0.5 MG tablet  Take 0.5 mg by mouth At Bedtime             metFORMIN (GLUCOPHAGE-XR) 500 MG 24 hr tablet  Take 1,000 mg by mouth daily (with dinner)              Multiple Vitamins-Minerals (MULTIVITAMIN ADULT) TABS  Take 1 tablet by mouth daily             OLANZapine (ZYPREXA) 20 MG tablet  Take 20 mg by mouth daily (with dinner)             perphenazine 2 MG tablet  Take 1 tablet (2 mg) by mouth 3 times daily as needed (psychosis)             rivaroxaban ANTICOAGULANT (XARELTO ANTICOAGULANT) 20 MG TABS tablet  Take 1 tablet (20 mg) by mouth daily (with dinner)              tranexamic acid (LYSTEDA) 650 MG tablet  Take 2 tablets (1,300 mg) by mouth 3 times daily During menstrual cycle             Vitamin D3 (CHOLECALCIFEROL) 25 mcg (1000 units) tablet  Take 1 tablet (25 mcg) by mouth daily                      Psychiatric Examination:     Appearance:  awake, alert and adequately groomed  Attitude:  cooperative  Eye Contact:  good  Mood:  \"good\"  Affect:  appropriate and in normal range, smiling  Speech:  clear, coherent  Psychomotor Behavior:  no evidence of tardive dyskinesia, dystonia, or tics  Thought " "Process:  linear and goal oriented  Associations:  no loose associations  Thought Content:  denies suicidal and homicidal ideation, states she has not experienced auditory hallucinations since , denies concerns about food being poisoned, or any other delusional thoughts   Insight:  fair  Judgment:  fair to good  Oriented to:  date, time, person, and place  Attention Span and Concentration:  fair  Recent and Remote Memory:  intact  Language:  intact, fluent English  Fund of Knowledge:  appropriate  Muscle Strength and Tone:  normal  Gait and Station:  normal     /89   Pulse 98   Temp 97.5  F (36.4  C) (Oral)   Resp 16   Ht 1.651 m (5' 5\")   Wt 73.2 kg (161 lb 4.8 oz)   LMP 2020 (Exact Date)   SpO2 100%   Breastfeeding No   BMI 26.84 kg/m           Discharge Plan:     Per Discharge AVS:    You were recommitted to the [facility] and the [facility] of [facility] on 2020. Clive are here voluntarily and so remain on your last Provisional Discharge Agreement. Your commitment and Reyes order  on 2021.  You are also court ordered to take the medications that the doctor ordered for you.     Health Care Follow-up Appointments:   [facility] ACT team:  : Vicky Santos [phone]; fax: [phone]  Psychiatrist: Walter Gaviria, [phone]       She was provided with a 30-day supply of Perphenazine.  She missed her monthly blood draw for Clozaril while hospitalized.  Portlandmaya Elizondo was apprised of the situation and were faxed WBC with differential results to ensure she receives Clozaril refills.  She does have an ample supply at home to last until Clozaril is filled.  She was advised to take her medications as prescribed and to abstain from alcohol and illicit substances.       Attestation:  The patient has been seen and evaluated by me, RAHEEM Perry CNP   Discharge time > 30 minutes      Clinical Global " Impressions  First:  Considering your total clinical experience with this particular patient population, how severe are the patient's symptoms at this time?: 7 (02/12/21 1150)  Compared to the patient's condition at the START of treatment, this patient's condition is: 7 (02/12/21 1150)  Most recent:  Considering your total clinical experience with this particular patient population, how severe are the patient's symptoms at this time?: 4 (02/18/21 0736)  Compared to the patient's condition at the START of treatment, this patient's condition is: 2 (02/18/21 0736)

## 2021-02-23 ENCOUNTER — TELEPHONE (OUTPATIENT)
Dept: BEHAVIORAL HEALTH | Facility: CLINIC | Age: 41
End: 2021-02-23

## 2021-02-23 ENCOUNTER — HOSPITAL ENCOUNTER (INPATIENT)
Facility: CLINIC | Age: 41
LOS: 14 days | Discharge: HOME | DRG: 885 | End: 2021-03-09
Attending: FAMILY MEDICINE | Admitting: PSYCHIATRY & NEUROLOGY
Payer: COMMERCIAL

## 2021-02-23 DIAGNOSIS — F51.01 PRIMARY INSOMNIA: ICD-10-CM

## 2021-02-23 DIAGNOSIS — Z20.822 COVID-19 RULED OUT BY LABORATORY TESTING: ICD-10-CM

## 2021-02-23 DIAGNOSIS — F31.9 BIPOLAR 1 DISORDER (H): ICD-10-CM

## 2021-02-23 DIAGNOSIS — F25.1 SCHIZOAFFECTIVE DISORDER, DEPRESSIVE TYPE (H): Chronic | ICD-10-CM

## 2021-02-23 DIAGNOSIS — K59.09 OTHER CONSTIPATION: ICD-10-CM

## 2021-02-23 DIAGNOSIS — F25.1 SCHIZOAFFECTIVE DISORDER, DEPRESSIVE TYPE (H): ICD-10-CM

## 2021-02-23 DIAGNOSIS — E55.9 VITAMIN D DEFICIENCY: ICD-10-CM

## 2021-02-23 DIAGNOSIS — E10.9 TYPE 1 DIABETES MELLITUS WITHOUT COMPLICATION (H): Primary | ICD-10-CM

## 2021-02-23 DIAGNOSIS — Z86.711 HISTORY OF PULMONARY EMBOLISM: ICD-10-CM

## 2021-02-23 DIAGNOSIS — N92.0 MENORRHAGIA WITH REGULAR CYCLE: ICD-10-CM

## 2021-02-23 LAB
AMPHETAMINES UR QL SCN: NEGATIVE
BARBITURATES UR QL: NEGATIVE
BENZODIAZ UR QL: NEGATIVE
CANNABINOIDS UR QL SCN: NEGATIVE
COCAINE UR QL: NEGATIVE
ETHANOL UR QL SCN: NEGATIVE
HCG UR QL: NEGATIVE
LABORATORY COMMENT REPORT: NORMAL
OPIATES UR QL SCN: NEGATIVE
SARS-COV-2 RNA RESP QL NAA+PROBE: NEGATIVE
SPECIMEN SOURCE: NORMAL

## 2021-02-23 PROCEDURE — 80307 DRUG TEST PRSMV CHEM ANLYZR: CPT | Performed by: FAMILY MEDICINE

## 2021-02-23 PROCEDURE — 80320 DRUG SCREEN QUANTALCOHOLS: CPT | Performed by: FAMILY MEDICINE

## 2021-02-23 PROCEDURE — 250N000013 HC RX MED GY IP 250 OP 250 PS 637: Performed by: PSYCHIATRY & NEUROLOGY

## 2021-02-23 PROCEDURE — 81025 URINE PREGNANCY TEST: CPT | Performed by: FAMILY MEDICINE

## 2021-02-23 PROCEDURE — 99285 EMERGENCY DEPT VISIT HI MDM: CPT | Performed by: FAMILY MEDICINE

## 2021-02-23 PROCEDURE — 124N000002 HC R&B MH UMMC

## 2021-02-23 PROCEDURE — 99285 EMERGENCY DEPT VISIT HI MDM: CPT | Mod: 25 | Performed by: FAMILY MEDICINE

## 2021-02-23 PROCEDURE — C9803 HOPD COVID-19 SPEC COLLECT: HCPCS | Performed by: FAMILY MEDICINE

## 2021-02-23 PROCEDURE — 90791 PSYCH DIAGNOSTIC EVALUATION: CPT

## 2021-02-23 PROCEDURE — 87635 SARS-COV-2 COVID-19 AMP PRB: CPT | Performed by: FAMILY MEDICINE

## 2021-02-23 PROCEDURE — 250N000013 HC RX MED GY IP 250 OP 250 PS 637: Performed by: FAMILY MEDICINE

## 2021-02-23 RX ORDER — OLANZAPINE 10 MG/1
10 TABLET ORAL 3 TIMES DAILY PRN
Status: DISCONTINUED | OUTPATIENT
Start: 2021-02-23 | End: 2021-03-09 | Stop reason: HOSPADM

## 2021-02-23 RX ORDER — OLANZAPINE 10 MG/1
10 TABLET, ORALLY DISINTEGRATING ORAL ONCE
Status: COMPLETED | OUTPATIENT
Start: 2021-02-23 | End: 2021-02-23

## 2021-02-23 RX ORDER — MULTIVITAMIN,THERAPEUTIC
1 TABLET ORAL DAILY
Status: DISCONTINUED | OUTPATIENT
Start: 2021-02-24 | End: 2021-03-09 | Stop reason: HOSPADM

## 2021-02-23 RX ORDER — VITAMIN B COMPLEX
25 TABLET ORAL DAILY
Status: DISCONTINUED | OUTPATIENT
Start: 2021-02-24 | End: 2021-03-09 | Stop reason: HOSPADM

## 2021-02-23 RX ORDER — PERPHENAZINE 2 MG/1
2 TABLET ORAL 3 TIMES DAILY PRN
Status: DISCONTINUED | OUTPATIENT
Start: 2021-02-23 | End: 2021-03-09 | Stop reason: HOSPADM

## 2021-02-23 RX ORDER — TRANEXAMIC ACID 650 MG/1
1300 TABLET ORAL 3 TIMES DAILY
Status: DISCONTINUED | OUTPATIENT
Start: 2021-02-23 | End: 2021-03-05

## 2021-02-23 RX ORDER — METFORMIN HCL 500 MG
1000 TABLET, EXTENDED RELEASE 24 HR ORAL
Status: DISCONTINUED | OUTPATIENT
Start: 2021-02-23 | End: 2021-03-09 | Stop reason: HOSPADM

## 2021-02-23 RX ORDER — OLANZAPINE 20 MG/1
20 TABLET ORAL
Status: DISCONTINUED | OUTPATIENT
Start: 2021-02-23 | End: 2021-02-25

## 2021-02-23 RX ORDER — OLANZAPINE 10 MG/2ML
10 INJECTION, POWDER, FOR SOLUTION INTRAMUSCULAR 3 TIMES DAILY PRN
Status: DISCONTINUED | OUTPATIENT
Start: 2021-02-23 | End: 2021-03-09 | Stop reason: HOSPADM

## 2021-02-23 RX ORDER — AMOXICILLIN 250 MG
1 CAPSULE ORAL 2 TIMES DAILY PRN
Status: DISCONTINUED | OUTPATIENT
Start: 2021-02-23 | End: 2021-03-09 | Stop reason: HOSPADM

## 2021-02-23 RX ORDER — TRAZODONE HYDROCHLORIDE 50 MG/1
50 TABLET, FILM COATED ORAL
Status: DISCONTINUED | OUTPATIENT
Start: 2021-02-23 | End: 2021-03-09 | Stop reason: HOSPADM

## 2021-02-23 RX ORDER — HYDROXYZINE HYDROCHLORIDE 25 MG/1
25 TABLET, FILM COATED ORAL EVERY 4 HOURS PRN
Status: DISCONTINUED | OUTPATIENT
Start: 2021-02-23 | End: 2021-03-09 | Stop reason: HOSPADM

## 2021-02-23 RX ORDER — CLOZAPINE 100 MG/1
200 TABLET, ORALLY DISINTEGRATING ORAL AT BEDTIME
Status: DISCONTINUED | OUTPATIENT
Start: 2021-02-23 | End: 2021-02-23

## 2021-02-23 RX ORDER — LORAZEPAM 0.5 MG/1
0.5 TABLET ORAL AT BEDTIME
Status: DISCONTINUED | OUTPATIENT
Start: 2021-02-23 | End: 2021-03-04

## 2021-02-23 RX ORDER — MAGNESIUM HYDROXIDE/ALUMINUM HYDROXICE/SIMETHICONE 120; 1200; 1200 MG/30ML; MG/30ML; MG/30ML
30 SUSPENSION ORAL EVERY 4 HOURS PRN
Status: DISCONTINUED | OUTPATIENT
Start: 2021-02-23 | End: 2021-03-09 | Stop reason: HOSPADM

## 2021-02-23 RX ORDER — ACETAMINOPHEN 325 MG/1
650 TABLET ORAL EVERY 4 HOURS PRN
Status: DISCONTINUED | OUTPATIENT
Start: 2021-02-23 | End: 2021-03-09 | Stop reason: HOSPADM

## 2021-02-23 RX ADMIN — RIVAROXABAN 20 MG: 10 TABLET, FILM COATED ORAL at 21:59

## 2021-02-23 RX ADMIN — OLANZAPINE 10 MG: 10 TABLET, ORALLY DISINTEGRATING ORAL at 18:34

## 2021-02-23 ASSESSMENT — MIFFLIN-ST. JEOR: SCORE: 1386.64

## 2021-02-23 NOTE — ED PROVIDER NOTES
[facility] EMERGENCY DEPARTMENT ([facility])     February 23, 2021    History     Chief Complaint   Patient presents with     Hallucinations     reports stopped taking her medication friday, now having auditory hallucinations commanding her to kill her children, denies any homicidal intentions     Suicidal     reports suicidal thoughts of jumping off a bridge but no plan     The history is provided by the patient and medical records.     Nona Finley is a 40 year old female with a medical history significant for schizoaffective disorder and hallucinations who presents to the ED for evaluation of hallucinations and suicidal ideation.  Patient reports she stopped taking her medication on Friday and is now having auditory hallucinations commanding her to kill her children.  She denies any homicidal intentions.  Patient also reports suicidal thoughts of jumping off of a bridge.  Patient was seen by the act team and was told to come to the emergency room with possible revocation of her stay of commitment.  When asked why she stopped taking her medication she stated that she felt as though the voices were telling her not to take her medications.  What unclear as to whether or not she was having voices while she was on medications.      PAST MEDICAL HISTORY:   Past Medical History:   Diagnosis Date     Encounter for female birth control 6/14/2017 6/14/2017 Plan Documentation Service ordered Depo Provera injection (150mg IM) may be given every 3 months for one year per protoccol. Plan and order should be renewed at a visit no later than 6/14/18   Vanessa Thompson MD         Encounter for insertion or removal of intrauterine contraceptive device 1/3/2008    Paragard 1/08 removed 1/08.     Postpartum depression 8/18/2011     Schizophrenia (H) 2/12/2019     Suicidal ideation 2/16/2017       PAST SURGICAL HISTORY:   Past Surgical History:   Procedure Laterality Date     NO HISTORY OF SURGERY       NO HISTORY OF  SURGERY  06/13/2013    Per patient reported this       Past medical history, past surgical history, medications, and allergies were reviewed with the patient. Additional pertinent items: None      FAMILY HISTORY:   Family History   Problem Relation Age of Onset     Respiratory Father         asthma     Asthma Father      Cerebrovascular Disease Mother      Diabetes Mother      Neurologic Disorder Brother         mental health problem?     Neurologic Disorder Sister         seizures (half sister)     Respiratory Paternal Grandfather         asthma     Respiratory Sister         asthma     Respiratory Brother         asthma     Neurologic Disorder Daughter         autism      Hypertension Maternal Grandmother      Neurologic Disorder Sister         seizures     Diabetes Maternal Grandfather      Coronary Artery Disease Son        SOCIAL HISTORY:   Social History     Tobacco Use     Smoking status: Never Smoker     Smokeless tobacco: Never Used   Substance Use Topics     Alcohol use: No     Social history was reviewed with the patient. Additional pertinent items: None      Patient's Medications   New Prescriptions    No medications on file   Previous Medications    ABILIFY MAINTENA 400 MG EXTENDED RELEASE INJECTION    Inject 400 mg into the muscle every 28 days    CLOZAPINE (FAZACLO) 100 MG ODT    Take 200 mg by mouth At Bedtime     LORAZEPAM (ATIVAN) 0.5 MG TABLET    Take 0.5 mg by mouth At Bedtime    METFORMIN (GLUCOPHAGE-XR) 500 MG 24 HR TABLET    Take 1,000 mg by mouth daily (with dinner)     MULTIPLE VITAMINS-MINERALS (MULTIVITAMIN ADULT) TABS    Take 1 tablet by mouth daily    OLANZAPINE (ZYPREXA) 20 MG TABLET    Take 20 mg by mouth daily (with dinner)    PERPHENAZINE 2 MG TABLET    Take 1 tablet (2 mg) by mouth 3 times daily as needed (psychosis)    RIVAROXABAN ANTICOAGULANT (XARELTO) 20 MG TABS TABLET    Take 1 tablet (20 mg) by mouth daily (with dinner)    TRANEXAMIC ACID (LYSTEDA) 650 MG TABLET    Take 2  tablets (1,300 mg) by mouth 3 times daily During menstrual cycle    VITAMIN D3 (CHOLECALCIFEROL) 25 MCG (1000 UNITS) TABLET    Take 1 tablet (25 mcg) by mouth daily   Modified Medications    No medications on file   Discontinued Medications    No medications on file        No Known Allergies     Review of Systems  A complete review of systems was performed with pertinent positives and negatives noted in the HPI, and all other systems negative.    Physical Exam   BP: 117/71  Pulse: 123  Temp: 98.3  F (36.8  C)  Resp: 16  Weight: 72.6 kg (160 lb)  SpO2: 99 %      Physical Exam  Constitutional:       General: She is not in acute distress.     Appearance: She is not diaphoretic.   HENT:      Head: Atraumatic.      Mouth/Throat:      Pharynx: No oropharyngeal exudate.   Eyes:      General: No scleral icterus.     Pupils: Pupils are equal, round, and reactive to light.   Cardiovascular:      Heart sounds: Normal heart sounds.   Pulmonary:      Effort: No respiratory distress.      Breath sounds: Normal breath sounds.   Abdominal:      General: Bowel sounds are normal.      Palpations: Abdomen is soft.      Tenderness: There is no abdominal tenderness.   Musculoskeletal:         General: No tenderness.   Skin:     General: Skin is warm.      Findings: No rash.   Neurological:      General: No focal deficit present.      Mental Status: She is oriented to person, place, and time.      Motor: No weakness.      Coordination: Coordination normal.         ED Course        Procedures              Patient was seen and evaluated by the  please refer to their documentation in the note section of the epic chart dated 2/23/2021       Pending at this time Labs          Assessments & Plan (with Medical Decision Making)           I have reviewed the nursing notes.    I have reviewed the findings, diagnosis, plan and need for follow up with the patient.    Patient with chronic schizoaffective disorder depressive type as well as  previous diagnosis of bipolar 1 disorder now medication noncompliant and acutely decompensating she will be admitted voluntarily for further stabilization and treatment.    Final diagnoses:   Schizoaffective disorder, depressive type (H)   Bipolar 1 disorder (H)       2/23/2021   [facility] EMERGENCY DEPARTMENT     Pérez Matthews MD  02/23/21 9501

## 2021-02-23 NOTE — TELEPHONE ENCOUNTER
R: Romaine, BEC, 40y/F, Psychosis    S:Discharged a week ago from [facility]  Not med compliant, hallucinations returned.  Due for injection for Abilify but refused it and has not taken any meds in three days, last was Friday night.  Auditory and visual hallucinations. Seeing people with scary faces.  Voices are telling her there is marijuana or poison in her food so she hasn't been eating. They have also been telling them to kill herself and her six children.  Pt reports that she would have the thought to drink bleach or jump [place]. Pt reports they have been increasing over the past three days. Constant suicidal thoughts.  Pt reports she can be safe in the hospital.  No aggression, calm and cooperative.     Patient cleared and ready for behavioral bed placement: Yes   COVID in process    A: Vol Eric jain    R:janeth  810pm Oncall provider accepted  816pm pt placed in queue, unit notified. Can report now.   8:22pm BEC notified

## 2021-02-24 LAB
ALBUMIN SERPL-MCNC: 3.5 G/DL (ref 3.4–5)
ALP SERPL-CCNC: 56 U/L (ref 40–150)
ALT SERPL W P-5'-P-CCNC: 18 U/L (ref 0–50)
ANION GAP SERPL CALCULATED.3IONS-SCNC: 6 MMOL/L (ref 3–14)
AST SERPL W P-5'-P-CCNC: 21 U/L (ref 0–45)
BILIRUB SERPL-MCNC: 0.3 MG/DL (ref 0.2–1.3)
BUN SERPL-MCNC: 14 MG/DL (ref 7–30)
CALCIUM SERPL-MCNC: 9.4 MG/DL (ref 8.5–10.1)
CHLORIDE SERPL-SCNC: 107 MMOL/L (ref 94–109)
CHOLEST SERPL-MCNC: 184 MG/DL
CO2 SERPL-SCNC: 27 MMOL/L (ref 20–32)
CREAT SERPL-MCNC: 0.57 MG/DL (ref 0.52–1.04)
GFR SERPL CREATININE-BSD FRML MDRD: >90 ML/MIN/{1.73_M2}
GLUCOSE SERPL-MCNC: 97 MG/DL (ref 70–99)
HDLC SERPL-MCNC: 62 MG/DL
LDLC SERPL CALC-MCNC: 112 MG/DL
NONHDLC SERPL-MCNC: 122 MG/DL
POTASSIUM SERPL-SCNC: 3.9 MMOL/L (ref 3.4–5.3)
PROT SERPL-MCNC: 7.8 G/DL (ref 6.8–8.8)
SODIUM SERPL-SCNC: 140 MMOL/L (ref 133–144)
TRIGL SERPL-MCNC: 49 MG/DL
TSH SERPL DL<=0.005 MIU/L-ACNC: 0.75 MU/L (ref 0.4–4)

## 2021-02-24 PROCEDURE — 250N000013 HC RX MED GY IP 250 OP 250 PS 637: Performed by: PSYCHIATRY & NEUROLOGY

## 2021-02-24 PROCEDURE — 124N000002 HC R&B MH UMMC

## 2021-02-24 PROCEDURE — 36415 COLL VENOUS BLD VENIPUNCTURE: CPT | Performed by: PSYCHIATRY & NEUROLOGY

## 2021-02-24 PROCEDURE — 99222 1ST HOSP IP/OBS MODERATE 55: CPT | Mod: AI | Performed by: PSYCHIATRY & NEUROLOGY

## 2021-02-24 PROCEDURE — 80061 LIPID PANEL: CPT | Performed by: PSYCHIATRY & NEUROLOGY

## 2021-02-24 PROCEDURE — 84443 ASSAY THYROID STIM HORMONE: CPT | Performed by: PSYCHIATRY & NEUROLOGY

## 2021-02-24 PROCEDURE — 80053 COMPREHEN METABOLIC PANEL: CPT | Performed by: PSYCHIATRY & NEUROLOGY

## 2021-02-24 RX ORDER — CLOZAPINE 25 MG/1
50 TABLET, ORALLY DISINTEGRATING ORAL AT BEDTIME
Status: DISCONTINUED | OUTPATIENT
Start: 2021-02-24 | End: 2021-02-26

## 2021-02-24 RX ADMIN — OLANZAPINE 20 MG: 20 TABLET, FILM COATED ORAL at 21:28

## 2021-02-24 RX ADMIN — LORAZEPAM 0.5 MG: 0.5 TABLET ORAL at 21:28

## 2021-02-24 RX ADMIN — RIVAROXABAN 20 MG: 10 TABLET, FILM COATED ORAL at 18:25

## 2021-02-24 RX ADMIN — METFORMIN HYDROCHLORIDE 1000 MG: 500 TABLET, EXTENDED RELEASE ORAL at 21:41

## 2021-02-24 RX ADMIN — CLOZAPINE 50 MG: 25 TABLET, ORALLY DISINTEGRATING ORAL at 21:28

## 2021-02-24 NOTE — PROVIDER NOTIFICATION
02/24/21 0608   Sleep/Rest/Relaxation   Sleep/Rest/Relaxation no problem identified;appears asleep   Night Time # Hours 6.75 hours       Pt had a quiet night with no behavior or safety concerns. Was observed sleeping during the safety checks.

## 2021-02-24 NOTE — SAFE
"Nona Finley  February 23, 2021    Pt. states the voices tell her that there is both marijuana and poison in her food. Pt. states she has not slept more than 6 hours per night because she \"doesn't sleep enough without the medication\". Pt. states she has been non-compliant with medication for 3 days, citing side effects. Pt. states the voices increase when she does not take her medication. Pt. last took them Friday 2/19/21 in the evening, refused injection today. Pt. states she sees lots of people with scary faces walking around the ED room. Pt. states they have claws. Pt. states they command her to \"do bad things\" like kill herself or her children. Pt. states she cannot take care of her children because the voices are commanding her to \"do something all the time\". Pt. states the voices command her to kill her children with a knife \"piece by piece\". Pt. will not be able to act on this as she is voluntarily referred for admission. Pt. states she doesn't want to live anymore. Pt. endorses suicidal ideation to drink bleach or jump off the [place] bridge. Pt. denies intent, states \"my kids will suffer if I die\".    Current Suicidal Ideation/Self-Injurious Concerns/Methods: Ingestion bleach and Jumping (from building, into traffic, etc.)    Inappropriate Sexual Behavior: No    Aggression/Homicidal Ideation: Specific Victim      For additional details see full DEC assessment.       WALDEMAR Kingston      "

## 2021-02-24 NOTE — PLAN OF CARE
Work Completed:  The patient is refusing medications but does have a Reyes Order in place.  She has been encouraged to attend programming.  Sitting in the lounge today with no active participation in anything.    Discharge plan or goal:   Patient needs to start taking her prescribed court ordered medications to stabilize her psychosis.                Barriers to discharge:     Patient must be stabilized prior to discharge

## 2021-02-24 NOTE — PLAN OF CARE
"Initial Psychosocial Assessment    I have reviewed the chart, met with the patient, and developed Care Plan.      Presenting Problem:  The patient is a 40-year old [place]val[place] female readmitted to the hospital after being discharged a week ago.  She is non-compliant with her medications stopped taking them 3 days ago and is refusing them here.  She was Recommitted as MI until 4/2/2021 in [facility] and there is an Active Reyes in place as well. Patient diagnosis of Schizoaffective Disorder, Depressive Type.  She was due for her monthly Abilify injection but hates it because she gains weight.  ReEntry ACT Team Nurse came to her home and patient refused to let her in saying she wanted to stay at home with her 6 children ages 4 to 16 years.  The ReEntry ACT Team then called a taxi and she came voluntarily into the hospital knowing that if she had refused, they would Revoke her PD.  She admits to having AH of voices telling her to kill her children with a knife \"piece by piece.\" She is NOT trying to act on this voice command. The struggle with voices makes her feel suicidal.  She is Covid-19 negative and is a Voluntary patient.    History of Mental Health and Chemical Dependency:  Just discharged from [facility] on 2/16/2021.  Multiple hospitalizations over the years.  No CD or alcohol history.    Family Description (Constellation, Family Psychiatric History):  The patient is  with 6 children ranging in age from 4-16 years old. Her in-laws provide PCA services to help for her at home because her  is doing a [employer]idency out of state. One of her brothers also suffers from mental illness.    Significant Life Events (Illness, Abuse, Trauma, Death):  Fled [place] when she was in the 5th Grade. Significant Trauma history.      Living Situation:  Lives in [place] with her family.    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    Financial " Status:   provides financial support.    Legal Issues:  [place] MI Commitment with a Reyes Order (In the chart!)    Ethnic/Cultural Issues:  Alevism. She is bilingual. Prays and reads the Koran    Spiritual Orientation:  Alevism     Service History:  No    Current Treatment Providers are:  ReEntry Act Team--  Dr. Walter Gaviria - Psychiatrist    is Vicky Santos [phone] and [phone]    Primary Care is Dr. Lissy Neff at [facility] [phone] located at  [address] [facility] [address]  Social Service Assessment/Plan:  Patient needs psychiatric reassessment, medications restarted and stabilization. Work with the ReEntry ACT Team regarding discharge or issues with medications. Patient will be encouraged to attend all of the programming and let the staff know if she has questions or concerns. Currently patient feels safe and contracts for safety.                                                           CTC to ensure that she reconnects with the ACT Team .

## 2021-02-24 NOTE — PHARMACY
Pharmacy Clozapine Note    Date of Service: 2021  Patient's : 1980  40 year old, female    Current clozapine regimen: 200 mg QHS  Has there been a known interruption in therapy for greater than/equal to 48 hours? Yes    Recent ANC Value(s):  Recent Labs   Lab Test 21  0719 21  1037 20  1139 20  0735 20  0742 07/10/20  2110 19  2338   ANEU 3.7 4.9 4.7 2.4 3.0 4.6 3.4       Is the patient enrolled in the clozapine REMS program? Yes  Ordering prescriber: Dr. Cancino  Is this provider certified in the clozapine REMS program? Yes  Is the ANC within recommended limits? Yes  Does the patient have any signs or symptoms of infection, including fever or sore throat? No    Plan:  1. Restart clozapine therapy at 50 mg PO QHS.  2. A WBC with differential will be ordered at least weekly.  ANC values will be entered into the REMS program.  3. Signs/symptoms of infection will be monitored daily.    Johanne Cortes [facility]  Phone: *[phone]

## 2021-02-24 NOTE — PROGRESS NOTES
SPIRITUAL HEALTH SERVICES  SPIRITUAL ASSESSMENT Progress Note  [facility] ([facility]) 10N     REFERRAL SOURCE: consult for emotional support    In consultation with unit staff, will postpone visiting for a day or two due to Nona's symptoms this morning.     PLAN: [facility]  will follow-up when Nona is able to have a visit.    Eden Banks  Chaplain Resident  Pager: [phone]

## 2021-02-24 NOTE — PLAN OF CARE
Patient spent most of the time isolative/withdrawn in her room. She came out for meals. During check in with this writer, patient is very tearful. Depressed mood with blunted, flat affect. She reports hearing voices telling her to take her own life. Patient states this has been going on for the last three days. Patient declined her morning medication. She reports that the voices told her to stop taking her medications because they have marijuana in them.  She is anxious and depressed. Rates her anxiety at a 5 out of 10 and depression at a 7 out of 10. She reports that she did not get enough sleep last night. Reports poor appetite. She denies SI/SIB/HI. She feels safe in the hospital and contracts for safety on the unit.

## 2021-02-24 NOTE — PROGRESS NOTES
02/23/21 2107   Patient Belongings   Did you bring any home meds/supplements to the hospital?  No   Patient Belongings remains with patient;locker;sent to security per site process   Patient Belongings Remaining with Patient clothing   Patient Belongings Put in Hospital Secure Location (Security or Locker, etc.) shoes;wallet;purse/wallet;cell phone/electronics;cash/credit card;clothing   Belongings Search Yes   Clothing Search Yes   Second Staff Kaitlynn KUO RN         On Patient:  Bra  Pink jacket    Locker:  pair of stripped leggings  2 scarves  2 skirts  4 dresses  3 scrunchies  Wallet  Phone+  LORAINE WALKER  [employer] gift card  Insurance cards  Purse  Loose change  Bright pink winter jacket  Shoes    Security (222524):  [place]-EBT *[phone]  [place] WIC *[id number]  [facility] Visa debit *[phone] and *[phone]  Social Security card  Passport  $78 cash (3-$20, 1-$10, 1-$5, and 3-$1)        A               Admission:  I am responsible for any personal items that are not sent to the safe or pharmacy.  [facility] is not responsible for loss, theft or damage of any property in my possession.    Signature:  _________________________________ Date: _______  Time: _____                                              Staff Signature:  ____________________________ Date: ________  Time: _____      2nd Staff person, if patient is unable/unwilling to sign:    Signature: ________________________________ Date: ________  Time: _____     Discharge:  [facility] has returned all of my personal belongings:    Signature: _________________________________ Date: ________  Time: _____                                          Staff Signature:  ____________________________ Date: ________  Time: _____

## 2021-02-24 NOTE — PLAN OF CARE
Problem: Adult Inpatient Plan of Care  Goal: Plan of Care Review  Outcome: No Change      Nona is a 40 year-old woman who is admitted from the [facility] on a voluntary basis for worsening depression, suicidal ideation and psychosis. Per chart review, pt was discharged a week ago from station 30. Has been having auditory and visual hallucinations since she stopped taking her medications. Pt is due for Abilify injection but refused it and has not taken any meds in three days, last was Friday night. Endorses auditory and visual hallucinations. Seeing people with scary faces. Voices are telling her there is marijuana or poison in her food so she hasn't been eating. They have also been telling them to kill herself and her six children.    Upon admission interview, pt presented with a depressed mood and blunted affect. Endorsed depression 5 and anxiety 7 on a 10 scale. Reported having passive suicidal ideations but denied any plan to act.  Also, pt denies having any hx of suicide attempt. Pt was cooperative and polite.   Plan: Status 15s; Build trust with pt. Continue to build on strengths. Encourage healthy coping.     Admission Notification: .

## 2021-02-24 NOTE — PLAN OF CARE
BEHAVIORAL TEAM DISCUSSION    Participants: Dr. Sandeep Cancino; Clau Plummer RN; Kamila Ying [facility]  Progress: Patient isolative, will not take her court ordered medications  Anticipated length of stay: unknown  Continued Stay Criteria/Rationale: Committed [place]vised [place]  Medical/Physical: Nothing provided  Precautions:   Behavioral Orders   Procedures    Code 1 - Restrict to Unit    Discontinue 1:1 attendant for suicide risk     Order Specific Question:   I have performed an in person assessment of the patient     Answer:   Based on this assessment the patient no longer requires a one on one attendant at this point in time.    Routine Programming     As clinically indicated    Single Room    Status 15     Every 15 minutes.    Suicide precautions     Patients on Suicide Precautions should have a Combination Diet ordered that includes a Diet selection(s) AND a Behavioral Tray selection for Safe Tray - with utensils, or Safe Tray - NO utensils       Plan: The patient is committed MI and Jarvised. Needs to take her court ordered medications to stabilize her mood.  Outpatient providers are the ReEntry ACT Team.  Rationale for change in precautions or plan: No changes

## 2021-02-24 NOTE — PHARMACY-ADMISSION MEDICATION HISTORY
/  Admission Medication History Completed by Pharmacy    See [facility] Admission Navigator for allergy information, preferred outpatient pharmacy, prior to admission medications and immunization status.     Medication History Sources:     Patient, dispense report, care everywhere.    Changes made to PTA medication list (reason):    Added: none    Deleted: none    Changed: none    Additional Information:    Medications verified with patient via Ipad.     Prior to Admission medications    Medication Sig Last Dose Taking? Auth Provider   ABILIFY MAINTENA 400 MG extended release injection Inject 400 mg into the muscle every 28 days 1/26/2021 Yes Reported, Patient   cloZAPine (FAZACLO) 100 MG ODT Take 200 mg by mouth At Bedtime  2/19/2021 at PM Yes Reported, Patient   metFORMIN (GLUCOPHAGE-XR) 500 MG 24 hr tablet Take 1,000 mg by mouth daily (with dinner)  2/22/2021 at PM Yes Reported, Patient   Multiple Vitamins-Minerals (MULTIVITAMIN ADULT) TABS Take 1 tablet by mouth daily 2/22/2021 at PM Yes Avery Govea MD   OLANZapine (ZYPREXA) 20 MG tablet Take 20 mg by mouth daily (with dinner) 2/19/2021 at PM Yes Unknown, Entered By History   perphenazine 2 MG tablet Take 1 tablet (2 mg) by mouth 3 times daily as needed (psychosis)  Yes Kayla Payton APRN CNP   rivaroxaban ANTICOAGULANT (XARELTO) 20 MG TABS tablet Take 1 tablet (20 mg) by mouth daily (with dinner) 2/22/2021 at PM Yes Kayla Payton APRN CNP   tranexamic acid (LYSTEDA) 650 MG tablet Take 2 tablets (1,300 mg) by mouth 3 times daily During menstrual cycle 2/22/2021 at PM Yes Hiram Loza MD   Vitamin D3 (CHOLECALCIFEROL) 25 mcg (1000 units) tablet Take 1 tablet (25 mcg) by mouth daily 2/22/2021 at PM Yes Avery Govea MD   LORazepam (ATIVAN) 0.5 MG tablet Take 0.5 mg by mouth At Bedtime 2/19/2021 at PM  Reported, Patient       Date completed: 02/23/21    Medication history completed by: Tip Coto

## 2021-02-25 LAB
BASOPHILS # BLD AUTO: 0.1 10E9/L (ref 0–0.2)
BASOPHILS NFR BLD AUTO: 1.1 %
DIFFERENTIAL METHOD BLD: NORMAL
EOSINOPHIL # BLD AUTO: 0.2 10E9/L (ref 0–0.7)
EOSINOPHIL NFR BLD AUTO: 4.7 %
IMM GRANULOCYTES # BLD: 0 10E9/L (ref 0–0.4)
IMM GRANULOCYTES NFR BLD: 0 %
LYMPHOCYTES # BLD AUTO: 1.5 10E9/L (ref 0.8–5.3)
LYMPHOCYTES NFR BLD AUTO: 34 %
MONOCYTES # BLD AUTO: 0.3 10E9/L (ref 0–1.3)
MONOCYTES NFR BLD AUTO: 7.1 %
NEUTROPHILS # BLD AUTO: 2.4 10E9/L (ref 1.6–8.3)
NEUTROPHILS NFR BLD AUTO: 53.1 %
NRBC # BLD AUTO: 0 10*3/UL
NRBC BLD AUTO-RTO: 0 /100
WBC # BLD AUTO: 4.5 10E9/L (ref 4–11)

## 2021-02-25 PROCEDURE — 250N000013 HC RX MED GY IP 250 OP 250 PS 637: Performed by: PSYCHIATRY & NEUROLOGY

## 2021-02-25 PROCEDURE — 99232 SBSQ HOSP IP/OBS MODERATE 35: CPT | Performed by: PSYCHIATRY & NEUROLOGY

## 2021-02-25 PROCEDURE — 124N000002 HC R&B MH UMMC

## 2021-02-25 PROCEDURE — 85004 AUTOMATED DIFF WBC COUNT: CPT | Performed by: PSYCHIATRY & NEUROLOGY

## 2021-02-25 PROCEDURE — 85048 AUTOMATED LEUKOCYTE COUNT: CPT | Performed by: PSYCHIATRY & NEUROLOGY

## 2021-02-25 PROCEDURE — 36415 COLL VENOUS BLD VENIPUNCTURE: CPT | Performed by: PSYCHIATRY & NEUROLOGY

## 2021-02-25 RX ORDER — OLANZAPINE 10 MG/1
10 TABLET ORAL
Status: DISCONTINUED | OUTPATIENT
Start: 2021-02-25 | End: 2021-03-03

## 2021-02-25 RX ADMIN — LORAZEPAM 0.5 MG: 0.5 TABLET ORAL at 19:20

## 2021-02-25 RX ADMIN — THERA TABS 1 TABLET: TAB at 09:08

## 2021-02-25 RX ADMIN — RIVAROXABAN 20 MG: 10 TABLET, FILM COATED ORAL at 17:32

## 2021-02-25 RX ADMIN — OLANZAPINE 10 MG: 10 TABLET, FILM COATED ORAL at 17:32

## 2021-02-25 RX ADMIN — Medication 25 MCG: at 09:08

## 2021-02-25 RX ADMIN — CLOZAPINE 50 MG: 25 TABLET, ORALLY DISINTEGRATING ORAL at 19:20

## 2021-02-25 RX ADMIN — METFORMIN HYDROCHLORIDE 1000 MG: 500 TABLET, EXTENDED RELEASE ORAL at 17:32

## 2021-02-25 ASSESSMENT — ACTIVITIES OF DAILY LIVING (ADL)
LAUNDRY: WITH SUPERVISION
DRESS: INDEPENDENT
ORAL_HYGIENE: INDEPENDENT
HYGIENE/GROOMING: INDEPENDENT

## 2021-02-25 ASSESSMENT — MIFFLIN-ST. JEOR: SCORE: 1373.03

## 2021-02-25 NOTE — PROGRESS NOTES
[facility], [place]   Psychiatric Progress Note        Interim History:   The patient's care was discussed with the treatment team during the daily team meeting and/or staff's chart notes were reviewed.  Staff report patient is isolative and quiet, but brightens on approach. Reported less intense Auditory hallucinations, ate and slept OK. Was compliant with meds, although, needed encouragement to take them.     Met with patient: was pleasant, smiled during interview. Reported some improvement with Auditory hallucinations, denied having med side effects. Said that, overall, she felt safer and asked about discharge date. I reminded her that she was recently hospitalized at this hospital and discharged only to be readmitted after spending few days at home due to worsening of psychotic symptoms, encouraged her to stay at this facility longer to ensure that she is more stable prior to her discharge. She was receptive to my words and promised to stay in. Ollie Parra was brought in today by her primary team, she will be given injection today. Abs Neutrophils 2.4 2/25/2021.          Medications:       ARIPiprazole ER  400 mg Intramuscular Q28 Days     cloZAPine  50 mg Oral At Bedtime     LORazepam  0.5 mg Oral At Bedtime     metFORMIN  1,000 mg Oral Daily with supper     multivitamin, therapeutic  1 tablet Oral Daily     OLANZapine  10 mg Oral Daily with supper     rivaroxaban ANTICOAGULANT  20 mg Oral Daily with supper     tranexamic acid  1,300 mg Oral TID     Vitamin D3  25 mcg Oral Daily          Allergies:   No Known Allergies       Labs:     Recent Results (from the past 24 hour(s))   WBC and differential    Collection Time: 02/25/21  7:46 AM   Result Value Ref Range    WBC 4.5 4.0 - 11.0 10e9/L    Diff Method Automated Method     % Neutrophils 53.1 %    % Lymphocytes 34.0 %    % Monocytes 7.1 %    % Eosinophils 4.7 %    % Basophils 1.1 %    % Immature Granulocytes 0.0 %    Nucleated  "RBCs 0 0 /100    Absolute Neutrophil 2.4 1.6 - 8.3 10e9/L    Absolute Lymphocytes 1.5 0.8 - 5.3 10e9/L    Absolute Monocytes 0.3 0.0 - 1.3 10e9/L    Absolute Eosinophils 0.2 0.0 - 0.7 10e9/L    Absolute Basophils 0.1 0.0 - 0.2 10e9/L    Abs Immature Granulocytes 0.0 0 - 0.4 10e9/L    Absolute Nucleated RBC 0.0           Psychiatric Examination:     /71   Pulse 103   Temp 98.5  F (36.9  C) (Oral)   Resp 16   Ht 1.651 m (5' 5\")   Wt 71.2 kg (157 lb)   LMP 02/16/2021 (Approximate)   SpO2 99%   BMI 26.13 kg/m    Weight is 157 lbs 0 oz  Body mass index is 26.13 kg/m .  Orthostatic Vitals       Most Recent      Sitting Orthostatic /71 02/25 0824    Sitting Orthostatic Pulse (bpm) 103 02/25 0824    Standing Orthostatic BP 86/60 02/25 0824    Standing Orthostatic Pulse (bpm) 150 02/25 0824          Appearance: awake, alert  Attitude:  cooperative  Eye Contact:  fair  Mood:  anxious and better  Affect:  constricted mobility  Speech:  clear, coherent  Psychomotor Behavior:  no evidence of tardive dyskinesia, dystonia, or tics  Throught Process:  linear  Associations:  no loose associations  Thought Content:  auditory hallucinations present  Insight:  partial  Judgement:  fair  Oriented to:  time, person, and place  Attention Span and Concentration:  fair  Recent and Remote Memory:  intact    Clinical Global Impressions  First: 2/25/2021 6/4     Most recent:            Precautions:     Behavioral Orders   Procedures     Code 1 - Restrict to Unit     Discontinue 1:1 attendant for suicide risk     Order Specific Question:   I have performed an in person assessment of the patient     Answer:   Based on this assessment the patient no longer requires a one on one attendant at this point in time.     Routine Programming     As clinically indicated     Single Room     Status 15     Every 15 minutes.     Suicide precautions     Patients on Suicide Precautions should have a Combination Diet ordered that includes a " Diet selection(s) AND a Behavioral Tray selection for Safe Tray - with utensils, or Safe Tray - NO utensils            DIagnoses:     Schizoaffective disorder, depressive type, severe.           Plan:     Was restarted on Clozapine and other meds yesterday. Will decrease oral Zyprexa with plan to taper her off and stop it. Will get 400 mg of Abilify Maintenna today. Will continue to provide support and structure.  Expected discharge in a few days.

## 2021-02-25 NOTE — PROGRESS NOTES
02/25/21 0624   Sleep/Rest/Relaxation   Sleep/Rest/Relaxation appears asleep;no problem identified   Night Time # Hours 7 hours

## 2021-02-25 NOTE — PLAN OF CARE
Work Completed:  Team discussed patient's progress today.  She asked to take her Abilify Maintena injection.  Called her ACT Team WALTER Santos who is going to deliver it at Noon today.  Let JOSEPHINE Carbone know about this.  Dr. Cancino feels that patient will need to stay in the hospital for further stabilization after the injection because even though she is voluntary, patient had just discharged from [facility] last week and immediately stopped her medications becoming psychotic again.  ACT WALTER Perry delivered the Abilify Maintena which was given to JOSEPHINE Carbone. The ACT Team will support the patient remaining in the hospital after the injection today.  If the patient refuses to stay (she is voluntary right now) the ACT team will Revoke her to keep her inpatient until she is stabilized.     Discharge plan or goal:   Patient needs medications restarted and needs to take her next dose of Abilify Maintena.                Barriers to discharge:    Needs further stabilization

## 2021-02-25 NOTE — H&P
Admitted:     02/23/2021      The patient was seen for 55 minutes face-to-face on station [facility] of [facility].  Greater than 50% of this time was spent on counseling and coordinating care, discussing past medication trials and reasons why patient stopped taking medications.      CHIEF COMPLAINT AND REASON FOR ADMISSION:  The patient is a 40-year-old [place]ese female well known to this hospital from her previous hospitalizations.  She is suffering from schizoaffective disorder, depressive type, was admitted voluntarily on the recommendations of her ACT team because of worsening of psychotic symptoms.      HISTORY OF PRESENT ILLNESS:  The patient presents as only a somewhat reliable historian and reports that she was hospitalized at this facility only 8 days ago on station [facility].  Appeared to be stable on combination of Abilify Maintena and Clozaril.  Reported that she returned home, started hearing voices telling her that medication had marijuana in it.  She stopped taking medication.  The voices got worse, started telling her to kill herself.  The patient was due for her next Abilify Maintena injection on 02/23 but refused to get it.  Her [facility] ACT team nurse came to her home.  The patient refused to let her in, saying that she wanted to stay at home with her 6 children, ages 4-16.  ReEntry ACT team then called a taxi.  Patient came in voluntarily to the hospital, knowing that if she had refused, they would revoke her provisional discharge.  The patient was recommended as mentally ill until 04/02/2021 at [facility] and there is an active Reyes in place as well.      PAST PSYCHIATRIC HISTORY:  The patient has a prior diagnosis of schizoaffective disorder, depressive type, and numerous hospitalizations.  She was under the care of this provider back in 2019, was also admitted because of auditory and visual hallucinations, was hearing voices telling her to kill her children, jump off a bridge.   The patient complaints of her medications and food being poisoned or having illicit drugs in them are pretty chronic.  Altogether, the patient apparently had been hospitalized 12-13 times.  Recently has been on various combinations of Clozaril and Abilify and reported that she was pretty stable and voices-free on this combination.  She also tried Zyprexa, Invega, Haldol, Abilify had ECT treatment in 2011 for auditory hallucinations, sees psychiatrist, Dr. Gaviria.  Denied prior suicide attempts.      PAST MEDICAL HISTORY:  Significant for mental health.  No history of surgeries.  History of pulmonary embolism, pulmonary nodules, latent tuberculosis, prediabetes.      ALLERGIES:  NO KNOWN MEDICAL ALLERGIES.      FAMILY AND SOCIAL HISTORY:  The patient is originally from [place].  Most of her family is still there.  Here in [place], she relies on help of her family of her .   currently lives in [place].  He is [employer] through his residency in Internal Medicine.  The patient is employed, taking care of their 6 children.  Her in-laws help her with children.  The patient reported that brother apparently had psychotic disorder.  He currently lives in [place].      HOME MEDICATIONS:   1.  Abilify Maintena 400 mg every 28 days.  She was due for her next injection on 02/23/2021.   2.  Clozapine 200 mg at bedtime.   3.  Lorazepam 0.5 mg at bedtime.   4.  Metformin 1000 mg with dinner.   5.  Zyprexa 20 mg with dinner.     6.  Xarelto 20 mg daily with dinner.     7.  Lysteda 1300 mg 3 times a day during menstrual cycle   8.  Cholecalciferol 25 mcg daily.   9.  Multivitamins with minerals 1 tablet daily.   10.  Perphenazine 2 mg 2 times p.r.n. for psychosis.      For physical examination and 12-point review of systems, please refer to Dr. Pérez Matthews's note from 02/23/2021.  I reviewed this note and agree with it.      VITAL SIGNS:  Temperature 98.2, heart rate 87, blood pressure 94/70.      MENTAL  STATUS EXAMINATION:  The patient is a pleasant [place] female, dressed in hospital garbs, wore a  scarf on her head.  She was seen in her room, sitting on the bed, was clearly depressed and cried a number of times, said that she felt so desperate because of voices and that she did not want to live anymore.  However, said that she felt safe being in the hospital.  Denied homicidal ideation.  Reported above-mentioned auditory, but not visual hallucinations.  Thought processes altogether appeared to be linear and at least superficially logical.  Described her mood as depressed.  Affect congruent with mood, tearful.  Associations were altogether sequential.  She was alert and oriented x 3.  Fund of knowledge appears to be average with proper usage of vocabulary.  Speech was spontaneous, accented, but good English.  Ability to focus and concentrate is mildly impaired.  Immediate short and long-term memories mildly impaired as well.  Gait and posture all within normal limits.  Insight and judgment moderately impaired.      IMPRESSION:  Schizoaffective disorder, depressive type, severe.       TREATMENT PLAN:  I discussed with the patient the reasons for stopping medications.  She indicated that the medications in fact were helpful for auditory hallucinations.  It appears that she stopped them only because the voices told her that medication was laced with marijuana.  She does have a concern about weight gain.  She is currently on a combination of Abilify Maintena, Zyprexa and clozapine. Will start Clozapine at lower dose (50 mg at night).  We will try to gradually replace Zyprexa with clozapine.  Patient will stay here voluntarily; however, if she demands to leave against medical advice, we will consider revocation of her provisional discharge.         JANETTE SPENCER MD             D: 2021   T: 2021   MT:       Name:     BHAVIK CHRIS   MRN:      [id number]        Account:      [id number]   :       1980        Admitted:     02/23/2021                   Document: [id number]

## 2021-02-25 NOTE — PLAN OF CARE
"  Problem: Psychotic Symptoms  Goal: Social and Therapeutic (Psychotic Symptoms)  Description: Signs and symptoms of listed problems will be absent or manageable.  Recent Flowsheet Documentation  Taken 2/24/2021 2132 by Deven Anderson RN  Psychotic Symptoms Present:   mood   insight   anxiety    Pt watched TV in the lounge briefly. Endorsed AH and VH. Endorsed \" a lot of anxiety and little bit of depression.\" Her mood was depressed and affect blunted. Did brighten up upon approach. Reported her sleep and appetite as good. Was medication compliant. No side effect reported.   Plan: Status 15s; Build trust with pt. Continue to build on strengths. Encourage compliance and healthy coping.         "

## 2021-02-25 NOTE — PLAN OF CARE
Patient spent most of the time in her room. She came out for meals and her medications. She has a flat affect but brightens upon approach. Mood is calm. She denies AVH/SI/SIB. Patient reports low anxiety and depression. She receive her Abilify MAINTENA injection per orders. No medication SE noted. Polite with her requests.

## 2021-02-26 PROCEDURE — 250N000013 HC RX MED GY IP 250 OP 250 PS 637: Performed by: PSYCHIATRY & NEUROLOGY

## 2021-02-26 PROCEDURE — 99232 SBSQ HOSP IP/OBS MODERATE 35: CPT | Performed by: PSYCHIATRY & NEUROLOGY

## 2021-02-26 PROCEDURE — 124N000002 HC R&B MH UMMC

## 2021-02-26 RX ORDER — CLOZAPINE 25 MG/1
100 TABLET, ORALLY DISINTEGRATING ORAL AT BEDTIME
Status: DISCONTINUED | OUTPATIENT
Start: 2021-02-26 | End: 2021-03-01

## 2021-02-26 RX ADMIN — RIVAROXABAN 20 MG: 10 TABLET, FILM COATED ORAL at 16:49

## 2021-02-26 RX ADMIN — OLANZAPINE 10 MG: 10 TABLET, FILM COATED ORAL at 16:49

## 2021-02-26 RX ADMIN — METFORMIN HYDROCHLORIDE 1000 MG: 500 TABLET, EXTENDED RELEASE ORAL at 16:49

## 2021-02-26 RX ADMIN — Medication 25 MCG: at 08:25

## 2021-02-26 RX ADMIN — THERA TABS 1 TABLET: TAB at 08:25

## 2021-02-26 RX ADMIN — LORAZEPAM 0.5 MG: 0.5 TABLET ORAL at 20:18

## 2021-02-26 RX ADMIN — CLOZAPINE 100 MG: 25 TABLET, ORALLY DISINTEGRATING ORAL at 20:17

## 2021-02-26 ASSESSMENT — ACTIVITIES OF DAILY LIVING (ADL)
HYGIENE/GROOMING: INDEPENDENT
LAUNDRY: WITH SUPERVISION
DRESS: INDEPENDENT
ORAL_HYGIENE: INDEPENDENT

## 2021-02-26 NOTE — PLAN OF CARE
"  Problem: Psychotic Symptoms  Goal: Psychotic Symptoms  Description: Signs and symptoms of listed problems will be absent or manageable.  2/25/2021 2142 by Deven Anderson RN  Outcome: Improving    Pt was present in the lounge watching TV part of the shift. Denied voices and SI. Mood was depressed, affect blunted, but pt brightened up upon approach. Pt was neat and well-groomed. Reported anxiety as \" Just a little bit.\"  Reported her eating and sleeping as \" good.\"   Plan: Status 15s; Build trust with pt. Continue to build on strengths. Encourage compliance and healthy coping.          "

## 2021-02-26 NOTE — PLAN OF CARE
Patient spent most of the day in the [facility] area. She is withdrawn, no interaction with others. She did not participate in groups. Presents with a flat, blunted affect but brightens upon approach. Mood is calm. She denies SI/SIB/AVH/HI. She hopes to return home as soon as next week. She requested her Clozaril medication dose increased (she used to take 200 mg every HS at home), this was communicated to Dr Cancino. She is ate okay. Sleeping and eating okay. No medication SE noted.

## 2021-02-26 NOTE — PROGRESS NOTES
"[facility], [place]   Psychiatric Progress Note        Interim History:   The patient's care was discussed with the treatment team during the daily team meeting and/or staff's chart notes were reviewed.  Staff report patient is isolative and quiet, but brightens on approach. She either sits and paces in the unit corridor, limited interactions with staff and other patients. She ate and slept OK. Was compliant with meds, although, needed encouragement to take them. Per CTC, patient's outpatient team wants to recommit her as commitment expires in early April.    Met with patient: was pleasant, smiled during interview. Denied Auditory hallucinations, denied having med side effects. Said that, overall, she felt safer and asked about discharge date. I informed her that outpatient treatment team in light of very poor compliance recommends recommitment. Nona took it calmly, asked to increase her Clozapine dose. Ollie Parra was brought in by her primary team, she was given injection yesterday. Abs Neutrophils 2.4 2/25/2021.          Medications:       ARIPiprazole ER  400 mg Intramuscular Q28 Days     cloZAPine  100 mg Oral At Bedtime     LORazepam  0.5 mg Oral At Bedtime     metFORMIN  1,000 mg Oral Daily with supper     multivitamin, therapeutic  1 tablet Oral Daily     OLANZapine  10 mg Oral Daily with supper     rivaroxaban ANTICOAGULANT  20 mg Oral Daily with supper     tranexamic acid  1,300 mg Oral TID     Vitamin D3  25 mcg Oral Daily          Allergies:   No Known Allergies       Labs:     No results found for this or any previous visit (from the past 24 hour(s)).       Psychiatric Examination:     BP (!) 88/64   Pulse 97   Temp 98.3  F (36.8  C)   Resp 16   Ht 1.651 m (5' 5\")   Wt 71.2 kg (157 lb)   LMP 02/16/2021 (Approximate)   SpO2 100%   BMI 26.13 kg/m    Weight is 157 lbs 0 oz  Body mass index is 26.13 kg/m .     Orthostatic Vitals       Most Recent      Sitting " Orthostatic BP 93/57 02/26 0817    Sitting Orthostatic Pulse (bpm) 105 02/26 0817    Standing Orthostatic BP 99/66 02/26 0817    Standing Orthostatic Pulse (bpm) 142 02/26 0817         Appearance: awake, alert  Attitude:  cooperative  Eye Contact:  fair  Mood:  anxious and better  Affect:  constricted mobility  Speech:  clear, coherent  Psychomotor Behavior:  no evidence of tardive dyskinesia, dystonia, or tics  Throught Process:  linear  Associations:  no loose associations  Thought Content:  auditory hallucinations present?  Insight:  partial  Judgement:  fair  Oriented to:  time, person, and place  Attention Span and Concentration:  fair  Recent and Remote Memory:  intact    Clinical Global Impressions  First: 2/25/2021 6/4     Most recent:            Precautions:     Behavioral Orders   Procedures     Code 1 - Restrict to Unit     Discontinue 1:1 attendant for suicide risk     Order Specific Question:   I have performed an in person assessment of the patient     Answer:   Based on this assessment the patient no longer requires a one on one attendant at this point in time.     Routine Programming     As clinically indicated     Single Room     Status 15     Every 15 minutes.     Suicide precautions     Patients on Suicide Precautions should have a Combination Diet ordered that includes a Diet selection(s) AND a Behavioral Tray selection for Safe Tray - with utensils, or Safe Tray - NO utensils            DIagnoses:     Schizoaffective disorder, depressive type, severe.           Plan:     Will increase Clozapine. Will continue to decrease oral Zyprexa with plan to taper her off and stop it. Got 400 mg of Abilify Maintenna yesterday. Will continue to provide support and structure. Petition for recommitment was filed. Expected discharge in a few days.

## 2021-02-26 NOTE — PROGRESS NOTES
NOC Shift Report    Pt in bed at beginning of shift, breathing quiet and unlabored. Pt slept through shift. Pt slept 7 hours.     No pt complaints or concerns at this time.     No PRNs given. Will continue to monitor.     Precautions: Suicide

## 2021-02-26 NOTE — PROGRESS NOTES
02/26/21 1700   General Information   Has Not Attended OT as of: 02/26/21     Plan: OT staff will meet with pt to review the role of occupational therapy and explain the value of having them involved in their treatment plan including options to meet current needs/self-identified goals. As group attendance is established, Pt will be given self-assessment to inform OT initial assessment.

## 2021-02-27 PROCEDURE — 124N000002 HC R&B MH UMMC

## 2021-02-27 PROCEDURE — 250N000013 HC RX MED GY IP 250 OP 250 PS 637: Performed by: PSYCHIATRY & NEUROLOGY

## 2021-02-27 RX ADMIN — THERA TABS 1 TABLET: TAB at 10:20

## 2021-02-27 RX ADMIN — Medication 25 MCG: at 10:17

## 2021-02-27 RX ADMIN — METFORMIN HYDROCHLORIDE 1000 MG: 500 TABLET, EXTENDED RELEASE ORAL at 17:59

## 2021-02-27 RX ADMIN — LORAZEPAM 0.5 MG: 0.5 TABLET ORAL at 19:11

## 2021-02-27 RX ADMIN — RIVAROXABAN 20 MG: 10 TABLET, FILM COATED ORAL at 17:59

## 2021-02-27 RX ADMIN — CLOZAPINE 100 MG: 25 TABLET, ORALLY DISINTEGRATING ORAL at 19:11

## 2021-02-27 RX ADMIN — OLANZAPINE 10 MG: 10 TABLET, FILM COATED ORAL at 17:59

## 2021-02-27 ASSESSMENT — ACTIVITIES OF DAILY LIVING (ADL)
ORAL_HYGIENE: INDEPENDENT
HYGIENE/GROOMING: INDEPENDENT
LAUNDRY: WITH SUPERVISION
DRESS: STREET CLOTHES;INDEPENDENT

## 2021-02-27 NOTE — PLAN OF CARE
"  Problem: Adult Inpatient Plan of Care  Goal: Plan of Care Review  Outcome: Improving    Pt was pleasant and cooperative. Was visible in the lounge off and on. Was med compliant. Denied all mental health symptoms. Stated \" I will take all of my meds so I can get better and go home.\"  Pt had a few low bp's but was encouraged to push fluids. Pt's bp improved later on. Her affect was full range. Denied AH/SI. No serious medication side effects.   Plan: Status 15s; Build trust with pt. Continue to build on strengths. Encourage compliance and healthy coping.                      "

## 2021-02-27 NOTE — PLAN OF CARE
Shift Summary  Chief Complaint: Auditory and visual hallucination that telling pt to kill self and her six children.   Target Symptoms/Condition Status: Improving  Psych  Pt calm, flat, polite and cooperative. Isolative and withdrawn to self. Stayed in milieu for few hours and watched TV on/off. Denies any suicidal ideation, homicidal ideation, Self injury behavior, hallucination, racing thought, depression and anxiety. Pt requested her Clozaril dose to increase from 100 mg to 200 mg which she used to take at home. Per Dr Cancino's note, plan is to increase dosage ,but no date has been mentioned in note. This information was communicated with pt and she seems ok with plan.   Prn: none  Medical  Pt alert and oriented x 3. Denies pain. Vital sings WNL (see flow sheet foe details). Slept 7.0 hours last night. Good medication compliance is noted. Seems tolerating medications well. No side effect reported by pt or noted by this writer. Appetite fair. No problem with bowel and bladder.   Prn: none  Continue to monitor pt's status Q 15 minutes and stabilize the patient's symptoms with the use of medications and therapeutic programming.

## 2021-02-27 NOTE — PROGRESS NOTES
NOC Shift Report     Pt in bed at beginning of shift, breathing quiet and unlabored. Pt slept through shift. Pt slept 7 hours.      No pt complaints or concerns at this time.      No PRNs given. Will continue to monitor.

## 2021-02-27 NOTE — PLAN OF CARE
Problem: Adult Inpatient Plan of Care  Goal: Plan of Care Review  Outcome: Improving  Flowsheets (Taken 2/27/2021 1638)  Plan of Care Reviewed With: patient  Progress: improving  Goal: Absence of Hospital-Acquired Illness or Injury  Intervention: Identify and Manage Fall Risk  Recent Flowsheet Documentation  Taken 2/27/2021 1600 by Sharifa López RN  Safety Promotion/Fall Prevention:    clutter free environment maintained    safety round/check completed     Problem: Depressive Symptoms  Goal: Depressive Symptoms  Description: Signs and symptoms of listed problems will be absent or manageable.  Outcome: Improving  Flowsheets (Taken 2/27/2021 1638)  Depressive Symptoms Assessed:    affect    mood    insight    suicidality    thought process    sleep    speech    orientation  Depressive Symptoms Present: (Patient denied symptoms at this time.) other (see comment)     Problem: Behavioral Health Plan of Care  Goal: Plan of Care Review  Recent Flowsheet Documentation  Taken 2/27/2021 1638 by Sharifa López RN  Plan of Care Reviewed With: patient  Progress: improving  Goal: Adheres to Safety Considerations for Self and Others  Intervention: Develop and Maintain Individualized Safety Plan  Recent Flowsheet Documentation  Taken 2/27/2021 1600 by Sharifa López RN  Safety Measures: safety rounds completed  Goal: Absence of New-Onset Illness or Injury  Intervention: Identify and Manage Fall Risk  Recent Flowsheet Documentation  Taken 2/27/2021 1600 by Sharifa López RN  Safety Measures: safety rounds completed     Problem: Behavioral Health Plan of Care  Goal: Absence of New-Onset Illness or Injury  Intervention: Identify and Manage Fall Risk  Recent Flowsheet Documentation  Taken 2/27/2021 1600 by Sharifa López RN  Safety Measures: safety rounds completed     Patient is visible in the milieu, pacing in and out of her room. Stated she's bored so that's her goal is to walk around the unit.  Encouraged her to attend the group therapy but she declined. She has a flat affect but brightens upon approach. Fair concentration. Partial insight. Fair judgement. Denied suicidal thoughts and other mental health symptoms. Rated both depression and anxiety both 0/10. She's hopeful for future. She said she's eating well during meals. Has no problems with sleep. Denied pain. Side effects of meds are: dizziness and drooling when she's sleeping at night. No drooling noted during the day. Patient is compliant with taking her scheduled medications. Asked how many mg is her Clozapine and writer told her that it's 100 mg. Encouraged her to follow up with her doctor on Monday about her request to increase it to 200 mg and agreed with writer. She was sitting at the lounge, watching TV this evening before going going to her bedroom. Otherwise pt, had been calm, pleasant and cooperative this shift. Will continue to monitor and assess pt.

## 2021-02-27 NOTE — PROVIDER NOTIFICATION
Pt's had 2 low BP's. B/P: 85/62, T: 98.3, P: 87, R: 16. Pt denies any physical symptoms. Encouraged fluids. Will continue to assess.

## 2021-02-28 PROCEDURE — 250N000013 HC RX MED GY IP 250 OP 250 PS 637: Performed by: PSYCHIATRY & NEUROLOGY

## 2021-02-28 PROCEDURE — 124N000002 HC R&B MH UMMC

## 2021-02-28 RX ADMIN — CLOZAPINE 100 MG: 25 TABLET, ORALLY DISINTEGRATING ORAL at 20:10

## 2021-02-28 RX ADMIN — METFORMIN HYDROCHLORIDE 1000 MG: 500 TABLET, EXTENDED RELEASE ORAL at 19:31

## 2021-02-28 RX ADMIN — THERA TABS 1 TABLET: TAB at 09:02

## 2021-02-28 RX ADMIN — LORAZEPAM 0.5 MG: 0.5 TABLET ORAL at 20:10

## 2021-02-28 RX ADMIN — RIVAROXABAN 20 MG: 10 TABLET, FILM COATED ORAL at 19:30

## 2021-02-28 RX ADMIN — OLANZAPINE 10 MG: 10 TABLET, FILM COATED ORAL at 19:31

## 2021-02-28 RX ADMIN — Medication 25 MCG: at 09:02

## 2021-02-28 ASSESSMENT — ACTIVITIES OF DAILY LIVING (ADL)
HYGIENE/GROOMING: INDEPENDENT
LAUNDRY: WITH SUPERVISION
ORAL_HYGIENE: INDEPENDENT
DRESS: STREET CLOTHES;INDEPENDENT

## 2021-02-28 ASSESSMENT — MIFFLIN-ST. JEOR: SCORE: 1383.91

## 2021-02-28 NOTE — PLAN OF CARE
Shift Summary  Chief Complaint: Auditory and visual hallucination that telling pt to kill herself and her six children.  Target Symptoms/Condition Status: Improving.   Psych  Pt present polite, cooperative, calm and flat. Pacing calmly in hallway. Visible in milieu. Isolative and withdrawn to self. Still has poor insight and judgement to her situation and illness. Denies hearing or seeming stuff for few days now. Hopeful for future and anxious to see her  and her kids. Denies being depressed or anxious.   Prn: none  Medical  Pt alert and oriented to self and place. Denies pain. Vital sings WNL (see flow sheet foe details). Slept 7.0 hours last night. Good medication compliance is noted. Seems tolerating medications well. No side effect reported by pt or noted by this writer. Appetite fair. No problem with bowel and bladder.   Prn: none  Continue to monitor pt's status Q 15 minutes and stabilize the patient's symptoms with the use of medications and therapeutic programming.  Late entry   Around 1430, pt seemed very distressed and upset. Upon checking in with pt, she reported that mother in law called pt and said kids are very low in food and groceries and they want her home. Pt said she is very worried about her children. Active listening and emotional support provided for pt. This writer told pt that at this point she needs to focus on her own recovery so hopefully she can discharge home properly. Pt agreed.

## 2021-03-01 PROCEDURE — 250N000013 HC RX MED GY IP 250 OP 250 PS 637: Performed by: PSYCHIATRY & NEUROLOGY

## 2021-03-01 PROCEDURE — G0177 OPPS/PHP; TRAIN & EDUC SERV: HCPCS

## 2021-03-01 PROCEDURE — 124N000002 HC R&B MH UMMC

## 2021-03-01 PROCEDURE — 99232 SBSQ HOSP IP/OBS MODERATE 35: CPT | Mod: 95 | Performed by: PSYCHIATRY & NEUROLOGY

## 2021-03-01 RX ADMIN — OLANZAPINE 10 MG: 10 TABLET, FILM COATED ORAL at 20:02

## 2021-03-01 RX ADMIN — METFORMIN HYDROCHLORIDE 1000 MG: 500 TABLET, EXTENDED RELEASE ORAL at 20:01

## 2021-03-01 RX ADMIN — THERA TABS 1 TABLET: TAB at 09:57

## 2021-03-01 RX ADMIN — LORAZEPAM 0.5 MG: 0.5 TABLET ORAL at 20:01

## 2021-03-01 RX ADMIN — CLOZAPINE 125 MG: 100 TABLET, ORALLY DISINTEGRATING ORAL at 20:01

## 2021-03-01 RX ADMIN — Medication 25 MCG: at 09:57

## 2021-03-01 RX ADMIN — RIVAROXABAN 20 MG: 10 TABLET, FILM COATED ORAL at 20:01

## 2021-03-01 ASSESSMENT — ACTIVITIES OF DAILY LIVING (ADL)
HYGIENE/GROOMING: INDEPENDENT
ORAL_HYGIENE: INDEPENDENT
DRESS: STREET CLOTHES;INDEPENDENT
LAUNDRY: WITH SUPERVISION

## 2021-03-01 NOTE — PLAN OF CARE
Work Completed:  Discussed patient's progress in Team today.  She is under the care of the [facility].  Patient making progress and taking her medications as prescribed.  Her discharge medications need to be escripted to [facility] on [address] in [place]. Patient would like to return home to her children who are being cared for at this time by her parents.   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    Discharge plan or goal:   Patient will return home and resume care with her [facility] Team                Barriers to discharge:   Patient needs to be stable on medications.

## 2021-03-01 NOTE — PLAN OF CARE
Problem: Adult Inpatient Plan of Care  Goal: Plan of Care Review  Outcome: No Change  Flowsheets  Taken 2/28/2021 1759  Plan of Care Reviewed With: patient  Taken 2/27/2021 1638  Progress: improving     Problem: Behavioral Health Plan of Care  Goal: Plan of Care Review  Recent Flowsheet Documentation  Taken 2/28/2021 1759 by Sharfia López RN  Plan of Care Reviewed With: patient  Goal: Adheres to Safety Considerations for Self and Others  Intervention: Develop and Maintain Individualized Safety Plan  Recent Flowsheet Documentation  Taken 2/28/2021 1751 by Sharifa López RN  Safety Measures: safety rounds completed  Goal: Absence of New-Onset Illness or Injury  Intervention: Identify and Manage Fall Risk  Recent Flowsheet Documentation  Taken 2/28/2021 1751 by Sharifa López RN  Safety Measures: safety rounds completed     Problem: Behavioral Health Plan of Care  Goal: Adheres to Safety Considerations for Self and Others  Intervention: Develop and Maintain Individualized Safety Plan  Recent Flowsheet Documentation  Taken 2/28/2021 1751 by Sharifa López RN  Safety Measures: safety rounds completed     Problem: Behavioral Health Plan of Care  Goal: Absence of New-Onset Illness or Injury  Intervention: Identify and Manage Fall Risk  Recent Flowsheet Documentation  Taken 2/28/2021 1751 by Sharifa López RN  Safety Measures: safety rounds completed     Patient is present in the milieu but keeps to herself per usual. She is calm, pleasant and cooperative. She is pacing in and out of her bedroom but denies being anxious. Stated that her goal is to eat dinner and go to sleep. Denied being depressed. No SI, SIB and hallucinations. She said that she is hopeful for her future. She eats well. No complaints regarding sleep and pain. Side effects of medication are drooling during the night but stated she does not need a PRN medication for this. She also said she is experiencing a little  dizziness but she drinks water and she will be ok. She is compliant with taking all her scheduled medications. She is still concerned of her Clozapine dosage, prefers to be increased to 200 mg. Encouraged her to talk with her doctor tomorrow morning.

## 2021-03-01 NOTE — PROGRESS NOTES
[facility], [place]   Psychiatric Progress Note      Video-Visit Details    Type of service:  Video Visit    Video Start Time (time video started): 1122    Video End Time (time video stopped): 1131    Originating Location (pt. Location): psychiatric inpatient    Distant Location (provider location): Provider remote location    Mode of Communication:  Video Conference via [employer]    Physician has received verbal consent for a Video Visit from the patient? Yes    Interim History:  The patient's care was discussed with the treatment team during the daily team meeting and/or staff's chart notes were reviewed.  Overnight staff report Patient is present in the milieu but keeps to herself per usual. She is calm, pleasant and cooperative. She is pacing in and out of her bedroom but denies being anxious. Stated that her goal is to eat dinner and go to sleep. Denied being depressed. No SI, SIB and hallucinations. She said that she is hopeful for her future. She eats well. No complaints regarding sleep and pain. Side effects of medication are drooling during the night but stated she does not need a PRN medication for this. She also said she is experiencing a little dizziness but she drinks water and she will be ok. She is compliant with taking all her scheduled medications. She is still concerned of her Clozapine dosage, prefers to be increased to 200 mg. Encouraged her to talk with her doctor tomorrow morning. Slept through the night.    Says the voices have gone away since starting the medication.   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  States mood is OK.  States she has [facility] to work with her.  States she stopped Clozapine at home because she was afraid it is poisoned.  Now states she wants to go home badly, but understands that she may need to stay longer.    Physical ROS:  The patient endorsed no side effects from medications except as above.. The remainder of 10-point review of  "systems was negative except as noted in Interim History.    Patient Active Problem List   Diagnosis     Varicose veins of bilateral lower extremities with pain     child with autism     History of latent tuberculosis     Schizoaffective disorder, depressive type, severe, with psychosis     Fungal nail infection     Auditory hallucinations     Nail abnormalities     Hallucinations     Bipolar 1 disorder (H)       Medications:    ARIPiprazole ER  400 mg Intramuscular Q28 Days     cloZAPine  100 mg Oral At Bedtime     LORazepam  0.5 mg Oral At Bedtime     metFORMIN  1,000 mg Oral Daily with supper     multivitamin, therapeutic  1 tablet Oral Daily     OLANZapine  10 mg Oral Daily with supper     rivaroxaban ANTICOAGULANT  20 mg Oral Daily with supper     tranexamic acid  1,300 mg Oral TID     Vitamin D3  25 mcg Oral Daily      Allergies:  No Known Allergies   Labs:  No results found for this or any previous visit (from the past 24 hour(s)).    Psychiatric Examination:    /75   Pulse 82   Temp 98.5  F (36.9  C) (Oral)   Resp 16   Ht 1.651 m (5' 5\")   Wt 72.3 kg (159 lb 6.4 oz)   LMP 02/16/2021 (Approximate)   SpO2 100%   BMI 26.53 kg/m    Weight is 159 lbs 6.4 oz  Body mass index is 26.53 kg/m .  Orthostatic Vitals       Most Recent      Sitting Orthostatic /77 03/01 0933    Sitting Orthostatic Pulse (bpm) 75 03/01 0933    Standing Orthostatic /64 03/01 0933    Standing Orthostatic Pulse (bpm) 100 03/01 0933            Appearance: awake, alert  General behavior: Cooperative  Eye Contact: Good   Gait and Motor Coordination: Normal gait and motor coordination  Speech: monotone  Language: Normal  Attention/Concentration: Fair  Orientation: oriented to time, place and person  Thought Process:  linear  Thought Content:  no evidence of suicidal ideation or homicidal ideation and denies auditory hallucinations  Recent and Remote Memory: no impairment in immediate, recent, or remote " memory  Associations: no loose associations  Mood: neutral  Affect: restricted  Fund of knowledge: appropriate to education and experiences  Demonstration of insight/judgment: fair  Suicidal risk: Low      Clinical Global Impressions  First:     Most recent:       Precautions:  Behavioral Orders   Procedures     Code 1 - Restrict to Unit     Discontinue 1:1 attendant for suicide risk     Order Specific Question:   I have performed an in person assessment of the patient     Answer:   Based on this assessment the patient no longer requires a one on one attendant at this point in time.     Routine Programming     As clinically indicated     Single Room     Status 15     Every 15 minutes.     Suicide precautions     Patients on Suicide Precautions should have a Combination Diet ordered that includes a Diet selection(s) AND a Behavioral Tray selection for Safe Tray - with utensils, or Safe Tray - NO utensils         Diagnoses:  Schizoaffective disorder, depressive type, severe.        Assessment/Plan:    39 yo female with diagnosis of schizoaffective disorder who decompensated in the context of medication non-compliance.  Restarted on clozapine at a lower dose.    1. Continue hospitalization for safety and stabilization.  2. Continue clozapine and olanzapine for now, increase clozapine to 125 mg  3. Encourage group participation  4. Patient is following with the ACT team, when stable consider discharging for further dose increase by the outpatient team. Anticipate 5 - 7 days.    Reason for ongoing admission: is unable to care for self due to severe psychosis or david  Discharge location: home with ACT  Discharge Medications: not ordered  Follow-up Appointments: not scheduled  Legal Status: voluntary (on provisional discharge not revoked)    Entered by: William Figueroa MD on 03/01/2021 at 11:22 AM

## 2021-03-01 NOTE — PLAN OF CARE
Shift Summary  Chief Complaint: Auditory and visual hallucination that telling pt to kill herself and her six children.  Target Symptoms/Condition Status: Improving   Psych  Visible in milieu ,but isolative and withdrawn to self. Pacing in hallway calmly. Denies hearing or seeing stuff. No evidence of psychosis, paranoid, delusional thoughts or disorganized behavior. Clozapine dosage changed from 100 mg to 125 mg at bed time per pt's request. Hopeful for future. Did not participate in any group activity.   Prn: none  Medical  Pt alert and oriented x 3. Denies pain. Vital sings WNL (see flow sheet foe details). Denies dizziness this morning. Slept 7.0 hours last night. Good medication compliance is noted. Seems tolerating medications well. No side effect reported by pt or noted by this writer.  Appetite adequate. No problem with bowel and bladder.   Prn: none  Continue to monitor pt's status Q 15 minutes and stabilize the patient's symptoms with the use of medications and therapeutic programming.

## 2021-03-02 PROCEDURE — 250N000013 HC RX MED GY IP 250 OP 250 PS 637: Performed by: PSYCHIATRY & NEUROLOGY

## 2021-03-02 PROCEDURE — 124N000002 HC R&B MH UMMC

## 2021-03-02 PROCEDURE — 99232 SBSQ HOSP IP/OBS MODERATE 35: CPT | Mod: 95 | Performed by: PSYCHIATRY & NEUROLOGY

## 2021-03-02 PROCEDURE — H2032 ACTIVITY THERAPY, PER 15 MIN: HCPCS

## 2021-03-02 RX ADMIN — CLOZAPINE 150 MG: 100 TABLET, ORALLY DISINTEGRATING ORAL at 19:50

## 2021-03-02 RX ADMIN — LORAZEPAM 0.5 MG: 0.5 TABLET ORAL at 19:50

## 2021-03-02 RX ADMIN — OLANZAPINE 10 MG: 10 TABLET, FILM COATED ORAL at 17:45

## 2021-03-02 RX ADMIN — METFORMIN HYDROCHLORIDE 1000 MG: 500 TABLET, EXTENDED RELEASE ORAL at 17:45

## 2021-03-02 RX ADMIN — RIVAROXABAN 20 MG: 10 TABLET, FILM COATED ORAL at 17:45

## 2021-03-02 RX ADMIN — THERA TABS 1 TABLET: TAB at 09:25

## 2021-03-02 RX ADMIN — Medication 25 MCG: at 09:26

## 2021-03-02 ASSESSMENT — ACTIVITIES OF DAILY LIVING (ADL)
DRESS: STREET CLOTHES;INDEPENDENT
DRESS: STREET CLOTHES;INDEPENDENT
ORAL_HYGIENE: INDEPENDENT
LAUNDRY: WITH SUPERVISION
LAUNDRY: WITH SUPERVISION
HYGIENE/GROOMING: INDEPENDENT
ORAL_HYGIENE: INDEPENDENT
HYGIENE/GROOMING: INDEPENDENT

## 2021-03-02 NOTE — PROGRESS NOTES
03/01/21 2113   General Information   Date Initially Attended OT 03/01/21     OT Groups Attended: Clinic     Affect/Hygiene/Presentation: Calm/pleasant, engaged, congruent affect. No apparent hygiene concerns.     Patient Performance/Response: Pt actively participated in occupational therapy clinic. Pt was able to ask for assistance as needed, and independently initiate a familiar, single-step, creative expression task with minimal assistance for task setup. Pt demonstrated good focus and attention to detail during task completion. Pt appeared comfortable interacting with peers and writer, however she generally kept to herself during her time in clinic.     Plan: More information needed to complete OT Initial Assessment; OT staff will meet with pt to review the role of occupational therapy and explain the value of having them involved in their treatment plan including options to meet current needs/self-identified goals. As group attendance is established, Pt will be given self-assessment to inform OT initial assessment.

## 2021-03-02 NOTE — PLAN OF CARE
Nursing Assessment    Recent Vitals: B/P: 107/68, T: 97.2, P: 82, R: 16    Sleep:  Hours of sleep at night: 7  Barriers: none    General Shift Summary  Patient has been mainly pacing the halls during the shift. She doesn't interact much with peers or staff and did not attend group. She presents with a flat affect but is calm and cooperative. Patient does seem to have fair concentration. She denied SI/SIB/AVH/HI and anxiety or depression. Patient states she feels she is doing better since coming here and that the meds are helping. She denied pain, is eating meals, and hygiene seems fair. She is medication compliant with no voiced side effects.    Plan is to continue to monitor patient status q 15 mins, assess response to medications, and maintain the patients safety.    Rubi Armijo RN MSN

## 2021-03-02 NOTE — PLAN OF CARE
"  Problem: Suicide Risk  Goal: Absence of Self-Harm  Outcome: No Change     Problem: Adult Inpatient Plan of Care  Goal: Plan of Care Review  Outcome: No Change  Flowsheets (Taken 3/1/2021 1939)  Plan of Care Reviewed With: patient  Progress: improving  Goal: Absence of Hospital-Acquired Illness or Injury  Intervention: Identify and Manage Fall Risk  Recent Flowsheet Documentation  Taken 3/1/2021 1700 by Sharifa López RN  Safety Promotion/Fall Prevention:    check orthostatic blood pressure    clutter free environment maintained    safety round/check completed     Problem: Behavioral Health Plan of Care  Goal: Plan of Care Review  Recent Flowsheet Documentation  Taken 3/1/2021 1939 by Sharifa López RN  Plan of Care Reviewed With: patient  Progress: improving  Goal: Adheres to Safety Considerations for Self and Others  Intervention: Develop and Maintain Individualized Safety Plan  Recent Flowsheet Documentation  Taken 3/1/2021 1900 by Sharifa López RN  Safety Measures: safety rounds completed  Goal: Absence of New-Onset Illness or Injury  Intervention: Identify and Manage Fall Risk  Recent Flowsheet Documentation  Taken 3/1/2021 1900 by Sharifa López RN  Safety Measures: safety rounds completed    Patient is is pacing in and out of her bedroom at early part of shift. Checked in with her and she said that her goal for the day is to just walk around. She is calm, pleasant and cooperative. Flat affect but brightens upon approach. Partial insight and fair judgement. Denied being depressed and anxious. Stated, \"I don't have it. It's zero.\" Denied SI, SIB and hallucinations. She said she is hopeful for her future. Good appetite and sleep. Denied pain. Same complaints related to side effects of meds; drooling during the night and little dizziness but she does not need medications for these. She said,\" My blood pressure is ok and I drink enough water.\" She took all her scheduled meds at 2001 " without any problem. She stayed at the lounge, watching TV before going to her bedroom. Will continue to monitor and assess pt.

## 2021-03-02 NOTE — PLAN OF CARE
Work Completed:  WR attended team  WR reviewed chart     Discharge plan or goal: Pt will return home and continue with her ACT team. Her discharge medications need to be escripted to [facility] on [address] in [place].                Barriers to discharge: Pt needs to stabilize on her Clozaril before a safe discharge can occur.

## 2021-03-02 NOTE — PROGRESS NOTES
"[facility], [place]   Psychiatric Progress Note      Video-Visit Details    Type of service:  Video Visit    Video Start Time (time video started): 1039    Video End Time (time video stopped): 1043    Originating Location (pt. Location): psychiatric inpatient    Distant Location (provider location): Provider remote location    Mode of Communication:  Video Conference via [employer]    Physician has received verbal consent for a Video Visit from the patient? Yes    Interim History:  The patient's care was discussed with the treatment team during the daily team meeting and/or staff's chart notes were reviewed.  Overnight staff report Patient is is pacing in and out of her bedroom at early part of shift. Checked in with her and she said that her goal for the day is to just walk around. She is calm, pleasant and cooperative. Flat affect but brightens upon approach. Partial insight and fair judgement. Denied being depressed and anxious. Stated, \"I don't have it. It's zero.\" Denied SI, SIB and hallucinations. She said she is hopeful for her future. Good appetite and sleep. Denied pain. Same complaints related to side effects of meds; drooling during the night and little dizziness but she does not need medications for these. She said,\" My blood pressure is ok and I drink enough water.\" She took all her scheduled meds at 2001 without any problem. She stayed at the lounge, watching TV before going to her bedroom.     States she is doing great.  Denies voices. Tolerating the increase of the medication.  States she went to one (coloring) group, but try to go to more.    Physical ROS:  The patient endorsed no side effects from medications except as above.. The remainder of 10-point review of systems was negative except as noted in Interim History.    Patient Active Problem List   Diagnosis     Varicose veins of bilateral lower extremities with pain     child with autism     History of latent " "tuberculosis     Schizoaffective disorder, depressive type, severe, with psychosis     Fungal nail infection     Auditory hallucinations     Nail abnormalities     Hallucinations     Bipolar 1 disorder (H)       Medications:    ARIPiprazole ER  400 mg Intramuscular Q28 Days     cloZAPine  125 mg Oral At Bedtime     LORazepam  0.5 mg Oral At Bedtime     metFORMIN  1,000 mg Oral Daily with supper     multivitamin, therapeutic  1 tablet Oral Daily     OLANZapine  10 mg Oral Daily with supper     rivaroxaban ANTICOAGULANT  20 mg Oral Daily with supper     tranexamic acid  1,300 mg Oral TID     Vitamin D3  25 mcg Oral Daily      Allergies:  No Known Allergies   Labs:  No results found for this or any previous visit (from the past 24 hour(s)).    Psychiatric Examination:    /72   Pulse 94   Temp 97.2  F (36.2  C) (Oral)   Resp 16   Ht 1.651 m (5' 5\")   Wt 72.3 kg (159 lb 6.4 oz)   LMP 02/16/2021 (Approximate)   SpO2 100%   BMI 26.53 kg/m    Weight is 159 lbs 6.4 oz  Body mass index is 26.53 kg/m .  Orthostatic Vitals       Most Recent      Sitting Orthostatic /77 03/01 0933    Sitting Orthostatic Pulse (bpm) 75 03/01 0933    Standing Orthostatic /64 03/01 0933    Standing Orthostatic Pulse (bpm) 100 03/01 0933            Appearance: awake, alert  General behavior: Cooperative  Eye Contact: Good   Gait and Motor Coordination: Normal gait and motor coordination  Speech: monotone  Language: Normal  Attention/Concentration: Fair  Orientation: oriented to time, place and person  Thought Process:  linear  Thought Content:  no evidence of suicidal ideation or homicidal ideation and denies auditory hallucinations  Recent and Remote Memory: no impairment in immediate, recent, or remote memory  Associations: no loose associations  Mood: neutral  Affect: restricted  Fund of knowledge: appropriate to education and experiences  Demonstration of insight/judgment: fair  Suicidal risk: Low      Clinical Global " Impressions  First:     Most recent:       Precautions:  Behavioral Orders   Procedures     Code 1 - Restrict to Unit     Discontinue 1:1 attendant for suicide risk     Order Specific Question:   I have performed an in person assessment of the patient     Answer:   Based on this assessment the patient no longer requires a one on one attendant at this point in time.     Routine Programming     As clinically indicated     Single Room     Status 15     Every 15 minutes.     Suicide precautions     Patients on Suicide Precautions should have a Combination Diet ordered that includes a Diet selection(s) AND a Behavioral Tray selection for Safe Tray - with utensils, or Safe Tray - NO utensils         Diagnoses:  Schizoaffective disorder, depressive type, severe.        Assessment/Plan:    41 yo female with diagnosis of schizoaffective disorder who decompensated in the context of medication non-compliance.  Restarted on clozapine at a lower dose.    1. Continue hospitalization for safety and stabilization.  2. Continue clozapine and olanzapine for now, increase clozapine to 150 mg  3. Encourage group participation  4. Patient is following with the ACT team, when stable consider discharging for further dose increase by the outpatient team. Anticipate 5 - 7 days.    Reason for ongoing admission: is unable to care for self due to severe psychosis or david  Discharge location: home with ACT  Discharge Medications: not ordered  Follow-up Appointments: not scheduled  Legal Status: voluntary (on provisional discharge not revoked)    Entered by: William Figueroa MD on 03/02/2021 at 10:39 AM

## 2021-03-03 PROCEDURE — 250N000013 HC RX MED GY IP 250 OP 250 PS 637: Performed by: PSYCHIATRY & NEUROLOGY

## 2021-03-03 PROCEDURE — 124N000002 HC R&B MH UMMC

## 2021-03-03 PROCEDURE — G0177 OPPS/PHP; TRAIN & EDUC SERV: HCPCS

## 2021-03-03 PROCEDURE — 99232 SBSQ HOSP IP/OBS MODERATE 35: CPT | Mod: 95 | Performed by: PSYCHIATRY & NEUROLOGY

## 2021-03-03 RX ORDER — OLANZAPINE 5 MG/1
5 TABLET ORAL
Status: DISCONTINUED | OUTPATIENT
Start: 2021-03-03 | End: 2021-03-04

## 2021-03-03 RX ADMIN — OLANZAPINE 5 MG: 5 TABLET, FILM COATED ORAL at 18:01

## 2021-03-03 RX ADMIN — METFORMIN HYDROCHLORIDE 1000 MG: 500 TABLET, EXTENDED RELEASE ORAL at 18:01

## 2021-03-03 RX ADMIN — CLOZAPINE 150 MG: 100 TABLET, ORALLY DISINTEGRATING ORAL at 19:55

## 2021-03-03 RX ADMIN — RIVAROXABAN 20 MG: 10 TABLET, FILM COATED ORAL at 18:01

## 2021-03-03 RX ADMIN — Medication 25 MCG: at 08:58

## 2021-03-03 RX ADMIN — LORAZEPAM 0.5 MG: 0.5 TABLET ORAL at 19:54

## 2021-03-03 RX ADMIN — THERA TABS 1 TABLET: TAB at 08:58

## 2021-03-03 ASSESSMENT — ACTIVITIES OF DAILY LIVING (ADL)
ORAL_HYGIENE: INDEPENDENT
HYGIENE/GROOMING: INDEPENDENT
DRESS: STREET CLOTHES
LAUNDRY: WITH SUPERVISION
ORAL_HYGIENE: INDEPENDENT
DRESS: STREET CLOTHES;INDEPENDENT
HYGIENE/GROOMING: INDEPENDENT
LAUNDRY: WITH SUPERVISION

## 2021-03-03 NOTE — PLAN OF CARE
Pt attended the structured Therapeutic Recreation group this evening. Pt participated in a group activity focused on leisure participation and social engagement to gain self-esteem, manage behaviors, improve social skills, decrease isolation, and reduce anxiety/depression.   Pt remained focused and engaged in the group discussion, but often did not say too much throughout group. Pt occasionally added to her thoughts on some inspirational quotes. Pt stated she did not know the game, but wanted to give it a try. Pt gave some clues and descriptions throughout the activity.

## 2021-03-03 NOTE — PLAN OF CARE
Patient was recorded as asleep for 6.75hrs during 15min checks. She had no behavioral or medical concerns during the night.

## 2021-03-03 NOTE — PLAN OF CARE
"  Problem: Behavioral Health Plan of Care  Goal: Plan of Care Review  Outcome: Improving    Pt observed out in the milieu, interactive with selected peers, watching television. Pt also observed pacing the hallway. Presents with flat affect, mood is calm. Pt is cooperative and pleasant upon approach. Described her day as \"Good so far. I took my medications\". Denies SI/SIB/HI. Denies having anxiety and depression as of time of assessment. Denies experiencing any type of hallucinations. Verbalized \"The voices are gone now ever since I took my medications again. My brain is working you know\". Claimed she is looking forward for her discharge. Pt attended group.   Consumed 100% of share of dinner and took in approximately 600cc of fluids.   Pt refused Tranexamic acid. Other due medications given. Denies experiencing side effects. Needs attended to. Staff will continue to monitor. No further concerns noted as of this time   "

## 2021-03-03 NOTE — PLAN OF CARE
Work Completed:  Patient being stabilized on her medications.  She is out in the milieu and attends a few groups per day.    Discharge plan or goal:   Patient will return home at discharge and the [facility] Team will resume care.                Barriers to discharge:    Needs further stabilization.

## 2021-03-03 NOTE — PLAN OF CARE
Chief Complaint: Schizoaffective Disorder  Target Symptoms/Status: Improving    Pt is calm and cooperative. Pt is alert and oriented x 4. Pt states her anxiety and depression are 0-1/10. Pt is withdrawn, but seen walking in the milieu. She participated in OT therapy. Pt denies SI/SIB/AVH/HI. States that she has not seen/heard anything since starting her medications. This writer did not observe the patient reacting to internal stimuli. This morning her BP was 93/58 sitting, 96/58 standing. Pt complains of dizziness at times when she stands. Pt was encouraged to drink more water, be slow to stand, and report if the dizziness gets worse. Otherwise patient is steady on her feet. Pt is medication compliant, denied tranexamic acid (Lysteda).     Around 1400 pt asked about why her Clozapine is still at 150 mg. Writer explained the reason for a lower dose is because of her low blood pressure. The plan is to lower her scheduled Zyprexa to 5 mg (from 10 mg). Pt was receptive to education, yet she was tearful and stated she wanted to go home. She knows that she takes 200 mg Clozapine at home, so she is still having a hard time processing why she is not on it now. Reinforcement of the plan of care may be necessary.      Prn: none  Plan of Care: Continue q 15 minute safety checks. Reinforce plan of care with need to increase BP before being prescribed 200 mg Clozapine. Pt will discharge home with ReEntry ACT team.

## 2021-03-03 NOTE — PROGRESS NOTES
"[facility], [place]   Psychiatric Progress Note      Video-Visit Details    Type of service:  Video Visit    Video Start Time (time video started): 1033    Video End Time (time video stopped): 1039    Originating Location (pt. Location): psychiatric inpatient    Distant Location (provider location): Provider remote location    Mode of Communication:  Video Conference via [employer]    Physician has received verbal consent for a Video Visit from the patient? Yes    Interim History:  The patient's care was discussed with the treatment team during the daily team meeting and/or staff's chart notes were reviewed.  Overnight staff report Pt observed out in the milieu, interactive with selected peers, watching television. Pt also observed pacing the hallway. Presents with flat affect, mood is calm. Pt is cooperative and pleasant upon approach. Described her day as \"Good so far. I took my medications\". Denies SI/SIB/HI. Denies having anxiety and depression as of time of assessment. Denies experiencing any type of hallucinations. Verbalized \"The voices are gone now ever since I took my medications again. My brain is working you know\". Claimed she is looking forward for her discharge. Pt attended group.   Consumed 100% of share of dinner and took in approximately 600cc of fluids.   Pt refused Tranexamic acid. Other due medications given. Denies experiencing side effects. Slept 6.75 hours.      Went to another group yesterday  States that she feels a but dizzy sometimes, no other complaints.  Denies any voices.  States there aren't too many things to do here. Was encouraged to go to more groups    Physical ROS:  The patient endorsed no side effects from medications except as above.. The remainder of 10-point review of systems was negative except as noted in Interim History.    Patient Active Problem List   Diagnosis     Varicose veins of bilateral lower extremities with pain     child with autism     " "History of latent tuberculosis     Schizoaffective disorder, depressive type, severe, with psychosis     Fungal nail infection     Auditory hallucinations     Nail abnormalities     Hallucinations     Bipolar 1 disorder (H)       Medications:    ARIPiprazole ER  400 mg Intramuscular Q28 Days     cloZAPine  150 mg Oral At Bedtime     LORazepam  0.5 mg Oral At Bedtime     metFORMIN  1,000 mg Oral Daily with supper     multivitamin, therapeutic  1 tablet Oral Daily     OLANZapine  10 mg Oral Daily with supper     rivaroxaban ANTICOAGULANT  20 mg Oral Daily with supper     tranexamic acid  1,300 mg Oral TID     Vitamin D3  25 mcg Oral Daily      Allergies:  No Known Allergies   Labs:  No results found for this or any previous visit (from the past 24 hour(s)).    Psychiatric Examination:    BP 95/73   Pulse 98   Temp 98.4  F (36.9  C) (Oral)   Resp 16   Ht 1.651 m (5' 5\")   Wt 72.3 kg (159 lb 6.4 oz)   LMP 02/16/2021 (Approximate)   SpO2 100%   BMI 26.53 kg/m    Weight is 159 lbs 6.4 oz  Body mass index is 26.53 kg/m .  Orthostatic Vitals       Most Recent      Sitting Orthostatic /77 03/01 0933    Sitting Orthostatic Pulse (bpm) 75 03/01 0933    Standing Orthostatic /64 03/01 0933    Standing Orthostatic Pulse (bpm) 100 03/01 0933            Appearance: awake, alert  General behavior: Cooperative  Eye Contact: Good   Gait and Motor Coordination: Normal gait and motor coordination  Speech: improved prosody  Language: Normal  Attention/Concentration: Fair  Orientation: oriented to time, place and person  Thought Process:  linear  Thought Content:  no evidence of suicidal ideation or homicidal ideation and denies auditory hallucinations  Recent and Remote Memory: no impairment in immediate, recent, or remote memory  Associations: no loose associations  Mood: neutral  Affect: restricted  Fund of knowledge: appropriate to education and experiences  Demonstration of insight/judgment: fair  Suicidal risk: " Low      Clinical Global Impressions  First:     Most recent:       Precautions:  Behavioral Orders   Procedures     Code 1 - Restrict to Unit     Discontinue 1:1 attendant for suicide risk     Order Specific Question:   I have performed an in person assessment of the patient     Answer:   Based on this assessment the patient no longer requires a one on one attendant at this point in time.     Routine Programming     As clinically indicated     Single Room     Status 15     Every 15 minutes.     Suicide precautions     Patients on Suicide Precautions should have a Combination Diet ordered that includes a Diet selection(s) AND a Behavioral Tray selection for Safe Tray - with utensils, or Safe Tray - NO utensils         Diagnoses:  Schizoaffective disorder, depressive type, severe.        Assessment/Plan:    41 yo female with diagnosis of schizoaffective disorder who decompensated in the context of medication non-compliance.  Restarted on clozapine at a lower dose.    1. Continue hospitalization for safety and stabilization.  2. Continue clozapine and olanzapine for now, [id number] mg of clozapine and decrease zyprexa to 5 mg tonight  3. Encourage group participation  4. Patient is following with the ACT team, when stable consider discharging for further dose increase by the outpatient team. Anticipate 5 - 7 days.    Reason for ongoing admission: is unable to care for self due to severe psychosis or david  Discharge location: home with ACT  Discharge Medications: not ordered  Follow-up Appointments: not scheduled  Legal Status: voluntary (on provisional discharge not revoked)    Entered by: William Figueroa MD on 03/03/2021 at 10:34 AM

## 2021-03-03 NOTE — PLAN OF CARE
Problem: Adult Inpatient Plan of Care  Goal: Plan of Care Review  Recent Flowsheet Documentation  Taken 3/3/2021 1705 by Sharifa López RN  Plan of Care Reviewed With: patient  3/3/2021 1649 by Sharifa López RN  Outcome: No Change  Flowsheets (Taken 3/3/2021 1649)  Plan of Care Reviewed With: patient  Progress: improving  Goal: Absence of Hospital-Acquired Illness or Injury  Intervention: Identify and Manage Fall Risk  Recent Flowsheet Documentation  Taken 3/3/2021 1635 by Sharifa López RN  Safety Promotion/Fall Prevention:    check orthostatic blood pressure    safety round/check completed     Problem: Adult Inpatient Plan of Care  Goal: Absence of Hospital-Acquired Illness or Injury  Intervention: Identify and Manage Fall Risk  Recent Flowsheet Documentation  Taken 3/3/2021 1635 by Sharifa López RN  Safety Promotion/Fall Prevention:    check orthostatic blood pressure    safety round/check completed     Problem: Behavioral Health Plan of Care  Goal: Plan of Care Review  Outcome: Improving  Flowsheets  Taken 3/3/2021 1705  Plan of Care Reviewed With: patient  Patient Agreement with Plan of Care: agrees  Taken 3/3/2021 1649  Plan of Care Reviewed With: patient  Progress: improving  Goal: Adheres to Safety Considerations for Self and Others  Intervention: Develop and Maintain Individualized Safety Plan  Recent Flowsheet Documentation  Taken 3/3/2021 1636 by Sharifa López RN  Safety Measures: safety rounds completed  Goal: Absence of New-Onset Illness or Injury  Intervention: Identify and Manage Fall Risk  Recent Flowsheet Documentation  Taken 3/3/2021 1636 by Sharifa López RN  Safety Measures: safety rounds completed     Problem: Behavioral Health Plan of Care  Goal: Adheres to Safety Considerations for Self and Others  Intervention: Develop and Maintain Individualized Safety Plan  Recent Flowsheet Documentation  Taken 3/3/2021 1636 by Sharifa López RN  Safety  Measures: safety rounds completed     Problem: Behavioral Health Plan of Care  Goal: Absence of New-Onset Illness or Injury  Intervention: Identify and Manage Fall Risk  Recent Flowsheet Documentation  Taken 3/3/2021 1636 by Sharifa López RN  Safety Measures: safety rounds completed     Problem: General Rehab Plan of Care  Goal: Therapeutic Recreation/Music Therapy Goal  Description: The patient and/or their representative will achieve their patient-specific goals related to the plan of care.  The patient-specific goals include:  Outcome: Improving     Patient was resting at start of shift during reflection time. She was out in the milieu later, pacing around the unit. Observed her sitting at the lounge, watching TV, too before supper. Agreed to talk with writer. Stated that her goal is ' just to eat dinner, walk around and watch Tv, nothing else to do here.' She said she participated in the OT group. Rated depression and anxiety both 4/10. Fair and improving concentration. Denied racing thoughts, SI, SIB and hallucinations. Good appetite and sleep. Denied pain. Side effects of medications is low BP. She said she had been hydrated so far. BP checked at 1627 was 100/69, Pulse=94. She admitted that she was a little upset in the morning related to her medication, Clozapine that she was requesting to be increased to 200 mg. She also thinks she is ready to go home.  She was compliant with taking all her meds. No PRN meds needed this shift.

## 2021-03-04 LAB
BASOPHILS # BLD AUTO: 0 10E9/L (ref 0–0.2)
BASOPHILS NFR BLD AUTO: 0.5 %
DIFFERENTIAL METHOD BLD: NORMAL
EOSINOPHIL # BLD AUTO: 0.4 10E9/L (ref 0–0.7)
EOSINOPHIL NFR BLD AUTO: 6.4 %
IMM GRANULOCYTES # BLD: 0 10E9/L (ref 0–0.4)
IMM GRANULOCYTES NFR BLD: 0.2 %
LYMPHOCYTES # BLD AUTO: 2 10E9/L (ref 0.8–5.3)
LYMPHOCYTES NFR BLD AUTO: 34.9 %
MONOCYTES # BLD AUTO: 0.5 10E9/L (ref 0–1.3)
MONOCYTES NFR BLD AUTO: 8.7 %
NEUTROPHILS # BLD AUTO: 2.9 10E9/L (ref 1.6–8.3)
NEUTROPHILS NFR BLD AUTO: 49.3 %
NRBC # BLD AUTO: 0 10*3/UL
NRBC BLD AUTO-RTO: 0 /100
WBC # BLD AUTO: 5.8 10E9/L (ref 4–11)

## 2021-03-04 PROCEDURE — 124N000002 HC R&B MH UMMC

## 2021-03-04 PROCEDURE — H2032 ACTIVITY THERAPY, PER 15 MIN: HCPCS

## 2021-03-04 PROCEDURE — 85004 AUTOMATED DIFF WBC COUNT: CPT | Performed by: PSYCHIATRY & NEUROLOGY

## 2021-03-04 PROCEDURE — 99232 SBSQ HOSP IP/OBS MODERATE 35: CPT | Mod: 95 | Performed by: PSYCHIATRY & NEUROLOGY

## 2021-03-04 PROCEDURE — 85048 AUTOMATED LEUKOCYTE COUNT: CPT | Performed by: PSYCHIATRY & NEUROLOGY

## 2021-03-04 PROCEDURE — 36415 COLL VENOUS BLD VENIPUNCTURE: CPT | Performed by: PSYCHIATRY & NEUROLOGY

## 2021-03-04 PROCEDURE — 250N000013 HC RX MED GY IP 250 OP 250 PS 637: Performed by: PSYCHIATRY & NEUROLOGY

## 2021-03-04 PROCEDURE — G0177 OPPS/PHP; TRAIN & EDUC SERV: HCPCS

## 2021-03-04 RX ORDER — CLOZAPINE 100 MG/1
200 TABLET ORAL AT BEDTIME
Status: DISCONTINUED | OUTPATIENT
Start: 2021-03-06 | End: 2021-03-05

## 2021-03-04 RX ADMIN — CLOZAPINE 175 MG: 100 TABLET ORAL at 19:59

## 2021-03-04 RX ADMIN — METFORMIN HYDROCHLORIDE 1000 MG: 500 TABLET, EXTENDED RELEASE ORAL at 18:05

## 2021-03-04 RX ADMIN — Medication 25 MCG: at 08:38

## 2021-03-04 RX ADMIN — RIVAROXABAN 20 MG: 10 TABLET, FILM COATED ORAL at 18:05

## 2021-03-04 RX ADMIN — THERA TABS 1 TABLET: TAB at 08:38

## 2021-03-04 ASSESSMENT — ACTIVITIES OF DAILY LIVING (ADL)
DRESS: INDEPENDENT
ORAL_HYGIENE: INDEPENDENT
LAUNDRY: WITH SUPERVISION
HYGIENE/GROOMING: INDEPENDENT

## 2021-03-04 NOTE — PLAN OF CARE
Work Completed:  The patient continues daily to ask to return home but is not yet stabilized with medication adjustments still being made.  She has been encouraged to attend the unit programming reporting there is nothing to do here and that she is bored but usually is seen pacing the hallways and talking to staff.      Discharge plan or goal:   Complete medication adjustments and patient will return home and outpatient care will be taken over by her ReEntry Act Team                Barriers to discharge:    Patient needs further stabilization of her psychosis.

## 2021-03-04 NOTE — PROGRESS NOTES
[facility], [place]   Psychiatric Progress Note      Video-Visit Details    Type of service:  Video Visit    Video Start Time (time video started): 1109    Video End Time (time video stopped): 1115    Originating Location (pt. Location): psychiatric inpatient    Distant Location (provider location): Provider remote location    Mode of Communication:  Video Conference via [employer]    Physician has received verbal consent for a Video Visit from the patient? Yes    Interim History:  The patient's care was discussed with the treatment team during the daily team meeting and/or staff's chart notes were reviewed.  Overnight staff report Patient was resting at start of shift during reflection time. She was out in the milieu later, pacing around the unit. Observed her sitting at the lounge, watching TV, too before supper. Agreed to talk with writer. Stated that her goal is ' just to eat dinner, walk around and watch Tv, nothing else to do here.' She said she participated in the OT group. Rated depression and anxiety both 4/10. Fair and improving concentration. Denied racing thoughts, SI, SIB and hallucinations. Good appetite and sleep. Denied pain. Side effects of medications is low BP. She said she had been hydrated so far. BP checked at 1627 was 100/69, Pulse=94. She admitted that she was a little upset in the morning related to her medication, Clozapine that she was requesting to be increased to 200 mg. She also thinks she is ready to go home.  She was compliant with taking all her meds. No PRN meds needed this shift.  Slept 8 hours.    States she wants to leave asap.  Wants increase in Clozaril so that she leaves sooner. States she feels tired.  Discussed discontinuation of Lorazepam and Olanzapine.    Physical ROS:  The patient endorsed no side effects from medications except as above.. The remainder of 10-point review of systems was negative except as noted in Interim History.    Patient  "Active Problem List   Diagnosis     Varicose veins of bilateral lower extremities with pain     child with autism     History of latent tuberculosis     Schizoaffective disorder, depressive type, severe, with psychosis     Fungal nail infection     Auditory hallucinations     Nail abnormalities     Hallucinations     Bipolar 1 disorder (H)       Medications:    ARIPiprazole ER  400 mg Intramuscular Q28 Days     cloZAPine  150 mg Oral At Bedtime     metFORMIN  1,000 mg Oral Daily with supper     multivitamin, therapeutic  1 tablet Oral Daily     rivaroxaban ANTICOAGULANT  20 mg Oral Daily with supper     tranexamic acid  1,300 mg Oral TID     Vitamin D3  25 mcg Oral Daily      Allergies:  No Known Allergies   Labs:  Recent Results (from the past 24 hour(s))   WBC and differential    Collection Time: 03/04/21  7:40 AM   Result Value Ref Range    WBC 5.8 4.0 - 11.0 10e9/L    Diff Method Automated Method     % Neutrophils 49.3 %    % Lymphocytes 34.9 %    % Monocytes 8.7 %    % Eosinophils 6.4 %    % Basophils 0.5 %    % Immature Granulocytes 0.2 %    Nucleated RBCs 0 0 /100    Absolute Neutrophil 2.9 1.6 - 8.3 10e9/L    Absolute Lymphocytes 2.0 0.8 - 5.3 10e9/L    Absolute Monocytes 0.5 0.0 - 1.3 10e9/L    Absolute Eosinophils 0.4 0.0 - 0.7 10e9/L    Absolute Basophils 0.0 0.0 - 0.2 10e9/L    Abs Immature Granulocytes 0.0 0 - 0.4 10e9/L    Absolute Nucleated RBC 0.0        Psychiatric Examination:    /69   Pulse 94   Temp 97.7  F (36.5  C) (Oral)   Resp 16   Ht 1.651 m (5' 5\")   Wt 72.3 kg (159 lb 6.4 oz)   LMP 02/16/2021 (Approximate)   SpO2 100%   BMI 26.53 kg/m    Weight is 159 lbs 6.4 oz  Body mass index is 26.53 kg/m .  Orthostatic Vitals       Most Recent      Sitting Orthostatic /77 03/01 0933    Sitting Orthostatic Pulse (bpm) 75 03/01 0933    Standing Orthostatic /64 03/01 0933    Standing Orthostatic Pulse (bpm) 100 03/01 0933            Appearance: awake, alert  General behavior: " Cooperative  Eye Contact: Good   Gait and Motor Coordination: Normal gait and motor coordination  Speech: improved prosody  Language: Normal  Attention/Concentration: Fair  Orientation: oriented to time, place and person  Thought Process:  linear  Thought Content:  no evidence of suicidal ideation or homicidal ideation and denies auditory hallucinations  Recent and Remote Memory: no impairment in immediate, recent, or remote memory  Associations: no loose associations  Mood: neutral  Affect: restricted  Fund of knowledge: appropriate to education and experiences  Demonstration of insight/judgment: fair  Suicidal risk: Low      Clinical Global Impressions  First:     Most recent:       Precautions:  Behavioral Orders   Procedures     Code 1 - Restrict to Unit     Discontinue 1:1 attendant for suicide risk     Order Specific Question:   I have performed an in person assessment of the patient     Answer:   Based on this assessment the patient no longer requires a one on one attendant at this point in time.     Routine Programming     As clinically indicated     Single Room     Status 15     Every 15 minutes.     Suicide precautions     Patients on Suicide Precautions should have a Combination Diet ordered that includes a Diet selection(s) AND a Behavioral Tray selection for Safe Tray - with utensils, or Safe Tray - NO utensils         Diagnoses:  Schizoaffective disorder, depressive type, severe.        Assessment/Plan:    41 yo female with diagnosis of schizoaffective disorder who decompensated in the context of medication non-compliance.  Restarted on clozapine at a lower dose.    1. Continue hospitalization for safety and stabilization.  2. Continue clozapine and olanzapine for now, increase to 175 mg of clozapine and discontinue zyprexa and lorazepam  3. Encourage group participation  4. Patient is following with the ACT team, when stable consider discharging for further dose increase by the outpatient team.  Anticipate discharge on monday.    Reason for ongoing admission: is unable to care for self due to severe psychosis or david  Discharge location: home with ACT  Discharge Medications: not ordered  Follow-up Appointments: not scheduled  Legal Status: voluntary (on provisional discharge not revoked)    Entered by: William Figueroa MD on 03/04/2021 at 11:09 AM

## 2021-03-04 NOTE — PLAN OF CARE
Shift Summary  Chief Complaint: Auditory and visual hallucination that telling pt to kill herself and her six children.  Target Symptoms/Condition Status: Improving   Psych  This morning pt reported being fatigue and tired. Pt skipped breakfast and stayed in bed till almost [id number] which is unusual for her. Bed time Ativan and Zyprex were discontinued per Dr Figueroa due to being fatigue and tired. Also Clozapine dosage was changed to 175 mg from 125 mg and on 3/6 will go to 200 mg per pt's request. White blood count 5.8 today. Pt happy with these changes. No evidence of psychosis, paranoid, delusional thoughts or disorganized behavior. Pt feels her concentration is improving. Hopeful for future and wants to go home soon. Did not participate in any group activity. Pt said she wanted to go to group activity ealier ,but she forgot to sign for it. Pt is hoping to go to afternoon coloring session.   Prn: none  Medical  Pt alert and oriented to self and place. Denies pain. Vital sings WNL (see flow sheet for details). Bp slightly low ,but consistent with previous reading. Denies any signs or symptoms of low BP at the moment. Slept 8 hours last night. Good medication compliance is noted. Appetite adequate. No problem with bowel and bladder.   Prn: none  Continue to monitor pt's status Q 15 minutes and stabilize the patient's symptoms with the use of medications and therapeutic programming.
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
## 2021-03-05 PROCEDURE — 124N000002 HC R&B MH UMMC

## 2021-03-05 PROCEDURE — 99231 SBSQ HOSP IP/OBS SF/LOW 25: CPT | Performed by: PSYCHIATRY & NEUROLOGY

## 2021-03-05 PROCEDURE — 250N000013 HC RX MED GY IP 250 OP 250 PS 637: Performed by: PSYCHIATRY & NEUROLOGY

## 2021-03-05 PROCEDURE — G0177 OPPS/PHP; TRAIN & EDUC SERV: HCPCS

## 2021-03-05 RX ORDER — CLOZAPINE 100 MG/1
200 TABLET ORAL AT BEDTIME
Status: DISCONTINUED | OUTPATIENT
Start: 2021-03-06 | End: 2021-03-09 | Stop reason: HOSPADM

## 2021-03-05 RX ORDER — TRANEXAMIC ACID 650 MG/1
1300 TABLET ORAL 3 TIMES DAILY PRN
Status: DISCONTINUED | OUTPATIENT
Start: 2021-03-05 | End: 2021-03-09 | Stop reason: HOSPADM

## 2021-03-05 RX ADMIN — Medication 25 MCG: at 09:42

## 2021-03-05 RX ADMIN — RIVAROXABAN 20 MG: 10 TABLET, FILM COATED ORAL at 17:37

## 2021-03-05 RX ADMIN — METFORMIN HYDROCHLORIDE 1000 MG: 500 TABLET, EXTENDED RELEASE ORAL at 17:37

## 2021-03-05 RX ADMIN — THERA TABS 1 TABLET: TAB at 09:42

## 2021-03-05 RX ADMIN — CLOZAPINE 175 MG: 25 TABLET ORAL at 19:56

## 2021-03-05 ASSESSMENT — ACTIVITIES OF DAILY LIVING (ADL)
HYGIENE/GROOMING: INDEPENDENT
DRESS: INDEPENDENT
ORAL_HYGIENE: INDEPENDENT
ORAL_HYGIENE: INDEPENDENT
DRESS: STREET CLOTHES;INDEPENDENT
LAUNDRY: WITH SUPERVISION
HYGIENE/GROOMING: HANDWASHING;SHOWER;INDEPENDENT
LAUNDRY: WITH SUPERVISION

## 2021-03-05 NOTE — PROGRESS NOTES
PROGRESS NOTE    The patient was seen for Dr. Figueroa, her chart reviewed, her case discussed with staff.  The patient is under commitment and was Jarvised.  She is currently on Abilify Maintena 400 mg, last injection on 02/25/21 and Clozaril 200 mg at bedtime. She seems to tolerate these medications well with only side effect being nocturnal drooling.  She feels good subjectively and currently presents with no complaints. She required no PRN neuroleptics.    This is a 53 year old  [place] mother of 6 with a long history of schizoaffective disorder who was initially sent here for admission by ReEntry ACT team because of auditory and visual hallucinations which have intensified after she stopped taking her medications because she thought there was marijuana in them. She was seen by Dr. Cancino who restarted her preadmission Abilify Maintena but replaced PTA Zyprexa with Clozaril. Because of her demands to leave she was petitioned for commitment and Jarvised.     Labs: Her most recent CBC was WNL. Her Clozaril blood level has not been checked.    VS: Temp. - 97.7, Pulse - 85, BP - 106/73, Resp.- 16, SpO2 - 100%    Mental Status Examination  Reveals a normally built and normally developed, very pleasant, friendly and cooperative 40 year old [place] female appearing her stated age. She has a good English command and her speech is coherent, logical, goal related and of normal tempo and tone. Her mood is neutral, her affect is euthymic. She denies hallucinations and no prominent delusions can be noted or elicited. She has some insight into her problems and her judgement is not significantly impaired.    Diagnostic Impression    Schizoaffective Disorder, last episode depressed    Plan: Continue current medications without change. Suggest obtaining Clozaril level and adjusting the dose accordingly.   Plan to discharge her home sometime next week with ACT team involved.    Thanks,    Mason Leavitt MD

## 2021-03-05 NOTE — PLAN OF CARE
OT Groups Attended: Clinic     Affect/Hygiene/Presentation: Calm/pleasant, engaged, congruent affect. No apparent hygiene concerns.     Patient Performance/Response: Pt actively participated in occupational therapy clinic. Pt was able to ask for assistance as needed, and independently initiate a familiar, single-step, creative expression task without difficulty. Pt demonstrated good focus, planning, and attention to detail during task completion. Pt appeared comfortable interacting with peers and writer, however she generally kept to herself and appeared focused on her selected task.     Plan: Pt will be encouraged to maintain group attendance for continued ongoing assessment and support in completion of occupational therapy treatment goals.

## 2021-03-05 NOTE — PLAN OF CARE
Work Completed:  Patient is cooperative with all care, taking her medications.  She does not attend programming but staff continues to encourage her to do so since she reports being bored here.      Discharge plan or goal:   Patient will return home to her family at discharge and her ReEntry ACT Team will continue to provider outpatient care.                Barriers to discharge:    Needs further medication adjustments and stabilization

## 2021-03-05 NOTE — PLAN OF CARE
"Pt is calm and pleasant, has full range affect. Her mood is \"good.\" She said she has some night time drooling, from her meds. She is also talking about wanting her Clozaril increased; Dr Leavitt is aware. See PCS for CP review.  "

## 2021-03-05 NOTE — PLAN OF CARE
Music Therapy Group note    Total time in session: 45 minutes     Number of patients in group: 4    Scope of service: humanistic    Patient progress: initial encounter    Patient response/reaction to treatment intervention(s): Nona presented on the quiet side initially, but warmed up as group progressed, requesting romantic and uplifting songs.  Did not interact with peers.  Kept to self.  Swayed gently to the beat of the music.    Pao Calle, MT-BC  Board-Certified Music Therapist

## 2021-03-05 NOTE — PLAN OF CARE
Problem: Adult Inpatient Plan of Care  Goal: Optimal Comfort and Wellbeing  Outcome: No Change  Note: Pt slept through shifts with no complaints or concerns.     NOC Shift Report    Pt in bed at beginning of shift, breathing quiet and unlabored. Pt slept through shift. Pt slept 7 hours.     No pt complaints or concerns at this time.     No PRNs given. Will continue to monitor.     Precautions: Suicide

## 2021-03-06 PROCEDURE — 250N000013 HC RX MED GY IP 250 OP 250 PS 637: Performed by: PSYCHIATRY & NEUROLOGY

## 2021-03-06 PROCEDURE — 124N000002 HC R&B MH UMMC

## 2021-03-06 RX ADMIN — THERA TABS 1 TABLET: TAB at 09:16

## 2021-03-06 RX ADMIN — Medication 25 MCG: at 09:16

## 2021-03-06 RX ADMIN — RIVAROXABAN 20 MG: 10 TABLET, FILM COATED ORAL at 17:53

## 2021-03-06 RX ADMIN — METFORMIN HYDROCHLORIDE 1000 MG: 500 TABLET, EXTENDED RELEASE ORAL at 17:53

## 2021-03-06 RX ADMIN — CLOZAPINE 200 MG: 100 TABLET ORAL at 20:01

## 2021-03-06 NOTE — PLAN OF CARE
Problem: Adult Inpatient Plan of Care  Goal: Optimal Comfort and Wellbeing  Outcome: No Change  Note: No pt complaints or concerns throughout shift.     NOC Shift Report    Pt in bed at beginning of shift, breathing quiet and unlabored. Pt slept through shift. Pt slept 7 hours.     No pt complaints or concerns at this time.     No PRNs given. Will continue to monitor.     Precautions: Suicide

## 2021-03-06 NOTE — PLAN OF CARE
Pt was visible in the milieu for most of the shift. She was observed watching tv in the lounge with peers but had minimal, if any, social interaction unless addressed directly. Pt's mood was calm and her affect was blunted but would brighten upon approach. Pt denied all mental health concerns including SI, SIB, and hallucinations. She was medication compliant, had no PRN's this shift, and ate meals completely. She expressed no concerns this shift.

## 2021-03-06 NOTE — PLAN OF CARE
"Pt is very pleasant and cooperative.  Pt is med compliant.  Pt has good insight as to why she is here stating \"I stopped taking my medications and know I cannot do that again.\"  Pt is alert and oriented.  Pt is quiet but engages with staff in conversation.  Pt was out on unit watching TV off/on.  No concerns.    "

## 2021-03-07 PROCEDURE — 124N000002 HC R&B MH UMMC

## 2021-03-07 PROCEDURE — 250N000013 HC RX MED GY IP 250 OP 250 PS 637: Performed by: PSYCHIATRY & NEUROLOGY

## 2021-03-07 RX ADMIN — RIVAROXABAN 20 MG: 10 TABLET, FILM COATED ORAL at 18:11

## 2021-03-07 RX ADMIN — METFORMIN HYDROCHLORIDE 1000 MG: 500 TABLET, EXTENDED RELEASE ORAL at 18:10

## 2021-03-07 RX ADMIN — THERA TABS 1 TABLET: TAB at 09:34

## 2021-03-07 RX ADMIN — Medication 25 MCG: at 09:34

## 2021-03-07 RX ADMIN — CLOZAPINE 200 MG: 100 TABLET ORAL at 20:04

## 2021-03-07 ASSESSMENT — ACTIVITIES OF DAILY LIVING (ADL)
DRESS: INDEPENDENT
LAUNDRY: WITH SUPERVISION
HYGIENE/GROOMING: INDEPENDENT
LAUNDRY: WITH SUPERVISION
ORAL_HYGIENE: INDEPENDENT
ORAL_HYGIENE: INDEPENDENT
HYGIENE/GROOMING: INDEPENDENT
DRESS: INDEPENDENT

## 2021-03-07 ASSESSMENT — MIFFLIN-ST. JEOR: SCORE: 1386.64

## 2021-03-07 NOTE — PLAN OF CARE
Problem: Adult Inpatient Plan of Care  Goal: Optimal Comfort and Wellbeing  Outcome: No Change  Note: Pt slept through majority of shift with no complaints or concerns.     NOC Shift Report    Pt in bed at beginning of shift, breathing quiet and unlabored. Pt slept through shift. Pt slept 7 hours.     No pt complaints or concerns at this time.     No PRNs given. Will continue to monitor.     Precautions: Suicide

## 2021-03-07 NOTE — PLAN OF CARE
Pt attending and participating in unit groups/activities.  Pt appropriate and social with staff and peers. Pt presents with full range affect and calm mood.  Pt denies SI/Self harm thoughts, urges, plan, and intent. Pt denies any AVH, HI, depression and anxiety. Pt medication compliant. Pt reports drooling at night and dizziness in the morning as some of the side effects she notes r/t medications. Pt reports appetite and sleep as adequate. Staff will continue to support and monitor.

## 2021-03-07 NOTE — PLAN OF CARE
"Nursing Assessment    Mental Status Exam:  Appearance: Attire appropriate to situation, well groomed  Behavior/relationship to examiner/demeanor: Calm, polite, cooperative  Affect : Full range  Mood : Reports mood is \"good\"  Gait: Steady, balanced  Speech rate, volume, coherence:  Clear, coherent, normal rate, rhythm and volume  Attention/Concentration: \"It's fine\"  Insight: Appropriate to situation, accepts  Judgment: Intact  Suicide Assessment:   Recent suicidal thoughts: No   Any attempts in the last 24 hours: No   Plan or considering various methods: No   Access to means: No   Verbal or Written contract for safety: Yes   Self Injurious Behavior: No, none observed  Goal: To go home  General Shift Summary  Visible in milieu and withdrawn from peers  Depression a level 0/10, anxiety 0/10,   HI, aggressive behaviors not present.  Auditory/visual hallucinations: Denies, does not appear responding  Patient is medication compliant and had no reported side effects. No PRN medications given this shift  Hygiene: Well groomed  Eating: Ate all meals  Plan is to continue to monitor patient status q 15 mins, assess response to medications, and maintain the patients safety.     "

## 2021-03-08 LAB
LABORATORY COMMENT REPORT: NORMAL
SARS-COV-2 RNA RESP QL NAA+PROBE: NEGATIVE
SPECIMEN SOURCE: NORMAL

## 2021-03-08 PROCEDURE — 250N000013 HC RX MED GY IP 250 OP 250 PS 637: Performed by: PSYCHIATRY & NEUROLOGY

## 2021-03-08 PROCEDURE — 99232 SBSQ HOSP IP/OBS MODERATE 35: CPT | Performed by: PSYCHIATRY & NEUROLOGY

## 2021-03-08 PROCEDURE — 124N000002 HC R&B MH UMMC

## 2021-03-08 PROCEDURE — U0005 INFEC AGEN DETEC AMPLI PROBE: HCPCS | Performed by: PSYCHIATRY & NEUROLOGY

## 2021-03-08 PROCEDURE — U0003 INFECTIOUS AGENT DETECTION BY NUCLEIC ACID (DNA OR RNA); SEVERE ACUTE RESPIRATORY SYNDROME CORONAVIRUS 2 (SARS-COV-2) (CORONAVIRUS DISEASE [COVID-19]), AMPLIFIED PROBE TECHNIQUE, MAKING USE OF HIGH THROUGHPUT TECHNOLOGIES AS DESCRIBED BY CMS-2020-01-R: HCPCS | Performed by: PSYCHIATRY & NEUROLOGY

## 2021-03-08 RX ORDER — VITAMIN B COMPLEX
25 TABLET ORAL DAILY
Qty: 30 TABLET | Refills: 11 | Status: ON HOLD | OUTPATIENT
Start: 2021-03-08 | End: 2021-08-19

## 2021-03-08 RX ORDER — CLOZAPINE 100 MG/1
200 TABLET, ORALLY DISINTEGRATING ORAL AT BEDTIME
Qty: 60 TABLET | Refills: 0 | Status: ON HOLD | OUTPATIENT
Start: 2021-03-08 | End: 2021-08-19

## 2021-03-08 RX ORDER — TRANEXAMIC ACID 650 MG/1
1300 TABLET ORAL 3 TIMES DAILY
Qty: 60 TABLET | Refills: 11 | Status: ON HOLD | OUTPATIENT
Start: 2021-03-08 | End: 2021-08-19

## 2021-03-08 RX ORDER — AMOXICILLIN 250 MG
1 CAPSULE ORAL 2 TIMES DAILY PRN
Qty: 60 TABLET | Refills: 0 | Status: ON HOLD | OUTPATIENT
Start: 2021-03-08 | End: 2021-08-19

## 2021-03-08 RX ORDER — OLANZAPINE 10 MG/1
10 TABLET ORAL 3 TIMES DAILY PRN
Qty: 60 TABLET | Refills: 0 | Status: ON HOLD | OUTPATIENT
Start: 2021-03-08 | End: 2021-08-19

## 2021-03-08 RX ORDER — TRAZODONE HYDROCHLORIDE 50 MG/1
50 TABLET, FILM COATED ORAL
Qty: 30 TABLET | Refills: 0 | Status: ON HOLD | OUTPATIENT
Start: 2021-03-08 | End: 2021-08-19

## 2021-03-08 RX ORDER — METFORMIN HCL 500 MG
1000 TABLET, EXTENDED RELEASE 24 HR ORAL
Qty: 60 TABLET | Refills: 0 | Status: ON HOLD | OUTPATIENT
Start: 2021-03-08 | End: 2021-08-19

## 2021-03-08 RX ORDER — ELECTROLYTES/DEXTROSE
1 SOLUTION, ORAL ORAL DAILY
Qty: 30 TABLET | Refills: 11 | Status: ON HOLD | OUTPATIENT
Start: 2021-03-08 | End: 2021-08-19

## 2021-03-08 RX ORDER — ARIPIPRAZOLE 400 MG
400 KIT INTRAMUSCULAR
Qty: 2 ML | Refills: 0 | Status: SHIPPED | OUTPATIENT
Start: 2021-03-08

## 2021-03-08 RX ADMIN — METFORMIN HYDROCHLORIDE 1000 MG: 500 TABLET, EXTENDED RELEASE ORAL at 17:47

## 2021-03-08 RX ADMIN — Medication 25 MCG: at 08:46

## 2021-03-08 RX ADMIN — CLOZAPINE 200 MG: 100 TABLET ORAL at 20:13

## 2021-03-08 RX ADMIN — RIVAROXABAN 20 MG: 10 TABLET, FILM COATED ORAL at 17:46

## 2021-03-08 RX ADMIN — THERA TABS 1 TABLET: TAB at 08:46

## 2021-03-08 ASSESSMENT — ACTIVITIES OF DAILY LIVING (ADL)
HYGIENE/GROOMING: INDEPENDENT
ORAL_HYGIENE: INDEPENDENT
LAUNDRY: WITH SUPERVISION
DRESS: STREET CLOTHES;INDEPENDENT

## 2021-03-08 NOTE — PROGRESS NOTES
Work Completed:  Team met today to discuss patient's progress.  Patient now up to 200mg Clozaril as requested by her ACT Team.  Tentatively considering a discharge back home tomorrow.  Provided  Dr. MIX with the ACT Team Pharmacy: [address].  Updated the ACT Team regarding the discharge.     Discharge plan or goal:   Patient will return to her family home in [place] and resume care with her [facility] ACT Team and Dr. Walter Gaviria.                Barriers to discharge:    Needs another day of stabilization.

## 2021-03-08 NOTE — PLAN OF CARE
Shift Summary  Chief Complaint: Auditory and visual hallucination that telling pt to kill herself and her six children.  Target Symptoms/Condition Status: Improving   Psych  Pt is calm and flat. Happy for discharging home tomorrow. No evidence of psychosis, paranoid, delusional thoughts or disorganized behavior. Denies hearing voices. Denies any suicidal ideation, homicidal ideation, Self injury behavior, racing thought, depression and anxiety. Visible in milieu. Isolative and withdrawn to self.   Prn: none  Medical  Pt alert and oriented x 3.  Denies pain. Vital sings WNL (see flow sheet for details). Slightly dizzy this morning which subsided after drinking few cups of water. Slept 7.0 hours last night. Good medication compliance is noted. Appetite adequate. No problem with bowel and bladder.   Prn: none  Continue to monitor pt's status Q 15 minutes and stabilize the patient's symptoms with the use of medications and therapeutic programming.

## 2021-03-08 NOTE — PLAN OF CARE
BEHAVIORAL TEAM DISCUSSION    Participants: Dr. Sandeep Cancino: Clau Plummer RN; [facility]  Progress: Patient taking all medications as prescribed.  Her psychosis symptoms are stabilizing.  Anticipated length of stay: 1-2 days  Continued Stay Criteria/Rationale: Committed/Jarvised need more stabilization  Medical/Physical: Nothing provided  Precautions:   Behavioral Orders   Procedures     Code 1 - Restrict to Unit     Discontinue 1:1 attendant for suicide risk     Order Specific Question:   I have performed an in person assessment of the patient     Answer:   Based on this assessment the patient no longer requires a one on one attendant at this point in time.     Routine Programming     As clinically indicated     Single Room     Status 15     Every 15 minutes.     Suicide precautions     Patients on Suicide Precautions should have a Combination Diet ordered that includes a Diet selection(s) AND a Behavioral Tray selection for Safe Tray - with utensils, or Safe Tray - NO utensils       Plan: Patient will be prepared for discharge soon.  She is taking all prescribed medications  Contacting her ACT Team today to let them know she may discharge in the next or so.  Rationale for change in precautions or plan: No changes

## 2021-03-08 NOTE — PLAN OF CARE
Problem: Depressive Symptoms  Goal: Depressive Symptoms  Description: Signs and symptoms of listed problems will be absent or manageable.  Outcome: No Change  Flowsheets (Taken 3/8/2021 3845)  Depressive Symptoms Assessed: sleep  Note: No pt complaints or concerns throughout shift.     NOC Shift Report    Pt in bed at beginning of shift, breathing quiet and unlabored. Pt slept through shift. Pt slept 7 hours.     No pt complaints or concerns at this time.     No PRNs given. Will continue to monitor.     Precautions: Suicide

## 2021-03-08 NOTE — PROGRESS NOTES
"[facility], [place]   Psychiatric Progress Note    Interim History:  The patient's care was discussed with the treatment team during the daily team meeting and/or staff's chart notes were reviewed.  Staff reported that she had, overall, good weekend. Socialized with some peers and staff, watched TV. Said that she would like to leave hospital as soon as possible. Complained of mild dizziness in am, was encouraged to use caution while sitting and standing up, drink more fluids.    In interview: pleasant, cooperative. Recognized me from being under my care short time ago. She denied all MH symptoms. Denied having any problems with meds today. Said that she was ready for discharge tomorrow. We talked about her meds, plans for follow up after discharge. She had no further questions or concerns. I electronically sent her meds to [facility] in [place].        Patient Active Problem List   Diagnosis     Varicose veins of bilateral lower extremities with pain     child with autism     History of latent tuberculosis     Schizoaffective disorder, depressive type, severe, with psychosis     Fungal nail infection     Auditory hallucinations     Nail abnormalities     Hallucinations     Bipolar 1 disorder (H)       Medications:    ARIPiprazole ER  400 mg Intramuscular Q28 Days     cloZAPine  200 mg Oral At Bedtime     metFORMIN  1,000 mg Oral Daily with supper     multivitamin, therapeutic  1 tablet Oral Daily     rivaroxaban ANTICOAGULANT  20 mg Oral Daily with supper     Vitamin D3  25 mcg Oral Daily      Allergies:  No Known Allergies   Labs:  No results found for this or any previous visit (from the past 24 hour(s)).    Psychiatric Examination:    /71   Pulse 95   Temp 98.5  F (36.9  C) (Oral)   Resp 16   Ht 1.651 m (5' 5\")   Wt 72.6 kg (160 lb)   LMP 02/16/2021 (Approximate)   SpO2 100%   BMI 26.63 kg/m    Weight is 160 lbs 0 oz  Body mass index is 26.63 kg/m .    Orthostatic " Vitals       Most Recent      Sitting Orthostatic /68 03/08 1000    Sitting Orthostatic Pulse (bpm) 114 03/08 1000    Standing Orthostatic BP 87/61 03/08 1000    Standing Orthostatic Pulse (bpm) 130 03/08 1000         Appearance: awake, alert  General behavior: Cooperative  Eye Contact: Good   Gait and Motor Coordination: Normal gait and motor coordination  Speech: improved prosody  Language: Normal  Attention/Concentration: Fair  Orientation: oriented to time, place and person  Thought Process:  linear  Thought Content:  no evidence of suicidal ideation or homicidal ideation and denies auditory hallucinations  Recent and Remote Memory: no impairment in immediate, recent, or remote memory  Associations: no loose associations  Mood: neutral  Affect: restricted  Fund of knowledge: appropriate to education and experiences  Demonstration of insight/judgment: fair  Suicidal risk: Low      Clinical Global Impressions  First:     Most recent: 4/3 3/8/2021        Precautions:  Behavioral Orders   Procedures     Code 1 - Restrict to Unit     Discontinue 1:1 attendant for suicide risk     Order Specific Question:   I have performed an in person assessment of the patient     Answer:   Based on this assessment the patient no longer requires a one on one attendant at this point in time.     Routine Programming     As clinically indicated     Single Room     Status 15     Every 15 minutes.     Suicide precautions     Patients on Suicide Precautions should have a Combination Diet ordered that includes a Diet selection(s) AND a Behavioral Tray selection for Safe Tray - with utensils, or Safe Tray - NO utensils         Diagnoses:  Schizoaffective disorder, depressive type, severe.        Assessment/Plan:    41 yo female with diagnosis of schizoaffective disorder who decompensated in the context of medication non-compliance.  Restarted on clozapine at a lower dose.    1. Continue hospitalization for safety and stabilization.  Discharge tomorrow.  2. Continue clozapine and olanzapine for now, increase to 175 mg of clozapine and discontinue zyprexa and lorazepam  3. Encourage group participation  4. Patient is following with the ACT team, when stable consider discharging for further dose increase by the outpatient team. Anticipate discharge on monday.    Reason for ongoing admission: is unable to care for self due to severe psychosis or david  Discharge location: home with ACT  Discharge Medications: ordered  Follow-up Appointments: scheduled  Legal Status: voluntary (on provisional discharge not revoked)    Sandeep Cancino MD  [facility] Psychiatry

## 2021-03-08 NOTE — PLAN OF CARE
"Pt was visible in the milieu for the majority of the shift. She was observed sitting in the lounge with peers, watching TV and at times conversing with others when approached. Pt's affect was full range and her mood was calm. Pt was calm, polite, and cooperative with staff. She interacted appropriately with staff and peers. Pt was medication compliant and received no PRN medications this shift. She does endorse drooling at night as a side effect to the medication. Pt denies all mental health symptoms including SI, SIB, AH, VH, anxiety,and depression. Pt reports \"I just really want to go home, I miss my family\". Pt ate all meals and reports sleep is \"good\". Overall pt had a good shift, there were no major concerns.   "

## 2021-03-09 VITALS
RESPIRATION RATE: 16 BRPM | WEIGHT: 160.1 LBS | HEIGHT: 65 IN | OXYGEN SATURATION: 99 % | DIASTOLIC BLOOD PRESSURE: 77 MMHG | BODY MASS INDEX: 26.67 KG/M2 | SYSTOLIC BLOOD PRESSURE: 112 MMHG | TEMPERATURE: 98.3 F | HEART RATE: 89 BPM

## 2021-03-09 PROCEDURE — 99239 HOSP IP/OBS DSCHRG MGMT >30: CPT | Performed by: PSYCHIATRY & NEUROLOGY

## 2021-03-09 PROCEDURE — 250N000013 HC RX MED GY IP 250 OP 250 PS 637: Performed by: PSYCHIATRY & NEUROLOGY

## 2021-03-09 RX ADMIN — THERA TABS 1 TABLET: TAB at 08:15

## 2021-03-09 RX ADMIN — Medication 25 MCG: at 08:15

## 2021-03-09 ASSESSMENT — MIFFLIN-ST. JEOR: SCORE: 1387.09

## 2021-03-09 NOTE — PROVIDER NOTIFICATION
03/09/21 0000 03/09/21 0613   Sleep/Rest/Relaxation   Sleep/Rest/Relaxation (WDL) WDL  --    Sleep/Rest/Relaxation appears asleep appears asleep;no problem identified   Night Time # Hours  --  7 hours   Pt slept all night. Did not present any concerns, was seen and heard breathing at ease. No concerns this shift.

## 2021-03-09 NOTE — PLAN OF CARE
Problem: Adult Inpatient Plan of Care  Goal: Plan of Care Review  Outcome: Improving  Flowsheets (Taken 3/8/2021 2050)  Plan of Care Reviewed With: patient  Progress: improving  Goal: Absence of Hospital-Acquired Illness or Injury  Intervention: Identify and Manage Fall Risk  Recent Flowsheet Documentation  Taken 3/8/2021 2044 by Sharifa López RN  Safety Promotion/Fall Prevention:   check orthostatic blood pressure   safety round/check completed     Problem: Adult Inpatient Plan of Care  Goal: Absence of Hospital-Acquired Illness or Injury  Intervention: Identify and Manage Fall Risk  Recent Flowsheet Documentation  Taken 3/8/2021 2044 by Sharifa López RN  Safety Promotion/Fall Prevention:   check orthostatic blood pressure   safety round/check completed     Problem: Behavioral Health Plan of Care  Goal: Plan of Care Review  Outcome: Improving  Flowsheets (Taken 3/8/2021 2050)  Plan of Care Reviewed With: patient  Progress: improving  Goal: Adheres to Safety Considerations for Self and Others  Intervention: Develop and Maintain Individualized Safety Plan  Recent Flowsheet Documentation  Taken 3/8/2021 2045 by Sharifa López RN  Safety Measures: safety rounds completed  Goal: Absence of New-Onset Illness or Injury  Intervention: Identify and Manage Fall Risk  Recent Flowsheet Documentation  Taken 3/8/2021 2045 by Sharifa López RN  Safety Measures: safety rounds completed     Problem: Behavioral Health Plan of Care  Goal: Adheres to Safety Considerations for Self and Others  Intervention: Develop and Maintain Individualized Safety Plan  Recent Flowsheet Documentation  Taken 3/8/2021 2045 by Sharifa López RN  Safety Measures: safety rounds completed     Problem: Behavioral Health Plan of Care  Goal: Absence of New-Onset Illness or Injury  Intervention: Identify and Manage Fall Risk  Recent Flowsheet Documentation  Taken 3/8/2021 2045 by Sharifa López RN  Safety Measures:  safety rounds completed     Problem: General Rehab Plan of Care  Goal: Therapeutic Recreation/Music Therapy Goal  Description: The patient and/or their representative will achieve their patient-specific goals related to the plan of care.  The patient-specific goals include:  Outcome: Improving     Patient had been visible in the milieu this shift. She had been walking around. Stated she is happy to go home tomorrow. Full bright affect. At her snacks and supper meal. Denied mental health symptoms. Denied SI, SIB and hallucinations. She has been compliant with taking all her medications. Stated that she has dizziness at times but she drinks water. Denied pain. Pt was calm, pleasant and cooperative this shift. No behavioral concerns so far.

## 2021-03-09 NOTE — PLAN OF CARE
48 Hours Nursing Assessment/Discharge note  Shift Summary: Pt alert and oriented x 3. Presents polite, cooperative and calm. Able to communicate needs. Speech is clear and coherent.  Affect is flat and mood is calm . Poor concentrations. Insight and judgement imporving.  Hopeful for future. Visible in milieu ,but not social with other patient. Appetite adequate. Pt is medication compliance. Slept 7.0 hours last night.     Discharged today. All discharge medications and instructions were reviewed with pt. Copy of discharge instruction and unit address/phone number given to pt.  Walking, escorted down stairs and transferred to car safely.   At the time of discharge, pt denied any SI, SIB, HI, hallucination, racing thought, suicidal or homicidal ideations. No evidence of psychosis or paranoid/delusional thoughts. Denied both depression and anxiety.   Discharge medication: [facility] [address]  Discharge palace: Family home in [place]  Transportation: Cabbed   Outcome: progressing well   New Medications Today: none  Prn Medication given/Reason/effectiveness: none  Pain: Denied  Sensitivity/side effect: tolerating medications well. No side effect reported by pt or noted by this writer.  Valuable: Returned to pt from security.

## 2021-03-09 NOTE — DISCHARGE INSTRUCTIONS
Behavioral Discharge Planning and Instructions    Summary: You were admitted on 2/23/2021  due to psychosis.  You were treated by Dr. William Figueroa MD; Dr. Sandeep Cancino and discharged on 03/09/2021 from [facility] to your family home in [place].    Main Diagnosis: Schizoaffective Disorder, Depressive Type, Severe     Health Care Follow-up:   [facility] ACT Team:  Psychiatrist Dr. Walter Gaviria   Vicky Santos [phone]  Pharmacy is :[address]    You are currently Committed as Mentally ill through [facility].  Please take all medications as prescribed, keep all appointments with your ACT Team,  follow all directives of all providers of care.    Attend all scheduled appointments with your outpatient providers. Call at least 24 hours in advance if you need to reschedule an appointment to ensure continued access to your outpatient providers.     Major Treatments, Procedures and Findings:  You were provided with: a psychiatric assessment, assessed for medical stability, medication evaluation and/or management, group therapy and milieu management    Symptoms to Report: feeling more aggressive, increased confusion, losing more sleep, mood getting worse or thoughts of suicide    Early warning signs can include: increased depression or anxiety sleep disturbances increased thoughts or behaviors of suicide or self-harm  increased unusual thinking, such as paranoia or hearing voices    Safety and Wellness:  Take all medicines as directed.  Make no changes unless your doctor suggests them.  Follow treatment recommendations. Refrain from alcohol and non-prescribed drugs.  Ask your support system to help you reduce your access to items that could harm yourself or others. Items could include:    Firearms  Medicines (both prescribed and over-the-counter)  Knives and other sharp objects  Ropes and like materials  Car keys  If there is a concern for safety, call  911.    Resources:   { RESOURCES MB:184733}    General Medication Instructions:   See your medication sheet(s) for instructions.   Take all medicines as directed.  Make no changes unless your doctor suggests them.   Go to all your doctor visits.  Be sure to have all your required lab tests. This way, your medicines can be refilled on time.  Do not use any drugs not prescribed by your doctor.  Avoid alcohol.    Advance Directives:   Scanned document on file with [place]? No scanned doc  Is document scanned? No. Copy Requested.  Honoring Choices Your Rights Handout: Informed and given  Was more information offered? Pt declined    The Treatment team has appreciated the opportunity to work with you. If you have any questions or concerns about your recent admission, you can contact the unit which can receive your call 24 hours a day, 7 days a week. They will be able to get in touch with a Provider if needed. The unit number is *** .

## 2021-03-11 NOTE — DISCHARGE SUMMARY
Admit Date:     02/23/2021   Discharge Date:     03/09/2021      The patient was hospitalized at this facility between 02/23 and 3/9/2021.  On the day of discharge, was seen face-to-face for 32 minutes on station 10 of [facility].  Greater than 50% of this time was spent on counseling and coordinating care, discussing discharge medications and followup care.      CHIEF COMPLAINT AND REASON FOR ADMISSION:  The patient is a 40-year-old Ethiopian female well known to this hospital from previous hospitalizations suffering from schizoaffective disorder, depressive type, who was admitted voluntarily on the recommendations of her ACT team because of worsening of psychotic symptoms.  The patient herself presented as only a partially reliable historian, reported that she was hospitalized at this facility 8 days ago on station 30, which is correct.  She appeared to be stable on combination of Abilify Maintena and Clozaril.  Reports that when she returned home, she started hearing voices telling her that medication had marijuana in it, so she stopped taking medications.  Voices got even worse, started telling her to kill herself.  The patient was due for her next Abilify Maintena injection on 02/23 but refused to get it.  Her reentry ACT team nurse came to her home, but the patient refused to let her in, said that she wanted to stay at home with her 6 children aged from 4 to 16 Reentry team then called a taxi, and the patient came in voluntarily, knowing that if she would refuse, they would revoke her provisional discharge.  For more details about the patient's presentation and past psychiatric history, please refer to Dr. Sandeep Cancino's note from 02/23/2021.      DISCHARGE DIAGNOSIS:  Schizoaffective disorder, depressive type, severe.      CONSULTATIONS:  No consults were done.      LABORATORY WORK:  Comprehensive metabolic better was unremarkable.  Fasting lipid panel showed elevated low-density  cholesterol 112.  TSH was normal.  Glucose 97.  CBC with differential was done on 2 occasions.  On 02/25 absolute neutrophil number was 2.4, and on 03/04, this time absolute neutrophil number was 2.9.  COVID-19 nasopharyngeal swab was negative.  Urine drug screen negative for all screened substances.      HOSPITAL COURSE:  The patient presented as pretty pleasant, however depressed.  She cried a number of times.  Her ability to focus and concentrate appeared to be mildly impaired.  We discussed reasons for stopping medications.  Interestingly enough, she indicated that the medications in fact were helpful for voices.  It appears that she stopped them only because voices told her that medications were laced with marijuana.  Did have concerns about weight gain, was on a combination of Abilify Maintena, Zyprexa and clozapine.  We agreed to restart clozapine at a lower dose and try to gradually replace Zyprexa with clozapine.  The patient agreed to stay in the hospital voluntarily after was told that her provisional discharge would be revoked if she tried to leave.  She was followed by this provider in the beginning and at the end of hospitalization.  Reported almost immediate improvement in her auditory hallucinations.  Asked when she could be discharged home.  I was able to convince her that she needed to get better prior to discharge.  Reported mild dizziness, was recommended to drink water.  She stated that she would like her dose of clozapine be increased prior to her discharge.  Reported mild drooling at night.  Was reportedly eating well, sleeping okay, and improvement of auditory hallucinations.  Asked a number of times when she could be discharged home.  Family was contacted with her father.  The patient improved enough to be discharged back to the community.  Her ACT team was also informed about this, promised to start visiting patient.  She reported only mild dizziness in the morning, was encouraged to use  caution while sitting and standing up, drink more fluids.  Denied auditory or visual hallucinations.  Medications were sent to [facility] in [place].  On the day of discharge, was in a pretty good mood, smiled, happy to be discharged, thankful for the care provided to her, denied suicidal or homicidal thoughts, denied auditory or visual hallucinations, did not voice any bizarre delusions.  Will get her injection of Abilify Maintena 400 mg on  outside in community.  Will take:   1.  200 mg of clozapine by mouth at bedtime.   2.  Metformin 1000 mg XR at night.   3.  Multivitamins adult tablets 1 tablet daily.   4.  Zyprexa 10 mg 2 times a day p.r.n. for psychosis, david.   5.  ? 20 mg daily with dinner.   6.  ? 1300 mg 3 times a day during menstrual cycle.   7.  Trazodone 50 mg nightly as needed for sleep, can be repeated after 60 minutes.     8.  Vitamin D3 at 25 mcg daily.      The patient will continue to follow-up with her ACT team, Dr. Walter Gaviria,  Vicky Santos.         JANETTE SPENCER MD             D: 03/10/2021   T: 2021   MT: WALTER      Name:     BHAVIK CHRIS   MRN:      [id number]        Account:        [id number]   :      1980           Admit Date:     2021                                  Discharge Date: 2021      Document: [id number]

## 2021-05-10 ENCOUNTER — APPOINTMENT (OUTPATIENT)
Dept: CT IMAGING | Facility: CLINIC | Age: 41
End: 2021-05-10
Attending: EMERGENCY MEDICINE
Payer: COMMERCIAL

## 2021-05-10 ENCOUNTER — HOSPITAL ENCOUNTER (EMERGENCY)
Facility: CLINIC | Age: 41
Discharge: HOME | End: 2021-05-10
Attending: EMERGENCY MEDICINE | Admitting: EMERGENCY MEDICINE
Payer: COMMERCIAL

## 2021-05-10 VITALS
OXYGEN SATURATION: 100 % | RESPIRATION RATE: 16 BRPM | TEMPERATURE: 97.6 F | HEART RATE: 88 BPM | SYSTOLIC BLOOD PRESSURE: 126 MMHG | DIASTOLIC BLOOD PRESSURE: 80 MMHG | BODY MASS INDEX: 25.63 KG/M2 | WEIGHT: 154 LBS

## 2021-05-10 DIAGNOSIS — R42 VERTIGO: ICD-10-CM

## 2021-05-10 LAB
ANION GAP SERPL CALCULATED.3IONS-SCNC: 5 MMOL/L (ref 3–14)
BASOPHILS # BLD AUTO: 0 10E9/L (ref 0–0.2)
BASOPHILS NFR BLD AUTO: 0.4 %
BUN SERPL-MCNC: 13 MG/DL (ref 7–30)
CALCIUM SERPL-MCNC: 9 MG/DL (ref 8.5–10.1)
CHLORIDE SERPL-SCNC: 108 MMOL/L (ref 94–109)
CO2 SERPL-SCNC: 27 MMOL/L (ref 20–32)
CREAT SERPL-MCNC: 0.52 MG/DL (ref 0.52–1.04)
DIFFERENTIAL METHOD BLD: NORMAL
EOSINOPHIL # BLD AUTO: 0.1 10E9/L (ref 0–0.7)
EOSINOPHIL NFR BLD AUTO: 0.7 %
ERYTHROCYTE [DISTWIDTH] IN BLOOD BY AUTOMATED COUNT: 14.8 % (ref 10–15)
GFR SERPL CREATININE-BSD FRML MDRD: >90 ML/MIN/{1.73_M2}
GLUCOSE SERPL-MCNC: 93 MG/DL (ref 70–99)
HCG SERPL QL: NEGATIVE
HCT VFR BLD AUTO: 37 % (ref 35–47)
HGB BLD-MCNC: 12 G/DL (ref 11.7–15.7)
IMM GRANULOCYTES # BLD: 0 10E9/L (ref 0–0.4)
IMM GRANULOCYTES NFR BLD: 0.3 %
LYMPHOCYTES # BLD AUTO: 1.4 10E9/L (ref 0.8–5.3)
LYMPHOCYTES NFR BLD AUTO: 18 %
MCH RBC QN AUTO: 28.5 PG (ref 26.5–33)
MCHC RBC AUTO-ENTMCNC: 32.4 G/DL (ref 31.5–36.5)
MCV RBC AUTO: 88 FL (ref 78–100)
MONOCYTES # BLD AUTO: 0.6 10E9/L (ref 0–1.3)
MONOCYTES NFR BLD AUTO: 7.7 %
NEUTROPHILS # BLD AUTO: 5.6 10E9/L (ref 1.6–8.3)
NEUTROPHILS NFR BLD AUTO: 72.9 %
NRBC # BLD AUTO: 0 10*3/UL
NRBC BLD AUTO-RTO: 0 /100
PLATELET # BLD AUTO: 310 10E9/L (ref 150–450)
POTASSIUM SERPL-SCNC: 3.6 MMOL/L (ref 3.4–5.3)
RBC # BLD AUTO: 4.21 10E12/L (ref 3.8–5.2)
SODIUM SERPL-SCNC: 140 MMOL/L (ref 133–144)
WBC # BLD AUTO: 7.6 10E9/L (ref 4–11)

## 2021-05-10 PROCEDURE — 80048 BASIC METABOLIC PNL TOTAL CA: CPT | Performed by: EMERGENCY MEDICINE

## 2021-05-10 PROCEDURE — 85025 COMPLETE CBC W/AUTO DIFF WBC: CPT | Performed by: EMERGENCY MEDICINE

## 2021-05-10 PROCEDURE — 250N000013 HC RX MED GY IP 250 OP 250 PS 637: Performed by: EMERGENCY MEDICINE

## 2021-05-10 PROCEDURE — 84703 CHORIONIC GONADOTROPIN ASSAY: CPT | Performed by: EMERGENCY MEDICINE

## 2021-05-10 PROCEDURE — 99284 EMERGENCY DEPT VISIT MOD MDM: CPT | Mod: 25 | Performed by: EMERGENCY MEDICINE

## 2021-05-10 PROCEDURE — 70450 CT HEAD/BRAIN W/O DYE: CPT

## 2021-05-10 PROCEDURE — 99284 EMERGENCY DEPT VISIT MOD MDM: CPT | Performed by: EMERGENCY MEDICINE

## 2021-05-10 RX ORDER — MECLIZINE HYDROCHLORIDE 25 MG/1
50 TABLET ORAL ONCE
Status: COMPLETED | OUTPATIENT
Start: 2021-05-10 | End: 2021-05-10

## 2021-05-10 RX ORDER — MECLIZINE HYDROCHLORIDE 25 MG/1
TABLET ORAL
Qty: 21 TABLET | Refills: 0 | Status: SHIPPED | OUTPATIENT
Start: 2021-05-10 | End: 2021-08-16

## 2021-05-10 RX ADMIN — MECLIZINE HYDROCHLORIDE 50 MG: 25 TABLET ORAL at 18:11

## 2021-05-10 ASSESSMENT — ENCOUNTER SYMPTOMS
DIZZINESS: 1
NUMBNESS: 0
WEAKNESS: 0
FEVER: 0
HEADACHES: 1
NAUSEA: 1
COUGH: 0

## 2021-05-10 NOTE — ED PROVIDER NOTES
[facility] EMERGENCY DEPARTMENT ([facility])  5/10/21    History     Chief Complaint   Patient presents with     Nausea & Vomiting     Dizziness     The history is provided by the patient and medical records.     Nona Finley is a 40 year old female with a medical history significant for latent tuberculosis, bipolar 1 disorder, and schizoaffective disorder who presents to the Emergency Department with complaints of vertiginous type dizziness that started this morning after she got up out of bed.  Patient states that every time she moves her head the room starts to spin.  Patient states this is associated with an occipital headache but no focal numbness or weakness.  Patient denies any fevers, denies any coughing, states that she has had some nausea associated with this as well.  Patient does have a history of diabetes and has no previous history of vertigo.    This part of the document was transcribed by Atul Brown Scribe.        Past Medical History:   Diagnosis Date     Encounter for female birth control 6/14/2017 6/14/2017 Plan Documentation Service ordered Depo Provera injection (150mg IM) may be given every 3 months for one year per loretta. Plan and order should be renewed at a visit no later than 6/14/18   Vanessa Thompson MD         Encounter for insertion or removal of intrauterine contraceptive device 1/3/2008    Paragard 1/08 removed 1/08.     Postpartum depression 8/18/2011     Schizophrenia (H) 2/12/2019     Suicidal ideation 2/16/2017       Past Surgical History:   Procedure Laterality Date     NO HISTORY OF SURGERY       NO HISTORY OF SURGERY  06/13/2013    Per patient reported this       Family History   Problem Relation Age of Onset     Respiratory Father         asthma     Asthma Father      Cerebrovascular Disease Mother      Diabetes Mother      Neurologic Disorder Brother         mental health problem?     Neurologic Disorder Sister         seizures (half sister)      Respiratory Paternal Grandfather         asthma     Respiratory Sister         asthma     Respiratory Brother         asthma     Neurologic Disorder Daughter         autism      Hypertension Maternal Grandmother      Neurologic Disorder Sister         seizures     Diabetes Maternal Grandfather      Coronary Artery Disease Son        Social History     Tobacco Use     Smoking status: Never Smoker     Smokeless tobacco: Never Used   Substance Use Topics     Alcohol use: No         Current Outpatient Medications   Medication     ABILIFY MAINTENA 400 MG extended release injection     cloZAPine (FAZACLO) 100 MG ODT     metFORMIN (GLUCOPHAGE-XR) 500 MG 24 hr tablet     Multiple Vitamin (MULTIVITAMIN ADULT) TABS     OLANZapine (ZYPREXA) 10 MG tablet     rivaroxaban ANTICOAGULANT (XARELTO) 20 MG TABS tablet     senna-docusate (SENOKOT-S/PERICOLACE) 8.6-50 MG tablet     tranexamic acid (LYSTEDA) 650 MG tablet     traZODone (DESYREL) 50 MG tablet     Vitamin D3 (CHOLECALCIFEROL) 25 mcg (1000 units) tablet      No Known Allergies       Review of Systems   Constitutional: Negative for fever.   Respiratory: Negative for cough.    Gastrointestinal: Positive for nausea.   Neurological: Positive for dizziness and headaches. Negative for weakness and numbness.     A complete review of systems was performed with pertinent positives and negatives noted in the HPI, and all other systems negative.    Physical Exam   BP: 128/75  Pulse: 102  Temp: 97.2  F (36.2  C)  Resp: 18  Weight: 69.9 kg (154 lb)  SpO2: 100 %  Physical Exam  Vitals signs and nursing note reviewed.   HENT:      Head: Atraumatic.   Eyes:      Extraocular Movements: Extraocular movements intact.      Pupils: Pupils are equal, round, and reactive to light.      Comments: Patient currently not symptomatic laying on the cart and has no evidence of nystagmus.   Cardiovascular:      Rate and Rhythm: Regular rhythm.   Pulmonary:      Breath sounds: Normal breath sounds.    Abdominal:      Palpations: Abdomen is soft.   Skin:     General: Skin is warm.   Neurological:      General: No focal deficit present.      Mental Status: She is alert and oriented to person, place, and time.   Psychiatric:         Mood and Affect: Mood normal.         ED Course      Procedures           Results for orders placed or performed during the hospital encounter of 05/10/21 (from the past 24 hour(s))   CBC with platelets differential   Result Value Ref Range    WBC 7.6 4.0 - 11.0 10e9/L    RBC Count 4.21 3.8 - 5.2 10e12/L    Hemoglobin 12.0 11.7 - 15.7 g/dL    Hematocrit 37.0 35.0 - 47.0 %    MCV 88 78 - 100 fl    MCH 28.5 26.5 - 33.0 pg    MCHC 32.4 31.5 - 36.5 g/dL    RDW 14.8 10.0 - 15.0 %    Platelet Count 310 150 - 450 10e9/L    Diff Method Automated Method     % Neutrophils 72.9 %    % Lymphocytes 18.0 %    % Monocytes 7.7 %    % Eosinophils 0.7 %    % Basophils 0.4 %    % Immature Granulocytes 0.3 %    Nucleated RBCs 0 0 /100    Absolute Neutrophil 5.6 1.6 - 8.3 10e9/L    Absolute Lymphocytes 1.4 0.8 - 5.3 10e9/L    Absolute Monocytes 0.6 0.0 - 1.3 10e9/L    Absolute Eosinophils 0.1 0.0 - 0.7 10e9/L    Absolute Basophils 0.0 0.0 - 0.2 10e9/L    Abs Immature Granulocytes 0.0 0 - 0.4 10e9/L    Absolute Nucleated RBC 0.0    Basic metabolic panel   Result Value Ref Range    Sodium 140 133 - 144 mmol/L    Potassium 3.6 3.4 - 5.3 mmol/L    Chloride 108 94 - 109 mmol/L    Carbon Dioxide 27 20 - 32 mmol/L    Anion Gap 5 3 - 14 mmol/L    Glucose 93 70 - 99 mg/dL    Urea Nitrogen 13 7 - 30 mg/dL    Creatinine 0.52 0.52 - 1.04 mg/dL    GFR Estimate >90 >60 mL/min/[1.73_m2]    GFR Estimate If Black >90 >60 mL/min/[1.73_m2]    Calcium 9.0 8.5 - 10.1 mg/dL   HCG qualitative Blood   Result Value Ref Range    HCG Qualitative Serum Negative NEG^Negative   CT Head w/o Contrast    Narrative    CT HEAD W/O CONTRAST 5/10/2021 7:27 PM    INDICATION: Dizziness, non-specific; with Headache  TECHNIQUE: CT scan of the  head without contrast. Dose reduction  techniques were used.  CONTRAST: None.  COMPARISON: Brain MRI 3/12/2012    FINDINGS:   No intracranial hemorrhage, extraaxial collection, mass effect or CT  evidence of acute infarct.  Normal parenchymal density for age. The  ventricles and sulci are normal for age. Osseous structures are  intact. Unremarkable orbits. Paranasal sinuses are free of significant  disease. Clear mastoid air cells.      Impression    IMPRESSION:  No intracranial hemorrhage, mass, or definite CT evidence of recent  ischemia.    JOHN SALAS MD       Medications   meclizine (ANTIVERT) tablet 50 mg (50 mg Oral Given 5/10/21 1811)       Assessments & Plan (with Medical Decision Making)     I have reviewed the nursing notes.    Approximately 90 minutes after the Antivert was given the patient reported significant relief in her symptoms when moving.  Meanwhile her medical work-up was unremarkable and despite being on Xarelto the patient has no intracranial hemorrhage and the most likely diagnosis is benign positional vertigo.    I have reviewed the findings, diagnosis, plan and need for follow up with the patient.    New Prescriptions    MECLIZINE (ANTIVERT) 25 MG TABLET    Take 1 tablet 3 times daily for 5 days; then take 1 tablet 3 times daily as needed for persistent dizziness       Final diagnoses:   Vertigo     Please make an appointment to follow up with Your Primary Care Provider or our [facility] (phone: [phone]) in one week for recheck.    Return to the ER for any worsening or fever.    Routine discharge instructions were given for this diagnosis  --  ROLADN SPAIN MD   [facility] EMERGENCY DEPARTMENT  5/10/2021     Roland Spain MD  05/10/21 2004

## 2021-05-11 NOTE — ED NOTES
Pt given one prescription and instructed to f/u with PCP or ENT, number on discharge instructions.

## 2021-05-11 NOTE — DISCHARGE INSTRUCTIONS
Please make an appointment to follow up with Your Primary Care Provider or our [facility] (phone: [phone]) in one week for recheck.    Return to the ER for any worsening or fever.

## 2021-05-27 ENCOUNTER — RECORDS - HEALTHEAST (OUTPATIENT)
Dept: ADMINISTRATIVE | Facility: CLINIC | Age: 41
End: 2021-05-27

## 2021-05-30 ENCOUNTER — HEALTH MAINTENANCE LETTER (OUTPATIENT)
Age: 41
End: 2021-05-30

## 2021-06-01 ENCOUNTER — TELEPHONE (OUTPATIENT)
Dept: HEMATOLOGY | Facility: CLINIC | Age: 41
End: 2021-06-01

## 2021-06-01 NOTE — TELEPHONE ENCOUNTER
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  She has continued on Xarelto since that time.  When last seen in February, stopping anticoagulation had been discussed but she was hesitant.  She is calling today to say that she plans to stop the Xarelto for now.  She agreed to return to clinic as planned in September and appt was made.  She knows to call with any questions or concerns.    Sharlene Maldonado RN - Nurse Clinician - [facility] - [phone]

## 2021-08-16 ENCOUNTER — TELEPHONE (OUTPATIENT)
Dept: BEHAVIORAL HEALTH | Facility: CLINIC | Age: 41
End: 2021-08-16

## 2021-08-16 ENCOUNTER — HOSPITAL ENCOUNTER (INPATIENT)
Facility: CLINIC | Age: 41
End: 2021-08-16
Attending: PSYCHIATRY & NEUROLOGY | Admitting: PSYCHIATRY & NEUROLOGY
Payer: COMMERCIAL

## 2021-08-16 ENCOUNTER — HOSPITAL ENCOUNTER (INPATIENT)
Facility: CLINIC | Age: 41
LOS: 6 days | Discharge: HOME | DRG: 885 | End: 2021-08-23
Attending: PSYCHIATRY & NEUROLOGY | Admitting: PSYCHIATRY & NEUROLOGY
Payer: COMMERCIAL

## 2021-08-16 DIAGNOSIS — K59.03 DRUG-INDUCED CONSTIPATION: ICD-10-CM

## 2021-08-16 DIAGNOSIS — F25.9 ACUTE EXACERBATION OF CHRONIC SCHIZOAFFECTIVE SCHIZOPHRENIA (H): ICD-10-CM

## 2021-08-16 DIAGNOSIS — F99 INSOMNIA DUE TO OTHER MENTAL DISORDER: ICD-10-CM

## 2021-08-16 DIAGNOSIS — F51.05 INSOMNIA DUE TO OTHER MENTAL DISORDER: ICD-10-CM

## 2021-08-16 DIAGNOSIS — F25.1 SCHIZOAFFECTIVE DISORDER, DEPRESSIVE TYPE (H): Primary | ICD-10-CM

## 2021-08-16 DIAGNOSIS — R45.851 SUICIDAL IDEATION: ICD-10-CM

## 2021-08-16 DIAGNOSIS — Z20.822 COVID-19 RULED OUT BY LABORATORY TESTING: ICD-10-CM

## 2021-08-16 LAB
ALBUMIN SERPL-MCNC: 3.6 G/DL (ref 3.4–5)
ALP SERPL-CCNC: 66 U/L (ref 40–150)
ALT SERPL W P-5'-P-CCNC: 30 U/L (ref 0–50)
AMPHETAMINES UR QL SCN: NORMAL
ANION GAP SERPL CALCULATED.3IONS-SCNC: 3 MMOL/L (ref 3–14)
AST SERPL W P-5'-P-CCNC: 25 U/L (ref 0–45)
BARBITURATES UR QL: NORMAL
BASOPHILS # BLD AUTO: 0 10E3/UL (ref 0–0.2)
BASOPHILS NFR BLD AUTO: 1 %
BENZODIAZ UR QL: NORMAL
BILIRUB SERPL-MCNC: 0.2 MG/DL (ref 0.2–1.3)
BUN SERPL-MCNC: 14 MG/DL (ref 7–30)
CALCIUM SERPL-MCNC: 8.5 MG/DL (ref 8.5–10.1)
CANNABINOIDS UR QL SCN: NORMAL
CHLORIDE BLD-SCNC: 110 MMOL/L (ref 94–109)
CO2 SERPL-SCNC: 27 MMOL/L (ref 20–32)
COCAINE UR QL: NORMAL
CREAT SERPL-MCNC: 0.69 MG/DL (ref 0.52–1.04)
EOSINOPHIL # BLD AUTO: 0.2 10E3/UL (ref 0–0.7)
EOSINOPHIL NFR BLD AUTO: 3 %
ERYTHROCYTE [DISTWIDTH] IN BLOOD BY AUTOMATED COUNT: 14.9 % (ref 10–15)
GFR SERPL CREATININE-BSD FRML MDRD: >90 ML/MIN/1.73M2
GLUCOSE BLD-MCNC: 101 MG/DL (ref 70–99)
HCG UR QL: NEGATIVE
HCT VFR BLD AUTO: 36.5 % (ref 35–47)
HGB BLD-MCNC: 11.5 G/DL (ref 11.7–15.7)
IMM GRANULOCYTES # BLD: 0 10E3/UL
IMM GRANULOCYTES NFR BLD: 0 %
LYMPHOCYTES # BLD AUTO: 1.6 10E3/UL (ref 0.8–5.3)
LYMPHOCYTES NFR BLD AUTO: 29 %
MCH RBC QN AUTO: 27.9 PG (ref 26.5–33)
MCHC RBC AUTO-ENTMCNC: 31.5 G/DL (ref 31.5–36.5)
MCV RBC AUTO: 89 FL (ref 78–100)
MONOCYTES # BLD AUTO: 0.4 10E3/UL (ref 0–1.3)
MONOCYTES NFR BLD AUTO: 7 %
NEUTROPHILS # BLD AUTO: 3.3 10E3/UL (ref 1.6–8.3)
NEUTROPHILS NFR BLD AUTO: 60 %
NRBC # BLD AUTO: 0 10E3/UL
NRBC BLD AUTO-RTO: 0 /100
OPIATES UR QL SCN: NORMAL
PLATELET # BLD AUTO: 344 10E3/UL (ref 150–450)
POTASSIUM BLD-SCNC: 3.9 MMOL/L (ref 3.4–5.3)
PROT SERPL-MCNC: 8.3 G/DL (ref 6.8–8.8)
RBC # BLD AUTO: 4.12 10E6/UL (ref 3.8–5.2)
SARS-COV-2 RNA RESP QL NAA+PROBE: NEGATIVE
SODIUM SERPL-SCNC: 140 MMOL/L (ref 133–144)
TSH SERPL DL<=0.005 MIU/L-ACNC: 0.69 MU/L (ref 0.4–4)
WBC # BLD AUTO: 5.5 10E3/UL (ref 4–11)

## 2021-08-16 PROCEDURE — 81025 URINE PREGNANCY TEST: CPT | Performed by: PSYCHIATRY & NEUROLOGY

## 2021-08-16 PROCEDURE — 85025 COMPLETE CBC W/AUTO DIFF WBC: CPT | Performed by: PSYCHIATRY & NEUROLOGY

## 2021-08-16 PROCEDURE — 82040 ASSAY OF SERUM ALBUMIN: CPT | Performed by: PSYCHIATRY & NEUROLOGY

## 2021-08-16 PROCEDURE — 36415 COLL VENOUS BLD VENIPUNCTURE: CPT | Performed by: PSYCHIATRY & NEUROLOGY

## 2021-08-16 PROCEDURE — C9803 HOPD COVID-19 SPEC COLLECT: HCPCS | Performed by: PSYCHIATRY & NEUROLOGY

## 2021-08-16 PROCEDURE — 84466 ASSAY OF TRANSFERRIN: CPT

## 2021-08-16 PROCEDURE — 90791 PSYCH DIAGNOSTIC EVALUATION: CPT

## 2021-08-16 PROCEDURE — 99285 EMERGENCY DEPT VISIT HI MDM: CPT | Performed by: PSYCHIATRY & NEUROLOGY

## 2021-08-16 PROCEDURE — 82728 ASSAY OF FERRITIN: CPT

## 2021-08-16 PROCEDURE — U0005 INFEC AGEN DETEC AMPLI PROBE: HCPCS | Performed by: PSYCHIATRY & NEUROLOGY

## 2021-08-16 PROCEDURE — 99285 EMERGENCY DEPT VISIT HI MDM: CPT | Mod: 25 | Performed by: PSYCHIATRY & NEUROLOGY

## 2021-08-16 PROCEDURE — 80307 DRUG TEST PRSMV CHEM ANLYZR: CPT | Performed by: PSYCHIATRY & NEUROLOGY

## 2021-08-16 PROCEDURE — 83550 IRON BINDING TEST: CPT

## 2021-08-16 PROCEDURE — 84443 ASSAY THYROID STIM HORMONE: CPT | Performed by: PSYCHIATRY & NEUROLOGY

## 2021-08-16 RX ORDER — OLANZAPINE 5 MG/1
5 TABLET ORAL DAILY
Status: ON HOLD | COMMUNITY
Start: 2021-08-10 | End: 2021-08-23

## 2021-08-16 ASSESSMENT — ENCOUNTER SYMPTOMS
GASTROINTESTINAL NEGATIVE: 1
RESPIRATORY NEGATIVE: 1
DECREASED CONCENTRATION: 1
SLEEP DISTURBANCE: 1
NEUROLOGICAL NEGATIVE: 1
MUSCULOSKELETAL NEGATIVE: 1
HALLUCINATIONS: 1
NERVOUS/ANXIOUS: 1
EYES NEGATIVE: 1
CARDIOVASCULAR NEGATIVE: 1
ACTIVITY CHANGE: 1
APPETITE CHANGE: 1

## 2021-08-16 NOTE — ED PROVIDER NOTES
ED Provider Note  [facility]      History     Chief Complaint   Patient presents with     Hallucinations     HPI  Nona Finley is a 41 year old female who is here reporting relapsing of perjorative auditory hallucinations that are telling her to kill herself and her kids. Patient has schizoaffective disorder and been hospitalized multiple times. She is under commitment. She is followed by an ACT team. Her clozaril dose has been increased and Zyprexa was added to address her breakthrough symptoms but she continues to experience them. She has been sleeping and eating poorly. She had thoughts of going to the river nearby to drown herself. She planned on going yesterday. She now feels unsafe. She would like to come into the hospital for stabilization.    Patient denies using drugs. She has no acute medical concerns. She denies COVID symptoms.    Please see [facility] Crisis Assessment on 8/16/21 in Epic for further details.    PERSONAL MEDICAL HISTORY  Past Medical History:   Diagnosis Date     Encounter for female birth control 6/14/2017 6/14/2017 Plan Documentation Service ordered Depo Provera injection (150mg IM) may be given every 3 months for one year per loretta. Plan and order should be renewed at a visit no later than 6/14/18   Vanessa Thompson MD         Encounter for insertion or removal of intrauterine contraceptive device 1/3/2008    Paragard 1/08 removed 1/08.     Postpartum depression 8/18/2011     Schizophrenia (H) 2/12/2019     Suicidal ideation 2/16/2017     PAST SURGICAL HISTORY  Past Surgical History:   Procedure Laterality Date     NO HISTORY OF SURGERY       NO HISTORY OF SURGERY  06/13/2013    Per patient reported this     FAMILY HISTORY  Family History   Problem Relation Age of Onset     Respiratory Father         asthma     Asthma Father      Cerebrovascular Disease Mother      Diabetes Mother      Neurologic Disorder Brother         mental health problem?     Neurologic  Disorder Sister         seizures (half sister)     Respiratory Paternal Grandfather         asthma     Respiratory Sister         asthma     Respiratory Brother         asthma     Neurologic Disorder Daughter         autism      Hypertension Maternal Grandmother      Neurologic Disorder Sister         seizures     Diabetes Maternal Grandfather      Coronary Artery Disease Son      SOCIAL HISTORY  Social History     Tobacco Use     Smoking status: Never Smoker     Smokeless tobacco: Never Used   Substance Use Topics     Alcohol use: No     MEDICATIONS  No current facility-administered medications for this encounter.     Current Outpatient Medications   Medication     Multiple Vitamin (MULTIVITAMIN ADULT) TABS     ABILIFY MAINTENA 400 MG extended release injection     cloZAPine (FAZACLO) 100 MG ODT     meclizine (ANTIVERT) 25 MG tablet     metFORMIN (GLUCOPHAGE-XR) 500 MG 24 hr tablet     OLANZapine (ZYPREXA) 10 MG tablet     rivaroxaban ANTICOAGULANT (XARELTO) 20 MG TABS tablet     senna-docusate (SENOKOT-S/PERICOLACE) 8.6-50 MG tablet     tranexamic acid (LYSTEDA) 650 MG tablet     traZODone (DESYREL) 50 MG tablet     Vitamin D3 (CHOLECALCIFEROL) 25 mcg (1000 units) tablet     ALLERGIES  No Known Allergies       Review of Systems   Constitutional: Positive for activity change and appetite change.   HENT: Negative.    Eyes: Negative.    Respiratory: Negative.    Cardiovascular: Negative.    Gastrointestinal: Negative.    Genitourinary: Negative.    Musculoskeletal: Negative.    Skin: Negative.    Neurological: Negative.    Psychiatric/Behavioral: Positive for decreased concentration, hallucinations, sleep disturbance and suicidal ideas. The patient is nervous/anxious.    All other systems reviewed and are negative.        Physical Exam   BP: 117/71  Pulse: 96  SpO2: 100 %  Physical Exam  Vitals and nursing note reviewed.   HENT:      Head: Normocephalic.   Eyes:      Pupils: Pupils are equal, round, and reactive to  light.   Pulmonary:      Effort: Pulmonary effort is normal.   Musculoskeletal:         General: Normal range of motion.      Cervical back: Normal range of motion.   Neurological:      General: No focal deficit present.      Mental Status: She is alert.   Psychiatric:         Attention and Perception: Attention and perception normal. She does not perceive auditory or visual hallucinations.         Mood and Affect: Mood is anxious and depressed.         Speech: Speech normal.         Behavior: Behavior normal. Behavior is not agitated, aggressive, hyperactive or combative. Behavior is cooperative.         Thought Content: Thought content includes homicidal and suicidal ideation.         Cognition and Memory: Cognition and memory normal.         Judgment: Judgment normal.         ED Course      Procedures            Results for orders placed or performed during the hospital encounter of 08/16/21   CBC with platelets differential     Status: None ()    Narrative    The following orders were created for panel order CBC with platelets differential.  Procedure                               Abnormality         Status                     ---------                               -----------         ------                     CBC with platelets and d...[[id number]]                                                   Please view results for these tests on the individual orders.     Medications - No data to display     Assessments & Plan (with Medical Decision Making)   Patient with history of schizoaffective disorder who presently has breakthrough command hallucinations that tell her to harm herself and her kids. She does not feel safe being in the community presently. She is referred for admission. She is voluntary.    I have reviewed the nursing notes. I have reviewed the findings, diagnosis, plan and need for follow up with the patient.    New Prescriptions    No medications on file       Final diagnoses:   Acute exacerbation  of chronic schizoaffective schizophrenia (H)   Suicidal ideation       --  Serg Chauhan MD  formerly [facility] EMERGENCY DEPARTMENT  8/16/2021     Serg Chauhan MD  08/16/21 [id number]
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
## 2021-08-16 NOTE — ED TRIAGE NOTES
Pt is having command hallucinations telling her to take herself to the river and kill herself.  Pt reports this is an ongoing issue and they have increased her meds but the Pt feels they have made things worse.  Pt denies any SI attempt.

## 2021-08-16 NOTE — TELEPHONE ENCOUNTER
S: Marielena, [facility], 41/F, psychosis     B: Hx of schizophrenia   Pt has often admissions   Pt repots AH command to kill herself and her children   Pt reports she was planning to go to the river yesterday but was able to stop herself   Pt reports she hasn't been able to sleep or eat due to her sx   Pt rpeorts med changes last week and reports things got worse     Medically cleared, eating, drinking, ambulating indep  Patient cleared and ready for behavioral bed placement: Yes   No covid concerns, test ordered     A: Voluntary     R: Pt placed on work list until appropriate placement is available

## 2021-08-16 NOTE — ED NOTES
ED to Behavioral Floor Handoff    SITUATION  Nona Finley is a 41 year old female who speaks Australian and lives in a home with family members The patient arrived in the ED by private car from home with a complaint of Hallucinations  .The patient's current symptoms started/worsened 2 day(s) ago and during this time the symptoms have increased.   In the ED, pt was diagnosed with   Final diagnoses:   Acute exacerbation of chronic schizoaffective schizophrenia (H)   Suicidal ideation        Initial vitals were: BP: 117/71  Pulse: 96  SpO2: 100 %   --------  Is the patient diabetic? No   If yes, last blood glucose? --     If yes, was this treated in the ED? --  --------  Is the patient inebriated (ETOH) No or Impaired on other substances? No  MSSA done? N/A  Last MSSA score: --    Were withdrawal symptoms treated? N/A  Does the patient have a seizure history? No. If yes, date of most recent seizure--  --------  Is the patient patient experiencing suicidal ideation? reports suicidal ideation with out intention or a suicidal plan    Homicidal ideation? denies current or recent homicidal ideation or behaviors.    Self-injurious behavior/urges? denies current or recent self injurious behavior or ideation.  ------  Was pt aggressive in the ED No  Was a code called No  Is the pt now cooperative? Yes  -------  Meds given in ED: Medications - No data to display   Family present during ED course? No  Family currently present? No    BACKGROUND  Does the patient have a cognitive impairment or developmental disability? No  Allergies: No Known Allergies.   Social demographics are   Social History     Socioeconomic History     Marital status:      Spouse name: None     Number of children: 2     Years of education: None     Highest education level: None   Occupational History     Employer: NONE    Tobacco Use     Smoking status: Never Smoker     Smokeless tobacco: Never Used   Substance and Sexual Activity     Alcohol use: No      Drug use: No     Sexual activity: Yes     Partners: Male     Birth control/protection: None   Other Topics Concern      Service Not Asked     Blood Transfusions No     Caffeine Concern Not Asked     Occupational Exposure No     Hobby Hazards Not Asked     Sleep Concern Not Asked     Stress Concern Not Asked     Weight Concern Not Asked     Special Diet Not Asked     Back Care Not Asked     Exercise Not Asked     Bike Helmet Not Asked     Seat Belt Not Asked     Self-Exams Not Asked     Parent/sibling w/ CABG, MI or angioplasty before 65F 55M? Not Asked   Social History Narrative    Caffeine intake/servings daily - 2 times a week    Calcium intake/servings daily - 1-2    Exercise no times weekly - describe active with children    Sunscreen used - soemtimes    Seatbelts used - Yes    Guns stored in the home - Yes    Self Breast Exam - Yes    Pap test up to date -  Yes    Eye exam up to date -  No    Dental exam up to date -  No    DEXA scan up to date -  Not Applicable    Flex Sig/Colonoscopy up to date -  Not Applicable    Mammography up to date -  Not Applicable    Immunizations reviewed and up to date - needs tdap at postpartum    Abuse: Current or Past (Physical, Sexual or Emotional) - No    Do you feel safe in your environment - Yes    Do you cope well with stress - sometimes    Do you suffer from insomnia - No    Last updated by: Joanne Gilligan RN 12/16/10                         Social Determinants of Health     Financial Resource Strain:      Difficulty of Paying Living Expenses:    Food Insecurity:      Worried About Running Out of Food in the Last Year:      Ran Out of Food in the Last Year:    Transportation Needs:      Lack of Transportation (Medical):      Lack of Transportation (Non-Medical):    Physical Activity:      Days of Exercise per Week:      Minutes of Exercise per Session:    Stress:      Feeling of Stress :    Social Connections:      Frequency of Communication with Friends and  Family:      Frequency of Social Gatherings with Friends and Family:      Attends Mandaeism Services:      Active Member of Clubs or Organizations:      Attends Club or Organization Meetings:      Marital Status:    Intimate Partner Violence:      Fear of Current or Ex-Partner:      Emotionally Abused:      Physically Abused:      Sexually Abused:         ASSESSMENT  Labs results Labs Ordered and Resulted from Time of ED Arrival Up to the Time of Departure from the ED - No data to display   Imaging Studies: No results found for this or any previous visit (from the past 24 hour(s)).   Most recent vital signs /71   Pulse 96   LMP 08/09/2021   SpO2 100%    Abnormal labs/tests/findings requiring intervention:---   Pain control: pt had none  Nausea control: pt had none    RECOMMENDATION  Are any infection precautions needed (MRSA, VRE, etc.)? No If yes, what infection? --  ---  Does the patient have mobility issues? independently. If yes, what device does the pt use? ---  ---  Is patient on 72 hour hold or commitment? No If on 72 hour hold, have hold and rights been given to patient? N/A  Are admitting orders written if after 10 p.m. ?N/A  Tasks needing to be completed:---     Janie Sotelo, RN    [phone] [place] [facility]   [phone] [facility] ED

## 2021-08-16 NOTE — SAFE
Nona Finley  August 16, 2021    Critical Safety Issues:  Command hallucinations telling her to kill herself and her children.      Current Suicidal Ideation/Self-Injurious Concerns/Methods: Jumping (from building, into traffic, etc.)      Current or Historical Inappropriate Sexual Behavior: No      Current or Historical Aggression/Homicidal Ideation: None - N/A      Triggers: Medication changes. Daughters autism.    Updated care team: Yes: Dr. Chauhan    For additional details see full [facility] assessment.       BREANNA Knight

## 2021-08-16 NOTE — PHARMACY-ADMISSION MEDICATION HISTORY
"  Admission Medication History Completed by Pharmacy    See [facility] Admission Navigator for allergy information, preferred outpatient pharmacy, prior to admission medications and immunization status.     Medication History Sources:     Patient    Dispense History    [facility]    Changes made to PTA medication list (reason):    Added:  o Olanzapine 5mg tablet -- take 1 tablet daily (per [facility])    Deleted:  o Meclizine 25mg tablet (not needed anymore, per patient)    Changed:  o \"patient not taking\":  - Metformin 500mg tablet (caused upset stomach, per patient)  - Xarelto 20mg tablet (told by physician it was okay to stop after 6 months post pulmonary embolism last year, per patient)  - Senna-docusate 8.6-50mg tablet (does not use except in hospital, per patient)  - Lysteda 650mg tablet (not needed, per patient)  - Trazodone 50mg tablet (does not recognize medication, per patient)  - Olanzapine 10mg tablet (replaced by 5mg tablet, per patient & [facility])    Additional Information:    Patient states taking all daily Rx medications in the evening.      Prior to Admission medications    Medication Sig Last Dose Taking? Auth Provider   ABILIFY MAINTENA 400 MG extended release injection Inject 2 mLs (400 mg) into the muscle every 28 days Due on 3/25 8/10/2021 Yes Sandeep Cancino MD   cloZAPine (FAZACLO) 100 MG ODT Take 2 tablets (200 mg) by mouth At Bedtime  Patient taking differently: Take 250 mg by mouth At Bedtime  8/15/2021 at pm Yes Sandeep Cancino MD   Multiple Vitamin (MULTIVITAMIN ADULT) TABS Take 1 tablet by mouth daily 8/15/2021 at pm Yes Sandeep Cancino MD   OLANZapine (ZYPREXA) 5 MG tablet Take 5 mg by mouth daily 8/15/2021 at pm Yes Reported, Patient   Vitamin D3 (CHOLECALCIFEROL) 25 mcg (1000 units) tablet Take 1 tablet (25 mcg) by mouth daily 8/15/2021 at pm Yes Sandeep Cancino MD   metFORMIN (GLUCOPHAGE-XR) 500 MG 24 hr tablet Take 2 tablets (1,000 mg) by " mouth daily (with dinner)  Patient not taking: Reported on 8/16/2021 Not Taking  Sandeep Cancino MD   OLANZapine (ZYPREXA) 10 MG tablet Take 1 tablet (10 mg) by mouth 3 times daily as needed (associated with psychosis or david)  Patient not taking: Reported on 8/16/2021 Not Taking  Sandeep Cancino MD   rivaroxaban ANTICOAGULANT (XARELTO) 20 MG TABS tablet Take 1 tablet (20 mg) by mouth daily (with dinner)  Patient not taking: Reported on 8/16/2021 Not Taking  Sandeep Cancino MD   senna-docusate (SENOKOT-S/PERICOLACE) 8.6-50 MG tablet Take 1 tablet by mouth 2 times daily as needed for constipation  Patient not taking: Reported on 8/16/2021 Not Taking  Sandeep Cancino MD   tranexamic acid (LYSTEDA) 650 MG tablet Take 2 tablets (1,300 mg) by mouth 3 times daily During menstrual cycle  Patient not taking: Reported on 8/16/2021 Not Taking  Sandeep Cancino MD   traZODone (DESYREL) 50 MG tablet Take 1 tablet (50 mg) by mouth nightly as needed for sleep (may repeat after 60 minutes)  Patient not taking: Reported on 8/16/2021 Not Taking  Sandeep Cancino MD       Date completed: 08/16/21    Medication history completed by: Lizett Shields

## 2021-08-17 PROBLEM — F25.9 ACUTE EXACERBATION OF CHRONIC SCHIZOAFFECTIVE SCHIZOPHRENIA (H): Status: ACTIVE | Noted: 2021-08-17

## 2021-08-17 PROCEDURE — 124N000002 HC R&B MH UMMC

## 2021-08-17 PROCEDURE — 99223 1ST HOSP IP/OBS HIGH 75: CPT | Mod: AI | Performed by: PSYCHIATRY & NEUROLOGY

## 2021-08-17 PROCEDURE — 250N000013 HC RX MED GY IP 250 OP 250 PS 637

## 2021-08-17 PROCEDURE — 250N000013 HC RX MED GY IP 250 OP 250 PS 637: Performed by: PSYCHIATRY & NEUROLOGY

## 2021-08-17 RX ORDER — OLANZAPINE 10 MG/2ML
10 INJECTION, POWDER, FOR SOLUTION INTRAMUSCULAR 3 TIMES DAILY PRN
Status: DISCONTINUED | OUTPATIENT
Start: 2021-08-17 | End: 2021-08-18

## 2021-08-17 RX ORDER — MAGNESIUM HYDROXIDE/ALUMINUM HYDROXICE/SIMETHICONE 120; 1200; 1200 MG/30ML; MG/30ML; MG/30ML
30 SUSPENSION ORAL EVERY 4 HOURS PRN
Status: DISCONTINUED | OUTPATIENT
Start: 2021-08-17 | End: 2021-08-23 | Stop reason: HOSPADM

## 2021-08-17 RX ORDER — AMOXICILLIN 250 MG
1 CAPSULE ORAL 2 TIMES DAILY PRN
Status: DISCONTINUED | OUTPATIENT
Start: 2021-08-17 | End: 2021-08-23 | Stop reason: HOSPADM

## 2021-08-17 RX ORDER — OLANZAPINE 5 MG/1
5 TABLET, ORALLY DISINTEGRATING ORAL AT BEDTIME
Status: DISCONTINUED | OUTPATIENT
Start: 2021-08-17 | End: 2021-08-18

## 2021-08-17 RX ORDER — OLANZAPINE 10 MG/1
10 TABLET ORAL 3 TIMES DAILY PRN
Status: DISCONTINUED | OUTPATIENT
Start: 2021-08-17 | End: 2021-08-18

## 2021-08-17 RX ORDER — HALOPERIDOL 5 MG/1
5 TABLET ORAL AT BEDTIME
Status: DISCONTINUED | OUTPATIENT
Start: 2021-08-17 | End: 2021-08-18

## 2021-08-17 RX ORDER — ACETAMINOPHEN 325 MG/1
650 TABLET ORAL EVERY 4 HOURS PRN
Status: DISCONTINUED | OUTPATIENT
Start: 2021-08-17 | End: 2021-08-23 | Stop reason: HOSPADM

## 2021-08-17 RX ADMIN — OLANZAPINE 5 MG: 5 TABLET, ORALLY DISINTEGRATING ORAL at 03:57

## 2021-08-17 RX ADMIN — Medication 5 MG: at 00:17

## 2021-08-17 RX ADMIN — Medication 5 MG: at 21:14

## 2021-08-17 RX ADMIN — CLOZAPINE 250 MG: 100 TABLET, ORALLY DISINTEGRATING ORAL at 20:48

## 2021-08-17 RX ADMIN — OLANZAPINE 5 MG: 5 TABLET, ORALLY DISINTEGRATING ORAL at 20:48

## 2021-08-17 RX ADMIN — HALOPERIDOL 5 MG: 5 TABLET ORAL at 20:49

## 2021-08-17 RX ADMIN — CLOZAPINE 250 MG: 100 TABLET, ORALLY DISINTEGRATING ORAL at 04:24

## 2021-08-17 ASSESSMENT — ACTIVITIES OF DAILY LIVING (ADL)
LAUNDRY: UNABLE TO COMPLETE
ORAL_HYGIENE: INDEPENDENT
DRESS: INDEPENDENT
DIFFICULTY_COMMUNICATING: NO
DOING_ERRANDS_INDEPENDENTLY_DIFFICULTY: NO
WEAR_GLASSES_OR_BLIND: NO
DRESS: INDEPENDENT
CONCENTRATING,_REMEMBERING_OR_MAKING_DECISIONS_DIFFICULTY: NO
TOILETING_ISSUES: NO
HYGIENE/GROOMING: INDEPENDENT
ORAL_HYGIENE: INDEPENDENT
HYGIENE/GROOMING: INDEPENDENT
HYGIENE/GROOMING: INDEPENDENT
DRESSING/BATHING_DIFFICULTY: NO
DIFFICULTY_EATING/SWALLOWING: NO
ORAL_HYGIENE: INDEPENDENT
WALKING_OR_CLIMBING_STAIRS_DIFFICULTY: NO
FALL_HISTORY_WITHIN_LAST_SIX_MONTHS: NO
DRESS: INDEPENDENT

## 2021-08-17 ASSESSMENT — MIFFLIN-ST. JEOR: SCORE: 1381.64

## 2021-08-17 NOTE — PLAN OF CARE
"Addendum             []Hide copied text    []Rupa for details  Initial Psychosocial Assessment     I have reviewed the chart, met with the patient, and developed Care Plan.       Presenting Problem:  Per ED:  Nona Finley is a 41 year old female who is here reporting relapsing of perjorative auditory hallucinations that are telling her to kill herself and her kids. Patient has schizoaffective disorder and been hospitalized multiple times. She is under commitment. She is followed by an ACT team. Her clozaril dose has been increased and Zyprexa was added to address her breakthrough symptoms but she continues to experience them. She has been sleeping and eating poorly. She had thoughts of going to the river nearby to drown herself. She planned on going yesterday. She now feels unsafe. She would like to come into the hospital for stabilization.     Patient denies using drugs. She has no acute medical concerns. She denies COVID symptoms.      From last hospitalization  The patient is a 40-year old Nigerian female readmitted to the hospital after being discharged a week ago.  She is non-compliant with her medications stopped taking them 3 days ago and is refusing them here.  She was Recommitted as MI until 4/2/2021 in [facility] and there is an Active Reyes in place as well. Patient diagnosis of Schizoaffective Disorder, Depressive Type.  She was due for her monthly Abilify injection but hates it because she gains weight.  ReEntry ACT Team Nurse came to her home and patient refused to let her in saying she wanted to stay at home with her 6 children ages 4 to 16 years.  The ReEntry ACT Team then called a taxi and she came voluntarily into the hospital knowing that if she had refused, they would Revoke her PD.  She admits to having AH of voices telling her to kill her children with a knife \"piece by piece.\" She is NOT trying to act on this voice command. The struggle with voices makes her feel suicidal.  She is " Covid-19 negative and is a Voluntary patient.      History of Mental Health and Chemical Dependency:  Discharged from [facility] on 3/9/2021  Just discharged from [facility] on 2/16/2021.  Multiple hospitalizations over the years.  No CD or alcohol history.     Family Description (Constellation, Family Psychiatric History):  The patient is  with 6 children ranging in age from 4-16 years old. Her in-laws provide PCA services to help for her at home because her  is doing a [employer]idency out of state. One of her brothers also suffers from mental illness.     Significant Life Events (Illness, Abuse, Trauma, Death):  Fled [place] when she was in the 5th Grade. Significant Trauma history.       Living Situation:  Lives in [facility] [place] [place] with her family.     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     Financial Status:   provides financial support.     Legal Issues:  Sara Cabral MI Commitment with a Reyes Order (In the chart!)     Ethnic/Cultural Issues:  Jain. She is bilingual. Prays and reads the Koran     Spiritual Orientation:  Jain      Service History:  No     Current Treatment Providers are:  ReEntry Act Team--  Dr. Walter Gaviria - Psychiatrist    is Vicky Santos [phone] and [phone]     Primary Care is Dr. Lissy Neff at [facility] [phone] located at  [address] [facility] [address]  Social Service Assessment/Plan:  Provide a psychological assessment and manage medications per psychiatry. CTC to meet with patient to discuss discharge plans and continue to assess for services needed. Staff to provide a safe environment and provide a therapeutic milieu.

## 2021-08-17 NOTE — TELEPHONE ENCOUNTER
R:  11:30PM- Dr. Cordon accepts for 4500/[facility].      Labs were WNL.  COVID is negative.   UTOX is negative.  HCG is negative.      Per ANS, unit is capped at 14.  No more admission.     Pt is placed on worklist pending available bed.

## 2021-08-17 NOTE — H&P
"    ----------------------------------------------------------------------------------------------------------  [facility]  History and Physical  Nona Finley MRN# [id number]   Age: 41 year old YOB: 1980   Date of Admission:  8/16/2021  (information obtained from patient interview and chart review)    Contacts:   Primary Outpatient Psychiatrist: Dr. Walter Gaviria     Primary Physician: Rip Capps  Therapist: Brittnee  : Vicky Harrington [phone], She is currently under a commitment with a Jarvais in place. She is followed by the [facility] ACT Team.   Family Members: Lindsay Singhsim ()     HPI:    Chief Complaint: \"I hear voices to kill myself and my children\"    History of Present Illness:  Nona Finley is a 41 year old female previously diagnosed with schizphrenia and post partum depression with psychosis who presented on 08/17/2021 with suicidal ideation and plan in the context of medication changes.   She was medically cleared for admission to inpatient psychiatric unit. The risks, benefits, alternatives, and side effects of treatments including no treatment have been discussed and are understood by the patient and other caregivers.    Per ED report:  Nona Finley is a 41 year old female who is here reporting relapsing of pejorative auditory hallucinations that are telling her to kill herself and her kids. Patient has schizoaffective disorder and been hospitalized multiple times. She is under commitment. She is followed by an ACT team. Her clozaril dose has been increased and Zyprexa was added to address her breakthrough symptoms but she continues to experience them. She has been sleeping and eating poorly. She had thoughts of going to the river nearby to drown herself. She planned on going yesterday. She now feels unsafe. She would like to come into the hospital for stabilization. Patient denies using drugs. She has no acute medical " "concerns. She denies COVID symptoms.    Per patient report:    Nona states \"I am not feeling well\". She shares that she was hearing voices that were telling her to kill herself. It was very distressing to her. This has been going on for the past 2 weeks. She has been taking her medications regularly but she states that they have not been working. She states that she feels worthless, weak and cannot help herself and that she needs to leave the world.  She has not made any attemps. Her plan was to jump off the bridge and she was very close to doing that. She left her house one night and walked 3 blocks then thought about her children and came back. She has 6 kids. She states that voices told her to jump off the bridge and it very hard for her to resist the commands of her auditory hallucinations. 2 weeks prior she didn't have those symptoms and these symptoms came \"from nowhere\". Her current stressors include raising a daughter with autism and taking care of her 6 children.   She states that she cannot sleep without medication. She would be awaken by the voices. Every night she would get 4 hrs of sleep.  She experiences from the food smells of blood and bleach which causes her to avoid eating. She is aware that she prepares the food herself and she does not put those things in the food but the voices tell her there is poison in the food. She believes that what the voices say is true. In the past the voices have told her that her  will kill her which ended up not being true.     She took haldol injections once a month in the past which helped. Discussed that if Haldol is on the Reyes then it will be prescribed, if not it will be added. Reyes has haldol included and she will start 5 mg Haldol HS.   She states that she had Abilify injection last week which does not help manage her symptoms. Clozapine she states causes drooling and dizziness for her. She states that she has not been drinking often.    When " asked about visual hallucination she states that sometimes she sees people with knives and they are everywhere she goes. She is scared of those figures.      She lives with her children and her  has been away for 2 years doing his medical residency in [place].    She has not experienced trauma in the past. Since she was a young child she had been dealing with mental health concerns but her parents did not think she had a mental illness. She was 13 when visual hallucinations started. She moved the [place] after fleeing the civil war in [place] and lived with relatives. She was unable to obtain a job because of the hallucinations because those would distract her and it was really hard to focus. She states that she does not receive disability benefits since she was working part time in the past and was also denied due to her 's income.     Nothing has triggered this episode. Her  is her support system and her brother who she sees occasionally. She also has an ACT team who give her her medications at her house.     It was discussed that she will get her hijab back  and she states she has no plan to harm herself with her hijab.  She was encouraged to talk to staff and providers if she feels thinks about harming  herself.      She thought of going to a group home after discharge to get a break from her children. It was discussed with her that once her symptoms are managed, the team will revisit the idea of referring her to a group home.       Per DEC assessment:  Patient is a 41 year old  and -American female who presented to the ED by Community Provider related to concerns for command hallucinations.She was going to go to the river last night. She also told her children she was going to kill them making them afraid.  Patient reports she was dropped off by her  Vicky Harrington. She reports a few weeks of increased command hallucinations telling her to go to the  river and kill herself and her children. She had a recent medication increase which she states did not help, or possibly made it worse.       Psychotherapy techniques or interventions utilized throughout assessment include: Establishing rapport, Active listening, Assess dimensions of crisis and Apply solution-focused therapy to address current crisis    Patient presents to the hospital with her  for an acute exacerbation of her schizophrenia. She reports command hallucinations to kill herself and her children. Patient also reports inability to eat as she believes there is poison or bleach in her food. Her sleep is interrupted by the command hallucinations telling her to kill herself. She also reports feeling sad and crying.       Psychiatric ROS:  Depression: suicidal ideation, suicidal ideation with plan, without intent, depressed mood, low energy, insomnia and feeling worthless  Martha/Hypomania:  none  Anxiety: overwhelmed  Panic Attack:  none  Psychosis: auditory hallucinations with commands [details in Interim History] and visual hallucinations [details in Interim History]  Trauma Related: none  Personality Symptoms: none  Obsessive Compulsive Disorder: negative    DMDD: None  Eating Disorders: negative  Oppositional Defiant Disorder/ conduct: none  ADHD: No symptoms  LD: No previously diagnosed or signs of symptoms of learning disorder reported   ASD: none  RAD: none  Suicidal Ideation: go to the river and kill herself  Homicidal Ideation: none at the moment      Medical ROS: A complete medical review of systems was preformed and is negative other than noted in the HPI     Psychiatric History:     Prior diagnoses: schizoaffective disorder bipolar type    Hospitalizations: Patient has been hospitalized around 13 times for mental health issues. 2/11/19-2/18/19 at [facility] for psychosis with command AH, 5/26/18-5/29/18 for psychosis, and 3/21/17-4/14/17 for AH, VH, and SI due to medication  non-compliance.   most recent: discharged from [facility] on 2/16/2021    Suicide attempts: One in past, attempted to jump off bridge but  stopped her    Self-injurious behavior: none    Violence: none     ECT/TMS: Received series of ECT in 2012    Past medications: Many anti-psychotics, reportedly quickly stabilizes once clozapine is titrated to PTA dose    1.  Abilify Maintena 400 mg every 28 days.  She was due for her next injection on 02/23/2021.   2.  Clozapine 200 mg at bedtime.   3.  Lorazepam 0.5 mg at bedtime.   4.  Metformin 1000 mg with dinner.   5.  Zyprexa 20 mg with dinner.     6.  Xarelto 20 mg daily with dinner.     7.  Lysteda 1300 mg 3 times a day during menstrual cycle   8.  Cholecalciferol 25 mcg daily.   9.  Multivitamins with minerals 1 tablet daily.   10.  Perphenazine 2 mg 2 times p.r.n. for psychosis        Substance Use History:     Nicotine: Denies    Alcohol: Denies     Cannabis: Denies    Others: Denies    Prior CD treatments: none    Denies current or past addiction to stimulants, opiates, benzodiazepines, hallucinogens, or other elicit substances.      Social History:     Upbringing: The patient is originally from [place].  Most of her family is still there.   [place] in [place], left in [place]. First went to [place] before coming to [place].     Family/Relationships: Here in [place], she relies on help of the family of her .   currently lives in [place].   The patient is taking care of their 6 children  Has a daughter with ASD who is non-verbal. Her in-laws help her with children.      Living Situation: Lives in [place] with her family. Her mother lives near by and visits regularly.    Education: The patient's highest level of education completed is 5th grade.      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 Currently on a stay of commitment admitted as voluntary.     Abuse/Trauma: No history of  abuse.    : no    Spirituality: Sabianism     Guns: yes, stored in the home    Birthcontrol: none   Medical History:     Past Medical History:   Diagnosis Date     Encounter for female birth control 6/14/2017 6/14/2017 Plan Documentation Service ordered Depo Provera injection (150mg IM) may be given every 3 months for one year per loretta. Plan and order should be renewed at a visit no later than 6/14/18   Vanessa Thompson MD         Encounter for insertion or removal of intrauterine contraceptive device 1/3/2008    Paragard 1/08 removed 1/08.     Postpartum depression 8/18/2011     Schizophrenia (H) 2/12/2019     Suicidal ideation 2/16/2017      Surgical History:     Past Surgical History:   Procedure Laterality Date     NO HISTORY OF SURGERY       NO HISTORY OF SURGERY  06/13/2013    Per patient reported this     No History of seizures or head trauma.   Family History:    history of mental illness in family who live in [facility] now.   Problem (# of Occurrences) Relation (Name,Age of Onset)    Asthma (1) Father    Cerebrovascular Disease (1) Mother    Coronary Artery Disease (1) Son    Diabetes (2) Mother, Maternal Grandfather    Hypertension (1) Maternal Grandmother    Neurologic Disorder (4) Brother: mental health problem?, Sister: seizures (half sister), Daughter: autism , Sister: seizures    Respiratory (4) Father: asthma, Paternal Grandfather: asthma, Sister: asthma, Brother: asthma         Allergies:   No Known Allergies   Medications:     Prior to Admission Medications   Prescriptions Last Dose Informant Patient Reported? Taking?   ABILIFY MAINTENA 400 MG extended release injection 8/10/2021  No Yes   Sig: Inject 2 mLs (400 mg) into the muscle every 28 days Due on 3/25   Multiple Vitamin (MULTIVITAMIN ADULT) TABS 8/15/2021 at pm  No Yes   Sig: Take 1 tablet by mouth daily   OLANZapine (ZYPREXA) 10 MG tablet Not Taking  No No   Sig: Take 1 tablet (10 mg) by mouth 3 times daily as needed (associated with  psychosis or david)   Patient not taking: Reported on 8/16/2021   OLANZapine (ZYPREXA) 5 MG tablet 8/15/2021 at pm  Yes Yes   Sig: Take 5 mg by mouth daily   Vitamin D3 (CHOLECALCIFEROL) 25 mcg (1000 units) tablet 8/15/2021 at pm  No Yes   Sig: Take 1 tablet (25 mcg) by mouth daily   cloZAPine (FAZACLO) 100 MG ODT 8/15/2021 at pm  No Yes   Sig: Take 2 tablets (200 mg) by mouth At Bedtime   Patient taking differently: Take 250 mg by mouth At Bedtime    metFORMIN (GLUCOPHAGE-XR) 500 MG 24 hr tablet Not Taking  No No   Sig: Take 2 tablets (1,000 mg) by mouth daily (with dinner)   Patient not taking: Reported on 8/16/2021   rivaroxaban ANTICOAGULANT (XARELTO) 20 MG TABS tablet Not Taking  No No   Sig: Take 1 tablet (20 mg) by mouth daily (with dinner)   Patient not taking: Reported on 8/16/2021   senna-docusate (SENOKOT-S/PERICOLACE) 8.6-50 MG tablet Not Taking  No No   Sig: Take 1 tablet by mouth 2 times daily as needed for constipation   Patient not taking: Reported on 8/16/2021   traZODone (DESYREL) 50 MG tablet Not Taking  No No   Sig: Take 1 tablet (50 mg) by mouth nightly as needed for sleep (may repeat after 60 minutes)   Patient not taking: Reported on 8/16/2021   tranexamic acid (LYSTEDA) 650 MG tablet Not Taking  No No   Sig: Take 2 tablets (1,300 mg) by mouth 3 times daily During menstrual cycle   Patient not taking: Reported on 8/16/2021      Facility-Administered Medications: None      No current outpatient medications on file.        Data (Labs/Imaging):   Data   Recent Results (from the past 24 hour(s))   Comprehensive metabolic panel    Collection Time: 08/16/21  4:49 PM   Result Value Ref Range    Sodium 140 133 - 144 mmol/L    Potassium 3.9 3.4 - 5.3 mmol/L    Chloride 110 (H) 94 - 109 mmol/L    Carbon Dioxide (CO2) 27 20 - 32 mmol/L    Anion Gap 3 3 - 14 mmol/L    Urea Nitrogen 14 7 - 30 mg/dL    Creatinine 0.69 0.52 - 1.04 mg/dL    Calcium 8.5 8.5 - 10.1 mg/dL    Glucose 101 (H) 70 - 99 mg/dL     Alkaline Phosphatase 66 40 - 150 U/L    AST 25 0 - 45 U/L    ALT 30 0 - 50 U/L    Protein Total 8.3 6.8 - 8.8 g/dL    Albumin 3.6 3.4 - 5.0 g/dL    Bilirubin Total 0.2 0.2 - 1.3 mg/dL    GFR Estimate >90 >60 mL/min/1.73m2   TSH with free T4 reflex    Collection Time: 08/16/21  4:49 PM   Result Value Ref Range    TSH 0.69 0.40 - 4.00 mU/L   Asymptomatic COVID-19 Virus (Coronavirus) by PCR Oropharynx    Collection Time: 08/16/21  4:49 PM    Specimen: Oropharynx; Swab   Result Value Ref Range    SARS CoV2 PCR Negative Negative   CBC with platelets and differential    Collection Time: 08/16/21  4:49 PM   Result Value Ref Range    WBC Count 5.5 4.0 - 11.0 10e3/uL    RBC Count 4.12 3.80 - 5.20 10e6/uL    Hemoglobin 11.5 (L) 11.7 - 15.7 g/dL    Hematocrit 36.5 35.0 - 47.0 %    MCV 89 78 - 100 fL    MCH 27.9 26.5 - 33.0 pg    MCHC 31.5 31.5 - 36.5 g/dL    RDW 14.9 10.0 - 15.0 %    Platelet Count 344 150 - 450 10e3/uL    % Neutrophils 60 %    % Lymphocytes 29 %    % Monocytes 7 %    % Eosinophils 3 %    % Basophils 1 %    % Immature Granulocytes 0 %    NRBCs per 100 WBC 0 <1 /100    Absolute Neutrophils 3.3 1.6 - 8.3 10e3/uL    Absolute Lymphocytes 1.6 0.8 - 5.3 10e3/uL    Absolute Monocytes 0.4 0.0 - 1.3 10e3/uL    Absolute Eosinophils 0.2 0.0 - 0.7 10e3/uL    Absolute Basophils 0.0 0.0 - 0.2 10e3/uL    Absolute Immature Granulocytes 0.0 <=0.0 10e3/uL    Absolute NRBCs 0.0 10e3/uL   HCG qualitative urine (UPT)    Collection Time: 08/16/21  4:50 PM   Result Value Ref Range    hCG Urine Qualitative Negative Negative   Drug abuse screen 1 urine (ED)    Collection Time: 08/16/21  4:50 PM   Result Value Ref Range    Amphetamines Urine Screen Negative Screen Negative    Barbiturates Urine Screen Negative Screen Negative    Benzodiazepines Urine Screen Negative Screen Negative    Cannabinoids Urine Screen Negative Screen Negative    Cocaine Urine Screen Negative Screen Negative    Opiates Urine Screen Negative Screen Negative  "    Pending Results      Physical & Psychiatric Examinations:   /76   Pulse 90   Temp 97.9  F (36.6  C) (Oral)   Resp 18   Ht 1.651 m (5' 5\")   Wt 72.6 kg (160 lb)   LMP 08/09/2021   SpO2 99%   BMI 26.63 kg/m    See ED assessment note by ED physician on 08/17/2021  Appearance:  awake, alert, adequately groomed, dressed in hospital scrubs and appeared older than stated age  Muscle Strength and Tone: normal  Gait and Station: Normal  Behavior (Psychomotor):  no evidence of tardive dyskinesia, dystonia, or tics  Eye Contact:  fair  Speech:  clear, coherent  Mood:  depressed  Affect:  mood congruent and intensity is normal  Attitude:  cooperative  Thought Process:  logical, linear and goal oriented  Thought Content:  active suicidal ideation present, auditory hallucinations present and visual hallucinations present  Associations:  no loose associations  Insight:  fair  Judgment:  poor  Oriented to:  time, person, and place  Attention Span and Concentration:  intact  Recent and Remote Memory:  intact  Language: Fluent in English with appropriate syntax and vocabulary.  Fund of Knowledge: low        Assessment   Nona Finley is a 41 year old  female previously diagnosed with schizoaffective disorder who presents with  Suicidal ideations, visual and auditory command hallucination  on 08/17/2021 in the context of  . The MSE is notable for depressed mood, active suicidal ideation and visual and auditory hallucinations. Substances are not a contributing factor to their presentation.  Biological contributions to mental health presentation include a family history of mental illness. Psychosocial factors contributing to current presentation include having to raise her six children mostly on her own.                The patient's reported symptoms of depression, auditory and visually hallucations are consistent with historic diagnosis of schizoaffective disorder. Given that she is actively having suicidal " ideation, patient warrants inpatient psychiatric hospitalization to maintain her safety and will be treated in the therapeutic milieu with appropriate individual and group therapies. Disposition pending clinical stabilization, medication optimization and development of an appropriate discharge plan.    Safety Assessment:   Behavioral Orders   Procedures     Code 1 - Restrict to Unit     Routine Programming     As clinically indicated     Self Injury Precaution     Status 15     Every 15 minutes.     Suicide precautions     Patients on Suicide Precautions should have a Combination Diet ordered that includes a Diet selection(s) AND a Behavioral Tray selection for Safe Tray - with utensils, or Safe Tray - NO utensils       Pt has not required locked seclusion or restraints in the past 24 hours to maintain safety, please refer to RN documentation for further details.    Plan   Admit to [facility] with Attending Physician Juarez Grimm MD                    Principal psychiatric diagnosis:   # Schizoaffective, depressive type  -Clozapine  mg PO HS  -Haloperidol 5 mg PO HS 8/17  -Olanzapine ODT 5 mg PO HS    Secondary psychiatric diagnoses:   # none    Medications:   Outpatient medications held:       Outpatient medications continued:  Clozapine  mg PO HS    New medications initiated:   Haloperidol 5 mg PO HS 8/17    Prn medications: acetaminophen, alum & mag hydroxide-simethicone, OLANZapine **OR** OLANZapine, senna-docusate      Medical diagnoses:    # none    - Consult: None  - Labs: CMP, CBC, TSH, B12.    Precautions: Self Injury and suicide precaution    Legal Status: Voluntary  [facility]  Committed  Disposition: TBD, pending clinical stabilization, medication optimization, and formulation of a safe discharge plan.     Patient seen and discussed with my attending physician, Dr. Grimm who is in agreement with my assessment and plan.    Tere Rojas, MS3    Trey Pritchard MD  PGY-1 Psychiatry  Resident    Attestation:  I, Juarez Grimm MD, have personally performed an examination of this patient and I have reviewed the resident's documentation.  I have edited the note to reflect all relevant changes.  I have discussed this patient with the house staff on 8/17/2021.  I agree with resident findings and plan in today's resident H&P.  I have reviewed all vitals and laboratory findings.      I certifiy that the inpatient services were ordered in accordance with the Medicare regulations governing the order. This includes certification that hospital inpatient services are reasonable and necessary and in the case of services not specified as inpatient-only under 42 .22(n), that they are appropriately provided as inpatient services in accordance with the 2-midnight benchmark under 42 .3(e).     The reason for inpatient status is Acute Psychosis.    Juarez Grimm M.D.,Ph.D.

## 2021-08-17 NOTE — PLAN OF CARE
"Problem: Suicidal Behavior  Goal: Suicidal Behavior is Absent or Managed  Outcome: No Change     Problem: Depressive Symptoms  Goal: Depressive Symptoms  Outcome: No Change     Patient is pleasant, cooperative, and teary-eyed upon approach.  She endorses auditory hallucinations that tell her to kill her children and herself.  When asked about a plan, she says if she leaves the hospital she has a plan to jump off a bridge into the river.  She denies a plan to kill children and denies suicidal plan while in the hospital.  She contracts for safety while in hospital.  When asked about anxiety she says, \"I'm worried about my children.\"  Patient says that her medications were not working prior to coming to the hospital.  She appears hopeful she can get better.  Patient denies pain.  No aggressive behaviors are observed.  Patient remains safe on unit.  Will continue to monitor.  Bessie Olivas RN    "

## 2021-08-17 NOTE — PLAN OF CARE
BEHAVIORAL TEAM DISCUSSION    Participants: Dr. Grimm, resident Trey Morales, RN Bessie Armijo, CTC Clarissa Francis, med student Tere  Progress: new admission  Anticipated length of stay: 5-7 days  Continued Stay Criteria/Rationale: psychosis  Medical/Physical:see IM Consult  Precautions:   Behavioral Orders   Procedures     Code 1 - Restrict to Unit     Routine Programming     As clinically indicated     Self Injury Precaution     Status 15     Every 15 minutes.     Suicide precautions     Patients on Suicide Precautions should have a Combination Diet ordered that includes a Diet selection(s) AND a Behavioral Tray selection for Safe Tray - with utensils, or Safe Tray - NO utensils       Plan: provide a psychological assessment and manage medications per psychiatry, staff to provide a safe and therapeutic milieu, CTC to contact ACT team and provide proper follow up.  Rationale for change in precautions or plan: no change

## 2021-08-17 NOTE — PLAN OF CARE
Assessment/Intervention/Current Symptoms and Care Coordination    Attended team meeting and reviewed chart notes.    Pt's PTA medication will be restarted.      Discharge Plan or Goal  Return home with follow up in place      Barriers to Discharge   Pt needs stabilization      Referral Status  None made    Legal Status  Pt is committed with ellis but here voluntarily

## 2021-08-17 NOTE — PROGRESS NOTES
08/17/21 0500   Valuables   Patient Belongings remains with patient   Patient Belongings Remaining with Patient body jewelry;clothing;jewelry;shoes   Patient Belongings Put in Hospital Secure Location (Security or Locker, etc.) cash/credit card;jewelry;money (see comment)   Number of Belongings Bags 1  (Wallet, Deodorant, Iphone and . )                  Admission:  I am responsible for any personal items that are not sent to the safe or pharmacy.  [facility] is not responsible for loss, theft or damage of any property in my possession.    Signature:  _______[facility] Ismael__________________________ Date: _______  Time: _____                                              Staff Signature:  ____________________________ Date: ________  Time: _____      2nd Staff person, if patient is unable/unwilling to sign:    Signature: ________________________________ Date: ________  Time: _____     Discharge:  [facility] has returned all of my personal belongings:    Signature: _________________________________ Date: ________  Time: _____                                          Staff Signature:  ____________________________ Date: ________  Time: _____

## 2021-08-17 NOTE — PLAN OF CARE
Problem: General Plan of Care (Inpatient Behavioral)  Goal: Individualization/Patient Specific Goal (IP Behavioral)  Description: The patient and/or their representative will achieve their patient-specific goals related to the plan of care.    The patient-specific goals include:  Flowsheets (Taken 8/17/2021 0858)  Areas of Vulnerability: Pt suffers from SPMI, history of trauma  Family Participation in Plan: yes  Patient Strengths:   Stable housing   Stable and supportive family

## 2021-08-17 NOTE — ED NOTES
ED to Behavioral Floor Handoff    SITUATION  Nona Finley is a 41 year old female who speaks Kazakh and lives in a home with family members The patient arrived in the ED by private car from emergency room with a complaint of Hallucinations  .The patient's current symptoms started/worsened 1 month(s) ago and during this time the symptoms have increased.   In the ED, pt was diagnosed with   Final diagnoses:   Acute exacerbation of chronic schizoaffective schizophrenia (H)   Suicidal ideation        Initial vitals were: BP: 117/71  Pulse: 96  Temp: 97.8  F (36.6  C)  Resp: 18  SpO2: 100 %   --------  Is the patient diabetic? No   If yes, last blood glucose? --     If yes, was this treated in the ED? --  --------  Is the patient inebriated (ETOH) No or Impaired on other substances? No  MSSA done? N/A  Last MSSA score: --    Were withdrawal symptoms treated? N/A  Does the patient have a seizure history? No. If yes, date of most recent seizure--  --------  Is the patient patient experiencing suicidal ideation? reports occasional suicidal thoughts representing feeling that life is not worth feeling    Homicidal ideation? denies current or recent homicidal ideation or behaviors.    Self-injurious behavior/urges? denies current or recent self injurious behavior or ideation.  ------  Was pt aggressive in the ED No  Was a code called No  Is the pt now cooperative? Yes  -------  Meds given in ED:   Medications   melatonin tablet 5 mg (5 mg Oral Given 8/17/21 0017)   OLANZapine zydis (zyPREXA) ODT tab 5 mg (0 mg Oral Hold 8/17/21 0138)   cloZAPine (FAZACLO) ODT tab 250 mg (0 mg Oral Hold 8/17/21 0138)      Family present during ED course? No  Family currently present? No    BACKGROUND  Does the patient have a cognitive impairment or developmental disability? No  Allergies: No Known Allergies.   Social demographics are   Social History     Socioeconomic History     Marital status:      Spouse name: None     Number of  children: 2     Years of education: None     Highest education level: None   Occupational History     Employer: NONE    Tobacco Use     Smoking status: Never Smoker     Smokeless tobacco: Never Used   Substance and Sexual Activity     Alcohol use: No     Drug use: No     Sexual activity: Yes     Partners: Male     Birth control/protection: None   Other Topics Concern      Service Not Asked     Blood Transfusions No     Caffeine Concern Not Asked     Occupational Exposure No     Hobby Hazards Not Asked     Sleep Concern Not Asked     Stress Concern Not Asked     Weight Concern Not Asked     Special Diet Not Asked     Back Care Not Asked     Exercise Not Asked     Bike Helmet Not Asked     Seat Belt Not Asked     Self-Exams Not Asked     Parent/sibling w/ CABG, MI or angioplasty before 65F 55M? Not Asked   Social History Narrative    Caffeine intake/servings daily - 2 times a week    Calcium intake/servings daily - 1-2    Exercise no times weekly - describe active with children    Sunscreen used - soemtimes    Seatbelts used - Yes    Guns stored in the home - Yes    Self Breast Exam - Yes    Pap test up to date -  Yes    Eye exam up to date -  No    Dental exam up to date -  No    DEXA scan up to date -  Not Applicable    Flex Sig/Colonoscopy up to date -  Not Applicable    Mammography up to date -  Not Applicable    Immunizations reviewed and up to date - needs tdap at postpartum    Abuse: Current or Past (Physical, Sexual or Emotional) - No    Do you feel safe in your environment - Yes    Do you cope well with stress - sometimes    Do you suffer from insomnia - No    Last updated by: Joanne Gilligan RN 12/16/10                         Social Determinants of Health     Financial Resource Strain:      Difficulty of Paying Living Expenses:    Food Insecurity:      Worried About Running Out of Food in the Last Year:      Ran Out of Food in the Last Year:    Transportation Needs:      Lack of Transportation  (Medical):      Lack of Transportation (Non-Medical):    Physical Activity:      Days of Exercise per Week:      Minutes of Exercise per Session:    Stress:      Feeling of Stress :    Social Connections:      Frequency of Communication with Friends and Family:      Frequency of Social Gatherings with Friends and Family:      Attends Christianity Services:      Active Member of Clubs or Organizations:      Attends Club or Organization Meetings:      Marital Status:    Intimate Partner Violence:      Fear of Current or Ex-Partner:      Emotionally Abused:      Physically Abused:      Sexually Abused:         ASSESSMENT  Labs results   Labs Ordered and Resulted from Time of ED Arrival Up to the Time of Departure from the ED   COMPREHENSIVE METABOLIC PANEL - Abnormal; Notable for the following components:       Result Value    Chloride 110 (*)     Glucose 101 (*)     All other components within normal limits   CBC WITH PLATELETS AND DIFFERENTIAL - Abnormal; Notable for the following components:    Hemoglobin 11.5 (*)     All other components within normal limits   TSH WITH FREE T4 REFLEX - Normal   HCG QUALITATIVE URINE - Normal   COVID-19 VIRUS (CORONAVIRUS) BY PCR - Normal    Narrative:     Testing was performed using the Xpert Xpress SARS-CoV-2 Assay on the  Cepheid Gene-Xpert Instrument Systems. Additional information about  this Emergency Use Authorization (EUA) assay can be found via the Lab  Guide. This test should be ordered for the detection of SARS-CoV-2 in  individuals who meet SARS-CoV-2 clinical and/or epidemiological  criteria. Test performance is unknown in asymptomatic patients. This  test is for in vitro diagnostic use under the FDA EUA for  laboratories certified under CLIA to perform high complexity testing.  This test has not been FDA cleared or approved. A negative result  does not rule out the presence of PCR inhibitors in the specimen or  target RNA in concentration below the limit of detection for  the  assay. The possibility of a false negative should be considered if  the patient's recent exposure or clinical presentation suggests  COVID-19. This test was validated by the [facility]. This laboratory is certified under  the Clinical Laboratory Improvement Amendments of 1988 (CLIA-88) as  qualified to perform high complexity laboratory testing.     DRUG ABUSE SCREEN 1 URINE (ED) - Normal   CBC WITH PLATELETS & DIFFERENTIAL    Narrative:     The following orders were created for panel order CBC with platelets differential.  Procedure                               Abnormality         Status                     ---------                               -----------         ------                     CBC with platelets and d...[[id number]]  Abnormal            Final result                 Please view results for these tests on the individual orders.   URINE DRUGS OF ABUSE SCREEN    Narrative:     The following orders were created for panel order Urine Drugs of Abuse Screen.  Procedure                               Abnormality         Status                     ---------                               -----------         ------                     Drug abuse screen 1 urin...[[id number]]  Normal              Final result                 Please view results for these tests on the individual orders.      Imaging Studies: No results found for this or any previous visit (from the past 24 hour(s)).   Most recent vital signs /81   Pulse 77   Temp 97.8  F (36.6  C) (Oral)   Resp 18   LMP 08/09/2021   SpO2 100%    Abnormal labs/tests/findings requiring intervention:---   Pain control: good  Nausea control: good    RECOMMENDATION  Are any infection precautions needed (MRSA, VRE, etc.)? No If yes, what infection? --  ---  Does the patient have mobility issues? independently. If yes, what device does the pt use? ---  ---  Is patient on 72 hour hold or commitment? No If on  72 hour hold, have hold and rights been given to patient? N/A  Are admitting orders written if after 10 p.m. ?N/A  Tasks needing to be completed:---     Bob Echeverria RN   [facility]--    [phone] [facility]   [phone] [facility]

## 2021-08-17 NOTE — PLAN OF CARE
"Pt bed resting in room at start of shift. Up in lounge for dinner. Utilizing phones. Pleasant, cooperative. Flat, sad affect. Isolative, withdrawn. Reports having the \"same symptoms\". Reports auditory and visual hallucinations. Sees \"people with scary faces\". When asked about anxiety, states that she \"worries about my children\". Endorses SI; contracts for safety.     Problem: Depressive Symptoms  Goal: Depressive Symptoms  Description: Signs and symptoms of listed problems will be absent or manageable.  Outcome: No Change     "

## 2021-08-17 NOTE — PLAN OF CARE
"Pt arrived to unit from [place] ED @5043. Pt arrived to ED by private car with c/o auditory hallucinations and suicidal ideation.     Pt agreed to search, belongings were logged, voluntary consent form signed, folder with bill of rights given.       Pt stated reason for admission is \"I hear voices to kill myself and my children\". Pt lives with her children and her mother, and feels safe at home, with no history of abuse. Pt stated that for the last few weeks she has had voices telling her to kill her kids (no plan) and to walk into the river to kill herself. Pt said last night she walked out to the river and when she thought about how she did not want to leave her kids she did not go through with it. For the last 2 weeks pt has only eaten one meal a day because she thinks it is poisoned. Pt wakes up frequently during the night due to the voices. Depression 8/10, anxiety 7/10. Aud hallucinations present, no vis hallucinations. Pt diagnosed with schizoaffective d/o  and has a psychiatrist but no therapist. Pt has hx of inpatient mental health admission at [facility]. Pt does not use alcohol or drugs. Pt has hx of commitments, multiple mental health admissions . Pt is Tajik and of Restorationism bobby, and declines need for  or spiritual services.     Pts affect is blunted and flat. Pt says she never gets excited and she is always sad. Pt believes her situation get better with proper help and medications. Pt given melatonin in ED and scheduled clozapine and zyprexa were held due to patient sleeping. Scheduled bedtime Clozapine and zyprexa given by writer before patient slept for remainder of shift.       Problem: Depressive Symptoms  Goal: Depressive Symptoms  Description: Signs and symptoms of listed problems will be absent or manageable.  Outcome: No Change     Problem: Adult Inpatient Plan of Care  Goal: Plan of Care Review  Recent Flowsheet Documentation  Taken 8/17/2021 0500 by Eden Mccabe RN  Plan of " Care Reviewed With: patient

## 2021-08-17 NOTE — PHARMACY
Pharmacy Clozapine Continuation Note    Patient's Name: Nona Finley  Patient's : 1980    Current cloZAPine regimen: 250 mg PO QHS  Has there been a known interruption in therapy for greater than/equal to 48 hours? No    Recent ANC Value(s) for last 7 days:  Recent labs: (last 7 days)     21  1649   ANEUTAUTO 3.3       Is the patient enrolled in the cloZAPine REMS program? Yes  Ordering prescriber: Dr. Serg Chauhan  Is the ANC within recommended limits? Yes  Does the patient have any signs or symptoms of infection, including fever? No    Plan:  1. Continue clozapine therapy at 250 mg PO QHS.  2. A WBC with differential will be ordered at least weekly, NEXT DUE 2021. ANC values will be entered into the REMS program.    Johanne Cortes [facility]  Phone / Pager: *[phone] or *[phone]

## 2021-08-18 LAB
FERRITIN SERPL-MCNC: 17 NG/ML (ref 12–150)
IRON SATN MFR SERPL: 13 % (ref 15–46)
IRON SERPL-MCNC: 52 UG/DL (ref 35–180)
TIBC SERPL-MCNC: 404 UG/DL (ref 240–430)

## 2021-08-18 PROCEDURE — 99232 SBSQ HOSP IP/OBS MODERATE 35: CPT | Mod: GC | Performed by: PSYCHIATRY & NEUROLOGY

## 2021-08-18 PROCEDURE — 250N000013 HC RX MED GY IP 250 OP 250 PS 637

## 2021-08-18 PROCEDURE — 250N000013 HC RX MED GY IP 250 OP 250 PS 637: Performed by: STUDENT IN AN ORGANIZED HEALTH CARE EDUCATION/TRAINING PROGRAM

## 2021-08-18 PROCEDURE — 250N000013 HC RX MED GY IP 250 OP 250 PS 637: Performed by: PSYCHIATRY & NEUROLOGY

## 2021-08-18 PROCEDURE — 124N000002 HC R&B MH UMMC

## 2021-08-18 RX ORDER — HALOPERIDOL 5 MG/1
5 TABLET ORAL AT BEDTIME
Status: DISCONTINUED | OUTPATIENT
Start: 2021-08-18 | End: 2021-08-23 | Stop reason: HOSPADM

## 2021-08-18 RX ORDER — OLANZAPINE 10 MG/2ML
10 INJECTION, POWDER, FOR SOLUTION INTRAMUSCULAR 3 TIMES DAILY PRN
Status: DISCONTINUED | OUTPATIENT
Start: 2021-08-18 | End: 2021-08-23 | Stop reason: HOSPADM

## 2021-08-18 RX ORDER — CLOZAPINE 100 MG/1
300 TABLET, ORALLY DISINTEGRATING ORAL AT BEDTIME
Status: DISCONTINUED | OUTPATIENT
Start: 2021-08-18 | End: 2021-08-23 | Stop reason: HOSPADM

## 2021-08-18 RX ORDER — OLANZAPINE 10 MG/1
10 TABLET ORAL 3 TIMES DAILY PRN
Status: DISCONTINUED | OUTPATIENT
Start: 2021-08-18 | End: 2021-08-23 | Stop reason: HOSPADM

## 2021-08-18 RX ADMIN — DOCUSATE SODIUM AND SENNOSIDES 1 TABLET: 8.6; 5 TABLET ORAL at 17:48

## 2021-08-18 RX ADMIN — HALOPERIDOL 5 MG: 5 TABLET ORAL at 20:25

## 2021-08-18 RX ADMIN — CLOZAPINE 300 MG: 100 TABLET, ORALLY DISINTEGRATING ORAL at 20:25

## 2021-08-18 RX ADMIN — Medication 5 MG: at 20:40

## 2021-08-18 RX ADMIN — Medication 2.5 MG: at 11:16

## 2021-08-18 ASSESSMENT — ACTIVITIES OF DAILY LIVING (ADL)
HYGIENE/GROOMING: INDEPENDENT
DRESS: INDEPENDENT
ORAL_HYGIENE: INDEPENDENT
LAUNDRY: UNABLE TO COMPLETE

## 2021-08-18 NOTE — PLAN OF CARE
"Nursing plan of care   Problem: Suicidal Behavior  Goal: Suicidal Behavior is Absent or Managed  Outcome: Improving  Flowsheets (Taken 8/18/2021 1740)  Mutually Determined Action Steps (Suicidal Behavior Absent/Managed): identifies protective factors   Pt was visible in the milieu; spent the shift pacing in the hallway; resting in her room and watching TV in the lounge; stated \" feeling better, the voices are slowly gone away, I think Haldol helps me and good to increase the dose again; denied depression; when asked about anxiety, pt replied, \" I don't have much anxiety, but I worried about my kids\"; calm and pleasant; insight appropriate to the situation; reported constipation and stated, \" No BM in the last three days\"; offered and administered senna at 1748; during 1:1 interaction pt denied SI/SIB and AVH, but stated,\" the voices are come and go\"; good eye contact; blunt affect; mediation complaint; denied medication side effect; reported lightheadedness, encouraged fluid and slowly to change position when get out of bed; VS, BP 91/62   Pulse 116   Temp 98.4  F (36.9  C) (Tympanic)   Resp 12, SpO2 100%;  will continue to monitor and assess.           "

## 2021-08-18 NOTE — PROGRESS NOTES
"  ----------------------------------------------------------------------------------------------------------  [facility], [place]   Psychiatric Progress Note  Hospital Day #1     Interim History:   The patient's care was discussed with the treatment team and chart notes were reviewed.    Sleep 7 hours (08/18/21 0600)  Scheduled Medications: compliant  PRN medications: none     Staff Report:   \"Patient is pleasant, cooperative, and teary-eyed upon approach.  She endorses auditory hallucinations that tell her to kill her children and herself.  When asked about a plan, she says if she leaves the hospital she has a plan to jump off a bridge into the river.  She denies a plan to kill children and denies suicidal plan while in the hospital.  She contracts for safety while in hospital.  When asked about anxiety she says, \"I'm worried about my children.\"  Patient says that her medications were not working prior to coming to the hospital.  She appears hopeful she can get better.  Patient denies pain.  No aggressive behaviors are observed.  Patient remains safe on unit.  Will continue to monitor.  Bessie Olivas RN \" 8/17 afternoon    \"Pt's PTA medication will be restarted.\"    \"Pt has not attended OT since admit\"    \"Pt bed resting in room at start of shift. Up in lounge for dinner. Utilizing phones. Pleasant, cooperative. Flat, sad affect. Isolative, withdrawn. Reports having the \"same symptoms\". Reports auditory and visual hallucinations. Sees \"people with scary faces\". When asked about anxiety, states that she \"worries about my children\". Endorses SI; contracts for safety. \" 8/17 evening       Patient Interview:   When asked how she was feeling Nona said, \"I am still struggling with the voices, they woke me up 3 times last night.\" She states that she has not improved. She shared that she sees people with scary faces and she can see them currently during the interview. She shares that she " "believes there is marijuana in her food and has not eaten it because of that. She was offered a closed container of food and she states that this will not change the marijuana she smells in the food. Last night she forced herself to eat even thought she could smell marijuana. She usually uses perfume at home to block those  Smells in her food.   She is concerned about the voices. She states that she cannot live with the voices and she wants to die. To deal with the voices she reads Quran and prays, which she states was difficult.   She states that her anxiety comes from the voices.     She asked about an injection medication once every three months. Shared with her that Invega is the only 3 month injection which she states she has had and did not help her. Discussed with her that if the Haldol works it can be an injection. Discussed different anxiety coping techniques. Was reassured she will improve.      The risks, benefits, alternatives and side effects of any medication changes have been discussed and are understood by the patient and other caregivers.    Review of systems:     ROS was negative unless noted above.          Allergies:   No Known Allergies         Psychiatric Examination:   /73   Pulse 86   Temp 97.7  F (36.5  C) (Oral)   Resp 16   Ht 1.651 m (5' 5\")   Wt 72.6 kg (160 lb)   LMP 08/09/2021   SpO2 99%   BMI 26.63 kg/m    Weight is 160 lbs 0 oz  Body mass index is 26.63 kg/m .    MENTAL STATUS EXAM    Appearance:  appears older than than stated age, appropriately dressed and distressed  Attitude:  cooperative and engaged  Psychomotor:  normal and no evidence of tics, dystonia, or tardive dyskinesia  Eye Contact: appropriate  Speech:  fluent English and normal tone  Mood: \"struggling\"  Affect:  congruent  Thought Content: passive suicidal ideation  Thought Process: linear and coherent  Sensorium: awake, alert, endorses auditory hallucinations, endorses visual hallucinations and olfactory " hallucinations  Cognition: memory grossly intact  Impulse control: fair  Insight: questionable  Judgment: questionable         Labs:   No results found for this or any previous visit (from the past 24 hour(s)).     Assessment  & Plan      Assessment:   Nona is a 41 year old  female previously diagnosed with schizoaffective disorder who presents with active suicidal Ideation, auditory and visual hallucinations  in the context of medication changes. Substances are not a contributing factor to their presentation. Biological contributions to mental health presentation include family history of mental health concenrs.  The patient presentation is consistent with Schizoaffective disorder, depressive type.      Psychiatric Hospital course:   Nona Finley was admitted to [facility] as a voluntary patient. Her PTA clozapine  mg PO HS was continued. Started Clozapine  mg PO HS 8/17,Haloperidol 5 mg PO HS 8/17, Olanzapine ODT 5 mg PO HS 8/17. Discontinued Zyprexa 5 mg PO HS 8/18, Added 2.5 mg to Haldol AM and BID PRN. Increased Clozapine dose to 300 mg PO HS.     Updates:  -Clozapine  mg PO HS 8/17  -Haloperidol 5 mg PO HS 8/17  -Olanzapine ODT 5 mg PO HS 8/17  -Discontinued Zyprexa 5 mg PO HS 8/18  -Added 2.5 mg to Haldol AM and BID PRN 8/18  - Increased Clozapine dose to 300 mg PO HS. 8/18    Principal Diagnosis:   # Schizoaffective disorder, depressive type, severe  -Clozapine  mg PO HS 8/17  -Haloperidol 5 mg PO HS 8/17  -Olanzapine ODT 5 mg PO HS 8/17  -Discontinued Zyprexa 5 mg PO HS 8/18  -Added 2.5 mg to Haldol AM and BID PRN 8/18  - Increased Clozapine dose to 300 mg PO HS. 8/18    Secondary psychiatric & Medical diagnoses of concern this admission:   # none      PRN  Medications  - Hydroxyzine 25 mg Q4hrs PRN   - Olanzapine 10 mg PO/IM TID PRN severe agitation/psychosis  - Acetaminophen 650 mg po Q4hrs PRN pain   -Haldol 2.5 mg PO BID  -Senna docusate 8.6-50 mg BID    Legal  Status:   Orders Placed This Encounter      Voluntary      Safety Assessment:   Behavioral Orders   Procedures     Code 1 - Restrict to Unit     Routine Programming     As clinically indicated     Self Injury Precaution     Status 15     Every 15 minutes.     Suicide precautions     Patients on Suicide Precautions should have a Combination Diet ordered that includes a Diet selection(s) AND a Behavioral Tray selection for Safe Tray - with utensils, or Safe Tray - NO utensils         Disposition: Pending stabilization & development of a safe discharge plan.     Patient will be treated in therapeutic milieu with appropriate individual and group therapies as described.  The patient was seen and the plan was discussed with the attending physician.       Psychiatry PGY-1 Resident     Attestation:  This patient has been seen and evaluated by me, Juarez Grimm MD.  I have discussed this patient with the house staff team including the resident and medical student and I agree with the findings and plan in this note.    I have reviewed today's vital signs, medications, labs and imaging. Juarez Grimm MD , PhD.

## 2021-08-18 NOTE — PROGRESS NOTES
"SPIRITUAL HEALTH SERVICES  SPIRITUAL ASSESSMENT Progress Note  [facility] ([facility]) [facility]       REFERRAL SOURCE: Self initiated  visit, Jain specific.     DATA: Pt Nona Finley identifies as Jain and is of [place]onegas[place] descent.     Nona was upright on the side of her bed during our encounter. Plain affect when speaking but stated, \"I am having hallucinations\". She shared that her  is still out of state until November in school for his medical residency.     Nona stated that she benefited from the previous written resources provided to her through [facility] and I was able to provide additional copies to her during her current stay.     Nona has received an English copy of the Holy Quran. I also provided Nona with an Islamic prayer booklet and card with a Prophetic supplication.       PLAN: I will follow up with Nona Finley for the duration of her stay.    Brown Todd  Lead Jain   Pager [phone]    [facility] remains available 24/7 for emergent requests/referrals, either by having the switchboard page the on-call  or by entering an ASAP/STAT consult in Epic (this will also page the on-call ).    "

## 2021-08-18 NOTE — PLAN OF CARE
"Problem: Suicidal Behavior  Goal: Suicidal Behavior is Absent or Managed  Outcome: No Change     Problem: Depressive Symptoms  Goal: Depressive Symptoms  Outcome: No Change     Patient is flat, blunted, sad, pleasant, and cooperative.  Up on unit intermittently and paces hallway.  When asked about anxiety she states, \"I'm worried about my children.\"  Writer talked with patient about children and patient says that she feels they are safe with her mother-in-law and sister-in-law.  Patient endorses depression and suicidal thoughts with plan to commit suicide if discharged by \"going to the river.\"  Patient endorses auditory and visual hallucinations.  She describes auditory hallucinations as voices that tell her to kill herself.  She denies that these voices are telling her to kill her children today.  She describes visual hallucinations as \"people with scary faces that tell me I'm worthless.\"  Patient says she feels safe in the hospital and does contract for safety this shift.  Patient's BP is low this shift, most recent 90/64.  Provider notified of BP.  She does report some dizziness with standing but does appear steady when walking.  Patient is medication complaint.  No aggressive behaviors are observed.  Patient remains safe on unit.  Will continue to monitor.  Bessie Olivas RN   "

## 2021-08-18 NOTE — PLAN OF CARE
Assessment/Intervention/Current Symptoms and Care Coordination    Attended team meeting and reviewed chart notes.    Write emailed DEC  to see if she made a CHP report.    Discharge Plan or Goal  Return to home    Barriers to Discharge   Needs stabilization    Referral Status  None made      Legal Status  Voluntary- pt is under commitment with ellis order

## 2021-08-19 LAB — TRANSFERRIN SERPL-MCNC: 302 MG/DL (ref 210–360)

## 2021-08-19 PROCEDURE — 250N000013 HC RX MED GY IP 250 OP 250 PS 637: Performed by: PSYCHIATRY & NEUROLOGY

## 2021-08-19 PROCEDURE — 250N000013 HC RX MED GY IP 250 OP 250 PS 637

## 2021-08-19 PROCEDURE — 99232 SBSQ HOSP IP/OBS MODERATE 35: CPT | Mod: GC | Performed by: PSYCHIATRY & NEUROLOGY

## 2021-08-19 PROCEDURE — 124N000002 HC R&B MH UMMC

## 2021-08-19 RX ORDER — DOCUSATE SODIUM 100 MG/1
100 CAPSULE, LIQUID FILLED ORAL 2 TIMES DAILY
Status: DISCONTINUED | OUTPATIENT
Start: 2021-08-19 | End: 2021-08-23 | Stop reason: HOSPADM

## 2021-08-19 RX ORDER — POLYETHYLENE GLYCOL 3350 17 G/17G
17 POWDER, FOR SOLUTION ORAL DAILY
Status: COMPLETED | OUTPATIENT
Start: 2021-08-19 | End: 2021-08-19

## 2021-08-19 RX ORDER — CLOZAPINE 150 MG/1
300 TABLET, ORALLY DISINTEGRATING ORAL AT BEDTIME
Qty: 45 TABLET | Refills: 0 | Status: ON HOLD | OUTPATIENT
Start: 2021-08-19 | End: 2021-12-30

## 2021-08-19 RX ORDER — DOCUSATE SODIUM 100 MG/1
100 CAPSULE, LIQUID FILLED ORAL 2 TIMES DAILY
Qty: 30 CAPSULE | Refills: 0 | Status: SHIPPED | OUTPATIENT
Start: 2021-08-19 | End: 2023-07-18

## 2021-08-19 RX ORDER — HALOPERIDOL 0.5 MG/1
2.5 TABLET ORAL DAILY
Qty: 150 TABLET | Refills: 0 | Status: ON HOLD | OUTPATIENT
Start: 2021-08-20 | End: 2021-12-30

## 2021-08-19 RX ORDER — HALOPERIDOL 5 MG/1
5 TABLET ORAL AT BEDTIME
Qty: 30 TABLET | Refills: 0 | Status: ON HOLD | OUTPATIENT
Start: 2021-08-19 | End: 2022-01-03

## 2021-08-19 RX ORDER — MAGNESIUM HYDROXIDE/ALUMINUM HYDROXICE/SIMETHICONE 120; 1200; 1200 MG/30ML; MG/30ML; MG/30ML
30 SUSPENSION ORAL EVERY 4 HOURS PRN
Qty: 335 ML | Refills: 0 | Status: SHIPPED | OUTPATIENT
Start: 2021-08-19 | End: 2023-07-18

## 2021-08-19 RX ADMIN — Medication 5 MG: at 20:51

## 2021-08-19 RX ADMIN — POLYETHYLENE GLYCOL 3350 17 G: 17 POWDER, FOR SOLUTION ORAL at 10:31

## 2021-08-19 RX ADMIN — HALOPERIDOL 5 MG: 5 TABLET ORAL at 20:51

## 2021-08-19 RX ADMIN — DOCUSATE SODIUM 100 MG: 100 CAPSULE, LIQUID FILLED ORAL at 10:32

## 2021-08-19 RX ADMIN — Medication 2.5 MG: at 10:32

## 2021-08-19 RX ADMIN — CLOZAPINE 300 MG: 100 TABLET, ORALLY DISINTEGRATING ORAL at 20:51

## 2021-08-19 RX ADMIN — DOCUSATE SODIUM 100 MG: 100 CAPSULE, LIQUID FILLED ORAL at 20:51

## 2021-08-19 NOTE — PLAN OF CARE
Problem: Behavioral Health Plan of Care  Goal: Plan of Care Review  Outcome: Improving  Flowsheets (Taken 8/19/2021 1141)  Plan of Care Reviewed With: patient  Progress: improving  Patient Agreement with Plan of Care: agrees     Problem: Depressive Symptoms  Goal: Depressive Symptoms  Description: Signs and symptoms of listed problems will be absent or manageable.  Outcome: Improving     Nona reports she has not experienced auditory hallucinations today or last evening-states her mood is improved and she is feeling relieved that sxs are resolving-appears calm-declining grps because she does not enjoy them-states she had not difficulty eating bkft and ate most of it-very pleasant in interactions

## 2021-08-19 NOTE — PLAN OF CARE
Assessment/Intervention/Current Symptoms and Care Coordination    Attended team meeting and reviewed chart notes.    Writer left WALTER Vicky Cr a message stating pt to discharge tomorrow. Spoke with pt about transportation and she is comfortable with a cab if the ACT team can't give her a ride.    Writer made a child protection report to Amilcar with [facility] Child protection ([phone]).      Discharge Plan or Goal  Return to home    Barriers to Discharge   Further stabilization is needed-recent med change      Referral Status  None made    Legal Status  voluntary

## 2021-08-19 NOTE — PROGRESS NOTES
"  ----------------------------------------------------------------------------------------------------------  [facility], [place], [place]   Psychiatric Progress Note  Hospital Day #2     Interim History:   The patient's care was discussed with the treatment team and chart notes were reviewed.    Sleep 7 hours (08/19/21 0600)  Scheduled Medications: compliant  PRN medications: Senna Docusate for constipation    Staff Report:    \"Patient is flat, blunted, sad, pleasant, and cooperative.  Up on unit intermittently and paces hallway.  When asked about anxiety she states, \"I'm worried about my children.\"  Writer talked with patient about children and patient says that she feels they are safe with her mother-in-law and sister-in-law.  Patient endorses depression and suicidal thoughts with plan to commit suicide if discharged by \"going [place] the river.\"  Patient endorses auditory and visual hallucinations.  She describes auditory hallucinations as voices that tell her to kill herself.  She denies that these voices are telling her to kill her children today.  She describes visual hallucinations as \"people with scary faces that tell me I'm worthless.\"  Patient says she feels safe in the hospital and does contract for safety this shift.  Patient's BP is low this shift, most recent 90/64.  Provider notified of BP.  She does report some dizziness with standing but does appear steady when walking.  Patient is medication complaint.  No aggressive behaviors are observed.  Patient remains safe on unit.  Will continue to monitor.  Bessie Olivas RN \"    \"Pt was visible in the milieu; spent the shift pacing in the hallway; resting in her room and watching TV in the lounge; stated \" feeling better, the voices are slowly gone away, I think Haldol helps me and good to increase the dose again; denied depression; when asked about anxiety, pt replied, \" I don't have much anxiety, but I worried about my kids\"; calm and " "pleasant; insight appropriate to the situation; reported constipation and stated, \" No BM in the last three days\"; offered and administered senna at 1748; during 1:1 interaction pt denied SI/SIB and AVH, but stated,\" the voices are come and go\"; good eye contact; blunt affect; mediation complaint; denied medication side effect; reported lightheadedness, encouraged fluid and slowly to change position when get out of bed; VS, BP 91/62   Pulse 116   Temp 98.4  F (36.9  C) (Tympanic)   Resp 12, SpO2 100%;  will continue to monitor and assess. \"    Patient Interview:   Nona came into the conference room with a smile on her face.  She states that she is happy and excited. Last night she took her medications which made the voices go away. Since last night she has not heard any voices. Last time she experienced the auditory hallucinations was yesterday 8/18 afternoon.   She has been eating more, she used lavender oil to help her deal with the olfactory hallucinations. She denies HI and SI.   She asked about discharge. Discussed with her that she will need to be monitored a little more to make sure her hallucinations are managed with her current dose of medications.    She states that she has been dizzy when she gets up. Drinking water helps her.   Discussed with her that she has PRN Haldol to help manage her symptoms in the afternoons.  She states that her last bowel movement was Monday and was offered Miralax and she accepted. Discussed with her the constipation can be due to clozapine.  She had no other concerns today.     The risks, benefits, alternatives and side effects of any medication changes have been discussed and are understood by the patient and other caregivers.    Review of systems:     ROS was negative unless noted above.          Allergies:   No Known Allergies         Psychiatric Examination:   BP 90/63   Pulse 100   Temp 98.4  F (36.9  C) (Tympanic)   Resp 12   Ht 1.651 m (5' 5\")   Wt 72.6 kg " "(160 lb)   LMP 08/09/2021   SpO2 99%   BMI 26.63 kg/m    Weight is 160 lbs 0 oz  Body mass index is 26.63 kg/m .    MENTAL STATUS EXAM    Appearance:  appears older than than stated age, appropriately dressed and distressed  Attitude:  cooperative and engaged  Psychomotor:  normal and no evidence of tics, dystonia, or tardive dyskinesia  Eye Contact: appropriate  Speech:  fluent English and normal tone  Mood: \"happy\"  Affect:  congruent  Thought Content: denies suicidal ideation and denies homicidal ideation  Thought Process: linear and coherent  Sensorium: awake and alert  Cognition: memory grossly intact  Impulse control: fair  Insight: fair  Judgment: fair         Labs:   No results found for this or any previous visit (from the past 24 hour(s)).     Assessment  & Plan      Assessment:   Nona is a 41 year old  female previously diagnosed with schizoaffective disorder who presents with active suicidal Ideation, auditory and visual hallucinations  in the context of medication changes. Substances are not a contributing factor to their presentation. Biological contributions to mental health presentation include family history of mental health concenrs.  The patient presentation is consistent with Schizoaffective disorder, depressive type.      Psychiatric Hospital course:   Nona Finley was admitted to [facility] as a voluntary patient. Her PTA clozapine  mg PO HS was continued. Started Clozapine  mg PO HS 8/17,Haloperidol 5 mg PO HS 8/17, Olanzapine ODT 5 mg PO HS 8/17. Discontinued Zyprexa 5 mg PO HS 8/18, Added 2.5 mg to Haldol AM and BID PRN. Increased Clozapine dose to 300 mg PO HS 8/18. Added colace 100 mg BID 8/19. Added Miralax 17 g daily 8/19 for symptoms of constipation.    Updates:  -Clozapine  mg PO HS 8/17  -Haloperidol 5 mg PO HS 8/17  -Olanzapine ODT 5 mg PO HS 8/17  -Discontinued Zyprexa 5 mg PO HS 8/18  -Added 2.5 mg to Haldol AM and BID PRN 8/18  - Increased " Clozapine dose to 300 mg PO HS. 8/18  -added colace 100 mg BID 8/19.  - Added Miralax 17 g daily 8/19 for symptoms of constipation.    Principal Diagnosis:   # Schizoaffective disorder, depressive type, severe  -Clozapine  mg PO HS 8/17  -Haloperidol 5 mg PO HS 8/17  -Olanzapine ODT 5 mg PO HS 8/17  -Discontinued Zyprexa 5 mg PO HS 8/18  -Added 2.5 mg to Haldol AM and BID PRN 8/18  - Increased Clozapine dose to 300 mg PO HS. 8/18    Secondary psychiatric & Medical diagnoses of concern this admission:   # none      PRN  Medications  - Hydroxyzine 25 mg Q4hrs PRN   - Olanzapine 10 mg PO/IM TID PRN severe agitation/psychosis  - Acetaminophen 650 mg po Q4hrs PRN pain   -Haldol 2.5 mg PO BID  -Senna docusate 8.6-50 mg BID    Legal Status:   Orders Placed This Encounter      Voluntary      Safety Assessment:   Behavioral Orders   Procedures     Code 1 - Restrict to Unit     Routine Programming     As clinically indicated     Self Injury Precaution     Status 15     Every 15 minutes.     Suicide precautions     Patients on Suicide Precautions should have a Combination Diet ordered that includes a Diet selection(s) AND a Behavioral Tray selection for Safe Tray - with utensils, or Safe Tray - NO utensils         Disposition: Pending stabilization & development of a safe discharge plan.     Patient will be treated in therapeutic milieu with appropriate individual and group therapies as described.  The patient was seen and the plan was discussed with the attending physician.       Psychiatry PGY-1 Resident       Attestation:  This patient has been seen and evaluated by me, Juarez Grimm MD.  I have discussed this patient with the house staff team including the resident and medical student and I agree with the findings and plan in this note.    I have reviewed today's vital signs, medications, labs and imaging. Juarez Grimm MD , PhD.

## 2021-08-19 NOTE — PHARMACY
Confirmed with ACT team  is Vicky Santos [phone]  Ollie Aguilera last administered by ACT team nursing staff on 08/10/21    Johanne Cortes, PharmD, BCPP  Behavioral Health Pharmacist  [facility] Ascom: [phone] or [phone]

## 2021-08-19 NOTE — PLAN OF CARE
Plan of care   Problem: Sleep Disturbance  Goal: Adequate Sleep/Rest  Outcome: Improving   Received pt sleeping in bed with regular unlabored respiration; No PRN was requested or administered; no safety concern to report; pt appeared to have slept for 7 hrs this shift; will continue to monitor.

## 2021-08-19 NOTE — PROVIDER NOTIFICATION
At 1512, Bev GIBSONfrom [place] [employer] protection called the unit to follow up with report filed by [facility]  and asked about pt's current mental status and discharge plan. Discussed potential need to meet pt's family to investigate the case and child protection response time frame. Bev provided [facility] phone number ([phone]) for the staff to call tomorrow before pt discharge home. Attending resident was notified.

## 2021-08-20 PROCEDURE — 124N000002 HC R&B MH UMMC

## 2021-08-20 PROCEDURE — 250N000013 HC RX MED GY IP 250 OP 250 PS 637: Performed by: PSYCHIATRY & NEUROLOGY

## 2021-08-20 PROCEDURE — 99233 SBSQ HOSP IP/OBS HIGH 50: CPT | Mod: GC | Performed by: PSYCHIATRY & NEUROLOGY

## 2021-08-20 PROCEDURE — 250N000013 HC RX MED GY IP 250 OP 250 PS 637

## 2021-08-20 RX ORDER — IPRATROPIUM BROMIDE 21 UG/1
2 SPRAY, METERED NASAL
Status: DISCONTINUED | OUTPATIENT
Start: 2021-08-20 | End: 2021-08-23 | Stop reason: HOSPADM

## 2021-08-20 RX ADMIN — HALOPERIDOL 5 MG: 5 TABLET ORAL at 19:46

## 2021-08-20 RX ADMIN — Medication 2.5 MG: at 09:15

## 2021-08-20 RX ADMIN — DOCUSATE SODIUM 100 MG: 100 CAPSULE, LIQUID FILLED ORAL at 09:15

## 2021-08-20 RX ADMIN — Medication 5 MG: at 19:46

## 2021-08-20 RX ADMIN — DOCUSATE SODIUM 100 MG: 100 CAPSULE, LIQUID FILLED ORAL at 19:46

## 2021-08-20 RX ADMIN — IPRATROPIUM BROMIDE 2 SPRAY: 21 SPRAY NASAL at 20:50

## 2021-08-20 RX ADMIN — CLOZAPINE 300 MG: 100 TABLET, ORALLY DISINTEGRATING ORAL at 19:46

## 2021-08-20 ASSESSMENT — ACTIVITIES OF DAILY LIVING (ADL)
HYGIENE/GROOMING: INDEPENDENT
ORAL_HYGIENE: INDEPENDENT
ORAL_HYGIENE: INDEPENDENT
DRESS: INDEPENDENT
HYGIENE/GROOMING: INDEPENDENT
LAUNDRY: WITH SUPERVISION
DRESS: INDEPENDENT

## 2021-08-20 NOTE — PLAN OF CARE
Problem: Behavioral Health Plan of Care  Goal: Plan of Care Review  Outcome: Improving  Flowsheets (Taken 8/20/2021 1310)  Plan of Care Reviewed With: patient  Progress: improving  Patient Agreement with Plan of Care: agrees     Problem: Depressive Symptoms  Goal: Depressive Symptoms  Description: Signs and symptoms of listed problems will be absent or manageable.  Outcome: Improving  Flowsheets (Taken 8/20/2021 1310)  Depressive Symptoms Assessed: all  Depressive Symptoms Present: none     Nona spending most of day walking about unit-states she is just passing time-disappointed, stating she misses her children, but accepting of postponement of discharge until Monday-spoke in 1:1 of 's plan to apply to fellowship only in [place] or  post finishing his medical residency this year, as she needs to have him home to help with children-states teenage children listen to him, but not to her and she is struggling with them-smiling and pleasant while discussing-spoke lovingly of children-states mother-in-law moved out of her home and so she is providing majority of care for children, although mother-in-law comes In morning to help with 16 yr old autistic child-Nona smiling and pleasant in interactions-denies auditory hallucinations and does not appear to be responding-states mood is good and she feels hopeful

## 2021-08-20 NOTE — PLAN OF CARE
Pt attended 1 of 3 OT groups today. Pt participated in OT clinic IND, where she initiated a chosen project (coloring a picture of flowers), followed through with plan, and asked for support with supplies as needed. Pt was pleasant at engaged throughout with good attention to task; minimally social with peers.

## 2021-08-20 NOTE — PLAN OF CARE
Assessment/Intervention/Current Symptoms and Care Coordination    Attended team meeting and reviewed chart notes.    Writer spoke with pt's ACT team WALTER Perry. Vicky had some concerns about pt discharging of Friday and not having support on the weekend. She also stated that pt's  usually pushes for pt discharged before she is stable due to caring for the 6 children by his mother. Vicky stated in the past pt has left too early only to decompensate when home.      Discharge Plan or Goal  Return to home      Barriers to Discharge   Needs further stabilization    Referral Status  None made    Legal Status  Pt is committed with [facility] but here voluntarily

## 2021-08-20 NOTE — PLAN OF CARE
Problem: Sleep Disturbance  Goal: Adequate Sleep/Rest  Outcome: Improving   Pt slept 7 hours this night,respirations easy

## 2021-08-20 NOTE — PLAN OF CARE
"Nursing Plan of care   Problem: Suicidal Behavior  Goal: Suicidal Behavior is Absent or Managed  Outcome: Improving  Flowsheets (Taken 8/19/2021 2057)  Mutually Determined Action Steps (Suicidal Behavior Absent/Managed):   shares suicidal thoughts   identifies protective factors  Pt stated, \" No voices since yesterday, and I think they are gone\"; calm and pleasant; denied SI/SIB,AVH, depression and anxiety; medication complaint; reported drooling at night and dizziness in the morning; encouraged fluid and monitor change position lying-sitting and standing position; spent most of the shift pacing in the hallway and watching TV in the lounge; will continue to monitor and assess.      "

## 2021-08-20 NOTE — PLAN OF CARE
Pt up walking hallway at start of shift. Pleasant, cooperative. Pt denies all criteria at this time. No pain. Disappointed that she couldn't discharge today but is accepting that she is staying longer. No complaints.     2050 Prn atrovent administered per request for drooling. Informed pt she could repeat this x1 if desired.    Problem: Suicidal Behavior  Goal: Suicidal Behavior is Absent or Managed  Outcome: Improving

## 2021-08-20 NOTE — PROGRESS NOTES
"  ----------------------------------------------------------------------------------------------------------  [facility], [place]   Psychiatric Progress Note  Hospital Day #3     Interim History:   The patient's care was discussed with the treatment team and chart notes were reviewed.    Sleep 7 hours (08/20/21 0600)  Scheduled Medications: compliant  PRN medications: none    Staff Report:    \"Confirmed with ACT team  is Vicky Santos [phone]  Ollie Aguilera last administered by ACT team nursing staff on 08/10/21\"    \"At 6262, Bev GIBSONfrom [place] [employer] protection called the unit to follow up with report filed by [facility]  and asked about pt's current mental status and discharge plan. Discussed potential need to meet pt's family to investigate the case and child protection response time frame. Bev provided [facility] phone number ([phone]) for the staff to call tomorrow before pt discharge home. Attending resident was notified. \"    \"Pt stated, \" No voices since yesterday, and I think they are gone\"; calm and pleasant; denied SI/SIB,AVH, depression and anxiety; medication complaint; reported drooling at night and dizziness in the morning; encouraged fluid and monitor change position lying-sitting and standing position; spent most of the shift pacing in the hallway and watching TV in the lounge; will continue to monitor and assess. \"       Patient Interview:   Nona states that she is doing well. She denies hallucinations both auditory and olfactory. She states, \"I think they are gone.\" She states that she is not sad and her mood is good.  Discussed that ACT team is not ready for her until Monday 8/23. Shared with her that Monday is mostly her discharge date but can change. Pt seemed ok about staying over the weekend.  Offered her ipratropium spray for her drooling due to clozapine side effect and has " "accepted it as her PRN.       The risks, benefits, alternatives and side effects of any medication changes have been discussed and are understood by the patient and other caregivers.    Review of systems:     ROS was negative unless noted above.          Allergies:   No Known Allergies         Psychiatric Examination:   BP 92/69   Pulse 114   Temp 96.8  F (36  C) (Tympanic)   Resp 12   Ht 1.651 m (5' 5\")   Wt 72.6 kg (160 lb)   LMP 08/09/2021   SpO2 99%   BMI 26.63 kg/m    Weight is 160 lbs 0 oz  Body mass index is 26.63 kg/m .    MENTAL STATUS EXAM    Appearance:  normal posture, appears older than than stated age and appropriately dressed  Attitude:  cooperative and engaged  Psychomotor:  normal and no evidence of tics, dystonia, or tardive dyskinesia  Eye Contact: appropriate  Speech:  fluent English and normal tone  Mood: \"good\"  Affect:  congruent  Thought Content: denies suicidal ideation and denies homicidal ideation  Thought Process: linear and coherent  Sensorium: awake and alert  Cognition: memory grossly intact  Impulse control: fair  Insight: fair  Judgment: fair         Labs:   No results found for this or any previous visit (from the past 24 hour(s)).     Assessment  & Plan      Assessment:   Nona is a 41 year old  female previously diagnosed with schizoaffective disorder who presents with active suicidal Ideation, auditory and visual hallucinations  in the context of medication changes. Substances are not a contributing factor to their presentation. Biological contributions to mental health presentation include family history of mental health concenrs.  The patient presentation is consistent with Schizoaffective disorder, depressive type.      Psychiatric Hospital course:   Nona Finley was [facility] as a voluntary patient. Her PTA clozapine  mg PO HS was continued. Started Clozapine  mg PO HS 8/17,Haloperidol 5 mg PO HS 8/17, Olanzapine ODT 5 mg PO HS " 8/17. Discontinued Zyprexa 5 mg PO HS 8/18, Added 2.5 mg to Haldol AM and BID PRN. Increased Clozapine dose to 300 mg PO HS 8/18. Added colace 100 mg BID 8/19. Added Miralax 17 g daily 8/19 for symptoms of constipation.    Updates:  -Clozapine  mg PO HS 8/17  -Haloperidol 5 mg PO HS 8/17  -Olanzapine ODT 5 mg PO HS 8/17  -Discontinued Zyprexa 5 mg PO HS 8/18  -Added 2.5 mg to Haldol AM and BID PRN 8/18  - Increased Clozapine dose to 300 mg PO HS. 8/18  -added colace 100 mg BID 8/19.  - Added Miralax 17 g daily 8/19 for symptoms of constipation.  -Ipratropium 0.03%  Spray 8/20 for drooling.      Principal Diagnosis:   # Schizoaffective disorder, depressive type, severe  -Clozapine  mg PO HS 8/17  -Haloperidol 5 mg PO HS 8/17  -Olanzapine ODT 5 mg PO HS 8/17  -Discontinued Zyprexa 5 mg PO HS 8/18  -Added 2.5 mg to Haldol AM and BID PRN 8/18  - Increased Clozapine dose to 300 mg PO HS. 8/18    Secondary psychiatric & Medical diagnoses of concern this admission:   # none      PRN  Medications  - Hydroxyzine 25 mg Q4hrs PRN   - Olanzapine 10 mg PO/IM TID PRN severe agitation/psychosis  - Acetaminophen 650 mg po Q4hrs PRN pain   -Haldol 2.5 mg PO BID  -Senna docusate 8.6-50 mg BID    Legal Status:   Orders Placed This Encounter      Voluntary      Safety Assessment:   Behavioral Orders   Procedures     Code 1 - Restrict to Unit     Routine Programming     As clinically indicated     Self Injury Precaution     Status 15     Every 15 minutes.     Suicide precautions     Patients on Suicide Precautions should have a Combination Diet ordered that includes a Diet selection(s) AND a Behavioral Tray selection for Safe Tray - with utensils, or Safe Tray - NO utensils         Disposition: Pending stabilization & development of a safe discharge plan.     Patient will be treated in therapeutic milieu with appropriate individual and group therapies as described.  The patient was seen and the plan was discussed with the  attending physician.     Tere Rojas, MS3    I was present with the medical student who participated in the service and in the documentation of the note. I have verified the history and personally performed the exam and medical decision making. I agree with the assessment and plan of care as documented in the note. Juarez Grimm M.D., Ph.D.

## 2021-08-20 NOTE — DISCHARGE SUMMARY
"    ----------------------------------------------------------------------------------------------------------  [facility], [place]   Discharge Summary  Hospital Day #6  Nona Finley MRN# [id number]   Age: 41 year old YOB: 1980   Date of Admission:  8/16/2021  Date of Discharge:  8/23/2021 12:05 PM  Admitting Physician:  Juarez Grimm MD  Discharge Physician:  Juarez Grimm MD     Event Leading to Hospitalization:   Chief Complaint: \"I hear voices to kill myself and my children\"     History of Present Illness:  Nona Finley is a 41 year old female previously diagnosed with schizphrenia and post partum depression with psychosis who presented on 08/17/2021 with suicidal ideation and plan in the context of medication changes.   She was medically cleared for admission to inpatient psychiatric unit. The risks, benefits, alternatives, and side effects of treatments including no treatment have been discussed and are understood by the patient and other caregivers.     Per ED report:  Nona Finley is a 41 year old female who is here reporting relapsing of pejorative auditory hallucinations that are telling her to kill herself and her kids. Patient has schizoaffective disorder and been hospitalized multiple times. She is under commitment. She is followed by an ACT team. Her clozaril dose has been increased and Zyprexa was added to address her breakthrough symptoms but she continues to experience them. She has been sleeping and eating poorly. She had thoughts of going to the river nearby to drown herself. She planned on going yesterday. She now feels unsafe. She would like to come into the hospital for stabilization. Patient denies using drugs. She has no acute medical concerns. She denies COVID symptoms.     Per patient report:    Nona states \"I am not feeling well\". She shares that she was hearing voices that were telling her to kill herself. It was very distressing to her. " "This has been going on for the past 2 weeks. She has been taking her medications regularly but she states that they have not been working. She states that she feels worthless, weak and cannot help herself and that she needs to leave the world.  She has not made any attemps. Her plan was to jump off the bridge and she was very close to doing that. She left her house one night and walked 3 blocks then thought about her children and came back. She has 6 kids. She states that voices told her to jump off the bridge and it very hard for her to resist the commands of her auditory hallucinations. 2 weeks prior she didn't have those symptoms and these symptoms came \"from nowhere\". Her current stressors include raising a daughter with autism and taking care of her 6 children.   She states that she cannot sleep without medication. She would be awaken by the voices. Every night she would get 4 hrs of sleep.  She experiences from the food smells of blood and bleach which causes her to avoid eating. She is aware that she prepares the food herself and she does not put those things in the food but the voices tell her there is poison in the food. She believes that what the voices say is true. In the past the voices have told her that her  will kill her which ended up not being true.      She took haldol injections once a month in the past which helped. Discussed that if Haldol is on the Re[facility] then it will be prescribed, if not it will be added. Eric has haldol included and she will start 5 mg Haldol HS.   She states that she had Abilify injection last week which does not help manage her symptoms. Clozapine she states causes drooling and dizziness for her. She states that she has not been drinking often.    When asked about visual hallucination she states that sometimes she sees people with knives and they are everywhere she goes. She is scared of those figures.       She lives with her children and her  has been " away for 2 years doing his medical residency in [place].     She has not experienced trauma in the past. Since she was a young child she had been dealing with mental health concerns but her parents did not think she had a mental illness. She was 13 when visual hallucinations started. She moved the [place] after fleeing the civil war in [place] and lived with relatives. She was unable to obtain a job because of the hallucinations because those would distract her and it was really hard to focus. She states that she does not receive disability benefits since she was working part time in the past and was also denied due to her 's income.      Nothing has triggered this episode. Her  is her support system and her brother who she sees occasionally. She also has an ACT team who give her her medications at her house.      It was discussed that she will get her hijab back  and she states she has no plan to harm herself with her hijab.  She was encouraged to talk to staff and providers if she feels thinks about harming  herself.       She thought of going to a group home after discharge to get a break from her children. It was discussed with her that once her symptoms are managed, the team will revisit the idea of referring her to a group home.         Per DEC assessment:  Patient is a 41 year old  and -American female who presented to the ED by Community Provider related to concerns for command hallucinations.She was going to go to the river last night. She also told her children she was going to kill them making them afraid.  Patient reports she was dropped off by her  Vicky Harrington. She reports a few weeks of increased command hallucinations telling her to go to the river and kill herself and her children. She had a recent medication increase which she states did not help, or possibly made it worse.       Psychotherapy techniques or interventions utilized throughout  assessment include: Establishing rapport, Active listening, Assess dimensions of crisis and Apply solution-focused therapy to address current crisis     Patient presents to the hospital with her  for an acute exacerbation of her schizophrenia. She reports command hallucinations to kill herself and her children. Patient also reports inability to eat as she believes there is poison or bleach in her food. Her sleep is interrupted by the command hallucinations telling her to kill herself. She also reports feeling sad and crying.         Psychiatric ROS:  Depression: suicidal ideation, suicidal ideation with plan, without intent, depressed mood, low energy, insomnia and feeling worthless  Martha/Hypomania:  none  Anxiety: overwhelmed  Panic Attack:  none  Psychosis: auditory hallucinations with commands [details in Interim History] and visual hallucinations [details in Interim History]  Trauma Related: none  Personality Symptoms: none  Obsessive Compulsive Disorder: negative    DMDD: None  Eating Disorders: negative  Oppositional Defiant Disorder/ conduct: none  ADHD: No symptoms  LD: No previously diagnosed or signs of symptoms of learning disorder reported   ASD: none  RAD: none  Suicidal Ideation: go to the river and kill herself  Homicidal Ideation: none at the moment        Medical ROS: A complete medical review of systems was preformed and is negative other than noted in the HPI    See Admission note by Juarez Grimm MD on 8/16/2021 for additional details.      Objective:   B/P: 92/69, T: 96.8, P: 114, R: 12    Psychiatric Examination:  Appearance:  awake, alert and adequately groomed  Muscle Strength and Tone: normal  Gait and Station: Normal  Behavior (Psychomotor):  no evidence of tardive dyskinesia, dystonia, or tics  Eye Contact:  good  Speech:  clear, coherent  Mood:  good  Affect:  appropriate and in normal range and intensity is normal  Attitude:  cooperative  Thought Process:  logical, linear  and goal oriented  Thought Content:  no evidence of suicidal ideation or homicidal ideation, no auditory hallucinations present and no visual hallucinations present  Associations:  no loose associations  Insight:  fair  Judgment:  fair  Oriented to:  time, person, and place  Attention Span and Concentration:  intact  Recent and Remote Memory:  intact  Language: Fluent in English with appropriate syntax and vocabulary.  Fund of Knowledge: appropriate     Hospital Course:   Diagnostic Impression:                Nona is a 41 year old  female previously diagnosed with schizoaffective disorder who presents with active suicidal Ideation, auditory and visual hallucinations  in the context of medication changes. Substances are not a contributing factor to their presentation. Biological contributions to mental health presentation include family history of mental health concenrs.  The patient presentation is consistent with Schizoaffective disorder, depressive type.  Psychiatric Course:  Nona Finley was admitted to [facility] as a voluntary patient. Her PTA clozapine  mg PO HS was continued. Started Clozapine  mg PO HS 8/17,Haloperidol 5 mg PO HS 8/17, Olanzapine ODT 5 mg PO HS 8/17. Discontinued Zyprexa 5 mg PO HS 8/18, Added 2.5 mg to Haldol AM and BID PRN. Increased Clozapine dose to 300 mg PO HS. Reported drooling as a side effect of clozapine and PRN ipratropium spray was initiated at 8/20.     Updates:  -Clozapine  mg PO HS 8/17  -Haloperidol 5 mg PO HS 8/17  -Olanzapine ODT 5 mg PO HS 8/17  -Discontinued Zyprexa 5 mg PO HS 8/18  -Added 2.5 mg to Haldol AM and BID PRN 8/18  - Increased Clozapine dose to 300 mg PO HS. 8/18   -Ipratropium 0.03%  Spray 8/20 for drooling.    Principal Diagnosis:   # Schizoaffective disorder, depressive type, severe  -Clozapine  mg PO HS 8/17  -Haloperidol 5 mg PO HS 8/17  -Olanzapine ODT 5 mg PO HS 8/17  -Discontinued Zyprexa 5 mg PO HS 8/18  -Added  2.5 mg to Haldol AM and BID PRN 8/18  - Increased Clozapine dose to 300 mg PO HS. 8/18    Medical Course:  Patient was medically cleared for admission to the unit. No medical issues arose during this hospitalization.     Consults:   None    Risk Assessment:   Today Nona Finley denies SI, SIB and HI. No overt evidence of psychosis or david observed. Patient grossly appears to be cognitively intact. Insight and judgement have improved since admission. Patient is aware of consequences of medication non-adherence . Patient has not exhibited aggressive or violence behaviors since prior to this admission.  Has notable risk factors for self-harm, including access to firearm and previous suicide attempts. However, risk is mitigated by commitment to family, Voodoo beliefs, ability to volunteer a safety plan and history of seeking help when needed. Patient does not have immediate access to firearms. Therefore, based on all available evidence including the factors cited above, she does not appear to be at imminent risk for self-harm, does not meet criteria for a 72-hr hold, and therefore remains appropriate for ongoing outpatient level of care.  Patient agreed to further reduce risk of self-harm and agreed to remain medication adherent. Additional steps taken to minimize risk include: medication optimization, close psychiatric follow up and provision of crisis resources. Patient expressed understanding of risk associated with medication non compliance including increased risk of harm to self or others.      Nona was discharged to home. During this admission, she did participate in groups and was visible in the milieu, and her symptoms of depression, anxiety and hallucination improved. At the time of discharge she was determined to not be a danger to herself or others.     This document serves as a transfer of care to Nona Finley's outpatient providers.     Diagnoses:   Schizoaffective Disorder, Depressive type        Discharge Plan:   Patient is being discharged to home with the following medications and appointments as detailed below:    Medications:    Discharge Medication List as of 8/23/2021 12:30 PM      START taking these medications    Details   alum & mag hydroxide-simethicone (MAALOX) 200-200-20 MG/5ML SUSP suspension Take 30 mLs by mouth every 4 hours as needed for indigestion, Disp-335 mL, R-0, E-Prescribe      docusate sodium (COLACE) 100 MG capsule Take 1 capsule (100 mg) by mouth 2 times daily, Disp-30 capsule, R-0, E-Prescribe      !! haloperidol (HALDOL) 0.5 MG tablet Take 5 tablets (2.5 mg) by mouth daily, Disp-150 tablet, R-0, E-PrescribeIf there is possible to split 5mg tablet to half that is preferred instead of taking 5 0.5 mg tabs. The supply should cover 30 days.      !! haloperidol (HALDOL) 5 MG tablet Take 1 tablet (5 mg) by mouth At Bedtime, Disp-30 tablet, R-0, E-Prescribe       !! - Potential duplicate medications found. Please discuss with provider.      CONTINUE these medications which have CHANGED    Details   cloZAPine 150 MG TBDP Take 300 mg by mouth At Bedtime, Disp-45 tablet, R-0, E-Prescribe         CONTINUE these medications which have NOT CHANGED    Details   ABILIFY MAINTENA 400 MG extended release injection Inject 2 mLs (400 mg) into the muscle every 28 days Due on 3/25, Disp-2 mL, R-0, MARICRUZ, E-Prescribe      melatonin 5 MG tablet Take 1 tablet (5 mg) by mouth At Bedtime, Disp-30 tablet, R-0, E-Prescribe         STOP taking these medications       metFORMIN (GLUCOPHAGE-XR) 500 MG 24 hr tablet Comments:   Reason for Stopping:         Multiple Vitamin (MULTIVITAMIN ADULT) TABS Comments:   Reason for Stopping:         OLANZapine (ZYPREXA) 10 MG tablet Comments:   Reason for Stopping:         OLANZapine (ZYPREXA) 5 MG tablet Comments:   Reason for Stopping:         rivaroxaban ANTICOAGULANT (XARELTO) 20 MG TABS tablet Comments:   Reason for Stopping:         senna-docusate  (SENOKOT-S/PERICOLACE) 8.6-50 MG tablet Comments:   Reason for Stopping:         tranexamic acid (LYSTEDA) 650 MG tablet Comments:   Reason for Stopping:         traZODone (DESYREL) 50 MG tablet Comments:   Reason for Stopping:         Vitamin D3 (CHOLECALCIFEROL) 25 mcg (1000 units) tablet Comments:   Reason for Stopping:             Health Care Follow-up:   [phone]  Dr. Khadra WATSON with ACT team: Vicky Hankins [phone]      Pt seen and discussed with my attending, MD Trey Moreira MD  PGY-1 Psychiatry Resident    Attestation:   The patient has been seen and evaluated by me,  Juarez Grimm MD. I have examined the patient today and reviewed the discharge plan with the resident and medical student. I agree with the final assessment and plan, as noted in the discharge summary. I have reviewed today's vital signs, medications, labs and imaging.  Total time discharge plannin minutes  Juarez Grimm MD ,Ph.D.         Appendix A: All Labs This Admission:     Results for orders placed or performed during the hospital encounter of 21   CBC with platelets differential     Status: Abnormal    Narrative    The following orders were created for panel order CBC with platelets differential.  Procedure                               Abnormality         Status                     ---------                               -----------         ------                     CBC with platelets and d...[[id number]]  Abnormal            Final result                 Please view results for these tests on the individual orders.   Comprehensive metabolic panel     Status: Abnormal   Result Value Ref Range    Sodium 140 133 - 144 mmol/L    Potassium 3.9 3.4 - 5.3 mmol/L    Chloride 110 (H) 94 - 109 mmol/L    Carbon Dioxide (CO2) 27 20 - 32 mmol/L    Anion Gap 3 3 - 14 mmol/L    Urea Nitrogen 14 7 - 30 mg/dL    Creatinine 0.69 0.52 - 1.04 mg/dL    Calcium 8.5 8.5 - 10.1 mg/dL    Glucose 101  (H) 70 - 99 mg/dL    Alkaline Phosphatase 66 40 - 150 U/L    AST 25 0 - 45 U/L    ALT 30 0 - 50 U/L    Protein Total 8.3 6.8 - 8.8 g/dL    Albumin 3.6 3.4 - 5.0 g/dL    Bilirubin Total 0.2 0.2 - 1.3 mg/dL    GFR Estimate >90 >60 mL/min/1.73m2   TSH with free T4 reflex     Status: Normal   Result Value Ref Range    TSH 0.69 0.40 - 4.00 mU/L   HCG qualitative urine (UPT)     Status: Normal   Result Value Ref Range    hCG Urine Qualitative Negative Negative   Asymptomatic COVID-19 Virus (Coronavirus) by PCR Oropharynx     Status: Normal    Specimen: Oropharynx; Swab   Result Value Ref Range    SARS CoV2 PCR Negative Negative    Narrative    Testing was performed using the Xpert Xpress SARS-CoV-2 Assay on the  Cepheid Gene-Xpert Instrument Systems. Additional information about  this Emergency Use Authorization (EUA) assay can be found via the Lab  Guide. This test should be ordered for the detection of SARS-CoV-2 in  individuals who meet SARS-CoV-2 clinical and/or epidemiological  criteria. Test performance is unknown in asymptomatic patients. This  test is for in vitro diagnostic use under the FDA EUA for  laboratories certified under CLIA to perform high complexity testing.  This test has not been FDA cleared or approved. A negative result  does not rule out the presence of PCR inhibitors in the specimen or  target RNA in concentration below the limit of detection for the  assay. The possibility of a false negative should be considered if  the patient's recent exposure or clinical presentation suggests  COVID-19. This test was validated by the [facility]. This laboratory is certified under  the Clinical Laboratory Improvement Amendments of 1988 (CLIA-88) as  qualified to perform high complexity laboratory testing.     CBC with platelets and differential     Status: Abnormal   Result Value Ref Range    WBC Count 5.5 4.0 - 11.0 10e3/uL    RBC Count 4.12 3.80 - 5.20  10e6/uL    Hemoglobin 11.5 (L) 11.7 - 15.7 g/dL    Hematocrit 36.5 35.0 - 47.0 %    MCV 89 78 - 100 fL    MCH 27.9 26.5 - 33.0 pg    MCHC 31.5 31.5 - 36.5 g/dL    RDW 14.9 10.0 - 15.0 %    Platelet Count 344 150 - 450 10e3/uL    % Neutrophils 60 %    % Lymphocytes 29 %    % Monocytes 7 %    % Eosinophils 3 %    % Basophils 1 %    % Immature Granulocytes 0 %    NRBCs per 100 WBC 0 <1 /100    Absolute Neutrophils 3.3 1.6 - 8.3 10e3/uL    Absolute Lymphocytes 1.6 0.8 - 5.3 10e3/uL    Absolute Monocytes 0.4 0.0 - 1.3 10e3/uL    Absolute Eosinophils 0.2 0.0 - 0.7 10e3/uL    Absolute Basophils 0.0 0.0 - 0.2 10e3/uL    Absolute Immature Granulocytes 0.0 <=0.0 10e3/uL    Absolute NRBCs 0.0 10e3/uL   Drug abuse screen 1 urine (ED)     Status: Normal   Result Value Ref Range    Amphetamines Urine Screen Negative Screen Negative    Barbiturates Urine Screen Negative Screen Negative    Benzodiazepines Urine Screen Negative Screen Negative    Cannabinoids Urine Screen Negative Screen Negative    Cocaine Urine Screen Negative Screen Negative    Opiates Urine Screen Negative Screen Negative   Ferritin     Status: Normal   Result Value Ref Range    Ferritin 17 12 - 150 ng/mL   Transferrin     Status: Normal   Result Value Ref Range    Transferrin 302 210 - 360 mg/dL   Iron and iron binding capacity     Status: Abnormal   Result Value Ref Range    Iron 52 35 - 180 ug/dL    Iron Binding Capacity 404 240 - 430 ug/dL    Iron Sat Index 13 (L) 15 - 46 %   WBC and Differential     Status: None   Result Value Ref Range    WBC Count 5.7 4.0 - 11.0 10e3/uL    % Neutrophils 43 %    % Lymphocytes 40 %    % Monocytes 8 %    % Eosinophils 8 %    % Basophils 1 %    % Immature Granulocytes 0 %    NRBCs per 100 WBC 0 <1 /100    Absolute Neutrophils 2.5 1.6 - 8.3 10e3/uL    Absolute Lymphocytes 2.3 0.8 - 5.3 10e3/uL    Absolute Monocytes 0.4 0.0 - 1.3 10e3/uL    Absolute Eosinophils 0.5 0.0 - 0.7 10e3/uL    Absolute Basophils 0.1 0.0 - 0.2 10e3/uL     Absolute Immature Granulocytes 0.0 <=0.0 10e3/uL    Absolute NRBCs 0.0 10e3/uL   Urine Drugs of Abuse Screen     Status: Normal    Narrative    The following orders were created for panel order Urine Drugs of Abuse Screen.  Procedure                               Abnormality         Status                     ---------                               -----------         ------                     Drug abuse screen 1 urin...[[id number]]  Normal              Final result                 Please view results for these tests on the individual orders.   WBC and differential     Status: None    Narrative    The following orders were created for panel order WBC and differential.  Procedure                               Abnormality         Status                     ---------                               -----------         ------                     WBC and Differential[[id number]]                             Final result                 Please view results for these tests on the individual orders.

## 2021-08-21 PROCEDURE — 250N000013 HC RX MED GY IP 250 OP 250 PS 637: Performed by: PSYCHIATRY & NEUROLOGY

## 2021-08-21 PROCEDURE — 124N000002 HC R&B MH UMMC

## 2021-08-21 PROCEDURE — 250N000013 HC RX MED GY IP 250 OP 250 PS 637

## 2021-08-21 RX ADMIN — HALOPERIDOL 5 MG: 5 TABLET ORAL at 20:01

## 2021-08-21 RX ADMIN — CLOZAPINE 300 MG: 100 TABLET, ORALLY DISINTEGRATING ORAL at 20:01

## 2021-08-21 RX ADMIN — Medication 2.5 MG: at 08:35

## 2021-08-21 RX ADMIN — Medication 5 MG: at 20:01

## 2021-08-21 RX ADMIN — DOCUSATE SODIUM 100 MG: 100 CAPSULE, LIQUID FILLED ORAL at 08:35

## 2021-08-21 RX ADMIN — DOCUSATE SODIUM 100 MG: 100 CAPSULE, LIQUID FILLED ORAL at 20:01

## 2021-08-21 ASSESSMENT — ACTIVITIES OF DAILY LIVING (ADL)
HYGIENE/GROOMING: INDEPENDENT
DRESS: INDEPENDENT
HYGIENE/GROOMING: INDEPENDENT
ORAL_HYGIENE: INDEPENDENT
ORAL_HYGIENE: INDEPENDENT
DRESS: INDEPENDENT

## 2021-08-21 NOTE — PLAN OF CARE
Problem: Sleep Disturbance  Goal: Adequate Sleep/Rest  Outcome: No Change   Pt slept 7 hours,respirations easy

## 2021-08-21 NOTE — PLAN OF CARE
Problem: Depressive Symptoms  Goal: Depressive Symptoms  Description: Signs and symptoms of listed problems will be absent or manageable.  Outcome: Improving  Flowsheets (Taken 8/21/2021 9803)  Depressive Symptoms Assessed: all  Depressive Symptoms Present: affect   Pt up and out this a.m for meds and breakfast. Pt denies all mental health sx. Pt has been isolative to her room except out for meals and a shower. Pt appears flat and blunted but brightens upon approach.

## 2021-08-22 PROCEDURE — 250N000013 HC RX MED GY IP 250 OP 250 PS 637

## 2021-08-22 PROCEDURE — 250N000013 HC RX MED GY IP 250 OP 250 PS 637: Performed by: PSYCHIATRY & NEUROLOGY

## 2021-08-22 PROCEDURE — 124N000002 HC R&B MH UMMC

## 2021-08-22 RX ADMIN — Medication 5 MG: at 20:05

## 2021-08-22 RX ADMIN — Medication 2.5 MG: at 09:56

## 2021-08-22 RX ADMIN — HALOPERIDOL 5 MG: 5 TABLET ORAL at 20:05

## 2021-08-22 RX ADMIN — CLOZAPINE 300 MG: 100 TABLET, ORALLY DISINTEGRATING ORAL at 20:05

## 2021-08-22 ASSESSMENT — ACTIVITIES OF DAILY LIVING (ADL)
HYGIENE/GROOMING: INDEPENDENT
ORAL_HYGIENE: INDEPENDENT
DRESS: INDEPENDENT

## 2021-08-22 NOTE — PLAN OF CARE
Pt out in lounge at start of shift watching television. Pleasant, cooperative, withdrawn. Watching television. Showered. Denies all criteria at this time. Hopeful for discharge tomorrow. Spent majority of shift out in milieu. Refused colace; states it upsets her stomach.    Problem: Suicidal Behavior  Goal: Suicidal Behavior is Absent or Managed  Outcome: Improving

## 2021-08-22 NOTE — PLAN OF CARE
Problem: Sleep Disturbance  Goal: Adequate Sleep/Rest  Outcome: No Change   Pt slept 7 hours this night

## 2021-08-22 NOTE — PLAN OF CARE
Problem: Depressive Symptoms  Goal: Depressive Symptoms  Description: Signs and symptoms of listed problems will be absent or manageable.  Outcome: Improving     Nursing Assessment    Recent Vitals: B/P: 106/73 T: 97.2 P:95     General Shift Summary  Visible in milieu, keeps mostly to self although social with staff. Brighter affect than previous shifts. Pt denies current MH sx. States they are feeling better and are hoping to go back home on Monday. Denies SI thoughts and AH this shift. Medication complaint and reported no side effects. No prn medications given. Appetite and hygiene are good.     Branden Doe RN

## 2021-08-23 VITALS
RESPIRATION RATE: 16 BRPM | SYSTOLIC BLOOD PRESSURE: 106 MMHG | BODY MASS INDEX: 26.66 KG/M2 | WEIGHT: 160 LBS | TEMPERATURE: 96.5 F | DIASTOLIC BLOOD PRESSURE: 64 MMHG | OXYGEN SATURATION: 100 % | HEIGHT: 65 IN | HEART RATE: 124 BPM

## 2021-08-23 LAB
BASOPHILS # BLD AUTO: 0.1 10E3/UL (ref 0–0.2)
BASOPHILS NFR BLD AUTO: 1 %
EOSINOPHIL # BLD AUTO: 0.5 10E3/UL (ref 0–0.7)
EOSINOPHIL NFR BLD AUTO: 8 %
IMM GRANULOCYTES # BLD: 0 10E3/UL
IMM GRANULOCYTES NFR BLD: 0 %
LYMPHOCYTES # BLD AUTO: 2.3 10E3/UL (ref 0.8–5.3)
LYMPHOCYTES NFR BLD AUTO: 40 %
MONOCYTES # BLD AUTO: 0.4 10E3/UL (ref 0–1.3)
MONOCYTES NFR BLD AUTO: 8 %
NEUTROPHILS # BLD AUTO: 2.5 10E3/UL (ref 1.6–8.3)
NEUTROPHILS NFR BLD AUTO: 43 %
NRBC # BLD AUTO: 0 10E3/UL
NRBC BLD AUTO-RTO: 0 /100
WBC # BLD AUTO: 5.7 10E3/UL (ref 4–11)

## 2021-08-23 PROCEDURE — 99238 HOSP IP/OBS DSCHRG MGMT 30/<: CPT | Mod: GC | Performed by: PSYCHIATRY & NEUROLOGY

## 2021-08-23 PROCEDURE — 36415 COLL VENOUS BLD VENIPUNCTURE: CPT | Performed by: PSYCHIATRY & NEUROLOGY

## 2021-08-23 PROCEDURE — 250N000013 HC RX MED GY IP 250 OP 250 PS 637

## 2021-08-23 PROCEDURE — 85048 AUTOMATED LEUKOCYTE COUNT: CPT | Performed by: PSYCHIATRY & NEUROLOGY

## 2021-08-23 RX ADMIN — Medication 2.5 MG: at 08:48

## 2021-08-23 NOTE — PROGRESS NOTES
Pt discharged at 1210. Pt's belongings were returned. Medication sent with from [facility]. Writer reviewed AVS with pt and pt verbalized understanding. Pt denies SI/HI, agrees to contract for safety out in the community. PA escorted pt out of the hospital. Pt cabbed home.

## 2021-08-23 NOTE — DISCHARGE INSTRUCTIONS
"Behavioral Discharge Planning and Instructions    Summary: You were admitted on 8/16/2021 due to psychosis.  You were treated by Dr. Grimm and discharged on 8/20/ from station 22 to Home    Main Diagnosis:   Schizoaffective Disorder, Depressive type    Health Care Follow-up:   [facility] [phone]  Dr. Gaviria-Tuesday 8/24/2021 @ 9:00am  CM with ACT team: Vicky Hankins [phone]      Attend all scheduled appointments with your outpatient providers. Call at least 24 hours in advance if you need to reschedule an appointment to ensure continued access to your outpatient providers.     Major Treatments, Procedures and Findings:  You were provided with: a psychiatric assessment, assessed for medical stability, medication evaluation and/or management and milieu management    Symptoms to Report: feeling more aggressive, increased confusion, losing more sleep, mood getting worse or thoughts of suicide    Early warning signs can include: increased depression or anxiety sleep disturbances increased thoughts or behaviors of suicide or self-harm  increased unusual thinking, such as paranoia or hearing voices    Safety and Wellness:  Take all medicines as directed.  Make no changes unless your doctor suggests them.      Follow treatment recommendations.  Refrain from alcohol and non-prescribed drugs.  If there is a concern for safety, call 911.    Resources:   Crisis Intervention: 442.825.4523 or 369-323-9572 (TTY: 664.434.2848).  Call anytime for help.  [place] on Mental Illness (www.mn.doron.org): 592.828.4233 or 865-110-3708.  Suicide Awareness Voices of Education (SAVE) (www.save.org): 304-860-BYNF (4960)  National Suicide Prevention Line (www.mentalhealthmn.org): 597-231-QWCA (2631)  [facility] Crisis (COPE) Response - Adult 014 595-6605  Text 4 Life: txt \"LIFE\" to 49382 for immediate support and crisis intervention  Crisis text line: Text \"[place]\" to 296185. Free, confidential, 24/7.    General " Medication Instructions:   See your medication sheet(s) for instructions.   Take all medicines as directed.  Make no changes unless your doctor suggests them.   Go to all your doctor visits.  Be sure to have all your required lab tests. This way, your medicines can be refilled on time.  Do not use any drugs not prescribed by your doctor.  Avoid alcohol.    Advance Directives:   Scanned document on file with [facility]? No scanned doc  Is document scanned? Pt states no documents  Honoring Choices Your Rights Handout: Informed and given  Was more information offered? Pt declined    The Treatment team has appreciated the opportunity to work with you. If you have any questions or concerns about your recent admission, you can contact the unit which can receive your call 24 hours a day, 7 days a week. They will be able to get in touch with a Provider if needed. The unit number is [phone].

## 2021-08-23 NOTE — PLAN OF CARE
Assessment/Intervention/Current Symptoms and Care Coordination      Pt will be discharged to home. She will be followed by the ACT team upon discharge.            Discharge Plan or Goal  Pt will return home      Barriers to Discharge   none    Referral Status  None made      Legal Status  Under commitment with Eric

## 2021-08-23 NOTE — PLAN OF CARE
Problem: Sleep Disturbance  Goal: Adequate Sleep/Rest  Outcome: No Change                                                                                                                                               Observed to have slept well for approx 7 hrs. On all Q 15 rounds w/ regular non-labored respirations and no reported or observed distress.  Safe, therapeutic environment maintained.

## 2021-08-23 NOTE — PLAN OF CARE
Problem: Depressive Symptoms  Goal: Depressive Symptoms  Description: Signs and symptoms of listed problems will be absent or manageable.  Outcome: Improving     Pt is pleasant, polite and cooperative. Presents with full range affect. Mood is calm. Pt denies all psych symptoms. Reports she hasn't heard voices since Wednesday. Med compliant. Declined scheduled colace. Pt is happy to be discharging today. Will continue to monitor and assist as needed.

## 2021-08-26 ENCOUNTER — TELEPHONE (OUTPATIENT)
Dept: PHARMACY | Facility: CLINIC | Age: 41
End: 2021-08-26

## 2021-08-26 NOTE — TELEPHONE ENCOUNTER
Called patient using phone  service with [facility] . No answer. Left voicemail with the [facility] Scheduling Line for patient to call and reschedule.    Sid Crisostomo, ShandaD, [facility]  Medication Therapy Management Pharmacist  Pager: [phone]

## 2021-08-30 ENCOUNTER — TELEPHONE (OUTPATIENT)
Dept: FAMILY MEDICINE | Facility: CLINIC | Age: 41
End: 2021-08-30

## 2021-08-30 NOTE — TELEPHONE ENCOUNTER
MTM referral from: Transitions of Care (recent hospital discharge or ED visit)    MTM referral outreach attempt #2 on August 30, 2021 at 2:35 PM      Outcome: Patient not reachable after several attempts, will route to MTM Pharmacist/Provider as an FYI. Thank you for the referral.    Catarino Jeffers, MTM coordinator

## 2021-09-19 ENCOUNTER — HEALTH MAINTENANCE LETTER (OUTPATIENT)
Age: 41
End: 2021-09-19

## 2021-12-28 ENCOUNTER — TELEPHONE (OUTPATIENT)
Dept: BEHAVIORAL HEALTH | Facility: CLINIC | Age: 41
End: 2021-12-28

## 2021-12-28 ENCOUNTER — HOSPITAL ENCOUNTER (INPATIENT)
Facility: CLINIC | Age: 41
LOS: 5 days | Discharge: HOME | DRG: 885 | End: 2022-01-04
Attending: PSYCHIATRY & NEUROLOGY | Admitting: PSYCHIATRY & NEUROLOGY
Payer: COMMERCIAL

## 2021-12-28 ENCOUNTER — TELEPHONE (OUTPATIENT)
Dept: BEHAVIORAL HEALTH | Facility: CLINIC | Age: 41
End: 2021-12-28
Payer: COMMERCIAL

## 2021-12-28 DIAGNOSIS — R44.0 AUDITORY HALLUCINATIONS: ICD-10-CM

## 2021-12-28 DIAGNOSIS — Z11.52 ENCOUNTER FOR SCREENING LABORATORY TESTING FOR SEVERE ACUTE RESPIRATORY SYNDROME CORONAVIRUS 2 (SARS-COV-2): ICD-10-CM

## 2021-12-28 DIAGNOSIS — R45.851 SUICIDAL IDEATION: ICD-10-CM

## 2021-12-28 DIAGNOSIS — R44.3 HALLUCINATIONS: ICD-10-CM

## 2021-12-28 DIAGNOSIS — F25.9 ACUTE EXACERBATION OF CHRONIC SCHIZOAFFECTIVE SCHIZOPHRENIA (H): Primary | ICD-10-CM

## 2021-12-28 DIAGNOSIS — F25.0 SCHIZOAFFECTIVE DISORDER, BIPOLAR TYPE (H): ICD-10-CM

## 2021-12-28 LAB
ALBUMIN SERPL-MCNC: 3.2 G/DL (ref 3.4–5)
ALP SERPL-CCNC: 63 U/L (ref 40–150)
ALT SERPL W P-5'-P-CCNC: 22 U/L (ref 0–50)
AMPHETAMINES UR QL SCN: NORMAL
ANION GAP SERPL CALCULATED.3IONS-SCNC: 5 MMOL/L (ref 3–14)
AST SERPL W P-5'-P-CCNC: 21 U/L (ref 0–45)
BARBITURATES UR QL: NORMAL
BASOPHILS # BLD AUTO: 0.1 10E3/UL (ref 0–0.2)
BASOPHILS NFR BLD AUTO: 1 %
BENZODIAZ UR QL: NORMAL
BILIRUB SERPL-MCNC: 0.1 MG/DL (ref 0.2–1.3)
BUN SERPL-MCNC: 19 MG/DL (ref 7–30)
CALCIUM SERPL-MCNC: 9 MG/DL (ref 8.5–10.1)
CANNABINOIDS UR QL SCN: NORMAL
CHLORIDE BLD-SCNC: 108 MMOL/L (ref 94–109)
CO2 SERPL-SCNC: 27 MMOL/L (ref 20–32)
COCAINE UR QL: NORMAL
CREAT SERPL-MCNC: 0.56 MG/DL (ref 0.52–1.04)
EOSINOPHIL # BLD AUTO: 0.2 10E3/UL (ref 0–0.7)
EOSINOPHIL NFR BLD AUTO: 3 %
ERYTHROCYTE [DISTWIDTH] IN BLOOD BY AUTOMATED COUNT: 14.6 % (ref 10–15)
GFR SERPL CREATININE-BSD FRML MDRD: >90 ML/MIN/1.73M2
GLUCOSE BLD-MCNC: 128 MG/DL (ref 70–99)
HCG UR QL: NEGATIVE
HCT VFR BLD AUTO: 33.9 % (ref 35–47)
HGB BLD-MCNC: 10.9 G/DL (ref 11.7–15.7)
IMM GRANULOCYTES # BLD: 0 10E3/UL
IMM GRANULOCYTES NFR BLD: 0 %
LYMPHOCYTES # BLD AUTO: 2.7 10E3/UL (ref 0.8–5.3)
LYMPHOCYTES NFR BLD AUTO: 32 %
MCH RBC QN AUTO: 28.5 PG (ref 26.5–33)
MCHC RBC AUTO-ENTMCNC: 32.2 G/DL (ref 31.5–36.5)
MCV RBC AUTO: 89 FL (ref 78–100)
MONOCYTES # BLD AUTO: 0.7 10E3/UL (ref 0–1.3)
MONOCYTES NFR BLD AUTO: 8 %
NEUTROPHILS # BLD AUTO: 4.7 10E3/UL (ref 1.6–8.3)
NEUTROPHILS NFR BLD AUTO: 56 %
NRBC # BLD AUTO: 0 10E3/UL
NRBC BLD AUTO-RTO: 0 /100
OPIATES UR QL SCN: NORMAL
PLATELET # BLD AUTO: 257 10E3/UL (ref 150–450)
POTASSIUM BLD-SCNC: 3.7 MMOL/L (ref 3.4–5.3)
PROT SERPL-MCNC: 7.5 G/DL (ref 6.8–8.8)
RBC # BLD AUTO: 3.82 10E6/UL (ref 3.8–5.2)
SARS-COV-2 RNA RESP QL NAA+PROBE: NEGATIVE
SODIUM SERPL-SCNC: 140 MMOL/L (ref 133–144)
TSH SERPL DL<=0.005 MIU/L-ACNC: 1.52 MU/L (ref 0.4–4)
WBC # BLD AUTO: 8.3 10E3/UL (ref 4–11)

## 2021-12-28 PROCEDURE — 85025 COMPLETE CBC W/AUTO DIFF WBC: CPT | Performed by: PSYCHIATRY & NEUROLOGY

## 2021-12-28 PROCEDURE — C9803 HOPD COVID-19 SPEC COLLECT: HCPCS | Performed by: PSYCHIATRY & NEUROLOGY

## 2021-12-28 PROCEDURE — 87635 SARS-COV-2 COVID-19 AMP PRB: CPT | Performed by: PSYCHIATRY & NEUROLOGY

## 2021-12-28 PROCEDURE — 84443 ASSAY THYROID STIM HORMONE: CPT | Performed by: PSYCHIATRY & NEUROLOGY

## 2021-12-28 PROCEDURE — 80053 COMPREHEN METABOLIC PANEL: CPT | Performed by: PSYCHIATRY & NEUROLOGY

## 2021-12-28 PROCEDURE — 99284 EMERGENCY DEPT VISIT MOD MDM: CPT | Performed by: PSYCHIATRY & NEUROLOGY

## 2021-12-28 PROCEDURE — 99285 EMERGENCY DEPT VISIT HI MDM: CPT | Mod: 25 | Performed by: PSYCHIATRY & NEUROLOGY

## 2021-12-28 PROCEDURE — 250N000013 HC RX MED GY IP 250 OP 250 PS 637: Performed by: PSYCHIATRY & NEUROLOGY

## 2021-12-28 PROCEDURE — 80307 DRUG TEST PRSMV CHEM ANLYZR: CPT | Performed by: PSYCHIATRY & NEUROLOGY

## 2021-12-28 PROCEDURE — 36415 COLL VENOUS BLD VENIPUNCTURE: CPT | Performed by: PSYCHIATRY & NEUROLOGY

## 2021-12-28 PROCEDURE — 81025 URINE PREGNANCY TEST: CPT | Performed by: PSYCHIATRY & NEUROLOGY

## 2021-12-28 PROCEDURE — 90791 PSYCH DIAGNOSTIC EVALUATION: CPT

## 2021-12-28 RX ADMIN — CLOZAPINE 350 MG: 100 TABLET ORAL at 23:02

## 2021-12-28 ASSESSMENT — ENCOUNTER SYMPTOMS
SLEEP DISTURBANCE: 1
HALLUCINATIONS: 1
CONSTITUTIONAL NEGATIVE: 1
DECREASED CONCENTRATION: 1
RESPIRATORY NEGATIVE: 1
NEUROLOGICAL NEGATIVE: 1
EYES NEGATIVE: 1
GASTROINTESTINAL NEGATIVE: 1
HYPERACTIVE: 0
CARDIOVASCULAR NEGATIVE: 1
MUSCULOSKELETAL NEGATIVE: 1

## 2021-12-28 NOTE — TELEPHONE ENCOUNTER
S: 3:54PM- BELLE Aragon CM- called to provide collateral information on 41 yrs old female who is being brought in for mh inpt evaluation.   [phone] available 9AM-5PM. , Main office for after hours [phone].     B: Pt has been experiencing increase stress.  She is taking meds daily.  Pt is having commands hallucinations. Pt's recurring thing is that the voices tell her to jump off bridge.  Struggles to eat.  She has practice to walk to a bridge before.  She has a hx of mh inpt with Cira pretty frequently.  Sometimes the voices tell her to hurt her children; Pt has 6 children.  She reports struggling to eat, telling her to go to a bridge.  Pt has been presenting different.      Pt is on an MI commitment.  Pt is voluntary.  Revocation is not needed d/t her voluntary stay.  If need to revoke then  CM can do that.    Pt has a hx of schizoaffective d/o depressive d/o.    Pt had blood clots in her lungs at one point.  No longer taking meds.      A: Pt is generally very reserve, quiet, depressed.  Gets very large bags under her eyes when not doing well.  Pt is being brought into the ER for evaluation.     R: To be evaluated for mh inpt placement.

## 2021-12-29 ENCOUNTER — TELEPHONE (OUTPATIENT)
Dept: BEHAVIORAL HEALTH | Facility: CLINIC | Age: 41
End: 2021-12-29
Payer: COMMERCIAL

## 2021-12-29 PROCEDURE — 250N000013 HC RX MED GY IP 250 OP 250 PS 637

## 2021-12-29 PROCEDURE — 250N000013 HC RX MED GY IP 250 OP 250 PS 637: Performed by: EMERGENCY MEDICINE

## 2021-12-29 RX ADMIN — Medication 5 MG: at 22:06

## 2021-12-29 RX ADMIN — CLOZAPINE 350 MG: 100 TABLET ORAL at 21:56

## 2021-12-29 NOTE — ED PROVIDER NOTES
ED Provider Note  [facility]      History     Chief Complaint   Patient presents with     Hallucinations     has command hallucinations telling her to harm herself and her children; olfactory hallucinations as well; here with Ge Acosta, part of ACT team     Suicidal     had thoughts of going to a bridge and jumping today because the hallucinations were so bad     HPI  Nona Finley is a 41 year old female who is here with concerns for breakthrough hallucinations that are commanding in nature, telling her to kill herself and encouraging her to jump off a bridge. She has young children in the home and admits to also having thoughts of harming them. She reports taking her meds as prescribed, including clozapine and receiving IM of Abilify (maintena) and haldol decanoate. She denies drug use. She denies acute medical concerns. She denies COVID symptoms. She is followed by an ACT team. Her psychiatrist, therapist and  all recommend admission. Patient would like to voluntarily check herself in.    Please see DEC Crisis Assessment on 12/28/21 in Epic for further details.    PERSONAL MEDICAL HISTORY  Past Medical History:   Diagnosis Date     Encounter for female birth control 6/14/2017 6/14/2017 Plan Documentation Service ordered Depo Provera injection (150mg IM) may be given every 3 months for one year per loretta. Plan and order should be renewed at a visit no later than 6/14/18   Vanessa Thompson MD         Encounter for insertion or removal of intrauterine contraceptive device 1/3/2008    Paragard 1/08 removed 1/08.     Postpartum depression 8/18/2011     Schizophrenia (H) 2/12/2019     Suicidal ideation 2/16/2017     PAST SURGICAL HISTORY  Past Surgical History:   Procedure Laterality Date     NO HISTORY OF SURGERY       NO HISTORY OF SURGERY  06/13/2013    Per patient reported this     FAMILY HISTORY  Family History   Problem Relation Age of Onset     Respiratory Father          asthma     Asthma Father      Cerebrovascular Disease Mother      Diabetes Mother      Neurologic Disorder Brother         mental health problem?     Neurologic Disorder Sister         seizures (half sister)     Respiratory Paternal Grandfather         asthma     Respiratory Sister         asthma     Respiratory Brother         asthma     Neurologic Disorder Daughter         autism      Hypertension Maternal Grandmother      Neurologic Disorder Sister         seizures     Diabetes Maternal Grandfather      Coronary Artery Disease Son      SOCIAL HISTORY  Social History     Tobacco Use     Smoking status: Never Smoker     Smokeless tobacco: Never Used   Substance Use Topics     Alcohol use: No     MEDICATIONS  No current facility-administered medications for this encounter.     Current Outpatient Medications   Medication     ABILIFY MAINTENA 400 MG extended release injection     alum & mag hydroxide-simethicone (MAALOX) 200-200-20 MG/5ML SUSP suspension     cloZAPine 150 MG TBDP     docusate sodium (COLACE) 100 MG capsule     haloperidol (HALDOL) 0.5 MG tablet     haloperidol (HALDOL) 5 MG tablet     melatonin 5 MG tablet     ALLERGIES  No Known Allergies       Review of Systems   Constitutional: Negative.    HENT: Negative.    Eyes: Negative.    Respiratory: Negative.    Cardiovascular: Negative.    Gastrointestinal: Negative.    Genitourinary: Negative.    Musculoskeletal: Negative.    Skin: Negative.    Neurological: Negative.    Psychiatric/Behavioral: Positive for decreased concentration, hallucinations, sleep disturbance and suicidal ideas. The patient is not hyperactive.    All other systems reviewed and are negative.        Physical Exam   BP: 119/77  Pulse: 114  Temp: 98.5  F (36.9  C)  Resp: 18  Weight: 74 kg (163 lb 3.2 oz)  SpO2: 99 %  Physical Exam  Vitals and nursing note reviewed.   HENT:      Head: Normocephalic.   Eyes:      Pupils: Pupils are equal, round, and reactive to light.   Pulmonary:       Effort: Pulmonary effort is normal.   Musculoskeletal:         General: Normal range of motion.      Cervical back: Normal range of motion.   Neurological:      General: No focal deficit present.      Mental Status: She is alert.   Psychiatric:         Attention and Perception: Attention and perception normal. She does not perceive auditory or visual hallucinations.         Mood and Affect: Mood normal. Affect is blunt.         Speech: Speech normal.         Behavior: Behavior normal. Behavior is not agitated, aggressive, hyperactive or combative. Behavior is cooperative.         Thought Content: Thought content is paranoid. Thought content includes suicidal ideation.         Cognition and Memory: Cognition and memory normal.         Judgment: Judgment normal.         ED Course      Procedures         Mental Health Risk Assessment      PSS-3    Date and Time Over the past 2 weeks have you felt down, depressed, or hopeless? Over the past 2 weeks have you had thoughts of killing yourself? Have you ever attempted to kill yourself? When did this last happen? User   12/28/21 1743 yes yes yes more than 6 months ago RMK      C-SSRS ([place])    Date and Time Q1 Wished to be Dead (Past Month) Q2 Suicidal Thoughts (Past Month) Q3 Suicidal Thought Method Q4 Suicidal Intent without Specific Plan Q5 Suicide Intent with Specific Plan Q6 Suicide Behavior (Lifetime) Within the Past 3 Months? RETIRED: Level of Risk per Screen Screening Not Complete User   12/28/21 1743 yes yes yes yes yes yes -- -- -- RMK              Suicide assessment completed by mental health (D.E.C., LCSW, etc.)       No results found for any visits on 12/28/21.  Medications - No data to display     Assessments & Plan (with Medical Decision Making)   Patient with history of schizoaffective disorder who is here as she is having breakthrough psychosis with command hallucinations to kill herself and harm her kids. She would like to be admitted for  stabilization. She is referred for admission. She is voluntary.    I have reviewed the nursing notes. I have reviewed the findings, diagnosis, plan and need for follow up with the patient.    New Prescriptions    No medications on file       Final diagnoses:   Schizoaffective disorder, bipolar type (H)   Suicidal ideation       --  Serg Chauhan MD  [facility] EMERGENCY DEPARTMENT  12/28/2021     Serg Chauhan MD  12/28/21 2051

## 2021-12-29 NOTE — SAFE
Nona Finley  December 28, 2021  SAFE Note    Critical Safety Issues: Command hallucinations telling her to kill herself and children. Voices also tell her that there is Marijuana in her food and drinks.       Current Suicidal Ideation/Self-Injurious Concerns/Methods: Jumping (from building, into traffic, etc.)      Current or Historical Inappropriate Sexual Behavior: No      Current or Historical Aggression/Homicidal Ideation: None - N/A      Triggers: Afraid for her and her children's safety.        Updated care team: Yes: Dr. Chauhan    For additional details see full LMHP assessment.     Mariela Cunha

## 2021-12-29 NOTE — ED NOTES
[facility] ED Mental Health Handoff Note:       Brief HPI:  This is a 41 year old female signed out to me by Dr. Reynolds.  See initial ED Provider note for full details of the presentation.    Home meds reviewed and ordered/administered: Yes    Medically stable for inpatient mental health admission: Yes.    Evaluated by mental health: Yes. The recommendation is for inpatient mental health treatment. Bed search in process    Safety concerns: At the time I received sign out, there were no safety concerns.    Hold Status:  Active Orders   N/A            Exam:   Patient Vitals for the past 24 hrs:   BP Temp Temp src Pulse Resp SpO2   12/29/21 2152 115/76 98.2  F (36.8  C) Oral 74 18 100 %   12/29/21 1625 105/62 -- -- 112 16 99 %   12/29/21 0919 100/69 98.6  F (37  C) Oral 119 16 100 %           ED Course:    Medications   cloZAPine (CLOZARIL) tablet 350 mg (350 mg Oral Given 12/29/21 2156)   melatonin tablet 5 mg (5 mg Oral Given 12/29/21 2206)            There were no significant events during my shift.    Patient was signed out to the oncoming provider, Dr. Miller      Impression:    ICD-10-CM    1. Schizoaffective disorder, bipolar type (H)  F25.0    2. Suicidal ideation  R45.851        Plan:    Awaiting inpatient mental health admission/transfer.      RESULTS:   No results found for this visit on 12/28/21 (from the past 24 hour(s)).            MD Satish Seals Jill C, MD  12/29/21 3553

## 2021-12-29 NOTE — TELEPHONE ENCOUNTER
R: #20/bajzer accepted for himself   Unit notified at 1:24 pm, er notified at 1:25 pm                 jeffery

## 2021-12-29 NOTE — ED NOTES
[facility] ED Mental Health Handoff Note:       Brief HPI:  This is a 41 year old female signed out to me by Dr. Chauhan.  See initial ED Provider note for full details of the presentation. Interval history is pertinent for SI, hallucinations .    Home meds reviewed and ordered/administered: Yes    Medically stable for inpatient mental health admission: Yes.    Evaluated by mental health: Yes. The recommendation is for inpatient mental health treatment. Bed search in process    Safety concerns: At the time I received sign out, there were no safety concerns.    Hold Status:  Active Orders   N/A            Exam:   Patient Vitals for the past 24 hrs:   BP Temp Temp src Pulse Resp SpO2 Weight   12/28/21 2040 118/78 98.1  F (36.7  C) Oral 101 18 100 % --   12/28/21 1739 119/77 98.5  F (36.9  C) Oral 114 18 99 % 74 kg (163 lb 3.2 oz)           ED Course:    Medications   cloZAPine (CLOZARIL) tablet 350 mg (350 mg Oral Given 12/28/21 2302)            There were no significant events during my shift.    Patient was signed out to the oncoming provider      Impression:    ICD-10-CM    1. Schizoaffective disorder, bipolar type (H)  F25.0    2. Suicidal ideation  R45.851        Plan:    1. Awaiting inpatient mental health admission/transfer.      RESULTS:   Results for orders placed or performed during the hospital encounter of 12/28/21 (from the past 24 hour(s))   Asymptomatic COVID-19 Virus (Coronavirus) by PCR Oropharynx     Status: Normal    Collection Time: 12/28/21  8:38 PM    Specimen: Oropharynx; Swab   Result Value Ref Range    SARS CoV2 PCR Negative Negative    Narrative    Testing was performed using the arash  SARS-CoV-2 & Influenza A/B Assay on the arash  [employer]ree  System.  This test should be ordered for the detection of SARS-COV-2 in individuals who meet SARS-CoV-2 clinical and/or epidemiological criteria. Test performance is unknown in asymptomatic patients.  This test is for in vitro diagnostic use under the  FDA EUA for laboratories certified under CLIA to perform moderate and/or high complexity testing. This test has not been FDA cleared or approved.  A negative test does not rule out the presence of PCR inhibitors in the specimen or target RNA in concentration below the limit of detection for the assay. The possibility of a false negative should be considered if the patient's recent exposure or clinical presentation suggests COVID-19.  [facility] are certified under the Clinical Laboratory Improvement Amendments of 1988 (CLIA-88) as qualified to perform moderate and/or high complexity laboratory testing.   Urine Drugs of Abuse Screen     Status: Normal    Collection Time: 12/28/21  8:53 PM    Narrative    The following orders were created for panel order Urine Drugs of Abuse Screen.  Procedure                               Abnormality         Status                     ---------                               -----------         ------                     Drug abuse screen 1 urin...[[id number]]  Normal              Final result                 Please view results for these tests on the individual orders.   HCG qualitative urine     Status: Normal    Collection Time: 12/28/21  8:53 PM   Result Value Ref Range    hCG Urine Qualitative Negative Negative   Drug abuse screen 1 urine (ED)     Status: Normal    Collection Time: 12/28/21  8:53 PM   Result Value Ref Range    Amphetamines Urine Screen Negative Screen Negative    Barbiturates Urine Screen Negative Screen Negative    Benzodiazepines Urine Screen Negative Screen Negative    Cannabinoids Urine Screen Negative Screen Negative    Cocaine Urine Screen Negative Screen Negative    Opiates Urine Screen Negative Screen Negative   CBC with platelets differential     Status: Abnormal    Collection Time: 12/28/21  9:46 PM    Narrative    The following orders were created for panel order CBC with platelets differential.  Procedure                                Abnormality         Status                     ---------                               -----------         ------                     CBC with platelets and d...[[id number]]  Abnormal            Final result                 Please view results for these tests on the individual orders.   Comprehensive metabolic panel     Status: Abnormal    Collection Time: 12/28/21  9:46 PM   Result Value Ref Range    Sodium 140 133 - 144 mmol/L    Potassium 3.7 3.4 - 5.3 mmol/L    Chloride 108 94 - 109 mmol/L    Carbon Dioxide (CO2) 27 20 - 32 mmol/L    Anion Gap 5 3 - 14 mmol/L    Urea Nitrogen 19 7 - 30 mg/dL    Creatinine 0.56 0.52 - 1.04 mg/dL    Calcium 9.0 8.5 - 10.1 mg/dL    Glucose 128 (H) 70 - 99 mg/dL    Alkaline Phosphatase 63 40 - 150 U/L    AST 21 0 - 45 U/L    ALT 22 0 - 50 U/L    Protein Total 7.5 6.8 - 8.8 g/dL    Albumin 3.2 (L) 3.4 - 5.0 g/dL    Bilirubin Total 0.1 (L) 0.2 - 1.3 mg/dL    GFR Estimate >90 >60 mL/min/1.73m2   TSH with free T4 reflex     Status: Normal    Collection Time: 12/28/21  9:46 PM   Result Value Ref Range    TSH 1.52 0.40 - 4.00 mU/L   CBC with platelets and differential     Status: Abnormal    Collection Time: 12/28/21  9:46 PM   Result Value Ref Range    WBC Count 8.3 4.0 - 11.0 10e3/uL    RBC Count 3.82 3.80 - 5.20 10e6/uL    Hemoglobin 10.9 (L) 11.7 - 15.7 g/dL    Hematocrit 33.9 (L) 35.0 - 47.0 %    MCV 89 78 - 100 fL    MCH 28.5 26.5 - 33.0 pg    MCHC 32.2 31.5 - 36.5 g/dL    RDW 14.6 10.0 - 15.0 %    Platelet Count 257 150 - 450 10e3/uL    % Neutrophils 56 %    % Lymphocytes 32 %    % Monocytes 8 %    % Eosinophils 3 %    % Basophils 1 %    % Immature Granulocytes 0 %    NRBCs per 100 WBC 0 <1 /100    Absolute Neutrophils 4.7 1.6 - 8.3 10e3/uL    Absolute Lymphocytes 2.7 0.8 - 5.3 10e3/uL    Absolute Monocytes 0.7 0.0 - 1.3 10e3/uL    Absolute Eosinophils 0.2 0.0 - 0.7 10e3/uL    Absolute Basophils 0.1 0.0 - 0.2 10e3/uL    Absolute Immature Granulocytes 0.0 <=0.4 10e3/uL    Absolute  NRBCs 0.0 10e3/uL             MD Yony Diaz Matthew Joseph, MD  12/29/21 0227

## 2021-12-29 NOTE — ED NOTES
"Patient continues to report auditory and visual hallucinations. States, \"I am seeing people with scary faces. They tell me that my kids aren't mine. They want me to hurt myself\". Essie Bailon RN on 12/29/2021 at 5:26 PM    "

## 2021-12-29 NOTE — ED NOTES
12/28/2021  Nona Finley 1980     [facility] Crisis Assessment    Patient was assessed: in person  Patient location: [facility]    Referral Data and Chief Complaint  Nona is a 41 year old who uses she/her pronouns. Patient presented to the ED and was referred to the ED by self. Patient is presenting to the ED for the following concerns: psychosis symptoms of command hallucinations telling her to kill herself and her children along with visual hallucinations and paranoia.    Informed Consent and Assessment Methods    Patient is her own guardian. Writer met with patient and explained the crisis assessment process, including applicable information disclosures and limits to confidentiality, assessed understanding of the process, and obtained consent to proceed with the assessment. Patient was observed to be able to participate in the assessment as evidenced by X4 orientation and active engagement. Assessment methods included conducting a formal interview with patient, review of medical records, collaboration with medical staff, and obtaining relevant collateral information from family and community providers when available.    Narrative Summary of Presenting Problem and Current Functioning  What led to the patient presenting for crisis services, factors that make the crisis life threatening or complex, stressors, how is this disrupting the patient's life, and how current functioning is in comparison to baseline. How is patient presenting during the assessment.     Nona is a 41 year old female of -American descent. The patient is seeking voluntary admission per recommendations of her ACT team because of worsening of psychotic symptoms. Throughout the assessment, the patient was calm, cooperative, and engaged. The assessment was facilitated with the help of a Duanea  through the ipad.     Per patient, as of 3-4 days ago, the patient started experiencing worsening of psychotic symptoms. Patient  "reported currently experiencing command hallucinations telling her to kill her self and her children. Nona reported a plan of jumping off a bridge. The voices also tell her that there are Marijuana in her food and water. As the result, Nona reportedly has not been eating or drinking for 3 days. The patient also reported visual hallucination of seeing \"scary faces.\"     The patient has a history of psychiatric hospitalization due to similar symptoms presentation with last admission on 8/16/2021. Patient reported that she has been taking her medications (Abilify, Haldol, and Clozapine) as prescribed. Patient reported she got her injectables (Abilify and Haldol) today. Patient reported that up until today, her symptoms are generally well managed with the current medication regimen. Patient did not identify any new stressors or triggers that might exacerbate symptoms. Patient denied substance use.    History of the Crisis  Duration of the current crisis, coping skills attempted to reduce the crisis, community resources used, and past presentations.    Patient identified historical diagnoses of schizoaffective disorder depressive type and post partum depression with psychosis. Patient's last medication adjustment was in August, 2021 when she was hospitalized for psychiatric reasons. Per records, the patient has been hospitalized around numerous times for mental health issues. She is currently followed by the [facility] ACT Team. The patient's  is Vicky Harrington.    The patient is originally from [facility].  She stated that most of her family is still in [place].  Here in [place], she relies on help of the family of her .   currently lives in [place].  He is working on his residency in Internal Medicine. The patient stated even though she receives support from her mother ans sister in- law, she often feel overwhelmed and stressed.  The patient is unemployed she is taking care of her 6 " children. Patient reported one of her children has ASD.     Risk Assessment    Risk of Harm to Self     ESS-6  1.a. Over the past 2 weeks, have you had thoughts of killing yourself? Yes  1.b. Have you ever attempted to kill yourself and, if yes, when did this last happen? No   2. Recent or current suicide plan? Yes Jumping off a bridge and stab her children   3. Recent or current intent to act on ideation? Yes  4. Lifetime psychiatric hospitalization? Yes  5. Pattern of excessive substance use? No  6. Current irritability, agitation, or aggression? No  Scoring note: BOTH 1a and 1b must be yes for it to score 1 point, if both are not yes it is zero. All others are 1 point per number. If all questions 1a/1b - 6 are no, risk is negligible. If one of 1a/1b is yes, then risk is mild. If either question 2 or 3, but not both, is yes, then risk is automatically moderate regardless of total score. If both 2 and 3 are yes, risk is automatically high regardless of total score.     Score: 3, high risk    The patient has the following risk factors for suicide: depressive symptoms, isolation, poor decision making, preoccupied with death/dying and psychosis    Is the patient experiencing current suicidal ideation: Yes. Plan: jumping off brige Intent Command hallucinations    Is the patient engaging in preparatory suicide behaviors (formulating how to act on plan, giving away possessions, saying goodbye, displaying dramatic behavior changes, etc)? No    Does the patient have access to firearms or other lethal means? Yes.     The patient has the following protective factors: voluntarily seeking mental health support, displays resiliency , established relationship community mental health provider(s), expresses desire to engage in treatment and sense of obligation to people/pets    Support system information: Mother and sister in-law, ACT team    Patient strengths: Resilient, caring.     Does the patient engage in non-suicidal  self-injurious behavior (NSSI/SIB)? no    Is the patient vulnerable to sexual exploitation?  No    Is the patient experiencing abuse or neglect? no    Is the patient a vulnerable adult? No      Risk of Harm to Others  The patient has the following risk factors of harm to others: agitation and command hallucinations    Is the patient engaging in sexually inappropriate behavior?  no       Current Substance Abuse    Is there recent substance abuse? no    Was a urine drug screen or blood alcohol level obtained: No    CAGE AID  Have you felt you ought to cut down on your drinking or drug use?  No  Have people annoyed you by criticizing your drinking or drug use? No  Have you felt bad or guilty about your drinking or drug use? No  Have you ever had a drink or used drugs first thing in the morning to steady your nerves or to get rid of a hangover? No  Score: 0/4       Current Symptoms/Concerns    Symptoms  Attention, hyperactivity, and impulsivity symptoms present: Yes: Impulsive    Anxiety symptoms present: Yes: Generalized Symptoms: Agitation, Cognitive anxiety - feelings of doom, racing thoughts, difficulty concentrating  and Excessive worry      Appetite symptoms present: Yes: Other Eating Problems     Behavioral difficulties present: Yes: Agitation, Impulsivity/Disinhibition and Withdrawal/Isolation     Cognitive impairment symptoms present: Yes: Decision-Making    Depressive symptoms present: Yes Feelings of helplessness , Feelings of hopelessness  and Increased irritability/agitation     Eating disorder symptoms present: No    Learning disabilities, cognitive challenges, and/or developmental disorder symptoms present: No     Manic/hypomanic symptoms present: No    Personality and interpersonal functioning difficulties present : Yes: Impaired Impulse Control    Psychosis symptoms present: Yes: Hallucinations: Auditory, Visual, Command and Olfactory, Delusions: Paranoid: Marijuana in her foods and drinks and Paranoia       Sleep difficulties present: Yes: Difficulty falling asleep  and Difficulty staying sleep     Substance abuse disorder symptoms present: No     Trauma and stressor related symptoms present: No           Mental Status Exam   Affect: Labile   Appearance: Appropriate    Attention Span/Concentration: Inattentive?    Eye Contact: Variable   Fund of Knowledge: Appropriate and Delayed    Language /Speech Content: Fluent   Language /Speech Volume: Soft    Language /Speech Rate/Productions: Normal    Recent Memory: Variable   Remote Memory: Variable   Mood: Anxious, Depressed and Sad    Orientation to Person: Yes    Orientation to Place: Yes   Orientation to Time of Day: Yes    Orientation to Date: Yes    Situation (Do they understand why they are here?): Yes    Psychomotor Behavior: Underactive    Thought Content: Delusions and Hallucinations   Thought Form: Intact       Mental Health and Substance Abuse History    History  Current and historical diagnoses or mental health concerns: Schizoaffective disorder depressed type and history of post-partum depression.     Prior MH services (inpatient, programmatic care, outpatient, etc) : Yes 8/2021    History of substance abuse: No    Prior LLU services (inpatient, programmatic care, detox, outpatient, etc) : No    History of commitment:Yes    Family history of MH/LUL: Yes Brother has schizophrenia    Trauma history: No    Medication  Psychotropic medications: Yes- Haldol, Abilify, Clozapine.    Current Care Team    Primary Care Provider: Yes, [facility]  Psychiatrist: Yes, Dr. Walter Gaviria, [facility] team  Therapist: No  : Yes, Vicky Harrington, [phone] [facility]   CTSS or ARMHS: No  ACT Team: Yes, [facility]  Other: No    Release of Information  Was a release of information signed: Yes. Providers included on the release: Listed Above      Biopsychosocial Information    Socioeconomic Information  Current living situation: Patient lives  with her children.    Employment/income source: Unemployed    Relevant legal issues: None reported    Cultural, Yazidi, or spiritual influences on mental health care: Patient identified as Judaism and stated her bobby is a significant protective factor.     Is the patient active in the  or a : No      Relevant Medical Concerns   Patient identifies concerns with completing ADLs? No     Patient can ambulate independently? Yes     Other medical concerns? No     History of concussion or TBI? No        Diagnosis    295.70  (F25.1) Schizoaffective Disorder Depressive Type - by history     Therapeutic Intervention  The following therapeutic methodologies were employed when working with the patient: establishing rapport, active listening, assessing dimensions of crisis, identifying additional supports and alternative coping skills and psychoeducation. Patient response to intervention: patient engaged in interventions.      Disposition  Recommended disposition: Inpatient Mental Health      Reviewed case and recommendations with attending provider. Attending Name: Dr. Serg Chauhan      Attending concurs with disposition: Yes      Patient concurs with disposition: Yes      Guardian concurs with disposition: NA     Final disposition: Inpatient mental health .     Inpatient Details (if applicable):  Is patient admitted voluntarily:Yes    Patient aware of potential for transfer if there is not appropriate placement? Yes     Patient is willing to travel outside of the [facility] for placement? No      Behavioral Intake Notified? Yes: Date: 12/28/2021 Time: 9:15pm.     Clinical Substantiation of Recommendations   Rationale with supporting factors for disposition and diagnosis.     Patient presented with acute exacerbation of schizoaffective symptoms. She reported command hallucinations to kill herself and her children. Patient reported a plan of jump off a bride and stab her children. Patient also reported inability to  eat and drink for 3 days due to voices telling her that there is Marijuana in her food and water. Her sleep is interrupted by the command hallucinations telling her to kill herself. Patient presented imminent harm to self and others. Patient would benefit from inpatient treatment to help symptoms stabilization.      Assessment Details    Patient interview started at: 7:45pm and completed at: 8:20pm.    Total duration spent on the patient case in minutes: .75 hrs     CPT code(s) utilized: 33373 - Psychotherapy for Crisis - 60 (30-74*) min     Marilea Cunha

## 2021-12-29 NOTE — ED NOTES
Extended Care    Nona Finley  December 29, 2021    Nona is followed related to a placement delay: (16+ hours in ER). Please see initial DEC Crisis Assessment completed by Mariela Cunha on 12/28/21 for complete assessment information. Notable concerns include hallucinations, delusions, and thoughts of harm to self and others. The patient requires a [facility] . Patient will be followed by the DEC Extended Care team and will be assessed as staff are available. Writer verified that the patient is on the wait list for inpatient mental health placement with [facility] ([phone]). DEC will continue to follow. DEC may be reached at [phone] if further clinical intervention is needed.     Jeannie Temple, [facility], [facility]   [phone]

## 2021-12-29 NOTE — ED PROVIDER NOTES
Emergency Department Patient Sign-out       Brief HPI:  This is a 41 year old female signed out to me by Dr. Bhakta .  See initial ED Provider note for details of the presentation.          Patient is medically cleared for admission to a Behavioral Health unit.      The patient is not on a hold.      The patient has not required medication for agitation.    Awaiting Transfer to Mental Health Facility        Significant Events prior to my assuming care: Patient with a history of schizoaffective disorder who has an act team.  Was evaluated yesterday due to increasing auditory command hallucinations telling her to kill herself.  Has been placed in the queue for voluntary admission and is awaiting bed placement.      Exam:   Patient Vitals for the past 24 hrs:   BP Temp Temp src Pulse Resp SpO2 Weight   12/28/21 2040 118/78 98.1  F (36.7  C) Oral 101 18 100 % --   12/28/21 1739 119/77 98.5  F (36.9  C) Oral 114 18 99 % 74 kg (163 lb 3.2 oz)     General: Patient is in no acute distress and is resting comfortably.  HEENT: Normocephalic atraumatic  Neck: Supple  Cardiovascular: Heart rate normal  Pulmonary: Patient is in no respiratory distress  Extremities: No signs of any significant or life-threatening trauma.  Neurologic: No new focal neurologic deficits.        ED RESULTS:   Results for orders placed or performed during the hospital encounter of 12/28/21 (from the past 24 hour(s))   Asymptomatic COVID-19 Virus (Coronavirus) by PCR Oropharynx     Status: Normal    Collection Time: 12/28/21  8:38 PM    Specimen: Oropharynx; Swab   Result Value Ref Range    SARS CoV2 PCR Negative Negative    Narrative    Testing was performed using the arash  SARS-CoV-2 & Influenza A/B Assay on the arash  [employer]ubree  System.  This test should be ordered for the detection of SARS-COV-2 in individuals who meet SARS-CoV-2 clinical and/or epidemiological criteria. Test performance is unknown in asymptomatic patients.  This test is for in vitro  diagnostic use under the FDA EUA for laboratories certified under CLIA to perform moderate and/or high complexity testing. This test has not been FDA cleared or approved.  A negative test does not rule out the presence of PCR inhibitors in the specimen or target RNA in concentration below the limit of detection for the assay. The possibility of a false negative should be considered if the patient's recent exposure or clinical presentation suggests COVID-19.  [facility] are certified under the Clinical Laboratory Improvement Amendments of 1988 (CLIA-88) as qualified to perform moderate and/or high complexity laboratory testing.   Urine Drugs of Abuse Screen     Status: Normal    Collection Time: 12/28/21  8:53 PM    Narrative    The following orders were created for panel order Urine Drugs of Abuse Screen.  Procedure                               Abnormality         Status                     ---------                               -----------         ------                     Drug abuse screen 1 urin...[[id number]]  Normal              Final result                 Please view results for these tests on the individual orders.   HCG qualitative urine     Status: Normal    Collection Time: 12/28/21  8:53 PM   Result Value Ref Range    hCG Urine Qualitative Negative Negative   Drug abuse screen 1 urine (ED)     Status: Normal    Collection Time: 12/28/21  8:53 PM   Result Value Ref Range    Amphetamines Urine Screen Negative Screen Negative    Barbiturates Urine Screen Negative Screen Negative    Benzodiazepines Urine Screen Negative Screen Negative    Cannabinoids Urine Screen Negative Screen Negative    Cocaine Urine Screen Negative Screen Negative    Opiates Urine Screen Negative Screen Negative   CBC with platelets differential     Status: Abnormal    Collection Time: 12/28/21  9:46 PM    Narrative    The following orders were created for panel order CBC with platelets differential.  Procedure                                Abnormality         Status                     ---------                               -----------         ------                     CBC with platelets and d...[[id number]]  Abnormal            Final result                 Please view results for these tests on the individual orders.   Comprehensive metabolic panel     Status: Abnormal    Collection Time: 12/28/21  9:46 PM   Result Value Ref Range    Sodium 140 133 - 144 mmol/L    Potassium 3.7 3.4 - 5.3 mmol/L    Chloride 108 94 - 109 mmol/L    Carbon Dioxide (CO2) 27 20 - 32 mmol/L    Anion Gap 5 3 - 14 mmol/L    Urea Nitrogen 19 7 - 30 mg/dL    Creatinine 0.56 0.52 - 1.04 mg/dL    Calcium 9.0 8.5 - 10.1 mg/dL    Glucose 128 (H) 70 - 99 mg/dL    Alkaline Phosphatase 63 40 - 150 U/L    AST 21 0 - 45 U/L    ALT 22 0 - 50 U/L    Protein Total 7.5 6.8 - 8.8 g/dL    Albumin 3.2 (L) 3.4 - 5.0 g/dL    Bilirubin Total 0.1 (L) 0.2 - 1.3 mg/dL    GFR Estimate >90 >60 mL/min/1.73m2   TSH with free T4 reflex     Status: Normal    Collection Time: 12/28/21  9:46 PM   Result Value Ref Range    TSH 1.52 0.40 - 4.00 mU/L   CBC with platelets and differential     Status: Abnormal    Collection Time: 12/28/21  9:46 PM   Result Value Ref Range    WBC Count 8.3 4.0 - 11.0 10e3/uL    RBC Count 3.82 3.80 - 5.20 10e6/uL    Hemoglobin 10.9 (L) 11.7 - 15.7 g/dL    Hematocrit 33.9 (L) 35.0 - 47.0 %    MCV 89 78 - 100 fL    MCH 28.5 26.5 - 33.0 pg    MCHC 32.2 31.5 - 36.5 g/dL    RDW 14.6 10.0 - 15.0 %    Platelet Count 257 150 - 450 10e3/uL    % Neutrophils 56 %    % Lymphocytes 32 %    % Monocytes 8 %    % Eosinophils 3 %    % Basophils 1 %    % Immature Granulocytes 0 %    NRBCs per 100 WBC 0 <1 /100    Absolute Neutrophils 4.7 1.6 - 8.3 10e3/uL    Absolute Lymphocytes 2.7 0.8 - 5.3 10e3/uL    Absolute Monocytes 0.7 0.0 - 1.3 10e3/uL    Absolute Eosinophils 0.2 0.0 - 0.7 10e3/uL    Absolute Basophils 0.1 0.0 - 0.2 10e3/uL    Absolute Immature Granulocytes 0.0  <=0.4 10e3/uL    Absolute NRBCs 0.0 10e3/uL       ED MEDICATIONS:   Medications   cloZAPine (CLOZARIL) tablet 350 mg (350 mg Oral Given 12/28/21 5633)         Impression:    ICD-10-CM    1. Schizoaffective disorder, bipolar type (H)  F25.0    2. Suicidal ideation  R45.851        Plan:    Pending studies include none.  Awaiting inpatient mental health bed placement.        MD Jo Ann Gore David, MD  12/29/21 0956

## 2021-12-29 NOTE — TELEPHONE ENCOUNTER
R:  No appropriate beds available within [facility] system. Bed search update of [facility] area @ 0607:  [facility]: @ cap per website  [facility]:@ cap per website  [facility]: @ cap per website  : @ cap per website  [facility]: @ cap per website  [facility]: @ cap per website    Pt remains on work list until appropriate placement is available

## 2021-12-29 NOTE — TELEPHONE ENCOUNTER
S: Pt is a 41 yrs old female in the [facility] for psychosis and SI w/ a plan, reports by Mariela at 9:12PM.     B: Pt came in for worsening psychotic symptoms.  She was recommended to come in by her  CM.  Pt is reporting that she has auditory and visual hallucinations.  Pt reports the voices are telling her to kill herself and has a plan to jump off a bridge and to kill her children.  Pt reports seeing scary faces.  Pt also endorses paranoia- that there is marijuana in her food and water.  Pt has not been eating for 3 days.  She has not been sleeping as well.  Pt has dx of schizoaffective d/o Depressive Type.  Pt is compliant w/ medications.      No drug use.  No chronic medical illness reported.  Ambulates independently.  No symptoms of COVID.     COVID: processing  UTOX: process  HCG: negative  Vitals: stable    A: Vol.  Needs Citizen of [place] , was able to communicate in English. [place] only.       R:     Pt is placed on waitlist pending available bed.        Patient cleared and ready for behavioral bed placement: Yes

## 2021-12-29 NOTE — H&P
"    ----------------------------------------------------------------------------------------------------------  [facility]  History and Physical    Nona Finley MRN# [id number]   Age: 41 year old YOB: 1980   Date of Admission:  12/28/2021   Contacts:   Primary Outpatient Psychiatrist: Dr. Walter Gaviria, [facility] ACT team  Primary Physician: Rip Capps at [facility] [place][note 2]  Therapist: No  : Vicky Harrington, [phone] Re [facility] ACT Team   Probation/: No  Family Members: Bob Rawls (spouse), [phone]     HPI:    Chief Complaint: \"voices telling me to kill myself\"    History of present illness:  History obtained from patient    Nona Finley is a 41 year old Portuguese female with a past psychiatric history of Schizoaffective Disorder Depressive type admitted from the  ER on 12/30/2021 due to concern for SI, HI and psychosis without identifiable triggers. She came to the ED voluntarily d/t recommendations by her ACT team. She is MI committed with Reyes.    Per ED:   \"Patient with history of schizoaffective disorder who is here as she is having breakthrough psychosis with command hallucinations to kill herself and harm her kids. She would like to be admitted for stabilization. She is referred for admission. She is voluntary.\"    Per Patient:  Nona Finley reports that since two weeks ago she has been experiencing symptoms including increased commanding auditory hallucinations in the form of voices telling her to go to the river and jump off a bridge. She also mentions that voices have been telling her to harm her children which she finds extremely disturbing. She has been seeing scary human faces as well. She reports last being well 2 weeks ago. Patient had not been eating or drinking for three to four days PTA as the voices were telling her the food and water contain marijuana. She has been forcing herself to eat since last " "evening when she arrived in the ED. She could not attribute her symptoms & decompensation to any specific cause as she has been adherent to her medications as prescribed, has not started any new supplement or medication. She received her MORENO abilify and Haldol yesterday before presentation She denies any recent psychosocial stressors and denies any substance use. She is visited by the ACT team weekly as part of her commitment plan. She also mentions that she has been feeling \"down\" for the past few weeks.     She said that she does not know what the plan was for medications from her ACT team.     ____________________________________________________________________________  Psychiatric ROS:  Depression: suicidal ideation with plan, without intent, depressed mood, insomnia, poor concentration /memory, feeling hopeless and overwhelmed  Anxiety: excessive worry  Panic Attack:  none  Martha/Hypomania:  none  Psychosis: delusions- paranoid (water and food poisoned with marijuana) [details in Interim History], auditory hallucinations with commands [details in Interim History] and visual hallucinations [details in Interim History]  Trauma Related: none  Suicidal Ideation: Yes   Homicidal Ideation: Yes    Personality Symptoms: Impulsivity  Obsessive Compulsive Disorder: negative    Eating Disorders: negative  LD: No previously diagnosed or signs of symptoms of learning disorder reported    Medical ROS: A complete medical review of systems was preformed and is negative other than noted in the HPI     Patient's Strengths & Goals:   Patient states that Interpersonal relationships and supports (family, friends, peers) and Cultural/spiritual/Sikh and community involvement are personal strengths and/or positive aspects of their life.     Patient states that their goal(s) for treatment are: To get rid of the voices and feel safe to return home.     Psychiatric History:   Prior diagnoses:   Schizoaffective Disorder, Depressive " "subtype    Prior Hospitalizations:   [facility]: 8/2021 - 7/2020 - 12/2019 - 5/2019 - 3/2019 - 2/2019 - 5/2018     Suicide attempts:   Yes, more than 6 months ago - attempted to jump off a bridge - stopped by      Self-injurious behavior:   No    Violence:   No    ECT/TMS:   Yes - 2011 and 2012    Past medications: (per chart review)  1.  Abilify Maintena 400 mg every 28 days.  She was due for her next injection on 02/23/2021.   2.  Clozapine 200 mg at bedtime.   3.  Lorazepam 0.5 mg at bedtime.   4.  Metformin 1000 mg with dinner.   5.  Zyprexa 20 mg with dinner.     6.  Xarelto 20 mg daily with dinner.     7.  Lysteda 1300 mg 3 times a day during menstrual cycle   8.  Cholecalciferol 25 mcg daily.   9.  Multivitamins with minerals 1 tablet daily.   10.  Perphenazine 2 mg 2 times p.r.n. for psychosis  11.  Was on Prozac when diagnosed with post partum depression and it was effective for her      Substance Use History:   Nicotine:   History   Smoking Status     Never Smoker   Smokeless Tobacco     Never Used      Alcohol: Social History    Substance and Sexual Activity      Alcohol use: No         Tobacco: never smoked  Alcohol: No  Cannabis: No  Opiates: No  Benzodiazepines: No  Stimulants: No    Prior CD treatments: No     Social History:   Early History: \"Originally from [place].  Most of her family is still there.   [place] in [place], left in [place]. First went to [place] before coming to [place].\"     Abuse/Trauma: Not ob file    Friends/Family/Support: \"Here in [place], she relies on help of the family of her .   currently lives in [place].   The patient is taking care of their 6 children  Has a daughter with ASD who is non-verbal. Her in-laws help her with children\".    Collateral: None    Current Living Situation:  Lives in a house in [place] with  and 6 children    Educational History: 5th grade    Occupation: Housewife    Legal: MI committed with " Reyes    : No    Guns: locked in house    Spirituality: practicing Orthodox     Family History:      Problem (# of Occurrences) Relation (Name,Age of Onset)    Asthma (1) Father    Cerebrovascular Disease (1) Mother    Coronary Artery Disease (1) Son    Diabetes (2) Mother, Maternal Grandfather    Hypertension (1) Maternal Grandmother    Neurologic Disorder (4) Brother: mental health problem?, Sister: seizures (half sister), Daughter: autism , Sister: seizures    Respiratory (4) Father: asthma, Paternal Grandfather: asthma, Sister: asthma, Brother: asthma           Medical History:     Pulmonary emboli - 2020  Pulmonary nodule  Latent TB      Past Medical History:   Diagnosis Date     Encounter for female birth control 6/14/2017 6/14/2017 Plan Documentation Service ordered Depo Provera injection (150mg IM) may be given every 3 months for one year per hanselcol. Plan and order should be renewed at a visit no later than 6/14/18   Vanessa Thompson MD         Encounter for insertion or removal of intrauterine contraceptive device 1/3/2008    Paragard 1/08 removed 1/08.     Postpartum depression 8/18/2011     Schizophrenia (H) 2/12/2019     Suicidal ideation 2/16/2017        Surgical History:     Past Surgical History:   Procedure Laterality Date     NO HISTORY OF SURGERY       NO HISTORY OF SURGERY  06/13/2013    Per patient reported this     No history of seizures or head trauma.     Allergies:   No Known Allergies     Medications:     Prior to Admission Medications   Prescriptions Last Dose Informant Patient Reported? Taking?   ABILIFY MAINTENA 400 MG extended release injection   No No   Sig: Inject 2 mLs (400 mg) into the muscle every 28 days Due on 3/25   alum & mag hydroxide-simethicone (MAALOX) 200-200-20 MG/5ML SUSP suspension   No No   Sig: Take 30 mLs by mouth every 4 hours as needed for indigestion   cloZAPine 150 MG TBDP   No No   Sig: Take 300 mg by mouth At Bedtime   docusate sodium (COLACE) 100 MG  capsule   No No   Sig: Take 1 capsule (100 mg) by mouth 2 times daily   haloperidol (HALDOL) 0.5 MG tablet   No No   Sig: Take 5 tablets (2.5 mg) by mouth daily   haloperidol (HALDOL) 5 MG tablet   No No   Sig: Take 1 tablet (5 mg) by mouth At Bedtime   melatonin 5 MG tablet   No No   Sig: Take 1 tablet (5 mg) by mouth At Bedtime      Facility-Administered Medications: None      No current outpatient medications on file.        Data (Labs/Imaging):     Results for orders placed or performed during the hospital encounter of 12/28/21 (from the past 48 hour(s))   Asymptomatic COVID-19 Virus (Coronavirus) by PCR Oropharynx    Specimen: Oropharynx; Swab   Result Value Ref Range    SARS CoV2 PCR Negative Negative    Narrative    Testing was performed using the arash  SARS-CoV-2 & Influenza A/B Assay on the arash  Aubree  System.  This test should be ordered for the detection of SARS-COV-2 in individuals who meet SARS-CoV-2 clinical and/or epidemiological criteria. Test performance is unknown in asymptomatic patients.  This test is for in vitro diagnostic use under the FDA EUA for laboratories certified under CLIA to perform moderate and/or high complexity testing. This test has not been FDA cleared or approved.  A negative test does not rule out the presence of PCR inhibitors in the specimen or target RNA in concentration below the limit of detection for the assay. The possibility of a false negative should be considered if the patient's recent exposure or clinical presentation suggests COVID-19.  [facility] are certified under the Clinical Laboratory Improvement Amendments of 1988 (CLIA-88) as qualified to perform moderate and/or high complexity laboratory testing.   Urine Drugs of Abuse Screen    Narrative    The following orders were created for panel order Urine Drugs of Abuse Screen.  Procedure                               Abnormality         Status                     ---------                                -----------         ------                     Drug abuse screen 1 urin...[[id number]]  Normal              Final result                 Please view results for these tests on the individual orders.   HCG qualitative urine   Result Value Ref Range    hCG Urine Qualitative Negative Negative   Drug abuse screen 1 urine (ED)   Result Value Ref Range    Amphetamines Urine Screen Negative Screen Negative    Barbiturates Urine Screen Negative Screen Negative    Benzodiazepines Urine Screen Negative Screen Negative    Cannabinoids Urine Screen Negative Screen Negative    Cocaine Urine Screen Negative Screen Negative    Opiates Urine Screen Negative Screen Negative   CBC with platelets differential    Narrative    The following orders were created for panel order CBC with platelets differential.  Procedure                               Abnormality         Status                     ---------                               -----------         ------                     CBC with platelets and d...[[id number]]  Abnormal            Final result                 Please view results for these tests on the individual orders.   Comprehensive metabolic panel   Result Value Ref Range    Sodium 140 133 - 144 mmol/L    Potassium 3.7 3.4 - 5.3 mmol/L    Chloride 108 94 - 109 mmol/L    Carbon Dioxide (CO2) 27 20 - 32 mmol/L    Anion Gap 5 3 - 14 mmol/L    Urea Nitrogen 19 7 - 30 mg/dL    Creatinine 0.56 0.52 - 1.04 mg/dL    Calcium 9.0 8.5 - 10.1 mg/dL    Glucose 128 (H) 70 - 99 mg/dL    Alkaline Phosphatase 63 40 - 150 U/L    AST 21 0 - 45 U/L    ALT 22 0 - 50 U/L    Protein Total 7.5 6.8 - 8.8 g/dL    Albumin 3.2 (L) 3.4 - 5.0 g/dL    Bilirubin Total 0.1 (L) 0.2 - 1.3 mg/dL    GFR Estimate >90 >60 mL/min/1.73m2   TSH with free T4 reflex   Result Value Ref Range    TSH 1.52 0.40 - 4.00 mU/L   CBC with platelets and differential   Result Value Ref Range    WBC Count 8.3 4.0 - 11.0 10e3/uL    RBC Count 3.82 3.80 - 5.20 10e6/uL     "Hemoglobin 10.9 (L) 11.7 - 15.7 g/dL    Hematocrit 33.9 (L) 35.0 - 47.0 %    MCV 89 78 - 100 fL    MCH 28.5 26.5 - 33.0 pg    MCHC 32.2 31.5 - 36.5 g/dL    RDW 14.6 10.0 - 15.0 %    Platelet Count 257 150 - 450 10e3/uL    % Neutrophils 56 %    % Lymphocytes 32 %    % Monocytes 8 %    % Eosinophils 3 %    % Basophils 1 %    % Immature Granulocytes 0 %    NRBCs per 100 WBC 0 <1 /100    Absolute Neutrophils 4.7 1.6 - 8.3 10e3/uL    Absolute Lymphocytes 2.7 0.8 - 5.3 10e3/uL    Absolute Monocytes 0.7 0.0 - 1.3 10e3/uL    Absolute Eosinophils 0.2 0.0 - 0.7 10e3/uL    Absolute Basophils 0.1 0.0 - 0.2 10e3/uL    Absolute Immature Granulocytes 0.0 <=0.4 10e3/uL    Absolute NRBCs 0.0 10e3/uL        Physical & Psychiatric Examinations:   24 Hour Vital Signs Summary:  Temp: 97.9  F (36.6  C) (Temp  Min: 97.9  F (36.6  C)  Max: 98.6  F (37  C))  Resp: 18 (Resp  Min: 16  Max: 18)  SpO2: 100 % (SpO2  Min: 99 %  Max: 100 %)  Pulse: 112 (Pulse  Min: 74  Max: 119)  BP: 103/72  Systolic (24hrs), Av , Min:100 , Max:115   Diastolic (24hrs), Av, Min:62, Max:76      Weight is 162 lbs 0 oz  Body mass index is 27.81 kg/m .    See ED assessment note by ED physician on 2021 for physical exam.     Mental Status Examination:  Oriented to: Person/Self, Situation, Unit/Floor and Date  General: Awake and Alert, limited facial expressions   Appearance: appears stated age, Grooming is adequate and wearing hijab  Behavior: cooperative, engaged and open  Eye Contact: good  Psychomotor: no abnormal motor symptoms appreciated; no catatonia present  Speech: appropriate volume/tone, spontaneous and with good articulation  Language: Fluent in English with appropriate syntax and vocabulary.  Mood: \"down\"  Affect: congruent with mood, stable, sad and anxious  Thought Process: coherent, linear, logical and goal directed  Thought Content: suicidal ideation with plan (without intent), homicidal ideation, does not appear to be responding to " internal stimuli, visual hallucinations of scary faces and auditory hallucinations of voices telling her to hurt herself and her children ;  Delusions of being watched by unknown people  Associations: intact  Insight: good  Judgment: good  Attention Span: grossly intact  Concentration: grossly intact  Recent and Remote Memory: grossly intact  Fund of Knowledge: average     Assessment   Diagnostic Impression:  Nona Finley is a 41 year old Singaporean female with a past psychiatric history of schizoaffective disorder depressive type who presented to the ER with SI, HI and psychosis. Significant symptoms include SI, depressed, sleep issues, psychosis and disordered eating. Her last psychiatric hospitalization was in 08/2021.  She is currently followed by Dr. Walter Gaviria, Re Entry ACT team. No current psychosocial stressors. she has been coping with by withdrawing.  Patient's support system includes family and outpatient team.  Substance use does not appear to be playing a contributing role in the patient's presentation.  There is genetic loading for psychosis. Medical history does not appear to be significant. The MSE is notable for depressed mood, sad affect, SI with plan, commanding AH and disturbing VH. She end denies  self injurious behaviors. She does not seem to have any traits of personality disorders  interfering with her ability to adhere with the treatment plan.     Her reported symptoms of depressed mood, hopelessness, SI, AH,VH are consistent with her historic diagnosis of schizoaffective disorder. Optimization of medications to target these symptom clusters in addition to evaluation of adquate outpatient supports will be targets for treatment during this admission.     Given that she currently has SI, HI and psychosis, patient warrants inpatient psychiatric hospitalization to maintain her safety. Disposition pending clinical stabilization, medication optimization and development of an appropriate discharge  plan.    Risk for harm is elevated.  Risk factors: SI, HI, impulsive and past behaviors  Protective factors: family and engaged in treatment    She was medically cleared for admission to inpatient psychiatric unit. The risks, benefits, alternatives, and side effects of treatments including no treatment have been discussed and are understood by the patient and other caregivers.    Primary Psychiatric Diagnosis:     Schizoaffective Disorder Depressive type, Depressive episode severe, recurrent, without use disorder    Secondary Psychiatric Diagnoses:    Unspecified anxiety disorder moderate    Admit to [facility] with Attending Physician Dr. Grimm and will be treated in the therapeutic milieu with appropriate individual and group therapies.    Medications:   Continued PTA Medications:    Clozapine 350mg at bedtime (was increased to this dose in August)     Haldol 5mg qam   Held PTA Medications:    None  New Medications Started at Admission:    Trazodone 50mg at bedtime    prozac 10 mg was initiated but discontinued the following day after being staffed by the attending psychiatrist; it's likely that depressive symptoms will resolve with optimal treatment of the psychotic disorder and this additional could be considered at a later time.      Plan   Psychiatric diagnoses and management:  Principal Diagnosis:     Schizoaffective disorder Depressive Type, Depressive episode severe, without use disorder   o Continue Clozapine 350mg at bedtime  o Increase Haldol 7.5mg and change time to at bedtime for administration (added 2 mg po at bedtime for tonight)   o Trazodone 50mg qhs    Secondary Psychiatric Diagnoses:     Unspecified Anxiety Disorder moderate  o Educate TIPP skills  o Hydroxyzine 25mg Q4H PRN    Additional Planning:   -      Continue to monitor for and stabilize: SI, depressed, sleep issues, psychosis and disordered eating    Patient will be treated in therapeutic milieu with appropriate individual and group  therapies as described.     Consults    None    Legal Status:    Voluntary    SIO:    No    Pt has not required locked seclusion or restraints in the past 24 hours to maintain safety, please refer to RN documentation for further details.    Disposition/Anticipated Discharge Date:    Home, pending clinical stabilization, medication optimization, and formulation of a safe discharge plan.    Safety Assessment:   Behavioral Orders   Procedures     Code 1 - Restrict to Unit     Discontinue 1:1 attendant for suicide risk     Order Specific Question:   I have performed an in person assessment of the patient     Answer:   Based on this assessment the patient no longer requires a one on one attendant at this point in time.     Order Specific Question:   Rationale     Answer:   Medical Record Reviewed     Order Specific Question:   Rationale     Answer:   Patient States able to remain safe in hospital     Order Specific Question:   Rationale     Answer:   Modifications to care environment made to mitigate safety risk     Order Specific Question:   Rationale     Answer:   Routine observations are sufficient to monitor safety.     Routine Programming     As clinically indicated     Self Injury Precaution     Status 15     Every 15 minutes.     Suicide precautions     Patients on Suicide Precautions should have a Combination Diet ordered that includes a Diet selection(s) AND a Behavioral Tray selection for Safe Tray - with utensils, or Safe Tray - NO utensils       __________________________________________________________________________________  Pertinent Medical diagnoses and management:   None  __________________________________________________________________________________    Patient was seen and staffed with attending psychiatrist today, 12/30/2021.  -  Mazin Schulte DO   PGY-2 (Psychiatry)  [facility]    -----------------------------------------------------------------------------------------------  Attending  Attestation:    Physician Attestation   I, Sofy Valdes MD, saw this patient with the resident and agree with the resident/fellow's findings and plan of care as documented in the note.      I personally reviewed vital signs, medications and labs. The patient mood is depressed, restricted affect, hallucinations as above have not changed since admission yesterday.  She reports that the Clozaril helped initially when started in August and Haldol also appeared to help..  The doses and schedule of these meds were not clear; pharm  Helping with med rec.     Plan:  Consult with ACT team.  Apparently she was to be on a dose of 2.5 in am and 5 at night but she was taking 5 in the am.  We will increase the dose to 7.5 at bedtime, obtain a Clozapine level and consider further increase in it, as tolerated.  She does report tiredness and drooling.  She recently received her injectable Abilify. Obtain EKG due to the multiple medications especially Haldol which could effect QTC and discontinue Prozac at least for now.      Sofy Valdes MD  Date of Service (when I saw the patient): 12/30/21

## 2021-12-30 PROBLEM — F25.0 SCHIZOAFFECTIVE DISORDER, BIPOLAR TYPE (H): Status: ACTIVE | Noted: 2021-12-30

## 2021-12-30 LAB
ATRIAL RATE - MUSE: 92 BPM
DIASTOLIC BLOOD PRESSURE - MUSE: NORMAL MMHG
INTERPRETATION ECG - MUSE: NORMAL
P AXIS - MUSE: 79 DEGREES
PR INTERVAL - MUSE: 142 MS
QRS DURATION - MUSE: 72 MS
QT - MUSE: 344 MS
QTC - MUSE: 425 MS
R AXIS - MUSE: 73 DEGREES
SYSTOLIC BLOOD PRESSURE - MUSE: NORMAL MMHG
T AXIS - MUSE: 58 DEGREES
VENTRICULAR RATE- MUSE: 92 BPM

## 2021-12-30 PROCEDURE — 250N000013 HC RX MED GY IP 250 OP 250 PS 637: Performed by: STUDENT IN AN ORGANIZED HEALTH CARE EDUCATION/TRAINING PROGRAM

## 2021-12-30 PROCEDURE — 93010 ELECTROCARDIOGRAM REPORT: CPT | Performed by: INTERNAL MEDICINE

## 2021-12-30 PROCEDURE — 124N000002 HC R&B MH UMMC

## 2021-12-30 PROCEDURE — 250N000013 HC RX MED GY IP 250 OP 250 PS 637

## 2021-12-30 PROCEDURE — 93005 ELECTROCARDIOGRAM TRACING: CPT

## 2021-12-30 PROCEDURE — 99223 1ST HOSP IP/OBS HIGH 75: CPT | Mod: AI | Performed by: PSYCHIATRY & NEUROLOGY

## 2021-12-30 RX ORDER — HALOPERIDOL 2 MG/1
2 TABLET ORAL ONCE
Status: COMPLETED | OUTPATIENT
Start: 2021-12-30 | End: 2021-12-30

## 2021-12-30 RX ORDER — CLOZAPINE 100 MG/1
300 TABLET, ORALLY DISINTEGRATING ORAL DAILY
Status: ON HOLD | COMMUNITY
End: 2022-01-20

## 2021-12-30 RX ORDER — MAGNESIUM HYDROXIDE/ALUMINUM HYDROXICE/SIMETHICONE 120; 1200; 1200 MG/30ML; MG/30ML; MG/30ML
30 SUSPENSION ORAL EVERY 4 HOURS PRN
Status: DISCONTINUED | OUTPATIENT
Start: 2021-12-30 | End: 2022-01-04 | Stop reason: HOSPADM

## 2021-12-30 RX ORDER — TRAZODONE HYDROCHLORIDE 50 MG/1
50 TABLET, FILM COATED ORAL AT BEDTIME
Status: DISCONTINUED | OUTPATIENT
Start: 2021-12-30 | End: 2022-01-04 | Stop reason: HOSPADM

## 2021-12-30 RX ORDER — HALOPERIDOL 5 MG/1
5 TABLET ORAL DAILY
Status: DISCONTINUED | OUTPATIENT
Start: 2021-12-30 | End: 2021-12-30

## 2021-12-30 RX ORDER — OLANZAPINE 10 MG/1
10 TABLET ORAL 3 TIMES DAILY PRN
Status: DISCONTINUED | OUTPATIENT
Start: 2021-12-30 | End: 2022-01-04 | Stop reason: HOSPADM

## 2021-12-30 RX ORDER — ACETAMINOPHEN 325 MG/1
650 TABLET ORAL EVERY 4 HOURS PRN
Status: DISCONTINUED | OUTPATIENT
Start: 2021-12-30 | End: 2022-01-04 | Stop reason: HOSPADM

## 2021-12-30 RX ORDER — HYDROXYZINE HYDROCHLORIDE 25 MG/1
25 TABLET, FILM COATED ORAL EVERY 4 HOURS PRN
Status: DISCONTINUED | OUTPATIENT
Start: 2021-12-30 | End: 2022-01-04 | Stop reason: HOSPADM

## 2021-12-30 RX ORDER — TRAZODONE HYDROCHLORIDE 50 MG/1
50 TABLET, FILM COATED ORAL AT BEDTIME
Status: DISCONTINUED | OUTPATIENT
Start: 2021-12-30 | End: 2021-12-30

## 2021-12-30 RX ORDER — OLANZAPINE 10 MG/2ML
10 INJECTION, POWDER, FOR SOLUTION INTRAMUSCULAR 3 TIMES DAILY PRN
Status: DISCONTINUED | OUTPATIENT
Start: 2021-12-30 | End: 2022-01-04 | Stop reason: HOSPADM

## 2021-12-30 RX ORDER — FLUOXETINE 10 MG/1
10 CAPSULE ORAL DAILY
Status: DISCONTINUED | OUTPATIENT
Start: 2021-12-30 | End: 2021-12-30

## 2021-12-30 RX ORDER — CLOZAPINE 25 MG/1
50 TABLET, ORALLY DISINTEGRATING ORAL DAILY
Status: ON HOLD | COMMUNITY
End: 2022-01-20

## 2021-12-30 RX ORDER — LANOLIN ALCOHOL/MO/W.PET/CERES
3 CREAM (GRAM) TOPICAL
Status: DISCONTINUED | OUTPATIENT
Start: 2021-12-30 | End: 2021-12-30

## 2021-12-30 RX ADMIN — HALOPERIDOL 5 MG: 5 TABLET ORAL at 08:57

## 2021-12-30 RX ADMIN — TRAZODONE HYDROCHLORIDE 50 MG: 50 TABLET ORAL at 21:11

## 2021-12-30 RX ADMIN — HALOPERIDOL 2 MG: 2 TABLET ORAL at 20:32

## 2021-12-30 RX ADMIN — CLOZAPINE 350 MG: 100 TABLET ORAL at 21:10

## 2021-12-30 RX ADMIN — FLUOXETINE 10 MG: 10 CAPSULE ORAL at 08:57

## 2021-12-30 ASSESSMENT — MIFFLIN-ST. JEOR
SCORE: 1374.83
SCORE: 1372.11

## 2021-12-30 NOTE — PHARMACY-ADMISSION MEDICATION HISTORY
Admission Medication History Completed by Pharmacy    See [facility] Admission Navigator for allergy information, preferred outpatient pharmacy, prior to admission medications and immunization status.     Medication History Sources:     ACT  Vicky [phone] (unable to reach nurse Johanne [phone])    Dispense fill data     Changes made to PTA medication list (reason):    Changed: clozapine 300mg daily to clozapine 350mg daily, haloperidol 5mg daily to 2.5mg in the morning, 5mg at night     Additional Information:    Patient's last dose of Abilify Maintena given 12/28/21     Vicky reports that olanzapine 5mg daily prn was recently discontinued and haloperidol recently increased to add a morning dose of 2.5mg daily     Vicky unsure of how often patient is receiving Maalox and docusate - patient's nurse was unable to be reached     Prior to Admission medications    Medication Sig Last Dose Taking? Auth Provider   ABILIFY MAINTENA 400 MG extended release injection Inject 2 mLs (400 mg) into the muscle every 28 days Due on 3/25 12/28/2021 at Unknown time Yes Sandeep Cancino MD   alum & mag hydroxide-simethicone (MAALOX) 200-200-20 MG/5ML SUSP suspension Take 30 mLs by mouth every 4 hours as needed for indigestion Unknown at Unknown time Yes Juarez Grimm MD   cloZAPine (FAZACLO) 100 MG ODT Take 300 mg by mouth daily Take with two 25mg tablets for a total daily dose of 350mg. Past Week at Unknown time Yes Unknown, Entered By History   cloZAPine (FAZACLO) 25 MG ODT Take 50 mg by mouth daily Take with three 100mg tablets for a total daily dose of 350mg. Past Week at Unknown time Yes Unknown, Entered By History   docusate sodium (COLACE) 100 MG capsule Take 1 capsule (100 mg) by mouth 2 times daily Unknown at Unknown time Yes Juarez Grimm MD   haloperidol (HALDOL) 5 MG tablet Take 1 tablet (5 mg) by mouth At Bedtime  Patient taking differently: Take 2.5-5 mg by mouth 2 times daily Take 2.5mg in  the morning and 5mg at night. Past Week at Unknown time Yes Juarez Grimm MD   melatonin 5 MG tablet Take 1 tablet (5 mg) by mouth At Bedtime  Patient taking differently: Take 3 mg by mouth At Bedtime  Past Week at Unknown time Yes Juarez Grimm MD       Date completed: 12/30/21    Medication history completed by: JENNIFER GILES [facility]

## 2021-12-30 NOTE — PLAN OF CARE
"  Problem: Behavioral Health Plan of Care  Goal: Patient-Specific Goal (Individualization)  Flowsheets (Taken 12/30/2021 1235)  Patient Vulnerabilities:   legal concerns   lacks insight into illness  Patient Personal Strengths:   expressive of emotions   no history of violence   motivated for recovery   family/social support   stable living environment   spiritual/Latter-day support  Note:   Personal Plan of Care    Patient goals:  Feel stable              \"Don't want to hear the voices\"    Plan for admission:  1. stabilization of mood disorder symptoms.   2. Absence of SI/Safe with self  3. Medication management per Mds  4. Coordination of Care with outpatient providers/family  5. Psychiatric follow up care in place                "

## 2021-12-30 NOTE — PROGRESS NOTES
"Patient arrived the unit at about 02:10 am, present with flat affect on approach, but pleasant and compliant with search and assessment questions, patient denies depression, anxiety ,SI/SIB, but has A/V hallucinations, patient states \" I can hear voices telling me to kill my kids, I also can see scary faces\" patient denies pain or discomfort, verbalized to contract for safety,Vital signs were stable except for pulse @ 112,patient refused scheduled Trazodone , safety checks and precautions in progress every 15 minutes, patient has no other concerns, in bed sleeping at this time, will monitor as needed   "

## 2021-12-30 NOTE — PLAN OF CARE
Problem: Sleep Disturbance  Goal: Adequate Sleep/Rest  12/30/2021 0433 by Reny Arteaga, RN  Outcome: Improving  12/30/2021 0237 by Reny Arteaga, RN  Outcome: Improving     Problem: Suicidal Behavior  Goal: Suicidal Behavior is Absent or Managed  Outcome: Improving   Patient slept 4 hours, refused Trazodone. No other known concerns at this time, will monitor as needed

## 2021-12-30 NOTE — PLAN OF CARE
Problem: Sleep Disturbance  Goal: Adequate Sleep/Rest  Outcome: Improving     Problem: Suicidal Behavior  Goal: Suicidal Behavior is Absent or Managed  Outcome: Improving

## 2021-12-30 NOTE — PHARMACY-CONSULT NOTE
Pharmacy Clozapine Continuation Note    Patient's Name: Nona Finley  Patient's : 1980    Current cloZAPine regimen: 350 mg nightly   Has there been a known interruption in therapy for greater than/equal to 48 hours which would warrant retitration: No    Recent ANC Value(s) for last 30 days:  2021: Absolute Neutrophils 4.7 10e3/uL (Ref range: 1.6 - 8.3 10e3/uL)      A REMS Dispense Authorization was obtained from the clozapine REMS prgram? Yes   If no, why was the REMS Dispense Authorization not successful:  A Dispense Rationale was needed to obtain the REMS Dispense Authorization? No   Is the ANC (if available) within recommended limits? Yes  Does the patient have any signs or symptoms of infection, including fever? No    Plan:  1. Continue clozapine therapy at 350 mg PO nightly.  2. A WBC with differential will be ordered at least weekly, NEXT DUE 2022. ANC values will be entered into the REMS program.    JENNIFER GILES [facility]

## 2021-12-30 NOTE — PLAN OF CARE
Initial Psychosocial Assessment    I have reviewed the chart, met with the patient, and developed Care Plan.  Information for assessment was obtained from: Chart and patient         Presenting Problem:  Patient is a 41 year old female admitted to [facility] on 12/30/2021 due to concerns worsening psychosis symptoms, command hallucinations telling her to kill herself and her children along with visual hallucinations and paranoia. Patient is voluntary and willing to work with treatment team on medication adjustments.        History of Mental Health and Chemical Dependency:  Current mental health Dx of Schizoaffective disorder depressed type and history of post-partum depression. Previous  hospitalization at [facility] on 8/2021 - 7/2020 - 12/2019 - 5/2019 - 3/2019 - 2/2019 - 5/2018.    Family Description (Constellation, Family Psychiatric History):  Was born in [place].  Most of her family is still there. Grew up in [place], left in 2001. First went to [place] before coming to [place]. Patient is  with 6 children ranging in age from 4-16 years old. Her in-laws provide PCA services to help for her at home because her  is doing a Medical Residency out of state. One of her brothers also suffers from mental illness.    Significant Life Events (Illness, Abuse, Trauma, Death):  Fled [place] when she was in the 5th Grade. Significant Trauma history.         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    Financial Status:  Family provides financial support    Legal Issues:  Sara Cab[place] Commitment with a Re[place] Order     Ethnic/Cultural Issues:   Restorationism. She is bilingual. Prays and reads the Koran       Spiritual Orientation:  Restorationism        Service History:  None      Current Treatment Providers are:  Primary Outpatient Psychiatrist: Dr. Walter Gaviria, [facility]  Primary  Physician: Rip Capps at [facility]  Therapist: Brittnee  : Vicky Harrington, [phone] [facility]   Family Members: Bob Rawls (spouse), [phone]    Social Service Assessment/Plan:  Patient will have ongoing psychiatric assessment. Medications will be reviewed and adjusted per MD as indicated. Outpatient providers will be contacted for care coordination. Hospital staff will provide a safe environment and a therapeutic milieu. Patient will be encouraged to participate in unit groups and activities. CTC will continue to assess needs and  ensure appropriate follow up care is in place.

## 2021-12-30 NOTE — PLAN OF CARE
BEHAVIORAL TEAM DISCUSSION    Participants: Gita BURTON, Gloria SPRAGUE, Kristie Crump CTC  Progress: Initial assessment   Anticipated length of stay: No anticipated discharge date  Continued Stay Criteria/Rationale: Worsening psychosis symptoms. Needs clinical stabilization and medication management.   Medical/Physical: See H&P note  Precautions:   Behavioral Orders   Procedures    Code 1 - Restrict to Unit    Discontinue 1:1 attendant for suicide risk     Order Specific Question:   I have performed an in person assessment of the patient     Answer:   Based on this assessment the patient no longer requires a one on one attendant at this point in time.     Order Specific Question:   Rationale     Answer:   Medical Record Reviewed     Order Specific Question:   Rationale     Answer:   Patient States able to remain safe in hospital     Order Specific Question:   Rationale     Answer:   Modifications to care environment made to mitigate safety risk     Order Specific Question:   Rationale     Answer:   Routine observations are sufficient to monitor safety.    Routine Programming     As clinically indicated    Self Injury Precaution    Status 15     Every 15 minutes.    Suicide precautions     Patients on Suicide Precautions should have a Combination Diet ordered that includes a Diet selection(s) AND a Behavioral Tray selection for Safe Tray - with utensils, or Safe Tray - NO utensils       Plan: Patient will have ongoing psychiatric assessment. Medications will be reviewed and adjusted per MD as indicated. Outpatient providers will be contacted for care coordination. CTC will continue to assess needs and  ensure appropriate follow up care is in place.   Rationale for change in precautions or plan: None

## 2021-12-30 NOTE — PROGRESS NOTES
12/30/21 0230   Patient Belongings   Did you bring any home meds/supplements to the hospital?  No   Patient Belongings locker;sent to security per site process   Patient Belongings Put in Hospital Secure Location (Security or Locker, etc.) cash/credit card;cell phone/electronics;earrings;necklace;clothing;purse/wallet;[employer] item;shoes;wallet;other (see comments)  (Glasses kept w/pt. Advent item is her scarf, pouch #[id number] sent to security)   Belongings Search Yes   Clothing Search Yes   Comment Sent to security pouch # [id number]     Sent to security: Pouch # ([id number]): [employer] card, Visa card x2, social security, WIC card, target card x3, EBT card x2, goto card    A               Admission:  I am responsible for any personal items that are not sent to the safe or pharmacy.  [facility] is not responsible for loss, theft or damage of any property in my possession.    Signature:  _________________________________ Date: _______  Time: _____                                              Staff Signature:  ____________________________ Date: ________  Time: _____      2nd Staff person, if patient is unable/unwilling to sign:    Signature: ________________________________ Date: ________  Time: _____     Discharge:  [facility] has returned all of my personal belongings:    Signature: _________________________________ Date: ________  Time: _____                                          Staff Signature:  ____________________________ Date: ________  Time: _____

## 2021-12-31 PROCEDURE — 124N000002 HC R&B MH UMMC

## 2021-12-31 PROCEDURE — 250N000013 HC RX MED GY IP 250 OP 250 PS 637

## 2021-12-31 PROCEDURE — 36415 COLL VENOUS BLD VENIPUNCTURE: CPT | Performed by: STUDENT IN AN ORGANIZED HEALTH CARE EDUCATION/TRAINING PROGRAM

## 2021-12-31 PROCEDURE — 250N000013 HC RX MED GY IP 250 OP 250 PS 637: Performed by: STUDENT IN AN ORGANIZED HEALTH CARE EDUCATION/TRAINING PROGRAM

## 2021-12-31 PROCEDURE — 80159 DRUG ASSAY CLOZAPINE: CPT | Performed by: STUDENT IN AN ORGANIZED HEALTH CARE EDUCATION/TRAINING PROGRAM

## 2021-12-31 RX ADMIN — Medication 7.5 MG: at 20:52

## 2021-12-31 RX ADMIN — CLOZAPINE 350 MG: 100 TABLET ORAL at 20:51

## 2021-12-31 RX ADMIN — HYDROXYZINE HYDROCHLORIDE 25 MG: 25 TABLET, FILM COATED ORAL at 14:01

## 2021-12-31 RX ADMIN — TRAZODONE HYDROCHLORIDE 50 MG: 50 TABLET ORAL at 20:52

## 2021-12-31 RX ADMIN — OLANZAPINE 10 MG: 10 TABLET, FILM COATED ORAL at 18:00

## 2021-12-31 ASSESSMENT — ACTIVITIES OF DAILY LIVING (ADL)
DRESS: INDEPENDENT
ORAL_HYGIENE: INDEPENDENT
ORAL_HYGIENE: INDEPENDENT
LAUNDRY: UNABLE TO COMPLETE
HYGIENE/GROOMING: INDEPENDENT
LAUNDRY: UNABLE TO COMPLETE
DRESS: SCRUBS (BEHAVIORAL HEALTH)
HYGIENE/GROOMING: INDEPENDENT

## 2021-12-31 NOTE — PROGRESS NOTES
"Pt was visible in the milieu, calm and cooperative with all cares, denies SI/HI or SIB. Pt reported having hallucination both visual and auditory. Pt Vs is WNL, she is able to increase her fluid intake. She did not eat dinner tonight state \" I do not eat dinner even when I am in my home, I only eat breakfast and lunch\" \" I do not want to gain weight\". Appears pt is hopeful to the future. She stated \" If these voices stops I will be able to be normal and be with my family\". She took her schedule medication, not PRN requested. Pt did laundry tonight, she also requested if she can have her bra back. Writer explain to her that we will need provider order for that. She was ok with that. Pt denies pain or discomfort. Currently pt is sleeping with normal respiration. Will continue to monitor.   "

## 2021-12-31 NOTE — PLAN OF CARE
Assessment/Intervention/Current Symptoms and Care Coordination  Patient continues to experience hallucinations.  She appears to have slept well last night. CTC unable to contact outpatient supports today due to holiday, will do so Monday.    Discharge Plan or Goal  Likely home with appropriate outpatient support when stable.    Barriers to Discharge   Patient is currently experiencing significant symptoms, is being closely monitored and medications continue to be adjusted as needed.    Referral Status  None    Legal Status  Voluntary

## 2021-12-31 NOTE — PROGRESS NOTES
"Pt was visible in the milieu, but spent most of the shift in her room, stated \" I am tried\" Pt's pulse was high and her BP was WNL at the start of the shift. Writer encouraged pt to increase fluid intake. Pt drink 240 ml, Vs was been reject, Plus decreased, not WNL but decreased, pt denies headache, dizziness, lightheaded or heart palpitation.   Denies SI/HI, SIB, but reported both visual, and auditory hallucination \" they are yelling continuously\". Pt takes all her schedule medication, denies side effects. Denies depression, or anxiety. Pt eat most of her meals, with no difficulty. Pt is hopefull the future \" if the voices stops I will be ok\". Will continue to monitor.     "

## 2021-12-31 NOTE — PLAN OF CARE
Pt appeared to have slept 6.75 hours tonight. No prn medications administered this shift.     Problem: Sleep Disturbance  Goal: Adequate Sleep/Rest  Outcome: No Change     Problem: Suicidal Behavior  Goal: Suicidal Behavior is Absent or Managed  Outcome: Improving

## 2022-01-01 PROCEDURE — 250N000013 HC RX MED GY IP 250 OP 250 PS 637: Performed by: STUDENT IN AN ORGANIZED HEALTH CARE EDUCATION/TRAINING PROGRAM

## 2022-01-01 PROCEDURE — 124N000002 HC R&B MH UMMC

## 2022-01-01 PROCEDURE — 250N000013 HC RX MED GY IP 250 OP 250 PS 637

## 2022-01-01 RX ADMIN — Medication 7.5 MG: at 19:29

## 2022-01-01 RX ADMIN — TRAZODONE HYDROCHLORIDE 50 MG: 50 TABLET ORAL at 19:29

## 2022-01-01 RX ADMIN — CLOZAPINE 350 MG: 100 TABLET ORAL at 19:29

## 2022-01-01 ASSESSMENT — ACTIVITIES OF DAILY LIVING (ADL)
LAUNDRY: WITH SUPERVISION
ORAL_HYGIENE: INDEPENDENT
LAUNDRY: WITH SUPERVISION
HYGIENE/GROOMING: INDEPENDENT
HYGIENE/GROOMING: INDEPENDENT
DRESS: INDEPENDENT
ORAL_HYGIENE: INDEPENDENT
DRESS: INDEPENDENT

## 2022-01-01 NOTE — PLAN OF CARE
Problem: Sleep Disturbance  Goal: Adequate Sleep/Rest  Outcome: Improving     Pt appeared to have slept for 7 hours during shift. No PRN medication was given. 15 minutes safety checks was in place. Staff will continue to offer support to pt.

## 2022-01-01 NOTE — PLAN OF CARE
"  Problem: Behavioral Health Plan of Care  Goal: Optimized Coping Skills in Response to Life Stressors  Outcome: Improving     Behavioral  Pt was visible in the milieu intermittently also spent time resting in her room. Pt reported hearing voices telling her to go to the river and jump over the bridge also saw people with scary faces. Pt says she worries a lot and feel guilt \"why only me in this condition?\" Pt said she ate lunch and dinner but continues to struggle with voices telling her that food has marijuana. .Pt rated anxiety at 7/10 and depression \"high\" Pt got PRN 10 mg Zyprexa for increased voices and reported feeling relieved of the voices and anxiety as well. Pt also asked why her Prozac was discontinued and writer went over her mediations including new Haldol medication. No other concerns this shift.  Medical Alerts  None  Plan  Continue to monitor     "

## 2022-01-01 NOTE — PLAN OF CARE
Pt c/o dizziness while standing when morning VS were taken. BP 95/67. Pt was given water and was encouraged to drink frequently. Pt agreed. Pt said she believes the dizziness is from her Klonopin. She said this has happened before and it was due to Klonopin. She said it only lasts for a little while in the morning and then goes away. Pt was encouraged to get up slowly. She agreed to do this. Later the pt said she no longer experienced dizziness upon standing. No other medication side effects were reported or observed. Pt denied pain.    Pt was pleasant and cooperative. She was able to make her needs known and she was able to answer questions appropriately.     Pt spent time napping in her room before lunch and pacing the halls while listening to music in the afternoon.    Pt's afternoon VS were taken and BP 94/65. Pt encouraged to drink more water. She agreed and got a large cup of water. Pt encouraged to drink 4 large cups of water per day. Pt denied dizziness.    Pt denied SI/SIB/HI/AVH. Pt said she has not experienced visual or auditory hallucinations since she took HS medications last evening. She denied depression and anxiety. Pt then smiled. Will continue to monitor.

## 2022-01-01 NOTE — PLAN OF CARE
Pt was in her room earlier in the shift but later was in the lounge watching tv. Presents with flat affect. Pt reports that her mood is good. Pt Denies SI/SIB/AH/VH. Denied depression and anxiety. Pt says the new medications started last night has helped her. Pt was happy and smiling when writer was checking in with her. Pt was medication compliant. Ate supper 100%. Will continue to monitor.     Problem: Adult Inpatient Plan of Care  Goal: Readiness for Transition of Care  Outcome: Improving     Problem: Suicidal Behavior  Goal: Suicidal Behavior is Absent or Managed  Outcome: Improving     Problem: Behavioral Health Plan of Care  Goal: Adheres to Safety Considerations for Self and Others  Outcome: Improving

## 2022-01-02 PROCEDURE — 250N000013 HC RX MED GY IP 250 OP 250 PS 637

## 2022-01-02 PROCEDURE — 124N000002 HC R&B MH UMMC

## 2022-01-02 PROCEDURE — 250N000013 HC RX MED GY IP 250 OP 250 PS 637: Performed by: STUDENT IN AN ORGANIZED HEALTH CARE EDUCATION/TRAINING PROGRAM

## 2022-01-02 RX ADMIN — CLOZAPINE 350 MG: 100 TABLET ORAL at 21:02

## 2022-01-02 RX ADMIN — Medication 7.5 MG: at 21:02

## 2022-01-02 RX ADMIN — TRAZODONE HYDROCHLORIDE 50 MG: 50 TABLET ORAL at 21:02

## 2022-01-02 ASSESSMENT — ACTIVITIES OF DAILY LIVING (ADL)
HYGIENE/GROOMING: INDEPENDENT
ORAL_HYGIENE: INDEPENDENT
HYGIENE/GROOMING: INDEPENDENT
DRESS: INDEPENDENT
LAUNDRY: WITH SUPERVISION
DRESS: INDEPENDENT
ORAL_HYGIENE: INDEPENDENT
LAUNDRY: WITH SUPERVISION

## 2022-01-02 NOTE — PLAN OF CARE
"Assessment:  Nona was up ad dayna, spent 75% of the shift in her room and was minimally social with peers.   Writer offered to contact a [facility]  to aid with nursing assessment, pt declined saying \"my english is good\"  Nona denied having suicidal ideation or self harm thoughts. \"I am not hearing voices, the medication is working, I am feeling better\"    Nona's food and fluid intake were WNL. Pt has no scheduled am medications and has not requested any PRN medication this shift. Nona has been calm and generally quiet.  "

## 2022-01-03 PROCEDURE — 124N000002 HC R&B MH UMMC

## 2022-01-03 PROCEDURE — 250N000013 HC RX MED GY IP 250 OP 250 PS 637

## 2022-01-03 PROCEDURE — 250N000013 HC RX MED GY IP 250 OP 250 PS 637: Performed by: STUDENT IN AN ORGANIZED HEALTH CARE EDUCATION/TRAINING PROGRAM

## 2022-01-03 PROCEDURE — 99232 SBSQ HOSP IP/OBS MODERATE 35: CPT | Mod: GC | Performed by: PSYCHIATRY & NEUROLOGY

## 2022-01-03 RX ORDER — TRAZODONE HYDROCHLORIDE 50 MG/1
50 TABLET, FILM COATED ORAL AT BEDTIME
Qty: 30 TABLET | Refills: 0 | Status: ON HOLD | OUTPATIENT
Start: 2022-01-03 | End: 2022-01-20

## 2022-01-03 RX ORDER — HALOPERIDOL 2 MG/1
2 TABLET ORAL 2 TIMES DAILY PRN
Status: DISCONTINUED | OUTPATIENT
Start: 2022-01-03 | End: 2022-01-04 | Stop reason: HOSPADM

## 2022-01-03 RX ORDER — HALOPERIDOL 0.5 MG/1
7.5 TABLET ORAL AT BEDTIME
Qty: 450 TABLET | Refills: 0 | Status: SHIPPED | OUTPATIENT
Start: 2022-01-03 | End: 2022-01-10

## 2022-01-03 RX ORDER — HALOPERIDOL 2 MG/1
2 TABLET ORAL 2 TIMES DAILY PRN
Qty: 60 TABLET | Refills: 0 | Status: ON HOLD | OUTPATIENT
Start: 2022-01-03 | End: 2022-01-20

## 2022-01-03 RX ADMIN — TRAZODONE HYDROCHLORIDE 50 MG: 50 TABLET ORAL at 19:39

## 2022-01-03 RX ADMIN — CLOZAPINE 350 MG: 100 TABLET ORAL at 19:38

## 2022-01-03 RX ADMIN — Medication 7.5 MG: at 19:39

## 2022-01-03 ASSESSMENT — ACTIVITIES OF DAILY LIVING (ADL)
ORAL_HYGIENE: INDEPENDENT
HYGIENE/GROOMING: INDEPENDENT
DRESS: INDEPENDENT
ORAL_HYGIENE: INDEPENDENT
LAUNDRY: WITH SUPERVISION
HYGIENE/GROOMING: INDEPENDENT
LAUNDRY: WITH SUPERVISION
DRESS: INDEPENDENT

## 2022-01-03 NOTE — PROGRESS NOTES
"    ----------------------------------------------------------------------------------------------------------  [facility]  Psychiatric Progress Note  Hospital Day #4     Subjective   The patient's care was discussed with the treatment team and chart notes were reviewed.     Sleep: 7 hours (01/03/22 0600)  Scheduled meds: adherent/non-adherent  PRN meds: none taken in last 48 hours    Per Staff report: Relatively uneventful weekend. Patient thinks she is feeling better, that medicines are helping. Did report dizziness with low blood pressure since last being seen by physician team, this resolved with water and rest (most recent /71)     At some point worried there was marijuana in her food.     Patient interview:   \"been going well since last Saturday. The voices went away after I took the combination of medicine.\"    Says voices had started again 3 weeks ago, telling her to jump off a bridge into the river; she states she did not consider acting on this. Unsure what brought them back, did have stress around taking care of her 6 children. Mood has been \"sad\" though \"getting better\" since being in hospital. Denies anxiety. Denies thoughts of food being poisoned currently though was thinking this recently \"at that time it was very hard but it's getting better.\"     Does have morning dizziness \"but it's better in the afternoon when I drink water.\" Says this has been going on since before coming in the hospital. We encourage her to continue drinking water which she plans to do.     Does not take prn medication at home. We discuss having a smaller dose of the haldol as-needed if voices are getting worse. She is agreeable to this.     Goals are \"just want to go home.\" Wants to go home to take care of her children. Might be interested in an ILS worker for additional support.     The risks, benefits, alternatives, and side effects of treatments including no treatment have been discussed and " "are understood by the patient and other caregivers.    Review of systems:  Complete psychiatric ROS is negative unless otherwise noted above.      Objective   /71   Pulse 109   Temp 98.1  F (36.7  C) (Oral)   Resp 16   Ht 1.626 m (5' 4\")   Wt 73.2 kg (161 lb 6.4 oz)   SpO2 100%   BMI 27.70 kg/m    Weight is 161 lbs 6.4 oz  Body mass index is 27.7 kg/m .    Psychiatric Examination:  Appearance:  awake, alert, adequately groomed and dressed in hospital scrubs. Wearing Mormon hair covering.   Muscle Strength and Tone: moves all extremities spontaneously  Gait and Station: Normal  Behavior (Psychomotor):  no evidence of tardive dyskinesia, dystonia, or tics  Eye Contact:  good  Speech:  fair, occasionally downcast  Mood:  better  Affect:  Sad affect and brightens to humor and affirmations  Attitude:  cooperative  Thought Process:  logical and linear  Thought Content:  no evidence of suicidal ideation or homicidal ideation. Denies hallucinations  Associations:  no loose associations  Insight:  fair, acknowledges MH symptoms  Judgment:  fair, help-seeking  Oriented to:  time, person, and place  Attention Span and Concentration:  adequate for brief interview  Recent and Remote Memory:  intact  Language: Fluent in English with appropriate syntax and vocabulary. Has accent    No results found for this or any previous visit (from the past 24 hour(s)).     Assessment                 Diagnostic Impression:  Nona Finley is a 41 year old [place] female with a past psychiatric history of schizoaffective disorder depressive type who presented to the ER with SI, HI and psychosis. Significant symptoms include SI, depressed, sleep issues, psychosis and disordered eating. Her last psychiatric hospitalization was in 08/2021.  She is currently followed by Dr. Walter Gaviria, [facility] team. No current psychosocial stressors. she has been coping with by withdrawing.  Patient's support system includes family and " outpatient team.  Substance use does not appear to be playing a contributing role in the patient's presentation.  There is genetic loading for psychosis. Medical history does not appear to be significant. The MSE is notable for depressed mood, sad affect, SI with plan, commanding AH and disturbing VH. She end denies  self injurious behaviors. She does not seem to have any traits of personality disorders  interfering with her ability to adhere with the treatment plan.      Her reported symptoms of depressed mood, hopelessness, SI, AH,VH are consistent with her historic diagnosis of schizoaffective disorder. Optimization of medications to target these symptom clusters in addition to evaluation of adquate outpatient supports will be targets for treatment during this admission.      Given that she currently has SI, HI and psychosis, patient warrants inpatient psychiatric hospitalization to maintain her safety. Disposition pending clinical stabilization, medication optimization and development of an appropriate discharge plan.     Risk for harm is elevated.  Risk factors: SI, HI, impulsive and past behaviors  Protective factors: family and engaged in treatment     She was medically cleared for admission to inpatient psychiatric unit. The risks, benefits, alternatives, and side effects of treatments including no treatment have been discussed and are understood by the patient and other caregivers.     Hospital course: Nona Finley was admitted to station 20 as a voluntary patient (who is under commitment) and will be treated in the therapeutic milieu with appropriate individual and group therapies. Initially endorsing auditory hallucinations, voices telling her to jump off a bridge. Also had some paranoia suspecting food on the unit was poisoned with marijuana. Evening haldol increased to 7.5 mg at bedtime. Trazodone 50mg at bedtime scheduled at night. Haldol 2mg bid prn added for voices. Patient felt better with these  changes and denied side effects. Appeared more organized and calm on 1/3/21, at which time team and patient discussed anticipating discharge the following day.     Medical course: She was medically cleared by the ED prior to admission to the unit. Did endorse occasional dizziness which was not new to her, and she was noted to have low blood pressure during these spells. Dizziness improved with rest and oral fluids. No other medical concerns arose.     Plan     Today's Changes:   - Haldol 2mg bid prn for voices, added  - plan to discharge tomorrow AM    Primary Psychiatric Diagnosis:     Schizoaffective Disorder Depressive type, Depressive episode severe, recurrent, without use disorder     Secondary Psychiatric Diagnoses:    Unspecified anxiety disorder moderate    Psychotropic medications  Modifications  Haldol increased from 5mg qAM to 7.5mg qAM + 2mg bid prn for voices  Trazodone 50 mg at bedtime ADDED    Continue  Clozapine 350 mg daily    Pertinent Medical diagnoses and medications:  none    Consults:  none    Laboratory/Imaging:   No new labs/imaging    Legal Status: Voluntary    Safety Assessment:   Behavioral Orders   Procedures     Code 1 - Restrict to Unit     Discontinue 1:1 attendant for suicide risk     Order Specific Question:   I have performed an in person assessment of the patient     Answer:   Based on this assessment the patient no longer requires a one on one attendant at this point in time.     Order Specific Question:   Rationale     Answer:   Medical Record Reviewed     Order Specific Question:   Rationale     Answer:   Patient States able to remain safe in hospital     Order Specific Question:   Rationale     Answer:   Modifications to care environment made to mitigate safety risk     Order Specific Question:   Rationale     Answer:   Routine observations are sufficient to monitor safety.     Routine Programming     As clinically indicated     Self Injury Precaution     Status 15     Every 15  minutes.     Suicide precautions     Patients on Suicide Precautions should have a Combination Diet ordered that includes a Diet selection(s) AND a Behavioral Tray selection for Safe Tray - with utensils, or Safe Tray - NO utensils          Disposition: TBD, pending clinical stabilization, medication optimization, and formulation of a safe discharge plan.     Patient seen and discussed with my attending physician, Dr. Juarez Grimm MD who is in agreement with my assessment and plan.    Maris Casey MD  PGY-2 Psychiatry Resident    Attestation:  This patient has been seen and evaluated by me, Juarez Grimm MD.  I have discussed this patient with the house staff team including the resident and medical student and I agree with the findings and plan in this note.    I have reviewed today's vital signs, medications, labs and imaging. Juarez Grimm MD , PhD.

## 2022-01-03 NOTE — PLAN OF CARE
Pt has been visible in the milieu, she is selectively social with peers and does not attend groups. Pt denies AH and all other mental health symptoms, she verbalizes that she is no longer having thoughts of hurting herself or her children. Pt states that she feels as though her medications are working well and she is hoping to discharge within the next couple of days. No other concerns at this time.     Problem: Suicidal Behavior  Goal: Suicidal Behavior is Absent or Managed  Outcome: Improving     Problem: Behavioral Health Plan of Care  Goal: Plan of Care Review  Outcome: Improving  Flowsheets (Taken 1/3/2022 1100)  Plan of Care Reviewed With: patient  Patient Agreement with Plan of Care: agrees

## 2022-01-03 NOTE — PLAN OF CARE
Assessment/Intervention/Current Symtoms and Care Coordination  The patient's care was discussed with the treatment team and chart notes were reviewed.   Patient met with team.  She reports that symptoms have been improving in the past 2 days.  She continues to report some paranoia around being poisoned however she reports that she has been eating and that these thoughts are improving.  Patient reports feeling well enough to return home today.  CTC will contact ACT team to discuss and coordinate care.    Discharge Plan or Goal  Home when stable- likely tomorrow  Resume outpatient care    Barriers to Discharge   Continued medication mgmt per MD's  Coordination of care with outpatient ACT team    Referral Status  None today    Legal Status     Voluntary- on commitment

## 2022-01-03 NOTE — PLAN OF CARE
"  Problem: Adult Inpatient Plan of Care  Goal: Absence of Hospital-Acquired Illness or Injury  Outcome: Improving  Goal: Optimal Comfort and Wellbeing  Outcome: Improving  Intervention: Provide Person-Centered Care  Recent Flowsheet Documentation  Taken 1/3/2022 1600 by Reny Arteaga, RN  Trust Relationship/Rapport:    care explained    choices provided    emotional support provided    empathic listening provided    questions answered    questions encouraged    reassurance provided    thoughts/feelings acknowledged     Problem: Suicidal Behavior  Goal: Suicidal Behavior is Absent or Managed  Outcome: Improving     Problem: Behavioral Health Plan of Care  Goal: Plan of Care Review  Outcome: Improving  Flowsheets (Taken 1/3/2022 1600)  Plan of Care Reviewed With: patient  Patient Agreement with Plan of Care: agrees     Patient up and visible on the unit most part off the shift, does not socialize with peers when in the milieu, presents with full range affect on approach, denies all MH symptoms, patient states \" I am very excited going home, I feel good,\" denies pain or discomfort, patient is dilma for safety, safety checks in progress every 15 minutes, patient is eating okay, medication compliant with no stated or observed side effects this shift. No other known concerns at this time, will offer support as needed for the rest of the shift.  "

## 2022-01-03 NOTE — PLAN OF CARE
Pt was visible in the milieu. Pt was not interacting with others but just kept to herself watching tv. Pt presents with full range affect. Denies SI/SIB/AH/VH. Pt reports her mood is good. Denies depression and anxiety. Ate supper 100% and was medication compliant.    Problem: Suicidal Behavior  Goal: Suicidal Behavior is Absent or Managed  Outcome: Improving     Problem: Behavioral Health Plan of Care  Goal: Adheres to Safety Considerations for Self and Others  Outcome: Improving

## 2022-01-04 VITALS
DIASTOLIC BLOOD PRESSURE: 66 MMHG | HEIGHT: 64 IN | TEMPERATURE: 98.5 F | OXYGEN SATURATION: 100 % | RESPIRATION RATE: 16 BRPM | WEIGHT: 160.9 LBS | HEART RATE: 107 BPM | BODY MASS INDEX: 27.47 KG/M2 | SYSTOLIC BLOOD PRESSURE: 103 MMHG

## 2022-01-04 LAB
BASOPHILS # BLD AUTO: 0 10E3/UL (ref 0–0.2)
BASOPHILS NFR BLD AUTO: 1 %
CLOZAPINE SERPL-MCNC: 699 NG/ML
CLOZAPINE+NOR SERPL-MCNC: 950 NG/ML
CNO SERPL-MCNC: <100 NG/ML
EOSINOPHIL # BLD AUTO: 0.4 10E3/UL (ref 0–0.7)
EOSINOPHIL NFR BLD AUTO: 6 %
ERYTHROCYTE [DISTWIDTH] IN BLOOD BY AUTOMATED COUNT: 14.4 % (ref 10–15)
HCT VFR BLD AUTO: 34.1 % (ref 35–47)
HGB BLD-MCNC: 11.1 G/DL (ref 11.7–15.7)
IMM GRANULOCYTES # BLD: 0 10E3/UL
IMM GRANULOCYTES NFR BLD: 0 %
LYMPHOCYTES # BLD AUTO: 2.1 10E3/UL (ref 0.8–5.3)
LYMPHOCYTES NFR BLD AUTO: 36 %
MCH RBC QN AUTO: 28.5 PG (ref 26.5–33)
MCHC RBC AUTO-ENTMCNC: 32.6 G/DL (ref 31.5–36.5)
MCV RBC AUTO: 88 FL (ref 78–100)
MONOCYTES # BLD AUTO: 0.5 10E3/UL (ref 0–1.3)
MONOCYTES NFR BLD AUTO: 9 %
NEUTROPHILS # BLD AUTO: 2.8 10E3/UL (ref 1.6–8.3)
NEUTROPHILS NFR BLD AUTO: 48 %
NORCLOZAPINE SERPL-MCNC: 251 NG/ML
NRBC # BLD AUTO: 0 10E3/UL
NRBC BLD AUTO-RTO: 0 /100
PLATELET # BLD AUTO: 260 10E3/UL (ref 150–450)
RBC # BLD AUTO: 3.89 10E6/UL (ref 3.8–5.2)
WBC # BLD AUTO: 5.8 10E3/UL (ref 4–11)

## 2022-01-04 PROCEDURE — 36415 COLL VENOUS BLD VENIPUNCTURE: CPT | Performed by: PSYCHIATRY & NEUROLOGY

## 2022-01-04 PROCEDURE — 99238 HOSP IP/OBS DSCHRG MGMT 30/<: CPT | Mod: GC | Performed by: PSYCHIATRY & NEUROLOGY

## 2022-01-04 PROCEDURE — 85025 COMPLETE CBC W/AUTO DIFF WBC: CPT | Performed by: PSYCHIATRY & NEUROLOGY

## 2022-01-04 ASSESSMENT — MIFFLIN-ST. JEOR: SCORE: 1369.84

## 2022-01-04 NOTE — PLAN OF CARE
Assessment/Intervention/Current Symtoms and Care Coordination  The patient's care was discussed with the treatment team and chart notes were reviewed.   Patient met with team. She continues to report that she is feeling much better- feels safe to return home.  Has been eating and sleeping well, denies any thoughts of self harm/harming others.  [facility] contacted patient's ACT team CM to update and  inform her of discharge, and left a VM with ACT team RN.    [facility] also contacted [facility] to inform of admission and discharge plan.  No changes in Clozaril dose were made and therefore, no rx /labs need to be sent.    Discharge Plan or Goal  Home with family today   Resume outpatient care with ACT team     Barriers to Discharge   None     Referral Status  None today     Legal Status     Voluntary- on commitment

## 2022-01-04 NOTE — DISCHARGE INSTRUCTIONS
Behavioral Discharge Planning and Instructions    Summary:   You were admitted to [facility] on with worsening  paranoia and suicidal ideation under the care of Dr. Grimm.  You met with Dr. Grimm and his team daily for ongoing psychiatric assessment and medication management.  You had opportunities to participate in therapeutic groups on the unit.   At this time you report your mood is stabilizing and you report you are not having thoughts or intent to harm yourself or others. You will be discharged home and will resume care with your outpatient providers.  You remain on a civil commitment and are encouraged to follow the conditions of the commitment.    Disposition:  Home    Main Diagnosis:   Schizoaffective Disorder    Health Care Follow-up:   Appointment: Psychiatry: Dr. Gaviria-  ( will schedule an appt.)  [phone]    Appointment:[facility] :   They will continue to monitor your monthly Clozaril prescriptions and blood draws.  They will resume the schedule you were on.     ACT team: Vicky Hankins [phone]  RN: Yo [phone]      Major Treatments, Procedures and Findings:   Medications were  managed throughout your stay. An internal medicine consult was completed during your stay. You had the opportunity to participate in treatment programming while on the unit including occupational therapy, mental health support and education and spiritual services.     Symptoms to Report:   Please report if you are experiencing increased aggression and/or confusion, problematic loss of sleep, worsening mood, or thoughts of suicide to your treatment team or notify your primary provider.   IF THE SYMPTOMS YOU ARE EXPERIENCING ARE A MEDICAL EMERGENCY, CALL 911 IMMEDIATELY    Lifestyle Adjustment:   1. Adjust your lifestyle to get enough sleep, relaxation, exercise and good nutrition.  Continue to develop healthy coping skills to decrease stress and promote a healthy and sober  "lifestyle.  2. Abstain from all substances of abuse.  3. Take medications as prescribed.  Please work with your doctor to discuss any concerns you have with your medications or side effects you may be experiencing.  4. Follow up with appointments as scheduled.      Resources:   *[facility]: COPE: ([phone]) 24 hour mobile crisis support for people having a mental health crisis in [facility].   *[facility]ute Psychiatric Services ([phone]). 24-hour walk-in crisis psychiatric support at [facility]; [facility] available 7:30am - 2:00pm  *[facility]: Text  \"life\"  to [phone]   A trained crisis counselor will respond immediately  *[facility]: ([phone])  24-hour confidential telephone counseling   *[facility] Emergency Room: [phone]    General Medication Instructions:   See your medication sheet(s) for instructions.   Take all medicines as directed.  Make no changes unless your doctor suggests them.   Go to all your doctor visits.  Be sure to have all your required lab tests. This way, your medicines can be refilled on time.  Do not use any drugs not prescribed by your doctor.    Advance Directives:   Scanned document on file with [employer]? No scanned doc  Is document scanned? Pt states no documents  Honoring Choices Your Rights Handout: Informed and given  Was more information offered? Materials given    The Treatment team has appreciated the opportunity to work with you. If you have any questions or concerns about your recent admission, you can contact the unit which can receive your call 24 hours a day, 7 days a week. They will be able to get in touch with a Provider if needed. The unit number is [phone]    "

## 2022-01-04 NOTE — DISCHARGE SUMMARY
"    Psychiatric Discharge Summary    Hospital Day #5    Nona Finley MRN# [id number]   Age: 41 year old YOB: 1980     Date of Admission:  12/28/2021  Date of Discharge:  1/4/2022  Admitting Physician:  Sofy Valdes MD  Discharge Physician:  Juarez Grimm MD         Event Leading to Hospitalization:   \"Nona Finley is a 41 year old [place] female with a past psychiatric history of Schizoaffective Disorder Depressive type admitted from the  ER on 12/30/2021 due to concern for SI, HI and psychosis without identifiable triggers. She came to the ED voluntarily d/t recommendations by her ACT team. She is MI committed with [facility].     Per ED:   \"Patient with history of schizoaffective disorder who is here as she is having breakthrough psychosis with command hallucinations to kill herself and harm her kids. She would like to be admitted for stabilization. She is referred for admission. She is voluntary.\"     Per Patient:  Nona Finley reports that since two weeks ago she has been experiencing symptoms including increased commanding auditory hallucinations in the form of voices telling her to go to the river and jump off a bridge. She also mentions that voices have been telling her to harm her children which she finds extremely disturbing. She has been seeing scary human faces as well. She reports last being well 2 weeks ago. Patient had not been eating or drinking for three to four days PTA as the voices were telling her the food and water contain marijuana. She has been forcing herself to eat since last evening when she arrived in the ED. She could not attribute her symptoms & decompensation to any specific cause as she has been adherent to her medications as prescribed, has not started any new supplement or medication. She received her MORENO abilify and Haldol yesterday before presentation She denies any recent psychosocial stressors and denies any substance use. She is visited by the ACT team weekly " "as part of her commitment plan. She also mentions that she has been feeling \"down\" for the past few weeks.      She said that she does not know what the plan was for medications from her ACT team. \"       See Admission note by Dr. Keisha MD on 12/30/21 for additional details.          Diagnoses:   #Primary Psychiatric Diagnosis  Schizoaffective Disorder, Depressive subtype    #Secondary Psychiatric Diagnosis    Unspecified anxiety disorder moderate    Diagnostic Impression:   Nona Finley is a 41 year old Spanish female with a past psychiatric history of schizoaffective disorder depressive type who presented to the ER with SI, HI and psychosis. Significant symptoms on admission included SI, depressed, sleep issues, psychosis and disordered eating. Her last psychiatric hospitalization was in 08/2021.  She is currently followed by Dr. Walter Gaviria, [facility] team. Psychosocial stressors include family issues. She has been coping with by withdrawing.  Patient's support system includes family and outpatient team.  Substance use does not appear to be playing a contributing role in the patient's presentation.  There is genetic loading for psychosis. Medical history does not appear to be significant. The MSE is notable for depressed mood, sad affect, SI with plan, commanding AH and disturbing VH. She end denies  self injurious behaviors. She does not seem to have any traits of personality disorders  interfering with her ability to adhere with the treatment plan.      Her reported symptoms of depressed mood, hopelessness, SI, AH,VH are consistent with her historic diagnosis of schizoaffective disorder. Optimization of medications to target these symptom clusters in addition to evaluation of adquate outpatient supports will be targets for treatment during this admission.          Consults:   None         Hospital Course:   Psychiatric Course:  Nona Finley was admitted to station 20 as a voluntary patient (who is " "under commitment) and was treated in the therapeutic milieu with appropriate individual and group therapies. Initially endorsing auditory hallucinations, voices telling her to jump off a bridge. Also had some paranoia suspecting food on the unit was poisoned with marijuana. Evening haldol increased to 7.5 mg at bedtime. Trazodone 50mg at bedtime scheduled at night. Haldol 2mg bid prn added for voices. Patient felt better with these changes and denied side effects. Appeared more organized and calm on 1/3/21, at which time team and patient discussed anticipating discharge the following day.      On the day of discharge Nona reported that voices had disappeared. She said she is feeling \"fine\" about leaving the hospital. She said she will take care of her children when she goes home. She said she misses her children. She denied all physical symptoms. She reported that her appetite and sleep is okay. Mood \"good\". She denied suicidal and homicidal thoughts. She said she will call the ACT team if she has any thoughts or experiences hallucinations, come to ED if severe. She expressed understanding of her future appointments, nursing visits and doctor visits.     Medical Course:  She was medically cleared by the ED prior to admission to the unit. Did endorse occasional dizziness which was not new to her, and she was noted to have low blood pressure during these spells. Dizziness improved with rest and oral fluids. No other medical concerns arose.     Risk Assessment:      Today Nona Finley denies SI, SIB and HI. No overt evidence of psychosis or david observed. Patient grossly appears to be cognitively intact. Insight and judgement have improved since admission. Patient denies hallucinations.  Patient is aware of what to do if she were to experience them . Patient expresses understanding of her new prn medications and willingness to utilize them. Patient has not exhibited aggressive or violence behaviors since prior to " this admission. Throughout patient's stay they were absent.  She has notable risk factors for self-harm, including psychosis. However, risk is mitigated by commitment to family and history of seeking help when needed. Patient /does not have immediate access to firearms. Therefore, based on all available evidence including the factors cited above, she does not appear to be at imminent risk for self-harm, does not meet criteria for a 72-hr hold, and therefore remains appropriate for ongoing outpatient level of care.  Patient agreed to further reduce risk of self-harm by reaching out to ACT team and agreed to remain medication adherent. Additional steps taken to minimize risk include: medication optimization, close psychiatric follow up and provision of crisis resources provided.  Patient expressed understanding of risk associated with not keeping up with appointments and not taking medications including increased risk of harm to self or others.     Nona was released to home. During this admission, she did participate in groups and was visible in the milieu, and her symptoms of psychosis improved. At the time of discharge she was determined to not be a danger to herself or others.     This document serves as a transfer of care to Nona Finley's outpatient providers.         Discharge Medications:     Current Discharge Medication List      START taking these medications    Details   traZODone (DESYREL) 50 MG tablet Take 1 tablet (50 mg) by mouth At Bedtime  Qty: 30 tablet, Refills: 0    Associated Diagnoses: Schizoaffective disorder, bipolar type (H); Hallucinations; Acute exacerbation of chronic schizoaffective schizophrenia (H); Auditory hallucinations         CONTINUE these medications which have CHANGED    Details   !! haloperidol (HALDOL) 0.5 MG tablet Take 15 tablets (7.5 mg) by mouth At Bedtime  Qty: 450 tablet, Refills: 0    Associated Diagnoses: Schizoaffective disorder, bipolar type (H); Hallucinations;  Acute exacerbation of chronic schizoaffective schizophrenia (H); Auditory hallucinations      !! haloperidol (HALDOL) 2 MG tablet Take 1 tablet (2 mg) by mouth 2 times daily as needed for other (voices)  Qty: 60 tablet, Refills: 0    Associated Diagnoses: Schizoaffective disorder, bipolar type (H); Hallucinations; Acute exacerbation of chronic schizoaffective schizophrenia (H); Auditory hallucinations       !! - Potential duplicate medications found. Please discuss with provider.      CONTINUE these medications which have NOT CHANGED    Details   ABILIFY MAINTENA 400 MG extended release injection Inject 2 mLs (400 mg) into the muscle every 28 days Due on 3/25  Qty: 2 mL, Refills: 0    Associated Diagnoses: Schizoaffective disorder, depressive type (H)      alum & mag hydroxide-simethicone (MAALOX) 200-200-20 MG/5ML SUSP suspension Take 30 mLs by mouth every 4 hours as needed for indigestion  Qty: 335 mL, Refills: 0    Associated Diagnoses: Drug-induced constipation      !! cloZAPine (FAZACLO) 100 MG ODT Take 300 mg by mouth daily Take with two 25mg tablets for a total daily dose of 350mg.      !! cloZAPine (FAZACLO) 25 MG ODT Take 50 mg by mouth daily Take with three 100mg tablets for a total daily dose of 350mg.      docusate sodium (COLACE) 100 MG capsule Take 1 capsule (100 mg) by mouth 2 times daily  Qty: 30 capsule, Refills: 0    Associated Diagnoses: Drug-induced constipation      melatonin 5 MG tablet Take 1 tablet (5 mg) by mouth At Bedtime  Qty: 30 tablet, Refills: 0    Associated Diagnoses: Insomnia due to other mental disorder       !! - Potential duplicate medications found. Please discuss with provider.               Psychiatric Examination:   Appearance:  no apparent distress, normal posture, well-developed, well-nourished and good hygiene  Attitude:  cooperative, engaged and pleasant  Psychomotor:  normal and no evidence of tics, dystonia, or tardive dyskinesia  Eye Contact: appropriate  Speech:   "fluent English, normal tone and normal rate  Mood: \"good\"  Affect:  congruent, appropriate and euthymic  Thought Content: denies suicidal ideation, denies homicidal ideation and no reported delusions  Thought Process: linear, coherent and goal directed  Sensorium: awake and alert  Cognition: memory grossly intact  Impulse control: good  Insight: fair  Judgment: good         Discharge Plan:   Health Care Follow-up:   Appointment: Psychiatry: Dr. Gaviria-  ( will schedule an appt.)  [facility] [phone]     Appointment: [facility] :   They will continue to monitor your monthly Clozaril prescriptions and blood draws.  They will resume the schedule you were on.     ACT team: Vicky Hankins [phone]  RN: Yo [phone]    Pt seen and discussed with my attending physician, Dr. Juarez Grimm MD.   Walter Blancas MD, PhD  PGY-2 Psychiatry Resident    Attestation:   The patient has been seen and evaluated by me,  Juarez Grimm MD. I have examined the patient today and reviewed the discharge plan with the resident and medical student. I agree with the final assessment and plan, as noted in the discharge summary. I have reviewed today's vital signs, medications, labs and imaging.  Total time discharge plannin minutes  Juarez Grimm MD ,Ph.D.            Appendix A: All Labs This Admission:     Results for orders placed or performed during the hospital encounter of 21   HCG qualitative urine     Status: Normal   Result Value Ref Range    hCG Urine Qualitative Negative Negative   Asymptomatic COVID-19 Virus (Coronavirus) by PCR Oropharynx     Status: Normal    Specimen: Oropharynx; Swab   Result Value Ref Range    SARS CoV2 PCR Negative Negative    Narrative    Testing was performed using the arash  SARS-CoV-2 & Influenza A/B Assay on the arash  [employer]  System.  This test should be ordered for the detection of SARS-COV-2 in individuals who meet SARS-CoV-2 clinical and/or " epidemiological criteria. Test performance is unknown in asymptomatic patients.  This test is for in vitro diagnostic use under the FDA EUA for laboratories certified under CLIA to perform moderate and/or high complexity testing. This test has not been FDA cleared or approved.  A negative test does not rule out the presence of PCR inhibitors in the specimen or target RNA in concentration below the limit of detection for the assay. The possibility of a false negative should be considered if the patient's recent exposure or clinical presentation suggests COVID-19.  [facility] are certified under the Clinical Laboratory Improvement Amendments of 1988 (CLIA-88) as qualified to perform moderate and/or high complexity laboratory testing.   Drug abuse screen 1 urine (ED)     Status: Normal   Result Value Ref Range    Amphetamines Urine Screen Negative Screen Negative    Barbiturates Urine Screen Negative Screen Negative    Benzodiazepines Urine Screen Negative Screen Negative    Cannabinoids Urine Screen Negative Screen Negative    Cocaine Urine Screen Negative Screen Negative    Opiates Urine Screen Negative Screen Negative   Comprehensive metabolic panel     Status: Abnormal   Result Value Ref Range    Sodium 140 133 - 144 mmol/L    Potassium 3.7 3.4 - 5.3 mmol/L    Chloride 108 94 - 109 mmol/L    Carbon Dioxide (CO2) 27 20 - 32 mmol/L    Anion Gap 5 3 - 14 mmol/L    Urea Nitrogen 19 7 - 30 mg/dL    Creatinine 0.56 0.52 - 1.04 mg/dL    Calcium 9.0 8.5 - 10.1 mg/dL    Glucose 128 (H) 70 - 99 mg/dL    Alkaline Phosphatase 63 40 - 150 U/L    AST 21 0 - 45 U/L    ALT 22 0 - 50 U/L    Protein Total 7.5 6.8 - 8.8 g/dL    Albumin 3.2 (L) 3.4 - 5.0 g/dL    Bilirubin Total 0.1 (L) 0.2 - 1.3 mg/dL    GFR Estimate >90 >60 mL/min/1.73m2   TSH with free T4 reflex     Status: Normal   Result Value Ref Range    TSH 1.52 0.40 - 4.00 mU/L   CBC with platelets and differential     Status: Abnormal   Result Value Ref  Range    WBC Count 8.3 4.0 - 11.0 10e3/uL    RBC Count 3.82 3.80 - 5.20 10e6/uL    Hemoglobin 10.9 (L) 11.7 - 15.7 g/dL    Hematocrit 33.9 (L) 35.0 - 47.0 %    MCV 89 78 - 100 fL    MCH 28.5 26.5 - 33.0 pg    MCHC 32.2 31.5 - 36.5 g/dL    RDW 14.6 10.0 - 15.0 %    Platelet Count 257 150 - 450 10e3/uL    % Neutrophils 56 %    % Lymphocytes 32 %    % Monocytes 8 %    % Eosinophils 3 %    % Basophils 1 %    % Immature Granulocytes 0 %    NRBCs per 100 WBC 0 <1 /100    Absolute Neutrophils 4.7 1.6 - 8.3 10e3/uL    Absolute Lymphocytes 2.7 0.8 - 5.3 10e3/uL    Absolute Monocytes 0.7 0.0 - 1.3 10e3/uL    Absolute Eosinophils 0.2 0.0 - 0.7 10e3/uL    Absolute Basophils 0.1 0.0 - 0.2 10e3/uL    Absolute Immature Granulocytes 0.0 <=0.4 10e3/uL    Absolute NRBCs 0.0 10e3/uL   CBC with platelets and differential     Status: Abnormal   Result Value Ref Range    WBC Count 5.8 4.0 - 11.0 10e3/uL    RBC Count 3.89 3.80 - 5.20 10e6/uL    Hemoglobin 11.1 (L) 11.7 - 15.7 g/dL    Hematocrit 34.1 (L) 35.0 - 47.0 %    MCV 88 78 - 100 fL    MCH 28.5 26.5 - 33.0 pg    MCHC 32.6 31.5 - 36.5 g/dL    RDW 14.4 10.0 - 15.0 %    Platelet Count 260 150 - 450 10e3/uL    % Neutrophils 48 %    % Lymphocytes 36 %    % Monocytes 9 %    % Eosinophils 6 %    % Basophils 1 %    % Immature Granulocytes 0 %    NRBCs per 100 WBC 0 <1 /100    Absolute Neutrophils 2.8 1.6 - 8.3 10e3/uL    Absolute Lymphocytes 2.1 0.8 - 5.3 10e3/uL    Absolute Monocytes 0.5 0.0 - 1.3 10e3/uL    Absolute Eosinophils 0.4 0.0 - 0.7 10e3/uL    Absolute Basophils 0.0 0.0 - 0.2 10e3/uL    Absolute Immature Granulocytes 0.0 <=0.4 10e3/uL    Absolute NRBCs 0.0 10e3/uL   EKG 12-lead, complete     Status: None   Result Value Ref Range    Systolic Blood Pressure  mmHg    Diastolic Blood Pressure  mmHg    Ventricular Rate 92 BPM    Atrial Rate 92 BPM    NH Interval 142 ms    QRS Duration 72 ms     ms    QTc 425 ms    P Axis 79 degrees    R AXIS 73 degrees    T Axis 58 degrees     Interpretation ECG       Sinus rhythm  Biatrial enlargement  Abnormal ECG  When compared with ECG of 12-FEB-2021 09:46,  No significant change was found  Confirmed by MD NORY, RANDOLPH ([id number]) on 12/30/2021 10:38:39 PM     Urine Drugs of Abuse Screen     Status: Normal    Narrative    The following orders were created for panel order Urine Drugs of Abuse Screen.  Procedure                               Abnormality         Status                     ---------                               -----------         ------                     Drug abuse screen 1 urin...[[id number]]  Normal              Final result                 Please view results for these tests on the individual orders.   CBC with platelets differential     Status: Abnormal    Narrative    The following orders were created for panel order CBC with platelets differential.  Procedure                               Abnormality         Status                     ---------                               -----------         ------                     CBC with platelets and d...[[id number]]  Abnormal            Final result                 Please view results for these tests on the individual orders.   CBC with Platelets & Differential     Status: Abnormal    Narrative    The following orders were created for panel order CBC with Platelets & Differential.  Procedure                               Abnormality         Status                     ---------                               -----------         ------                     CBC with platelets and d...[[id number]]  Abnormal            Final result                 Please view results for these tests on the individual orders.

## 2022-01-04 NOTE — PLAN OF CARE
Pt has been intermittently visible in the milieu, she tends to be withdrawn to self. She is pleasant upon approach and presents with a full range affect. She denies AH/VH and SI/SIB, states she is no longer having thoughts of hurting herself or her children. Pt states that she is no longer having thoughts that her food is being poisoned. She identifies her  as her primary support person, states she is looking forward to going home today.       Problem: Suicidal Behavior  Goal: Suicidal Behavior is Absent or Managed  Outcome: Adequate for Discharge     Problem: Behavioral Health Plan of Care  Goal: Plan of Care Review  Outcome: Adequate for Discharge  Flowsheets (Taken 1/4/2022 1000)  Plan of Care Reviewed With: patient  Patient Agreement with Plan of Care: agrees

## 2022-01-06 ENCOUNTER — TELEPHONE (OUTPATIENT)
Dept: FAMILY MEDICINE | Facility: CLINIC | Age: 42
End: 2022-01-06
Payer: COMMERCIAL

## 2022-01-06 NOTE — TELEPHONE ENCOUNTER
MTM referral from: Transitions of Care (recent hospital discharge or ED visit)    MTM referral outreach attempt #2 on January 6, 2022 at 2:24 PM      Outcome: Patient declined, will route to MTM Pharmacist/Provider as an FYI.  [facility] scheduling number is [phone].  Thank you for the referral.    Catarino Jeffers, MT coordinator

## 2022-01-09 ENCOUNTER — HEALTH MAINTENANCE LETTER (OUTPATIENT)
Age: 42
End: 2022-01-09

## 2022-01-10 ENCOUNTER — TELEPHONE (OUTPATIENT)
Dept: BEHAVIORAL HEALTH | Facility: CLINIC | Age: 42
End: 2022-01-10
Payer: COMMERCIAL

## 2022-01-10 ENCOUNTER — HOSPITAL ENCOUNTER (INPATIENT)
Facility: CLINIC | Age: 42
LOS: 11 days | Discharge: HOME | DRG: 885 | End: 2022-01-21
Attending: EMERGENCY MEDICINE | Admitting: PSYCHIATRY & NEUROLOGY
Payer: COMMERCIAL

## 2022-01-10 DIAGNOSIS — Z11.52 ENCOUNTER FOR SCREENING LABORATORY TESTING FOR SEVERE ACUTE RESPIRATORY SYNDROME CORONAVIRUS 2 (SARS-COV-2): ICD-10-CM

## 2022-01-10 DIAGNOSIS — F25.1 SCHIZOAFFECTIVE DISORDER, DEPRESSIVE TYPE (H): ICD-10-CM

## 2022-01-10 DIAGNOSIS — R44.0 AUDITORY HALLUCINATIONS: ICD-10-CM

## 2022-01-10 DIAGNOSIS — R44.3 HALLUCINATIONS: ICD-10-CM

## 2022-01-10 DIAGNOSIS — F25.1 SCHIZOAFFECTIVE DISORDER, DEPRESSIVE TYPE (H): Chronic | ICD-10-CM

## 2022-01-10 DIAGNOSIS — F51.01 PRIMARY INSOMNIA: ICD-10-CM

## 2022-01-10 DIAGNOSIS — F25.9 ACUTE EXACERBATION OF CHRONIC SCHIZOAFFECTIVE SCHIZOPHRENIA (H): ICD-10-CM

## 2022-01-10 DIAGNOSIS — R45.850 HOMICIDAL IDEATION: Primary | ICD-10-CM

## 2022-01-10 DIAGNOSIS — F25.0 SCHIZOAFFECTIVE DISORDER, BIPOLAR TYPE (H): ICD-10-CM

## 2022-01-10 DIAGNOSIS — R45.851 SUICIDAL IDEATION: ICD-10-CM

## 2022-01-10 LAB — SARS-COV-2 RNA RESP QL NAA+PROBE: NEGATIVE

## 2022-01-10 PROCEDURE — 90791 PSYCH DIAGNOSTIC EVALUATION: CPT

## 2022-01-10 PROCEDURE — 87635 SARS-COV-2 COVID-19 AMP PRB: CPT | Performed by: EMERGENCY MEDICINE

## 2022-01-10 PROCEDURE — 99285 EMERGENCY DEPT VISIT HI MDM: CPT | Mod: 25 | Performed by: EMERGENCY MEDICINE

## 2022-01-10 PROCEDURE — 250N000013 HC RX MED GY IP 250 OP 250 PS 637

## 2022-01-10 PROCEDURE — 124N000002 HC R&B MH UMMC

## 2022-01-10 PROCEDURE — C9803 HOPD COVID-19 SPEC COLLECT: HCPCS | Performed by: EMERGENCY MEDICINE

## 2022-01-10 PROCEDURE — 99285 EMERGENCY DEPT VISIT HI MDM: CPT | Performed by: EMERGENCY MEDICINE

## 2022-01-10 RX ORDER — LANOLIN ALCOHOL/MO/W.PET/CERES
3 CREAM (GRAM) TOPICAL
Status: DISCONTINUED | OUTPATIENT
Start: 2022-01-10 | End: 2022-01-21 | Stop reason: HOSPADM

## 2022-01-10 RX ORDER — OLANZAPINE 10 MG/1
10 TABLET ORAL 3 TIMES DAILY PRN
Status: DISCONTINUED | OUTPATIENT
Start: 2022-01-10 | End: 2022-01-21 | Stop reason: HOSPADM

## 2022-01-10 RX ORDER — CLOZAPINE 50 MG/1
50 TABLET ORAL AT BEDTIME
Status: DISCONTINUED | OUTPATIENT
Start: 2022-01-10 | End: 2022-01-10 | Stop reason: CLARIF

## 2022-01-10 RX ORDER — GABAPENTIN 100 MG/1
100 CAPSULE ORAL EVERY 6 HOURS PRN
Status: DISCONTINUED | OUTPATIENT
Start: 2022-01-10 | End: 2022-01-14

## 2022-01-10 RX ORDER — MAGNESIUM HYDROXIDE/ALUMINUM HYDROXICE/SIMETHICONE 120; 1200; 1200 MG/30ML; MG/30ML; MG/30ML
30 SUSPENSION ORAL EVERY 4 HOURS PRN
Status: DISCONTINUED | OUTPATIENT
Start: 2022-01-10 | End: 2022-01-21 | Stop reason: HOSPADM

## 2022-01-10 RX ORDER — TRAZODONE HYDROCHLORIDE 50 MG/1
50 TABLET, FILM COATED ORAL AT BEDTIME
Status: DISCONTINUED | OUTPATIENT
Start: 2022-01-10 | End: 2022-01-21 | Stop reason: HOSPADM

## 2022-01-10 RX ORDER — DOCUSATE SODIUM 100 MG/1
100 CAPSULE, LIQUID FILLED ORAL 2 TIMES DAILY
Status: DISCONTINUED | OUTPATIENT
Start: 2022-01-10 | End: 2022-01-21 | Stop reason: HOSPADM

## 2022-01-10 RX ORDER — HALOPERIDOL 5 MG/1
7.5 TABLET ORAL AT BEDTIME
Status: ON HOLD | COMMUNITY
End: 2022-01-20

## 2022-01-10 RX ORDER — ACETAMINOPHEN 325 MG/1
650 TABLET ORAL EVERY 4 HOURS PRN
Status: DISCONTINUED | OUTPATIENT
Start: 2022-01-10 | End: 2022-01-21 | Stop reason: HOSPADM

## 2022-01-10 RX ORDER — OLANZAPINE 10 MG/2ML
10 INJECTION, POWDER, FOR SOLUTION INTRAMUSCULAR 3 TIMES DAILY PRN
Status: DISCONTINUED | OUTPATIENT
Start: 2022-01-10 | End: 2022-01-21 | Stop reason: HOSPADM

## 2022-01-10 RX ORDER — POLYETHYLENE GLYCOL 3350 17 G/17G
17 POWDER, FOR SOLUTION ORAL DAILY PRN
Status: DISCONTINUED | OUTPATIENT
Start: 2022-01-10 | End: 2022-01-21 | Stop reason: HOSPADM

## 2022-01-10 RX ORDER — HALOPERIDOL 2 MG/1
2 TABLET ORAL 2 TIMES DAILY PRN
Status: DISCONTINUED | OUTPATIENT
Start: 2022-01-10 | End: 2022-01-21 | Stop reason: HOSPADM

## 2022-01-10 RX ADMIN — TRAZODONE HYDROCHLORIDE 50 MG: 50 TABLET ORAL at 22:43

## 2022-01-10 RX ADMIN — CLOZAPINE 350 MG: 100 TABLET ORAL at 23:01

## 2022-01-10 RX ADMIN — Medication 7.5 MG: at 22:43

## 2022-01-10 RX ADMIN — DOCUSATE SODIUM 100 MG: 100 CAPSULE, LIQUID FILLED ORAL at 22:43

## 2022-01-10 ASSESSMENT — ENCOUNTER SYMPTOMS
ARTHRALGIAS: 0
DIFFICULTY URINATING: 0
HEADACHES: 0
NECK STIFFNESS: 0
CONFUSION: 0
EYE REDNESS: 0
HALLUCINATIONS: 1
COLOR CHANGE: 0
FEVER: 0
ABDOMINAL PAIN: 0
SHORTNESS OF BREATH: 0

## 2022-01-10 ASSESSMENT — ACTIVITIES OF DAILY LIVING (ADL)
TOILETING_ISSUES: NO
DRESSING/BATHING_DIFFICULTY: NO
FALL_HISTORY_WITHIN_LAST_SIX_MONTHS: NO
DIFFICULTY_EATING/SWALLOWING: NO
PATIENT_/_FAMILY_COMMUNICATION_STYLE: SPOKEN LANGUAGE (ENGLISH OR BILINGUAL)
DOING_ERRANDS_INDEPENDENTLY_DIFFICULTY: NO
CONCENTRATING,_REMEMBERING_OR_MAKING_DECISIONS_DIFFICULTY: NO
HEARING_DIFFICULTY_OR_DEAF: NO
WEAR_GLASSES_OR_BLIND: NO
WALKING_OR_CLIMBING_STAIRS_DIFFICULTY: NO
DIFFICULTY_COMMUNICATING: NO

## 2022-01-10 NOTE — ED NOTES
Check in Pt and Pt asking when she was going to get and in Pt bed.  Pt informed it was hard to tell but we have Pt that have been in the ED since yesterday waiting for an in Pt bed.  Pt stated she was hearing voices.  Pt stated the voice where telling her to hurt herself.  Pt when asked if she could contract for safety in the ED stated she felt very safe here and wouldn't hurt herself.

## 2022-01-10 NOTE — SAFE
Nona Finley  January 10, 2022  SAFE Note    Critical Safety Issues: Command hallucinations to kill herself and her children.       Current Suicidal Ideation/Self-Injurious Concerns/Methods: Jumping (from building, into traffic, etc.)      Current or Historical Inappropriate Sexual Behavior: No      Current or Historical Aggression/Homicidal Ideation: None - N/A    Updated care team: Yes: RN, MD, Intake    For additional details see full [facility] assessment.       RBEANNA Knight         no

## 2022-01-10 NOTE — ED NOTES
1/10/2022  Nona Finley 1980     [facility] Crisis Assessment    Patient was assessed: in person  Patient location: [facility] Emergency Department       Referral Data and Chief Complaint  Nona is a 41 year old who uses she/her pronouns. Patient presented to the ED other: ACT Team member Francois and was referred to the ED by community provider(s). Patient is presenting to the ED for the following concerns: Command auditory hallucinations to kill herself and her children. Patient was discharged from [facility] on 1/4/2022 for similar complaint. She is currently under commitment and jarvais.      Informed Consent and Assessment Methods    Patient is her own guardian. Writer met with patient and explained the crisis assessment process, including applicable information disclosures and limits to confidentiality, assessed understanding of the process, and obtained consent to proceed with the assessment. Patient was observed to be able to participate in the assessment as evidenced by engagement in assessment and treatment planning.. Assessment methods included conducting a formal interview with patient, review of medical records, collaboration with medical staff, and obtaining relevant collateral information from family and community providers when available.    Narrative Summary of Presenting Problem and Current Functioning  What led to the patient presenting for crisis services, factors that make the crisis life threatening or complex, stressors, how is this disrupting the patient's life, and how current functioning is in comparison to baseline. How is patient presenting during the assessment.     Patient is alert and oriented, she is very tired looking. She is quiet and soft spoken. She states on Saturday her auditory hallucinations returned telling her to jump off a bridge and cut her children to pieces with a knife. She also reports having suicidal thoughts to jump from a bridge or a tall  building. She states she is also having olfactory hallucinations with her food smelling like bleach or blood. She is having a very difficult time eating meat or spaghetti, but was able to eat a banana a apple. Patient reports she called her ACT team last night and they told her to come to the hospital but she did not have . They picked her up today and brought her in. She has not been able to use her coping skill of reading the Koran. She has been taking her PRN Haldol every 4 hours since Saturday. Patient states she does not want to be at the hospital but is voluntary. Validation and support was given for her decision to put her and her childrens safety first.     History of the Crisis  Duration of the current crisis, coping skills attempted to reduce the crisis, community resources used, and past presentations.    Patient reports the voices returned Saturday night.     Collateral Information  NA    Risk Assessment    Risk of Harm to Self     ESS-6  1.a. Over the past 2 weeks, have you had thoughts of killing yourself? Yes  1.b. Have you ever attempted to kill yourself and, if yes, when did this last happen? Yes     2. Recent or current suicide plan? Yes Jump in the river, stab children with knife.   3. Recent or current intent to act on ideation? Yes  4. Lifetime psychiatric hospitalization? Yes  5. Pattern of excessive substance use? No  6. Current irritability, agitation, or aggression? No  Scoring note: BOTH 1a and 1b must be yes for it to score 1 point, if both are not yes it is zero. All others are 1 point per number. If all questions 1a/1b - 6 are no, risk is negligible. If one of 1a/1b is yes, then risk is mild. If either question 2 or 3, but not both, is yes, then risk is automatically moderate regardless of total score. If both 2 and 3 are yes, risk is automatically high regardless of total score.     Score: 5, high risk    The patient has the following risk factors for suicide: depressive  symptoms and psychosis    Is the patient experiencing current suicidal ideation: Yes. Plan: jump into the river Intent No, she knows how to ask for help.     Is the patient engaging in preparatory suicide behaviors (formulating how to act on plan, giving away possessions, saying goodbye, displaying dramatic behavior changes, etc)? No    Does the patient have access to firearms or other lethal means? no    The patient has the following protective factors: established relationship community mental health provider(s), bobby system, displays insight, expresses desire to engage in treatment, sense of obligation to people/pets and safe/stable housing    Support system information: Family, ACT Team    Patient strengths: Patient knows when to ask for help with her psychosis. She has a strong bobby system and commitment to her family.     Does the patient engage in non-suicidal self-injurious behavior (NSSI/SIB)? no    Is the patient vulnerable to sexual exploitation?  No    Is the patient experiencing abuse or neglect? no    Is the patient a vulnerable adult? No      Risk of Harm to Others  The patient has the following risk factors of harm to others: rumination Command auditory hallucinations.     Does the patient have thoughts of harming others? Yes.  Does the patient have a specific victim in mind? Yes Her children Do they have a plan? Yes cut them to pieces with a knife.  Do they have intent? No Is this a duty to warn situation?  no    Is the patient engaging in sexually inappropriate behavior?  no       Current Substance Abuse    Is there recent substance abuse? no    Was a urine drug screen or blood alcohol level obtained: Yes Pending      Current Symptoms/Concerns    Symptoms  Attention, hyperactivity, and impulsivity symptoms present: No    Anxiety symptoms present: No      Appetite symptoms present: Yes: Loss of Appetite     Behavioral difficulties present: No     Cognitive impairment symptoms present:  No    Depressive symptoms present: Yes Depressed mood, Feelings of hopelessness , Impaired concentration, Sleep disturbance  and Thoughts of suicide/death      Eating disorder symptoms present: No    Learning disabilities, cognitive challenges, and/or developmental disorder symptoms present: No     Manic/hypomanic symptoms present: No    Personality and interpersonal functioning difficulties present : No    Psychosis symptoms present: Yes: Hallucinations: Auditory, Command and Olfactory      Sleep difficulties present: Yes: Difficulty staying sleep  and Other: The voices interupt her sleep.    Substance abuse disorder symptoms present: No     Trauma and stressor related symptoms present: No           Mental Status Exam   Affect: Blunted   Appearance: Appropriate    Attention Span/Concentration: Attentive?    Eye Contact: Engaged   Fund of Knowledge: Appropriate    Language /Speech Content: Fluent   Language /Speech Volume: Loud and Normal    Language /Speech Rate/Productions: Articulate    Recent Memory: Intact   Remote Memory: Intact   Mood: Depressed    Orientation to Person: Yes    Orientation to Place: Yes   Orientation to Time of Day: Yes    Orientation to Date: Yes    Situation (Do they understand why they are here?): Yes    Psychomotor Behavior: Normal    Thought Content: Hallucinations   Thought Form: Intact       Mental Health and Substance Abuse History    History  Current and historical diagnoses or mental health concerns:   Patient identified historical diagnoses of schizoaffective disorder depressive type and post partum depression with psychosis. Patient's last medication adjustment during admission last week when she was hospitalized for psychiatric reasons.   Prior MH services (inpatient, programmatic care, outpatient, etc) : Yes  The patient has been hospitalized around numerous times for mental health issues. She is currently followed by the [facility]. The patient's  is Vicky  Juany.    History of substance abuse: No    Prior LUL services (inpatient, programmatic care, detox, outpatient, etc) : No    History of commitment: Yes Patient is currently under commitment and jarvais.    Family history of MH/LUL: No    Trauma history: No    Medication  Psychotropic medications: Yes. Pt is currently taking see below. . Medication compliant: Yes. Recent medication changes: Yes during recent admission.    Primary Care Provider: Yes, [facility]  Psychiatrist: Yes, Dr. Walter Gaviria, [facility]  Therapist: No  : Yes, Vickyines Harrington, [phone] [facility]   CTSS or ARMHS: No  ACT Team: Yes, [facility]  Other: No    Release of Information  Was a release of information signed: Yes. Providers included on the release: all above      Biopsychosocial Information    Socioeconomic Information  Current living situation: The patient is originally from [place].  She stated that most of her family is still in [place].  Here in [place], she relies on help of the family of her .   currently lives in [place].  He is working on his residency in Internal Medicine. The patient stated even though she receives support from her mother ans sister in- law, she often feel overwhelmed and stressed.  The patient is unemployed she is taking care of her 6 children. Patient reported one of her children has ASD.      Employment/income source:     Relevant legal issues: NA    Cultural, Mormonism, or spiritual influences on mental health care: Patient identified as Buddhist and stated her bobby is a significant protective factor.     Is the patient active in the  or a : No      Relevant Medical Concerns   Patient identifies concerns with completing ADLs? No     Patient can ambulate independently? Yes     Other medical concerns? No     History of concussion or TBI? No        Diagnosis    295.70  (F25.1) Schizoaffective Disorder Depressive Type - by history        Therapeutic Intervention  The following therapeutic methodologies were employed when working with the patient: establishing rapport, active listening, assessing dimensions of crisis and identifying additional supports and alternative coping skills. Patient response to intervention: engaged.      Disposition  Recommended disposition: Inpatient Mental Health      Reviewed case and recommendations with attending provider. Attending Name: Dr. Chiu       Attending concurs with disposition: Yes      Patient concurs with disposition: Yes      Guardian concurs with disposition: NA     Final disposition: Inpatient mental health .     Inpatient Details (if applicable):  Is patient admitted voluntarily:Yes    Patient aware of potential for transfer if there is not appropriate placement? Yes     Patient is willing to travel outside of the [facility] for placement? No  [facility] Notified? Yes: Date: 1/10/2022 Time: 11:30.       Clinical Substantiation of Recommendations   Rationale with supporting factors for disposition and diagnosis.     Patient returns to the ED with her ACT team due to resumption of command auditory hallucinations telling her that she should kill herself and her children. Patient has a long history of command hallucinations, she is under commitment and jarvais and has an active ACT who checks on her daily for medication reminders. Patient will be re admitted on a voluntary basis.      Assessment Details  Patient interview started at: 11:00 am and completed at: 11:30 am.    Total duration spent on the patient case in minutes: .50 hrs     CPT code(s) utilized: 32404 - Psychotherapy for Crisis - 60 (30-74*) min     BREANNA Knight

## 2022-01-10 NOTE — ED NOTES
Bill from [facility] can be reached at      Primary care coordinator on team is Vicky can be reached at the same number.

## 2022-01-10 NOTE — TELEPHONE ENCOUNTER
S: [facility], DEC calling 11:30am, 41/F, psychosis     B: Pt presenting with reports of AH that started on Saturday and has been telling her to cut her children into pieces and to jump in the river to kill herself. Pt reports the voices are waking her up, she has decreased appetite, she is smelling bleach and blood in her food. Pt has been taking Haldol PRN every 4 hours since Saturday. Pt act team brought her in to be assessed. Pt just discharged from  on the 4th. Pt does not need and .     A: voluntary for admission, Pt to be admitted to [facility] ONLY. medically cleared, pt us under commitment and Reyes. COVID and Utox pending.     R: Patient cleared and ready for behavioral bed placement: Yes   Pt placed on waitlist. NO [place] beds available at this time, Pt remains on waitlist pending bed availability.   5:30pm update: no beds available at [facility] at this time. Pt remains on waitlist.

## 2022-01-10 NOTE — ED TRIAGE NOTES
Pt reports having command hallucinations to hurt herself and kids since Saturday. Pt reports recently being hospitalized with this 1 week ago. Pt presents with member of care team Francois.     Pt reports plan to go to river to hurt self denies intention last attempt was 2 weeks ago.     Pt reports plan to stab kids with knife.     Pt calm and cooperative in triage.

## 2022-01-10 NOTE — ED PROVIDER NOTES
"    [facility] EMERGENCY DEPARTMENT ([facility])       1/10/22  History     Chief Complaint   Patient presents with     Hallucinations     Auditory command hallucinations      Suicidal     Homicidal     The history is provided by the patient and medical records.     Nona Finley is a 41 year old female with a past medical history significant for schizophrenia, bipolar 1 disorder, and latent TB who presents to the Emergency Department for evaluation of auditory hallucinations.  Patient reports that she has started to hear \"commanding\" hallucinations on Saturday (1/08) to hurt herself and her children. She reports she has a history of auditory hallucinations in the past. She reports she has been compliant on her medications, however, states they are no longer helping. She states she last took her prescribed medications last night. Patient denies use of any other drugs or substances.     Per review of the patient's medical record, they were recently admitted to the psychiatric unit from 12/28/2021 - 1/4/2022.  Patient endorsed hearing commanding voices telling her to jump off of a bridge.  Patient also had paranoia suspecting food on the unit was poisoned with marijuana.  Patient's evening Haldol was increased to 7.5 mg at bedtime.  Trazodone 50 mg at bedtime scheduled at night.  Haldol 2 mg twice daily as needed added for voices.  Patient felt better with these changes and denied side effects.  Patient was subsequently discharged with recommendation to add trazodone 50 mg to medication regimen.    Past Medical History  Past Medical History:   Diagnosis Date     Encounter for female birth control 6/14/2017 6/14/2017 Plan Documentation Service ordered Depo Provera injection (150mg IM) may be given every 3 months for one year per loretta. Plan and order should be renewed at a visit no later than 6/14/18   Vanessa Thompson MD         Encounter for insertion or removal of intrauterine contraceptive device 1/3/2008 "    Paragard 1/08 removed 1/08.     Postpartum depression 8/18/2011     Schizophrenia (H) 2/12/2019     Suicidal ideation 2/16/2017     Past Surgical History:   Procedure Laterality Date     NO HISTORY OF SURGERY       NO HISTORY OF SURGERY  06/13/2013    Per patient reported this     cloZAPine (FAZACLO) 100 MG ODT  cloZAPine (FAZACLO) 25 MG ODT  haloperidol (HALDOL) 0.5 MG tablet  ABILIFY MAINTENA 400 MG extended release injection  alum & mag hydroxide-simethicone (MAALOX) 200-200-20 MG/5ML SUSP suspension  docusate sodium (COLACE) 100 MG capsule  haloperidol (HALDOL) 2 MG tablet  melatonin 5 MG tablet  traZODone (DESYREL) 50 MG tablet      No Known Allergies  Family History  Family History   Problem Relation Age of Onset     Respiratory Father         asthma     Asthma Father      Cerebrovascular Disease Mother      Diabetes Mother      Neurologic Disorder Brother         mental health problem?     Neurologic Disorder Sister         seizures (half sister)     Respiratory Paternal Grandfather         asthma     Respiratory Sister         asthma     Respiratory Brother         asthma     Neurologic Disorder Daughter         autism      Hypertension Maternal Grandmother      Neurologic Disorder Sister         seizures     Diabetes Maternal Grandfather      Coronary Artery Disease Son      Social History   Social History     Tobacco Use     Smoking status: Never Smoker     Smokeless tobacco: Never Used   Substance Use Topics     Alcohol use: No     Drug use: No      Past medical history, past surgical history, medications, allergies, family history, and social history were reviewed with the patient. No additional pertinent items.     I have reviewed the Medications, Allergies, Past Medical and Surgical History, and Social History in the Epic system.    Review of Systems   Constitutional: Negative for fever.   HENT: Negative for congestion.    Eyes: Negative for redness.   Respiratory: Negative for shortness of breath.     Cardiovascular: Negative for chest pain.   Gastrointestinal: Negative for abdominal pain.   Genitourinary: Negative for difficulty urinating.   Musculoskeletal: Negative for arthralgias and neck stiffness.   Skin: Negative for color change.   Neurological: Negative for headaches.   Psychiatric/Behavioral: Positive for hallucinations (Auditory) and suicidal ideas. Negative for confusion.   All other systems reviewed and are negative.      Physical Exam   BP: 111/76  Pulse: 119  Temp: 97.7  F (36.5  C)  Resp: 16  Weight: 72.6 kg (160 lb)  SpO2: 100 %      Physical Exam  Vitals and nursing note reviewed.   Constitutional:       General: She is not in acute distress.     Appearance: She is not diaphoretic.   HENT:      Head: Atraumatic.      Mouth/Throat:      Pharynx: No oropharyngeal exudate.   Eyes:      General: No scleral icterus.     Pupils: Pupils are equal, round, and reactive to light.   Cardiovascular:      Heart sounds: Normal heart sounds.   Pulmonary:      Effort: No respiratory distress.      Breath sounds: Normal breath sounds.   Abdominal:      General: Bowel sounds are normal.      Palpations: Abdomen is soft.      Tenderness: There is no abdominal tenderness.   Musculoskeletal:         General: No tenderness.   Skin:     General: Skin is warm.      Findings: No rash.   Psychiatric:         Attention and Perception: She perceives auditory hallucinations.         Thought Content: Thought content includes homicidal and suicidal ideation. Thought content includes homicidal and suicidal plan.         ED Course     At 10:42 AM the patient was seen and examined by Shant Chiu MD in Room EDHW03.     Procedures         The medical record was reviewed and interpreted.  Current labs reviewed and interpreted.  Previous labs reviewed and interpreted.      No results found for this or any previous visit (from the past 24 hour(s)).  Medications - No data to display     Assessments & Plan (with Medical Decision  Making)   41-year-old female presents for evaluation of ongoing suicidal and homicidal ideation.  Differential includes mental illness, medication side effect, intoxication, withdrawal, malingering.  Exam revealed ongoing auditory hallucinations, suicidal (plan to jump off bridge) and homicidal ideation with plans to stab children.  The plan was discussed at length with the behavioral , please see separate note.  It was decided that patient would be admitted to mental health for further evaluation and management.  Patient is voluntary and agreeable.    I have reviewed the nursing notes.    I have reviewed the findings, diagnosis, plan and need for follow up with the patient.    New Prescriptions    No medications on file       Final diagnoses:   Homicidal ideation   Suicidal ideation       I, Celestino Hawkins am serving as a trained medical scribe to document services personally performed by Shant Chiu MD, based on the provider's statements to me.      IShant MD, was physically present and have reviewed and verified the accuracy of this note documented by Celestino Hawkins.     Shant Chiu MD  1/10/2022   [facility] EMERGENCY DEPARTMENT     Shant Chiu MD  01/10/22 1525

## 2022-01-10 NOTE — PHARMACY-ADMISSION MEDICATION HISTORY
Admission Medication History Completed by Pharmacy    See [employer] Admission Navigator for allergy information, preferred outpatient pharmacy and prior to admission medications.     Medication History Sources:     Prescription fill history via Epic Surescripts data    [facility] ACT Team staff , JOSEPHINE Powell ([phone])    Chart review; recent hospitalization, discharged on 1/4    Additional Information:    Writer verified all PTA medications with JOSEPHINE Powell from [facility] ACT team.  No medication changes since hospital discharge on 1/4/2022.  Sherry states they fill a weekly pillbox every Monday, and have FaceTime calls with the patient daily or every other day to check-in and ensure med adherence.  Sherry states patient spoke with staff member Francois last night.     Writer spoke with patient via phone in ED who was adamant she took her clozapine last night while talking with Bill via phone.  Pt denies any missed doses.     Prior to Admission medications    Medication Sig Last Dose     cloZAPine (FAZACLO) 100 MG ODT Take 300 mg by mouth daily Take with two 25mg tablets for a total daily dose of 350mg.     1/9/2022     cloZAPine (FAZACLO) 25 MG ODT Take 50 mg by mouth daily Take with three 100mg tablets for a total daily dose of 350mg.     1/9/2022     docusate sodium (COLACE) 100 MG capsule     Take 1 capsule (100 mg) by mouth 2 times daily 1/9/2022     haloperidol (HALDOL) 2 MG tablet Take 1 tablet (2 mg) by mouth 2 times daily as needed for other (voices)     Past Week     haloperidol (HALDOL) 5 MG tablet Take 7.5 mg by mouth At Bedtime     1/9/2022     melatonin 5 MG tablet Take 1 tablet (5 mg) by mouth At Bedtime  Patient taking differently: Take 3 mg by mouth At Bedtime      1/9/2022     traZODone (DESYREL) 50 MG tablet Take 1 tablet (50 mg) by mouth At Bedtime     1/9/2022     ABILIFY MAINTENA 400 MG extended release injection Inject 2 mLs (400 mg) into the muscle every 28 days     12/28/2021     alum & mag  hydroxide-simethicone (MAALOX) 200-200-20 MG/5ML SUSP suspension     Take 30 mLs by mouth every 4 hours as needed for indigestion prn       Date completed: 01/10/22    Medication history completed by:   Karrie Payton, Pharm.D., [facility]  Behavioral Health Inpatient Pharmacist  [facility] ([facility]) Emergency Department  Phone: *[phone] (Artsy) or [phone]

## 2022-01-11 LAB
BASOPHILS # BLD AUTO: 0.1 10E3/UL (ref 0–0.2)
BASOPHILS NFR BLD AUTO: 1 %
EOSINOPHIL # BLD AUTO: 0.3 10E3/UL (ref 0–0.7)
EOSINOPHIL NFR BLD AUTO: 6 %
ERYTHROCYTE [DISTWIDTH] IN BLOOD BY AUTOMATED COUNT: 14.3 % (ref 10–15)
HCT VFR BLD AUTO: 34.5 % (ref 35–47)
HGB BLD-MCNC: 11.4 G/DL (ref 11.7–15.7)
IMM GRANULOCYTES # BLD: 0 10E3/UL
IMM GRANULOCYTES NFR BLD: 0 %
LYMPHOCYTES # BLD AUTO: 1.8 10E3/UL (ref 0.8–5.3)
LYMPHOCYTES NFR BLD AUTO: 32 %
MCH RBC QN AUTO: 28.9 PG (ref 26.5–33)
MCHC RBC AUTO-ENTMCNC: 33 G/DL (ref 31.5–36.5)
MCV RBC AUTO: 88 FL (ref 78–100)
MONOCYTES # BLD AUTO: 0.5 10E3/UL (ref 0–1.3)
MONOCYTES NFR BLD AUTO: 8 %
NEUTROPHILS # BLD AUTO: 3 10E3/UL (ref 1.6–8.3)
NEUTROPHILS NFR BLD AUTO: 53 %
NRBC # BLD AUTO: 0 10E3/UL
NRBC BLD AUTO-RTO: 0 /100
PLATELET # BLD AUTO: 349 10E3/UL (ref 150–450)
RBC # BLD AUTO: 3.94 10E6/UL (ref 3.8–5.2)
WBC # BLD AUTO: 5.7 10E3/UL (ref 4–11)

## 2022-01-11 PROCEDURE — 250N000013 HC RX MED GY IP 250 OP 250 PS 637: Performed by: PSYCHIATRY & NEUROLOGY

## 2022-01-11 PROCEDURE — 85025 COMPLETE CBC W/AUTO DIFF WBC: CPT | Performed by: PSYCHIATRY & NEUROLOGY

## 2022-01-11 PROCEDURE — 99223 1ST HOSP IP/OBS HIGH 75: CPT | Mod: AI | Performed by: PSYCHIATRY & NEUROLOGY

## 2022-01-11 PROCEDURE — 250N000013 HC RX MED GY IP 250 OP 250 PS 637

## 2022-01-11 PROCEDURE — 124N000002 HC R&B MH UMMC

## 2022-01-11 PROCEDURE — 36415 COLL VENOUS BLD VENIPUNCTURE: CPT | Performed by: PSYCHIATRY & NEUROLOGY

## 2022-01-11 RX ADMIN — TRAZODONE HYDROCHLORIDE 50 MG: 50 TABLET ORAL at 20:03

## 2022-01-11 RX ADMIN — DOCUSATE SODIUM 100 MG: 100 CAPSULE, LIQUID FILLED ORAL at 08:54

## 2022-01-11 RX ADMIN — OLANZAPINE 10 MG: 10 TABLET, FILM COATED ORAL at 16:43

## 2022-01-11 RX ADMIN — Medication 7.5 MG: at 20:01

## 2022-01-11 RX ADMIN — DOCUSATE SODIUM 100 MG: 100 CAPSULE, LIQUID FILLED ORAL at 20:03

## 2022-01-11 RX ADMIN — CLOZAPINE 350 MG: 50 TABLET ORAL at 20:24

## 2022-01-11 ASSESSMENT — ACTIVITIES OF DAILY LIVING (ADL)
ORAL_HYGIENE: INDEPENDENT
HYGIENE/GROOMING: INDEPENDENT
DRESS: INDEPENDENT

## 2022-01-11 NOTE — PROGRESS NOTES
"SPIRITUAL HEALTH SERVICES  SPIRITUAL ASSESSMENT Progress Note  [facility] ([facility]) 22NB       REFERRAL SOURCE: Self initiated  visit, Nondenominational specific.     DATA: Pt Nona Finley identifies as Nondenominational and is of [place] descent.     I introduced myself to Nona as the Lead Nondenominational  and oriented her to [facility].     She stated that, \"I was unable to sleep last night because I keep hearing voices\".     Nona also shared that she is experiencing some sadness due to her  being away in [place] for a residency. She stated that her in-laws help her with her children at home but that it is still very difficult living without her  at home.     She expressed some hope in reuniting with him in May, as he will be finished with the residency and hopes that he will find employment locally.     Nona welcomed Islamic incantations/prayer at bedside. We prayed together at her request. I also provided her with an Islamic prayer booklet and card with a Prophetic supplication.       PLAN: I will follow up with Nona for the duration of her stay. [facility] is available to Nona per request.     Brown Todd  Lead Nondenominational   Pager [phone]    [facility] remains available 24/7 for emergent requests/referrals, either by having the switchboard page the on-call  or by entering an ASAP/STAT consult in Epic (this will also page the on-call ).    "

## 2022-01-11 NOTE — PLAN OF CARE
Problem: Additional Goals  Goal: BEH OT Goal 1  Description: Will attend OT groups and participate actively in all OT opportunities. Will assess and set goals.    Pt has not attended scheduled occupational therapy sessions. Encourage attendance and participation. Pt will be given self-assessment form, and OT staff will explain the purpose of including them in their treatment plan and offer options for meeting their needs.

## 2022-01-11 NOTE — PLAN OF CARE
Problem: Behavior Regulation Impairment (Psychotic Signs/Symptoms)  Goal: Improved Behavioral Control (Psychotic Signs/Symptoms)  Outcome: No Change   Pt was c/o voices talking to her but no specifics so this RN offered pt zyprexa 10 mg po at 1645. She has spent most of the evening in her room. Did eat supper. We will assess in a bit if the medicine has helped.  She settled well at HS.

## 2022-01-11 NOTE — PLAN OF CARE
Problem: Sleep Disturbance (Psychotic Signs/Symptoms)  Goal: Improved Sleep (Psychotic Signs/Symptoms)  Outcome: No Change or declining   Pt is anxious and paranoid. Pt reported experiencing auditory and olfactory hallucinations when asked by a writer. She reported that she woke up twice due to hearing voices while asleep. Pt reported she also experiences visual hallucination mostly during night time. Pt reported she hears voices to kill herself and also her children but declined any plan or any thoughts to take an action. Pt said she reads Quran prayer book to managed and stop from taking any bad action. Denies any pain or other concern when asked by a writer. Pt had blood lab drawn this morning. No PRN med given.

## 2022-01-11 NOTE — PLAN OF CARE
"Admission Note     Pt admitted from ER following presenting with AH telling her to kill her children and to jump off of a bridge. Pt had similar presentation when recently hospitalized on [facility] from 12/28 to 1/4. Pt states she has no intent of HI or SI and does not believe she would harm herself or her children, but that command hallucinations are distressing. Pt also experiencing VH of shadowy figures. Pt also presenting with paranoia and olfactory hallucinations of food smelling like blood or bleach.     Pt has several past inpatient admissions and is committed and Jarvised (however is currently signed in voluntary).  Provider notified of discrepancy in documentation and will be addressed during day shift. Pt has diagnosis of schizoaffective depressive type. Pt reports AH hallucinations since age 13 coming and going. Pt reports that her AH were completely gone when she discharged on 1/4, but that they came back a few days later. Pt reports no significant hx of SI, however reports that on one occurrence she walked out of her house with plan to kill self by jumping off of bridge however says, \"I went back inside because it was cold.\" Pt reports that  and parents in law are supportive of her. Pt's  possibly living out of state in [place] at this time, but not confirmed with patient on this admission. Pt unable to identify reasons for decompensation although stressors may include being primary care giver of six children, one of whom has ASD. Pt has an ACT team. Pt reports taking medication as prescribed and takes haldol, MORENO Abilify, trazodone, and Clozaril.     On unit pt is calm and cooperative. Pt has organized speech and is not actively responding. Pt endorses paranoid thinking, believing that others are watching her when she is walking on the street and having ideas of reference when watching TV. Reports that she is paranoid, but says she thinks that these things are real. Pt is " dilma for safety and states they feel safe in the hospital. Pt denies feeling depressed. Pt states she enjoys reading the Koran, praying, and watching TV. Pt requested medication from writer and was given scheduled HS trazodone, colace, haldol and Clozaril. Pt had no behavioral concerns and has stayed in room since admitting to unit late this evening.     Pt familiar with the unit. Writer went through admission packet with pt. Pt is signed in voluntarily, but appears to be on commitment (to be addressed by provider on day shift). Pt has no questions or concerns at this time.

## 2022-01-11 NOTE — PLAN OF CARE
BEHAVIORAL TEAM DISCUSSION    Participants:   Juarez Grimm MD Attending Psychiatrist  Jennifer Lindsey MD, PGY1  Jessica Sparks, MSW, [facility] Clinical Treatment Coordinator  JOSEPHINE Guevara, MS3  Luis F Zaidi, MS3  Progress: initial team meeting  Anticipated length of stay: assessment ongoing  Continued Stay Criteria/Rationale: admitted for treatment of psychosis and suicidal ideation  Medical/Physical: none noted in provider note - medically cleared for admission.   Precautions:   Behavioral Orders   Procedures    Code 1 - Restrict to Unit    Discontinue 1:1 attendant for suicide risk     Order Specific Question:   I have performed an in person assessment of the patient     Answer:   Based on this assessment the patient no longer requires a one on one attendant at this point in time.     Order Specific Question:   Rationale     Answer:   Medical Record Reviewed     Order Specific Question:   Rationale     Answer:   Patient States able to remain safe in hospital     Order Specific Question:   Rationale     Answer:   Modifications to care environment made to mitigate safety risk     Order Specific Question:   Rationale     Answer:   Routine observations are sufficient to monitor safety.    Routine Programming     As clinically indicated    Status 15     Every 15 minutes.    Suicide precautions     Patients on Suicide Precautions should have a Combination Diet ordered that includes a Diet selection(s) AND a Behavioral Tray selection for Safe Tray - with utensils, or Safe Tray - NO utensils       Plan: Psychiatric assessment. Medication management. Therapeutic Milieu. Individual care planning and after care planning. Patient to participate in unit groups and activities. Individual and group support on unit.   Rationale for change in precautions or plan: per ongoing assessment.

## 2022-01-11 NOTE — PROGRESS NOTES
"   01/10/22 2229   Valuables   Patient Belongings remains with patient;locker   Patient Belongings Remaining with Patient clothing   Patient Belongings Put in Hospital Secure Location (Security or Locker, etc.) cash/credit card;clothing;glasses;money (see comment);plastic bag;purse/wallet;shoes;wallet;other (see comments)   Did you bring any home meds/supplements to the hospital?  No       WITH PT:  Dress (1)  Sweat pants (1)  Jacket (1)  Head scarf (1)    LOCKER:  Clothing:  Dress (1)  Pair of boots (1)  Scarf (1)  Bra (1)  Pair of eye glasses (1)    Electronics:  Iphone (1)  Phone  (1)    Other:  1 purse  1 wallet  Cards:  \"Go to\" cards (2): 0546 and [phone]  Vaccine cards (2)  Social Security Card (1)  [place] (1)  Insurance Card (1)  MHCP (2)  3 medica insurance cards      SECURITY:  Cash $132.00  Addendum: below items sent to security in envelope #[id number].  P-EBT card (3) : [id number], [id number], [id number]  WIC card (1): 4976  Mastercard Debit card (1): 9253  Visa debit card [phone]  Visa debit card [phone]  Three target gift cards (unscatched)    A               Admission:  I am responsible for any personal items that are not sent to the safe or pharmacy.  [facility] is not responsible for loss, theft or damage of any property in my possession.    Signature:  _________________________________ Date: _______  Time: _____                                              Staff Signature:  ____________________________ Date: ________  Time: _____      2nd Staff person, if patient is unable/unwilling to sign:    Signature: ________________________________ Date: ________  Time: _____     Discharge:  [facility] has returned all of my personal belongings:    Signature: _________________________________ Date: ________  Time: _____                                          Staff Signature:  ____________________________ Date: ________  Time: _____          "

## 2022-01-11 NOTE — PLAN OF CARE
Pt sleeping at beginning of shift, appeared to sleep 7 hours. No PRNs given or requested. No medical or behavioral events this shift. Pts  Curly notified about hospitalization per pts request.     Problem: Sleep Disturbance (Psychotic Signs/Symptoms)  Goal: Improved Sleep (Psychotic Signs/Symptoms)  Outcome: No Change

## 2022-01-11 NOTE — H&P
"    ----------------------------------------------------------------------------------------------------------  [facility]  History and Physical    Nona Finley MRN# [id number]   Age: 41 year old YOB: 1980   Date of Admission:  1/10/2022   Contacts:   Primary Outpatient Psychiatrist: Dr. Walter Gaviria, [facility]  Primary Physician: Rip Capps at [facility]  Therapist: No  : Vicky Harrington, [phone] [facility]   Probation/: No  Family Members: Bob Rawls (spouse), [phone]     HPI:    Chief Complaint: \"voiced tell me to kill my children\"    History of present illness:  History obtained from patient and electronic chart    Nona Finley is a 41 year old [place] female with a past psychiatric history of schizoaffective disorder admitted from the  ER on 01/10/2022 due to concern for commanding auditory hallucinations and visual hallucinations of shadowy faces telling her to kill her children with knife since 2 days ago with no specific trigger or contributing factor identified.    Per ED:   \"41-year-old female presents for evaluation of ongoing suicidal and homicidal ideation.  Differential includes mental illness, medication side effect, intoxication, withdrawal, malingering.  Exam revealed ongoing auditory hallucinations, suicidal (plan to jump off bridge) and homicidal ideation with plans to stab children.  The plan was discussed at length with the behavioral , please see separate note.  It was decided that patient would be admitted to mental health for further evaluation and management.  Patient is voluntary and agreeable\".    Per LMHP:  \"Patient is alert and oriented, she is very tired looking. She is quiet and soft spoken. She states on Saturday her auditory hallucinations returned telling her to jump off a bridge and cut her children to pieces with a knife. She also reports having " "suicidal thoughts to jump from a bridge or a tall building. She states she is also having olfactory hallucinations with her food smelling like bleach or blood. She is having a very difficult time eating meat or spaghetti, but was able to eat a banana a apple. Patient reports she called her ACT team last night and they told her to come to the hospital but she did not have . They picked her up today and brought her in. She has not been able to use her coping skill of reading the Koran. She has been taking her PRN Haldol every 4 hours since Saturday. Patient states she does not want to be at the hospital but is voluntary. Validation and support was given for her decision to put her and her childrens safety first\".    Per Patient:  Nona Finley reports that since 2 days ago she has been experiencing symptoms including commanding auditory hallucinations of voices telling her to stab her children and to kill herself. She also saw the dark shadow-like faces of the voices. She cannot attribute her symptoms & decompensation to any identifiable trigger although states that her oldest daughter of 16 yo is diagnosed with ASD and has special needs that can be stress-evoking at times. She reports she has been taking their psychiatric medications regularly as prescribed which include Haldol, MORENO Abilify and Trazodone. She last took these medications last night. She denies life-time substance use. She was last visited remotely by the ACT team member the night prior to presentation. She was recently hospitalized with the same presentation on 12/28/21 and admitted to station 20 and got discharged on 1/4/22. Please refer to discharge summary by Dr. Juarez Grimm MD for more details.    ___________________________________________________________________________  Psychiatric ROS:    Depression: suicidal ideation with plan, without intent, depressed mood, low energy, insomnia, feeling worthless and overwhelmed  Anxiety: " excessive worry and nervous/overwhelmed  Panic Attack:  No  Martha/Hypomania:  none  Psychosis: delusions- being under surveillance, delusions of reference (TV) [details in Interim History], auditory hallucinations with commands [details in Interim History] and visual hallucinations [details in Interim History]  Trauma Related: none  Suicidal Ideation: Yes   Homicidal Ideation: Yes    Personality Symptoms: None  Obsessive Compulsive Disorder: negative    Eating Disorders: negative  LD: No previously diagnosed or signs of symptoms of learning disorder reported    Medical ROS: A complete medical review of systems was preformed and is negative other than noted in the HPI     Patient's Strengths & Goals:   Patient states that Motivation and readiness for change and Setting and pursuing goals are personal strengths and/or positive aspects of their life.    Patient states that their goal(s) for treatment are: controlling the psychotic sxs by changing the medication regimen     Psychiatric History:   Prior diagnoses:   Schizoaffective Disorder, Depressive subtype     Prior Hospitalizations:   [facility]: 12/2021 - 8/2021 - 7/2020 - 12/2019 - 5/2019 - 3/2019 - 2/2019 - 5/2018      Suicide attempts:   Yes, longer than 6 months ago - attempted to jump off a bridge - stopped by       Self-injurious behavior:   No     Violence:   No     ECT/TMS:   Yes - 2011 and 2012     Past medications (Not currently taking):  Lorazepam 0.5 mg  Metformin 1000 mg  Zyprexa 20 mg    Xarelto 20 mg  .   Perphenazine 2 mg for psychosis  Prozac when diagnosed with post partum depression and it was effective for her      Substance Use History:   Nicotine:   History   Smoking Status     Never Smoker   Smokeless Tobacco     Never Used      Alcohol: Social History    Substance and Sexual Activity      Alcohol use: No     Tobacco: never smoked  Alcohol: No  Cannabis: No  Opiates: No  Benzodiazepines: No  Stimulants: No     Prior CD treatments: No      "Social History:   Early History: \"Originally from [place].  Most of her family is still there.   Grew up in [place], left in 2001. First went to [place] before coming to [place].\"      Abuse/Trauma: Not ob file     Friends/Family/Support: \"Here in [place], she relies on help of the family of her .   currently lives in [place].   The patient is taking care of their 6 children  Has a daughter with ASD who is non-verbal. Her in-laws help her with children\".     Collateral: None     Current Living Situation:  Lives in a house in [place] with  and 6 children     Educational History: 5th grade     Occupation: Housewife     Legal: Not on file    : No     Guns: locked in house     Spirituality: practicing Church     Family History:      Problem (# of Occurrences) Relation (Name,Age of Onset)    Asthma (1) Father    Cerebrovascular Disease (1) Mother    Coronary Artery Disease (1) Son    Diabetes (2) Mother, Maternal Grandfather    Hypertension (1) Maternal Grandmother    Neurologic Disorder (4) Brother: mental health problem?, Sister: seizures (half sister), Daughter: autism , Sister: seizures    Respiratory (4) Father: asthma, Paternal Grandfather: asthma, Sister: asthma, Brother: asthma        Brother with Schizophrenia.  Daughter with ASD     Medical History:     Pulmonary emboli - 2020  Pulmonary nodule  Latent TB     Surgical History:     Past Surgical History:   Procedure Laterality Date     NO HISTORY OF SURGERY       NO HISTORY OF SURGERY  06/13/2013    Per patient reported this     No history of seizures or head trauma.     Allergies:   No Known Allergies     Medications:     Prior to Admission Medications   Prescriptions Last Dose Informant Patient Reported? Taking?   ABILIFY MAINTENA 400 MG extended release injection 12/28/2021  No No   Sig: Inject 2 mLs (400 mg) into the muscle every 28 days Due on 3/25   alum & mag hydroxide-simethicone (MAALOX) " 200-200-20 MG/5ML SUSP suspension prn  No No   Sig: Take 30 mLs by mouth every 4 hours as needed for indigestion   cloZAPine (FAZACLO) 100 MG ODT 1/9/2022  Yes Yes   Sig: Take 300 mg by mouth daily Take with two 25mg tablets for a total daily dose of 350mg.   cloZAPine (FAZACLO) 25 MG ODT 1/9/2022  Yes Yes   Sig: Take 50 mg by mouth daily Take with three 100mg tablets for a total daily dose of 350mg.   docusate sodium (COLACE) 100 MG capsule 1/9/2022  No Yes   Sig: Take 1 capsule (100 mg) by mouth 2 times daily   haloperidol (HALDOL) 2 MG tablet Past Week  No Yes   Sig: Take 1 tablet (2 mg) by mouth 2 times daily as needed for other (voices)   haloperidol (HALDOL) 5 MG tablet 1/9/2022  Yes Yes   Sig: Take 7.5 mg by mouth At Bedtime   melatonin 5 MG tablet 1/9/2022  No Yes   Sig: Take 1 tablet (5 mg) by mouth At Bedtime   Patient taking differently: Take 3 mg by mouth At Bedtime    traZODone (DESYREL) 50 MG tablet 1/9/2022  No Yes   Sig: Take 1 tablet (50 mg) by mouth At Bedtime      Facility-Administered Medications: None      Current Outpatient Medications   Medication Sig Dispense Refill     cloZAPine (FAZACLO) 100 MG ODT Take 300 mg by mouth daily Take with two 25mg tablets for a total daily dose of 350mg.       cloZAPine (FAZACLO) 25 MG ODT Take 50 mg by mouth daily Take with three 100mg tablets for a total daily dose of 350mg.       docusate sodium (COLACE) 100 MG capsule Take 1 capsule (100 mg) by mouth 2 times daily 30 capsule 0     haloperidol (HALDOL) 2 MG tablet Take 1 tablet (2 mg) by mouth 2 times daily as needed for other (voices) 60 tablet 0     haloperidol (HALDOL) 5 MG tablet Take 7.5 mg by mouth At Bedtime       melatonin 5 MG tablet Take 1 tablet (5 mg) by mouth At Bedtime (Patient taking differently: Take 3 mg by mouth At Bedtime ) 30 tablet 0     traZODone (DESYREL) 50 MG tablet Take 1 tablet (50 mg) by mouth At Bedtime 30 tablet 0     ABILIFY MAINTENA 400 MG extended release injection Inject 2  mLs (400 mg) into the muscle every 28 days Due on 3/25 2 mL 0     alum & mag hydroxide-simethicone (MAALOX) 200-200-20 MG/5ML SUSP suspension Take 30 mLs by mouth every 4 hours as needed for indigestion 335 mL 0        Data (Labs/Imaging):     Results for orders placed or performed during the hospital encounter of 01/10/22 (from the past 48 hour(s))   Asymptomatic COVID-19 Virus (Coronavirus) by PCR Nasopharyngeal    Specimen: Nasopharyngeal; Swab   Result Value Ref Range    SARS CoV2 PCR Negative Negative    Narrative    Testing was performed using the arash  SARS-CoV-2 & Influenza A/B Assay on the arash  Aubree  System.  This test should be ordered for the detection of SARS-COV-2 in individuals who meet SARS-CoV-2 clinical and/or epidemiological criteria. Test performance is unknown in asymptomatic patients.  This test is for in vitro diagnostic use under the FDA EUA for laboratories certified under CLIA to perform moderate and/or high complexity testing. This test has not been FDA cleared or approved.  A negative test does not rule out the presence of PCR inhibitors in the specimen or target RNA in concentration below the limit of detection for the assay. The possibility of a false negative should be considered if the patient's recent exposure or clinical presentation suggests COVID-19.  [facility] are certified under the Clinical Laboratory Improvement Amendments of 1988 (CLIA-88) as qualified to perform moderate and/or high complexity laboratory testing.        Physical & Psychiatric Examinations:   24 Hour Vital Signs Summary:  Temp: 97.9  F (36.6  C) (Temp  Min: 97.7  F (36.5  C)  Max: 97.9  F (36.6  C))  Resp: 18 (Resp  Min: 16  Max: 18)  SpO2: 100 % (SpO2  Min: 100 %  Max: 100 %)  Pulse: 100 (Pulse  Min: 100  Max: 119)  BP: 111/75  Systolic (24hrs), Av , Min:111 , Max:111   Diastolic (24hrs), Av, Min:75, Max:76      Weight is 160 lbs 0 oz  Body mass index is 27.46 kg/m .    See ED  "assessment note by ED physician on 01/10/2022 for physical exam.     Mental Status Examination:  Oriented to: Person/Self, City, Unit/Floor, Day of the Week and Date  General: Awake and Alert  Appearance: appears older than stated age, Grooming is adequate, Dressed in appropriate clothes for weather and Hair is covered with hijab  Behavior: calm, cooperative, engaged and open  Eye Contact: good  Psychomotor: no abnormal motor symptoms appreciated; no catatonia present  Speech: appropriate volume/tone, spontaneous and with good articulation  Language: Advanced level of English with appropriate syntax and vocabulary.  Mood: \"Depressed\"  Affect: stable, restricted and blunted  Thought Process: coherent, linear, logical and goal directed  Thought Content: suicidal ideation with plan (without intent), homicidal ideation, does not appear to be responding to internal stimuli, visual hallucinations of shadows look like faces, auditory hallucinations of voices of a few people commanding her to hurt herself and her kids, distressing hallucinations, ideas of reference and delusions; delusions that are self-referential  Associations: intact  Insight: good   Judgment: good  Attention Span: grossly intact  Concentration: grossly intact  Recent and Remote Memory: grossly intact  Fund of Knowledge: average     Assessment   Diagnostic Impression:  Nona Finley is a 41 year old Malaysian female with a past psychiatric history of schizoaffective depressive type who presented to the ER with SI, HI and psychosis. Significant symptoms include SI, depressed, sleep issues and psychosis. Her last psychiatric hospitalization was in 12/2021.  She is currently followed by Dr. Walter Gaviria, [facility] team. No current psychosocial stressors are identified at this point. she has been coping with the stress of her sxs by seeking help.  Patient's support system includes family and outpatient ACT  team.  Substance use does not appear to be playing " a contributing role in the patient's presentation.  There is genetic loading for psychosis as her brother suffers from schizophrenia. Medical history does not appear to be significant. The MSE is notable for depressed mood, blunt affect, SI with plan, commanding AH and disturbing VH. She denies self injurious behaviors. She does not seem to have any traits of personality disorders  interfering with her ability to adhere with the treatment plan.     Her reported symptoms of depressed mood, poor sleep, low energy, SI, HI AH,VH are consistent with her historic diagnosis of schizoaffective disorder. Optimization of medications to target these symptom clusters in addition to evaluation of adquate outpatient supports will be targets for treatment during this admission.       Given that she currently has SI, HI and psychosis, patient warrants inpatient psychiatric hospitalization to maintain her safety. Disposition pending clinical stabilization, medication optimization and development of an appropriate discharge plan.    Risk for harm is elevated.  Risk factors: SI, HI, family history and past behaviors  Protective factors: family and engaged in treatment    She was medically cleared for admission to inpatient psychiatric unit. The risks, benefits, alternatives, and side effects of treatments including no treatment have been discussed and are understood by the patient and other caregivers.    Primary Psychiatric Diagnosis:     Schizoaffective Disorder Depressive type     Secondary Psychiatric Diagnoses:    Unspecified Anxiety Disorder     Admit to [facility] with Attending Physician Dr. Kobe Freed and will be treated in the therapeutic milieu with appropriate individual and group therapies.    Medications:   Continued PTA Medications:    Clozaril 350mg qhs    Haldol 7.5mg qhs    Trazodone 50mg qhs  Held PTA Medications:    None  New Medications Started at Admission:    None, deferred to the primary team.     Plan    Psychiatric diagnoses and management:  Principal Diagnosis:     Schizoaffective Disorder Depressive type  o Continue PTA Haldol 7.5mg at bedtime  o Continue PTA Clozaril 350mg qhs  o Continue PTA Trazodone 50mg qhs    Secondary Psychiatric Diagnoses:   o Unspecified Anxiety Disorder    Additional Planning:    Continue to monitor for and stabilize: SI, depressed, sleep issues and psychosis    Patient will be treated in therapeutic milieu with appropriate individual and group therapies as described.    Consults    None    Legal Status:    Voluntary    SIO:    No    Pt has not required locked seclusion or restraints in the past 24 hours to maintain safety, please refer to RN documentation for further details.    Disposition/Anticipated Discharge Date:    Home; TBD, pending clinical stabilization, medication optimization, and formulation of a safe discharge plan.    Safety Assessment:   Behavioral Orders   Procedures     Discontinue 1:1 attendant for suicide risk     Order Specific Question:   I have performed an in person assessment of the patient     Answer:   Based on this assessment the patient no longer requires a one on one attendant at this point in time.     Order Specific Question:   Rationale     Answer:   Medical Record Reviewed     Order Specific Question:   Rationale     Answer:   Patient States able to remain safe in hospital     Order Specific Question:   Rationale     Answer:   Modifications to care environment made to mitigate safety risk     Order Specific Question:   Rationale     Answer:   Routine observations are sufficient to monitor safety.     __________________________________________________________________________________  Pertinent Medical diagnoses and management:  o None  __________________________________________________________________________________  Patient was seen overnight and will be staffed with attending physician in the morning.    Santos Brand, PGY-1 (Psychiatry)  [place]  [facility]      Attestation:  For attending attestation statement, see progress note dated January 11, 2022. Juarez Grimm MD

## 2022-01-11 NOTE — PLAN OF CARE
Initial Psychosocial Assessment    I have reviewed the chart, met with the patient, and developed Care Plan.  Information for assessment was obtained from:     Patient: interview, Chart: review of admission and Outside Providers: discussion with ACT team TCM    Presenting Problem:     patient is admitted due to suicidal ideation and psychosis - was brought to the ED by ACT team staff ([facility])     History of Mental Health and Chemical Dependency:  Significant history of mental illness - appears that first mental health diagnosis related to post partum depression, later psychosis.   Has had multiple admissions to this hospital and this unit specifically. First admission to [facility] under the care of Dr. Julien occurred in 2011.       Family Description (Constellation, Family Psychiatric History):    Was born in [place] [facility] [place].  Most of her family is still there. Grew up in [place], left in 2001. First went to [place] before coming to [place].  Patient is  with 6 children ranging in age from 4-16 years old.    -  is currently living in [place], doing a Medical Residency and mostly does not live here or with patient and their children. He at times comes back to visit - most recently was in town for a month prior to Holiday / recent admit to station 20.     Her mother-law provides PCA services to help for her oldest child (daughter who has significant Autism Spectrum Disorder)     One of her brothers also suffers from mental illness.     Significant Life Events (Illness, Abuse, Trauma, Death):  Fled [place] when she was in the 5th Grade. Significant Trauma history.        Living Situation:  Lives in a house in [place] - her and the kids, sometimes mother and father in law come. As noted above mother in law is PCA for patient's eldest child.     Educational Background:  Up to 5th grade in [place] , did attend some schooling in the [place] with ESL assistance.      Occupational History:     Financial Status:    Legal Issues:    In the community with Provisional Discharge Agreement   Order for Recommitment - issued on 12/31/21 - effective until 12/31/22  No Reyes Order at this time    Ethnic/Cultural Issues:  Sikh. She is bilingual. Prays and reads the Koran     Spiritual Orientation:  Sikh      Service History:  none    Social Functioning (organization, interests):  Significantly isolated from other supports/interactions     Current Treatment Providers are:    [facility] Team ([facility])  [address] [place] [address] Main ph: [phone]  Fax: [phone]  Psychiatrist - Dr. Gaviria  Nurse - Sherry Powell RN ([phone])    - Vicky ALMARAZ ph: [phone]-     - has been her  for 4 years.   Has been working with ACT team for at least since 2012.   Recent visits with Vicky have been in person - have been doing telephone and video for a period of time as well. Had been doing better and with [facility]    Social Service Assessment/Plan:  Coordination for planning/ recommendations and follow up should be done with her ACT team., they have been working with patient around a decade and know her well. Team Psychiatry Resident Dr. Lindsey did speak with ACT team psychiatrist today - see provider note. Nona is experiencing high levels of psychosocial stressors with raising 6 children, one with special needs, managing her own mental health while her  lives elsewhere pursuing his career. Dr. Lindsey did speak with Dr. Gaviria (ACT team) re initial thoughts re medications.   In speaking with her ACT team  we also discussed exploring if patient would like to pursue further supports that may be offered through a CADI waiver, including doing so in a way that she would not lose her ACT team service. Will also encourage and explore options for further supports in the community such as  a support group at [facility]

## 2022-01-12 PROCEDURE — 99232 SBSQ HOSP IP/OBS MODERATE 35: CPT | Mod: GC | Performed by: PSYCHIATRY & NEUROLOGY

## 2022-01-12 PROCEDURE — 124N000002 HC R&B MH UMMC

## 2022-01-12 PROCEDURE — 80320 DRUG SCREEN QUANTALCOHOLS: CPT

## 2022-01-12 PROCEDURE — 80307 DRUG TEST PRSMV CHEM ANLYZR: CPT

## 2022-01-12 PROCEDURE — 81025 URINE PREGNANCY TEST: CPT | Performed by: EMERGENCY MEDICINE

## 2022-01-12 PROCEDURE — 250N000013 HC RX MED GY IP 250 OP 250 PS 637

## 2022-01-12 RX ADMIN — TRAZODONE HYDROCHLORIDE 50 MG: 50 TABLET ORAL at 21:37

## 2022-01-12 RX ADMIN — Medication 7.5 MG: at 21:37

## 2022-01-12 RX ADMIN — DOCUSATE SODIUM 100 MG: 100 CAPSULE, LIQUID FILLED ORAL at 08:49

## 2022-01-12 RX ADMIN — Medication 362.5 MG: at 20:00

## 2022-01-12 RX ADMIN — OLANZAPINE 10 MG: 10 TABLET, FILM COATED ORAL at 16:28

## 2022-01-12 RX ADMIN — DOCUSATE SODIUM 100 MG: 100 CAPSULE, LIQUID FILLED ORAL at 20:00

## 2022-01-12 ASSESSMENT — ACTIVITIES OF DAILY LIVING (ADL)
HYGIENE/GROOMING: INDEPENDENT
ORAL_HYGIENE: INDEPENDENT
DRESS: INDEPENDENT
DRESS: INDEPENDENT
ORAL_HYGIENE: INDEPENDENT
HYGIENE/GROOMING: INDEPENDENT

## 2022-01-12 NOTE — PLAN OF CARE
Assessment/Intervention/Current Symptoms and Care Coordination  - chart review  - team meeting      Discharge Plan or Goal  Pending stabilization & development of a safe discharge plan.  Considerations include: Return home with outpatient providers      Barriers to Discharge   Patient requires further psychiatric stabilization due to current symptomology      Referral Status  No new referrals       Legal Status  Patient is voluntary     Provisional Discharge Agreement - has not been revoked    Order for Recommitment   - Order issued on 2021 -   Per order  Respondent is recommitted as a person who poises a risk of harm due to mental illness to the Commissioner of Human Services and the head of [facility] until 2022; provided however, that Respondent's provisional discharge remains in effect at this time and will  on 2022, unless it has been revoked or extended before that date.     This writer spoke with [facility]  staff date of admission 2022 - requested copy of recent recommitment and confirmed there is no Reyes order at this time.   Copy of above order obtained from the court and sent for H.I.M scan into media - order is now available to view in media of EHR as of 22

## 2022-01-12 NOTE — PLAN OF CARE
Problem: Decreased Participation and Engagement (Psychotic Signs/Symptoms)  Goal: Increased Participation and Engagement (Psychotic Signs/Symptoms)  Outcome: No Change     Problem: Cognitive Impairment (Psychotic Signs/Symptoms)  Goal: Optimal Cognitive Function (Psychotic Signs/Symptoms)  Outcome: No Change    Pt ate both breakfast and lunch and said she pinched her nose so that she won't get bleach or blood smell from food. Denies any pain. No PRN med given. Pt has orthostatic BP when checked this morning. Gave cup of water and then rechecked BP again and shows no orthostatic BP currently. Pt is tachycardic but denies any chest pain or discomfort. Pt is anxious and paranoid. Pt reported she is still experiencing auditory and olfactory hallucinations when asked by a writer. Pt also reported experiencing visual hallucination like dead figures. Pt reported she hears voices to kill herself and also her children but declined to have any plan or thoughts to take any action. Collected and sent urine sample for HCG test to lab but could not print a label for utox. Requested resident to place a new order for it.

## 2022-01-12 NOTE — PLAN OF CARE
Problem: Sleep Disturbance (Psychotic Signs/Symptoms)  Goal: Improved Sleep (Psychotic Signs/Symptoms)  Outcome: No Change  Flowsheets (Taken 1/12/2022 0862)  Mutually Determined Action Steps (Improved Sleep): sleeps 4-6 hours at night    Patient was sleeping well this shift. She was awake x1 for voiding but she was back to sleep. No AVH reported by pt. No complaints of pain. Appeared to have slept 6.75 hours. No PRN medications given.

## 2022-01-13 LAB
ALBUMIN SERPL-MCNC: 3 G/DL (ref 3.4–5)
ALP SERPL-CCNC: 64 U/L (ref 40–150)
ALT SERPL W P-5'-P-CCNC: 22 U/L (ref 0–50)
ANION GAP SERPL CALCULATED.3IONS-SCNC: 5 MMOL/L (ref 3–14)
AST SERPL W P-5'-P-CCNC: 18 U/L (ref 0–45)
BILIRUB SERPL-MCNC: 0.2 MG/DL (ref 0.2–1.3)
BUN SERPL-MCNC: 15 MG/DL (ref 7–30)
CALCIUM SERPL-MCNC: 9.1 MG/DL (ref 8.5–10.1)
CHLORIDE BLD-SCNC: 110 MMOL/L (ref 94–109)
CO2 SERPL-SCNC: 26 MMOL/L (ref 20–32)
CREAT SERPL-MCNC: 0.51 MG/DL (ref 0.52–1.04)
GFR SERPL CREATININE-BSD FRML MDRD: >90 ML/MIN/1.73M2
GLUCOSE BLD-MCNC: 97 MG/DL (ref 70–99)
POTASSIUM BLD-SCNC: 4.2 MMOL/L (ref 3.4–5.3)
PROT SERPL-MCNC: 7.5 G/DL (ref 6.8–8.8)
SODIUM SERPL-SCNC: 141 MMOL/L (ref 133–144)

## 2022-01-13 PROCEDURE — 99207 PR CONSULT E&M CHANGED TO INITIAL LEVEL: CPT | Performed by: PHYSICIAN ASSISTANT

## 2022-01-13 PROCEDURE — 124N000002 HC R&B MH UMMC

## 2022-01-13 PROCEDURE — 99221 1ST HOSP IP/OBS SF/LOW 40: CPT | Performed by: PHYSICIAN ASSISTANT

## 2022-01-13 PROCEDURE — 250N000013 HC RX MED GY IP 250 OP 250 PS 637

## 2022-01-13 PROCEDURE — 99231 SBSQ HOSP IP/OBS SF/LOW 25: CPT | Mod: GC | Performed by: PSYCHIATRY & NEUROLOGY

## 2022-01-13 PROCEDURE — 82306 VITAMIN D 25 HYDROXY: CPT

## 2022-01-13 PROCEDURE — 82310 ASSAY OF CALCIUM: CPT | Performed by: PHYSICIAN ASSISTANT

## 2022-01-13 PROCEDURE — 36415 COLL VENOUS BLD VENIPUNCTURE: CPT | Performed by: PHYSICIAN ASSISTANT

## 2022-01-13 RX ADMIN — DOCUSATE SODIUM 100 MG: 100 CAPSULE, LIQUID FILLED ORAL at 20:17

## 2022-01-13 RX ADMIN — Medication 7.5 MG: at 20:17

## 2022-01-13 RX ADMIN — TRAZODONE HYDROCHLORIDE 50 MG: 50 TABLET ORAL at 20:16

## 2022-01-13 RX ADMIN — Medication 362.5 MG: at 20:16

## 2022-01-13 RX ADMIN — DOCUSATE SODIUM 100 MG: 100 CAPSULE, LIQUID FILLED ORAL at 09:23

## 2022-01-13 RX ADMIN — HALOPERIDOL 2 MG: 2 TABLET ORAL at 09:52

## 2022-01-13 ASSESSMENT — ACTIVITIES OF DAILY LIVING (ADL)
LAUNDRY: UNABLE TO COMPLETE
ORAL_HYGIENE: INDEPENDENT
ORAL_HYGIENE: INDEPENDENT
DRESS: INDEPENDENT
HYGIENE/GROOMING: INDEPENDENT
DRESS: INDEPENDENT
HYGIENE/GROOMING: INDEPENDENT
LAUNDRY: WITH SUPERVISION

## 2022-01-13 NOTE — PLAN OF CARE
"  Problem: Sensory Perception Impairment (Psychotic Signs/Symptoms)  Goal: Decreased Sensory Symptoms (Psychotic Signs/Symptoms)  Outcome: No Change     Pt presents with a withdrawn, sad affect. States she is still suffering. Pt expressed worry that her six children will be taken away from her due to her mental illness. She reports she continues to have visual, auditory and olfactory hallucinations. Pt states she sees \"scary faces,\" hears command voices telling her to kill herself and her children and smells bleach and blood in food and drinks. Pt denies any SI or SIB intent. Pt was given PRN haldol (with no reported effect) and headphones for hallucinations. She paced the halls, sat in her room and napped during the shift. BP continues to run low with tachycardia - BP 90/59, . Writer encouraged fluids. Will continue to monitor and assist as needed.  "

## 2022-01-13 NOTE — PLAN OF CARE
"  Problem: Adult Inpatient Plan of Care  Goal: Optimal Comfort and Wellbeing  Outcome: No Change     Problem: Sleep Disturbance (Psychotic Signs/Symptoms)  Goal: Improved Sleep (Psychotic Signs/Symptoms)  Outcome: No Change   Observed to have slept well for approx 7 hrs on all Q15\"rounds overnight w/ regular non-labored respirations and no reported or observed distress.  Safe, therapeutic environment maintained.   "

## 2022-01-13 NOTE — PLAN OF CARE
Assessment/Intervention/Current Symptoms and Care Coordination  - chart review  - team meeting      Discharge Plan or Goal  Pending stabilization & development of a safe discharge plan.  Considerations include: Return home with outpatient providers      Barriers to Discharge   Patient requires further psychiatric stabilization due to current symptomology      Referral Status  No new referrals       Legal Status  Patient is voluntary     Provisional Discharge Agreement - has not been revoked    Order for Recommitment   - Order issued on 2021 -   Per order  Respondent is recommitted as a person who poises a risk of harm due to mental illness to the Commissioner of Human Services and the head of [facility] until 2022; provided however, that Respondent's provisional discharge remains in effect at this time and will  on 2022, unless it has been revoked or extended before that date.     This writer spoke with [facility]  staff date of admission 2022 - requested copy of recent recommitment and confirmed there is no Reyes order at this time.   Copy of above order obtained from the court and sent for H.I.M scan into media - order is now available to view in media of EHR as of 22

## 2022-01-13 NOTE — CONSULTS
[facility]  Internal Medicine Consult    Nona Finley MRN# [id number]   Age: 41 year old YOB: 1980     Date of Admission: 1/10/2022  Date of Consult:  1/13/2022    Requesting Service: Behavioral Health - Kobe Freed MD  Reason for Consult: Pre ECT Medicine Evaluation   Indication for ECT: Schizoaffective disorder with command auditory and visual hallucinations    Chief Complaint: Command auditory and visual hallucinations telling her to kill her children and herself with knife    HPI:   Nona Finley is a 41 year old [place]ones[place] female with a history of schizoaffective disorder, history of PE (8/2020) who was admitted to inpatient psychiatry on 1/10/22 after presenting to the ED with command auditory hallucinations and visual hallucinations telling her to kill her children and herself with knife.     Currently, patient reports she is feeling fine. She notes the hallucinations are still present telling her to kill herself. She denies CP, SOB, abdominal pain, N/V/D, dysuria, headache, parsethesias, change in vision, or BROCK.     She notes that she had ECT in 2011 and reports it was a long time ago so doesn't know what the outcome was. She did not having mild short term memory loss afterwards.     Review of Systems:   Cardiovascular: negative, palpitations, exertional chest pain or pressure, paroxysmal nocturnal dyspnea, dyspnea on exertion, orthopnea, lower extremity edema and syncope or near-syncope  Pulmonary: No shortness of breath, dyspnea on exertion, cough, or hemoptysis  Neurological: negative, migraine headaches, headaches, syncope, stroke, seizures, paralysis, local weakness, numbness or tingling of hands, numbness or tingling of feet, involuntary movements, speech problems, incoordination, memory problems and tremor    Past Medical History:   Prior Anesthesia: Yes  If yes, any complications: No    Prior ECT: Yes  If yes,   2011 for auditory hallucinations  Response:  She is unsure given length of time ago, but per chart review, there was improvement in symptoms  Side Effects: Memory loss, short term    Cardiovascular: CAD No, MI No, HTN No, CHF No, pacemaker or ICD No  Pulmonary: Asthma/COPD No, Prior respiratory failure or need for emergent intubation No, On theophylline No (note that theophylline use is a contraindication to proceeding with ECT, needs to be tapered off prior)  Neurological: Brain tumor No, TIA/CVA No, Dementia No, Neuromuscular Disease (including post polio syndrome) No, Seizures and/or Epilepsy No, Head Injury No, Intracranial Hemorrhage No  Diabetes: No    Past Surgical History:   Past Surgical History:   Procedure Laterality Date     NO HISTORY OF SURGERY       NO HISTORY OF SURGERY  06/13/2013    Per patient reported this       Allergies:    No Known Allergies    Medication list reviewed.    Physical Exam:  BP 95/62 (BP Location: Right arm)   Pulse 118   Temp 98.5  F (36.9  C) (Tympanic)   Resp 16   Wt 72.6 kg (160 lb)   SpO2 100%   BMI 27.46 kg/m     Cardiovascular: Tachycardic. S1, S2. No murmurs, rubs, gallops. No carotid bruits appreciated on auscultation.   Pulmonary: Effort normal. Lungs CTAB with no wheezing, rales, rhonchi.   Neurological: A&Ox3. No focal deficits. PERRLA. Normal gait. CN III-XII grossly intact. EOM intact. Strength 5/5 in extremities. Sensation intact. Negative Romberg. No dysdiadochokinesia.     Data:  Reviewed EKG from 12/28/2021  EKG:Normal, Normal sinus rhythm   There was evidence of biatrial enlargement  When compared to EKG of 2/21/21, no significant change was found    Head Imaging:   CT Head from 5/20/2021 with no intracranial hemorrhage, mass, or definite CT evidence of recent ischemia.     Labs were obtained within the last 30 days on 1/11 and 12/28 and are as follows:   CBC:  Recent Labs   Lab Test 01/11/22  0843   WBC 5.7   RBC 3.94   HGB 11.4*   HCT 34.5*   MCV 88   MCH 28.9   MCHC 33.0   RDW 14.3         CMP:  Recent Labs   Lab Test 12/28/21  2146      POTASSIUM 3.7   CHLORIDE 108   BRIANNA 9.0   CO2 27   BUN 19   CR 0.56   *   AST 21   ALT 22   BILITOTAL 0.1*   ALBUMIN 3.2*   PROTTOTAL 7.5   ALKPHOS 63     HCG:   Negative     Recommendations:     1. ECT clearance:  - Diuretics and oral hypoglycemics should be held the morning of ECT.   - All other antihypertensive medications can be continued.   - Patient does not have absolute medical contraindications to proceeding with ECT at this time. Treatment plan per psychiatry.     2. History of PE (8/2020):  Had unprovoked PE in 8/2020. Follows with Hematology as outpatient. Had been treated with 6 months of Xarelto. She is no longer taking Xarelto. She has been ambulating on the unit. Per Hematology note in 9/30/20 - discussed that the risk of recurrence is low based on the HERDOO2 score.   - Please notify medicine if patient is non-ambulatory, would recommend DVT ppx at that time     3. Orthostatic hypotension  4. Chronic sinus tachycardia:  Likely exacerbated by clozaril use. She had + orthostatic VS this AM, but asymptomatic per RN/patient. She is tolerating sufficient PO intake by staff.   - Encourage fluid intake  - Please notify Medicine if SBP <90/60, or symptomatic (dizziness, lightheadedness) or poor oral intake as would favor IVF prior to ECT  - Continue to monitor orthostatics    Patricia Barahona PA-C  [facility]  Hospitalist Service  Pager: [phone]

## 2022-01-13 NOTE — PROGRESS NOTES
"    ----------------------------------------------------------------------------------------------------------  [facility], [place]   Psychiatric Progress Note  Hospital Day #3    Identifier: Nona Finley is a 41 year old female with previous psychiatric diagnoses of schizoaffective disorder, depressive type who presents with command auditory hallucinations telling her to kill her children, frightening visual hallucinations, and SI w/ intent and plan in the context of medication adherence and significant chronic psychosocial stressors. Assessment is that the current presentation is consistent with schizoaffective disorder with current depression and psychosis.      Interim History:   The patient's care was discussed with the treatment team and chart notes were reviewed.    Vitals: BP 100s/60s -129,   Sleep: 6.75 hours (01/12/22 0600)  Scheduled medications: Took all scheduled medications as prescribed  PRN medications:   Olanzapine 10mg at 16:25 - for AH - not effective    Staff Report:  -Mostly withdrawn  -Endorses AH,VH and olfactory hallucinations  -Trouble sleeping due to AH/OH  -Calm and cooperative with staff  -Able to eat all dinner and was able to drink more fluids  -No overnight events     Subjective:     Patient Interview:  Nona Finley was interviewed in the conference room. She reports she's been working to drink more water. Still smells bleach and blood when she drinks. Discussed supplementing her meals with ensure.  Didn't sleep well. Awoke at ~2:00, and wasn't able to fall back asleep. Mood is \"very sad. Still crying a lot.\" She talked to her mother-in-law and kids last night. The kids have been switched to remote learning, so her mother-in-law is quite busy.   Discussed that ECT consult was placed, and talked about that process with her. She was seen by medicine this AM. She doesn't want to lose her memory again, but she hopes ECT might help her.     The risks, " "benefits, alternatives and side effects of any medication changes have been discussed and are understood by the patient and other caregivers.    Review of systems:   Patient reports no concerning symptoms aside from those listed above.     Objective:   BP 95/62 (BP Location: Right arm)   Pulse 118   Temp 98.5  F (36.9  C) (Tympanic)   Resp 16   Wt 72.6 kg (160 lb)   SpO2 100%   BMI 27.46 kg/m    Weight is 160 lbs 0 oz  Body mass index is 27.46 kg/m .    Mental Status Exam:  Oriented to:  Grossly Oriented  General:  Awake and Alert  Appearance:  appears stated age and Grooming is adequate  Behavior/Attitude:  calm and open  Eye Contact:  downcast  Psychomotor: normal and no evidence of tics, dystonia, or tardive dyskinesia no catatonia present  Speech: Normal rate and rhythm, soft volume/tone, more spontaneous  Language: Fluent in English with appropriate syntax and vocabulary.  Mood:  \"depressed\"  Affect:  congruent with mood, sad and anxious  Thought Process:  linear, coherent and goal directed  Thought Content: SI w/ intent and plan - contracts for safety. Constant command AH telling her to kill herself and her kids. No stated delusions.   Associations:  intact  Insight:  good due to recognition of AH and mental illness  Judgment:  good - presented to hospital for help, cooperative  Impulse control: fair - has not acted on command AH  Attention Span:  grossly intact  Concentration:  grossly intact  Recent and Remote Memory:  grossly intact  Fund of Knowledge:  average  Muscle Strength and Tone: normal  Gait and Station: Normal        Allergies:   No Known Allergies     Labs:     Recent Results (from the past 24 hour(s))   HCG qualitative urine (UPT)    Collection Time: 01/12/22 10:40 AM   Result Value Ref Range    hCG Urine Qualitative Negative Negative   Drug abuse screen 6 urine (chem dep) ([facility])    Collection Time: 01/12/22 10:40 AM   Result Value Ref Range    Amphetamines Urine Screen Negative Screen " Negative    Barbiturates Urine Screen Negative Screen Negative    Benzodiazepines Urine Screen Negative Screen Negative    Cannabinoids Urine Screen Negative Screen Negative    Cocaine Urine Screen Negative Screen Negative    Ethanol Urine Screen Negative Screen Negative    Opiates Urine Screen Negative Screen Negative          Assessment    Primary psychiatric diagnosis:   Schizoaffective disorder, depressed type    Secondary psychiatric diagnoses of concern this admission:   H/o anxiety disorder unspecified    Diagnostic Impression:   Nona Finley is a 41 year old Polish female with a past psychiatric history of schizoaffective disorder depressive type who presented to the ER with SI, HI and AH. Significant symptoms on admission included SI, depression, sleep issues, psychosis and disordered eating. Her last psychiatric hospitalization was from 12/28-1/04/22 at [facility] with Dr. Grimm for the same symptoms.  She is currently followed by Dr. Walter Gaviria, [facility] ACT team. Psychosocial stressors are quite significant. She lives with her mother and father in law and her 6 kids. Her relationship with her  is long-distance, strained and per ACT team, she's been ambivalent about  him and going to live in a  for some time.  Patient's support system is minimal outside of her  and in-laws, and is something her ACT team has been trying to help with (recommending Women's Groups, etc).  Substance use or medication nonadherence do not appear to be playing a contributing role in the patient's presentation.  There is genetic loading for psychosis. Medical history does not appear to be significant. The MSE is notable for depressed mood, sad affect, SI with plan, commanding AH and disturbing VH. She denies self injurious behaviors.     Her reported symptoms of depressed mood, hopelessness, SI, AH,VH are consistent with her historic diagnosis of schizoaffective disorder with current depression and  psychosis. Optimization of medications to target these symptom clusters in addition to evaluation of adquate outpatient supports will be targets for treatment during this admission.     Psychiatric Hospital course:   Nona Finley was admitted to [facility] as a voluntary patient/ on a 72 hour hold. PTA clozapine, colace, haldol (scheduled and prn), trazodone, and melatonin were continued. Patient is also on Abilify MORENO. 1/12 target sxs not controlled, increased clozaril to 362.5mg. Initiated the preliminary steps for ECT consult. Medicine consult placed for ECT screening assessment. Utox and UPT specimen was collected.    Discontinued Medications (& Rationale): n/a    Medical course: Patient was medically cleared for admission to psychiatric unit. No pertinent PMH.    Data:   CBC: Hgb 11.4 (baseline), otherwise WNL  COVID neg  UDS neg  HCG neg        Plan     Today's changes:    No change    Psychotropic Medications:  Scheduled:  -Clozapine 362.5mg at bedtime  -Colace 100mg BID  -Haldol 7.5mg at bedtime  -Trazodone 50mg at bedtime     PRN:  -Haldol 2mg BID  -Acetaminophen 650 mg Q6H PRN for pain and fever  -Maalox 30 ml Q4H PRN for indigestion  -Gabapentin 100mg q6h prn for anxiety  -Melatonin 3mg at bedtime  -Miralax prn for constipation  -Olanzapine 10 mg PO/IM prn Q2H severe agitation/psychosis    Additional Plans:  Patient will be treated in therapeutic milieu with appropriate individual and group therapies as described.    Medical diagnoses to be addressed this admission:    None    Consults: None    Legal Status: Voluntary. Currently committed on a PD. No Reyes.     Safety Assessment:   Behavioral Orders   Procedures     Code 1 - Restrict to Unit     Discontinue 1:1 attendant for suicide risk     Order Specific Question:   I have performed an in person assessment of the patient     Answer:   Based on this assessment the patient no longer requires a one on one attendant at this point in time.     Order  Specific Question:   Rationale     Answer:   Medical Record Reviewed     Order Specific Question:   Rationale     Answer:   Patient States able to remain safe in hospital     Order Specific Question:   Rationale     Answer:   Modifications to care environment made to mitigate safety risk     Order Specific Question:   Rationale     Answer:   Routine observations are sufficient to monitor safety.     Routine Programming     As clinically indicated     Status 15     Every 15 minutes.     Suicide precautions     Patients on Suicide Precautions should have a Combination Diet ordered that includes a Diet selection(s) AND a Behavioral Tray selection for Safe Tray - with utensils, or Safe Tray - NO utensils         SIO: No    Disposition: TBD. Pending stabilization, medication optimization, & development of a safe discharge plan.    This patient was seen and discussed with my attending physician.  Santos Brand MD  PGY-1 Psychiatry Resident Physician    Attestation:  This patient has been seen and evaluated by me, Juarez Grimm MD.  I have discussed this patient with the house staff team including the resident and medical student and I agree with the findings and plan in this note.    I have reviewed today's vital signs, medications, labs and imaging. Juarez Grimm MD , PhD.

## 2022-01-13 NOTE — PLAN OF CARE
Problem: Sensory Perception Impairment (Psychotic Signs/Symptoms)  Goal: Decreased Sensory Symptoms (Psychotic Signs/Symptoms)  Outcome: No Change   Nursing Assessment    Recent Vitals: B/P:95/62  T:98.5  P:118     General Shift Summary  Pt is out or room intermittently but is withdrawn. Pt continues to endorse AH, VH, and olfactory hallucinations of blood and chlorine. Pt reports having olfactory hallucinations all of the time and not only when they are eating. Pt states it is hard to sleep and the smells wake her up. Pt hearing voices which she does not recognize, telling her to kill herself and her children. Pt denies any intent to act on this and contracts for safety. Pt used PRN, headphones, and earplug in nondominant ear as coping strategies, but minimal effect. Pt reports being miserable due to hallucinations. Pt denies feeling paranoid, and states they feel safe in the hospital. Pt was not observed to be responding to hallucinations. Pt had appropriate behavior and was calm and pleasant throughout shift.     Patient is medication compliant and reported no side effects. PRN Zyprexa given at 1628 for distressing command hallucinations to hurt self and kill her children. Pt first offered Haldol, but requested Zyprexa believing it worked better for them. Pt reported no improvement in sx after taking Zyprexa.     Hygiene is fair. Pt was able to eat all of dinner. Pt observed drinking 2 cups of water and fluids were encouraged.     Branden Doe RN

## 2022-01-14 LAB — DEPRECATED CALCIDIOL+CALCIFEROL SERPL-MC: 28 UG/L (ref 20–75)

## 2022-01-14 PROCEDURE — 250N000013 HC RX MED GY IP 250 OP 250 PS 637

## 2022-01-14 PROCEDURE — 99232 SBSQ HOSP IP/OBS MODERATE 35: CPT | Mod: GC | Performed by: PSYCHIATRY & NEUROLOGY

## 2022-01-14 PROCEDURE — 99233 SBSQ HOSP IP/OBS HIGH 50: CPT | Mod: 25 | Performed by: PSYCHIATRY & NEUROLOGY

## 2022-01-14 PROCEDURE — 124N000002 HC R&B MH UMMC

## 2022-01-14 RX ADMIN — Medication 7.5 MG: at 21:58

## 2022-01-14 RX ADMIN — HALOPERIDOL 2 MG: 2 TABLET ORAL at 09:28

## 2022-01-14 RX ADMIN — CLOZAPINE 375 MG: 50 TABLET ORAL at 19:48

## 2022-01-14 RX ADMIN — TRAZODONE HYDROCHLORIDE 50 MG: 50 TABLET ORAL at 21:57

## 2022-01-14 RX ADMIN — GABAPENTIN 100 MG: 100 CAPSULE ORAL at 09:28

## 2022-01-14 RX ADMIN — DOCUSATE SODIUM 100 MG: 100 CAPSULE, LIQUID FILLED ORAL at 09:28

## 2022-01-14 RX ADMIN — DOCUSATE SODIUM 100 MG: 100 CAPSULE, LIQUID FILLED ORAL at 19:48

## 2022-01-14 ASSESSMENT — ACTIVITIES OF DAILY LIVING (ADL)
ORAL_HYGIENE: INDEPENDENT
LAUNDRY: WITH SUPERVISION
DRESS: INDEPENDENT
HYGIENE/GROOMING: INDEPENDENT
ORAL_HYGIENE: INDEPENDENT
LAUNDRY: WITH SUPERVISION
DRESS: INDEPENDENT
HYGIENE/GROOMING: INDEPENDENT

## 2022-01-14 NOTE — PROGRESS NOTES
"    ----------------------------------------------------------------------------------------------------------  [facility], [place]   Psychiatric Progress Note  Hospital Day #4    Identifier: Nona Finley is a 41 year old female with previous psychiatric diagnoses of schizoaffective disorder, depressive type who presents with command auditory hallucinations telling her to kill her children, frightening visual hallucinations, and SI w/ intent and plan in the context of medication adherence and significant chronic psychosocial stressors. Assessment is that the current presentation is consistent with schizoaffective disorder with current depression and psychosis.      Interim History:   The patient's care was discussed with the treatment team and chart notes were reviewed.    Vitals: BP 100s/60s -129,   Sleep: 7 hours (01/14/22 0600)  Scheduled medications: Took all scheduled medications as prescribed  PRN medications:   Olanzapine 10mg at 16:25 - for AH - not effective    Staff Report:  -Isolative and withdrawn  -Continuous olfactory and auditory hallucinations   -Sad and anxious  -No overnight events     Subjective:     Patient Interview:  Nona Finley was interviewed in the conference room. Patient said \"last night was bad\". She did not get much sleep. Usually goes to bed at 2100 - 2200. Woke up at 0300 due to auditory and olfactory hallucinations, and was not able to fall back asleep. She falls asleep well, but cannot stay asleep. Patient previously had taken melatonin. She thinks that it was maybe helpful for her sleep in the past.    Discussed ECT, and told that may be able to proceed on Wednesday.   Lithium dicussed. Patient was not sure if it was helpful in the past.  She feels that her mood is \"bad\". Energy level, \"feels weak when she stands up\". Hypotension discussed, patient encouraged to drink more water. Patient inquired about having a low Vit D level. She says that " "she takes Vit D supplements at home.   She spoke with her kids over the phone yesterday, but not yet today. \"They are doing okay\". Auditory hallucination keep telling her to kill herself. Patient expresses concern about ECT not working for her. Dicussed the benefits of ECT in the acute phase, and long term control with medication. Patient feels safe in the hospital. She has not noticed much change in her symptoms since arriving at the hospital.     The risks, benefits, alternatives and side effects of any medication changes have been discussed and are understood by the patient and other caregivers.    Review of systems:   Patient reports no concerning symptoms aside from those listed above.     Objective:   /72 (BP Location: Right arm)   Pulse 115   Temp 97.6  F (36.4  C) (Tympanic)   Resp 16   Wt 72.6 kg (160 lb)   SpO2 100%   BMI 27.46 kg/m    Weight is 160 lbs 0 oz  Body mass index is 27.46 kg/m .    Mental Status Exam:  Oriented to:  Grossly Oriented  General:  Awake and Alert  Appearance:  appears stated age and Grooming is adequate  Behavior/Attitude:  calm and open  Eye Contact:  downcast  Psychomotor: normal and no evidence of tics, dystonia, or tardive dyskinesia no catatonia present  Speech: Normal rate and rhythm, soft volume/tone, more spontaneous  Language: Fluent in English with appropriate syntax and vocabulary.  Mood:  \"depressed\"  Affect:  congruent with mood, sad and anxious  Thought Process:  linear, coherent and goal directed  Thought Content: No SI/HI Constant command AH telling her to kill herself and her kids with no plans to execute.  Associations:  intact  Insight:  good due to recognition of AH and mental illness  Judgment:  good - presented to hospital for help, cooperative  Impulse control: fair - has not acted on command AH  Attention Span:  grossly intact  Concentration:  grossly intact  Recent and Remote Memory:  grossly intact  Fund of Knowledge:  average  Muscle Strength " and Tone: normal  Gait and Station: Normal        Allergies:   No Known Allergies     Labs:     Recent Results (from the past 24 hour(s))   Comprehensive metabolic panel    Collection Time: 01/13/22  8:51 AM   Result Value Ref Range    Sodium 141 133 - 144 mmol/L    Potassium 4.2 3.4 - 5.3 mmol/L    Chloride 110 (H) 94 - 109 mmol/L    Carbon Dioxide (CO2) 26 20 - 32 mmol/L    Anion Gap 5 3 - 14 mmol/L    Urea Nitrogen 15 7 - 30 mg/dL    Creatinine 0.51 (L) 0.52 - 1.04 mg/dL    Calcium 9.1 8.5 - 10.1 mg/dL    Glucose 97 70 - 99 mg/dL    Alkaline Phosphatase 64 40 - 150 U/L    AST 18 0 - 45 U/L    ALT 22 0 - 50 U/L    Protein Total 7.5 6.8 - 8.8 g/dL    Albumin 3.0 (L) 3.4 - 5.0 g/dL    Bilirubin Total 0.2 0.2 - 1.3 mg/dL    GFR Estimate >90 >60 mL/min/1.73m2          Assessment    Primary psychiatric diagnosis:   Schizoaffective disorder, depressed type    Secondary psychiatric diagnoses of concern this admission:   H/o anxiety disorder unspecified    Diagnostic Impression:   Nona Finley is a 41 year old [place] female with a past psychiatric history of schizoaffective disorder depressive type who presented to the ER with SI, HI and AH. Significant symptoms on admission included SI, depression, sleep issues, psychosis and disordered eating. Her last psychiatric hospitalization was from 12/28-1/04/22 at [facility] with Dr. Grimm for the same symptoms.  She is currently followed by Dr. Walter Gaviria, [facility] ACT team. Psychosocial stressors are quite significant. She lives with her mother and father in law and her 6 kids. Her relationship with her  is long-distance, strained and per ACT team, she's been ambivalent about  him and going to live in a  for some time.  Patient's support system is minimal outside of her  and in-laws, and is something her ACT team has been trying to help with (recommending Women's Groups, etc).  Substance use or medication nonadherence do not appear to be  playing a contributing role in the patient's presentation.  There is genetic loading for psychosis. Medical history does not appear to be significant. The MSE is notable for depressed mood, sad affect, SI with plan, commanding AH and disturbing VH. She denies self injurious behaviors.     Her reported symptoms of depressed mood, hopelessness, SI, AH,VH are consistent with her historic diagnosis of schizoaffective disorder with current depression and psychosis. Optimization of medications to target these symptom clusters in addition to evaluation of adquate outpatient supports will be targets for treatment during this admission.     Psychiatric Hospital course:   Nona Finley was admitted to [facility] as a voluntary patient/ on a 72 hour hold. PTA clozapine, colace, haldol (scheduled and prn), trazodone, and melatonin were continued. Patient is also on Abilify MORENO. 1/12 target sxs not controlled, increased clozaril to 362.5mg. Initiated the preliminary steps for ECT consult. Medicine consult placed for ECT screening assessment. Utox and UPT specimen was collected.1/14 Increased Clozaril to 375mg, ECT IP consult planned to be completed. Potential start date of Monday 1/17. Gabapentin PRN was discontinued for ECT next week.    Discontinued Medications (& Rationale): n/a    Medical course: Patient was medically cleared for admission to psychiatric unit. No pertinent PMH.    Data:   CBC: Hgb 11.4 (baseline), otherwise WNL  COVID neg  UDS neg  HCG neg    Plan     Today's changes:    Increase Clozaril to 375mg at bedtime    Vit D 5000U    Vit D blood level check    Discontinue Gabapentin PRN    Psychotropic Medications:  Scheduled:  -Clozapine 362.5mg at bedtime  -Colace 100mg BID  -Haldol 7.5mg at bedtime  -Trazodone 50mg at bedtime     PRN:  -Haldol 2mg BID  -Acetaminophen 650 mg Q6H PRN for pain and fever  -Maalox 30 ml Q4H PRN for indigestion  -Gabapentin 100mg q6h prn for anxiety  -Melatonin 3mg at  bedtime  -Miralax prn for constipation  -Olanzapine 10 mg PO/IM prn Q2H severe agitation/psychosis    Additional Plans:  Patient will be treated in therapeutic milieu with appropriate individual and group therapies as described.    Medical diagnoses to be addressed this admission:    None    Consults: None    Legal Status: Voluntary. Currently committed on a PD. No Reyes.     Safety Assessment:   Behavioral Orders   Procedures     Code 1 - Restrict to Unit     Discontinue 1:1 attendant for suicide risk     Order Specific Question:   I have performed an in person assessment of the patient     Answer:   Based on this assessment the patient no longer requires a one on one attendant at this point in time.     Order Specific Question:   Rationale     Answer:   Medical Record Reviewed     Order Specific Question:   Rationale     Answer:   Patient States able to remain safe in hospital     Order Specific Question:   Rationale     Answer:   Modifications to care environment made to mitigate safety risk     Order Specific Question:   Rationale     Answer:   Routine observations are sufficient to monitor safety.     Routine Programming     As clinically indicated     Status 15     Every 15 minutes.     Suicide precautions     Patients on Suicide Precautions should have a Combination Diet ordered that includes a Diet selection(s) AND a Behavioral Tray selection for Safe Tray - with utensils, or Safe Tray - NO utensils         SIO: No    Disposition: TBD. Pending stabilization, medication optimization, & development of a safe discharge plan.    This patient was seen and discussed with my attending physician.  Santos Brand MD  PGY-1 Psychiatry Resident Physician      Attestation:  This patient has been seen and evaluated by me, Juarez Grimm MD.  I have discussed this patient with the house staff team including the resident and medical student and I agree with the findings and plan in this note.    I have reviewed today's  vital signs, medications, labs and imaging. Juarez Grimm MD , PhD.

## 2022-01-14 NOTE — PLAN OF CARE
Assessment/Intervention/Current Symptoms and Care Coordination  - chart review  - team meeting      Discharge Plan or Goal  Pending stabilization & development of a safe discharge plan.  Considerations include: Return home with outpatient providers      Barriers to Discharge   Patient requires further psychiatric stabilization due to current symptomology      Referral Status  No new referrals       Legal Status  Patient is voluntary     Provisional Discharge Agreement - has not been revoked    Order for Recommitment   - Order issued on 2021 -   Per order  Respondent is recommitted as a person who poises a risk of harm due to mental illness to the Commissioner of Human Services and the head of [facility] until 2022; provided however, that Respondent's provisional discharge remains in effect at this time and will  on 2022, unless it has been revoked or extended before that date.     This writer spoke with [facility]  staff date of admission 2022 - requested copy of recent recommitment and confirmed there is no Reyes order at this time.   Copy of above order obtained from the court and sent for H.I.M scan into media - order is now available to view in media of EHR as of 22

## 2022-01-14 NOTE — PLAN OF CARE
Problem: Sensory Perception Impairment (Psychotic Signs/Symptoms)  Goal: Decreased Sensory Symptoms (Psychotic Signs/Symptoms)  Outcome: No Change     Pt presents with a sad, withdrawn affect. Reports distressing hallucinations. States she hears voices telling her to hurt herself and her children. Also reports visual hallucinations of scary faces and olfactory hallucinations of smelling bleach and blood on her food and drink. Pt states this makes her paranoid of her food and hard for her to eat. Vitals checked this am- BP 93/63 . Writer monitoring and encouraging intake. Rechecked at 1315 and /72 . Pt rated anxiety and depression both a 7/10. Denied SI/SIB. Given haldol and gabapentin for hallucinations and anxiety with limited to no effect. Pt paced the halls with headphones and laid in bed during the shift. Med compliant. Will continue to monitor and assist as needed.

## 2022-01-14 NOTE — PLAN OF CARE
"  Problem: Adult Inpatient Plan of Care  Goal: Optimal Comfort and Wellbeing  Outcome: No Change     Problem: Sleep Disturbance (Psychotic Signs/Symptoms)  Goal: Improved Sleep (Psychotic Signs/Symptoms)  Outcome: No Change   Observed to have slept well for approx 7  hrs overnight w/ regular non-labored respirations and no reported or observed distress on all Q 15\" rounds.  Safe, therapeutic environment maintained.  "

## 2022-01-14 NOTE — PLAN OF CARE
Problem: Cognitive Impairment (Psychotic Signs/Symptoms)  Goal: Optimal Cognitive Function (Psychotic Signs/Symptoms)  Outcome: No Change  Note: Affect is sad and mood is anxious. She is isolative and withdrawn. Minimal interaction with peers. Continues to have olfactory hallucinations. She is med compliant. Denies SI and SIB. Soni Carrion RN

## 2022-01-15 LAB
ANION GAP SERPL CALCULATED.3IONS-SCNC: 6 MMOL/L (ref 3–14)
BUN SERPL-MCNC: 13 MG/DL (ref 7–30)
CALCIUM SERPL-MCNC: 9 MG/DL (ref 8.5–10.1)
CHLORIDE BLD-SCNC: 106 MMOL/L (ref 94–109)
CO2 SERPL-SCNC: 27 MMOL/L (ref 20–32)
CREAT SERPL-MCNC: 0.6 MG/DL (ref 0.52–1.04)
ERYTHROCYTE [DISTWIDTH] IN BLOOD BY AUTOMATED COUNT: 13.8 % (ref 10–15)
GFR SERPL CREATININE-BSD FRML MDRD: >90 ML/MIN/1.73M2
GLUCOSE BLD-MCNC: 113 MG/DL (ref 70–99)
HCT VFR BLD AUTO: 36.8 % (ref 35–47)
HGB BLD-MCNC: 12 G/DL (ref 11.7–15.7)
MCH RBC QN AUTO: 28.9 PG (ref 26.5–33)
MCHC RBC AUTO-ENTMCNC: 32.6 G/DL (ref 31.5–36.5)
MCV RBC AUTO: 89 FL (ref 78–100)
PLATELET # BLD AUTO: 328 10E3/UL (ref 150–450)
POTASSIUM BLD-SCNC: 3.9 MMOL/L (ref 3.4–5.3)
RBC # BLD AUTO: 4.15 10E6/UL (ref 3.8–5.2)
SODIUM SERPL-SCNC: 139 MMOL/L (ref 133–144)
WBC # BLD AUTO: 10.1 10E3/UL (ref 4–11)

## 2022-01-15 PROCEDURE — 124N000002 HC R&B MH UMMC

## 2022-01-15 PROCEDURE — 258N000003 HC RX IP 258 OP 636: Performed by: PHYSICIAN ASSISTANT

## 2022-01-15 PROCEDURE — 85027 COMPLETE CBC AUTOMATED: CPT | Performed by: PHYSICIAN ASSISTANT

## 2022-01-15 PROCEDURE — 250N000013 HC RX MED GY IP 250 OP 250 PS 637

## 2022-01-15 PROCEDURE — 80048 BASIC METABOLIC PNL TOTAL CA: CPT | Performed by: PHYSICIAN ASSISTANT

## 2022-01-15 PROCEDURE — 36415 COLL VENOUS BLD VENIPUNCTURE: CPT | Performed by: PHYSICIAN ASSISTANT

## 2022-01-15 RX ADMIN — Medication 125 MCG: at 10:49

## 2022-01-15 RX ADMIN — HALOPERIDOL 2 MG: 2 TABLET ORAL at 10:48

## 2022-01-15 RX ADMIN — TRAZODONE HYDROCHLORIDE 50 MG: 50 TABLET ORAL at 22:06

## 2022-01-15 RX ADMIN — DOCUSATE SODIUM 100 MG: 100 CAPSULE, LIQUID FILLED ORAL at 20:44

## 2022-01-15 RX ADMIN — SODIUM CHLORIDE, POTASSIUM CHLORIDE, SODIUM LACTATE AND CALCIUM CHLORIDE 1000 ML: 600; 310; 30; 20 INJECTION, SOLUTION INTRAVENOUS at 13:18

## 2022-01-15 RX ADMIN — Medication 7.5 MG: at 22:06

## 2022-01-15 RX ADMIN — CLOZAPINE 375 MG: 50 TABLET ORAL at 20:44

## 2022-01-15 RX ADMIN — DOCUSATE SODIUM 100 MG: 100 CAPSULE, LIQUID FILLED ORAL at 10:48

## 2022-01-15 ASSESSMENT — ACTIVITIES OF DAILY LIVING (ADL)
ORAL_HYGIENE: INDEPENDENT
LAUNDRY: WITH SUPERVISION
HYGIENE/GROOMING: INDEPENDENT
ORAL_HYGIENE: INDEPENDENT
DRESS: INDEPENDENT
DRESS: INDEPENDENT
HYGIENE/GROOMING: INDEPENDENT
LAUNDRY: WITH SUPERVISION

## 2022-01-15 NOTE — PLAN OF CARE
"  Problem: Adult Inpatient Plan of Care  Goal: Optimal Comfort and Wellbeing  Outcome: No Change     Problem: Sleep Disturbance (Psychotic Signs/Symptoms)  Goal: Improved Sleep (Psychotic Signs/Symptoms)  Outcome: No Change   Observed to have slept well for approx 7 hrs on all Q 15\" rounds overnight w/ regular non-labored respirations and a little intermittent snoring.  No reported or observed distress.  PO fluids maintained at BS r/t concern for ortho B/P changes. Safe, therapeutic environment maintained.    "

## 2022-01-15 NOTE — PLAN OF CARE
"  Problem: Sensory Perception Impairment (Psychotic Signs/Symptoms)  Goal: Decreased Sensory Symptoms (Psychotic Signs/Symptoms)  Outcome: No Change   Nursing Assessment    Recent Vitals: B/P:100/69  T:97.4  P:108      General Shift Summary  Pt in milieu throughout shift. Social with peers at times. Continues to endrorse AH,VH and olfactory hallucianations. AH are command in nature and telling her to kill herself and children. Pt does not have these thoughts and has no plan to act on this. Contracts for safety. Pt is calm and pleasant. Does not appear to be responding to hallucinations and did not make any paranoid or delusional statements beyond describing their hallucinations.    Pt was shown ECT video and had questions answered. Pt does not seem particularly hopeful of ECT tx, but says, \"I don't know what to do, I want to try anything that can help.\"     Patient is medication compliant and reported no side effects. No PRN medications given.     Pt reports normal BM's for several days in a row.     Hygiene is good. Pt reports eating all of dinner and had a snack. Pt has poor fluid intake due to olfactory hallucinations although agrees to education to try to drink more.     Branden Doe RN    "

## 2022-01-15 NOTE — PLAN OF CARE
Problem: Sensory Perception Impairment (Psychotic Signs/Symptoms)  Goal: Decreased Sensory Symptoms (Psychotic Signs/Symptoms)  Outcome: No Change     Pt is quiet and withdrawn on approach. Present with a sad affect. Reports no improvement in hallucinations. Endorses auditory hallucinations telling her to go to the river and jump, visual hallucinations of scary faces and olfactory hallucinations of smelling bleach and blood in her food and drinks, which pt reports makes it hard for her to eat/drink. Given haldol for hallucinations, with no effect. Pt had orthostatic hypotension and tachycardia this morning with reported dizziness. BP 98/68 sitting, 75/51 standing. Writer pushed fluids and alerted internal medicine. Pt drank 360 cc of water. Rechecked BP at 1200 and improved to 103/71 sitting, 97/67 standing. Given IV fluids- shivering prior to IV fluids, resolved after. Pt paced the halls while listening to headphones and laid in her bed during the shift. Med compliant. Will continue to monitor and assist as needed.    323/3N

## 2022-01-15 NOTE — PROGRESS NOTES
Brief Medicine Note    Contacted by nursing regarding as patient had positive orthostatic hypotension (lightheadedness) with BP 98/68,  (sitting) and BP 75/51 and  (standing). Repeat orthostatic taken thereafter was 103/71 with  (sitting) and 97/67  And  (standing). RN reports eating meals, but likely having poor fluid intake. Was seen having one cup of water yesterday.     Today's vital signs, medications, and nursing notes were reviewed.     /69   Pulse 108   Temp 98  F (36.7  C) (Oral)   Resp 16   Wt 72.6 kg (160 lb)   SpO2 100%   BMI 27.46 kg/m      A/P:  # Orthostatic hypotension:   Patient with positive orthostatics as above and symptomatic with lightheadedness. Likely exacerbated by clozaril use as well as poor PO intake in setting of psychosis.    - Will check BMP, CBC now    - Will give 1L bolus of LR now   - Continue to monitor orthostatics, likely exacerbated by Clozaril - psychiatry to monitor and consider adjustment if ongoing    - Encourage fluids   - Intake and output ordered   - Please notify Medicine if SBP <90/60 or symptomatic    Medicine will continue to follow. Please notify Medicine if any additional questions or concerns.    Patricia Barahona PA-C  Hospitalist Service  Pager [phone]

## 2022-01-16 LAB — SARS-COV-2 RNA RESP QL NAA+PROBE: NEGATIVE

## 2022-01-16 PROCEDURE — 124N000002 HC R&B MH UMMC

## 2022-01-16 PROCEDURE — 93005 ELECTROCARDIOGRAM TRACING: CPT

## 2022-01-16 PROCEDURE — U0005 INFEC AGEN DETEC AMPLI PROBE: HCPCS | Performed by: PSYCHIATRY & NEUROLOGY

## 2022-01-16 PROCEDURE — 250N000013 HC RX MED GY IP 250 OP 250 PS 637

## 2022-01-16 PROCEDURE — 93010 ELECTROCARDIOGRAM REPORT: CPT | Performed by: INTERNAL MEDICINE

## 2022-01-16 RX ADMIN — MELATONIN TAB 3 MG 3 MG: 3 TAB at 21:43

## 2022-01-16 RX ADMIN — TRAZODONE HYDROCHLORIDE 50 MG: 50 TABLET ORAL at 21:40

## 2022-01-16 RX ADMIN — CLOZAPINE 375 MG: 50 TABLET ORAL at 20:19

## 2022-01-16 RX ADMIN — Medication 125 MCG: at 10:03

## 2022-01-16 RX ADMIN — Medication 7.5 MG: at 21:40

## 2022-01-16 RX ADMIN — DOCUSATE SODIUM 100 MG: 100 CAPSULE, LIQUID FILLED ORAL at 10:03

## 2022-01-16 RX ADMIN — HALOPERIDOL 2 MG: 2 TABLET ORAL at 10:04

## 2022-01-16 RX ADMIN — DOCUSATE SODIUM 100 MG: 100 CAPSULE, LIQUID FILLED ORAL at 19:46

## 2022-01-16 ASSESSMENT — ACTIVITIES OF DAILY LIVING (ADL)
DRESS: INDEPENDENT
DRESS: INDEPENDENT
HYGIENE/GROOMING: INDEPENDENT
ORAL_HYGIENE: INDEPENDENT
HYGIENE/GROOMING: INDEPENDENT
LAUNDRY: WITH SUPERVISION
ORAL_HYGIENE: INDEPENDENT
LAUNDRY: WITH SUPERVISION

## 2022-01-16 NOTE — PLAN OF CARE
Problem: Sleep Disturbance (Psychotic Signs/Symptoms)  Goal: Improved Sleep (Psychotic Signs/Symptoms)  Outcome: No Change  Flowsheets (Taken 1/16/2022 0602)  Mutually Determined Action Steps (Improved Sleep): sleeps 4-6 hours at night   Patient was observed sleeping well at start of shift and through the rest of the night. No complaints of pain nor discomfort. Slept 7 hours this shift. No PRN medications given.

## 2022-01-16 NOTE — PLAN OF CARE
Problem: Sensory Perception Impairment (Psychotic Signs/Symptoms)  Goal: Decreased Sensory Symptoms (Psychotic Signs/Symptoms)  Outcome: No Change     Pt presents with a sad, withdrawn affect. Reports she is not doing well and continues to struggle with auditory, visual and olfactory hallucinations. Auditory hallucinations are command and telling her to hurt herself and her children. Pt reports the voices are also telling her her food and medication is poisoned. Pt ate only oatmeal off breakfast tray and drank 240 ml of milk this morning. She reports food and drink smell of bleach and blood, which is making eating and drinking difficult for her. Pt had orthostatic hypotension this shift with reported dizziness. See flow sheet for details. Writer encouraged fluids and alerted internal medicine. Pt had a total of 860 fluids this shift. She ate 90% of her lunch. Pt laid in bed, talked on the phone and paced with headphones during the shift. Med compliant. Haldol administered for hallucinations with no reported effect. Will continue to monitor and assist as needed.

## 2022-01-16 NOTE — PLAN OF CARE
"  Problem: Sensory Perception Impairment (Psychotic Signs/Symptoms)  Goal: Decreased Sensory Symptoms (Psychotic Signs/Symptoms)  Outcome: No Change   Nursing Assessment    Recent Vitals: B/P:114/75  T:96.9  P:107     General Shift Summary  Pt more withdrawn this shift although does spend time in milieu. Pt reports no change in hallucinations and continues to report command AH to kill herself and her children. Pt contracts for safety and states she would not do these things. Pt reports being \"very sad\". Pt is unsure how much mood is being impacted due to hallucinations. Pt hopeful that ECT will work for her. Pt does not appear to be outwardly responding to halls. Pt makes no delusional statements and speech and behavior are organized.     Patient is medication compliant and reported side no effects. No PRN medications given this shift. Pt states medications are not helping hallucinations at all.     Hygiene is good (washes in room). Appetite is adequate. Pt reports eating all of dinner and drinking two cups of water.       Branden Doe RN    "

## 2022-01-16 NOTE — PROGRESS NOTES
Brief Medicine Note    Medicine following for orthostatic hypotension. Patient had BMP and CBC on 1/15 which was stable. BP improved with 1L bolus of fluids last evening to 114/75 with .     /75 (BP Location: Right arm, Patient Position: Sitting)   Pulse 107   Temp 96.9  F (36.1  C) (Tympanic)   Resp 16   Wt 72.6 kg (160 lb)   SpO2 100%   BMI 27.46 kg/m      # Orthostatic hypotension:   Patient with history of orthostatic hypotension. Likely exacerbated by clozaril use as well as poor PO intake in setting of psychosis. BMP and CBC unremarkable on 1/15. Given 1L bolus of IVF.    - Continue to monitor orthostatics, likely exacerbated by Clozaril - psychiatry to monitor and consider adjustment if ongoing    - Encourage fluids   - Intake and output ordered   - Please notify Medicine if SBP <90/60 or symptomatic     Patricia Barahona PA-C  Hospitalist Service  Pager [phone]

## 2022-01-16 NOTE — CONSULTS
[facility], [place]   ECT Consultation   January 14, 2022     Nona Finley [id number]   41 year old 1980     Patient Status: Inpatient    Is this the first in a series of 12 treatments?  No    No Known Allergies    Weight:  160 lbs 0 oz           Indications for ECT:   Medications ineffective, former good response to electroconvulsive therapy (ECT)         Clinical Narrative:   Nona Finley is a 41 year old [place] female with a past psychiatric history of schizoaffective disorder depressive type who presented to the ER with SI, HI and AH. Significant symptoms on admission included SI, depression, sleep issues, psychosis and disordered eating. Her last psychiatric hospitalization was from 12/28-1/04/22 at [facility] with Dr. Grimm for the same symptoms.  She is currently followed by Dr. Walter Gaviria, [facility] team. Psychosocial stressors are quite significant. She lives with her mother and father in law and her 6 kids. Her relationship with her  is long-distance, strained and per ACT team, she's been ambivalent about  him and going to live in a  for some time.  Patient's support system is minimal outside of her  and in-laws, and is something her ACT team has been trying to help with (recommending Women's Groups, etc).  Substance use or medication nonadherence do not appear to be playing a contributing role in the patient's presentation.  There is genetic loading for psychosis. Medical history does not appear to be significant.   This consultation is requested to evaluate electroconvulsive therapy (ECT) candidacy. Medical records indicate she underwent a course of ECT in 2012. She was responsive to right, unilateral ultrabrief electroconvulsive therapy (ECT) x 9, she was started on methohexital as an anesthetic agent, added caffeine (up to 750 mg) and was eventually change to etomidate 12 mg.       ROS:    Depression: suicidal ideation with plan,  without intent, depressed mood, low energy, insomnia, feeling worthless and overwhelmed  Anxiety: excessive worry and nervous/overwhelmed  Panic Attack:  No  Martha/Hypomania:  none  Psychosis: delusions- being under surveillance, delusions of reference (TV) [details in Interim History], auditory hallucinations with commands [details in Interim History] and visual hallucinations [details in Interim History]  Trauma Related: none  Suicidal Ideation: Yes   Homicidal Ideation: Yes     Personality Symptoms: None  Obsessive Compulsive Disorder: negative    Eating Disorders: negative  LD: No previously diagnosed or signs of symptoms of learning disorder reported     Medical ROS: A complete medical review of systems was preformed and is negative other than noted in the HPI     Psychiatric History:     Primary Outpatient Psychiatrist: Dr. Walter Gaviria, [facility] ACT team  Primary Physician: Rip Capps at [facility]  Psychotherapist: Brittnee  : Vicky Harrington, [phone] [facility] ACT Team     Prior diagnoses:   Schizoaffective Disorder, Depressive subtype     Prior Hospitalizations:   [facility]: 12/2021 - 8/2021 - 7/2020 - 12/2019 - 5/2019 - 3/2019 - 2/2019 - 5/2018      Suicide attempts:   Yes, longer than 6 months ago - attempted to jump off a bridge - stopped by       Self-injurious behavior:   No     Violence:   No     ECT/TMS:   Yes - 2011 and 2012     Past medications (Not currently taking):  Lorazepam 0.5 mg  Metformin 1000 mg  Zyprexa 20 mg    Xarelto 20 mg  .   Perphenazine 2 mg for psychosis  Prozac when diagnosed with post partum depression and it was effective for her     Nicotine:       History   Smoking Status     Never Smoker   Smokeless Tobacco     Never Used      Alcohol: Social History    Substance and Sexual Activity      Alcohol use: No     Tobacco: never smoked  Alcohol: No  Cannabis: No  Opiates: No  Benzodiazepines: No  Stimulants: No     Prior CD  "treatments: No       Family History:      Social History:     Early History: \"Originally from [place].  Most of her family is still there.   Grew up in [place], left in 2001. First went to [place] before coming to [place].\"      Abuse/Trauma: Not ob file     Friends/Family/Support: \"Here in [place], she relies on help of the family of her .   currently lives in [place].   The patient is taking care of their 6 children  Has a daughter with ASD who is non-verbal. Her in-laws help her with children\".     Collateral: None     Current Living Situation:  Lives in a house in [place] with  and 6 children     Educational History: 5th grade     Occupation: Housewife     Legal: Not on file     : No     Guns: locked in house     Spirituality: practicing Restorationism     Current Med  Current Facility-Administered Medications   Medication     acetaminophen (TYLENOL) tablet 650 mg     alum & mag hydroxide-simethicone (MAALOX) suspension 30 mL     cholecalciferol (VITAMIN D3) 125 mcg (5000 units) capsule 125 mcg     cloZAPine (CLOZARIL) tablet 375 mg     docusate sodium (COLACE) capsule 100 mg     haloperidol (HALDOL) half-tab 7.5 mg     haloperidol (HALDOL) tablet 2 mg     melatonin tablet 3 mg     OLANZapine (zyPREXA) tablet 10 mg    Or     OLANZapine (zyPREXA) injection 10 mg     polyethylene glycol (MIRALAX) Packet 17 g     traZODone (DESYREL) tablet 50 mg        PMH:  Past Medical History:   Diagnosis Date     Encounter for female birth control 6/14/2017 6/14/2017 Plan Documentation Service ordered Depo Provera injection (150mg IM) may be given every 3 months for one year per loretta. Plan and order should be renewed at a visit no later than 6/14/18   Vanessa Thompson MD         Encounter for insertion or removal of intrauterine contraceptive device 1/3/2008    Paragard 1/08 removed 1/08.     Postpartum depression 8/18/2011     Schizophrenia (H) 2/12/2019     Suicidal " "ideation 2/16/2017       Objective:  /75 (BP Location: Right arm, Patient Position: Sitting)   Pulse 107   Temp 97.4  F (36.3  C) (Oral)   Resp 16   Wt 72.6 kg (160 lb)   SpO2 100%   BMI 27.46 kg/m   Weight is 160 lbs 0 oz  Body mass index is 27.46 kg/m .    Mental Status Examination:  Appearance/ Behavior: In appropriate attire, wearing a Evangelical head scarf; has good hygiene. Calmly and actively engages with the examiner. Maintains fair eye contact.   Speech:  Clear, spontaneous with no apparent receptive or expressive difficulties. Normal rate, rhythm and volume.  Musculoskeletal:  Normal bulk with no visible atrophy.  No abnormal movements noted.  Gait is of normal base, stride and speed. Normal arm swing bilaterally.  Mood/Affect: Mood is reported as \"depressed\".  Affect is restricted to depressed range with occasional appropriate tearfulness.    Thought Process:  Mostly linear with occasional circumstantiality which responds to redirection  Thought Content: Passive death wishes with intermittent suicidal and homicidal (her children) thoughts (prompted by commanding auditory hallucinations), without plan or intent   Perception:  No evidence for any perceptual disturbances  Cognition: alert, fully oriented to person, place and time.  Fund of knowledge appears appropriate in the context   Judgment/Insight: Fair insight; hallucinations are egodystonic, acknowledges need for care. Judgment is reasonable within the context of insight.        Labs/imaging:       Lab Results   Component Value Date     01/15/2022     01/13/2022     12/28/2021     05/10/2021     02/24/2021     02/11/2021    Lab Results   Component Value Date    CHLORIDE 106 01/15/2022    CHLORIDE 110 01/13/2022    CHLORIDE 108 12/28/2021    CHLORIDE 108 05/10/2021    CHLORIDE 107 02/24/2021    CHLORIDE 112 02/11/2021    Lab Results   Component Value Date    BUN 13 01/15/2022    BUN 15 01/13/2022    BUN 19 " 12/28/2021    BUN 13 05/10/2021    BUN 14 02/24/2021    BUN 16 02/11/2021      Lab Results   Component Value Date    POTASSIUM 3.9 01/15/2022    POTASSIUM 4.2 01/13/2022    POTASSIUM 3.7 12/28/2021    POTASSIUM 3.6 05/10/2021    POTASSIUM 3.9 02/24/2021    POTASSIUM 4.2 02/11/2021    Lab Results   Component Value Date    CO2 27 01/15/2022    CO2 26 01/13/2022    CO2 27 12/28/2021    CO2 27 05/10/2021    CO2 27 02/24/2021    CO2 24 02/11/2021    Lab Results   Component Value Date    CR 0.60 01/15/2022    CR 0.51 01/13/2022    CR 0.56 12/28/2021    CR 0.52 05/10/2021    CR 0.57 02/24/2021    CR 0.60 02/11/2021        Lab Results   Component Value Date    WBC 10.1 01/15/2022    WBC 5.7 01/11/2022    WBC 5.8 01/04/2022    HGB 12.0 01/15/2022    HGB 11.4 (L) 01/11/2022    HGB 11.1 (L) 01/04/2022    HCT 36.8 01/15/2022    HCT 34.5 (L) 01/11/2022    HCT 34.1 (L) 01/04/2022    MCV 89 01/15/2022    MCV 88 01/11/2022    MCV 88 01/04/2022     01/15/2022     01/11/2022     01/04/2022     Lab Results   Component Value Date    TSH 1.52 12/28/2021    TSH 0.69 08/16/2021    TSH 0.75 02/24/2021            Diagnosis:   - Schizoaffective Disorder Depressive type         Assessment:     Considering the clinical acuity and historical evidence for medication inefficacy; and good response to ECT; proceeding with ECT appears appropriate in the current settings. She would benefit from ongoing optimization of cognitive, behavioral and social interventions longitudinally.            Plan:     Start Right, unilateral ultrabrief ECT pending clearance by Medicine and Anesthesiology. We will titrate to seizure threshold and perform subsequent treatments at 6X threshhold, as per current standards. Treat to remission of depressive symptoms or plateau of improvement with no further improvement over 2-4 additional treatments.  Discussed all relevant aspects of ECT with patient, including risks of memory loss, HA, nausea, death  <1/50,000, driving prohibition; possible lack of benefit or relapse after successful treatment, alternatives, right to decline, possible outpatient procedures. All questions answered, patient will have opportunity to review ECT video. I will be the provider performing ECT.   Monitor depression severity with clinical assessment augmented with IDS-SR at baseline and every 3rd treatment. ECCA at baseline and repeat as indicated based on cognitive complaints  Medication changes recommended:   1. Taper (if needed) to discontinue gapapentin over the weekend.       Total time to meet face-to-face with patient was 75 minutes of which >50% was devoted to discussing procedures, risks, benefits, and alternatives for ECT, and educating patient on the necessary medical preparation to start ECT

## 2022-01-17 ENCOUNTER — ANESTHESIA (OUTPATIENT)
Dept: BEHAVIORAL HEALTH | Facility: CLINIC | Age: 42
DRG: 885 | End: 2022-01-17
Payer: COMMERCIAL

## 2022-01-17 ENCOUNTER — APPOINTMENT (OUTPATIENT)
Dept: BEHAVIORAL HEALTH | Facility: CLINIC | Age: 42
DRG: 885 | End: 2022-01-17
Attending: PSYCHIATRY & NEUROLOGY
Payer: COMMERCIAL

## 2022-01-17 ENCOUNTER — ANESTHESIA EVENT (OUTPATIENT)
Dept: BEHAVIORAL HEALTH | Facility: CLINIC | Age: 42
DRG: 885 | End: 2022-01-17
Payer: COMMERCIAL

## 2022-01-17 LAB
ATRIAL RATE - MUSE: 102 BPM
DIASTOLIC BLOOD PRESSURE - MUSE: NORMAL MMHG
INTERPRETATION ECG - MUSE: NORMAL
P AXIS - MUSE: 86 DEGREES
PR INTERVAL - MUSE: 174 MS
QRS DURATION - MUSE: 72 MS
QT - MUSE: 346 MS
QTC - MUSE: 450 MS
R AXIS - MUSE: 77 DEGREES
SYSTOLIC BLOOD PRESSURE - MUSE: NORMAL MMHG
T AXIS - MUSE: 69 DEGREES
VENTRICULAR RATE- MUSE: 102 BPM

## 2022-01-17 PROCEDURE — 250N000009 HC RX 250

## 2022-01-17 PROCEDURE — 250N000013 HC RX MED GY IP 250 OP 250 PS 637

## 2022-01-17 PROCEDURE — 370N000017 HC ANESTHESIA TECHNICAL FEE, PER MIN

## 2022-01-17 PROCEDURE — GZB0ZZZ ELECTROCONVULSIVE THERAPY, UNILATERAL-SINGLE SEIZURE: ICD-10-PCS | Performed by: PSYCHIATRY & NEUROLOGY

## 2022-01-17 PROCEDURE — 250N000009 HC RX 250: Performed by: ANESTHESIOLOGY

## 2022-01-17 PROCEDURE — U0005 INFEC AGEN DETEC AMPLI PROBE: HCPCS | Performed by: PSYCHIATRY & NEUROLOGY

## 2022-01-17 PROCEDURE — 250N000013 HC RX MED GY IP 250 OP 250 PS 637: Performed by: STUDENT IN AN ORGANIZED HEALTH CARE EDUCATION/TRAINING PROGRAM

## 2022-01-17 PROCEDURE — 90870 ELECTROCONVULSIVE THERAPY: CPT

## 2022-01-17 PROCEDURE — 250N000011 HC RX IP 250 OP 636: Performed by: ANESTHESIOLOGY

## 2022-01-17 PROCEDURE — 124N000002 HC R&B MH UMMC

## 2022-01-17 PROCEDURE — 258N000003 HC RX IP 258 OP 636

## 2022-01-17 RX ORDER — HYDROMORPHONE HCL IN WATER/PF 6 MG/30 ML
0.2 PATIENT CONTROLLED ANALGESIA SYRINGE INTRAVENOUS EVERY 5 MIN PRN
Status: DISCONTINUED | OUTPATIENT
Start: 2022-01-17 | End: 2022-01-17

## 2022-01-17 RX ORDER — OXYCODONE HYDROCHLORIDE 5 MG/1
5 TABLET ORAL EVERY 4 HOURS PRN
Status: DISCONTINUED | OUTPATIENT
Start: 2022-01-17 | End: 2022-01-17

## 2022-01-17 RX ORDER — IBUPROFEN 200 MG
400 TABLET ORAL EVERY 6 HOURS PRN
Status: DISCONTINUED | OUTPATIENT
Start: 2022-01-17 | End: 2022-01-21 | Stop reason: HOSPADM

## 2022-01-17 RX ORDER — ETOMIDATE 2 MG/ML
INJECTION INTRAVENOUS PRN
Status: DISCONTINUED | OUTPATIENT
Start: 2022-01-17 | End: 2022-01-17

## 2022-01-17 RX ORDER — FENTANYL CITRATE 50 UG/ML
25 INJECTION, SOLUTION INTRAMUSCULAR; INTRAVENOUS EVERY 5 MIN PRN
Status: DISCONTINUED | OUTPATIENT
Start: 2022-01-17 | End: 2022-01-17

## 2022-01-17 RX ORDER — SODIUM CHLORIDE, SODIUM LACTATE, POTASSIUM CHLORIDE, CALCIUM CHLORIDE 600; 310; 30; 20 MG/100ML; MG/100ML; MG/100ML; MG/100ML
INJECTION, SOLUTION INTRAVENOUS CONTINUOUS
Status: DISCONTINUED | OUTPATIENT
Start: 2022-01-17 | End: 2022-01-17

## 2022-01-17 RX ORDER — FENTANYL CITRATE 50 UG/ML
25 INJECTION, SOLUTION INTRAMUSCULAR; INTRAVENOUS
Status: DISCONTINUED | OUTPATIENT
Start: 2022-01-17 | End: 2022-01-17

## 2022-01-17 RX ORDER — MEPERIDINE HYDROCHLORIDE 25 MG/ML
12.5 INJECTION INTRAMUSCULAR; INTRAVENOUS; SUBCUTANEOUS
Status: DISCONTINUED | OUTPATIENT
Start: 2022-01-17 | End: 2022-01-17

## 2022-01-17 RX ORDER — ONDANSETRON 4 MG/1
4 TABLET, ORALLY DISINTEGRATING ORAL EVERY 30 MIN PRN
Status: DISCONTINUED | OUTPATIENT
Start: 2022-01-17 | End: 2022-01-17

## 2022-01-17 RX ORDER — ONDANSETRON 2 MG/ML
4 INJECTION INTRAMUSCULAR; INTRAVENOUS EVERY 30 MIN PRN
Status: DISCONTINUED | OUTPATIENT
Start: 2022-01-17 | End: 2022-01-17

## 2022-01-17 RX ORDER — NICARDIPINE HCL-0.9% SOD CHLOR 1 MG/10 ML
SYRINGE (ML) INTRAVENOUS PRN
Status: DISCONTINUED | OUTPATIENT
Start: 2022-01-17 | End: 2022-01-17

## 2022-01-17 RX ADMIN — Medication 125 MCG: at 13:42

## 2022-01-17 RX ADMIN — DOCUSATE SODIUM 100 MG: 100 CAPSULE, LIQUID FILLED ORAL at 19:39

## 2022-01-17 RX ADMIN — IBUPROFEN 400 MG: 200 TABLET, FILM COATED ORAL at 15:51

## 2022-01-17 RX ADMIN — Medication 400 MCG: at 12:18

## 2022-01-17 RX ADMIN — DOCUSATE SODIUM 100 MG: 100 CAPSULE, LIQUID FILLED ORAL at 13:42

## 2022-01-17 RX ADMIN — Medication 60 MG: at 12:18

## 2022-01-17 RX ADMIN — Medication 7.5 MG: at 21:20

## 2022-01-17 RX ADMIN — ACETAMINOPHEN 650 MG: 325 TABLET, FILM COATED ORAL at 14:57

## 2022-01-17 RX ADMIN — DEXMEDETOMIDINE HYDROCHLORIDE 20 MCG: 100 INJECTION, SOLUTION INTRAVENOUS at 12:16

## 2022-01-17 RX ADMIN — Medication 12 MG: at 12:18

## 2022-01-17 RX ADMIN — TRAZODONE HYDROCHLORIDE 50 MG: 50 TABLET ORAL at 21:21

## 2022-01-17 RX ADMIN — ACETAMINOPHEN 650 MG: 325 TABLET, FILM COATED ORAL at 21:21

## 2022-01-17 RX ADMIN — CLOZAPINE 375 MG: 50 TABLET ORAL at 19:39

## 2022-01-17 ASSESSMENT — ACTIVITIES OF DAILY LIVING (ADL)
ORAL_HYGIENE: INDEPENDENT
HYGIENE/GROOMING: INDEPENDENT
LAUNDRY: WITH SUPERVISION
HYGIENE/GROOMING: INDEPENDENT
DRESS: INDEPENDENT
DRESS: INDEPENDENT
LAUNDRY: WITH SUPERVISION
ORAL_HYGIENE: INDEPENDENT

## 2022-01-17 NOTE — ANESTHESIA CARE TRANSFER NOTE
Anesthesia Care Transfer Note    Patient: Nona Finley    Transferred to: PACU    Patient vital signs: stable    Airway: none

## 2022-01-17 NOTE — PLAN OF CARE
Problem: Sleep Disturbance (Psychotic Signs/Symptoms)  Goal: Improved Sleep (Psychotic Signs/Symptoms)  Outcome: No Change  Flowsheets (Taken 1/17/2022 9603)  Mutually Determined Action Steps (Improved Sleep): sleeps 4-6 hours at night    Patient was sleeping all night. No behavioral concerns. Slept 7 hours. No fluid intake this shift.

## 2022-01-17 NOTE — PROCEDURES
Nona Finley is a 41 year old  year old female patient.  [id number]      [facility], [place]   ECT Procedure Note   01/17/2022    Patient Status: Inpatient    Is this the first in a series of 12 treatments?  Yes   No Known Allergies    Weight:  160 lbs 0 oz         Indications for ECT:     Medications ineffective, Imminent suicide risk and History of good ECT response in one or more previous episodes of illness         Clinical Narrative:     Nona Finley is a 41 year old [place] female with a past psychiatric history of schizoaffective disorder depressive type who presented to the ER with SI, HI and AH. Significant symptoms on admission included SI, depression, sleep issues, psychosis and disordered eating. Her last psychiatric hospitalization was from 12/28-1/04/22 at [facility] with Dr. Grimm for the same symptoms.  She is currently followed by Dr. Walter Gaviria, [facility] ACT team. Psychosocial stressors are quite significant. She lives with her mother and father in law and her 6 kids. Her relationship with her  is long-distance, strained and per ACT team, she's been ambivalent about  him and going to live in a  for some time.  Patient's support system is minimal outside of her  and in-laws, and is something her ACT team has been trying to help with (recommending Women's Groups, etc).  Substance use or medication nonadherence do not appear to be playing a contributing role in the patient's presentation.  There is genetic loading for psychosis. Medical history does not appear to be significant.   This consultation is requested to evaluate electroconvulsive therapy (ECT) candidacy. Medical records indicate she underwent a course of ECT in 2012. She was responsive to right, unilateral ultrabrief electroconvulsive therapy (ECT) x 9, she was started on methohexital as an anesthetic agent, added caffeine (up to 750 mg) and was eventually change to etomidate 12  mg.   ROS:    Depression: suicidal ideation with plan, without intent, depressed mood, low energy, insomnia, feeling worthless and overwhelmed  Anxiety: excessive worry and nervous/overwhelmed  Panic Attack:  No  Martha/Hypomania:  none  Psychosis: delusions- being under surveillance, delusions of reference (TV) [details in Interim History], auditory hallucinations with commands [details in Interim History] and visual hallucinations [details in Interim History]  Trauma Related: none  Suicidal Ideation: Yes   Homicidal Ideation: Yes     Personality Symptoms: None  Obsessive Compulsive Disorder: negative    Eating Disorders: negative  LD: No previously diagnosed or signs of symptoms of learning disorder reported     Medical ROS: A complete medical review of systems was preformed and is negative other than noted in the HPI     Psychiatric History:      Primary Outpatient Psychiatrist: Dr. Walter Gaviria, [facility] ACT team  Primary Physician: Rip Capps at [facility]  Psychotherapist: Brittnee  : Vicky Juany, [phone] [facility] ACT Team      Prior diagnoses:   Schizoaffective Disorder, Depressive subtype     Prior Hospitalizations:   [facility]: 12/2021 - 8/2021 - 7/2020 - 12/2019 - 5/2019 - 3/2019 - 2/2019 - 5/2018      Suicide attempts:   Yes, longer than 6 months ago - attempted to jump off a bridge - stopped by       Self-injurious behavior:   No     Violence:   No     ECT/TMS:   Yes - 2011 and 2012     Past medications (Not currently taking):  Lorazepam 0.5 mg  Metformin 1000 mg  Zyprexa 20 mg    Xarelto 20 mg  .   Perphenazine 2 mg for psychosis  Prozac when diagnosed with post partum depression and it was effective for her      Nicotine:         History   Smoking Status     Never Smoker   Smokeless Tobacco     Never Used      Alcohol: Social History    Substance and Sexual Activity      Alcohol use: No     Tobacco: never  "smoked  Alcohol: No  Cannabis: No  Opiates: No  Benzodiazepines: No  Stimulants: No     Prior CD treatments: No        Family History:      Social History:      Early History: \"Originally from [place].  Most of her family is still there.   Grew up in [place], left in 2001. First went to [place] before coming to [place].\"      Abuse/Trauma: Not ob file     Friends/Family/Support: \"Here in [place], she relies on help of the family of her .   currently lives in [place].   The patient is taking care of their 6 children  Has a daughter with ASD who is non-verbal. Her in-laws help her with children\".     Collateral: None     Current Living Situation:  Lives in a house in [place] with  and 6 children     Educational History: 5th grade     Occupation: Housewife     Legal: Not on file     : No     Guns: locked in house     Spirituality: practicing Uatsdin              Diagnosis:     - Schizoaffective Disorder Depressive type          ECT Log:   #1 Consent is obtained for the procedure. Starting mood is 1/10 (10 being the best). Commanding auditory hallucinations and suicidal ideation continue. The patient reports no plan/intent to self-harm or harm to others when asked about explicitly.          Pause for the Cause:     Right patient Yes   Right procedure/laterality settings: Yes          Intra-Procedure Documentation:       ECT number at [facility]: 10   Treatment number this series: 1   Lifetime total treatment number: 11     Type of ECT:  Right, unilateral ultrabrief    ECT Medications:    Etomidate: 12mg  Succinyl Choline: 60mg  Precedex 20mg (pre-procedure)    ECT Strip Summary:   Energy Level: Titration: 19.2    Motor Seizure Duration: 72 seconds  EEG Seizure Duration: 96 seconds    Complications: No      Plan:   Continue ECT Q MWF (Treat at Energy Level:  115.2 mC     0.3 msec    30  Hz    8 sec   800mA   COVID test today    Monitor depression severity with clinical " assessment augmented with IDS-SR every 3rd treatment  Continue current medications    Hannah Adame MD assisted with this procedure      Luciano Eisenberg MD

## 2022-01-17 NOTE — PROGRESS NOTES
SPIRITUAL HEALTH SERVICES  SPIRITUAL ASSESSMENT Progress Note  [facility] ([facility]) UR 22NB     REFERRAL SOURCE: Self initiated visit    Pt declined SHS visit .    PLAN: No follow up for now    Sangeetha Correa  Chaplain Resident  Phone: [phone]

## 2022-01-17 NOTE — PLAN OF CARE
Problem: Sensory Perception Impairment (Psychotic Signs/Symptoms)  Goal: Decreased Sensory Symptoms (Psychotic Signs/Symptoms)  Outcome: No Change   Nursing Assessment    Recent Vitals: B/P:100/62  T:97.3  P:147 (sitting)   BP: 80/57 P:139 (standing)    General Shift Summary  Pt more isolative to room today, laying in bed for much of shift. Pt did however make a few phone calls and talk to children. Pt endorses anxiety and depression. Pt reports worrying about her kids and her mental health not getting better. Pt reports no changes in hallucinations, continuing to endorse AH, VH and olfactory hallucinations. Command AH continuing to tell her to kill self and children. Continues to contract for safety. Pt is very pleasant and cooperative. Does not appear responding to hallucinations and behavior and speech are organized.     Discussed ECT tx with patient. Pt was hopeful tx would be on Monday and disappointed she would likely have to wait until Wednesday. Pt was under the impression that she would only receive one treatment, so more education may be necessary for patient. Pt did view ECT video.     Pt had orthostatic hypotension at beginning of shift, BP: 98/69, P:109 (sitting) and BP: 79/58, P:126 (standing). Internal medicine notified and told to continue to monitor. At 1900 BP:100/62, P:147 (sitting) and BP: 80/57, P:139(standing). Internal med notified again. EKG ordered. Provider notified, and no changes to medications made at this time.     Patient is medication compliant. There is concern that Clozaril may be contributing factor to orthostatic hypotension. PRN melatonin given at HS.     Hygiene is adequate. Pt has poor appetite due to olfactory hallucinations and paranoia, however pt did eat 90% of dinner and snack. Pt observed to consume at least 1140 mL of fluid.     Branden Doe RN

## 2022-01-17 NOTE — PROGRESS NOTES
Patient meets phase I recovery criteria, switched to phase II recovery at 1307.          Patient meets recovery room discharge criteria.     The following medications were given in ECT:        Anesthetic: Etomidate 12mg at 12:18  Muscle Relaxant: Succinylcholine  60mg at 12:18  Blood Pressure: Nicardipine 400 mcg at 12:18   Precedex 20mcg at 12:18      Pt discharged from the recovery room via wheelchair back to station 1320   with staff at 1325       Report given to Karey BURTON            At discharge patient slightly tachycardic ant 107 with blood pressures of 98/62, 98/59 and 98/63.  Per patient's nurse, patient doesn't drink a lot of fluids on the unit.  Unit requesting IV fluids when patient has ECT treatements.    Upon discharge patient oriented x 2 to person and place, after approximately 30 minutes.   Unable to be oriented  to situation, date,  and time.    Feel free to call with any questions at *[phone]    Rocio Willson RN

## 2022-01-17 NOTE — ANESTHESIA CARE TRANSFER NOTE
Patient: Nona Y Malia    Procedure: * No procedures listed *       Diagnosis: * No pre-op diagnosis entered *  Diagnosis Additional Information: No value filed.    Anesthesia Type:   General     Note:      Level of Consciousness: awake  Oxygen Supplementation: room air    Independent Airway: airway patency satisfactory and stable        Patient transferred to: PACU    Handoff Report: Identifed the Patient, Identified the Reponsible Provider, Reviewed the pertinent medical history, Discussed the surgical course, Reviewed Intra-OP anesthesia mangement and issues during anesthesia, Set expectations for post-procedure period and Allowed opportunity for questions and acknowledgement of understanding      Vitals:  Vitals Value Taken Time   BP     Temp     Pulse     Resp     SpO2         Electronically Signed By: Enoch Doan MD  January 17, 2022  12:32 PM

## 2022-01-17 NOTE — PLAN OF CARE
Problem: Sensory Perception Impairment (Psychotic Signs/Symptoms)  Goal: Decreased Sensory Symptoms (Psychotic Signs/Symptoms)  Outcome: No Change     Pt presents with a anxious, sad affect. Reported she was nervous for ECT and is unsure if she wants to have more than one session. Writer educated that symptom improvement is not expected after just one session and encouraged her to discuss this further with her provider tomorrow. Pt paced the halls and laid in her bed during the shift. She rated depression an 8 and anxiety a 7. Continues to report auditory, visual and olfactory hallucinations. Reports command hallucinations to hurt herself and her children and olfactory hallucinations of smelling bleach and blood in her food and drinks. Pt states the voices are also telling her food and medication is poisoned. Pt arrived back on the unit around 1330. Stated ECT went well and she has some dizziness and a headache. Writer asked if she would be willing to proceed with future sessions and she stated she might. Pt received tylenol for a headache. Will continue to monitor and assist as needed.

## 2022-01-17 NOTE — PLAN OF CARE
Problem: Decreased Participation and Engagement (Psychotic Signs/Symptoms)  Goal: Increased Participation and Engagement (Psychotic Signs/Symptoms)  Outcome: No Change     Problem: Behavioral Health Plan of Care  Goal: Optimized Coping Skills in Response to Life Stressors  Outcome: Improving    Patient was intermittently visible in the milieu, C/0 HA that had not resolved after administration of tylenol by previous RN, next prn dose not due. Offered but declined ice pack. IBU prn ordered, received a dose at 1551 for HA rated 9/10, 10 being worst pain. Patient laid down with lights out for a while, encouraged to increase fluid intake. Observed use the phone x 2. On assessment, patient states her HA went down to 7/10. Endorses hearing voices that tell her to kill herself and her children, that there is something like marijuana in her food, states this is distressing for her, states that meds help. Nona says she felt anxious before ECT but not currently, states she is a little bit depressed.  Patient denies dizziness, VSS, T - 99.0, rechecked, 98.4. Ate dinner and took her meds. Patient took a 2nd dose of prn tylenol at HS, states she still had a little bit of HA. Total fluid intake this shift of 950cc.

## 2022-01-17 NOTE — PROGRESS NOTES
Brief Note - Cross Cover    S: Patient had ECT today. She has had a headache since her ECT treatment. Headache is band-like circumferentially around her head. She has had some associated nausea. No vision changes. Tylenol has not helped yet, and she would like something else for pain    O: /68 (Patient Position: Sitting)   Pulse (!) 125   Temp 97.4  F (36.3  C) (Temporal)   Resp 16   Wt 72.6 kg (160 lb)   SpO2 98%   BMI 27.46 kg/m     Pupils equal, reactive to light    A/P:  Nona Finley is a 41 year old female with previous psychiatric diagnoses of schizoaffective disorder, depressive type who initially presented with command auditory hallucinations telling her to kill her children, frightening visual hallucinations, and SI w/ intent and plan. She had her first ECT session today, and is experiencing a headache with it. Pattern of headache sounds most compatible with tension headache, possibly a sequale of ECT. No contraindications to NSAIDs.     - Ibuprofen 400mg q6h PRN for moderate pain    Jesus Barr  PGY2 Psychiatry Resident

## 2022-01-17 NOTE — ANESTHESIA POSTPROCEDURE EVALUATION
Patient: Nona Finley    Procedure: * No procedures listed *       Diagnosis:* No pre-op diagnosis entered *  Diagnosis Additional Information: No value filed.    Anesthesia Type:  General    Note:  Disposition: Outpatient   Postop Pain Control: Uneventful            Sign Out: Well controlled pain   PONV: No   Neuro/Psych: Uneventful            Sign Out: Acceptable/Baseline neuro status   Airway/Respiratory: Uneventful            Sign Out: Acceptable/Baseline resp. status   CV/Hemodynamics: Uneventful            Sign Out: Acceptable CV status; No obvious hypovolemia; No obvious fluid overload   Other NRE:    DID A NON-ROUTINE EVENT OCCUR?            Last vitals:  Vitals Value Taken Time   BP     Temp     Pulse     Resp     SpO2         Electronically Signed By: Enoch Doan MD  January 17, 2022  12:32 PM

## 2022-01-17 NOTE — ANESTHESIA PREPROCEDURE EVALUATION
Anesthesia Pre-Procedure Evaluation    Patient: Nona Finley   MRN: [id number] : 1980        Preoperative Diagnosis: * No surgery found *    Procedure :           Past Medical History:   Diagnosis Date     Encounter for female birth control 2017 Plan Documentation Service ordered Depo Provera injection (150mg IM) may be given every 3 months for one year per loretta. Plan and order should be renewed at a visit no later than 18   Vanessa Thompson MD         Encounter for insertion or removal of intrauterine contraceptive device 1/3/2008    Paragard  removed .     Postpartum depression 2011     Schizophrenia (H) 2019     Suicidal ideation 2017      Past Surgical History:   Procedure Laterality Date     NO HISTORY OF SURGERY       NO HISTORY OF SURGERY  2013    Per patient reported this      No Known Allergies   Social History     Tobacco Use     Smoking status: Never Smoker     Smokeless tobacco: Never Used   Substance Use Topics     Alcohol use: No      Wt Readings from Last 1 Encounters:   22 72.6 kg (160 lb)        Anesthesia Evaluation            ROS/MED HX  ENT/Pulmonary:       Neurologic:       Cardiovascular:       METS/Exercise Tolerance:     Hematologic:       Musculoskeletal:       GI/Hepatic:       Renal/Genitourinary:       Endo:       Psychiatric/Substance Use:     (+) psychiatric history schizophrenia and depression     Infectious Disease:       Malignancy:       Other:            Physical Exam    Airway        Mallampati: II   TM distance: > 3 FB   Neck ROM: full   Mouth opening: > 3 cm    Respiratory Devices and Support         Dental  no notable dental history         Cardiovascular   cardiovascular exam normal          Pulmonary   pulmonary exam normal                OUTSIDE LABS:  CBC:   Lab Results   Component Value Date    WBC 10.1 01/15/2022    WBC 5.7 2022    HGB 12.0 01/15/2022    HGB 11.4 (L) 2022    HCT 36.8  01/15/2022    HCT 34.5 (L) 01/11/2022     01/15/2022     01/11/2022     BMP:   Lab Results   Component Value Date     01/15/2022     01/13/2022    POTASSIUM 3.9 01/15/2022    POTASSIUM 4.2 01/13/2022    CHLORIDE 106 01/15/2022    CHLORIDE 110 (H) 01/13/2022    CO2 27 01/15/2022    CO2 26 01/13/2022    BUN 13 01/15/2022    BUN 15 01/13/2022    CR 0.60 01/15/2022    CR 0.51 (L) 01/13/2022     (H) 01/15/2022    GLC 97 01/13/2022     COAGS:   Lab Results   Component Value Date    INR 1.45 (H) 02/11/2021     POC:   Lab Results   Component Value Date    BGM 94 02/12/2021    HCG Negative 01/12/2022    HCGS Negative 05/10/2021     HEPATIC:   Lab Results   Component Value Date    ALBUMIN 3.0 (L) 01/13/2022    PROTTOTAL 7.5 01/13/2022    ALT 22 01/13/2022    AST 18 01/13/2022    ALKPHOS 64 01/13/2022    BILITOTAL 0.2 01/13/2022     OTHER:   Lab Results   Component Value Date    PH 6.0 11/17/2005    LACT 1.8 02/28/2017    A1C 5.4 02/12/2021    BRIANNA 9.0 01/15/2022    PHOS 4.0 10/28/2011    MAG 1.8 11/03/2016    LIPASE 75 10/26/2011    TSH 1.52 12/28/2021    T4 1.43 11/03/2016    CRP <2.9 02/21/2017       Anesthesia Plan    ASA Status:  2      Anesthesia Type: General.              Consents    Anesthesia Plan(s) and associated risks, benefits, and realistic alternatives discussed. Questions answered and patient/representative(s) expressed understanding.    - Discussed:     - Discussed with:  Patient         Postoperative Care    Pain management: IV analgesics, Oral pain medications.        Comments:                Enoch Doan MD

## 2022-01-18 LAB
BASOPHILS # BLD AUTO: 0.1 10E3/UL (ref 0–0.2)
BASOPHILS NFR BLD AUTO: 1 %
EOSINOPHIL # BLD AUTO: 0.3 10E3/UL (ref 0–0.7)
EOSINOPHIL NFR BLD AUTO: 5 %
HOLD SPECIMEN: NORMAL
IMM GRANULOCYTES # BLD: 0 10E3/UL
IMM GRANULOCYTES NFR BLD: 0 %
LYMPHOCYTES # BLD AUTO: 1.9 10E3/UL (ref 0.8–5.3)
LYMPHOCYTES NFR BLD AUTO: 31 %
MONOCYTES # BLD AUTO: 0.5 10E3/UL (ref 0–1.3)
MONOCYTES NFR BLD AUTO: 8 %
NEUTROPHILS # BLD AUTO: 3.3 10E3/UL (ref 1.6–8.3)
NEUTROPHILS NFR BLD AUTO: 55 %
NRBC # BLD AUTO: 0 10E3/UL
NRBC BLD AUTO-RTO: 0 /100
SARS-COV-2 RNA RESP QL NAA+PROBE: NEGATIVE
WBC # BLD AUTO: 6.1 10E3/UL (ref 4–11)

## 2022-01-18 PROCEDURE — 250N000013 HC RX MED GY IP 250 OP 250 PS 637: Performed by: STUDENT IN AN ORGANIZED HEALTH CARE EDUCATION/TRAINING PROGRAM

## 2022-01-18 PROCEDURE — 258N000003 HC RX IP 258 OP 636

## 2022-01-18 PROCEDURE — 85048 AUTOMATED LEUKOCYTE COUNT: CPT | Performed by: PSYCHIATRY & NEUROLOGY

## 2022-01-18 PROCEDURE — 124N000002 HC R&B MH UMMC

## 2022-01-18 PROCEDURE — 250N000013 HC RX MED GY IP 250 OP 250 PS 637

## 2022-01-18 PROCEDURE — H2032 ACTIVITY THERAPY, PER 15 MIN: HCPCS

## 2022-01-18 PROCEDURE — 36415 COLL VENOUS BLD VENIPUNCTURE: CPT | Performed by: PSYCHIATRY & NEUROLOGY

## 2022-01-18 PROCEDURE — 99233 SBSQ HOSP IP/OBS HIGH 50: CPT | Mod: GC | Performed by: PSYCHIATRY & NEUROLOGY

## 2022-01-18 RX ADMIN — CLOZAPINE 375 MG: 50 TABLET ORAL at 19:21

## 2022-01-18 RX ADMIN — DOCUSATE SODIUM 100 MG: 100 CAPSULE, LIQUID FILLED ORAL at 19:22

## 2022-01-18 RX ADMIN — SODIUM CHLORIDE 1000 ML: 9 INJECTION, SOLUTION INTRAVENOUS at 14:05

## 2022-01-18 RX ADMIN — Medication 7.5 MG: at 20:08

## 2022-01-18 RX ADMIN — TRAZODONE HYDROCHLORIDE 50 MG: 50 TABLET ORAL at 20:08

## 2022-01-18 RX ADMIN — IBUPROFEN 400 MG: 200 TABLET, FILM COATED ORAL at 09:26

## 2022-01-18 RX ADMIN — Medication 125 MCG: at 09:26

## 2022-01-18 RX ADMIN — ACETAMINOPHEN 650 MG: 325 TABLET, FILM COATED ORAL at 12:35

## 2022-01-18 RX ADMIN — DOCUSATE SODIUM 100 MG: 100 CAPSULE, LIQUID FILLED ORAL at 09:26

## 2022-01-18 ASSESSMENT — ACTIVITIES OF DAILY LIVING (ADL)
ORAL_HYGIENE: INDEPENDENT
HYGIENE/GROOMING: INDEPENDENT
DRESS: INDEPENDENT
DRESS: INDEPENDENT
HYGIENE/GROOMING: INDEPENDENT
ORAL_HYGIENE: INDEPENDENT

## 2022-01-18 NOTE — PLAN OF CARE
BEHAVIORAL TEAM DISCUSSION    Participants:   Jesus Julien MD Attending Psychiatrist  Santos Brand MD, PGY1  Jessica Sparks, MSW, [facility] Clinical Treatment Coordinator  Iwona Garcia, RN  Rachael Gleason, MS3  Luis F Zaidi MS3  Progress: fair  Anticipated length of stay: assessment ongoing   Continued Stay Criteria/Rationale:treatment ongoing for targeted symptom improvement.   Medical/Physical: Per psychiatry physician progress note:   Medical diagnoses to be addressed this admission:    None  Precautions:   Behavioral Orders   Procedures    1:1 Precautions     During the IV fluid treatment.    Code 1 - Restrict to Unit    Code 2 - 1:1 Staff Supervision     For ECT only    Discontinue 1:1 attendant for suicide risk     Order Specific Question:   I have performed an in person assessment of the patient     Answer:   Based on this assessment the patient no longer requires a one on one attendant at this point in time.     Order Specific Question:   Rationale     Answer:   Medical Record Reviewed     Order Specific Question:   Rationale     Answer:   Patient States able to remain safe in hospital     Order Specific Question:   Rationale     Answer:   Modifications to care environment made to mitigate safety risk     Order Specific Question:   Rationale     Answer:   Routine observations are sufficient to monitor safety.    Electroconvulsive therapy     Series of up to 12 treatments. Begin Date 01/14/22   Treating Psychiatrist providing ECT:  Sean  Notified on: 01/14/22    Fall precautions    Routine Programming     As clinically indicated    Status 15     Every 15 minutes.    Suicide precautions     Patients on Suicide Precautions should have a Combination Diet ordered that includes a Diet selection(s) AND a Behavioral Tray selection for Safe Tray - with utensils, or Safe Tray - NO utensils       Plan: continue with treatment per psychiatry - ECT ordered for ongoing in patient series. Coordination with out patient  care team/ ACT team ongoing.   Rationale for change in precautions or plan: per ongoing assessment.

## 2022-01-18 NOTE — PROGRESS NOTES
----------------------------------------------------------------------------------------------------------  [facility], [place]   Psychiatric Progress Note  Hospital Day #8    Identifier: Nona Finley is a 41 year old female with previous psychiatric diagnoses of schizoaffective disorder, depressive type who presents with command auditory hallucinations telling her to kill her children, frightening visual hallucinations, and SI w/ intent and plan in the context of medication adherence and significant chronic psychosocial stressors. Assessment is that the current presentation is consistent with schizoaffective disorder with current depression and psychosis.      Interim History:   The patient's care was discussed with the treatment team and chart notes were reviewed.    Vitals:   Sleep: 7 hours (01/18/22 0600)  Scheduled medications:   PRN medications:       Staff Report:  -c/o AH/VH/OH  -9/10 band-like headache with slight improvement wuth Tylenol and Advil  -Dizziness  -Endorsed depression and anxiety  -Calm and cooperative with staff     Subjective:     Patient Interview:  Not feeling well today. Dizzy. She has a really bad headache and cannot tolerate it. She does not want to do ECT anymore because of the side effects. We talked about premedicating with Tylenol prior to ECT to help with the headaches. She received IV fluids in the past and it helps with the dizziness. She is asking if the ECT is court ordered. We explained that it is not court ordered. Because of this, she does not want to do the ECT anymore. She does not want to do her ECT tomorrow. She would like to have some IV fluids because she feels better with fluids. She was sad and crying today and is wondering why this is happening. She hasn't noticed any changes in her mood since she has arrived. She is still having command auditory hallucinations. She is asking about what other options there are since she doesn't  want to do ECT and her medications aren't helping. We talked about starting her on Lithium, but this will not help her with her psychosis. We explained how we encourage her to keep trying the ECT because this is the only treatment that will likely help with her symptoms. We talked about how one session is not going to elicit much change, but continuation of the sessions would be helpful. She is wondering if Zyprexa would help because it has helped her in the past. She says that when she has been on Zyprexa she was not able to get out of bed due to side effects, but it has helped with her voices previously. She gained weight previously with Zyprexa. She would like to try Zyprexa. She would like to go home.     Nona was interviewed in the conference room for second conversation. Patient does not want to proceed with the ECT, due to severe headache. Discussed the potential possible impact of ECT, and informed that ECT will likely be the most helpful treatment for her right now. She reported that she needs to think more about getting another treatment. Patient reassured that there are other medications that can be used to reduce the risk and severity of headaches following ECT.    The risks, benefits, alternatives and side effects of any medication changes have been discussed and are understood by the patient and other caregivers.    Review of systems:   Patient reports no concerning symptoms aside from those listed above.     Objective:   BP (!) 83/55   Pulse (!) 123   Temp 96.8  F (36  C)   Resp 16   Wt 72.6 kg (160 lb)   SpO2 100%   BMI 27.46 kg/m    Weight is 160 lbs 0 oz  Body mass index is 27.46 kg/m .    Mental Status Exam:  Oriented to:  Grossly Oriented  General:  Awake and Alert  Appearance:  appears stated age and Grooming is adequate  Behavior/Attitude:  calm, engaged and open  Eye Contact:  appropriate  Psychomotor: normal and no evidence of tics, dystonia, or tardive dyskinesia no catatonia  "present  Speech: Normal rate and rhythm, soft volume/tone, more spontaneous  Language: Fluent in English with appropriate syntax and vocabulary.  Mood:  \"depressed\"  Affect:  congruent with mood, sad and anxious  Thought Process:  linear, coherent and goal directed  Thought Content: No SI/HI Constant command AH telling her to kill herself and her kids with no plans to execute.  Associations:  intact  Insight:  good due to recognition of AH and mental illness  Judgment:  good - presented to hospital for help, cooperative  Impulse control: fair - has not acted on command AH  Attention Span:  grossly intact  Concentration:  grossly intact  Recent and Remote Memory:  grossly intact  Fund of Knowledge:  average  Muscle Strength and Tone: normal  Gait and Station: Normal      Allergies:   No Known Allergies     Labs:     Recent Results (from the past 24 hour(s))   Asymptomatic COVID-19 Virus (Coronavirus) by PCR Nasopharyngeal    Collection Time: 01/17/22 12:24 PM    Specimen: Nasopharyngeal; Swab   Result Value Ref Range    SARS CoV2 PCR Negative Negative, Testing sent to reference lab. Results will be returned via unsolicited result   WBC and Differential    Collection Time: 01/18/22  8:03 AM   Result Value Ref Range    WBC Count 6.1 4.0 - 11.0 10e3/uL    % Neutrophils 55 %    % Lymphocytes 31 %    % Monocytes 8 %    % Eosinophils 5 %    % Basophils 1 %    % Immature Granulocytes 0 %    NRBCs per 100 WBC 0 <1 /100    Absolute Neutrophils 3.3 1.6 - 8.3 10e3/uL    Absolute Lymphocytes 1.9 0.8 - 5.3 10e3/uL    Absolute Monocytes 0.5 0.0 - 1.3 10e3/uL    Absolute Eosinophils 0.3 0.0 - 0.7 10e3/uL    Absolute Basophils 0.1 0.0 - 0.2 10e3/uL    Absolute Immature Granulocytes 0.0 <=0.4 10e3/uL    Absolute NRBCs 0.0 10e3/uL   Extra Green Top (Lithium Heparin) Tube    Collection Time: 01/18/22  8:48 AM   Result Value Ref Range    Hold Specimen JIC           Assessment    Primary psychiatric diagnosis:   Schizoaffective disorder, " depressed type    Secondary psychiatric diagnoses of concern this admission:   H/o anxiety disorder unspecified    Diagnostic Impression:   Nona Finley is a 41 year old [place] female with a past psychiatric history of schizoaffective disorder depressive type who presented to the ER with SI, HI and AH. Significant symptoms on admission included SI, depression, sleep issues, psychosis and disordered eating. Her last psychiatric hospitalization was from 12/28-1/04/22 at [facility] with Dr. Grimm for the same symptoms.  She is currently followed by Dr. Walter Gaviria, [facility] ACT team. Psychosocial stressors are quite significant. She lives with her mother and father in law and her 6 kids. Her relationship with her  is long-distance, strained and per ACT team, she's been ambivalent about  him and going to live in a  for some time.  Patient's support system is minimal outside of her  and in-laws, and is something her ACT team has been trying to help with (recommending Women's Groups, etc).  Substance use or medication nonadherence do not appear to be playing a contributing role in the patient's presentation.  There is genetic loading for psychosis. Medical history does not appear to be significant. The MSE is notable for depressed mood, sad affect, SI with plan, commanding AH and disturbing VH. She denies self injurious behaviors.     Her reported symptoms of depressed mood, hopelessness, SI, AH,VH are consistent with her historic diagnosis of schizoaffective disorder with current depression and psychosis. Optimization of medications to target these symptom clusters in addition to evaluation of adquate outpatient supports will be targets for treatment during this admission.     Psychiatric Hospital course:   Nona Finley was admitted to [facility] as a voluntary patient/ on a 72 hour hold. PTA clozapine, colace, haldol (scheduled and prn), trazodone, and melatonin were continued. Patient  is also on Abilify MORENO. 1/12 target sxs not controlled, increased clozaril to 362.5mg. Initiated the preliminary steps for ECT consult. Medicine consult placed for ECT screening assessment. Utox and UPT specimen was collected.1/14 Increased Clozaril to 375mg, ECT IP consult planned to be completed. Potential start date of Monday 1/17. Gabapentin PRN was discontinued for ECT next week. 1/18 she received her first ECT session on Monday. Complained of post-ECT headache 9/10. Declined further ECT sessions d/t the side effects. She was found to be dizzy and orthostatic. 0.9 NS 1L was administered which resolved sxs.    Discontinued Medications (& Rationale): n/a    Medical course: Patient was medically cleared for admission to psychiatric unit. No pertinent PMH.    Data:   CBC: Hgb 11.4 (baseline), otherwise WNL  COVID neg  UDS neg  HCG neg    Plan     Today's changes:    0.9% NS 1000cc IV Bolus over 2hours    Psychotropic Medications:  Scheduled:  -Clozapine 375mg at bedtime  -Colace 100mg BID  -Haldol 7.5mg at bedtime  -Trazodone 50mg at bedtime     PRN:  -Haldol 2mg BID  -Acetaminophen 650 mg Q6H PRN for pain and fever  -Maalox 30 ml Q4H PRN for indigestion  -Gabapentin 100mg q6h prn for anxiety  -Melatonin 3mg at bedtime  -Miralax prn for constipation  -Olanzapine 10 mg PO/IM prn Q2H severe agitation/psychosis    Additional Plans:  Patient will be treated in therapeutic milieu with appropriate individual and group therapies as described.    Medical diagnoses to be addressed this admission:    None    Consults: None    Legal Status: Voluntary. Currently committed on a PD. No Reyes.     Safety Assessment:   Behavioral Orders   Procedures     Code 1 - Restrict to Unit     Code 2 - 1:1 Staff Supervision     For ECT only     Discontinue 1:1 attendant for suicide risk     Order Specific Question:   I have performed an in person assessment of the patient     Answer:   Based on this assessment the patient no longer requires  a one on one attendant at this point in time.     Order Specific Question:   Rationale     Answer:   Medical Record Reviewed     Order Specific Question:   Rationale     Answer:   Patient States able to remain safe in hospital     Order Specific Question:   Rationale     Answer:   Modifications to care environment made to mitigate safety risk     Order Specific Question:   Rationale     Answer:   Routine observations are sufficient to monitor safety.     Electroconvulsive therapy     Series of up to 12 treatments. Begin Date 01/14/22   Treating Psychiatrist providing ECT:  Sean  Notified on: 01/14/22     Fall precautions     Routine Programming     As clinically indicated     Status 15     Every 15 minutes.     Suicide precautions     Patients on Suicide Precautions should have a Combination Diet ordered that includes a Diet selection(s) AND a Behavioral Tray selection for Safe Tray - with utensils, or Safe Tray - NO utensils         SIO: No    Disposition: Home, pending stabilization, medication optimization, & development of a safe discharge plan.    This patient was seen and discussed with my attending physician.  Santos Brand MD  PGY-1 Psychiatry Resident Physician    Attending Attestation:    This patient has been seen and evaluated by me, Jesus Julien.  I have discussed this patient with the psychiatry resident and I agree with the findings and plan in this note.    I have reviewed today's vital signs, medications, labs and imaging.     Jesus Wyman MD on 1/18/2022 at 9:24 PM

## 2022-01-18 NOTE — PLAN OF CARE
"  Problem: Behavior Regulation Impairment (Psychotic Signs/Symptoms)  Goal: Improved Behavioral Control (Psychotic Signs/Symptoms)  Outcome: Improving   Pt has been in and out of her room this shift. Pt c/o headache rated it 9/10. Pt said \"yesterday, the headache was the worst pain I'd ever felt.\" Writer gave pt ibuprofen and offered a cold pack. Pt declined the cold pack. Pt endorses AH telling her to kill herself and her kids but denies wanting to act on them. Pt took her meds but didn't go to groups. Pt is now getting IVF in her room, staff is sitting with her for safety.   "

## 2022-01-18 NOTE — PLAN OF CARE
"  Problem: Adult Inpatient Plan of Care  Goal: Absence of Hospital-Acquired Illness or Injury  Intervention: Identify and Manage Fall Risk  Recent Flowsheet Documentation  Taken 1/18/2022 0524 by Rossana Solis RN  Safety Promotion/Fall Prevention:    check orthostatic blood pressure    clutter free environment maintained    fall prevention program maintained    increased rounding and observation    nonskid shoes/slippers when out of bed    patient and family education    room organization consistent    safety round/check completed     Problem: Adult Inpatient Plan of Care  Goal: Absence of Hospital-Acquired Illness or Injury  Intervention: Identify and Manage Fall Risk  Recent Flowsheet Documentation  Taken 1/18/2022 0524 by Rossana Solis RN  Safety Promotion/Fall Prevention:    check orthostatic blood pressure    clutter free environment maintained    fall prevention program maintained    increased rounding and observation    nonskid shoes/slippers when out of bed    patient and family education    room organization consistent    safety round/check completed     Problem: Adult Inpatient Plan of Care  Goal: Optimal Comfort and Wellbeing  Outcome: No Change     Problem: Sleep Disturbance (Psychotic Signs/Symptoms)  Goal: Improved Sleep (Psychotic Signs/Symptoms)  Outcome: No Change   Observed to have slept well for approx 7  hrs on all Q 15\" rounds w/ regular non-labored respirations and no reported or observed distress.  P. O. Fluids maintained at BS for improved intake.  Safe, therapeutic environment maintained.   "

## 2022-01-18 NOTE — PLAN OF CARE
Assessment/Intervention/Current Symptoms and Care Coordination  - chart review  - team meeting  - post team rounds team meeting / discussion  - Behavioral Team Discussion Note for weekly plan of care update.     Discharge Plan or Goal  Pending stabilization & development of a safe discharge plan.  Considerations include: Return home with outpatient providers      Barriers to Discharge   Patient requires further psychiatric stabilization due to current symptomology      Referral Status  No new referrals at this time.       Legal Status  Patient is voluntary     Provisional Discharge Agreement - has not been revoked    Order for Recommitment   - Order issued on 2021 -   Per order  Respondent is recommitted as a person who poises a risk of harm due to mental illness to the Commissioner of Human Services and the head of [facility] until 2022; provided however, that Respondent's provisional discharge remains in effect at this time and will  on 2022, unless it has been revoked or extended before that date.      No Reyes in place.

## 2022-01-19 ENCOUNTER — ANESTHESIA EVENT (OUTPATIENT)
Dept: BEHAVIORAL HEALTH | Facility: CLINIC | Age: 42
End: 2022-01-19

## 2022-01-19 ENCOUNTER — ANESTHESIA (OUTPATIENT)
Dept: BEHAVIORAL HEALTH | Facility: CLINIC | Age: 42
End: 2022-01-19

## 2022-01-19 PROCEDURE — 250N000013 HC RX MED GY IP 250 OP 250 PS 637

## 2022-01-19 PROCEDURE — 124N000002 HC R&B MH UMMC

## 2022-01-19 PROCEDURE — 99232 SBSQ HOSP IP/OBS MODERATE 35: CPT | Mod: GC | Performed by: PSYCHIATRY & NEUROLOGY

## 2022-01-19 RX ORDER — KETOROLAC TROMETHAMINE 15 MG/ML
15 INJECTION, SOLUTION INTRAMUSCULAR; INTRAVENOUS ONCE
Status: DISCONTINUED | OUTPATIENT
Start: 2022-01-19 | End: 2022-01-19

## 2022-01-19 RX ADMIN — TRAZODONE HYDROCHLORIDE 50 MG: 50 TABLET ORAL at 20:36

## 2022-01-19 RX ADMIN — DOCUSATE SODIUM 100 MG: 100 CAPSULE, LIQUID FILLED ORAL at 19:19

## 2022-01-19 RX ADMIN — Medication 7.5 MG: at 20:37

## 2022-01-19 RX ADMIN — DOCUSATE SODIUM 100 MG: 100 CAPSULE, LIQUID FILLED ORAL at 08:52

## 2022-01-19 RX ADMIN — Medication 125 MCG: at 08:52

## 2022-01-19 RX ADMIN — ACETAMINOPHEN 650 MG: 325 TABLET, FILM COATED ORAL at 13:46

## 2022-01-19 RX ADMIN — CLOZAPINE 375 MG: 50 TABLET ORAL at 19:18

## 2022-01-19 ASSESSMENT — ACTIVITIES OF DAILY LIVING (ADL)
LAUNDRY: WITH SUPERVISION
ORAL_HYGIENE: INDEPENDENT
HYGIENE/GROOMING: INDEPENDENT
DRESS: INDEPENDENT

## 2022-01-19 NOTE — PLAN OF CARE
Problem: Sensory Perception Impairment (Psychotic Signs/Symptoms)  Goal: Decreased Sensory Symptoms (Psychotic Signs/Symptoms)  Outcome: Improving     Pt is quiet and withdrawn on approach. States she is feeling better today. Smiling at times. Reports her auditory hallucinations are not as intense. States she continues to hear voices telling her to hurt herself and her children, tell her that her medication and food is poisoned and smells bleach and blood in her food and drinks. Pt declined ECT today due to side effect of headaches and states she does not want to continue with treatments. Pt laid in her bed and paced during the shift. Pt has orthostatic hypotension. Pt denies dizziness. Writer pushed fluids and alerted provider. Med compliant. Given tylenol per request for a headache, awaiting results. Will continue to monitor and assist as needed.

## 2022-01-19 NOTE — ANESTHESIA PREPROCEDURE EVALUATION
Anesthesia Pre-Procedure Evaluation    Patient: Nona Finley   MRN: [id number] : 1980        Preoperative Diagnosis: * No surgery found *    Procedure :           Past Medical History:   Diagnosis Date     Encounter for female birth control 2017 Plan Documentation Service ordered Depo Provera injection (150mg IM) may be given every 3 months for one year per loretta. Plan and order should be renewed at a visit no later than 18   Vanessa Thompson MD         Encounter for insertion or removal of intrauterine contraceptive device 1/3/2008    Paragard  removed .     Postpartum depression 2011     Schizophrenia (H) 2019     Suicidal ideation 2017      Past Surgical History:   Procedure Laterality Date     NO HISTORY OF SURGERY       NO HISTORY OF SURGERY  2013    Per patient reported this      No Known Allergies   Social History     Tobacco Use     Smoking status: Never Smoker     Smokeless tobacco: Never Used   Substance Use Topics     Alcohol use: No      Wt Readings from Last 1 Encounters:   22 72.6 kg (160 lb)        Anesthesia Evaluation            ROS/MED HX  ENT/Pulmonary: Comment: Latent TB      Neurologic:  - neg neurologic ROS     Cardiovascular:  - neg cardiovascular ROS     METS/Exercise Tolerance:     Hematologic:  - neg hematologic  ROS     Musculoskeletal:  - neg musculoskeletal ROS     GI/Hepatic:  - neg GI/hepatic ROS     Renal/Genitourinary:  - neg Renal ROS     Endo:  - neg endo ROS     Psychiatric/Substance Use:     (+) psychiatric history schizophrenia and depression     Infectious Disease:  - neg infectious disease ROS     Malignancy:  - neg malignancy ROS     Other:            Physical Exam    Airway        Mallampati: II   TM distance: > 3 FB   Neck ROM: full   Mouth opening: > 3 cm    Respiratory Devices and Support         Dental  no notable dental history         Cardiovascular   cardiovascular exam normal          Pulmonary    pulmonary exam normal                OUTSIDE LABS:  CBC:   Lab Results   Component Value Date    WBC 6.1 01/18/2022    WBC 10.1 01/15/2022    HGB 12.0 01/15/2022    HGB 11.4 (L) 01/11/2022    HCT 36.8 01/15/2022    HCT 34.5 (L) 01/11/2022     01/15/2022     01/11/2022     BMP:   Lab Results   Component Value Date     01/15/2022     01/13/2022    POTASSIUM 3.9 01/15/2022    POTASSIUM 4.2 01/13/2022    CHLORIDE 106 01/15/2022    CHLORIDE 110 (H) 01/13/2022    CO2 27 01/15/2022    CO2 26 01/13/2022    BUN 13 01/15/2022    BUN 15 01/13/2022    CR 0.60 01/15/2022    CR 0.51 (L) 01/13/2022     (H) 01/15/2022    GLC 97 01/13/2022     COAGS:   Lab Results   Component Value Date    INR 1.45 (H) 02/11/2021     POC:   Lab Results   Component Value Date    BGM 94 02/12/2021    HCG Negative 01/12/2022    HCGS Negative 05/10/2021     HEPATIC:   Lab Results   Component Value Date    ALBUMIN 3.0 (L) 01/13/2022    PROTTOTAL 7.5 01/13/2022    ALT 22 01/13/2022    AST 18 01/13/2022    ALKPHOS 64 01/13/2022    BILITOTAL 0.2 01/13/2022     OTHER:   Lab Results   Component Value Date    PH 6.0 11/17/2005    LACT 1.8 02/28/2017    A1C 5.4 02/12/2021    BRIANNA 9.0 01/15/2022    PHOS 4.0 10/28/2011    MAG 1.8 11/03/2016    LIPASE 75 10/26/2011    TSH 1.52 12/28/2021    T4 1.43 11/03/2016    CRP <2.9 02/21/2017       Anesthesia Plan    ASA Status:  2   NPO Status:  NPO Appropriate    Anesthesia Type: General.     - Airway: Mask Only   Induction: Intravenous.   Maintenance: N/A.        Consents    Anesthesia Plan(s) and associated risks, benefits, and realistic alternatives discussed. Questions answered and patient/representative(s) expressed understanding.    - Discussed:     - Discussed with:  Patient      - Extended Intubation/Ventilatory Support Discussed: No.      - Patient is DNR/DNI Status: No    Use of blood products discussed: No .     Postoperative Care            Comments:           H&P reviewed:  Unable to attach H&P to encounter due to EHR limitations. H&P Update: appropriate H&P reviewed, patient examined. No interval changes since H&P (within 30 days).             Roselyn Elmore MD

## 2022-01-19 NOTE — PLAN OF CARE
"  Problem: Adult Inpatient Plan of Care  Goal: Absence of Hospital-Acquired Illness or Injury  Intervention: Identify and Manage Fall Risk  Recent Flowsheet Documentation  Taken 1/19/2022 0204 by Rossana Solis RN  Safety Promotion/Fall Prevention:   check orthostatic blood pressure   clutter free environment maintained   fall prevention program maintained   increased rounding and observation   lighting adjusted   nonskid shoes/slippers when out of bed   patient and family education   room organization consistent   safety round/check completed     Problem: Adult Inpatient Plan of Care  Goal: Absence of Hospital-Acquired Illness or Injury  Intervention: Identify and Manage Fall Risk  Recent Flowsheet Documentation  Taken 1/19/2022 0204 by Rossana Solis RN  Safety Promotion/Fall Prevention:   check orthostatic blood pressure   clutter free environment maintained   fall prevention program maintained   increased rounding and observation   lighting adjusted   nonskid shoes/slippers when out of bed   patient and family education   room organization consistent   safety round/check completed     Problem: Adult Inpatient Plan of Care  Goal: Absence of Hospital-Acquired Illness or Injury  Intervention: Identify and Manage Fall Risk  Recent Flowsheet Documentation  Taken 1/19/2022 0204 by Rossana Solis RN  Safety Promotion/Fall Prevention:   check orthostatic blood pressure   clutter free environment maintained   fall prevention program maintained   increased rounding and observation   lighting adjusted   nonskid shoes/slippers when out of bed   patient and family education   room organization consistent   safety round/check completed     Problem: Adult Inpatient Plan of Care  Goal: Optimal Comfort and Wellbeing  Outcome: No Change     Problem: Sleep Disturbance (Psychotic Signs/Symptoms)  Goal: Improved Sleep (Psychotic Signs/Symptoms)  Outcome: No Change  Observed to have slept well for approx 7 hrs on all Q 15\" rounds " overnight w/ regular non-labored respirations and no reported or observed distress.  Fasting/NPO status maintained overnight for scheduled ECT # 2 this AM.  Safe, therapeutic environment maintained.

## 2022-01-19 NOTE — PROGRESS NOTES
"    ----------------------------------------------------------------------------------------------------------  [facility], [place]   Psychiatric Progress Note  Hospital Day #9    Identifier: Nona Finley is a 41 year old female with previous psychiatric diagnoses of schizoaffective disorder, depressive type who presents with command auditory hallucinations telling her to kill her children, frightening visual hallucinations, and SI w/ intent and plan in the context of medication adherence and significant chronic psychosocial stressors. Assessment is that the current presentation is consistent with schizoaffective disorder with current depression and psychosis.      Interim History:   The patient's care was discussed with the treatment team and chart notes were reviewed.    Vitals: Some orthostatic hypotension, otherwise WNL  Sleep: 7 hours (01/19/22 0600)  Scheduled medications: 1L NS Bolus (once), took all other medications as prescribed  PRN medications: Acetaminophen 650mg and Ibuprofen 400mg for headache    Staff Report:  - Slept well overnight, no anxiety or depression, no SI/SIB/HI, good appetite   - Coordination with outpatient care team/ACT team is ongoing  - Patient was in and out of her room quite a bit yesterday, watched tv with her peers, pleasant and cooperative upon approach by staff, calm but blunted affect  - Having headaches, was having command AH telling her to kill herself and her kids but denies wanting to act on them  - Attended art therapy group for 15 minutes and reported feeling happy, made a drawing of her children and said that she wants to hold onto them to support her recovery       Subjective:     Patient Interview:  Patient was interviewed in the conference room. She is feeling \"a bit better\" today. Her mood is \"okay today, I'm not feeling sad.\" She slept well overnight. She mentioned hearing voices last night but not this morning, and they were not as " "\"loud\" as previous. Nona did not experience AH this AM. The IV fluids helped with her energy. She has been eating and drinking more. She drinks a cup of water before she goes to bed without any difficulty. She is planning on going to group this afternoon.    She is looking forward to going home to see her children. Her daughter has a neurology appointment on Feb 1 which she plans on taking her to. Patient expressed that she will continue to try to take her medication on time as she believes that the medications are helping. She does report some drooling, dizziness, and headaches with her medications, but they are not too distressing.     The risks, benefits, alternatives and side effects of any medication changes have been discussed and are understood by the patient and other caregivers.    Review of systems:   Patient reports no concerning symptoms aside from those listed above.     Objective:   /69   Pulse 98   Temp 96.8  F (36  C)   Resp 16   Wt 72.6 kg (160 lb)   SpO2 99%   BMI 27.46 kg/m    Weight is 160 lbs 0 oz  Body mass index is 27.46 kg/m .    Mental Status Exam:  Oriented to:  Grossly Oriented  General:  Awake and Alert  Appearance:  appears stated age and Grooming is adequate  Behavior/Attitude:  calm, engaged and open  Eye Contact:  appropriate  Psychomotor: normal and no evidence of tics, dystonia, or tardive dyskinesia no catatonia present  Speech: Normal rate and rhythm, appropriate volume/tone, more spontaneous  Language: Fluent in English with appropriate syntax and vocabulary.  Mood:  \"okay today, I'm not feeling sad\"  Affect:  congruent with mood, stable and euthymic  Thought Process:  linear, coherent and goal directed  Thought Content: No SI/HI, AH/VH last night but none since this morning.  Associations:  intact  Insight:  good due to recognition of AH and mental illness  Judgment:  good - presented to hospital for help, cooperative  Impulse control: fair - has not acted on " command AH  Attention Span:  grossly intact  Concentration:  grossly intact  Recent and Remote Memory:  grossly intact  Fund of Knowledge:  average  Muscle Strength and Tone: normal  Gait and Station: Normal      Allergies:   No Known Allergies     Labs:     No results found for this or any previous visit (from the past 24 hour(s)).     Assessment    Primary psychiatric diagnosis:   Schizoaffective disorder, depressed type    Secondary psychiatric diagnoses of concern this admission:   H/o anxiety disorder unspecified    Diagnostic Impression:   Nona Finley is a 41 year old [place] female with a past psychiatric history of schizoaffective disorder depressive type who presented to the ER with SI, HI and AH. Significant symptoms on admission included SI, depression, sleep issues, psychosis and disordered eating. Her last psychiatric hospitalization was from 12/28-1/04/22 at [facility] with Dr. Grimm for the same symptoms.  She is currently followed by Dr. Walter Gaviria, [facility] team. Psychosocial stressors are quite significant. She lives with her mother and father in law and her 6 kids. Her relationship with her  is long-distance, strained and per ACT team, she's been ambivalent about  him and going to live in a  for some time.  Patient's support system is minimal outside of her  and in-laws, and is something her ACT team has been trying to help with (recommending Women's Groups, etc).  Substance use or medication nonadherence do not appear to be playing a contributing role in the patient's presentation.  There is genetic loading for psychosis. Medical history does not appear to be significant. The MSE is notable for depressed mood, sad affect, SI with plan, commanding AH and disturbing VH. She denies self injurious behaviors.     Her reported symptoms of depressed mood, hopelessness, SI, AH,VH are consistent with her historic diagnosis of schizoaffective disorder with current  depression and psychosis. Optimization of medications to target these symptom clusters in addition to evaluation of adquate outpatient supports will be targets for treatment during this admission.     Psychiatric Hospital course:   Nona Finley was admitted to [facility] as a voluntary patient/ on a 72 hour hold. PTA clozapine, colace, haldol (scheduled and prn), trazodone, and melatonin were continued. Patient is also on Abilify MORENO. 1/12 target sxs not controlled, increased clozaril to 362.5mg. Initiated the preliminary steps for ECT consult. Medicine consult placed for ECT screening assessment. Utox and UPT specimen was collected.1/14 Increased Clozaril to 375mg, ECT IP consult planned to be completed. Potential start date of Monday 1/17. Gabapentin PRN was discontinued for ECT next week. 1/18 she received her first ECT session on Monday. Complained of post-ECT headache 9/10. Declined further ECT sessions d/t the side effects. She was found to be dizzy and orthostatic. 0.9 NS 1L was administered which resolved sxs.1/19 patient declined the ECT treatment. The AH is resolved since last night. Continues to report olfactory hallucinations but able to eat and drink well.     Discontinued Medications (& Rationale): n/a    Medical course: Patient was medically cleared for admission to psychiatric unit. No pertinent PMH.    Data:   CBC: Hgb 11.4 (baseline), otherwise WNL  COVID neg  UDS neg  HCG neg    Plan     Today's changes:  -Discontinue ECT d/t patient's decline    Psychotropic Medications:  Scheduled:  -Clozapine 375mg at bedtime  -Colace 100mg BID  -Haldol 7.5mg at bedtime  -Trazodone 50mg at bedtime     PRN:  -Haldol 2mg BID  -Acetaminophen 650 mg Q6H PRN for pain and fever  -Ibuprofen 400 mg Q6H for pain  -Maalox 30 ml Q4H PRN for indigestion  -Gabapentin 100mg q6h prn for anxiety  -Melatonin 3mg at bedtime  -Miralax prn for constipation  -Olanzapine 10 mg PO/IM prn Q2H severe agitation/psychosis    Additional  Plans:  Patient will be treated in therapeutic milieu with appropriate individual and group therapies as described.    Medical diagnoses to be addressed this admission:    None    Consults: None    Legal Status: Voluntary. Currently committed on a PD. No Reyes.     Safety Assessment:   Behavioral Orders   Procedures     Code 1 - Restrict to Unit     Code 2 - 1:1 Staff Supervision     For ECT only     Discontinue 1:1 attendant for suicide risk     Order Specific Question:   I have performed an in person assessment of the patient     Answer:   Based on this assessment the patient no longer requires a one on one attendant at this point in time.     Order Specific Question:   Rationale     Answer:   Medical Record Reviewed     Order Specific Question:   Rationale     Answer:   Patient States able to remain safe in hospital     Order Specific Question:   Rationale     Answer:   Modifications to care environment made to mitigate safety risk     Order Specific Question:   Rationale     Answer:   Routine observations are sufficient to monitor safety.     Electroconvulsive therapy     Series of up to 12 treatments. Begin Date 01/14/22   Treating Psychiatrist providing ECT:  Sean  Notified on: 01/14/22     Fall precautions     Routine Programming     As clinically indicated     Status 15     Every 15 minutes.     Suicide precautions     Patients on Suicide Precautions should have a Combination Diet ordered that includes a Diet selection(s) AND a Behavioral Tray selection for Safe Tray - with utensils, or Safe Tray - NO utensils         SIO: No    Disposition: Home, pending stabilization, medication optimization, & development of a safe discharge plan. Potential discharge on Friday 1/21 if symptoms do not recur.    -Leroy Gleason, MS3  [employer]    Attestation:   I was present with the medical student who participated in the service and in the documentation of the note. I have verified the history  and personally performed the physical exam and medical decision making. I agree with the assessment and plan of care as documented in the note.    Santos Brand, PGY-1 (Psychiatry)  [facility]    This patient has been seen and evaluated by me, Jesus Julien.  I have discussed this patient with the psychiatry resident and I agree with the findings and plan in this note.    I have reviewed today's vital signs, medications, labs and imaging.     Jesus Wyman MD on 1/19/2022 at 7:23 PM

## 2022-01-19 NOTE — PLAN OF CARE
Assessment/Intervention/Current Symptoms and Care Coordination  - chart review  - team meeting  - post team rounds team meeting / discussion    Spoke with ACT team  Vicky - discussed current care - including that ECT had been initiated and she then declined to proceed given her concerns with headaches. Dicussed symptoms patient continues to endorse - command AH (SI and to hurt children) is stating that is less intense and that doesn't want to act on them also that AH are saying that her food and medication are poisoned. Discussed with Vicky that today team / psychiatrist had mentioned possible discharge soon. Per Vicky is concerned to hear that her positive symptoms are still so present. States that typically for Nona when her symptoms are more controlled the AH are either completley gone or are more of a background noise and the voices are not clear enough for her to report any direct command or messages. Is concerned to hear she is still reporting AH command in nature to hurt herself and her children given more recent history of taking steps towards action re SI.  Says that it is more common for Nona to continue to endorse either depressive symptoms and display ongoing negative symptoms of schizophrenia and still be able to maintain safety in the community, however again states that typicaly when her postivie symptoms / AH are becoming more present and clear in that she can report back what the voices are saying there is greater concern for her ability to be safe.Vicky will be calling and talking to Nona today, will discuss planning going forward. Vicky also has team meeting today will update them. Discussed with Vicky that I will reach out to Dr. Brand and update re our conversation and ensure has contact for Dr. Gaviria on ACT for discussion /if indicated.    - spoke with Dr. Brand updated re above.    Met with patient individually - she said she spoke with Vicky and Nona is  concerned that the doctor here had told her she would discharge tomorrow but Vicky had not been aware of this and thus she wanted to meet as is hoping to discharge. Spoke with her about my understanding from provider that would like to assess and discuss possible discharge however not yet planned as for sure tomorrow. In regards to symptoms Nona tells me she hasn't had AH for a day or two, then later says this morning. We spoke about plan to talk to the team here tomorrow and that we are also working to coordinate with her ACT team as want to ensure she is feeling her best. In regards to supports in home, she says she is open to looking into Waiver for herself if she can also keep her ACT team. Seemed to not recall if Vicky had discussed this with her before. Reports that currently it is only her mother in law at home with the children as her father in law left for [facility] last week. Says in talking with mother in law seems as if things are okay, there was a concern about needing groceries however Nona says she had a friend go and help make sure they had some.     Call back from Vicky - she spoke with Nona - while talking with her Nona informed her she hadn't heard voices for a number of days and is unsure if patient is inconsistently reporting symptoms as has desire to discharge or if is due to timeline being hard to tell at times when in the hospital. Says that Nona told her that medications were increased and Vicky asked if I could sent the current MAR so they can review in the event they do discharge patient prior to weekend as the pharmacy they use has had some issues with filling quickly. We spoke about our discussions with Nona - per Vicky ACT team member had visited the home over the weekend to provide some gift cards to assist with groceries. Vicky wasn't aware of patient's father in law being out of the county. Is unsure if patient is feeling pressure to leave the hospital for family as  priscila Perry in her conversation with her today patient was very focused on discharging and is not how she has presented in conversations with her in prior hospital encounters.   Informed Vicky that I did speak with Dr. Brand and asked that he please communicate with the ACT team psychiatrist since they have now discussed a discharge timeline with the patient .     - Current Symptoms include the following: Psychosis endorsing command AH ongoing, AH re food and medication being poisoned.     - medica care coordinator Trudi Ordoñezsing phone: [phone] call from Trudi - spoke re update. Informed her I will give her information to the ACT team in the event they need assistance with resources etc.        Discharge Plan or Goal  Pending stabilization & development of a safe discharge plan.  Considerations include: Return home with outpatient providers      Barriers to Discharge   Patient requires further psychiatric stabilization due to current symptomology      Referral Status  No new referrals - is established with [facility] team      Legal Status  Provisional Discharge Agreement - has not been revoked    Order for Recommitment   - Order issued on 2021 -   Per order  Respondent is recommitted as a person who poises a risk of harm due to mental illness to the Commissioner of Human Services and the head of [facility] until 2022; provided however, that Respondent's provisional discharge remains in effect at this time and will  on 2022, unless it has been revoked or extended before that date.      No Reyes in place.

## 2022-01-19 NOTE — PLAN OF CARE
"  Problem: General Rehab Plan of Care  Goal: Therapeutic Recreation/Music Therapy Goal  Description: The patient and/or their representative will achieve their patient-specific goals related to the plan of care.  The patient-specific goals include:  Outcome: No Change     Nona attended art therapy group for 15 minutes (4 patients total). She reported feeling \"happy\" today. She participated in making a drawing depicting what you want to hold on to and may need to let go of to support recovery. She made a drawing showing that she wants to hold onto her children. She finished her drawing quickly and excused herself from group.   "

## 2022-01-19 NOTE — PLAN OF CARE
"Received pt with IV infusion. Pt tolerated treatment well. IV infusion completed this shift.   Pt observed out in the milieu, watching television with peers. Pt also attended group. Pt was pleasant and cooperative upon approach. Presents with blunted affect, in a calm mood. Described her day as \"I'm better today\". Pt denies experiencing any type of hallucinations. Verbalized \"I had the voices this morning but they went away. Nothing right now. Usually when I go to sleep that they bother me\". Described the voices as command in nature. Pt expressed feeling ambivalent on ECT treatment. Pt claimed she does not want to go through it again due to the headaches she had after the procedure.  Encouraged pt to discuss her concerns with her provider in the morning. Pt was accepting and claimed \"I will think about it\". Pt was reminded that she will be NPO after midnight for ECT tomorrow. Pt verbalized understanding.   Pt denies active SI/SIB/HI. Pt did not exhibit any self injurious behavior. Pt denies anxiety and depression. Claimed her main goal is to \"get better and go home\". Denies any pain this shift.   Appetite was good. Encouraged pt to increase fluid intake. Consumed 100% of share of dinner and took approximately 1000 ml of fluids.   Due medications given. Denies experiencing side effects.  Needs attended to. On fall and suicide precautions. Staff will continue to monitor. No further concerns noted as of this time.      Problem: Mood Impairment (Psychotic Signs/Symptoms)  Goal: Improved Mood Symptoms (Psychotic Signs/Symptoms)  Outcome: Improving     Problem: Behavior Regulation Impairment (Psychotic Signs/Symptoms)  Goal: Improved Behavioral Control (Psychotic Signs/Symptoms)  Outcome: Improving     "

## 2022-01-20 PROCEDURE — 250N000013 HC RX MED GY IP 250 OP 250 PS 637

## 2022-01-20 PROCEDURE — 124N000002 HC R&B MH UMMC

## 2022-01-20 PROCEDURE — 99232 SBSQ HOSP IP/OBS MODERATE 35: CPT | Mod: GC | Performed by: PSYCHIATRY & NEUROLOGY

## 2022-01-20 RX ORDER — HALOPERIDOL 2 MG/1
2 TABLET ORAL 2 TIMES DAILY PRN
Qty: 60 TABLET | Refills: 0 | Status: SHIPPED | OUTPATIENT
Start: 2022-01-20 | End: 2022-02-19

## 2022-01-20 RX ORDER — CLOZAPINE 25 MG/1
75 TABLET, ORALLY DISINTEGRATING ORAL DAILY
Qty: 90 TABLET | Refills: 0 | Status: SHIPPED | OUTPATIENT
Start: 2022-01-20 | End: 2022-01-21

## 2022-01-20 RX ORDER — TRAZODONE HYDROCHLORIDE 50 MG/1
50 TABLET, FILM COATED ORAL AT BEDTIME
Qty: 30 TABLET | Refills: 0 | Status: SHIPPED | OUTPATIENT
Start: 2022-01-20 | End: 2023-07-18

## 2022-01-20 RX ORDER — HALOPERIDOL 5 MG/1
5 TABLET ORAL AT BEDTIME
Qty: 30 TABLET | Refills: 0 | Status: SHIPPED | OUTPATIENT
Start: 2022-01-20 | End: 2022-01-21

## 2022-01-20 RX ORDER — CLOZAPINE 100 MG/1
300 TABLET, ORALLY DISINTEGRATING ORAL DAILY
Qty: 90 TABLET | Refills: 0 | Status: SHIPPED | OUTPATIENT
Start: 2022-01-20 | End: 2022-01-21

## 2022-01-20 RX ORDER — HALOPERIDOL 0.5 MG/1
2.5 TABLET ORAL 4 TIMES DAILY
Qty: 35 TABLET | Refills: 0 | Status: SHIPPED | OUTPATIENT
Start: 2022-01-20 | End: 2022-01-21

## 2022-01-20 RX ORDER — HALOPERIDOL 0.5 MG/1
2.5 TABLET ORAL AT BEDTIME
Qty: 150 TABLET | Refills: 0 | Status: SHIPPED | OUTPATIENT
Start: 2022-01-20 | End: 2022-01-21

## 2022-01-20 RX ORDER — CLOZAPINE 25 MG/1
25 TABLET ORAL DAILY
Qty: 7 TABLET | Refills: 0 | Status: SHIPPED | OUTPATIENT
Start: 2022-01-20 | End: 2022-01-21

## 2022-01-20 RX ORDER — TRAZODONE HYDROCHLORIDE 50 MG/1
50 TABLET, FILM COATED ORAL
Qty: 7 TABLET | Refills: 0 | Status: SHIPPED | OUTPATIENT
Start: 2022-01-20 | End: 2023-07-18

## 2022-01-20 RX ADMIN — TRAZODONE HYDROCHLORIDE 50 MG: 50 TABLET ORAL at 19:31

## 2022-01-20 RX ADMIN — Medication 7.5 MG: at 19:31

## 2022-01-20 RX ADMIN — Medication 125 MCG: at 08:43

## 2022-01-20 RX ADMIN — CLOZAPINE 375 MG: 50 TABLET ORAL at 19:28

## 2022-01-20 RX ADMIN — DOCUSATE SODIUM 100 MG: 100 CAPSULE, LIQUID FILLED ORAL at 19:28

## 2022-01-20 RX ADMIN — DOCUSATE SODIUM 100 MG: 100 CAPSULE, LIQUID FILLED ORAL at 08:43

## 2022-01-20 NOTE — PLAN OF CARE
"  Problem: Cognitive Impairment (Psychotic Signs/Symptoms)  Goal: Optimal Cognitive Function (Psychotic Signs/Symptoms)  Outcome: Improving   Pt's intrusive thoughts continue to improve. Pt denies all AH. Pt denies SI/SIB/HI. Pt continues to have hypotension. Writer contacted internal medicine to let them know. Provider ordered compression stockings to try and help her blood flow therefore hoping to increase her blood pressure. Pt said \"I have worn them in the past so I know how to wear them.\" Pt has been in and out of her room most of the shift. Pt doesn't interact with peers but will engage with staff if staff starts the conversation.   "

## 2022-01-20 NOTE — PLAN OF CARE
Problem: Psychomotor Impairment (Psychotic Signs/Symptoms)  Goal: Improved Psychomotor Symptoms (Psychotic Signs/Symptoms)  Outcome: Improving   Pt states she is not having hallucinations tonight and that her food tasted fine . She did drink 1000cc water tonight , Vitals WNL . Med compliant . Pt spoke of how her oldest daughter that is 17 has Autism and has been going to school to be trained to do some sort of a job . She states her  and her mother in law are very supportive . She is hoping to go home tomorrow , Denies thoughts to harm self or children

## 2022-01-20 NOTE — PROGRESS NOTES
"    ----------------------------------------------------------------------------------------------------------  [facility], [place]   Psychiatric Progress Note  Hospital Day #10    Identifier: Nona Filney is a 41 year old female with previous psychiatric diagnoses of schizoaffective disorder, depressive type who presents with command auditory hallucinations telling her to kill her children, frightening visual hallucinations, and SI w/ intent and plan in the context of medication adherence and significant chronic psychosocial stressors. Assessment is that the current presentation is consistent with schizoaffective disorder with current depression and psychosis.      Interim History:   The patient's care was discussed with the treatment team and chart notes were reviewed.    Vitals: Temp 96.6F, BP 93/61, otherwise WNL  Sleep: 6.75 hours (01/20/22 0600)  Scheduled medications: Took all other medications as prescribed  PRN medications: Acetaminophen 650mg for headache    Staff Report:  - Patient is quiet and withdrawn on approach, feeling better today, smiling, AH are not as intense but of same context, declined ECT yesterday, laid in bed and paced during the shift, orthostatic hypotension without dizziness, encouraged to drink fluids  - Per Jessica Sparks (Social Work): \"Vicky [ACT care team] is concerned to hear that her positive symptoms are still so present. States that typically for Nona when her symptoms are more controlled the AH are either completley gone or are more of a background noise and the voices are not clear enough for her to report any direct command or messages. Is concerned to hear she is still reporting AH command in nature to hurt herself and her children given more recent history of taking steps towards action re SI.\" AH are telling her to kill herself and her children and that her food and medications are poisoned. Inconsistent reporting of timing of AH. " "Sometimes says she hasn't hear them in a few days and sometimes says she heard them yesterday morning.   - Last night, not having AH, food tasted fine, drank 1000cc water, denied thoughts of self harm or harm to her children  - Slept well overnight     Subjective:     Patient Interview:  Nona is doing \"really well\" today. She has not heard voices since yesterday morning but still has lingering olfactory hallucinations though not as strong as previous. She was able to sleep well overnight due to cessation of AH. She still feels a bit lightheaded when she stands up, but she attributes this to the medication as she has this problem chronically while at home. She was able to eat and drink well this morning. She is trying to drink more water, but says that her BP still remains low. She said that during her last admission she also had low BP. She feels that she has been able to compensate for the low BP with drinking more fluid and sitting/standing up slowly.    She enjoyed dance group yesterday because she liked the country music. She enjoys listening to \"Skye Lang\" which made her laugh and smile. One of her kids tested positive for COVID recently. She wants to go home to help with them. Her goal for today is going home to see her kids. We asked if her home responsibilities are pushing her to want to go home. She replied with, \"I just want to go home\". She prefers to go home as soon as possible.     We inquired about why the patient thinks the voices have come back after her last hospitalization. She thinks maybe the stress of having a child with autism and no social support besides her in-laws are causing the voices to recur. When asked about how she cong with voices, she said it is difficult to deal with them when she is hearing them. Her Rastafari bobby tells her that \"killing someone is a crime,\" so she would never act on the command . She believes that she can overcome what the voices are telling her to " "do, and she has never gotten close to executing the commands of the voices.     The risks, benefits, alternatives and side effects of any medication changes have been discussed and are understood by the patient and other caregivers.    Review of systems:   Patient reports no concerning symptoms aside from those listed above.     Objective:   /73 (BP Location: Right arm)   Pulse 99   Temp (!) 96.6  F (35.9  C) (Tympanic)   Resp 16   Wt 72.6 kg (160 lb)   SpO2 97%   BMI 27.46 kg/m    Weight is 160 lbs 0 oz  Body mass index is 27.46 kg/m .    Mental Status Exam:  Oriented to:  Grossly Oriented  General:  Awake and Alert  Appearance:  appears stated age and Grooming is adequate  Behavior/Attitude:  calm, engaged and open  Eye Contact:  appropriate  Psychomotor: normal and no evidence of tics, dystonia, or tardive dyskinesia no catatonia present  Speech: Normal rate and rhythm, appropriate volume/tone, more spontaneous  Language: Fluent in English with appropriate syntax and vocabulary.  Mood:  \"doing well\"  Affect:  congruent with mood, stable and euthymic  Thought Process:  linear, coherent and goal directed  Thought Content: No SI/HI, AH/VH since yesterday morning  Associations:  intact  Insight:  good due to recognition of AH and mental illness  Judgment:  good - presented to hospital for help, cooperative  Impulse control: good - has not acted on command AH  Attention Span:  grossly intact  Concentration:  grossly intact  Recent and Remote Memory:  grossly intact  Fund of Knowledge:  average  Muscle Strength and Tone: normal  Gait and Station: Normal      Allergies:   No Known Allergies     Labs:     No results found for this or any previous visit (from the past 24 hour(s)).     Assessment    Primary psychiatric diagnosis:   Schizoaffective disorder, depressed type    Secondary psychiatric diagnoses of concern this admission:   H/o anxiety disorder unspecified    Diagnostic Impression:   Nona Finley " is a 41 year old Polish female with a past psychiatric history of schizoaffective disorder depressive type who presented to the ER with SI, HI and AH. Significant symptoms on admission included SI, depression, sleep issues, psychosis and disordered eating. Her last psychiatric hospitalization was from 12/28-1/04/22 at [facility] with Dr. Grimm for the same symptoms.  She is currently followed by Dr. Walter Gaviria, [facility] team. Psychosocial stressors are quite significant. She lives with her mother and father in law and her 6 kids. Her relationship with her  is long-distance, strained and per ACT team, she's been ambivalent about  him and going to live in a  for some time.  Patient's support system is minimal outside of her  and in-laws, and is something her ACT team has been trying to help with (recommending Women's Groups, etc).  Substance use or medication nonadherence do not appear to be playing a contributing role in the patient's presentation.  There is genetic loading for psychosis. Medical history does not appear to be significant. The MSE is notable for depressed mood, sad affect, SI with plan, commanding AH and disturbing VH. She denies self injurious behaviors.     Her reported symptoms of depressed mood, hopelessness, SI, AH,VH are consistent with her historic diagnosis of schizoaffective disorder with current depression and psychosis. Optimization of medications to target these symptom clusters in addition to evaluation of adquate outpatient supports will be targets for treatment during this admission.     Psychiatric Hospital course:   Nona Finley was admitted to [facility] as a voluntary patient/ on a 72 hour hold. PTA clozapine, colace, haldol (scheduled and prn), trazodone, and melatonin were continued. Patient is also on Abilify MORENO. 1/12 target sxs not controlled, increased clozaril to 362.5mg. Initiated the preliminary steps for ECT consult. Medicine consult  placed for ECT screening assessment. Utox and UPT specimen was collected.1/14 Increased Clozaril to 375mg, ECT IP consult planned to be completed. Potential start date of Monday 1/17. Gabapentin PRN was discontinued for ECT next week. 1/18 she received her first ECT session on Monday. Complained of post-ECT headache 9/10. Declined further ECT sessions d/t the side effects. She was found to be dizzy and orthostatic. 0.9 NS 1L was administered which resolved sxs.1/19 patient declined the ECT treatment. The AH is resolved since last night. Continues to report olfactory hallucinations but able to eat and drink well. 1/20 IM was consulted due to low blood pressures. Compression stockings were implemented. No AH, SI, SIB. Patient has been sxs free for the past 24 hours and if no change could be considered to be discharged home on 1/21/22.    Discontinued Medications (& Rationale): n/a    Medical course: Patient was medically cleared for admission to psychiatric unit. No pertinent PMH.    Data:   CBC: Hgb 11.4 (baseline), otherwise WNL  COVID neg  UDS neg  HCG neg    Plan     Today's changes:  - Compression stockings for orthostatic hypotension    Psychotropic Medications:  Scheduled:  -Clozapine 375mg at bedtime  -Colace 100mg BID  -Haldol 7.5mg at bedtime  -Trazodone 50mg at bedtime     PRN:  -Haldol 2mg BID  -Acetaminophen 650 mg Q6H PRN for pain and fever  -Ibuprofen 400 mg Q6H for pain  -Maalox 30 ml Q4H PRN for indigestion  -Gabapentin 100mg q6h prn for anxiety  -Melatonin 3mg at bedtime  -Miralax prn for constipation  -Olanzapine 10 mg PO/IM prn Q2H severe agitation/psychosis    Additional Plans:  Patient will be treated in therapeutic milieu with appropriate individual and group therapies as described.    Medical diagnoses to be addressed this admission:    None    Consults: Internal Medicine    Legal Status: Voluntary. Currently committed on a PD. No Reyes.     Safety Assessment:   Behavioral Orders   Procedures     Code 1 - Restrict to Unit    Code 2 - 1:1 Staff Supervision     For ECT only    Discontinue 1:1 attendant for suicide risk     Order Specific Question:   I have performed an in person assessment of the patient     Answer:   Based on this assessment the patient no longer requires a one on one attendant at this point in time.     Order Specific Question:   Rationale     Answer:   Medical Record Reviewed     Order Specific Question:   Rationale     Answer:   Patient States able to remain safe in hospital     Order Specific Question:   Rationale     Answer:   Modifications to care environment made to mitigate safety risk     Order Specific Question:   Rationale     Answer:   Routine observations are sufficient to monitor safety.    Electroconvulsive therapy     Series of up to 12 treatments. Begin Date 01/14/22   Treating Psychiatrist providing ECT:  Sean  Notified on: 01/14/22    Fall precautions    Routine Programming     As clinically indicated    Status 15     Every 15 minutes.    Suicide precautions     Patients on Suicide Precautions should have a Combination Diet ordered that includes a Diet selection(s) AND a Behavioral Tray selection for Safe Tray - with utensils, or Safe Tray - NO utensils         SIO: No    Disposition: Home, likely discharge on Friday 1/21 if target symptoms do not recur.    -Leroy Gleason, MS3  [employer]    Attestation:   I was present with the medical student who participated in the service and in the documentation of the note. I have verified the history and personally performed the physical exam and medical decision making. I agree with the assessment and plan of care as documented in the note.    Santos Brand, PGY-1 (Psychiatry)  [facility]    This patient has been seen and evaluated by me, Jesus Julien.  I have discussed this patient with the psychiatry resident and I agree with the findings and plan in this note.    I have reviewed  today's vital signs, medications, labs and imaging.     Jesus Wyman MD on 1/21/2022 at 8:47 PM

## 2022-01-20 NOTE — PROGRESS NOTES
INRS per Bah dosing  No bleeding issues  Monitor   Pt's BP taken. Sitting BP: 104/68, P: 109. Standing BP: 90/62, P: 123. Pt states she feels lightheaded/dizzy upon standing for BP. Pt encouraged to increase fluid intake. Pt seen to get a cup of water at this time. Pt educated on rising slowly from laying or sitting positions. Pt encouraged to ask staff for assistance when needed. RN informed.

## 2022-01-20 NOTE — PLAN OF CARE
Assessment/Intervention/Current Symptoms and Care Coordination  - chart review  - team meeting  - post team rounds team meeting / discussion  - coordination with ACT team and Dr. Brand -  - spoke with [employer] clozapine monitoring program - sent lab results as requested - forwarded contact information to provider for call back to clarify orders        Discharge Plan or Goal  Pending stabilization & development of a safe discharge plan.  Considerations include: tentative plan for discharge home tomorrow to continue with ACT team      Barriers to Discharge   Patient requires further psychiatric stabilization due to current symptomology      Referral Status  No new referrals      Legal Status  Provisional Discharge Agreement - has not been revoked    Order for Recommitment   - Order issued on 2021 -   Per order  Respondent is recommitted as a person who poises a risk of harm due to mental illness to the Commissioner of Human Services and the head of [facility] until 2022; provided however, that Respondent's provisional discharge remains in effect at this time and will  on 2022, unless it has been revoked or extended before that date.      No Reyes in place.

## 2022-01-20 NOTE — PLAN OF CARE
"  Problem: Adult Inpatient Plan of Care  Goal: Absence of Hospital-Acquired Illness or Injury  Intervention: Identify and Manage Fall Risk  Recent Flowsheet Documentation  Taken 1/20/2022 0271 by Rossana Solis RN  Safety Promotion/Fall Prevention:   check orthostatic blood pressure   clutter free environment maintained   fall prevention program maintained   increase visualization of patient     Problem: Sleep Disturbance (Psychotic Signs/Symptoms)  Goal: Improved Sleep (Psychotic Signs/Symptoms)  Outcome: No Change   Observed to have slept well for approx 6.75 hrs w/ regular non-labored respirations on all Q 15\" rounds overnight w/no reported or observed distress.  PO fluids maintained at BS.  Environment conducive to pt's safety and well-being.  Safe, therapeutic environment maintained.    "

## 2022-01-21 VITALS
HEART RATE: 121 BPM | OXYGEN SATURATION: 98 % | RESPIRATION RATE: 15 BRPM | BODY MASS INDEX: 27.46 KG/M2 | SYSTOLIC BLOOD PRESSURE: 104 MMHG | WEIGHT: 160 LBS | TEMPERATURE: 98 F | DIASTOLIC BLOOD PRESSURE: 72 MMHG

## 2022-01-21 PROCEDURE — 250N000013 HC RX MED GY IP 250 OP 250 PS 637

## 2022-01-21 PROCEDURE — 99239 HOSP IP/OBS DSCHRG MGMT >30: CPT | Mod: GC | Performed by: PSYCHIATRY & NEUROLOGY

## 2022-01-21 RX ORDER — HALOPERIDOL 5 MG/1
TABLET ORAL
Qty: 45 TABLET | Refills: 0 | Status: SHIPPED | OUTPATIENT
Start: 2022-01-21 | End: 2023-07-18

## 2022-01-21 RX ORDER — CLOZAPINE 100 MG/1
300 TABLET, ORALLY DISINTEGRATING ORAL DAILY
Qty: 90 TABLET | Refills: 0 | Status: ON HOLD | OUTPATIENT
Start: 2022-01-21 | End: 2023-07-24

## 2022-01-21 RX ORDER — CLOZAPINE 25 MG/1
75 TABLET, ORALLY DISINTEGRATING ORAL DAILY
Qty: 90 TABLET | Refills: 0 | Status: SHIPPED | OUTPATIENT
Start: 2022-01-21 | End: 2023-07-18

## 2022-01-21 RX ADMIN — DOCUSATE SODIUM 100 MG: 100 CAPSULE, LIQUID FILLED ORAL at 09:16

## 2022-01-21 RX ADMIN — Medication 125 MCG: at 09:16

## 2022-01-21 NOTE — PLAN OF CARE
"  Problem: Adult Inpatient Plan of Care  Goal: Absence of Hospital-Acquired Illness or Injury  Intervention: Identify and Manage Fall Risk  Recent Flowsheet Documentation  Taken 1/21/2022 8249 by Rossana Solis RN  Safety Promotion/Fall Prevention:    check orthostatic blood pressure    clutter free environment maintained    fall prevention program maintained     Problem: Adult Inpatient Plan of Care  Goal: Optimal Comfort and Wellbeing  Outcome: Improving     Problem: Sleep Disturbance (Psychotic Signs/Symptoms)  Goal: Improved Sleep (Psychotic Signs/Symptoms)  Outcome: No Change   Observed to have slept well for approx 6.5 hrs on all Q 15\" rounds overnight w/ regular non-labored respirations and no reported or observed distress.  Fluids maintained at BS overnight.  Safe, therapeutic environment maintained.    "

## 2022-01-21 NOTE — PLAN OF CARE
Pt discharge to home via cab. PA walked pt down and put pt in the cab. Pt denies SI/SIB/HI and hallucinations. Pt is excited to be going home. All discharge forms and medications gone over. Pt received all her belongings back from security.

## 2022-01-21 NOTE — PLAN OF CARE
Nursing Assessment    Recent Vitals: B/P:98/63  T:97.3  P:115     General Shift Summary  Pt in milieu at start of shift. Pt requested HS meds early because they wanted to be up early the next day. Pt denies hallucinations this shift and reports being less depressed. Pt does not appears responding (however has not appeared to be responding at all this admission). Pt reports they are not currently having thoughts to harm self or children. Pt is hoping to go home tomorrow.     Patient is medication compliant and reported no side effects. Continues to have hypotension and tachycardia.  No PRN medications given this shift.     Hygiene and appetite are adequate.      Branden Doe, RN

## 2022-01-21 NOTE — DISCHARGE INSTRUCTIONS
Behavioral Discharge Planning and Instructions    Summary: You were admitted on 1/10/2022 due to suicidal ideation and psychosis.  You were treated by Dr. Jesus Julien and discharged on 1/21/22 from [facility] to Home    Main Diagnosis: Schizoaffective     Health Care Follow-up:     [facility] ([facility])  [address] [place] [address] Main ph: [phone]  Fax: [phone]  Psychiatrist - Dr. Gaviria  Nurse - Sherry ph: [phone]    - Vicky Ceja Ph: [phone]    Clozapine Monitoring:  You will continue with [facility] for ongoing Clozapine monitoring -  Phone [phone]     Attend all scheduled appointments with your outpatient providers. Call at least 24 hours in advance if you need to reschedule an appointment to ensure continued access to your outpatient providers.     Major Treatments, Procedures and Findings:  You were provided with: a psychiatric assessment, assessed for medical stability, medication evaluation and/or management, individual therapy and milieu management    Symptoms to Report: feeling more aggressive, increased confusion, losing more sleep, mood getting worse or thoughts of suicide    Early warning signs can include: increased depression or anxiety sleep disturbances increased thoughts or behaviors of suicide or self-harm  increased unusual thinking, such as paranoia or hearing voices    Safety and Wellness:  Take all medicines as directed.  Make no changes unless your doctor suggests them.      Follow treatment recommendations.  Refrain from alcohol and non-prescribed drugs.     Resources:   Crisis Intervention: 577.700.2445 or 309-354-4169 (TTY: 458.271.7702).  Call anytime for help.  Suicide Awareness Voices of Education (SAVE) (www.save.org): 968-777-SAVE (4726)  Mental Health Consumer/Survivor Network of [place] (www.mhcsn.net): 729.992.6324 or 856-098-6311  [facility] (COPE) Response  - Adult [phone]    General Medication Instructions:   See your medication sheet(s) for instructions.   Take all medicines as directed.  Make no changes unless your doctor suggests them.   Go to all your doctor visits.  Be sure to have all your required lab tests. This way, your medicines can be refilled on time.  Do not use any drugs not prescribed by your doctor.  Avoid alcohol.    Advance Directives:   Scanned document on file with [facility]? No scanned doc  Is document scanned? Pt states no documents  Honoring Choices Your Rights Handout: Informed and given  Was more information offered? Pt declined    The Treatment team has appreciated the opportunity to work with you. If you have any questions or concerns about your recent admission, you can contact the unit which can receive your call 24 hours a day, 7 days a week. They will be able to get in touch with a Provider if needed. The unit number is [phone] .

## 2022-01-22 DIAGNOSIS — Z71.89 OTHER SPECIFIED COUNSELING: ICD-10-CM

## 2022-01-23 NOTE — DISCHARGE SUMMARY
----------------------------------------------------------------------------------------------------------  [facility], [place]   Discharge Summary  Hospital Day #11  Nona Finley MRN# [id number]   Age: 41 year old YOB: 1980   Date of Admission:  1/10/2022  Date of Discharge:  1/21/2022  2:00 PM  Admitting Physician:  Juarez Grimm MD  Discharge Physician:  Juarez Grimm MD     Event Leading to Hospitalization:   Nona Finley is a 41 year old female with a past psychiatric history of schizoaffective disorder, depressive type who was admitted from the ER on 01/10/2022 due to concern for commanding auditory hallucinations and visual hallucinations of shadowy faces telling her to kill her children with knife since 2 days ago with no specific trigger or contributing factor identified.     See Admission note by Juarez Grimm MD on 1/10/2022 for additional details.      Objective:   B/P: 104/72, T: 98, P: 121, R: 15    Psychiatric Examination:  Appearance:  awake, alert, adequately groomed, appeared as age stated, awake, alert, cooperative and no apparent distress  Muscle Strength and Tone: normal  Gait and Station: Normal  Behavior (Psychomotor):  no evidence of tardive dyskinesia, dystonia, or tics  Eye Contact:  good  Speech:  clear, coherent and normal prosody  Mood:  good  Affect:  appropriate and in normal range, mood congruent, intensity is normal, full range and reactive  Attitude:  cooperative  Thought Process:  logical, linear and goal oriented  Thought Content:  no evidence of suicidal ideation or homicidal ideation, no evidence of psychotic thought, no auditory hallucinations present and no visual hallucinations present  Associations:  no loose associations  Insight:  good  Judgment:  intact  Oriented to:  time, person, and place  Attention Span and Concentration:  intact  Recent and Remote Memory:  intact  Language: Fluent in English with appropriate syntax  and vocabulary.  Fund of Knowledge: appropriate     Hospital Course:   Diagnostic Impression:                Nona Finley is a 41 year old [place] female with a past psychiatric history of schizoaffective depressive type who presented to the ER with SI, HI and psychosis. Significant symptoms include SI, depressed, sleep issues and psychosis. Her last psychiatric hospitalization was in 12/2021.  She is currently followed by Dr. Walter Gaviria, [facility] team. No current psychosocial stressors are identified at this point. she has been coping with the stress of her sxs by seeking help.  Patient's support system includes family and outpatient ACT  team.  Substance use does not appear to be playing a contributing role in the patient's presentation.  There is genetic loading for psychosis as her brother suffers from schizophrenia. Medical history does not appear to be significant. The MSE is notable for depressed mood, blunt affect, SI with plan, commanding AH and disturbing VH. She denies self injurious behaviors. She does not seem to have any traits of personality disorders  interfering with her ability to adhere with the treatment plan.      Her reported symptoms of depressed mood, poor sleep, low energy, SI, HI AH,VH are consistent with her historic diagnosis of schizoaffective disorder. Optimization of medications to target these symptom clusters in addition to evaluation of adquate outpatient supports will be targets for treatment during this admission.      Psychiatric Course:  Psychiatric Hospital course:   Nona Finley was admitted to [facility] as a voluntary patient/ on a 72 hour hold. PTA clozapine, colace, haldol (scheduled and prn), trazodone, and melatonin were continued. Patient is also on Abilify MORENO. 1/12 target sxs not controlled, increased clozaril to 362.5mg. Initiated the preliminary steps for ECT consult. Medicine consult placed for ECT screening assessment. Utox and UPT specimen was  collected.1/14 Increased Clozaril to 375mg, ECT IP consult planned to be completed. Potential start date of Monday 1/17. Gabapentin PRN was discontinued for ECT next week. 1/18 she received her first ECT session on Monday. Complained of post-ECT headache 9/10. Declined further ECT sessions d/t the side effects. She was found to be dizzy and orthostatic. 0.9 NS 1L was administered which resolved sxs.1/19 patient declined the ECT treatment. The AH is resolved since last night. Continues to report olfactory hallucinations but able to eat and drink well. 1/20 IM was consulted due to low blood pressures. Compression stockings were implemented. No AH, SI, SIB. Patient has been sxs free for the past 48 hours and if no change could be considered to be discharged home on 1/21/22.    Medical Course:  Patient was medically cleared for admission to the unit. During the hospitalization she c/o dizziness and lightheadedness as well as low BP readings ranging from 80s/50s to 110s/70s and pulse rates as high as 140s. She was found to have positive orthostatic hypotension. She received 1L of NS bolus which improved the sxs. Medicine service recommended pressure stockings which further improved the sxs.    Consults:   Medicine consult for assessment and ECT clearance - completed on 1/11   ECT consult - completed on 1/14    Risk Assessment:   Today Nona Finley denies SI, SIB and HI. No overt evidence of psychosis or david observed. Patient grossly appears to be cognitively intact. Insight and judgement have improved since admission. Patient is aware of consequences of medication non-compliance. Patient expresses understanding of her mental health sxs and willingness to seek for help in time of distress. Patient has not exhibited aggressive or violence behaviors since prior to this admission. Throughout patient's stay she was cooperative with staff, med compliant and did not have any events on the unit. Nona has notable risk  factors for self-harm, including psychosis. However, risk is mitigated by commitment to family, Christian beliefs, sobriety, absence of past attempts, ability to volunteer a safety plan and history of seeking help when needed. Patient does/does not have immediate access to firearms. Therefore, based on all available evidence including the factors cited above, she does not appear to be at imminent risk for self-harm, does not meet criteria for a 72-hr hold, and therefore remains appropriate for ongoing outpatient level of care. Patient agreed to further reduce risk of self-harm by developing  a safety plan and agreed to remain medication adherent. Additional steps taken to minimize risk include: medication optimization, close psychiatric follow up and provision of crisis resources. Patient is going to be under the care of her ACT team once discharged. Patient expressed understanding of risk associated with cessation of treatment including increased risk of harm to self or others.      Nona was released to home. During this admission, she did participate in groups and was visible in the milieu, and her symptoms of auditory/visual/olfactory hallucinations improved. At the time of discharge she was determined to not be a danger to herself or others.     This document serves as a transfer of care to Nona Finley's outpatient providers.     Diagnoses:   Schizoaffective Disorder, depressive type     Discharge Plan:   Patient is being discharged to home with the following medications and appointments as detailed below:    Medications:    Discharge Medication List as of 1/21/2022  4:05 PM        CONTINUE these medications which have CHANGED    Details   !! cloZAPine (FAZACLO) 100 MG ODT Take 3 tablets (300 mg) by mouth daily Take with two 25mg tablets for a total daily dose of 375mg., Disp-90 tablet, R-0, Local Print      !! cloZAPine (FAZACLO) 25 MG ODT Take 3 tablets (75 mg) by mouth daily Take with three 100mg tablets  for a total daily dose of 375mg., Disp-90 tablet, R-0, Local Print      !! haloperidol (HALDOL) 2 MG tablet Take 1 tablet (2 mg) by mouth 2 times daily as needed for other (If auditory/visual hallucinations present), Disp-60 tablet, R-0, E-Prescribe      !! haloperidol (HALDOL) 5 MG tablet Take 1 and a half tablet every night at bedtime for a total dose of 7.5mg., Disp-45 tablet, R-0, Local Print      !! traZODone (DESYREL) 50 MG tablet Take 1 tablet (50 mg) by mouth At Bedtime, Disp-30 tablet, R-0, E-Prescribe      !! traZODone (DESYREL) 50 MG tablet Take 1 tablet (50 mg) by mouth nightly as needed for sleep, Disp-7 tablet, R-0, E-Prescribe       !! - Potential duplicate medications found. Please discuss with provider.        CONTINUE these medications which have NOT CHANGED    Details   docusate sodium (COLACE) 100 MG capsule Take 1 capsule (100 mg) by mouth 2 times daily, Disp-30 capsule, R-0, E-Prescribe      melatonin 5 MG tablet Take 1 tablet (5 mg) by mouth At Bedtime, Disp-30 tablet, R-0, E-Prescribe      ABILIFY MAINTENA 400 MG extended release injection Inject 2 mLs (400 mg) into the muscle every 28 days Due on 3/25, Disp-2 mL, R-0, MARICRUZ, E-Prescribe      alum & mag hydroxide-simethicone (MAALOX) 200-200-20 MG/5ML SUSP suspension Take 30 mLs by mouth every 4 hours as needed for indigestion, Disp-335 mL, R-0, E-Prescribe             Psychiatric Appointments:  [facility] ([facility]) [address] [place] [address] Main ph: [phone]  Fax: [phone]  Psychiatrist - Dr. Gaviria  Nurse - Sherry ph: [phone]    - Vicky Ceja Ph: [phone]     Clozapine Monitoring:  You will [facility] with [facility] for ongoing Clozapine monitoring -  Phone [phone]    Psychotherapy Appointments: no  Other Referrals: no    Pt seen and discussed with my attending, MD Santos Kim MD  PGY-1 Psychiatry  Resident    Attestation:  The patient has been seen and evaluated by me, Jesus Julien . I have examined the patient today and reviewed the discharge plan with the resident. I agree with the final assessment and plan, as noted in the discharge summary. I have reviewed today's vital signs, medications, labs and imaging.    Total time discharge plannin minutes      Jesus Wyman MD on 2022 at 6:53 PM       Appendix A: All Labs This Admission:     Results for orders placed or performed during the hospital encounter of 01/10/22   Asymptomatic COVID-19 Virus (Coronavirus) by PCR Nasopharyngeal     Status: Normal    Specimen: Nasopharyngeal; Swab   Result Value Ref Range    SARS CoV2 PCR Negative Negative    Narrative    Testing was performed using the arash  SARS-CoV-2 & Influenza A/B Assay on the arash  Aubree  System.  This test should be ordered for the detection of SARS-COV-2 in individuals who meet SARS-CoV-2 clinical and/or epidemiological criteria. Test performance is unknown in asymptomatic patients.  This test is for in vitro diagnostic use under the FDA EUA for laboratories certified under CLIA to perform moderate and/or high complexity testing. This test has not been FDA cleared or approved.  A negative test does not rule out the presence of PCR inhibitors in the specimen or target RNA in concentration below the limit of detection for the assay. The possibility of a false negative should be considered if the patient's recent exposure or clinical presentation suggests COVID-19.  [facility] are certified under the Clinical Laboratory Improvement Amendments of 1988 (CLIA-88) as qualified to perform moderate and/or high complexity laboratory testing.   HCG qualitative urine (UPT)     Status: Normal   Result Value Ref Range    hCG Urine Qualitative Negative Negative   CBC with platelets and differential     Status: Abnormal   Result Value Ref Range    WBC Count 5.7 4.0  - 11.0 10e3/uL    RBC Count 3.94 3.80 - 5.20 10e6/uL    Hemoglobin 11.4 (L) 11.7 - 15.7 g/dL    Hematocrit 34.5 (L) 35.0 - 47.0 %    MCV 88 78 - 100 fL    MCH 28.9 26.5 - 33.0 pg    MCHC 33.0 31.5 - 36.5 g/dL    RDW 14.3 10.0 - 15.0 %    Platelet Count 349 150 - 450 10e3/uL    % Neutrophils 53 %    % Lymphocytes 32 %    % Monocytes 8 %    % Eosinophils 6 %    % Basophils 1 %    % Immature Granulocytes 0 %    NRBCs per 100 WBC 0 <1 /100    Absolute Neutrophils 3.0 1.6 - 8.3 10e3/uL    Absolute Lymphocytes 1.8 0.8 - 5.3 10e3/uL    Absolute Monocytes 0.5 0.0 - 1.3 10e3/uL    Absolute Eosinophils 0.3 0.0 - 0.7 10e3/uL    Absolute Basophils 0.1 0.0 - 0.2 10e3/uL    Absolute Immature Granulocytes 0.0 <=0.4 10e3/uL    Absolute NRBCs 0.0 10e3/uL   Drug abuse screen 6 urine (chem dep) ([facility])     Status: Normal   Result Value Ref Range    Amphetamines Urine Screen Negative Screen Negative    Barbiturates Urine Screen Negative Screen Negative    Benzodiazepines Urine Screen Negative Screen Negative    Cannabinoids Urine Screen Negative Screen Negative    Cocaine Urine Screen Negative Screen Negative    Ethanol Urine Screen Negative Screen Negative    Opiates Urine Screen Negative Screen Negative   Comprehensive metabolic panel     Status: Abnormal   Result Value Ref Range    Sodium 141 133 - 144 mmol/L    Potassium 4.2 3.4 - 5.3 mmol/L    Chloride 110 (H) 94 - 109 mmol/L    Carbon Dioxide (CO2) 26 20 - 32 mmol/L    Anion Gap 5 3 - 14 mmol/L    Urea Nitrogen 15 7 - 30 mg/dL    Creatinine 0.51 (L) 0.52 - 1.04 mg/dL    Calcium 9.1 8.5 - 10.1 mg/dL    Glucose 97 70 - 99 mg/dL    Alkaline Phosphatase 64 40 - 150 U/L    AST 18 0 - 45 U/L    ALT 22 0 - 50 U/L    Protein Total 7.5 6.8 - 8.8 g/dL    Albumin 3.0 (L) 3.4 - 5.0 g/dL    Bilirubin Total 0.2 0.2 - 1.3 mg/dL    GFR Estimate >90 >60 mL/min/1.73m2   Vitamin D Deficiency     Status: Normal   Result Value Ref Range    Vitamin D, Total (25-Hydroxy) 28 20 - 75 ug/L    Narrative     Season, race, dietary intake, and treatment affect the concentration of 25-hydroxy-Vitamin D. Values may decrease during winter months and increase during summer months. Values 20-29 ug/L may indicate Vitamin D insufficiency and values <20 ug/L may indicate Vitamin D deficiency.    Vitamin D determination is routinely performed by an immunoassay specific for 25 hydroxyvitamin D3.  If an individual is on vitamin D2(ergocalciferol) supplementation, please specify 25 OH vitamin D2 and D3 level determination by LCMSMS test VITD23.     Asymptomatic COVID-19 Virus (Coronavirus) by PCR Nasopharyngeal     Status: Normal    Specimen: Nasopharyngeal; Swab   Result Value Ref Range    SARS CoV2 PCR Negative Negative    Narrative    Testing was performed using the arash  SARS-CoV-2 & Influenza A/B Assay on the arash  Aubree  System.  This test should be ordered for the detection of SARS-COV-2 in individuals who meet SARS-CoV-2 clinical and/or epidemiological criteria. Test performance is unknown in asymptomatic patients.  This test is for in vitro diagnostic use under the FDA EUA for laboratories certified under CLIA to perform moderate and/or high complexity testing. This test has not been FDA cleared or approved.  A negative test does not rule out the presence of PCR inhibitors in the specimen or target RNA in concentration below the limit of detection for the assay. The possibility of a false negative should be considered if the patient's recent exposure or clinical presentation suggests COVID-19.  [facility] are certified under the Clinical Laboratory Improvement Amendments of 1988 (CLIA-88) as qualified to perform moderate and/or high complexity laboratory testing.   Basic metabolic panel     Status: Abnormal   Result Value Ref Range    Sodium 139 133 - 144 mmol/L    Potassium 3.9 3.4 - 5.3 mmol/L    Chloride 106 94 - 109 mmol/L    Carbon Dioxide (CO2) 27 20 - 32 mmol/L    Anion Gap 6 3 - 14 mmol/L    Urea  Nitrogen 13 7 - 30 mg/dL    Creatinine 0.60 0.52 - 1.04 mg/dL    Calcium 9.0 8.5 - 10.1 mg/dL    Glucose 113 (H) 70 - 99 mg/dL    GFR Estimate >90 >60 mL/min/1.73m2   CBC with platelets     Status: Normal   Result Value Ref Range    WBC Count 10.1 4.0 - 11.0 10e3/uL    RBC Count 4.15 3.80 - 5.20 10e6/uL    Hemoglobin 12.0 11.7 - 15.7 g/dL    Hematocrit 36.8 35.0 - 47.0 %    MCV 89 78 - 100 fL    MCH 28.9 26.5 - 33.0 pg    MCHC 32.6 31.5 - 36.5 g/dL    RDW 13.8 10.0 - 15.0 %    Platelet Count 328 150 - 450 10e3/uL   Asymptomatic COVID-19 Virus (Coronavirus) by PCR Nasopharyngeal     Status: Normal    Specimen: Nasopharyngeal; Swab   Result Value Ref Range    SARS CoV2 PCR Negative Negative, Testing sent to reference lab. Results will be returned via unsolicited result    Narrative    Testing was performed using the arash SARS-CoV-2 assay on the arash  SETVI0 System. This test should be ordered for the detection of  SARS-CoV-2 in individuals who meet SARS-CoV-2 clinical and/or  epidemiological criteria. Test performance is unknown in asymptomatic  patients. This test is for in vitro diagnostic use under the FDA EUA  for laboratories certified under CLIA to perform high and/or moderate  complexity testing. This test has not been FDA cleared or approved. A  negative result does not rule out the presence of PCR inhibitors in  the specimen or target RNA in concentration below the limit of  detection for the assay. The possibility of a false negative should  be considered if the patient's recent exposure or clinical  presentation suggests COVID-19. This test was validated by the [facility]. This  laboratory is certified under the Clinical Laboratory Improvement  Amendments of 1988 (CLIA-88) as qualified to perform high and/or  moderate complexity laboratory testing.   WBC and Differential     Status: None   Result Value Ref Range    WBC Count 6.1 4.0 - 11.0 10e3/uL    %  Neutrophils 55 %    % Lymphocytes 31 %    % Monocytes 8 %    % Eosinophils 5 %    % Basophils 1 %    % Immature Granulocytes 0 %    NRBCs per 100 WBC 0 <1 /100    Absolute Neutrophils 3.3 1.6 - 8.3 10e3/uL    Absolute Lymphocytes 1.9 0.8 - 5.3 10e3/uL    Absolute Monocytes 0.5 0.0 - 1.3 10e3/uL    Absolute Eosinophils 0.3 0.0 - 0.7 10e3/uL    Absolute Basophils 0.1 0.0 - 0.2 10e3/uL    Absolute Immature Granulocytes 0.0 <=0.4 10e3/uL    Absolute NRBCs 0.0 10e3/uL   Extra Green Top (Lithium Heparin) Tube     Status: None   Result Value Ref Range    Hold Specimen JIC    EKG 12-lead, complete     Status: None   Result Value Ref Range    Systolic Blood Pressure  mmHg    Diastolic Blood Pressure  mmHg    Ventricular Rate 102 BPM    Atrial Rate 102 BPM    WA Interval 174 ms    QRS Duration 72 ms     ms    QTc 450 ms    P Axis 86 degrees    R AXIS 77 degrees    T Axis 69 degrees    Interpretation ECG       Sinus tachycardia  Biatrial enlargement  Otherwise normal ECG  When compared with ECG of 30-DEC-2021 10:21,  No significant change was found  Confirmed by fellow Chaudhry, Mohsan ([id number]) on 1/17/2022 9:22:24 AM  Confirmed by MD NORY, CHANO ([id number]) on 1/17/2022 4:26:21 PM     ECT IP Consult     Status: None ()    Narrative    Luciano Zepeda MD     1/16/2022 11:48 AM  [facility] [place]   ECT Consultation   January 14, 2022     Nona Finley [id number]   41 year old 1980     Patient Status: Inpatient    Is this the first in a series of 12 treatments?  No    No Known Allergies    Weight:  160 lbs 0 oz           Indications for ECT:   Medications ineffective, former good response to   electroconvulsive therapy (ECT)         Clinical Narrative:   Nona Finley is a 41 year old American female with a past   psychiatric history of schizoaffective disorder depressive   type who presented to the ER with SI, HI and AH. Significant   symptoms on admission included SI, depression,  sleep issues,   psychosis and disordered eating. Her last psychiatric   hospitalization was from 12/28-1/04/22 at [facility] with Dr. Grimm for the same symptoms.  She is currently followed by Dr. Walter Gaviria, [facility] team. Psychosocial stressors are quite   significant. She lives with her mother and father in law and her   6 kids. Her relationship with her  is long-distance,   strained and per ACT team, she's been ambivalent about    him and going to live in a GH for some time.  Patient's support   system is minimal outside of her  and in-laws, and is   something her ACT team has been trying to help with (recommending   Women's Groups, etc).  Substance use or medication nonadherence   do not appear to be playing a contributing role in the patient's   presentation.  There is genetic loading for psychosis. Medical   history does not appear to be significant.   This consultation is requested to evaluate electroconvulsive   therapy (ECT) candidacy. Medical records indicate she underwent a   course of ECT in 2012. She was responsive to right, unilateral   ultrabrief electroconvulsive therapy (ECT) x 9, she was started   on methohexital as an anesthetic agent, added caffeine (up to 750   mg) and was eventually change to etomidate 12 mg.       ROS:    Depression: suicidal ideation with plan, without intent,   depressed mood, low energy, insomnia, feeling worthless and   overwhelmed  Anxiety: excessive worry and nervous/overwhelmed  Panic Attack:  No  Martha/Hypomania:  none  Psychosis: delusions- being under surveillance, delusions of   reference (TV) [details in Interim History], auditory   hallucinations with commands [details in Interim History] and   visual hallucinations [details in Interim History]  Trauma Related: none  Suicidal Ideation: Yes   Homicidal Ideation: Yes     Personality Symptoms: None  Obsessive Compulsive Disorder: negative    Eating Disorders: negative  LD: No  "previously diagnosed or signs of symptoms of learning   disorder reported     Medical ROS: A complete medical review of systems was preformed   and is negative other than noted in the HPI     Psychiatric History:     Primary Outpatient Psychiatrist: Dr. Walter Gaviria, [facility] ACT   team  Primary Physician: Rip Capps at [facility]  Psychotherapist: No  : Vicky Harrington, [phone] [facility] Team     Prior diagnoses:   Schizoaffective Disorder, Depressive subtype     Prior Hospitalizations:   [facility]: 12/2021 - 8/2021 - 7/2020 - 12/2019 - 5/2019 - 3/2019 -   2/2019 - 5/2018      Suicide attempts:   Yes, longer than 6 months ago - attempted to jump off a bridge -   stopped by       Self-injurious behavior:   No     Violence:   No     ECT/TMS:   Yes - 2011 and 2012     Past medications (Not currently taking):  Lorazepam 0.5 mg  Metformin 1000 mg  Zyprexa 20 mg    Xarelto 20 mg  .   Perphenazine 2 mg for psychosis  Prozac when diagnosed with post partum depression and it was   effective for her     Nicotine:       History   Smoking Status    Never Smoker   Smokeless Tobacco    Never Used      Alcohol: Social History    Substance and Sexual Activity      Alcohol use: No     Tobacco: never smoked  Alcohol: No  Cannabis: No  Opiates: No  Benzodiazepines: No  Stimulants: No     Prior CD treatments: No       Family History:      Social History:     Early History: \"Originally from [place].  Most of her family is   still there.   Grew up in [place], left in 2001. First went to   [place] before coming to [place].\"      Abuse/Trauma: Not ob file     Friends/Family/Support: \"Here in [place], she relies on help   of the family of her .   currently lives in [place].   The patient is taking care of their 6 children  Has a   daughter with ASD who is non-verbal. Her in-laws help her with   children\".     Collateral: None     Current Living Situation: "  Lives in a house in [place] with    and 6 children     Educational History: 5th grade     Occupation: Housewife     Legal: Not on file     : No     Guns: locked in house     Spirituality: practicing Confucianist     Current Med  Current Facility-Administered Medications   Medication    acetaminophen (TYLENOL) tablet 650 mg    alum & mag hydroxide-simethicone (MAALOX) suspension 30 mL    cholecalciferol (VITAMIN D3) 125 mcg (5000 units) capsule 125   mcg    cloZAPine (CLOZARIL) tablet 375 mg    docusate sodium (COLACE) capsule 100 mg    haloperidol (HALDOL) half-tab 7.5 mg    haloperidol (HALDOL) tablet 2 mg    melatonin tablet 3 mg    OLANZapine (zyPREXA) tablet 10 mg    Or    OLANZapine (zyPREXA) injection 10 mg    polyethylene glycol (MIRALAX) Packet 17 g    traZODone (DESYREL) tablet 50 mg        PMH:  Past Medical History:   Diagnosis Date    Encounter for female birth control 6/14/2017 6/14/2017 Plan Documentation Service ordered Depo Provera   injection (150mg IM) may be given every 3 months for one year per   loretta. Plan and order should be renewed at a visit no later   than 6/14/18   Vanessa Thompson MD        Encounter for insertion or removal of intrauterine   contraceptive device 1/3/2008    Paragard 1/08 removed 1/08.    Postpartum depression 8/18/2011    Schizophrenia (H) 2/12/2019    Suicidal ideation 2/16/2017       Objective:  /75 (BP Location: Right arm, Patient Position: Sitting)     Pulse 107   Temp 97.4  F (36.3  C) (Oral)   Resp 16   Wt 72.6   kg (160 lb)   SpO2 100%   BMI 27.46 kg/m   Weight is 160 lbs 0   oz  Body mass index is 27.46 kg/m .    Mental Status Examination:  Appearance/ Behavior: In appropriate attire, wearing a [place]   head scarf; has good hygiene. Calmly and actively engages with   the examiner. Maintains fair eye contact.   Speech:  Clear, spontaneous with no apparent receptive or   expressive difficulties. Normal rate, rhythm and  "volume.  Musculoskeletal:  Normal bulk with no visible atrophy.  No   abnormal movements noted.  Gait is of normal base, stride and   speed. Normal arm swing bilaterally.  Mood/Affect: Mood is reported as \"depressed\".  Affect is   restricted to depressed range with occasional appropriate   tearfulness.    Thought Process:  Mostly linear with occasional circumstantiality   which responds to redirection  Thought Content: Passive death wishes with intermittent suicidal   and homicidal (her children) thoughts (prompted by commanding   auditory hallucinations), without plan or intent   Perception:  No evidence for any perceptual disturbances  Cognition: alert, fully oriented to person, place and time.  Fund   of knowledge appears appropriate in the context   Judgment/Insight: Fair insight; hallucinations are egodystonic,   acknowledges need for care. Judgment is reasonable within the   context of insight.        Labs/imaging:       Lab Results   Component Value Date     01/15/2022     01/13/2022     12/28/2021     05/10/2021     02/24/2021     02/11/2021    Lab Results   Component Value Date    CHLORIDE 106 01/15/2022    CHLORIDE 110 01/13/2022    CHLORIDE 108 12/28/2021    CHLORIDE 108 05/10/2021    CHLORIDE 107 02/24/2021    CHLORIDE 112 02/11/2021    Lab Results   Component Value Date    BUN 13 01/15/2022    BUN 15 01/13/2022    BUN 19 12/28/2021    BUN 13 05/10/2021    BUN 14 02/24/2021    BUN 16 02/11/2021      Lab Results   Component Value Date    POTASSIUM 3.9 01/15/2022    POTASSIUM 4.2 01/13/2022    POTASSIUM 3.7 12/28/2021    POTASSIUM 3.6 05/10/2021    POTASSIUM 3.9 02/24/2021    POTASSIUM 4.2 02/11/2021    Lab Results   Component Value Date    CO2 27 01/15/2022    CO2 26 01/13/2022    CO2 27 12/28/2021    CO2 27 05/10/2021    CO2 27 02/24/2021    CO2 24 02/11/2021    Lab Results   Component Value Date    CR 0.60 01/15/2022    CR 0.51 01/13/2022    CR 0.56 12/28/2021    CR " 0.52 05/10/2021    CR 0.57 02/24/2021    CR 0.60 02/11/2021        Lab Results   Component Value Date    WBC 10.1 01/15/2022    WBC 5.7 01/11/2022    WBC 5.8 01/04/2022    HGB 12.0 01/15/2022    HGB 11.4 (L) 01/11/2022    HGB 11.1 (L) 01/04/2022    HCT 36.8 01/15/2022    HCT 34.5 (L) 01/11/2022    HCT 34.1 (L) 01/04/2022    MCV 89 01/15/2022    MCV 88 01/11/2022    MCV 88 01/04/2022     01/15/2022     01/11/2022     01/04/2022     Lab Results   Component Value Date    TSH 1.52 12/28/2021    TSH 0.69 08/16/2021    TSH 0.75 02/24/2021            Diagnosis:   - Schizoaffective Disorder Depressive type         Assessment:     Considering the clinical acuity and historical evidence for   medication inefficacy; and good response to ECT; proceeding with   ECT appears appropriate in the current settings. She would   benefit from ongoing optimization of cognitive, behavioral and   social interventions longitudinally.            Plan:     Start Right, unilateral ultrabrief ECT pending clearance by   Medicine and Anesthesiology. We will titrate to seizure threshold   and perform subsequent treatments at 6X threshhold, as per   current standards. Treat to remission of depressive symptoms or   plateau of improvement with no further improvement over 2-4   additional treatments.  Discussed all relevant aspects of ECT with patient, including   risks of memory loss, HA, nausea, death <1/50,000, driving   prohibition; possible lack of benefit or relapse after successful   treatment, alternatives, right to decline, possible outpatient   procedures. All questions answered, patient will have opportunity   to review ECT video. I will be the provider performing ECT.   Monitor depression severity with clinical assessment augmented   with IDS-SR at baseline and every 3rd treatment. ECCA at baseline   and repeat as indicated based on cognitive complaints  Medication changes recommended:   1. Taper (if needed) to  discontinue gapapentin over the weekend.       Total time to meet face-to-face with patient was 75 minutes of   which >50% was devoted to discussing procedures, risks, benefits,   and alternatives for ECT, and educating patient on the necessary   medical preparation to start ECT   Internal Medicine Adult IP Consult for [facility] in [facility]: Patient to be seen: Routine within 24 hrs; Call back #: [phone]; assessment and clearance for ECT; Consultant may enter orders: Yes; Requesting provider? Attending physicia...     Status: None ()    Narrative    Patricia Barahona PA-C     1/13/2022 10:26 AM    [facility]  Internal Medicine Consult    Nona Finley MRN# [id number]   Age: 41 year old YOB: 1980     Date of Admission: 1/10/2022  Date of Consult:  1/13/2022    Requesting Service: Behavioral Health - Kobe Freed MD  Reason for Consult: Pre ECT Medicine Evaluation   Indication for ECT: Schizoaffective disorder with command   auditory and visual hallucinations    Chief Complaint: Command auditory and visual hallucinations   telling her to kill her children and herself with knife    HPI:   Nona Finley is a 41 year old [place] female with a history of   schizoaffective disorder, history of PE (8/2020) who was admitted   to inpatient psychiatry on 1/10/22 after presenting to the ED   with command auditory hallucinations and visual hallucinations   telling her to kill her children and herself with knife.     Currently, patient reports she is feeling fine. She notes the   hallucinations are still present telling her to kill herself. She   denies CP, SOB, abdominal pain, N/V/D, dysuria, headache,   parsethesias, change in vision, or BROCK.     She notes that she had ECT in 2011 and reports it was a long time   ago so doesn't know what the outcome was. She did not having mild   short term memory loss afterwards.     Review of Systems:   Cardiovascular: negative,  palpitations, exertional chest pain or   pressure, paroxysmal nocturnal dyspnea, dyspnea on exertion,   orthopnea, lower extremity edema and syncope or near-syncope  Pulmonary: No shortness of breath, dyspnea on exertion, cough, or   hemoptysis  Neurological: negative, migraine headaches, headaches, syncope,   stroke, seizures, paralysis, local weakness, numbness or tingling   of hands, numbness or tingling of feet, involuntary movements,   speech problems, incoordination, memory problems and tremor    Past Medical History:   Prior Anesthesia: Yes  If yes, any complications: No    Prior ECT: Yes  If yes,   2011 for auditory hallucinations  Response: She is unsure given length of time ago, but per chart   review, there was improvement in symptoms  Side Effects: Memory loss, short term    Cardiovascular: CAD No, MI No, HTN No, CHF No, pacemaker or ICD   No  Pulmonary: Asthma/COPD No, Prior respiratory failure or need for   emergent intubation No, On theophylline No (note that   theophylline use is a contraindication to proceeding with ECT,   needs to be tapered off prior)  Neurological: Brain tumor No, TIA/CVA No, Dementia No,   Neuromuscular Disease (including post polio syndrome) No,   Seizures and/or Epilepsy No, Head Injury No, Intracranial   Hemorrhage No  Diabetes: No    Past Surgical History:   Past Surgical History:   Procedure Laterality Date    NO HISTORY OF SURGERY      NO HISTORY OF SURGERY  06/13/2013    Per patient reported this       Allergies:    No Known Allergies    Medication list reviewed.    Physical Exam:  BP 95/62 (BP Location: Right arm)   Pulse 118   Temp 98.5  F   (36.9  C) (Tympanic)   Resp 16   Wt 72.6 kg (160 lb)   SpO2   100%   BMI 27.46 kg/m     Cardiovascular: Tachycardic. S1, S2. No murmurs, rubs, gallops.   No carotid bruits appreciated on auscultation.   Pulmonary: Effort normal. Lungs CTAB with no wheezing, rales,   rhonchi.   Neurological: A&Ox3. No focal deficits. PERRLA.  Normal gait. CN   III-XII grossly intact. EOM intact. Strength 5/5 in extremities.   Sensation intact. Negative Romberg. No dysdiadochokinesia.     Data:  Reviewed EKG from 12/28/2021  EKG:Normal, Normal sinus rhythm   There was evidence of biatrial enlargement  When compared to EKG of 2/21/21, no significant change was found    Head Imaging:   CT Head from 5/20/2021 with no intracranial hemorrhage, mass, or   definite CT evidence of recent ischemia.     Labs were obtained within the last 30 days on 1/11 and 12/28 and   are as follows:   CBC:  Recent Labs   Lab Test 01/11/22  0843   WBC 5.7   RBC 3.94   HGB 11.4*   HCT 34.5*   MCV 88   MCH 28.9   MCHC 33.0   RDW 14.3        CMP:  Recent Labs   Lab Test 12/28/21  2146      POTASSIUM 3.7   CHLORIDE 108   BRIANNA 9.0   CO2 27   BUN 19   CR 0.56   *   AST 21   ALT 22   BILITOTAL 0.1*   ALBUMIN 3.2*   PROTTOTAL 7.5   ALKPHOS 63     HCG:   Negative     Recommendations:     1. ECT clearance:  - Diuretics and oral hypoglycemics should be held the morning of   ECT.   - All other antihypertensive medications can be continued.   - Patient does not have absolute medical contraindications to   proceeding with ECT at this time. Treatment plan per psychiatry.     2. History of PE (8/2020):  Had unprovoked PE in 8/2020. Follows with Hematology as   outpatient. Had been treated with 6 months of Xarelto. She is no   longer taking Xarelto. She has been ambulating on the unit. Per   Hematology note in 9/30/20 - discussed that the risk of   recurrence is low based on the HERDOO2 score.   - Please notify medicine if patient is non-ambulatory, would   recommend DVT ppx at that time     3. Orthostatic hypotension  4. Chronic sinus tachycardia:  Likely exacerbated by clozaril use. She had + orthostatic VS this   AM, but asymptomatic per RN/patient. She is tolerating sufficient   PO intake by staff.   - Encourage fluid intake  - Please notify Medicine if SBP <90/60, or  symptomatic   (dizziness, lightheadedness) or poor oral intake as would favor   IVF prior to ECT  - Continue to monitor orthostatics    Patricia Barahona PA-C  [facility]  Hospitalist Service  Pager: [phone]     CBC with Platelets & Differential     Status: Abnormal    Narrative    The following orders were created for panel order CBC with Platelets & Differential.  Procedure                               Abnormality         Status                     ---------                               -----------         ------                     CBC with platelets and d...[[id number]]  Abnormal            Final result                 Please view results for these tests on the individual orders.   WBC and differential *Canceled*     Status: None ()    Narrative    The following orders were created for panel order WBC and differential.  Procedure                               Abnormality         Status                     ---------                               -----------         ------                       Please view results for these tests on the individual orders.   Urine Drugs of Abuse Screen with barbiturates, benzodiazepines     Status: Normal    Narrative    The following orders were created for panel order Urine Drugs of Abuse Screen with barbiturates, benzodiazepines.  Procedure                               Abnormality         Status                     ---------                               -----------         ------                     Drug abuse screen 6 urin...[[id number]]  Normal              Final result                 Please view results for these tests on the individual orders.   WBC and differential     Status: None    Narrative    The following orders were created for panel order WBC and differential.  Procedure                               Abnormality         Status                     ---------                               -----------         ------                     WBC  and Differential[[id number]]                             Final result                 Please view results for these tests on the individual orders.   Extra Tube     Status: None    Narrative    The following orders were created for panel order Extra Tube.  Procedure                               Abnormality         Status                     ---------                               -----------         ------                     Extra Green Top (Lithium...[[id number]]                      Final result                 Please view results for these tests on the individual orders.

## 2022-01-24 ENCOUNTER — PATIENT OUTREACH (OUTPATIENT)
Dept: CARE COORDINATION | Facility: CLINIC | Age: 42
End: 2022-01-24
Payer: COMMERCIAL

## 2022-01-24 NOTE — PROGRESS NOTES
Clinic Care Coordination Contact  [facility]       Clinical Data: Care Coordinator Outreach  Outreach attempted x 1. CC ELIZABETH called patient with  and a female answered who informs us that we have the wrong #.  -SW calls spouse and confirms this # is correct. He will have patient call me back direct as he thinks this is better.  Plan: LAUREL JOHNSON will wait for pt to call back and will try reaching out again in 1-2 business days if not return call is recvd.    ARIADNA Dillon   Social Work Clinic Care Coordinator   [facility]  PH: [phone]  [email or web address]

## 2022-01-25 NOTE — PROGRESS NOTES
Clinic Care Coordination Contact  [facility]: Post-Discharge Note  SITUATION                                                      Admission:    Admission Date: 01/10/22   Reason for Admission: concern for commanding auditory hallucinations and visual hallucinations of shadowy faces telling her to kill her children with knife since 1  Discharge:   Discharge Date: 01/21/22  Discharge Diagnosis: concern for commanding auditory hallucinations and visual hallucinations of shadowy faces telling her to kill her children with knife since 1    BACKGROUND                                                      Nona Finley was admitted to [facility] as a voluntary patient/ on a 72 hour hold. PTA clozapine, colace, haldol (scheduled and prn), trazodone, and melatonin were continued. Patient is also on Abilify MORENO. 1/12 target sxs not controlled, increased clozaril to 362.5mg. Initiated the preliminary steps for ECT consult. Medicine consult placed for ECT screening assessment. Utox and UPT specimen was collected.1/14 Increased Clozaril to 375mg, ECT IP consult planned to be completed. Potential start date of Monday 1/17. Gabapentin PRN was discontinued for ECT next week. 1/18 she received her first ECT session on Monday. Complained of post-ECT headache 9/10. Declined further ECT sessions d/t the side effects. She was found to be dizzy and orthostatic. 0.9 NS 1L was administered which resolved sxs.1/19 patient declined the ECT treatment. The AH is resolved since last night. Continues to report olfactory hallucinations but able to eat and drink well. 1/20 IM was consulted due to low blood pressures. Compression stockings were implemented. No AH, SI, SIB. Patient has been sxs free for the past 48 hours and if no change could be considered to be discharged home on 1/21/22.       ASSESSMENT      Enrollment  Primary Care Care Coordination Status: Unable to Reach    Discharge Assessment  How are you doing now that you are  home?: Spoke with patient who shares that she is doing much better and apologizes for hanging up yesterday. Patient is contact with her ACT team and they are helping her to set up appropriate appointments. Patient confirms that she has a good support system with family and care team. Thanks SW for the call  How are your symptoms? (Red Flag symptoms escalate to triage hotline per guidelines): Improved  Do you feel your condition is stable enough to be safe at home until your provider visit?: Yes  Does the patient have their discharge instructions? : Yes  Does the patient have questions regarding their discharge instructions? : No  Were you started on any new medications or were there changes to any of your previous medications? : Yes  Does the patient have all of their medications?: Yes  Do you have questions regarding any of your medications? : No  Do you have all of your needed medical supplies or equipment (DME)?  (i.e. oxygen tank, CPAP, cane, etc.): Yes  Discharge follow-up appointment scheduled within 14 calendar days? : Yes  Discharge Follow Up Appointment Date: 01/26/22  Discharge Follow Up Appointment Scheduled with?: Specialty Care Provider (ACT team)    Post-op ([facility] CTA Only)  If the patient had a surgery or procedure, do they have any questions for a nurse?: No    Post-op (Clinicians Only)  Did the patient have surgery or a procedure: No  Chills: No      PLAN                                                      Outpatient Plan:  [facility] ([facility]) [address] [place] [address] Main ph: [phone]  Fax: [phone]  Psychiatrist - Dr. Gaviria  Nurse Catalina Powell ph: [phone]    - Vicky Ceja Ph: [phone]     Clozapine Monitoring:  You will continue with [facility] for ongoing Clozapine monitoring -  Phone [phone]     Psychotherapy Appointments: no  Other Referrals: no       No future appointments.      For any  urgent concerns, please contact our 24 hour nurse triage line: [phone] ([phone])         ARIADNA Pop

## 2022-05-01 ENCOUNTER — HEALTH MAINTENANCE LETTER (OUTPATIENT)
Age: 42
End: 2022-05-01

## 2022-08-21 ENCOUNTER — HEALTH MAINTENANCE LETTER (OUTPATIENT)
Age: 42
End: 2022-08-21

## 2022-08-29 ENCOUNTER — OFFICE VISIT (OUTPATIENT)
Dept: FAMILY MEDICINE | Facility: CLINIC | Age: 42
End: 2022-08-29
Payer: COMMERCIAL

## 2022-08-29 VITALS
TEMPERATURE: 98.1 F | SYSTOLIC BLOOD PRESSURE: 113 MMHG | DIASTOLIC BLOOD PRESSURE: 70 MMHG | OXYGEN SATURATION: 98 % | BODY MASS INDEX: 25.78 KG/M2 | WEIGHT: 151 LBS | HEIGHT: 64 IN | HEART RATE: 97 BPM

## 2022-08-29 DIAGNOSIS — F25.1 SCHIZOAFFECTIVE DISORDER, DEPRESSIVE TYPE (H): ICD-10-CM

## 2022-08-29 DIAGNOSIS — N89.8 VAGINAL DISCHARGE: ICD-10-CM

## 2022-08-29 DIAGNOSIS — N91.2 AMENORRHEA: ICD-10-CM

## 2022-08-29 DIAGNOSIS — L60.9 FINGERNAIL ABNORMALITIES: ICD-10-CM

## 2022-08-29 DIAGNOSIS — Z00.00 HEALTHCARE MAINTENANCE: Primary | ICD-10-CM

## 2022-08-29 LAB
CLUE CELLS: NORMAL
HCG UR QL: NEGATIVE
TRICHOMONAS, WET PREP: NORMAL
WBC'S/HIGH POWER FIELD, WET PREP: NORMAL
YEAST, WET PREP: NORMAL

## 2022-08-29 PROCEDURE — 99213 OFFICE O/P EST LOW 20 MIN: CPT | Mod: 25

## 2022-08-29 PROCEDURE — 87106 FUNGI IDENTIFICATION YEAST: CPT

## 2022-08-29 PROCEDURE — G0124 SCREEN C/V THIN LAYER BY MD: HCPCS | Performed by: PATHOLOGY

## 2022-08-29 PROCEDURE — 2894A PR SCREENING PAP SMEAR: CPT

## 2022-08-29 PROCEDURE — 87101 SKIN FUNGI CULTURE: CPT

## 2022-08-29 PROCEDURE — 87107 FUNGI IDENTIFICATION MOLD: CPT | Mod: 59

## 2022-08-29 PROCEDURE — 99396 PREV VISIT EST AGE 40-64: CPT | Mod: GC

## 2022-08-29 PROCEDURE — 87210 SMEAR WET MOUNT SALINE/INK: CPT

## 2022-08-29 PROCEDURE — 87624 HPV HI-RISK TYP POOLED RSLT: CPT

## 2022-08-29 PROCEDURE — 81025 URINE PREGNANCY TEST: CPT

## 2022-08-29 ASSESSMENT — ENCOUNTER SYMPTOMS
LIGHT-HEADEDNESS: 0
FREQUENCY: 0
ABDOMINAL PAIN: 0
DIFFICULTY URINATING: 0
HALLUCINATIONS: 0
COUGH: 0
CONFUSION: 0
DYSURIA: 0
NAUSEA: 0
UNEXPECTED WEIGHT CHANGE: 0
FATIGUE: 0
HEADACHES: 0
NUMBNESS: 0
DIZZINESS: 0
DIARRHEA: 0
SHORTNESS OF BREATH: 0
CONSTIPATION: 0

## 2022-08-29 NOTE — PROGRESS NOTES
SUBJECTIVE:   CC: Nona Finley is an 42 year old woman who presents for preventive health visit.       Patient has been advised of split billing requirements and indicates understanding: No  Healthy Habits:     Getting at least 3 servings of Calcium per day:  Yes    Bi-annual eye exam:  Yes    Dental care twice a year:  Yes    Sleep apnea or symptoms of sleep apnea:  None    Diet:  Regular (no restrictions)    Duration of exercise:  N/A    Taking medications regularly:  Yes    Medication side effects:  Lightheadedness    PHQ-2 Total Score: 0    Additional concerns today:  No      Today's PHQ-2 Score:   PHQ-2 ( 1999 Pfizer) 8/29/2022   Q1: Little interest or pleasure in doing things 0   Q2: Feeling down, depressed or hopeless 0   PHQ-2 Score 0   PHQ-2 Total Score (12-17 Years)- Positive if 3 or more points; Administer PHQ-A if positive -   Q1: Little interest or pleasure in doing things Not at all   Q2: Feeling down, depressed or hopeless Not at all   PHQ-2 Score 0         Social History     Tobacco Use     Smoking status: Never Smoker     Smokeless tobacco: Never Used   Substance Use Topics     Alcohol use: No         Alcohol Use 8/29/2022   Prescreen: >3 drinks/day or >7 drinks/week? No     Reviewed orders with patient.  Reviewed health maintenance and updated orders accordingly - Yes  BP Readings from Last 3 Encounters:   08/29/22 113/70   01/21/22 104/72   01/04/22 103/66    Wt Readings from Last 3 Encounters:   08/29/22 68.5 kg (151 lb)   01/13/22 72.6 kg (160 lb)   01/04/22 73 kg (160 lb 14.4 oz)                  Current Outpatient Medications   Medication Sig Dispense Refill     ABILIFY MAINTENA 400 MG extended release injection Inject 2 mLs (400 mg) into the muscle every 28 days Due on 3/25 2 mL 0     cloZAPine (FAZACLO) 100 MG ODT Take 3 tablets (300 mg) by mouth daily Take with two 25mg tablets for a total daily dose of 375mg. 90 tablet 0     cloZAPine (FAZACLO) 25 MG ODT Take 3 tablets (75 mg) by  mouth daily Take with three 100mg tablets for a total daily dose of 375mg. 90 tablet 0     haloperidol (HALDOL) 5 MG tablet Take 1 and a half tablet every night at bedtime for a total dose of 7.5mg. 45 tablet 0     alum & mag hydroxide-simethicone (MAALOX) 200-200-20 MG/5ML SUSP suspension Take 30 mLs by mouth every 4 hours as needed for indigestion (Patient not taking: Reported on 8/29/2022) 335 mL 0     docusate sodium (COLACE) 100 MG capsule Take 1 capsule (100 mg) by mouth 2 times daily (Patient not taking: Reported on 8/29/2022) 30 capsule 0     haloperidol (HALDOL) 2 MG tablet Take 1 tablet (2 mg) by mouth 2 times daily as needed for other (If auditory/visual hallucinations present) 60 tablet 0     melatonin 5 MG tablet Take 1 tablet (5 mg) by mouth At Bedtime (Patient not taking: Reported on 8/29/2022) 30 tablet 0     traZODone (DESYREL) 50 MG tablet Take 1 tablet (50 mg) by mouth At Bedtime (Patient not taking: Reported on 8/29/2022) 30 tablet 0     traZODone (DESYREL) 50 MG tablet Take 1 tablet (50 mg) by mouth nightly as needed for sleep (Patient not taking: Reported on 8/29/2022) 7 tablet 0       Breast Cancer Screening:  Any new diagnosis of family breast, ovarian, or bowel cancer? No      History of abnormal Pap smear: NO - age 30-65 PAP every 5 years with negative HPV co-testing recommended  PAP / HPV 11/18/2014 8/18/2011 11/10/2008   PAP (Historical) NIL NIL NIL     Reviewed and updated as needed this visit by clinical staff   Tobacco  Allergies  Meds                Reviewed and updated as needed this visit by Provider                        Review of Systems   Constitutional: Negative for fatigue and unexpected weight change.   Respiratory: Negative for cough and shortness of breath.    Gastrointestinal: Negative for abdominal pain, constipation, diarrhea and nausea.   Genitourinary: Negative for difficulty urinating, dysuria, frequency and urgency.   Neurological: Negative for dizziness,  "light-headedness, numbness and headaches.   Psychiatric/Behavioral: Negative for confusion and hallucinations.     OBJECTIVE:   /70   Pulse 97   Temp 98.1  F (36.7  C)   Ht 1.613 m (5' 3.5\")   Wt 68.5 kg (151 lb)   SpO2 98%   BMI 26.33 kg/m    Physical Exam  Exam conducted with a chaperone present.   Constitutional:       Appearance: Normal appearance. She is normal weight.   HENT:      Head: Normocephalic and atraumatic.      Nose: No congestion or rhinorrhea.      Mouth/Throat:      Mouth: Mucous membranes are moist.      Pharynx: No oropharyngeal exudate or posterior oropharyngeal erythema.   Eyes:      Pupils: Pupils are equal, round, and reactive to light.   Cardiovascular:      Rate and Rhythm: Normal rate and regular rhythm.      Pulses: Normal pulses.      Heart sounds: Normal heart sounds. No murmur heard.    No gallop.   Pulmonary:      Effort: No respiratory distress.      Breath sounds: No wheezing.   Abdominal:      General: Abdomen is flat. Bowel sounds are normal. There is no distension.      Palpations: Abdomen is soft. There is no mass.      Tenderness: There is no abdominal tenderness. There is no right CVA tenderness, left CVA tenderness, guarding or rebound.      Hernia: No hernia is present.   Genitourinary:     General: Normal vulva.      Exam position: Lithotomy position.      Pubic Area: No rash.       Labia:         Right: No lesion.         Left: No lesion.       Vagina: No foreign body. Vaginal discharge and prolapsed vaginal walls present. No erythema, tenderness or bleeding.      Cervix: Discharge present. No cervical motion tenderness or erythema.      Comments: Anterior prolapse of vagina  Musculoskeletal:         General: No tenderness.      Cervical back: No rigidity.      Right lower leg: Edema present.      Left lower leg: Edema present.   Skin:     Findings: No rash.      Comments: Varicose veins   Neurological:      Cranial Nerves: No cranial nerve deficit. "   Psychiatric:         Mood and Affect: Mood normal.         Behavior: Behavior normal.         Thought Content: Thought content normal.           ASSESSMENT/PLAN:     1. Healthcare maintenance  - SCREENING PAP SMEAR  - Pap screen reflex to HPV if ASCUS - recommend age 25 - 29    2. Vaginal discharge  Given patient had discharge present on vaginal exam and discharge from cervical os wet prep was collected.  - Wet preparation - Clinic Collect    3. Amenorrhea  Patient stating that she has been amenorrheic for three months. Etiologies of amenorrhea most likely functional hypothalamic amenorrhea  or perimenopause. Other etiologies considered include pregnancy (unlikely with negative urine pregnancy test today), hypothalamic dysfunction due to intracranial pathology (no symptoms of headache or focal deficits), pituitary dysfunction (no other symptoms concerning for pituitary dysfunction such as galactorrhea, Cushings or vision changes), and thyroid dysfunction (no changes in skin/hair, temperature intolerances, or changes in HR) but these are less likely.  - HCG qualitative urine  - Continue to monitor symptoms of amenorrhea  - Consider testing for perimenopause with [facility] at next visit    4. Fingernail abnormalities  - Fungus skin hair nail culture    5. Schizoaffective disorder, depressive type, severe, with psychosis  Patient states that she follows with psychiatry monthly and has weekly lab test for clozapine. Thus, will not repeat lab test and monitoring for a granulocytosis, lipids, or glucose with patient on clozapine.  - continue to follow with psychiatrist  - have patient return signed release of information at next visit    Patient has been advised of split billing requirements and indicates understanding: No    COUNSELING:  Reviewed preventive health counseling, as reflected in patient instructions  Special attention given to:        Healthy diet/nutrition    Estimated body mass index is 26.33 kg/m  as  "calculated from the following:    Height as of this encounter: 1.613 m (5' 3.5\").    Weight as of this encounter: 68.5 kg (151 lb).    She reports that she has never smoked. She has never used smokeless tobacco.      Counseling Resources:  ATP IV GuidelinesPreceptor Attestation:   Patient seen, evaluated and discussed with the resident. I have verified the content of the note, which accurately reflects my assessment of the patient and the plan of care.   Supervising Physician:  Larry Albarran MD Preceptor Attestation:   Patient seen, evaluated and discussed with the resident. I have verified the content of the note, which accurately reflects my assessment of the patient and the plan of care.   Supervising Physician:  Larry Albarran MD   Pooled Cohorts Equation Calculator  Breast Cancer Risk Calculator  BRCA-Related Cancer Risk Assessment: FHS-7 Tool  FRAX Risk Assessment  ICSI Preventive Guidelines  Dietary Guidelines for Americans, 2010  USDA's MyPlate  ASA Prophylaxis  Lung CA Screening    Fatimah Cardoza MD  [facility]  "

## 2022-08-29 NOTE — PROGRESS NOTES
Preceptor Attestation:   Patient seen, evaluated and discussed with the resident. I have verified the content of the note, which accurately reflects my assessment of the patient and the plan of care.   Supervising Physician:  Larry Albarran MD

## 2022-08-29 NOTE — LETTER
August 30, 2022      Nona Finley  [address]  [facility] [address]        Dear ,    We are writing to inform you of your test results.    Your pregnancy test was negative. Your wet prep was normal. Your pap smear results are still being processed - you will receive notification of those results via phone or letter when they are complete.    Resulted Orders   Wet preparation - Clinic Collect   Result Value Ref Range    Trichomonas Absent Absent    Yeast Absent Absent    Clue Cells Absent Absent    WBCs/high power field None None    Narrative    >4.5pH  No odor and only rbc's seen   HCG qualitative urine   Result Value Ref Range    hCG Urine Qualitative Negative Negative      Comment:      This test is for screening purposes.  Results should be interpreted along with the clinical picture.  Confirmation testing is available if warranted by ordering [id number], HCG Quantitative Pregnancy.       If you have any questions or concerns, please call the clinic at the number listed above.       Sincerely,      Larry Albarran MD

## 2022-08-29 NOTE — PATIENT INSTRUCTIONS
Patient Education   Here is the plan from today's visit    1. Healthcare maintenance  - SCREENING PAP SMEAR  - Pap screen reflex to HPV if ASCUS - recommend age 25 - 29    2. Vaginal discharge  - Wet preparation - Clinic Collect    3. Amenorrhea  - HCG qualitative urine    4. Fingernail abnormalities    - Fungus skin hair nail culture    5. Schizoaffective disorder, depressive type, severe, with psychosis  - continue to follow with outpatient provider  - return signed release of information at next visit          Please call or return to clinic if your symptoms don't go away.    Follow up plan  No follow-ups on file.    Thank you for coming to [facility] today.  Lab Testing:  **If you had lab testing today and your results are reassuring or normal they will be mailed to you or sent through [employer] within 7 days.   **If the lab tests need quick action we will call you with the results.  **If you are having labs done on a different day, please call [phone] to schedule at [facility] or [phone] for other [facility] Outpatient Lab locations. Labs do not offer walk-in appointments.  The phone number we will call with results is # [phone] (home) . If this is not the best number please call our clinic and change the number.  Medication Refills:  If you need any refills please call your pharmacy and they will contact us.   If you need to  your refill at a new pharmacy, please contact the new pharmacy directly. The new pharmacy will help you get your medications transferred faster.   Scheduling:  If you have any concerns about today's visit or wish to schedule another appointment please call our office during normal business hours [phone] (8-5:00 M-F)  If a referral was made to an [facility] specialty provider and you do not get a call from central scheduling, please refer to directions on your visit summary or call our office during normal business hours for assistance.   If a  Mammogram was ordered for you at the [facility] call [phone] to schedule or change your appointment.  If you had an XRay/CT/Ultrasound/MRI ordered the number is [phone] to schedule or change your radiology appointment.   [facility] has limited ultrasound appointments available on Wednesdays, if you would like your ultrasound at [facility], please call [phone] to schedule.   Medical Concerns:  If you have urgent medical concerns please call [phone] at any time of the day.    Fatimah Cardoza MD

## 2022-09-01 ENCOUNTER — TELEPHONE (OUTPATIENT)
Dept: FAMILY MEDICINE | Facility: CLINIC | Age: 42
End: 2022-09-01

## 2022-09-01 DIAGNOSIS — L60.9 FINGERNAIL ABNORMALITIES: Primary | ICD-10-CM

## 2022-09-01 DIAGNOSIS — B35.1 ONYCHOMYCOSIS: ICD-10-CM

## 2022-09-01 RX ORDER — TAVABOROLE TOPICAL SOLUTION, 5% 43.5 MG/ML
SOLUTION TOPICAL DAILY
Qty: 10 ML | Refills: 3 | Status: SHIPPED | OUTPATIENT
Start: 2022-09-01 | End: 2022-09-08

## 2022-09-01 NOTE — TELEPHONE ENCOUNTER
Called patient to let her know be fingernail culture grew yeast. Let patient know that ideal medication options are oral terbinafine, but are not safe for her given that she is on clozapine. Thus, discussed that there are multiple topical antifungal agents available with approximately equal cure rates (although they are much less effective than oral antifungals). Given cost considerations (without private insurance efinaconazole can cost >$700) told patient that I will send two topical medication options and she can choose to fill whichever is cheapest for her. Also, let patient know that if topical antifungals are not helping within 2-3 weeks we can refer her to podiatry for fingernail removal. Also let patient know that if topical medications are unaffordable, she should let us know and we can refer her podiatry for fingernail removal. Patient verbalized understanding and stated having no questions. Preferred to have RX sent to [facility].    Fatimah Cardoza MD

## 2022-09-02 LAB
BKR LAB AP GYN ADEQUACY: ABNORMAL
BKR LAB AP GYN INTERPRETATION: ABNORMAL
BKR LAB AP HPV REFLEX: ABNORMAL
BKR LAB AP PREVIOUS ABNORMAL: ABNORMAL
PATH REPORT.COMMENTS IMP SPEC: ABNORMAL
PATH REPORT.COMMENTS IMP SPEC: ABNORMAL
PATH REPORT.RELEVANT HX SPEC: ABNORMAL

## 2022-09-07 LAB
HUMAN PAPILLOMA VIRUS 16 DNA: NEGATIVE
HUMAN PAPILLOMA VIRUS 18 DNA: NEGATIVE
HUMAN PAPILLOMA VIRUS FINAL DIAGNOSIS: NORMAL
HUMAN PAPILLOMA VIRUS OTHER HR: NEGATIVE

## 2022-09-08 DIAGNOSIS — B35.1 ONYCHOMYCOSIS: Primary | ICD-10-CM

## 2022-09-08 RX ORDER — CICLOPIROX 80 MG/ML
SOLUTION TOPICAL
Qty: 6 ML | Refills: 0 | Status: SHIPPED | OUTPATIENT
Start: 2022-09-08 | End: 2022-10-31

## 2022-09-26 LAB
BACTERIA SPEC CULT: ABNORMAL

## 2022-11-14 NOTE — PROGRESS NOTES
Assessment & Plan     Nona is a 42 year old patient with schizoaffective disorder, stable on medications for years, who presents for the following issues:    (Z20.5) Exposure to hepatitis B  Reports  diagnosed with hepatitis B when he went to donate blood. She is here to be tested. No symptoms. Physical exam unremarkable. She is uncertain if he has acute or chronic hepatitis. Reviewed immunization records. No vaccination of hepatitis B on file. Reviewed records, screened negative for hepatitis C and HIV in the past.   She has requested that I call her  with the results, as he is a physician. Offered , would prefer I call . I will call [phone] with the results. If they are abnormal, I will ask her to come in and discuss with me or the soonest available provider.   Plan: Hepatitis B surface antigen, Hepatitis B         Surface Antibody, Comprehensive metabolic panel    (Z00.00) Health maintenance examination  Routine screening for preventative care.   Plan: Hemoglobin A1c, Lipid panel reflex to direct         LDL Non-fasting     (Z23) Need for prophylactic vaccination and inoculation against influenza  (primary encounter diagnosis)  Plan: INFLUENZA VACCINE IM > 6 MONTHS VALENT IIV4         (AFLURIA/FLUZONE)    Orders Placed This Encounter   Procedures     INFLUENZA VACCINE IM > 6 MONTHS VALENT IIV4 (AFLURIA/FLUZONE)     Hemoglobin A1c     Lipid panel reflex to direct LDL Non-fasting     Hepatitis B surface antigen     Hepatitis B Surface Antibody     Comprehensive metabolic panel         See Patient Instructions    Return as soon as possible if hepatitis labs are abnormal, for with any available provider.    Hanh Britt MD  [facility]    Subjective   HPI   This is a 42 year old  year old, presenting for the following health issues:     diagnosed with hepatitis B when he went to donate blood.   She is here to be tested.   No symptoms including no  abdominal pain, jaundice, pale stools, malaise, pruritis, nausea/vomiting    She is uncertain if he has acute or chronic hepatitis.  Review of Systems   Constitutional, HEENT, cardiovascular, pulmonary, gi and gu systems are negative, except as otherwise noted.      Objective    /63   Pulse 105   Temp 98.7  F (37.1  C) (Oral)   Wt 68.4 kg (150 lb 12.8 oz)   LMP 10/31/2022 (Exact Date)   SpO2 99%   BMI 26.29 kg/m    Body mass index is 26.29 kg/m .    Physical Exam    General: Alert and oriented, in no acute distress.  Skin: Warm and dry, no abnormalities noted. No jaundice.   Eyes: Extra-ocular muscles grossly intact, pupils equal.  ENT: Speech intact, nasal passages open, no hearing impairment noted.  CV: No cyanosis or pallor, warm and well perfused.  Respiratory: No respiratory distress, no accessory muscle use.  Ab: no RUQ abdominal pain or distention. No mass or fluid wave.   Neuro: Gait and station normal, comprehension intact. Gross and fine motor skills intact.   Psychiatric: Mood and affect appear normal.   Extremities: Warm, able to move all four extremities at will.      No results found for this or any previous visit (from the past 24 hour(s)).      Hanh Britt MD on 11/14/2022 at 3:43 PM    ----- Service Performed and Documented by Resident or Fellow ------            .

## 2022-11-15 ENCOUNTER — MYC MEDICAL ADVICE (OUTPATIENT)
Dept: FAMILY MEDICINE | Facility: CLINIC | Age: 42
End: 2022-11-15

## 2022-11-15 ENCOUNTER — OFFICE VISIT (OUTPATIENT)
Dept: FAMILY MEDICINE | Facility: CLINIC | Age: 42
End: 2022-11-15
Payer: COMMERCIAL

## 2022-11-15 VITALS
OXYGEN SATURATION: 99 % | TEMPERATURE: 98.7 F | WEIGHT: 150.8 LBS | DIASTOLIC BLOOD PRESSURE: 63 MMHG | HEART RATE: 105 BPM | SYSTOLIC BLOOD PRESSURE: 106 MMHG | BODY MASS INDEX: 26.29 KG/M2

## 2022-11-15 DIAGNOSIS — Z20.5 EXPOSURE TO HEPATITIS B: ICD-10-CM

## 2022-11-15 DIAGNOSIS — Z00.00 HEALTH MAINTENANCE EXAMINATION: ICD-10-CM

## 2022-11-15 DIAGNOSIS — Z23 NEED FOR PROPHYLACTIC VACCINATION AND INOCULATION AGAINST INFLUENZA: Primary | ICD-10-CM

## 2022-11-15 DIAGNOSIS — Z20.5 EXPOSURE TO HEPATITIS B: Primary | ICD-10-CM

## 2022-11-15 LAB
ALBUMIN SERPL BCG-MCNC: 4.3 G/DL (ref 3.5–5.2)
ALP SERPL-CCNC: 56 U/L (ref 35–104)
ALT SERPL W P-5'-P-CCNC: 22 U/L (ref 10–35)
ANION GAP SERPL CALCULATED.3IONS-SCNC: 12 MMOL/L (ref 7–15)
AST SERPL W P-5'-P-CCNC: 36 U/L (ref 10–35)
BILIRUB SERPL-MCNC: 0.3 MG/DL
BUN SERPL-MCNC: 11.5 MG/DL (ref 6–20)
CALCIUM SERPL-MCNC: 9.8 MG/DL (ref 8.6–10)
CHLORIDE SERPL-SCNC: 104 MMOL/L (ref 98–107)
CREAT SERPL-MCNC: 0.56 MG/DL (ref 0.51–0.95)
DEPRECATED HCO3 PLAS-SCNC: 25 MMOL/L (ref 22–29)
GFR SERPL CREATININE-BSD FRML MDRD: >90 ML/MIN/1.73M2
GLUCOSE SERPL-MCNC: 108 MG/DL (ref 70–99)
HBA1C MFR BLD: 5.3 % (ref 0–5.6)
POTASSIUM SERPL-SCNC: 4 MMOL/L (ref 3.4–5.3)
PROT SERPL-MCNC: 7.9 G/DL (ref 6.4–8.3)
SODIUM SERPL-SCNC: 141 MMOL/L (ref 136–145)

## 2022-11-15 PROCEDURE — 36415 COLL VENOUS BLD VENIPUNCTURE: CPT

## 2022-11-15 PROCEDURE — 83036 HEMOGLOBIN GLYCOSYLATED A1C: CPT

## 2022-11-15 PROCEDURE — 90471 IMMUNIZATION ADMIN: CPT

## 2022-11-15 PROCEDURE — 80053 COMPREHEN METABOLIC PANEL: CPT

## 2022-11-15 PROCEDURE — 87340 HEPATITIS B SURFACE AG IA: CPT

## 2022-11-15 PROCEDURE — 90686 IIV4 VACC NO PRSV 0.5 ML IM: CPT

## 2022-11-15 PROCEDURE — 86706 HEP B SURFACE ANTIBODY: CPT

## 2022-11-15 PROCEDURE — 80061 LIPID PANEL: CPT

## 2022-11-15 PROCEDURE — 99214 OFFICE O/P EST MOD 30 MIN: CPT | Mod: 25

## 2022-11-15 NOTE — PATIENT INSTRUCTIONS
Patient Education   Here is the plan from today's visit    Flu vaccine  - INFLUENZA VACCINE IM > 6 MONTHS VALENT IIV4 (AFLURIA/FLUZONE)    2. Exposure to hepatitis B  - Hepatitis B surface antigen;   - Hepatitis B Surface Antibody;   - Comprehensive metabolic panel;   *I will call [phone] with the results. If they are abnormal, I will ask you to come in and discuss with me or the soonest available provider.     3. Health maintenance examination  - Hemoglobin A1c; Future  - Lipid panel reflex to direct LDL Non-fasting; Future        Thank you for coming to [facility] today.  Lab Testing:  **If you had lab testing today and your results are reassuring or normal they will be mailed to you or sent through [employer] within 7 days.   **If the lab tests need quick action we will call you with the results.  **If you are having labs done on a different day, please call [phone] to schedule at [facility] or [phone] for other [facility] Outpatient Lab locations. Labs do not offer walk-in appointments.  The phone number we will call with results is # [phone] (home) . If this is not the best number please call our clinic and change the number.  Medication Refills:  If you need any refills please call your pharmacy and they will contact us.   If you need to  your refill at a new pharmacy, please contact the new pharmacy directly. The new pharmacy will help you get your medications transferred faster.   Scheduling:  If you have any concerns about today's visit or wish to schedule another appointment please call our office during normal business hours [phone] (8-5:00 M-F)  If a referral was made to an [facility] specialty provider and you do not get a call from central scheduling, please refer to directions on your visit summary or call our office during normal business hours for assistance.   If a Mammogram was ordered for you at the [facility] call [phone] to schedule or  change your appointment.  If you had an XRay/CT/Ultrasound/MRI ordered the number is [phone] to schedule or change your radiology appointment.   [facility] has limited ultrasound appointments available on Wednesdays, if you would like your ultrasound at [facility], please call [phone] to schedule.   Medical Concerns:  If you have urgent medical concerns please call [phone] at any time of the day.    Hanh Britt MD

## 2022-11-15 NOTE — PROGRESS NOTES
Preceptor Attestation:   Patient seen, evaluated and discussed with the resident. I have verified the content of the note, which accurately reflects my assessment of the patient and the plan of care.   Supervising Physician:  Johanne Harmon MD

## 2022-11-16 LAB
CHOLEST SERPL-MCNC: 181 MG/DL
HBV SURFACE AB SERPL IA-ACNC: 0.92 M[IU]/ML
HBV SURFACE AB SERPL IA-ACNC: NONREACTIVE M[IU]/ML
HBV SURFACE AG SERPL QL IA: NONREACTIVE
HDLC SERPL-MCNC: 64 MG/DL
LDLC SERPL CALC-MCNC: 111 MG/DL
NONHDLC SERPL-MCNC: 117 MG/DL
TRIGL SERPL-MCNC: 28 MG/DL

## 2022-11-20 NOTE — RESULT ENCOUNTER NOTE
"Note: will test for hepatitis B DNA PCR to ensure not in window period of infection. Orders placed.       Dear Nona,     Here are your recent results    - Your hemoglobin A1C shows that you do not have diabetes or pre-diabetes  - Your cholesterol is overall good  - Your kidney function is normal  - Your hepatitis B test indicates you do not have chronic or acute hepatitis. However, I would like you to return to clinic to have additional testing done (see separate [employer] message for details)    Please continue with your current plan of care and let us know if you have any questions or concerns.    Regards,  Hanh Britt MD     ----- Message -----       From:Nona Finley       Sent:11/15/2022  7:04 PM CST         To:Hanh Britt MD    Subject:Tests    Hello,  I need the following tests checked; hep b surface antibodies and antigen, core antibodies, e antibodies and antigens, and DNA pcr.     My  is positive for all antibodies above. However, he is negative for all of the antigens and DNA.     I noticed that I have had low hep b surface antibodies. Why was I not offered the hep b vaccines all this years?     =============  Hello,    Thank you for your message. Your test shows that you do not have acute or chronic hepatitis b, which is good. However, you may be in the window period, so I would like you to return and have your blood checked for hepatitis b DNA by PCR, as you mentioned. Since your hepatitis b surface antibodies and antigen was negative, I would not expect those other antibodies to be positive. However I am happy to check them as well with the hepatitis DNA test.     I sincerely apologize that you were not offered a hepatitis B vaccine. If you are willing, I would recommend having the vaccine as soon as possible. Please call the [facility]  during business hours to schedule a \"lab only\" appointment to have your blood drawn for the tests, and a \"nurse only\" visit to have the hepatitis B " vaccine. Alternatively, we can give you your hepatitis vaccine at your next doctor's appointment here. Again, I am very sorry that you were not offered a hepatitis B vaccine.     Sincerely,  Hanh Britt MD    ===View-only below this line===

## 2022-12-14 ENCOUNTER — ALLIED HEALTH/NURSE VISIT (OUTPATIENT)
Dept: FAMILY MEDICINE | Facility: CLINIC | Age: 42
End: 2022-12-14
Payer: COMMERCIAL

## 2022-12-14 DIAGNOSIS — Z23 ENCOUNTER FOR IMMUNIZATION: Primary | ICD-10-CM

## 2022-12-14 PROCEDURE — 99207 PR NO CHARGE NURSE ONLY: CPT

## 2022-12-14 PROCEDURE — 90746 HEPB VACCINE 3 DOSE ADULT IM: CPT

## 2022-12-14 PROCEDURE — 90471 IMMUNIZATION ADMIN: CPT

## 2023-02-07 NOTE — PROGRESS NOTES
"    ----------------------------------------------------------------------------------------------------------  [facility], [place]   Psychiatric Progress Note  Hospital Day #1    Identifier: Nona Finley is a 41 year old female with previous psychiatric diagnoses of schizoaffective disorder, depressive type who presents with command auditory hallucinations telling her to kill her children, frightening visual hallucinations, and SI w/ intent and plan in the context of medication adherence and significant chronic psychosocial stressors. Assessment is that the current presentation is consistent with schizoaffective disorder with current depression and psychosis.      Interim History:   The patient's care was discussed with the treatment team and chart notes were reviewed.    Vitals: , otherwise WNL  Sleep:  No concerns  Scheduled medications: Took all scheduled medications as prescribed  PRN medications: No psychiatric prns given    Staff Report:   -calm, cooperative, organized speech  -Endorses paranoia and depression  -Contracts for safety     Subjective:     Patient Interview:  Nona Finley was interviewed in the conference room. She reports feeling \"very sad\" and hearing voices \"nonstop.\" Endorses feeling hopeless. She talked about feeling guilty about being away from her kids and thinking about  her . Her primary worry right now is losing her children. Reports improvement in symptoms during her last hospitalization, but that they returned 4 days after discharge and did not improve with prn haldol. Also reports decreased appetite d/t concerns about food having blood/bleach in it and has been forcing herself to eat some food. She's best able to eat vegetables currently. Does feel weak when standing up lately. Also reports poor sleeping because the voices wake her up. She is open to increasing clozapine. 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 cavernous malformation and incidental Left ICA aneurysm\par \par -Cavernous malformation and cerebral aneurysm- Repeat MRI/MRA and compare to previous.\par -Hepatic cysts and fatty liver- recommend previously to taper off Lamotrigine to 50mg and then stop medication, never went off medication - recommended to decrease lamotrigine to 50mg x 2 weeks and then discontinue medication, previous EEG showed no sign of seizure activity however continues to have intermittent transient episodes of left sided numbness - will start on Keppra 500mg BID while tapering off lamotrigine with cross coverage x 2 weeks \par -persistent tenderness to scalp - will check ESR, CRP\par -previously taking atorvastatin unsure reasoning, stopped x "months" ago will recheck lipid panel prior to seeing if she needs to restart\par -Recommend to make follow-up appointment with Hematologist , MD Fountain previously order to get MRI liver due to US reports of fatty liver and hepatic cysts\par \par  \par \par Follow-up in 4-6 weeks  tightness, and dizziness.     Phone call with Dr. Gaviria (ACT psychiatrist):  Spoke with Dr. Gaviria. He reports that she's been doing worse than usual over the last year or so despite medication adherence. Her command AH to kill her children, worries about food being poisoned with blood/bleach, and SI w/ plan are not new. He wonders if a central component to her mood is psychosocial stressors at home. She lives at home with her mother-in-law and father-in-law and 6 kids (one with worsening ASD). Her  lives in [place] most of the time for work, and she doesn't have a very good relationship with him (ex. She doesn't want any more kids, but he continues to try for a boy). She has no friends or social support network outside of her  and in-laws. She has been struggling for some time with ambivalence about whether she should stay in her current situation with her kids, or divorce her , leave her kids, and go live in a GH. She's asked her ACT team multiple times to start the CADI-waiver process.     His first suggestion is to help her process her stress and ambivalence about her social situation. The ACT team has been trying to set her up with a women's group or something similar for support. His other thought is to offer an ECT trial, which she hasn't had before. Writer asked about increasing clozapine. Dr. Gaviria notes that in the past, she required only 100mg daily and her dose is starting to get up there, but that we could offer it to her and see what she thinks. Writer also asked about a mood stabilizer, which he reports have been tried and ineffective in the past.     The risks, benefits, alternatives and side effects of any medication changes have been discussed and are understood by the patient and other caregivers.    Review of systems:   Patient reports no concerning symptoms aside from those listed above.     Objective:   /81   Pulse 118   Temp 97.4  F (36.3  C)   Resp 16   " Wt 72.6 kg (160 lb)   SpO2 100%   BMI 27.46 kg/m    Weight is 160 lbs 0 oz  Body mass index is 27.46 kg/m .    Mental Status Exam:  Oriented to:  Grossly Oriented  General:  Awake and Alert  Appearance:  appears stated age and Grooming is adequate  Behavior/Attitude:  calm and open  Eye Contact:  downcast  Psychomotor: normal and no evidence of tics, dystonia, or tardive dyskinesia no catatonia present  Speech:  soft volume/tone  Language: Fluent in English with appropriate syntax and vocabulary.  Mood:  \"depressed\"  Affect:  congruent with mood, sad and tearful  Thought Process:  linear, coherent and goal directed  Thought Content: SI w/ intent and plan - contracts for safety. Constant command AH telling her to kill herself and her kids. No stated delusions.   Associations:  intact  Insight:  good due to recognition of AH and mental illness  Judgment:  good - presented to hospital for help, cooperative  Impulse control: fair - has not acted on command AH  Attention Span:  grossly intact  Concentration:  grossly intact  Recent and Remote Memory:  grossly intact  Fund of Knowledge:  average  Muscle Strength and Tone: normal  Gait and Station: Normal        Allergies:   No Known Allergies     Labs:     Recent Results (from the past 24 hour(s))   CBC with platelets and differential    Collection Time: 01/11/22  8:43 AM   Result Value Ref Range    WBC Count 5.7 4.0 - 11.0 10e3/uL    RBC Count 3.94 3.80 - 5.20 10e6/uL    Hemoglobin 11.4 (L) 11.7 - 15.7 g/dL    Hematocrit 34.5 (L) 35.0 - 47.0 %    MCV 88 78 - 100 fL    MCH 28.9 26.5 - 33.0 pg    MCHC 33.0 31.5 - 36.5 g/dL    RDW 14.3 10.0 - 15.0 %    Platelet Count 349 150 - 450 10e3/uL    % Neutrophils 53 %    % Lymphocytes 32 %    % Monocytes 8 %    % Eosinophils 6 %    % Basophils 1 %    % Immature Granulocytes 0 %    NRBCs per 100 WBC 0 <1 /100    Absolute Neutrophils 3.0 1.6 - 8.3 10e3/uL    Absolute Lymphocytes 1.8 0.8 - 5.3 10e3/uL    Absolute Monocytes 0.5 0.0 " - 1.3 10e3/uL    Absolute Eosinophils 0.3 0.0 - 0.7 10e3/uL    Absolute Basophils 0.1 0.0 - 0.2 10e3/uL    Absolute Immature Granulocytes 0.0 <=0.4 10e3/uL    Absolute NRBCs 0.0 10e3/uL          Assessment    Primary psychiatric diagnosis:   Schizoaffective disorder, depressed type    Secondary psychiatric diagnoses of concern this admission:   H/o anxiety disorder unspecified    Diagnostic Impression:   Nona Finley is a 41 year old [place]ian female with a past psychiatric history of schizoaffective disorder depressive type who presented to the ER with SI, HI and AH. Significant symptoms on admission included SI, depression, sleep issues, psychosis and disordered eating. Her last psychiatric hospitalization was from 12/28-1/04/22 at [facility] with Dr. Grimm for the same symptoms.  She is currently followed by Dr. Walter Gaviria, [facility] ACT team. Psychosocial stressors are quite significant. She lives with her mother and father in law and her 6 kids. Her relationship with her  is long-distance, strained and per ACT team, she's been ambivalent about  him and going to live in a  for some time.  Patient's support system is minimal outside of her  and in-laws, and is something her ACT team has been trying to help with (recommending Women's Groups, etc).  Substance use or medication nonadherence do not appear to be playing a contributing role in the patient's presentation.  There is genetic loading for psychosis. Medical history does not appear to be significant. The MSE is notable for depressed mood, sad affect, SI with plan, commanding AH and disturbing VH. She denies self injurious behaviors.     Her reported symptoms of depressed mood, hopelessness, SI, AH,VH are consistent with her historic diagnosis of schizoaffective disorder with current depression and psychosis. Optimization of medications to target these symptom clusters in addition to evaluation of adquate outpatient supports will  be targets for treatment during this admission.     Psychiatric Hospital course:   Nona Finley was admitted to [facility] as a voluntary patient/ on a 72 hour hold. PTA clozapine, colace, haldol (scheduled and prn), trazodone, and melatonin were continued. Patient is also on Abilify MORENO.    Discontinued Medications (& Rationale): n/a    Medical course: Patient was medically cleared for admission to psychiatric unit. No pertinent PMH.    Data:   CBC: Hgb 11.4 (baseline), otherwise WNL  COVID neg    Pending:  -WBC  -HCG  -UDS    Plan     Today's changes:  -No changes    Psychotropic Medications:  Scheduled:  -Clozapine 350mg at bedtime  -Colace 100mg BID  -Haldol 7.5mg at bedtime  -Trazodone 50mg at bedtime     PRN:  -Haldol 2mg BID  -Acetaminophen 650 mg Q6H PRN for pain and fever  -Maalox 30 ml Q4H PRN for indigestion  -Gabapentin 100mg q6h prn for anxiety  -Melatonin 3mg at bedtime  -Miralax prn for constipation  -Olanzapine 10 mg PO/IM prn Q2H severe agitation/psychosis    Additional Plans:  Patient will be treated in therapeutic milieu with appropriate individual and group therapies as described.    Medical diagnoses to be addressed this admission:    None    Consults: None    Legal Status: Voluntary. Currently committed on a PD. No Reyes.     Safety Assessment:   Behavioral Orders   Procedures     Code 1 - Restrict to Unit     Discontinue 1:1 attendant for suicide risk     Order Specific Question:   I have performed an in person assessment of the patient     Answer:   Based on this assessment the patient no longer requires a one on one attendant at this point in time.     Order Specific Question:   Rationale     Answer:   Medical Record Reviewed     Order Specific Question:   Rationale     Answer:   Patient States able to remain safe in hospital     Order Specific Question:   Rationale     Answer:   Modifications to care environment made to mitigate safety risk     Order Specific Question:   Rationale      Answer:   Routine observations are sufficient to monitor safety.     Routine Programming     As clinically indicated     Status 15     Every 15 minutes.     Suicide precautions     Patients on Suicide Precautions should have a Combination Diet ordered that includes a Diet selection(s) AND a Behavioral Tray selection for Safe Tray - with utensils, or Safe Tray - NO utensils         SIO: No    Disposition: TBD. Pending stabilization, medication optimization, & development of a safe discharge plan.    This patient was seen and discussed with my attending physician.  Jennifer Lindsey MD  PGY-1 Psychiatry Resident Physician    Attestation:  I, Juarez Grimm MD, have personally performed an examination of this patient and I have reviewed the resident's documentation.  I have edited the note to reflect all relevant changes.  I have discussed this patient with the house staff on 1/11/2022.  I agree with resident findings and plan in today's note and yesterdays resident H&P.  I have reviewed all vitals and laboratory findings.      I certifiy that the inpatient services were ordered in accordance with the Medicare regulations governing the order. This includes certification that hospital inpatient services are reasonable and necessary and in the case of services not specified as inpatient-only under 42 .22(n), that they are appropriately provided as inpatient services in accordance with the 2-midnight benchmark under 42 .3(e).     The reason for inpatient status is Acute Psychosis.    Juarez Grimm M.D.,Ph.D.

## 2023-02-08 ENCOUNTER — TELEPHONE (OUTPATIENT)
Dept: FAMILY MEDICINE | Facility: CLINIC | Age: 43
End: 2023-02-08
Payer: COMMERCIAL

## 2023-02-08 NOTE — TELEPHONE ENCOUNTER
[facility] phone call message- medication clarification/question:    Full Medication Name: ciclopirox (PENLAC) 8 % external solution   Dose: Apply to adjacent skin and affected nails daily.  Remove with alcohol every 7 days, then repeat.    Question: Medication isn't working, per patient. Looking to speak to PCP via phone.     Pharmacy confirmed as    [facility]: Yes    OK to leave a message on voice mail? Yes    Primary language: Prydeinig      needed? Yes    Call taken on February 8, 2023 at 10:43 AM by Derek Aj

## 2023-02-10 NOTE — TELEPHONE ENCOUNTER
Left VM with Duane interpretor re onychomycosis.    Unfortunately, this is a difficult issue to treat and there are no great options.  Given that I have not examined the patient myself I cannot speak to alternative options beyond topical treatments for this patient specifically, however, known oral options are known to carry have significant side effects especially for patients that has equivocal hepatitis B screening and is on multiple medications that could affect liver and metabolic function.      Per chart review, I would generally discourage use of any oral antifungal options require a follow-up but if patient is interested in discussing further, it would in person visit for further evaluation if desired.    Rip Capps MD

## 2023-03-08 ENCOUNTER — OFFICE VISIT (OUTPATIENT)
Dept: FAMILY MEDICINE | Facility: CLINIC | Age: 43
End: 2023-03-08
Payer: COMMERCIAL

## 2023-03-08 VITALS
HEART RATE: 106 BPM | BODY MASS INDEX: 27.04 KG/M2 | SYSTOLIC BLOOD PRESSURE: 108 MMHG | TEMPERATURE: 98.7 F | RESPIRATION RATE: 16 BRPM | OXYGEN SATURATION: 100 % | HEIGHT: 64 IN | WEIGHT: 158.4 LBS | DIASTOLIC BLOOD PRESSURE: 79 MMHG

## 2023-03-08 DIAGNOSIS — B35.1 ONYCHOMYCOSIS: Primary | ICD-10-CM

## 2023-03-08 DIAGNOSIS — Z23 ENCOUNTER FOR IMMUNIZATION: ICD-10-CM

## 2023-03-08 LAB
ALBUMIN SERPL BCG-MCNC: 4.1 G/DL (ref 3.5–5.2)
ALP SERPL-CCNC: 67 U/L (ref 35–104)
ALT SERPL W P-5'-P-CCNC: 14 U/L (ref 10–35)
ANION GAP SERPL CALCULATED.3IONS-SCNC: 10 MMOL/L (ref 7–15)
AST SERPL W P-5'-P-CCNC: 23 U/L (ref 10–35)
BILIRUB SERPL-MCNC: 0.2 MG/DL
BUN SERPL-MCNC: 15.3 MG/DL (ref 6–20)
CALCIUM SERPL-MCNC: 9.3 MG/DL (ref 8.6–10)
CHLORIDE SERPL-SCNC: 106 MMOL/L (ref 98–107)
CREAT SERPL-MCNC: 0.55 MG/DL (ref 0.51–0.95)
DEPRECATED HCO3 PLAS-SCNC: 24 MMOL/L (ref 22–29)
GFR SERPL CREATININE-BSD FRML MDRD: >90 ML/MIN/1.73M2
GLUCOSE SERPL-MCNC: 110 MG/DL (ref 70–99)
POTASSIUM SERPL-SCNC: 4.2 MMOL/L (ref 3.4–5.3)
PROT SERPL-MCNC: 7.3 G/DL (ref 6.4–8.3)
SODIUM SERPL-SCNC: 140 MMOL/L (ref 136–145)

## 2023-03-08 PROCEDURE — 90471 IMMUNIZATION ADMIN: CPT

## 2023-03-08 PROCEDURE — 99213 OFFICE O/P EST LOW 20 MIN: CPT | Mod: 25

## 2023-03-08 PROCEDURE — 80053 COMPREHEN METABOLIC PANEL: CPT

## 2023-03-08 PROCEDURE — 90746 HEPB VACCINE 3 DOSE ADULT IM: CPT

## 2023-03-08 PROCEDURE — 36415 COLL VENOUS BLD VENIPUNCTURE: CPT

## 2023-03-08 RX ORDER — TERBINAFINE HYDROCHLORIDE 250 MG/1
250 TABLET ORAL DAILY
Qty: 42 TABLET | Refills: 0 | Status: SHIPPED | OUTPATIENT
Start: 2023-03-08 | End: 2023-04-19

## 2023-03-08 ASSESSMENT — PATIENT HEALTH QUESTIONNAIRE - PHQ9: SUM OF ALL RESPONSES TO PHQ QUESTIONS 1-9: 0

## 2023-03-08 NOTE — PROGRESS NOTES
"  Assessment & Plan     Onychomycosis  Fungal skin/hair/nail culture on 8/29/2022 was positive for candida albicans, candida parapsilosis complex, and alternaria species. Because the topical antifungal has not made a difference for her nails, Nona was counseled to take terbinafine daily for the next 6 weeks. Because she is on multiple antipsychotic medications, a CMP was drawn to assess her liver function. We will follow up and repeat the CMP in 3 weeks.  - terbinafine (LAMISIL) 250 MG tablet; Take 1 tablet (250 mg) by mouth daily for 42 days  - Comprehensive metabolic panel    Encounter for immunization  - HEPATITIS B VACCINE,ADULT,IM     BMI:   Estimated body mass index is 26.85 kg/m  as calculated from the following:    Height as of this encounter: 1.636 m (5' 4.41\").    Weight as of this encounter: 71.8 kg (158 lb 6.4 oz).     Return in about 3 weeks (around 3/29/2023).    Elaine Mcmanus DO  [facility] CHRISTIANO Castellano is a 42 year old, presenting for the following health issues:  RECHECK (Pt states she has had fungus on nails and came in 2 months ago. Medication has not helped.)      HPI   Presents with onychomycosis that has not improved with use of ciclopirox since September of 2022. The fungal infection has not spread to her skin or to any of her other fingers. She would like to try a different agent before being referred to a dermatologist.     Review of Systems   Constitutional, HEENT, cardiovascular, pulmonary, gi and gu systems are negative, except as otherwise noted.      Objective    /79 (BP Location: Right arm, Patient Position: Sitting, Cuff Size: Adult Regular)   Pulse 106   Temp 98.7  F (37.1  C) (Oral)   Resp 16   Ht 1.636 m (5' 4.41\")   Wt 71.8 kg (158 lb 6.4 oz)   LMP 03/01/2023 (Approximate)   SpO2 100%   BMI 26.85 kg/m    Body mass index is 26.85 kg/m .  Physical Exam  Vitals reviewed.   Constitutional:       Appearance: Normal appearance.   HENT: "      Head: Normocephalic and atraumatic.   Eyes:      Extraocular Movements: Extraocular movements intact.      Conjunctiva/sclera: Conjunctivae normal.   Pulmonary:      Effort: Pulmonary effort is normal.   Musculoskeletal:         General: Normal range of motion.      Cervical back: Normal range of motion and neck supple.   Skin:     General: Skin is warm and dry.      Comments: Discolored, thick, and crumbling nails on the first and third digits of the right hand.   Neurological:      Mental Status: She is alert and oriented to person, place, and time.   Psychiatric:         Mood and Affect: Mood normal.         Behavior: Behavior normal.         Thought Content: Thought content normal.         Judgment: Judgment normal.                ----- Service Performed and Documented by Resident or Fellow ------

## 2023-03-08 NOTE — PATIENT INSTRUCTIONS
Patient Education   Here is the plan from today's visit    1. Onychomycosis  - terbinafine (LAMISIL) 250 MG tablet; Take 1 tablet (250 mg) by mouth daily for 42 days  Dispense: 42 tablet; Refill: 0    Please call or return to clinic if your symptoms don't go away.    Follow up plan  Return in about 6 weeks (around 4/19/2023).    Thank you for coming to [facility] today.  Lab Testing:  **If you had lab testing today and your results are reassuring or normal they will be mailed to you or sent through [employer] within 7 days.   **If the lab tests need quick action we will call you with the results.  **If you are having labs done on a different day, please call [phone] to schedule at [facility] or [phone] for other [facility] locations. Labs do not offer walk-in appointments.  The phone number we will call with results is # [phone] (home) . If this is not the best number please call our clinic and change the number.  Medication Refills:  If you need any refills please call your pharmacy and they will contact us.   If you need to  your refill at a new pharmacy, please contact the new pharmacy directly. The new pharmacy will help you get your medications transferred faster.   Scheduling:  If you have any concerns about today's visit or wish to schedule another appointment please call our office during normal business hours [phone] (8-5:00 M-F). If you can no longer make a scheduled visit, please cancel via [employer] or call us to cancel.   If a referral was made to an [facility] specialty provider and you do not get a call from central scheduling, please refer to directions on your visit summary or call our office during normal business hours for assistance.   If a Mammogram was ordered for you at the Breast Center call [phone] to schedule or change your appointment.  If you had an XRay/CT/Ultrasound/MRI ordered the number is [phone] to schedule or change  your radiology appointment.   [facility] has limited ultrasound appointments available on Wednesdays, if you would like your ultrasound at [facility], please call [phone] to schedule.   Medical Concerns:  If you have urgent medical concerns please call [phone] at any time of the day.    Elaine Mcmanus, DO

## 2023-03-08 NOTE — PROGRESS NOTES
Preceptor Attestation:   Patient seen, evaluated and discussed with the resident. I have verified the content of the note, which accurately reflects my assessment of the patient and the plan of care.   Supervising Physician:  Ashok Perry MD

## 2023-04-16 ENCOUNTER — HEALTH MAINTENANCE LETTER (OUTPATIENT)
Age: 43
End: 2023-04-16

## 2023-07-08 ENCOUNTER — HEALTH MAINTENANCE LETTER (OUTPATIENT)
Age: 43
End: 2023-07-08

## 2023-07-18 ENCOUNTER — HOSPITAL ENCOUNTER (INPATIENT)
Facility: CLINIC | Age: 43
LOS: 4 days | Discharge: HOME | DRG: 885 | End: 2023-07-24
Attending: FAMILY MEDICINE | Admitting: PSYCHIATRY & NEUROLOGY
Payer: COMMERCIAL

## 2023-07-18 ENCOUNTER — TELEPHONE (OUTPATIENT)
Dept: BEHAVIORAL HEALTH | Facility: CLINIC | Age: 43
End: 2023-07-18
Payer: COMMERCIAL

## 2023-07-18 DIAGNOSIS — F25.1 SCHIZOAFFECTIVE DISORDER, DEPRESSIVE TYPE (H): ICD-10-CM

## 2023-07-18 LAB
AMPHETAMINES UR QL SCN: NORMAL
BARBITURATES UR QL SCN: NORMAL
BASOPHILS # BLD AUTO: 0 10E3/UL (ref 0–0.2)
BASOPHILS NFR BLD AUTO: 1 %
BENZODIAZ UR QL SCN: NORMAL
BZE UR QL SCN: NORMAL
CANNABINOIDS UR QL SCN: NORMAL
EOSINOPHIL # BLD AUTO: 0.3 10E3/UL (ref 0–0.7)
EOSINOPHIL NFR BLD AUTO: 5 %
ERYTHROCYTE [DISTWIDTH] IN BLOOD BY AUTOMATED COUNT: 14 % (ref 10–15)
HCG UR QL: NEGATIVE
HCT VFR BLD AUTO: 37.9 % (ref 35–47)
HGB BLD-MCNC: 12.6 G/DL (ref 11.7–15.7)
IMM GRANULOCYTES # BLD: 0 10E3/UL
IMM GRANULOCYTES NFR BLD: 0 %
LYMPHOCYTES # BLD AUTO: 2.3 10E3/UL (ref 0.8–5.3)
LYMPHOCYTES NFR BLD AUTO: 35 %
MCH RBC QN AUTO: 30.7 PG (ref 26.5–33)
MCHC RBC AUTO-ENTMCNC: 33.2 G/DL (ref 31.5–36.5)
MCV RBC AUTO: 92 FL (ref 78–100)
MONOCYTES # BLD AUTO: 0.5 10E3/UL (ref 0–1.3)
MONOCYTES NFR BLD AUTO: 8 %
NEUTROPHILS # BLD AUTO: 3.4 10E3/UL (ref 1.6–8.3)
NEUTROPHILS NFR BLD AUTO: 51 %
NRBC # BLD AUTO: 0 10E3/UL
NRBC BLD AUTO-RTO: 0 /100
OPIATES UR QL SCN: NORMAL
PLATELET # BLD AUTO: 260 10E3/UL (ref 150–450)
RBC # BLD AUTO: 4.11 10E6/UL (ref 3.8–5.2)
WBC # BLD AUTO: 6.6 10E3/UL (ref 4–11)

## 2023-07-18 PROCEDURE — 85025 COMPLETE CBC W/AUTO DIFF WBC: CPT | Performed by: FAMILY MEDICINE

## 2023-07-18 PROCEDURE — 80159 DRUG ASSAY CLOZAPINE: CPT | Performed by: FAMILY MEDICINE

## 2023-07-18 PROCEDURE — 81025 URINE PREGNANCY TEST: CPT | Performed by: FAMILY MEDICINE

## 2023-07-18 PROCEDURE — 250N000013 HC RX MED GY IP 250 OP 250 PS 637: Performed by: FAMILY MEDICINE

## 2023-07-18 PROCEDURE — 36415 COLL VENOUS BLD VENIPUNCTURE: CPT | Performed by: FAMILY MEDICINE

## 2023-07-18 PROCEDURE — 90791 PSYCH DIAGNOSTIC EVALUATION: CPT

## 2023-07-18 PROCEDURE — 80307 DRUG TEST PRSMV CHEM ANLYZR: CPT | Performed by: FAMILY MEDICINE

## 2023-07-18 PROCEDURE — 99285 EMERGENCY DEPT VISIT HI MDM: CPT | Performed by: FAMILY MEDICINE

## 2023-07-18 PROCEDURE — 99285 EMERGENCY DEPT VISIT HI MDM: CPT | Mod: 25 | Performed by: FAMILY MEDICINE

## 2023-07-18 RX ORDER — CLOZAPINE 200 MG/1
200 TABLET ORAL AT BEDTIME
Status: DISCONTINUED | OUTPATIENT
Start: 2023-07-18 | End: 2023-07-18

## 2023-07-18 RX ORDER — ACETAMINOPHEN 325 MG/1
650 TABLET ORAL EVERY 6 HOURS PRN
Status: DISCONTINUED | OUTPATIENT
Start: 2023-07-18 | End: 2023-07-20

## 2023-07-18 RX ORDER — OLANZAPINE 5 MG/1
5 TABLET, ORALLY DISINTEGRATING ORAL ONCE
Status: COMPLETED | OUTPATIENT
Start: 2023-07-18 | End: 2023-07-18

## 2023-07-18 RX ORDER — CLOZAPINE 25 MG/1
25 TABLET, ORALLY DISINTEGRATING ORAL DAILY
Status: ON HOLD | COMMUNITY
End: 2023-07-24

## 2023-07-18 RX ORDER — CLOZAPINE 100 MG/1
100 TABLET ORAL AT BEDTIME
Status: DISCONTINUED | OUTPATIENT
Start: 2023-07-18 | End: 2023-07-19

## 2023-07-18 RX ORDER — TRAZODONE HYDROCHLORIDE 50 MG/1
50 TABLET, FILM COATED ORAL ONCE
Status: COMPLETED | OUTPATIENT
Start: 2023-07-18 | End: 2023-07-18

## 2023-07-18 RX ADMIN — OLANZAPINE 5 MG: 5 TABLET, ORALLY DISINTEGRATING ORAL at 13:00

## 2023-07-18 RX ADMIN — ACETAMINOPHEN 650 MG: 325 TABLET, FILM COATED ORAL at 21:36

## 2023-07-18 RX ADMIN — CLOZAPINE 100 MG: 100 TABLET ORAL at 22:57

## 2023-07-18 RX ADMIN — TRAZODONE HYDROCHLORIDE 50 MG: 50 TABLET ORAL at 21:34

## 2023-07-18 ASSESSMENT — ACTIVITIES OF DAILY LIVING (ADL)
ADLS_ACUITY_SCORE: 37

## 2023-07-18 ASSESSMENT — COLUMBIA-SUICIDE SEVERITY RATING SCALE - C-SSRS
MOST RECENT DATE: 66673
4. HAVE YOU HAD THESE THOUGHTS AND HAD SOME INTENTION OF ACTING ON THEM?: YES
ATTEMPT LIFETIME: YES
ATTEMPT PAST THREE MONTHS: YES
1. HAVE YOU WISHED YOU WERE DEAD OR WISHED YOU COULD GO TO SLEEP AND NOT WAKE UP?: YES
6. HAVE YOU EVER DONE ANYTHING, STARTED TO DO ANYTHING, OR PREPARED TO DO ANYTHING TO END YOUR LIFE?: YES
REASONS FOR IDEATION PAST MONTH: COMPLETELY TO END OR STOP THE PAIN (YOU COULDN'T GO ON LIVING WITH THE PAIN OR HOW YOU WERE FEELING)
6. HAVE YOU EVER DONE ANYTHING, STARTED TO DO ANYTHING, OR PREPARED TO DO ANYTHING TO END YOUR LIFE?: YES
TOTAL  NUMBER OF INTERRUPTED ATTEMPTS LIFETIME: YES
LETHALITY/MEDICAL DAMAGE CODE MOST RECENT POTENTIAL ATTEMPT: BEHAVIOR LIKELY TO RESULT IN INJURY BUT NOT LIKELY TO CAUSE DEATH
TOTAL  NUMBER OF INTERRUPTED ATTEMPTS PAST 3 MONTHS: YES
4. HAVE YOU HAD THESE THOUGHTS AND HAD SOME INTENTION OF ACTING ON THEM?: YES
3. HAVE YOU BEEN THINKING ABOUT HOW YOU MIGHT KILL YOURSELF?: YES
LETHALITY/MEDICAL DAMAGE CODE MOST RECENT ACTUAL ATTEMPT: NO PHYSICAL DAMAGE OR VERY MINOR PHYSICAL DAMAGE
5. HAVE YOU STARTED TO WORK OUT OR WORKED OUT THE DETAILS OF HOW TO KILL YOURSELF? DO YOU INTEND TO CARRY OUT THIS PLAN?: YES
REASONS FOR IDEATION LIFETIME: COMPLETELY TO END OR STOP THE PAIN (YOU COULDN'T GO ON LIVING WITH THE PAIN OR HOW YOU WERE FEELING)
5. HAVE YOU STARTED TO WORK OUT OR WORKED OUT THE DETAILS OF HOW TO KILL YOURSELF? DO YOU INTEND TO CARRY OUT THIS PLAN?: YES
2. HAVE YOU ACTUALLY HAD ANY THOUGHTS OF KILLING YOURSELF?: YES
2. HAVE YOU ACTUALLY HAD ANY THOUGHTS OF KILLING YOURSELF?: YES
1. IN THE PAST MONTH, HAVE YOU WISHED YOU WERE DEAD OR WISHED YOU COULD GO TO SLEEP AND NOT WAKE UP?: YES

## 2023-07-18 NOTE — ED NOTES
Patient tells RN that she stopped taking her clozapine after Saturday night because it makes her feel really tired. States she did not have suicidal thoughts until this morning, and believes it is because she stopped taking her meds. Also reports taking a monthly Abilify shot that is due this Friday.

## 2023-07-18 NOTE — ED NOTES
Patient arrived at the [facility] unit at 1430. She was calm and cooperative upon arrival. She arrived via wheelchair. She was admitted to the ED due to suicidal ideation. Patient stated she stopped taking her medication due dizziness. After she stopped her medication, started to experience suicidal ideation. She currently denied pain and other psych symptoms.Patient contracted for safety. Patient stated her BP has been low due to her medication. Her BP this afternoon upon arrival was 98/59. Will continue to monitor for behaviors.

## 2023-07-18 NOTE — CONSULTS
Diagnostic Evaluation Consultation  Crisis Assessment    Patient was assessed: In Person  Patient location:  [facility] Adult Emergency Department  Was a release of information signed: Yes. Providers included on the release: [facility]       Referral Data and Chief Complaint  Patient is a 43 year old female presenting to the [facility] Emergency Department for the following concerns: suicide attempt.      Informed Consent and Assessment Methods     Patient is her own guardian. Writer met with patient and explained the crisis assessment process, including applicable information disclosures and limits to confidentiality, assessed understanding of the process, and obtained consent to proceed with the assessment. Patient was observed to be able to participate in the assessment as evidenced by verbal agreement. Assessment methods included conducting a formal interview with patient, review of medical records, collaboration with medical staff, and obtaining relevant collateral information from family and community providers when available.    Over the course of this crisis assessment provided reassurance, offered validation, engaged patient in problem solving and disposition planning, worked with patient on safety and aftercare planning, assisted in processing patient's thoughts and feeling relating to presenting concerns, provided psychoeducation and facilitated family communication. Patient's response to interventions was tearfully engaged.     Summary of Patient Situation     Patient reports she discontinued her psychiatric medication 2 days ago due to side effects of dizziness and exhaustion. Patient endorses onset of suicidal ideation and command auditory hallucinations since that time. Patient reports yesterday she heard too many voices to count and saw scary people's faces. Patient reports they told her to jump off the [facility] Bridge because they want her to be dead. Patient planned on going to  the bridge today but decided that it would be easier to get the police to shoot her instead. Patient took a kitchen knife and walked to [address] [place] [address]. Patient threatened to stab herself in front of police, in order to get police to shoot her. Patient reports she hates life, wants it to end, does not want to live anymore. Patient reports she feels stress from taking care of her 6 children ages 7, 12, 14, 16, 17, and 19. Patient reports she still feels suicidal with 5/5 intensity. Patient was brought in by police on health officer hold for further evaluation.    Brief Psychosocial History     Patient lives in a house with her  and children. Patient reports her highest level of education was 5th grade. Patient is not employed out of the home. Patient has no criminal history.    Significant Clinical History     Patient has history of multiple civil commitments, currently provisionally discharged through [facility]. Patient has history of inpatient mental health admissions, most recently 1/10/2022 - 1/21/2022 (11 days) at [facility]. Patient reports established outpatient care through [facility]. Patient has been non-compliant with psychiatric medication 2 days ago due to side effects of dizziness and exhaustion.       Collateral Information    Bob Rawls (Spouse) was called at [phone], but did not answer.      Risk Assessment  Marathon Suicide Severity Rating Scale Full Clinical Version:  Suicidal Ideation  1. Wish to be Dead (Lifetime): (P) Yes  1. Wish to be Dead (Past 1 Month): (P) Yes  2. Non-Specific Active Suicidal Thoughts (Lifetime): (P) Yes  2. Non-Specific Active Suicidal Thoughts (Past 1 Month): (P) Yes  3. Active Suicidal Ideation with any Methods (Not Plan) Without Intent to Act (Lifetime): (P) Yes  3. Active Suicidal Ideation with any Methods (Not Plan) Without Intent to Act (Past 1 Month): (P) Yes  4. Active Suicidal Ideation with Some  Intent to Act, Without Specific Plan (Lifetime): (P) Yes  4. Active Suicidal Ideation with Some Intent to Act, Without Specific Plan (Past 1 Month): (P) Yes  5. Active Suicidal Ideation with Specific Plan and Intent (Lifetime): (P) Yes  5. Active Suicidal Ideation with Specific Plan and Intent (Past 1 Month): (P) Yes  Intensity of Ideation  Most Severe Ideation Rating (Lifetime): (P) 5  Most Severe Ideation Rating (Past 1 Month): (P) 5  Frequency (Lifetime): (P) Many times each day  Frequency (Past 1 Month): (P) Many times each day  Duration (Lifetime): (P) More than 8 hours/persistent or continuous  Duration (Past 1 Month): (P) More than 8 hours/persistent or continuous  Controllability (Lifetime): (P) Unable to control thoughts  Controllability (Past 1 Month): (P) Unable to control thoughts  Deterrents (Lifetime): (P) Deterrents definitely did not stop you  Deterrents (Past 1 Month): (P) Deterrents definitely did not stop you  Reasons for Ideation (Lifetime): (P) Completely to end or stop the pain (You couldn't go on living with the pain or how you were feeling)  Reasons for Ideation (Past 1 Month): (P) Completely to end or stop the pain (You couldn't go on living with the pain or how you were feeling)  Suicidal Behavior  Actual Attempt (Lifetime): (P) Yes  Actual Attempt (Past 3 Months): (P) Yes  Interrupted Attempts (Lifetime): (P) Yes  Interrupted Attempts (Past 3 Months): (P) Yes  Preparatory Acts or Behavior (Lifetime): (P) Yes  Preparatory Acts or Behavior (Past 3 Months): (P) Yes  C-SSRS Risk (Lifetime/Recent)  Calculated C-SSRS Risk Score (Lifetime/Recent): (P) High Risk    Validity of evaluation is not impacted by presenting factors during interview.   Comments regarding subjective versus objective responses to Antelope tool: congruent  Environmental or Psychosocial Events: helplessness/hopelessness and neither working nor attending school  Chronic Risk Factors: history of psychiatric hospitalization  [FreeTextEntry1] : 32 year yo male in NAD and normal body habitus\par \par \par Gait - normal\par no swelling, erythema, warmth\par flexion to 120 deg\par no TTP in patellar and quad tendon, medial/lateral joint\par 5/5 knee ext\par \par    Warning Signs: seeking access to means to hurt or kill self, talking or writing about death, dying, or suicide, hopelessness and dramatic changes in mood  Protective Factors: supportive ongoing medical and mental health care relationships  Interpretation of Risk Scoring, Risk Mitigation Interventions and Safety Plan:  Patient endorses onset of suicidal ideation and command auditory hallucinations for 2 days since discontinuing her medication. Patient reports voices are commanding her to jump off the [facility] Bridge because they want her to be dead. Patient planned on going to the bridge today but decided that it would be easier to get the police to shoot her instead. Patient took a kitchen knife and walked to [address] [place] [address]. Patient threatened to stab herself in front of police, in order to get police to shoot her. Patient reports she hates life, wants it to end, does not want to live anymore.       Does the patient have thoughts of harming others? No     Is the patient engaging in sexually inappropriate behavior?  No       Current Substance Abuse     Is there recent substance abuse? No     Was a urine drug screen or blood alcohol level obtained: Yes utox negative.       Mental Status Exam     Affect: Flat   Appearance: Appropriate    Attention Span/Concentration: Attentive  Eye Contact: Avoidant   Fund of Knowledge: Appropriate    Language /Speech Content: Fluent   Language /Speech Volume: Soft    Language /Speech Rate/Productions: Normal    Recent Memory: Intact   Remote Memory: Intact   Mood: Depressed    Orientation to Person: Yes    Orientation to Place: Yes   Orientation to Time of Day: Yes    Orientation to Date: Yes    Situation (Do they understand why they are here?): Yes    Psychomotor Behavior: Normal    Thought Content: Hallucinations and Suicidal   Thought Form: Intact      History of commitment: Yes multiple times, currently provisionally discharged through [facility].        Medication    Psychotropic medications:   No current facility-administered medications for this encounter.     Current Outpatient Medications   Medication     ABILIFY MAINTENA 400 MG extended release injection     cloZAPine (FAZACLO) 100 MG ODT     alum & mag hydroxide-simethicone (MAALOX) 200-200-20 MG/5ML SUSP suspension     ciclopirox (PENLAC) 8 % external solution     cloZAPine (FAZACLO) 25 MG ODT     docusate sodium (COLACE) 100 MG capsule     haloperidol (HALDOL) 5 MG tablet     traZODone (DESYREL) 50 MG tablet     traZODone (DESYREL) 50 MG tablet     Medication changes made in the last two weeks: Patient stopped taking her medication 2 days ago because they make her feel tired and dizzy.       Current Care Team    [facility]    Psychiatrist: Dr. Walter Gaviria    Nurse: Piper (no last name provided)    Diagnoses by history      1 Schizoaffective disorder, Depressive type F25.1       Clinical Summary and Substantiation of Recommendations    Patient presents with flat affect and depressed mood. Patient is tearful and breathing heavily throughout assessment. Patient makes minimal eye contact. Patient endorses onset of suicidal ideation and command auditory hallucinations for 2 days since discontinuing her medication. Patient stopped taking her medication due to side effects of dizziness and exhaustion. Patient has not been sleeping, eating, or caring for ADLs since. Patient reports voices are commanding her to jump off the [facility] Bridge because they want her to be dead. Patient is also seeing scary faces. Patient planned on going to the bridge today but decided that it would be easier to get the police to shoot her instead. Patient took a kitchen knife and walked to [address] [place] [address]. Patient threatened to stab herself in front of police, in order to get police to shoot her. Patient reports she hates life, wants it to end, does not want to live anymore. Patient reports if discharge she would  end her life as soon as possible. Patient is currently under provisional discharge from civil commitment. Patient is agreeable to inpatient mental health admission for stabilization and medication management.    Admission to Inpatient Level of Care is indicated due to:    1. Patient risk of severity of behavioral health disorder is appropriate to proposed level of care as indicated by:    Imminent Risk of Harm: Very Recent suicide attempt or deliberate act of serious harm to self WITHOUT relief of factors precipitating the attempt or act and Current plan for suicide or serious harm to self is present  And/or:  Behavioral health disorder is present and appropriate for inpatient care with both of the following:     Severe psychiatric, behavioral or other comorbid conditions are appropriate for management at inpatient mental health as indicated by at least one of the following:   o Hallucinations; delusions; positive symptoms and Depressive symptoms and Impaired impulse control, judgement, or insight    Severe dysfunction in daily living is present as indicated by at least one of the following:   o Complete inability to maintain any appropriate aspect of personal responsibility in any adult roles and Other evidence of severe dysfunction    2. Inpatient mental health services are necessary to meet patient needs and at least one of the following:  Specific condition related to admission diagnosis is present and judged likely to deteriorate in absence of treatment at proposed level of care    3. Situation and expectations are appropriate for inpatient care, as indicated by one of the following:   Around-the-clock medical and nursing care to address symptoms and initiate intervention is required, Patient management/treatment at lower level of care is not feasible or is inappropriate and Biopsychosocial stresses potentially contributing to clinical presentation (co morbidities) have been assessed and are absent or  manageable at proposed level of care    Disposition    Recommended disposition: Inpatient Mental Health       Reviewed case and recommendations with attending provider. Attending Name: Dr. Pérez Matthews       Attending concurs with disposition: Yes       Patient and/or validated legal guardian concurs with disposition: Yes       Final disposition: Inpatient Mental Health     Inpatient Details (if applicable):   Is patient admitted voluntarily:Yes      Patient aware of potential for transfer if there is not appropriate placement? Patient's prefers [facility] only.       Patient is willing to travel outside of the [facility] for placement? No      Behavioral Intake Notified? Yes: Date: 7/18/2023 Time: 1pm.     Assessment Details    Patient interview started at: 12:05pm and completed at: 12:20pm.     Total time spent with the patient or their family: .25 hrs      CPT code(s) utilized: no billing code.       JACQUELINE Pearson, MSW, LICSW, Psychotherapist  [facility] - Triage & Transition Services  Callback: [phone]

## 2023-07-18 NOTE — ED PROVIDER NOTES
[facility] EMERGENCY DEPARTMENT ([facility])  7/18/23  [facility]    History     Chief Complaint   Patient presents with     Suicidal     Mental Health Problem     Not taking meds, suicidal thoughts/plan     HPI  Nona Finley is a 43 year old female with a past medical history significant for schizoaffective disorder, bipolar type who presents to the Emergency Department for evaluation of marked increase in depression and suicidal thoughts.  Patient states that she left her home with a knife and went to [facility] in hopes that the police would see her with a knife and she would her.  Patient states that she is overwhelmed that she has 6 children and does not feel as though she wants to be around for them and states that she wants to end her life.  Patient has not been taking her medications she states that she feels the medications have made her too sleepy and she does not see any point in living if she has to be on the medications.    Past Medical History  Past Medical History:   Diagnosis Date     Encounter for female birth control 6/14/2017 6/14/2017 Plan Documentation Service ordered Depo Provera injection (150mg IM) may be given every 3 months for one year per loretta. Plan and order should be renewed at a visit no later than 6/14/18   Vanessa Thompson MD         Encounter for insertion or removal of intrauterine contraceptive device 1/3/2008    Paragard 1/08 removed 1/08.     Postpartum depression 8/18/2011     Schizophrenia (H) 2/12/2019     Suicidal ideation 2/16/2017     Past Surgical History:   Procedure Laterality Date     NO HISTORY OF SURGERY       NO HISTORY OF SURGERY  06/13/2013    Per patient reported this     ABILIFY MAINTENA 400 MG extended release injection  cloZAPine (FAZACLO) 100 MG ODT  alum & mag hydroxide-simethicone (MAALOX) 200-200-20 MG/5ML SUSP suspension  ciclopirox (PENLAC) 8 % external solution  cloZAPine (FAZACLO) 25 MG ODT  docusate sodium (COLACE) 100 MG  capsule  haloperidol (HALDOL) 5 MG tablet  traZODone (DESYREL) 50 MG tablet  traZODone (DESYREL) 50 MG tablet      No Known Allergies  Family History  Family History   Problem Relation Age of Onset     Respiratory Father         asthma     Asthma Father      Cerebrovascular Disease Mother      Diabetes Mother      Neurologic Disorder Brother         mental health problem?     Neurologic Disorder Sister         seizures (half sister)     Respiratory Paternal Grandfather         asthma     Respiratory Sister         asthma     Respiratory Brother         asthma     Neurologic Disorder Daughter         autism      Hypertension Maternal Grandmother      Neurologic Disorder Sister         seizures     Diabetes Maternal Grandfather      Coronary Artery Disease Son      Social History   Social History     Tobacco Use     Smoking status: Never     Smokeless tobacco: Never   Substance Use Topics     Alcohol use: No     Drug use: No      Past medical history, past surgical history, medications, allergies, family history, and social history were reviewed with the patient. No additional pertinent items.      A medically appropriate review of systems was performed with pertinent positives and negatives noted in the HPI, and all other systems negative.    Physical Exam   BP: 94/64  Pulse: 89  Resp: 16  SpO2: 99 %  Physical Exam  Constitutional:       General: She is not in acute distress.     Appearance: Normal appearance. She is not diaphoretic.   HENT:      Head: Atraumatic.      Mouth/Throat:      Mouth: Mucous membranes are moist.   Eyes:      General: No scleral icterus.     Conjunctiva/sclera: Conjunctivae normal.   Cardiovascular:      Rate and Rhythm: Normal rate.      Heart sounds: Normal heart sounds.   Pulmonary:      Effort: No respiratory distress.      Breath sounds: Normal breath sounds.   Abdominal:      General: Abdomen is flat.   Musculoskeletal:      Cervical back: Neck supple.   Skin:     General: Skin is  warm.      Findings: No rash.   Neurological:      General: No focal deficit present.      Mental Status: She is alert and oriented to person, place, and time.      Sensory: No sensory deficit.      Motor: No weakness.      Coordination: Coordination normal.   Psychiatric:         Mood and Affect: Mood is anxious. Affect is blunt.         Speech: Speech is delayed.         Behavior: Behavior is slowed.         Thought Content: Thought content is paranoid. Thought content includes suicidal ideation. Thought content includes suicidal plan.           ED Course, Procedures, & Data      Procedures         Suicide assessment completed by mental health (D.E.C., LCSW, etc.)         Medications   OLANZapine zydis (zyPREXA) ODT tab 5 mg (5 mg Oral $Given 7/18/23 1300)     Labs Ordered and Resulted from Time of ED Arrival to Time of ED Departure   HCG QUALITATIVE URINE - Normal       Result Value    hCG Urine Qualitative Negative     DRUG ABUSE SCREEN 1 URINE (ED) - Normal    Amphetamines Urine Screen Negative      Barbituates Urine Screen Negative      Benzodiazepine Urine Screen Negative      Cannabinoids Urine Screen Negative      Cocaine Urine Screen Negative      Opiates Urine Screen Negative       No orders to display          Critical care was not performed.     Medical Decision Making  The patient's presentation was of high complexity (a chronic illness severe exacerbation, progression, or side effect of treatment).    The patient's evaluation involved:  ordering and/or review of 3+ test(s) in this encounter (see separate area of note for details)  discussion of management or test interpretation with another health professional (Patient seen independent provider DEC assessment discussion of hospitalization versus outpatient management.)    The patient's management necessitated high risk (a decision regarding hospitalization).      Assessment & Plan      I have reviewed the nursing notes. I have reviewed the findings,  diagnosis, plan and need for follow up with the patient.    Patient with schizoaffective disorder depressive type at this time acutely suicidal patient is currently under commitment and will need to be hospitalized she is voluntarily agreeing to hospitalization at this time will be started on Zyprexa and await inpatient bed.    Final diagnoses:   Schizoaffective disorder, depressive type (H)       Pérez Matthews MD  [facility] EMERGENCY DEPARTMENT  7/18/2023     Pérez Matthews MD  07/18/23 1416       Pérez Matthews MD  07/18/23 2010

## 2023-07-18 NOTE — PROGRESS NOTES
Triage & Transition Services, Extended Care    Client Name: Nona Finley    Date: July 18, 2023  Service Type:  Group Therapy    Topic:   Happy Jar    Intervention:    Group process: support, challenge, affirm, psycho-education.     Response:  Patient did not participate in group.         Skye Longo

## 2023-07-18 NOTE — PROGRESS NOTES
Triage & Transition Services, Extended Care    Client Name: Nona Finley    Date: July 18, 2023  Service Type:  Group Therapy     Topic:   Long-Term Goals    Intervention:    Group process: support, challenge, affirm, psycho-education.     Response:  Patient did not participate in group.        Skye Longo

## 2023-07-18 NOTE — TELEPHONE ENCOUNTER
S: [facility] Noe , DEC  Romaine calling at 12:43 PM about a 43 year old/Female presenting with Command AH, VH, SA and SI w/a plan     B: Pt arrived via EMS. Presenting problem, stressors: Pt took a knife and went to [place] today to try to SA by police.  Also tried stabbing herself.  Pt is under  Civil Commitment.  Pt reported not  taking meds for last 2 days due to side effects.  Pt stated she doesn't want to live anymore because she has a stressful life.  Pt stated she has 6 kids. Pt was crying and hyperventilating but no aggression.  Pt reports hearing Command AH to kill self and VH of scary faces.    Pt affect in ED: Disorganized, Tearful and overwhelmed  Pt Dx: Schizoaffective Disorder  Previous [facility] hx? Yes: multiple with most recent 1/2022  Pt endorses SI with a plan to do suicide by  or stab herself with a knife   Hx of suicide attempt? Yes: multiple  Pt denies SIB  Pt denies HI   Pt endorses auditory hallucinations  and endorses visual hallucination .   Pt RARS Score: 2    Hx of aggression/violence, sexual offenses, legal concerns, Epic care plan? describe: no  Current concerns for aggression this visit? No  Does pt have a history of Civil Commitment? Yes, currently committed, revocation pending   Is Pt their own guardian? Yes    Pt is prescribed medication. Is patient medication compliant? Pt recently decided to stop medications on their own  Pt endorses OP services: Psychiatrist and ACT Team  CD concerns: None  Acute or chronic medical concerns: None  Does Pt present with specific needs, assistive devices, or exclusionary criteria? None      Pt is ambulatory  Pt is able to perform ADLs independently      A: Pt to be reviewed for [facility] admission. Pt is voluntary at this time, if status changes provider has confirmed that this Pt is holdable.   Preferred placement: [facility]    COVID Symptoms: No  If yes, COVID test required   Utox: Not ordered, intake to request lab    CMP: N/A  CBC: N/A  HCG:  N/A    R: Patient cleared and ready for behavioral bed placement: Yes  Pt placed on [facility] worklist? Yes

## 2023-07-18 NOTE — ED NOTES
Bed: [facility]  Expected date: 7/18/23  Expected time:   Means of arrival:   Comments:  Macario [address] 44 y/o F SI

## 2023-07-18 NOTE — PLAN OF CARE
Nona Finley  July 18, 2023  Plan of Care Hand-off Note     Patient Care Path: Inpatient Mental Health    Plan for Care:     atient presents with flat affect and depressed mood. Patient is tearful and breathing heavily throughout assessment. Patient makes minimal eye contact. Patient endorses onset of suicidal ideation and command auditory hallucinations for 2 days since discontinuing her medication. Patient stopped taking her medication due to side effects of dizziness and exhaustion. Patient has not been sleeping, eating, or caring for ADLs since. Patient reports voices are commanding her to jump off the [facility] because they want her to be dead. Patient is also seeing scary faces. Patient planned on going to the bridge today but decided that it would be easier to get the police to shoot her instead. Patient took a kitchen knife and walked to [address] [place] [address]. Patient threatened to stab herself in front of police, in order to get police to shoot her. Patient reports she hates life, wants it to end, does not want to live anymore. Patient reports if discharge she would end her life as soon as possible. Patient is currently under provisional discharge from civil commitment. Patient is agreeable to inpatient mental health admission for stabilization and medication management.    Critical Safety Issues: suicidal ideation, medication non compliance, provisional discharge from civil commitment    Overview:  This patient is a child/adolescent: No    This patient has additional special visitor precautions: No    Legal Status: Voluntary    Contacts:   Bob Rawls (Spouse) was called at [phone]    Psychiatry Consult:  Adult Psychiatry Consult requested related to medication management and stabilization. Psychiatry IP Consult Order Placed: Yes    Updated RN and Attending Provider regarding plan of care.    JACQUELINE Pearson

## 2023-07-18 NOTE — ED TRIAGE NOTES
Per EMS: Pt was found with knife in the street. Patient stated she wanted police to kill her. Patient attempted to stab herself in front of police. Patient has not been taking her monthly shot or scheduled zyprexa. Tells EMS she doesn't feel like hurting herself while on them, but they make her sleepy so she doesn't take them

## 2023-07-18 NOTE — ED NOTES

## 2023-07-18 NOTE — PHARMACY-ADMISSION MEDICATION HISTORY
Pharmacy Intern Admission Medication History    Admission medication history is complete. The information provided in this note is only as accurate as the sources available at the time of the update.    Medication reconciliation/reorder completed by provider prior to medication history? No    Information Source(s): Patient and CareEverywhere/SureScripts via in-person    Pertinent Information:   -Abilify injection due Friday 07/21 per patient  -Last clozapine dose on Saturday 07/15 per patient (i.e. more than 48 hours since last dose)    Changes made to PTA medication list:    Added: None    Deleted:   o No longer taking: Haldol 7.5 mg, trazodone 50 mg, Maalox, ciclopirox 8%, docusate    Changed: clozapine 25 mg ODT 3 tabs daily -> 1 tab daily  Medication Affordability:  Not including over the counter (OTC) medications, was there a time in the past 3 months when you did not take your medications as prescribed because of cost?: No    Allergies reviewed with patient and updates made in EHR: yes    Medication History Completed By: Criselda Saldivar 7/18/2023 6:03 PM    Prior to Admission medications    Medication Sig Last Dose Taking? Auth Provider Long Term End Date   ABILIFY MAINTENA 400 MG extended release injection Inject 2 mLs (400 mg) into the muscle every 28 days Due on 3/25 6/23/2023 Yes Sandeep Cancino MD Yes    cloZAPine (FAZACLO) 100 MG ODT Take 3 tablets (300 mg) by mouth daily Take with two 25mg tablets for a total daily dose of 375mg. 7/15/2023 Yes Lindsay Sinha MD Yes    cloZAPine (FAZACLO) 25 MG ODT Take 25 mg by mouth daily 7/15/2023 Yes Unknown, Entered By History Yes

## 2023-07-19 ENCOUNTER — TELEPHONE (OUTPATIENT)
Dept: BEHAVIORAL HEALTH | Facility: CLINIC | Age: 43
End: 2023-07-19
Payer: COMMERCIAL

## 2023-07-19 PROCEDURE — 99255 IP/OBS CONSLTJ NEW/EST HI 80: CPT

## 2023-07-19 PROCEDURE — 250N000013 HC RX MED GY IP 250 OP 250 PS 637

## 2023-07-19 RX ORDER — CLOZAPINE 100 MG/1
300 TABLET ORAL AT BEDTIME
Status: DISCONTINUED | OUTPATIENT
Start: 2023-07-22 | End: 2023-07-20

## 2023-07-19 RX ORDER — OLANZAPINE 10 MG/1
10 TABLET ORAL 3 TIMES DAILY PRN
Status: DISCONTINUED | OUTPATIENT
Start: 2023-07-19 | End: 2023-07-20

## 2023-07-19 RX ORDER — CLOZAPINE 100 MG/1
200 TABLET ORAL AT BEDTIME
Status: DISCONTINUED | OUTPATIENT
Start: 2023-07-20 | End: 2023-07-20

## 2023-07-19 RX ORDER — OLANZAPINE 10 MG/2ML
10 INJECTION, POWDER, FOR SOLUTION INTRAMUSCULAR 3 TIMES DAILY PRN
Status: DISCONTINUED | OUTPATIENT
Start: 2023-07-19 | End: 2023-07-20

## 2023-07-19 RX ADMIN — CLOZAPINE 150 MG: 100 TABLET ORAL at 22:34

## 2023-07-19 ASSESSMENT — ACTIVITIES OF DAILY LIVING (ADL)
ADLS_ACUITY_SCORE: 37

## 2023-07-19 NOTE — CONSULTS
"  Nona Finley MRN# [id number]   Age: 43 year old YOB: 1980   Date of Admission to ED: 7/18/2023    In person visit Details:     Patient was assessed and interviewed face-to-face in person with this writer teddy. Patient was observed to be able to participate in the assessment as evidenced by verbal consent. Assessment methods included conducting a formal interview with patient, review of medical records, collaboration with medical staff, and obtaining relevant collateral information from family and community providers when available.        Reason for Consult:   This note is being entered to supplement the psychiatry consultation note that was completed on July 18, 2023 by the licensed mental health professional  by JACQUELINE Cameron have reviewed the pertinent clinical details related to their encounter. I am being consulted to offer additional guidance on psychiatric pharmacological interventions    Patient was alert and oriented x4 pleasant and cooperative during assessment and interview.  Currently denied suicidal ideation or homicidal ideation or self injury behavior.  Patient said yesterday she was suicidal due to voice command, patient states she stopped taking her Clozaril due to feeling of dizziness.  Patient asked this writer if the medication can be switched to a different medications.  Patient said she she is currently under commitment through her [facility], she want to come inpatient mental health unit voluntarily, patient speak English fluently.  Patient was taking 375 mg of Clozaril at bedtime she says she stopped taking for 3 days before she attempt suicidde by , yesterday.  Patient said voice are bothering her too much, patient is willing to go back on her Clozaril but she said \" I worry about side effect of Clozaril.\"  Also patient was hesitant to continue to take her Abilify Maintena she told this writer that she took on J June 21, 2022 her next Abilify Maintena will be July 21, " 2023 patient is willing to take her Abilify maintainer.      I have reviewed the nursing notes. I have reviewed the findings, diagnosis, plan and need for follow up with the patient.         HPI:   Per ED provider Dr. Pérez Tubbs note Nona Finley is a 43 year old female with a past medical history significant for schizoaffective disorder, bipolar type who presents to the Emergency Department for evaluation of marked increase in depression and suicidal thoughts.  Patient states that she left her home with a knife and went to [facility] in hopes that the police would see her with a knife and she would her.  Patient states that she is overwhelmed that she has 6 children and does not feel as though she wants to be around for them and states that she wants to end her life.  Patient has not been taking her medications she states that she feels the medications have made her too sleepy and she does not see any point in living if she has to be on the medications.             Pt has not required locked seclusion or restraints in the past 24 hours to maintain safety, please refer to RN documentation for further details.  Substance use does not appear to be playing a contributing role in the patient's presentation.  Brief Therapeutic Intervention(s): *  Provided active listening, unconditional positive regard, and validation. Engaged in cognitive restructuring/ reframing, looked at common cognitive distortions and challenged negative thoughts. *Engaged in guided discovery, explored patient's perspectives and helped expand them through socratic dialogue. Provided positive reinforcement for progress towards goals, gains in knowledge, and application of skills previously taught.  Engaged in social skills training. Explored and identified early warning signs to anger.        Past Psychiatric History:   Nona Finley is a 43 year old female with a past medical history significant for schizoaffective disorder, bipolar type  who presents to the Emergency Department for evaluation of marked increase in depression and suicidal thoughts.  Patient states that she left her home with a knife and went to [facility] in hopes that the police would see her with a knife and she would her.  Patient states that she is overwhelmed that she has 6 children and does not feel as though she wants to be around for them and states that she wants to end her life.  Patient has not been taking her medications she states that she feels the medications have made her too sleepy and she does not see any point in living if she has to be on the medications.          Substance Use and History:     None         Past Medical History:   PAST MEDICAL HISTORY:   Past Medical History:   Diagnosis Date     Encounter for female birth control 6/14/2017 6/14/2017 Plan Documentation Service ordered Depo Provera injection (150mg IM) may be given every 3 months for one year per hanselcol. Plan and order should be renewed at a visit no later than 6/14/18   Vanessa Thompson MD         Encounter for insertion or removal of intrauterine contraceptive device 1/3/2008    Paragard 1/08 removed 1/08.     Postpartum depression 8/18/2011     Schizophrenia (H) 2/12/2019     Suicidal ideation 2/16/2017       PAST SURGICAL HISTORY:   Past Surgical History:   Procedure Laterality Date     NO HISTORY OF SURGERY       NO HISTORY OF SURGERY  06/13/2013    Per patient reported this               Allergies:   No Known Allergies          Medications:   I have reviewed this patient's current medications           Family History:   FAMILY HISTORY:   Family History   Problem Relation Age of Onset     Respiratory Father         asthma     Asthma Father      Cerebrovascular Disease Mother      Diabetes Mother      Neurologic Disorder Brother         mental health problem?     Neurologic Disorder Sister         seizures (half sister)     Respiratory Paternal Grandfather         asthma     Respiratory  Sister         asthma     Respiratory Brother         asthma     Neurologic Disorder Daughter         autism      Hypertension Maternal Grandmother      Neurologic Disorder Sister         seizures     Diabetes Maternal Grandfather      Coronary Artery Disease Son            Social History:   SOCIAL HISTORY:   Social History     Tobacco Use     Smoking status: Never     Smokeless tobacco: Never   Substance Use Topics     Alcohol use: No            PTA Medications:   (Not in a hospital admission)         Allergies:   No Known Allergies       Labs:     Recent Results (from the past 48 hour(s))   HCG qualitative urine (UPT)    Collection Time: 07/18/23  1:01 PM   Result Value Ref Range    hCG Urine Qualitative Negative Negative   Drug abuse screen 1 urine (ED)    Collection Time: 07/18/23  1:01 PM   Result Value Ref Range    Amphetamines Urine Screen Negative Screen Negative    Barbituates Urine Screen Negative Screen Negative    Benzodiazepine Urine Screen Negative Screen Negative    Cannabinoids Urine Screen Negative Screen Negative    Cocaine Urine Screen Negative Screen Negative    Opiates Urine Screen Negative Screen Negative   CBC with platelets and differential    Collection Time: 07/18/23  9:17 PM   Result Value Ref Range    WBC Count 6.6 4.0 - 11.0 10e3/uL    RBC Count 4.11 3.80 - 5.20 10e6/uL    Hemoglobin 12.6 11.7 - 15.7 g/dL    Hematocrit 37.9 35.0 - 47.0 %    MCV 92 78 - 100 fL    MCH 30.7 26.5 - 33.0 pg    MCHC 33.2 31.5 - 36.5 g/dL    RDW 14.0 10.0 - 15.0 %    Platelet Count 260 150 - 450 10e3/uL    % Neutrophils 51 %    % Lymphocytes 35 %    % Monocytes 8 %    % Eosinophils 5 %    % Basophils 1 %    % Immature Granulocytes 0 %    NRBCs per 100 WBC 0 <1 /100    Absolute Neutrophils 3.4 1.6 - 8.3 10e3/uL    Absolute Lymphocytes 2.3 0.8 - 5.3 10e3/uL    Absolute Monocytes 0.5 0.0 - 1.3 10e3/uL    Absolute Eosinophils 0.3 0.0 - 0.7 10e3/uL    Absolute Basophils 0.0 0.0 - 0.2 10e3/uL    Absolute Immature  Granulocytes 0.0 <=0.4 10e3/uL    Absolute NRBCs 0.0 10e3/uL          Physical and Psychiatric Examination:     BP 98/59   Pulse 89   Temp 98.5  F (36.9  C) (Oral)   Resp 16   SpO2 99%   Weight is 0 lbs 0 oz  There is no height or weight on file to calculate BMI.    Mental Status Exam:  Appearance: awake, alert  Attitude:  cooperative  Eye Contact:  good  Mood:  better  Affect:  appropriate and in normal range  Speech:  clear, coherent  Language: fluent and intact in English  Psychomotor, Gait, Musculoskeletal:  no evidence of tardive dyskinesia, dystonia, or tics  Thought Process:  logical and linear  Associations:  no loose associations  Thought Content:  no evidence of suicidal ideation or homicidal ideation  Insight:  limited  Judgement:  limited  Oriented to:  time, person, and place  Attention Span and Concentration:  limited  Recent and Remote Memory:  limited  Fund of Knowledge:  appropriate         Diagnoses:   Schizoaffective disorder, depressive type (H)         Recommendations:     1. Pt displays the following risk factors that support IP admission:  Suicidal attempt by  unable to contract for safety. Pt is unable to engage in safety planning to mitigate risk level in a non-secure setting. Lower levels of care have not been successful in mitigating risk. Due to this IP is the least restrictive option of care for pt. Pt should remain in IP until deemed safe to return to the community and engage in OP MH supports    - Continue to recommend inpatient psychiatric hospitalizations for further stabilization   2.  Restart her Clozaril on July 18, 2023 we will titrated up to 350 mg slowly, patient will have Abilify Maintena July 21, 2023  3.  Currently patient is voluntary for inpatient mental health unit, she is under provisional discharge        treatment per ED team    - Consulted with [facility] Romaine HOLLAND ,   patient's ED JOSEPHINE Cevallos regarding this case.    Please call [facility]/DEC at [phone] if you have  follow-up questions or wish to place another consult.  Casey Goldman, Psychiatric Nurse practitioner    Attestation:  Time with:  Patient: 25 minutes  Treatment Team:20 Minutes  Chart Review: 40 minutes    Total time spent was 85minutes. Over 50% of times was spent counseling and coordination of care.    I, Casey Goldman, IVANIA, APRN, Psychiatric Nurse Practitioner have personally performed an examination of this patient.  I have edited the note to reflect all relevant changes.  I have discussed this patient with the care team July 19, 2023.  I have reviewed all vitals and laboratory findings.    Disclaimer: This note consists of symbols derived from keyboarding,

## 2023-07-19 NOTE — PROGRESS NOTES
Triage & Transition Services, Extended Care    Client Name: Nona Finley    Date: July 19, 2023  Service Type:  Group Therapy    Response:  Patient did not participate in group.        WALDEMAR Benavides

## 2023-07-19 NOTE — TELEPHONE ENCOUNTER
R:  [place]  Access Inpatient Bed Call Log 7/18/23 5:51PM  ([facility] Only)  Intake has called facilities that have not updated their bed status within the last 12 hours.                  [facility] is at capacity.         Patient remains on work list pending appropriate bed availability.

## 2023-07-19 NOTE — ED PROVIDER NOTES
[facility] ED Mental Health Handoff Note:       Brief HPI:  This is a 43 year old female signed out to me.  See initial ED Provider note for full details of the presentation.     Home meds reviewed and ordered/administered: Yes    Medically stable for inpatient mental health admission: Yes.    Evaluated by mental health: Yes. The recommendation is for inpatient mental health treatment. Bed search in process    Safety concerns: At the time I received sign out, there were no safety concerns.    Hold Status:  Active Orders   Legal    Health Officer Authority to Detain (RICHELLE)     Frequency: Effective Now     Start Date/Time: 07/18/23 1147      Number of Occurrences: Until Specified     Order Comments: suicidal             Exam:   Patient Vitals for the past 24 hrs:   Temp Temp src   07/18/23 2253 98.5  F (36.9  C) Oral           ED Course:    Medications   acetaminophen (TYLENOL) tablet 650 mg (650 mg Oral $Given 7/18/23 2136)   cloZAPine (CLOZARIL) tablet 150 mg (has no administration in time range)     Followed by   cloZAPine (CLOZARIL) tablet 200 mg (has no administration in time range)     Followed by   cloZAPine (CLOZARIL) tablet 250 mg (has no administration in time range)     Followed by   cloZAPine (CLOZARIL) tablet 300 mg (has no administration in time range)     Followed by   cloZAPine (CLOZARIL) tablet 350 mg (has no administration in time range)   OLANZapine (zyPREXA) injection 10 mg (has no administration in time range)     Or   OLANZapine (zyPREXA) tablet 10 mg (has no administration in time range)   ARIPiprazole ER (ABILIFY MAINTENA) extended release inj syringe 400 mg (has no administration in time range)   OLANZapine zydis (zyPREXA) ODT tab 5 mg (5 mg Oral $Given 7/18/23 1300)   traZODone (DESYREL) tablet 50 mg (50 mg Oral $Given 7/18/23 2134)            There were no significant events during my shift.    Patient was signed out to the oncoming provider.       Impression:    ICD-10-CM    1.  Schizoaffective disorder, depressive type (H)  F25.1           Plan:    1. Awaiting inpatient mental health admission/transfer.      RESULTS:   Results for orders placed or performed during the hospital encounter of 07/18/23 (from the past 24 hour(s))   CBC with Platelets & Differential     Status: None    Collection Time: 07/18/23  9:17 PM    Narrative    The following orders were created for panel order CBC with Platelets & Differential.  Procedure                               Abnormality         Status                     ---------                               -----------         ------                     CBC with platelets and d...[[id number]]                      Final result                 Please view results for these tests on the individual orders.   CBC with platelets and differential     Status: None    Collection Time: 07/18/23  9:17 PM   Result Value Ref Range    WBC Count 6.6 4.0 - 11.0 10e3/uL    RBC Count 4.11 3.80 - 5.20 10e6/uL    Hemoglobin 12.6 11.7 - 15.7 g/dL    Hematocrit 37.9 35.0 - 47.0 %    MCV 92 78 - 100 fL    MCH 30.7 26.5 - 33.0 pg    MCHC 33.2 31.5 - 36.5 g/dL    RDW 14.0 10.0 - 15.0 %    Platelet Count 260 150 - 450 10e3/uL    % Neutrophils 51 %    % Lymphocytes 35 %    % Monocytes 8 %    % Eosinophils 5 %    % Basophils 1 %    % Immature Granulocytes 0 %    NRBCs per 100 WBC 0 <1 /100    Absolute Neutrophils 3.4 1.6 - 8.3 10e3/uL    Absolute Lymphocytes 2.3 0.8 - 5.3 10e3/uL    Absolute Monocytes 0.5 0.0 - 1.3 10e3/uL    Absolute Eosinophils 0.3 0.0 - 0.7 10e3/uL    Absolute Basophils 0.0 0.0 - 0.2 10e3/uL    Absolute Immature Granulocytes 0.0 <=0.4 10e3/uL    Absolute NRBCs 0.0 10e3/uL             MD Richie Hardy Cara, MD  07/19/23 3606

## 2023-07-19 NOTE — TELEPHONE ENCOUNTER
R: [place]  Access Inpatient Bed Call Log 7/19/23 4:52 PM     Intake has called facilities that have not updated their bed status within the last 12 hours.                 Adults:           [facility] is at capacity.              Pt remains on work list pending appropriate bed availability.

## 2023-07-19 NOTE — PROGRESS NOTES
Triage & Transition Services, Extended Care     Client Name: Nnoa Finley    Date: July 19, 2023  Service Type: Group Therapy  Session Start Time: 5:45 pm  Session End Time: 5:52 pm  Session Length: 7 min  Site Location: [facility]  Attendees: Patient and other group members  Facilitator: Kymberly Neumann     Topic:   [address] Grounding    Intervention:    Group process: support, challenge, affirm, psycho-education.     Response:  Patient did participate in group. Behavior in group was quiet and pleasant. Patient participated in the exercise.       Kymberly Neumann

## 2023-07-19 NOTE — ED NOTES
Patient was calm and cooperative during the shift. She currently denied and other psych symptoms.patient contracted for safety. She was medication compliant. No major concerns during the shift. Will continue to monitor for behaviors.

## 2023-07-19 NOTE — ED NOTES
Pt in bed appeared  to be sleeping without any s/s of distress. Breathing regular and unlabored. No behavioral issues noted. Safety checks done as required. Will continue to evaluate.

## 2023-07-19 NOTE — ED NOTES
Patient was calm and cooperative during the shift. She currently denied pain and other psych symptoms. Patient contracted for safety. She was medication compliant. Patient had blood work done today, result is pending. She spent most of the shift in the milieu and her room. She denied SI/HI. Will continue to monitor for behaviors.

## 2023-07-19 NOTE — TELEPHONE ENCOUNTER
R:  [place]  Access Inpatient Bed Call Log 7/19/23 7:26AM   Intake has called facilities that have not updated their bed status within the last 12 hours.               Adults:          [facility] is at capacity.        [facility] is posting 0 beds.  [phone].     [facility] is posting 0 beds. Negative covid required.           [facility] is posting 0 beds. Negative covid required. [phone]. Per call @7:35am to Marybeth no beds available, callback after 3pm.     [facility] is posting 0 beds.     [facility] is posting 0 beds.      [facility] is posting 0 beds     [facility] ([facility]) is posting 0 beds.           [facility] is posting 0 bed. Low acuity only        [facility] is posting 2 bed -LOW acuity ONLY. Mixed unit 12+. Negative              covid- [phone].        [facility] has 0 beds posted. No aggression. Negative Covid. Low acuity       [facility] is posting 0 beds. Negative covid.           [facility] ([place]) 4 beds Low acuity only. Negative covid. -   [phone].      [facility]- is posting 4 bed. Low acuity. No current aggression.     [facility] ([place]) 0 beds Low acuity only. Negative covid. -   [phone].     [facility] is posting 3 bed. Low acuity. 72 HH hold preferred. [phone].  Negative covid required. Per call @7:49am to Cat a couple beds available, are screening today.    [facility] ([facility]) 1 beds Low acuity only. Negative covid. -   [phone].      [facility] in [place] is posting 2 Beds.  Negative covid required.   Vol only, No history of aggression, violence, or assault. No sexual offenders. No 72 HH holds.   [phone].     [facility] is posting 7 beds Negative covid required.  (Must have the cognitive ability to do programming. No aggressive or violent behavior or recent HX in the last 2 yrs.  must be  primary.) Always low acuity. [phone].     [facility] has 0 beds posted. Negative covid required.  Low acuity only. Violence and aggression capped.  [phone].     [facility] is posting 2 beds. Low acuity, Negative covid required.  [phone]. Per call @7:36am to Rosalie no beds available today due to staffing.     [facility] is posting 1 bed. Negative covid required.  Vol only. Combined adolescent and adult unit. No aggressive or violent behavior. No registered sex offenders.  [phone]; Per call @7:37am to Marilia a couple adult beds no adolescent.     [facility] posting 0 beds Negative covid required.  [phone]     [facility] [place] is posting 1 beds. Negative covid. (No lines, drains, or tubes, oxygen, CPAP, IV, etc.). [phone]. Per call @7:39am to Laurie brian on case-by-case basis.      [facility] ([facility]) is posting 3 bed. Negative covid. (No lines, drains, or tubes, oxygen, CPAP, IV, etc.).   [phone].       is posting 18 beds. No covid test required.  [phone]. Per call @7:43 to Intake 1 Female and 1 Male available, pending discharges.         Pt remains on work list pending appropriate bed availability.

## 2023-07-19 NOTE — TELEPHONE ENCOUNTER
R: [place]  Access Inpatient Bed Call Log 7/19/23 2:04 AM   Intake has called facilities that have not updated their bed status within the last 12 hours.                 Adults:           [facility] is at capacity.            Pt remains on work list pending appropriate bed availability.

## 2023-07-19 NOTE — PROGRESS NOTES
Triage & Transition Services, Extended Care     Nona Finley  July 19, 2023       Nona is followed related to Long wait time for admission: .. Please see initial DEC/[facility] Crisis Assessment completed by Rachael Baker on 7/18 for complete assessment information. Notable concerns include discontinuing psychiatric medication, hallucinations, suicidal ideation     Current Supports and Contact Information    Pt is on MI Commitment    [facility] Team [phone]    Bob Rawls (Spouse)  [phone]    Case Management  Case management for patient included collaborating with patient's support system. In course of the day today, writer had contact with : [facility] [phone]. Spoke about pt's current presentation. ACT Team will revoke if pt changes her current status of voluntary for inpatient admission.    Per ACT Team the pt had been doing well, saw her RN on 7/14. Pt saw her  on 7/16 and was doing well. Team had discontinued daily eyes on meds with the patient some time ago.    ACT Team supports a 72 hour hold and revocation of P Discharge if the pt is no longer voluntary for Inpatient mental health treatment.    Plan  Inpatient Mental Health:   Pt is here voluntary and is on the inpatient mental health waitlist at this time.     Plan for Care reviewed with Assigned Medical Provider? Yes. Provider, Dr. Quiroz, response: Agreement    Extended Care will follow and meet with patient/family/care team as able or requested.     Lakisha Lam, [facility]  Extended Care   [phone]

## 2023-07-19 NOTE — PROGRESS NOTES
"Triage & Transition Services, Extended Care      Client Name: Nona Finley \"Nona\"   Date: July 19, 2023  Service Type:  Group Therapy  Site Location: [facility]  Facilitator: Kymberly Neumann     Topic:   Collaging     Intervention:    Patient was in the lounge and writer asked pt if she would like to participate in group.    Response:  Patient declined participating in group and did not participate in group.     Kymberly Neumann  Extended Care Coordinator  "

## 2023-07-20 ENCOUNTER — APPOINTMENT (OUTPATIENT)
Dept: INTERPRETER SERVICES | Facility: CLINIC | Age: 43
DRG: 885 | End: 2023-07-20
Payer: COMMERCIAL

## 2023-07-20 LAB
ALBUMIN SERPL BCG-MCNC: 3.8 G/DL (ref 3.5–5.2)
ALP SERPL-CCNC: 71 U/L (ref 35–104)
ALT SERPL W P-5'-P-CCNC: 15 U/L (ref 0–50)
ANION GAP SERPL CALCULATED.3IONS-SCNC: 10 MMOL/L (ref 7–15)
AST SERPL W P-5'-P-CCNC: 23 U/L (ref 0–45)
BASOPHILS # BLD AUTO: 0 10E3/UL (ref 0–0.2)
BASOPHILS NFR BLD AUTO: 1 %
BILIRUB SERPL-MCNC: 0.3 MG/DL
BUN SERPL-MCNC: 18.5 MG/DL (ref 6–20)
CALCIUM SERPL-MCNC: 9 MG/DL (ref 8.6–10)
CHLORIDE SERPL-SCNC: 104 MMOL/L (ref 98–107)
CREAT SERPL-MCNC: 0.56 MG/DL (ref 0.51–0.95)
DEPRECATED HCO3 PLAS-SCNC: 25 MMOL/L (ref 22–29)
EOSINOPHIL # BLD AUTO: 0.3 10E3/UL (ref 0–0.7)
EOSINOPHIL NFR BLD AUTO: 6 %
ERYTHROCYTE [DISTWIDTH] IN BLOOD BY AUTOMATED COUNT: 13.9 % (ref 10–15)
FOLATE SERPL-MCNC: 10.7 NG/ML (ref 4.6–34.8)
GFR SERPL CREATININE-BSD FRML MDRD: >90 ML/MIN/1.73M2
GLUCOSE SERPL-MCNC: 87 MG/DL (ref 70–99)
HCT VFR BLD AUTO: 37.2 % (ref 35–47)
HGB BLD-MCNC: 12.5 G/DL (ref 11.7–15.7)
IMM GRANULOCYTES # BLD: 0 10E3/UL
IMM GRANULOCYTES NFR BLD: 0 %
LYMPHOCYTES # BLD AUTO: 1.8 10E3/UL (ref 0.8–5.3)
LYMPHOCYTES NFR BLD AUTO: 36 %
MCH RBC QN AUTO: 30.7 PG (ref 26.5–33)
MCHC RBC AUTO-ENTMCNC: 33.6 G/DL (ref 31.5–36.5)
MCV RBC AUTO: 91 FL (ref 78–100)
MONOCYTES # BLD AUTO: 0.5 10E3/UL (ref 0–1.3)
MONOCYTES NFR BLD AUTO: 10 %
NEUTROPHILS # BLD AUTO: 2.3 10E3/UL (ref 1.6–8.3)
NEUTROPHILS NFR BLD AUTO: 47 %
NRBC # BLD AUTO: 0 10E3/UL
NRBC BLD AUTO-RTO: 0 /100
PLATELET # BLD AUTO: 261 10E3/UL (ref 150–450)
POTASSIUM SERPL-SCNC: 4.1 MMOL/L (ref 3.4–5.3)
PROT SERPL-MCNC: 6.9 G/DL (ref 6.4–8.3)
RBC # BLD AUTO: 4.07 10E6/UL (ref 3.8–5.2)
SODIUM SERPL-SCNC: 139 MMOL/L (ref 136–145)
TSH SERPL DL<=0.005 MIU/L-ACNC: 0.86 UIU/ML (ref 0.3–4.2)
VIT B12 SERPL-MCNC: 463 PG/ML (ref 232–1245)
WBC # BLD AUTO: 4.9 10E3/UL (ref 4–11)

## 2023-07-20 PROCEDURE — 84443 ASSAY THYROID STIM HORMONE: CPT | Performed by: STUDENT IN AN ORGANIZED HEALTH CARE EDUCATION/TRAINING PROGRAM

## 2023-07-20 PROCEDURE — 124N000002 HC R&B MH UMMC

## 2023-07-20 PROCEDURE — 85014 HEMATOCRIT: CPT | Performed by: STUDENT IN AN ORGANIZED HEALTH CARE EDUCATION/TRAINING PROGRAM

## 2023-07-20 PROCEDURE — 80053 COMPREHEN METABOLIC PANEL: CPT | Performed by: STUDENT IN AN ORGANIZED HEALTH CARE EDUCATION/TRAINING PROGRAM

## 2023-07-20 PROCEDURE — 36415 COLL VENOUS BLD VENIPUNCTURE: CPT | Performed by: STUDENT IN AN ORGANIZED HEALTH CARE EDUCATION/TRAINING PROGRAM

## 2023-07-20 PROCEDURE — 82746 ASSAY OF FOLIC ACID SERUM: CPT | Performed by: STUDENT IN AN ORGANIZED HEALTH CARE EDUCATION/TRAINING PROGRAM

## 2023-07-20 PROCEDURE — 99223 1ST HOSP IP/OBS HIGH 75: CPT | Mod: AI | Performed by: PSYCHIATRY & NEUROLOGY

## 2023-07-20 PROCEDURE — 250N000013 HC RX MED GY IP 250 OP 250 PS 637

## 2023-07-20 PROCEDURE — 82607 VITAMIN B-12: CPT | Performed by: STUDENT IN AN ORGANIZED HEALTH CARE EDUCATION/TRAINING PROGRAM

## 2023-07-20 RX ORDER — OLANZAPINE 10 MG/1
10 TABLET ORAL 3 TIMES DAILY PRN
Status: DISCONTINUED | OUTPATIENT
Start: 2023-07-20 | End: 2023-07-24 | Stop reason: HOSPADM

## 2023-07-20 RX ORDER — LANOLIN ALCOHOL/MO/W.PET/CERES
3 CREAM (GRAM) TOPICAL
Status: DISCONTINUED | OUTPATIENT
Start: 2023-07-20 | End: 2023-07-24 | Stop reason: HOSPADM

## 2023-07-20 RX ORDER — CLOZAPINE 100 MG/1
200 TABLET ORAL AT BEDTIME
Status: COMPLETED | OUTPATIENT
Start: 2023-07-21 | End: 2023-07-21

## 2023-07-20 RX ORDER — ACETAMINOPHEN 325 MG/1
650 TABLET ORAL EVERY 4 HOURS PRN
Status: DISCONTINUED | OUTPATIENT
Start: 2023-07-20 | End: 2023-07-24 | Stop reason: HOSPADM

## 2023-07-20 RX ORDER — HYDROXYZINE HYDROCHLORIDE 25 MG/1
25 TABLET, FILM COATED ORAL EVERY 4 HOURS PRN
Status: DISCONTINUED | OUTPATIENT
Start: 2023-07-20 | End: 2023-07-24 | Stop reason: HOSPADM

## 2023-07-20 RX ORDER — POLYETHYLENE GLYCOL 3350 17 G/17G
17 POWDER, FOR SOLUTION ORAL DAILY PRN
Status: DISCONTINUED | OUTPATIENT
Start: 2023-07-20 | End: 2023-07-24 | Stop reason: HOSPADM

## 2023-07-20 RX ORDER — OLANZAPINE 10 MG/2ML
10 INJECTION, POWDER, FOR SOLUTION INTRAMUSCULAR 3 TIMES DAILY PRN
Status: DISCONTINUED | OUTPATIENT
Start: 2023-07-20 | End: 2023-07-24 | Stop reason: HOSPADM

## 2023-07-20 RX ADMIN — CLOZAPINE 175 MG: 100 TABLET ORAL at 21:13

## 2023-07-20 RX ADMIN — FLUOXETINE 20 MG: 20 CAPSULE ORAL at 13:06

## 2023-07-20 ASSESSMENT — ACTIVITIES OF DAILY LIVING (ADL)
ADLS_ACUITY_SCORE: 42
WEAR_GLASSES_OR_BLIND: YES
ADLS_ACUITY_SCORE: 42
DRESSING/BATHING_DIFFICULTY: NO
ADLS_ACUITY_SCORE: 42
HYGIENE/GROOMING: INDEPENDENT
ORAL_HYGIENE: INDEPENDENT
ADLS_ACUITY_SCORE: 42
DOING_ERRANDS_INDEPENDENTLY_DIFFICULTY: NO
CONCENTRATING,_REMEMBERING_OR_MAKING_DECISIONS_DIFFICULTY: NO
LAUNDRY: WITH SUPERVISION
ADLS_ACUITY_SCORE: 42
HEARING_DIFFICULTY_OR_DEAF: NO
CHANGE_IN_FUNCTIONAL_STATUS_SINCE_ONSET_OF_CURRENT_ILLNESS/INJURY: NO
ADLS_ACUITY_SCORE: 42
ADLS_ACUITY_SCORE: 57
TOILETING_ISSUES: NO
DIFFICULTY_COMMUNICATING: NO
DRESS: SCRUBS (BEHAVIORAL HEALTH)
DIFFICULTY_EATING/SWALLOWING: NO
ADLS_ACUITY_SCORE: 42
ADLS_ACUITY_SCORE: 42
FALL_HISTORY_WITHIN_LAST_SIX_MONTHS: NO
WALKING_OR_CLIMBING_STAIRS_DIFFICULTY: NO

## 2023-07-20 NOTE — PLAN OF CARE
Problem: Plan of Care - These are the overarching goals to be used throughout the patient stay.    Goal: Plan of Care Review  Description: The Plan of Care Review/Shift note should be completed every shift.  The Outcome Evaluation is a brief statement about your assessment that the patient is improving, declining, or no change.  This information will be displayed automatically on your shift note.  Outcome: Progressing     Problem: Depression  Goal: Improved Mood  Outcome: Progressing     Problem: Suicidal Behavior  Goal: Suicidal Behavior is Absent or Managed  Outcome: Progressing   Goal Outcome Evaluation:    Plan of Care Reviewed With: patient        Pt alert and oriented, able to verbalize her needs well, pt isolative in room, no social interaction with peers but cooperative upon approach. Pt came out for meals and went back to her room. Pt has insight of her mental illness, stated she stopped taking her medication because it was making her feel tired and sleepy and she couldn't help her family. Pt understands her medication regimen and states she will be compliant with her treatment plan. Pt denies pain, anxiety, SI, SIB, A/V Hallucination and all other mental health issues. Pt later came out around 1900 paced on the hallway with minimal interaction with selective peers. Pt intake is adequate, hygiene is appropriate, contracted for safety, no any behavior noted.    /71   Pulse 100   Temp 97.8  F    Resp 16   SpO2 100%

## 2023-07-20 NOTE — PROVIDER NOTIFICATION
07/20/23 0151   Patient Belongings   Second Staff Natali RN   A                 LOCKER  A black pants  Green long dress  A yellowish neck chain   A dark brown head scarf          Admission:  I am responsible for any personal items that are not sent to the safe or pharmacy.  [facility] is not responsible for loss, theft or damage of any property in my possession.    Signature:  _________________________________ Date: _______  Time: _____                                              Staff Signature:  ____________________________ Date: ________  Time: _____      2nd Staff person, if patient is unable/unwilling to sign:    Signature: ________________________________ Date: ________  Time: _____     Discharge:  [facility] has returned all of my personal belongings:    Signature: _________________________________ Date: ________  Time: _____                                          Staff Signature:  ____________________________ Date: ________  Time: _____

## 2023-07-20 NOTE — PLAN OF CARE
Pt is a 43 years old female admitted in station 20 for SI with increased depression. Per report , pt has Hx of Schizoaffective disorder, and Bipolar. Pt states that she left her home with a knife and went to the [facility] in hopes that the police would see her with a knife and would kill her. Pt has not been taking taking her medication.  Pt states  that she feels the medication have made her too sleepy and she does not see any point of living if she has to be on these medications.     Pt was cooperative with search and the admission process.  Denied SI/SIB or any mental health symptoms at this time; denied pain as well.   Contracted  for safety at this time. Pt was briefly oriented in the unit and went to bed. Pt appears to have slept for 4.50 hours. Will continue to monitor and offer support.     Problem: Sleep Disturbance  Goal: Adequate Sleep/Rest  Outcome: Progressing   Goal Outcome Evaluation:

## 2023-07-20 NOTE — TELEPHONE ENCOUNTER
9:29 PM   Pt accepted by [facility] for [facility]. Pt added to queue @ 9:45 PM.      9:50 PM 20 charge [facility] notified and ED updated with placement information.    10:17 PM Indicia complete.

## 2023-07-20 NOTE — PLAN OF CARE
Initial Psychosocial Assessment     I have reviewed the chart, met with the patient, and developed Care Plan.  Information for assessment was obtained from: Patient and chart review         Presenting Problem:  Patient is 43 year old woman admitted to [facility] on 7/20/2023  due to concerns for worsening depression and suicidal ideation in the context of non-medication adherence. The patient left her home with a knife and went to [facility] hoping that the police would shoot her.  Today she reports feeling better and denied any thoughts of harming herself. She reports being due for her ability injection 7/21. Patient is agreeable to work with treatment team on med adjustments and to try prozac again since she recall this being helpful in the past.          History of Mental Health and Chemical Dependency:  Previous psychiatric diagnoses include Schizoaffective disorder, Bipolar type. Patient has history of inpatient mental health admissions, most recently 1/10/2022 - 1/21/2022 (11 days) at [facility]. Patient reports established outpatient care through [facility]. Patient has been non-compliant with psychiatric medication 2 days ago due to side effects of dizziness and exhaustion.     Family Description (Constellation, Family Psychiatric History):  Was born in [place].  Most of her family is still there. Grew up in [place], left in 2001. First went to [place] before coming to [place].  Patient is  with 6 children ranging in age 7,12,14, 16, 17, and 19 years olds. Patient's oldest child (daughter) has significant Autism Spectrum Disorder. One of her brothers also suffers from mental illness.      Significant Life Events (Illness, Abuse, Trauma, Death):  Fled [place] when she was in the 5th Grade. Significant Trauma history.          Living Situation:  Lives in a house in [facility] her and the kids     Educational Background:  Up to 5th grade  in [place] , did attend some schooling in the [place] with ESL assistance.      Occupational History: Housewife     Financial Status: Supported financially by her      Legal Issues:   In the community with Provisional Discharge Agreement   Patient has history of multiple civil commitments, currently provisionally discharged through [facility].     Ethnic/Cultural Issues:  Yazdanism. She is bilingual. Prays and reads the Koran      Spiritual Orientation:  Yazdanism       Service History:  none     Social Functioning (organization, interests):  Limited due to exacerbated mental health symptoms       Current Treatment Providers are:   [facility] ACT Team ([facility]) Main   [address] [place] [address] Main ph: [phone]  Fax: [phone]  Psychiatrist - Dr. Gaviria   - Francoise ALMARAZ ph: [phone]    Has been working with ACT team since 2012.       Social Service Assessment/Plan:  Patient will have psychiatric assessment conducted by a psychiatry provider. Medications will be reviewed and adjusted per attending psychiatry provider as indicated. The treatment team will continue to assess and stabilize the patient's mental health symptoms with the use of medications and therapeutic programming. Hospital staff will provide a safe environment and a therapeutic milieu. Staff will continue to assess patient as needed. Patient will participate in unit groups and activities. Patient will receive individual and group support on the unit.      CTC will do individual inpatient treatment planning and after care planning. CTC will discuss options for increasing community supports with the patient. CTC will coordinate with outpatient providers and will place referrals to ensure appropriate follow up care is in place.

## 2023-07-20 NOTE — H&P
----------------------------------------------------------------------------------------------------------  [facility]   Psychiatry History and Physical    Name: Nona Finley   MRN#: [id number]  Age: 43 year old YOB: 1980    Date of Admission: 7/18/2023  Attending Physician: Juarez Grimm MD     Contacts:     Primary Outpatient Psychiatrist: Dr. Walter Gaviria M.D.   Primary Physician: Fatimah Cardoza  Therapist: [facility] : Francoise Dillard  Probation/: n/a  Family Members: Bob Rawls (Spouse), has 6 children     Chief Concern:     SI, command hallucinatons      History of Present Illness:     Nona Finley is a 43 year old female with a previous psychiatric diagnosis of schizoaffective disorder, depressive type admitted from the [facility] ED  due to psychosis, marked increase in depression symptoms, command hallucinations, and SI with plan in the context of medication non-adherence.         [facility]/DEC Assessment:    Summary of Patient Situation  Patient reports she discontinued her psychiatric medication 2 days ago due to side effects of dizziness and exhaustion. Patient endorses onset of suicidal ideation and command auditory hallucinations since that time. Patient reports yesterday she heard too many voices to count and saw scary people's faces. Patient reports they told her to jump off the [place] because they want her to be dead. Patient planned on going to the bridge today but decided that it would be easier to get the police to shoot her instead. Patient took a kitchen knife and walked to [address] [place] [address]. Patient threatened to stab herself in front of police, in order to get police to shoot her. Patient reports she hates life, wants it to end, does not want to live anymore. Patient reports she feels stress from taking care of her 6 children ages 7, 12, 14, 16, 17, and 19. Patient  reports she still feels suicidal with 5/5 intensity. Patient was brought in by police on health officer hold for further evaluation.     Brief Psychosocial History   Patient lives in a house with her  and children. Patient reports her highest level of education was 5th grade. Patient is not employed out of the home. Patient has no criminal history. Significant Clinical History     Significant Clinical History   Patient has history of multiple civil commitments, currently provisionally discharged through [facility]. Patient has history of inpatient mental health admissions, most recently 1/10/2022 - 1/21/2022 (11 days) at [facility]. Patient reports established outpatient care through [facility]. Patient has been non-compliant with psychiatric medication 2 days ago due to side effects of dizziness and exhaustion.    ED/Hospital Course:  Nona Finley was medically cleared for admission to inpatient psychiatric unit. In the ED, clozaril was restarted. Zyprexa also given. CBC wnl. UDS negative.     Patient interview:  Patient was seen in the interview room with the assistance of the Jamaican , Pauline. Patient reports she skipped her medicatioon for three days, and was hearing voices. The AH told her to take a knife, go outside, and if the police see her with a knife would shoot her. She endorses visual hallucinations as well. Stopped medications due to side effects including dizziness and sleepiness during the day. Prior to this was using medication regularly.    Today patient states she is feeling much better. No thoughts of harming herself (active or passive). She took clozapine before bed last night which was helpful. She also finds the Abilify injection helpful, but when discontinues clozapine finds symptoms become markedly worse. Endorses sadness when not taking medication: sad because she needs to take medications.    She lives at home with her  6  children and  (oldest 19, youngest 7), and does not work. She does not endorse any substance use .    She had ECT in the past but does not remember any side effects. She has an event on the 29th, and is eager to get home.    Nona reports no seizure hx, and has never taken Wellbutrin, took Prozac w/ post-partum depression and found that helpful. Does not remember if it offered additional energy at that time. States she does not think she is depressed, but acknowledged it can help side effects and mitigate depressive symptoms when they arise.     Court hearing for daughter to obtain legal guardianship on July 24 (Monday).     Psychiatric Review of Systems:     Depressive:   Reports suicidal ideation, depressed mood, low energy, feeling hopeless, feeling trapped and overwhelmed    Denies insomnia, weight changes, poor concentration /memory and excessive guilt   Dysregulation:    Reports: suicidal ideation, no intent at this time    Denies violent ideation, aggressive, irritable and physically agitated   Psychosis:    Reports AVH over the last few days, have stopped since restarting clozapine   Denies auditory hallucinations, visual hallucinations and disorganized speech (difficulty communicating)  Martha:    Reports none   Denies none  Anxiety:    Reports none   Denies none  PTSD:    Reports none   Denies none  ADHD:    Reports none   Denies none  Disordered Eating:   Reports none,  Denies none  Cluster B:   Reports none  Denies none     Medical Review of Systems:     The Review of Systems is negative other than what is noted in the HPI.     Psychiatric History:     Prior diagnoses: Previous psychiatric diagnoses include Schizoaffective disorder, depressive type    Hospitalizations: Patient has history of multiple inpatient mental health admissions, most recently 1/10/2022 - 1/21/2022 (11 days) at [facility].     [facility]: 12/2021 - 8/2021 - 7/2020 - 12/2019 - 5/2019 -  3/2019 - 2/2019 - 5/2018     Court Committments: In the community with Provisional Discharge Agreement   Patient has history of multiple civil commitments, currently provisionally discharged through [facility].     Suicide attempts: multiple per chart review.     Self-injurious behavior: None per patient report.     Violence towards others: None per chart review.     ECT/TMS: ECT done during hospitalization in 2022, stopped due to side effects per chart review. Also had ECT in 2011 and 2012.     Past medications:   Clozapine, abilify, haldol, trazadone, zyprexa, lorazepam, perphenazine, Prozac (when diagnosed with post partum depression and it was effective for her,)       Substance Use History:     Alcohol: Denies any use    Nicotine: Denies any use    Illicit Substances: Denies current or past addiction     Chemical Dependency Treatment: Denies history of chemical dependency treatment      Social History:     Upbringing:    Originally from [place].  Most of her family is still there.   Grew up in [place], left in 2001. First went to [place] before coming to [place]    Family/Relationships:  and has 6 children. Has daughter with ASD who is non-verbal. Her in-laws help her with children    Living Situation: Currently lives in [place] with  and 6 children    Education: Up to 5th grade in [place] , did attend some schooling in the [place] with ESL assistance.     Occupation: stay at home mother    Legal: was brought into ED by police this admission, currently provisionally discharged through [facility].    Guns: not addressed today, guns locked in house per chart review    Abuse/Trauma:  Fled [place] when she was in the 5th Grade. Significant Trauma history per chart review     Service: None     Spirituality: Episcopal     Hobbies/Interests: Not addressed today     Past Medical/Surgical History:     I have reviewed this patient's past medical history  Denies history of:  hepatitis, HIV, head trauma with or without loss of consciousness and seizures  Past Medical History:   Diagnosis Date     Encounter for female birth control 6/14/2017 6/14/2017 Plan Documentation Service ordered Depo Provera injection (150mg IM) may be given every 3 months for one year per hanselcol. Plan and order should be renewed at a visit no later than 6/14/18   Vanessa Thompson MD         Encounter for insertion or removal of intrauterine contraceptive device 1/3/2008    Paragard 1/08 removed 1/08.     Postpartum depression 8/18/2011     Schizophrenia (H) 2/12/2019     Suicidal ideation 2/16/2017       I have reviewed this patient's past surgical history  Past Surgical History:   Procedure Laterality Date     NO HISTORY OF SURGERY       NO HISTORY OF SURGERY  06/13/2013    Per patient reported this        Family History:     Psychiatric Family Hx: brother with schizophrenia  Family History   Problem Relation Age of Onset     Respiratory Father         asthma     Asthma Father      Cerebrovascular Disease Mother      Diabetes Mother      Neurologic Disorder Brother         mental health problem?     Neurologic Disorder Sister         seizures (half sister)     Respiratory Paternal Grandfather         asthma     Respiratory Sister         asthma     Respiratory Brother         asthma     Neurologic Disorder Daughter         autism      Hypertension Maternal Grandmother      Neurologic Disorder Sister         seizures     Diabetes Maternal Grandfather      Coronary Artery Disease Son         Allergies:      No Known Allergies     Medications:     Medications Prior to Admission   Medication Sig Dispense Refill Last Dose     ABILIFY MAINTENA 400 MG extended release injection Inject 2 mLs (400 mg) into the muscle every 28 days Due on 3/25 (Patient taking differently: Inject 400 mg into the muscle every 28 days) 2 mL 0 6/23/2023     cloZAPine (FAZACLO) 100 MG ODT Take 3 tablets (300 mg) by mouth daily Take with two  25mg tablets for a total daily dose of 375mg. 90 tablet 0 7/15/2023     cloZAPine (FAZACLO) 25 MG ODT Take 25 mg by mouth daily   7/15/2023       See current inpatient medications below.     Vitals and Physical Exam:     /71 (BP Location: Right arm, Patient Position: Sitting, Cuff Size: Adult Regular)   Pulse 100   Temp 97.8  F (36.6  C) (Oral)   Resp 16   Wt 70.6 kg (155 lb 11.2 oz)   SpO2 100%   BMI 26.39 kg/m      See ED assessment note by ED physician on 7/18.     Labs and Imaging:     Recent Results (from the past 72 hour(s))   HCG qualitative urine (UPT)    Collection Time: 07/18/23  1:01 PM   Result Value Ref Range    hCG Urine Qualitative Negative Negative   Drug abuse screen 1 urine (ED)    Collection Time: 07/18/23  1:01 PM   Result Value Ref Range    Amphetamines Urine Screen Negative Screen Negative    Barbituates Urine Screen Negative Screen Negative    Benzodiazepine Urine Screen Negative Screen Negative    Cannabinoids Urine Screen Negative Screen Negative    Cocaine Urine Screen Negative Screen Negative    Opiates Urine Screen Negative Screen Negative   CBC with platelets and differential    Collection Time: 07/18/23  9:17 PM   Result Value Ref Range    WBC Count 6.6 4.0 - 11.0 10e3/uL    RBC Count 4.11 3.80 - 5.20 10e6/uL    Hemoglobin 12.6 11.7 - 15.7 g/dL    Hematocrit 37.9 35.0 - 47.0 %    MCV 92 78 - 100 fL    MCH 30.7 26.5 - 33.0 pg    MCHC 33.2 31.5 - 36.5 g/dL    RDW 14.0 10.0 - 15.0 %    Platelet Count 260 150 - 450 10e3/uL    % Neutrophils 51 %    % Lymphocytes 35 %    % Monocytes 8 %    % Eosinophils 5 %    % Basophils 1 %    % Immature Granulocytes 0 %    NRBCs per 100 WBC 0 <1 /100    Absolute Neutrophils 3.4 1.6 - 8.3 10e3/uL    Absolute Lymphocytes 2.3 0.8 - 5.3 10e3/uL    Absolute Monocytes 0.5 0.0 - 1.3 10e3/uL    Absolute Eosinophils 0.3 0.0 - 0.7 10e3/uL    Absolute Basophils 0.0 0.0 - 0.2 10e3/uL    Absolute Immature Granulocytes 0.0 <=0.4 10e3/uL    Absolute NRBCs 0.0  "10e3/uL        Mental Status Examination:     Oriented to:  Grossly Oriented, Person/Self and Situation  General:  Awake and Alert  Appearance:  appears stated age, Grooming is adequate and appropriate weight  Behavior/Attitude:  calm and cooperative  Eye Contact:  appropriate  Psychomotor: no evidence of tics, dystonia, or tardive dyskinesia no catatonia present  Speech:  soft volume/tone  Language: Fluent in English with appropriate syntax and vocabulary.  Mood:  \"better\"  Affect:  congruent with mood and flat  Thought Process:  linear and coherent  Thought Content:  No SI/HI/AH/VH; No apparent delusions  Associations:  questionable  Insight:  fair due to understanding reason for admission  Judgment:  fair due to taking medications and following plan  Impulse control: fair  Attention Span:  adequate for conversation  Concentration:  grossly intact  Recent and Remote Memory:  grossly intact  Fund of Knowledge:  average  Muscle Strength and Tone: normal  Gait and Station: Normal     Psychiatric Assessment:     Nona Finley is a 43 year old female previously diagnosed with schizoaffective disorder, depressive type with psychosis who was admitted for command AH and distressing VH, with SI and plan in the context of medication noncompliance. Most recent psychiatric hospitalization was1/10/2022 - 1/21/2022 (11 days) at [facility]. Significant symptoms on admission include A/VH, SI and plan.The MSE on admission was notable for depressed mood, SI with plan, commanding AH and disturbing VH. Biological contributions to mental health presentation include previous diagnosis and family history. There is genetic loading for psychosis.  Psychological contributions to mental health presentation include stress from raising children. Protective factors include family and ACT team.     In summary, the patient's reported symptoms of depressed mood, SI with plan, commanding AH and " disturbing VH in the context of psychosocial stressors and medication stoppage is consistent with schizoaffective disorder, depressive type with psychosis. She will likely benefit from medication management and coordination with ACT team this admission.    Given that she currently has SI, HI and psychosis, patient warrants inpatient psychiatric hospitalization to maintain her safety.      Psychiatric Plan by Diagnosis      #  Schizoaffective disorder, depressive type  1. Medications:  - Clozapine 175 mg 7/20 then increased to 200 mg 7/21  -  Abilify Maintena 400 mg scheduled 7/21    # Depression  - FLUoxetine (PROzac) capsule 20 mg starting 7/20    2. Pertinent Labs/Monitoring:   - CBC w/differentials unremarkable 7/20  - TSH with free T4 reflex and/or T3 unremarkable 7/20  - B12 unremarkable 7/20  - B9 unremarkable 7/20     3. Additional Plans:  - Patient will be treated in therapeutic milieu with appropriate individual and group therapies as described     Psychiatric Hospital Course:      Nona Finley was admitted to [facility] as a voluntary patient.     Medications:  o PTA Clozapine 150 mg QHS restarted 7/19 and increased to 175 mg 7/20 then plan to increase to 200 mg 7/21. Was previously on 375 mg daily.  ? Abilify Maintena 400 mg scheduled 7/21  ? FLUoxetine (PROzac) capsule 20 mg started 7/20    The risks, benefits, alternatives, and side effects were discussed and understood by the patient and other caregivers.     Medical Assessment and Plan     Medical diagnoses to be addressed this admission:    none    Medical course: Patient was physically examined by the ED prior to being transferred to the unit and was found to be medically stable and appropriate for admission.     Consults:  none     Checklist     Legal Status: Orders Placed This Encounter      Health Officer Authority to Detain (RICHELLE)      Voluntary      Safety Assessment:   Behavioral Orders   Procedures     Code 1 - Restrict to Unit     Routine  Programming     As clinically indicated     Status 15     Every 15 minutes.     Suicide precautions     Patients on Suicide Precautions should have a Combination Diet ordered that includes a Diet selection(s) AND a Behavioral Tray selection for Safe Tray - with utensils, or Safe Tray - NO utensils         Risk Assessment:  Risk for harm is moderate.  Risk factors: SI and past behaviors  Protective factors: engaged in treatment     SIO: No    Dispo: TBD. Disposition pending clinical stabilization, medication optimization and development of an appropriate discharge plan.     Attestations:   This patient was seen and discussed with my attending physician.  Susi Rosario, MS3  [facility] Medical Student    I was present with the medical student who participated in the service and in the documentation of the note.  I have verified the history and personally performed the physical exam and medical decision making. I agree with the assessment and plan of care as documented in the note.    Johanne Peralta,   Psychiatry Resident PGY-1    Attestation:  I, Juarez Grimm MD, have personally performed an examination of this patient and I have reviewed the resident's documentation.  I have edited the note to reflect all relevant changes.  I have discussed this patient with the house staff on 7/20/2023.  I agree with resident findings and plan in today's note and yesterdays resident H&P.  I have reviewed all vitals and laboratory findings.      I certifiy that the inpatient services were ordered in accordance with the Medicare regulations governing the order. This includes certification that hospital inpatient services are reasonable and necessary and in the case of services not specified as inpatient-only under 42 .22(n), that they are appropriately provided as inpatient services in accordance with the 2-midnight benchmark under 42 .3(e).     The reason for inpatient status is Acute Psychosis.    Juarez Grimm  M.D.,Ph.D.

## 2023-07-20 NOTE — ED NOTES
Patient was calm and cooperative this evening. She denied  pain and other psych  symptoms. Patient contracted for safety. She was medication compliant. She spent most in the milieu. Will continue to monitor for behaviors.

## 2023-07-20 NOTE — PLAN OF CARE
Problem: Suicidal Behavior  Goal: Suicidal Behavior is Absent or Managed  Outcome: Progressing   Goal Outcome Evaluation:  Patient slept until after 10:30 to 11 AM this shift, after meeting with team, patient presented with flat affect, mood calm but sad, Patient has some insight to her illness and stated stressors at home as she care for child with special needs and taking medications that makes her sleepy all the time; she endorsed feeling sad for her children that they have to deal with a mother with mental illness, patient also talked about situations that led her to the hospital and stated she will have to continue her medication even if she dislike the side effects. Patient denies SI, SIB and hallucinations, she is dilma for safety. Was out for lunch and ate 100%, observed pacing flores and appeared worried, when approached, she stated she was fine, patient later asked writer about discharge and was told that she will have to discuss with Doctors on Friday, patient stated understanding. Plan for patient ACT team to deliver her long acting injectable Abilify Maintena on Friday morning for schedule administration. No concern for safety noted this shift, continue current plan of care.

## 2023-07-21 PROCEDURE — 124N000002 HC R&B MH UMMC

## 2023-07-21 PROCEDURE — 99232 SBSQ HOSP IP/OBS MODERATE 35: CPT | Mod: GC | Performed by: PSYCHIATRY & NEUROLOGY

## 2023-07-21 PROCEDURE — 250N000013 HC RX MED GY IP 250 OP 250 PS 637

## 2023-07-21 RX ADMIN — FLUOXETINE 20 MG: 20 CAPSULE ORAL at 10:40

## 2023-07-21 RX ADMIN — CLOZAPINE 200 MG: 100 TABLET ORAL at 20:37

## 2023-07-21 ASSESSMENT — ACTIVITIES OF DAILY LIVING (ADL)
LAUNDRY: WITH SUPERVISION
ADLS_ACUITY_SCORE: 42
HYGIENE/GROOMING: INDEPENDENT
ADLS_ACUITY_SCORE: 42
ADLS_ACUITY_SCORE: 42
DRESS: SCRUBS (BEHAVIORAL HEALTH)
LAUNDRY: WITH SUPERVISION
ORAL_HYGIENE: INDEPENDENT
ADLS_ACUITY_SCORE: 42
HYGIENE/GROOMING: INDEPENDENT
ADLS_ACUITY_SCORE: 42
ADLS_ACUITY_SCORE: 42
DRESS: SCRUBS (BEHAVIORAL HEALTH)
ADLS_ACUITY_SCORE: 42
ORAL_HYGIENE: INDEPENDENT
ADLS_ACUITY_SCORE: 42
ADLS_ACUITY_SCORE: 42

## 2023-07-21 NOTE — PLAN OF CARE
"  Problem: Suicidal Behavior  Goal: Suicidal Behavior is Absent or Managed  Outcome: Progressing   Goal Outcome Evaluation:    Plan of Care Reviewed With: patient      Patient appeared sad, flat affect and soft speech noted during interaction. She was pleasant on approach. She denied anxiety, depression, SI/HI/AVH. She said, \"my answer is no to everything\". She was eating and drinking adequately. Received long acting abilify from ACT. Verified and labeled by pharmacy. Injection given to patient (pls see MAR). Patient tolerated procedure well. She was eating and drinking well. She was visible in the milieu, not social with peers. Mood appeared better later in the shift. She started smiling upon approach.   "

## 2023-07-21 NOTE — PLAN OF CARE
Problem: Sleep Disturbance  Goal: Adequate Sleep/Rest  Outcome: Progressing   Goal Outcome Evaluation:  Patient had an uneventful night. Appears to have slept for 7 hours tonight. No complaints of pain or requests for prn's. Safety checks in place, no concerns noted.

## 2023-07-21 NOTE — PLAN OF CARE
Problem: Plan of Care - These are the overarching goals to be used throughout the patient stay.    Goal: Plan of Care Review  Description: The Plan of Care Review/Shift note should be completed every shift.  The Outcome Evaluation is a brief statement about your assessment that the patient is improving, declining, or no change.  This information will be displayed automatically on your shift note.  Outcome: Progressing     Problem: Depression  Goal: Improved Mood  Outcome: Progressing     Problem: Suicidal Behavior  Goal: Suicidal Behavior is Absent or Managed  Outcome: Progressing   Goal Outcome Evaluation:    Plan of Care Reviewed With: patient    Pt has been visible in the milieu, affect is flat and blunted but bright upon approach. Pt paced on the hallway mostly, more visible and social with selective peers and staff. Pt denied having SI, SIB, A/V Hallucination, anxiety/depression, pain and all other mental health issues. Pt ate 100% in the dining room, hygiene is appropriate. Pt wants to go home soon to see her family after the court order per the pt. Writer told the pt to meet with the teams on Monday and explain to them about discharge plan. Pt was compliant with the feedback. Pt is compliant with medication, no side effects reported/noted this shift.    /69 (BP Location: Left arm, Patient Position: Sitting,   Pulse 117   Temp 98.5  F (36.9  C) (Oral)   Resp 16   SpO2 98%

## 2023-07-21 NOTE — CARE PLAN
07/21/23 1340   Individualization/Patient Specific Goals   Patient Personal Strengths expressive of emotions;expressive of needs;relationship stability;motivated for treatment   Patient Vulnerabilities legal concerns;lacks insight into illness   Anxieties, Fears or Concerns Mental health concerns, SI and AH   Individualized Care Needs None   Patient/Family-Specific Goals (Include Timeframe) Wants to feel safe with self so she can take care of her family   Interprofessional Rounds   Participants CTC;psychiatrist;other (see comments);nursing   Behavioral Team Discussion   Participants Dr. Grimm, Psychiatry Resident, 3rd year medical students, April RNKristie CTC   Progress Progressing   Anticipated length of stay 5-7 days   Continued Stay Criteria/Rationale Admitted for psychosis and SA. Needs clinical stabilization and medication management   Medical/Physical No acute concerns   Precautions See below   Plan Psychiatry Team will meet with patient daily to assess psychiatric needs and to discuss medication options/side effects; during hospitalization patient will be encouraged to attend therapy groups and to participate in unit programming. CTC will coordinate disposition and aftercare plan   Rationale for change in precautions or plan None   Safety Plan See below   Anticipated Discharge Disposition home with family     PRECAUTIONS AND SAFETY    Behavioral Orders   Procedures    Code 1 - Restrict to Unit    Routine Programming     As clinically indicated    Status 15     Every 15 minutes.    Suicide precautions     Patients on Suicide Precautions should have a Combination Diet ordered that includes a Diet selection(s) AND a Behavioral Tray selection for Safe Tray - with utensils, or Safe Tray - NO utensils         Safety  Safety WDL: WDL  Patient Location: [facility]  Observed Behavior: calm  Observed Behavior (Comment): sitting  Safety Measures: environmental rounds completed, safety rounds completed, suicide  check-in completed  Diversional Activity: television  Suicidality: Status 15

## 2023-07-21 NOTE — PROGRESS NOTES
"  ----------------------------------------------------------------------------------------------------------  [facility]  Psychiatry Progress Note  Hospital Day #1     Interim History:     The patient's care was discussed with the treatment team and chart notes were reviewed.    Vitals: /71 (BP Location: Right arm, Patient Position: Sitting, Cuff Size: Adult Regular)   Pulse 100   Temp 97.8  F (36.6  C) (Oral)   Resp 16   Wt 70.6 kg (155 lb 11.2 oz)   SpO2 100%   BMI 26.39 kg/m    Sleep: 7 hours (07/21/23 0600)  Scheduled medications: Took all scheduled medications as prescribed  Psychiatric PRN medications: No psychiatric prns given    Staff Report:   Did not take meds this morning due to being asleep. Wants to take later. No acute events or safety concerns overnight. Please see staff notes for details.      Subjective:     Patient Interview:    Nona Finley was seen in the conference room. States she is doing \"well.\" Slept through the night. Feels less tired and attributes this to Prozac. Nona finds this helpful.  She does report some dizziness/tiredness. Her  who visited her yesterday is pursuing his nephrology fellowship. Plans to try to attend group today. She hasn't been socializing much and did not see ACT team today.    She's had normal BM and \"just wants to go home.\" [facility] has been in touch with her ACT team regarding legal status and discharge plan.      ROS:  Patient has dizziness, fatigue  Patient denies acute concerns, diarrhea and fever     Objective:     Vitals:  /71 (BP Location: Right arm, Patient Position: Sitting, Cuff Size: Adult Regular)   Pulse 100   Temp 97.8  F (36.6  C) (Oral)   Resp 16   Wt 70.6 kg (155 lb 11.2 oz)   SpO2 100%   BMI 26.39 kg/m      Allergies:  No Known Allergies    Current Medications:  Scheduled:  Current Facility-Administered Medications   Medication Dose Route Frequency     ARIPiprazole ER  " "400 mg Intramuscular Q28 Days     cloZAPine  200 mg Oral At Bedtime    Followed by     [START ON 7/22/2023] cloZAPine  225 mg Oral At Bedtime    Followed by     [START ON 7/23/2023] cloZAPine  250 mg Oral At Bedtime     FLUoxetine  20 mg Oral Daily       PRN:  Current Facility-Administered Medications   Medication Dose Route Frequency     acetaminophen  650 mg Oral Q4H PRN     hydrOXYzine  25 mg Oral Q4H PRN     melatonin  3 mg Oral At Bedtime PRN     OLANZapine  10 mg Oral TID PRN    Or     OLANZapine  10 mg Intramuscular TID PRN     polyethylene glycol  17 g Oral Daily PRN       Labs and Imaging:  New results:   - CBC unremarkable 7/20  - CMP unremarkable 7/20  - TSH normal 7/20  - Vit B12 normal 7/20  - Folate normal 7/20    Data this admission:  - CBC unremarkable 7/20  - CMP unremarkable 7/20  - TSH normal 7/20  - Vit B12 normal 7/20  - Folate normal 7/20     Mental Status Exam:     Oriented to:  Grossly Oriented  General:  Awake and Stuporous  Appearance:  appears stated age and Grooming is adequate  Behavior/Attitude:  calm, cooperative and engaged  Eye Contact:  appropriate  Psychomotor: normal no catatonia present  Speech:  soft volume/tone  Language: Fluent in English with appropriate syntax and vocabulary.  Mood:  \"better\"  Affect:  congruent with mood  Thought Process:  linear, coherent and goal directed  Thought Content:  No SI/HI/AH/VH; No apparent delusions  Associations:  questionable  Insight:  good and fair due to understanding condition  Judgment:  fair due to following treatment plan  Impulse control: fair  Attention Span:  grossly intact  Concentration:  grossly intact  Recent and Remote Memory:  not formally assessed  Fund of Knowledge:  average  Muscle Strength and Tone: normal  Gait and Station: Normal     Psychiatric Assessment     Nona Finley is a 43 year old female previously diagnosed with schizoaffective disorder, depressive type with psychosis who was admitted for command AH and " distressing VH, with SI and plan in the context of medication noncompliance. Most recent psychiatric hospitalization was1/10/2022 - 1/21/2022 (11 days) at [facility]. Significant symptoms on admission include A/VH, SI and plan.The MSE on admission was notable for depressed mood, SI with plan, commanding AH and disturbing VH. Biological contributions to mental health presentation include previous diagnosis and family history. There is genetic loading for psychosis.  Psychological contributions to mental health presentation include stress from raising children. Protective factors include family and ACT team.      In summary, the patient's reported symptoms of depressed mood, SI with plan, commanding AH and disturbing VH in the context of psychosocial stressors and medication stoppage is consistent with schizoaffective disorder, depressive type with psychosis. She will likely benefit from medication management and coordination with ACT team this admission.     Given that she currently has SI, HI and psychosis, patient warrants inpatient psychiatric hospitalization to maintain her safety.      Psychiatric Plan by Diagnosis      Today's changes:  - Plan to increase to 200 mg 7/21 then to increase to 225 mg 7/22.   - Abilify Maintena 400 mg   - Increase Prozac to 30 mg 7/22    #  Schizoaffective disorder, depressive type  1. Medications:  - Clozapine 175 mg at bedtime 7/20 increased to 200 mg 7/21  - Abilify Maintena 400 mg scheduled 7/21     # Depression  - FLUoxetine (PROzac) capsule 20 mg started 7/20, increase to 30 mg 7/22     2. Pertinent Labs/Monitoring:   - CBC w/differentials unremarkable 7/20  - TSH with free T4 reflex and/or T3 unremarkable 7/20  - B12 unremarkable 7/20  - B9 unremarkable 7/20     3. Additional Plans:    Patient will be treated in therapeutic milieu with appropriate individual and group therapies as described-     Psychiatric Hospital Course:       Nona Finley was admitted to [facility] as a voluntary patient.     Medications:    PTA Clozapine 150 mg QHS restarted 7/19 and increased to 175 mg 7/20. Plan to increase to 200 mg 7/21 then to increase to 225 mg 7/22. Was previously on 375 mg daily.  ? Abilify Maintena 400 mg scheduled 7/21  ? FLUoxetine (PROzac) capsule 20 mg started 7/20, and plan to increase to 30 mg 7/22     The risks, benefits, alternatives, and side effects were discussed and understood by the patient and other caregivers.     Medical Assessment and Plan     Medical diagnoses to be addressed this admission:    none     Medical course: Patient was physically examined by the ED prior to being transferred to the unit and was found to be medically stable and appropriate for admission.      Consults:  none     Checklist     Legal Status: Voluntary Health Officer Authority to Detain (RICHELLE)          Safety Assessment:   Behavioral Orders   Procedures     Code 1 - Restrict to Unit     Routine Programming     As clinically indicated     Status 15     Every 15 minutes.     Suicide precautions     Patients on Suicide Precautions should have a Combination Diet ordered that includes a Diet selection(s) AND a Behavioral Tray selection for Safe Tray - with utensils, or Safe Tray - NO utensils         Risk Assessment:  Risk for harm is moderate.  Risk factors: past behaviors  Protective factors: family and engaged in treatment     SIO: No    Disposition: Pending stabilization, medication optimization, & development of a safe discharge plan.     Attestations     This patient was seen and discussed with my attending physician.  Susi Rosario, MS3  [employer]    Resident Attestation:   I was present with the medical student who participated in the service and in the documentation of the note.  I have verified the history and personally performed the physical exam, mental status exam, and medical decision making. I agree with the assessment and  plan of care as documented in the note.    Johanne Peralta,  Psychiatry Resident PGY-1    Attestation:  This patient has been seen and evaluated by me, Juarez Grimm MD.  I have discussed this patient with the house staff team including the resident and medical student and I agree with the findings and plan in this note.    I have reviewed today's vital signs, medications, labs and imaging. Juarez Grimm MD , PhD.

## 2023-07-22 LAB
CLOZAPINE SERPL-MCNC: <100 NG/ML
CLOZAPINE+NOR SERPL-MCNC: NORMAL NG/ML
CNO SERPL-MCNC: <100 NG/ML
NORCLOZAPINE SERPL-MCNC: <100 NG/ML

## 2023-07-22 PROCEDURE — 250N000013 HC RX MED GY IP 250 OP 250 PS 637

## 2023-07-22 PROCEDURE — 124N000002 HC R&B MH UMMC

## 2023-07-22 RX ADMIN — CLOZAPINE 225 MG: 100 TABLET ORAL at 21:02

## 2023-07-22 RX ADMIN — FLUOXETINE 30 MG: 20 CAPSULE ORAL at 11:12

## 2023-07-22 ASSESSMENT — ACTIVITIES OF DAILY LIVING (ADL)
ADLS_ACUITY_SCORE: 42
DRESS: SCRUBS (BEHAVIORAL HEALTH)
ADLS_ACUITY_SCORE: 42
DRESS: SCRUBS (BEHAVIORAL HEALTH)
ORAL_HYGIENE: INDEPENDENT
ADLS_ACUITY_SCORE: 42
HYGIENE/GROOMING: INDEPENDENT
HYGIENE/GROOMING: INDEPENDENT
ADLS_ACUITY_SCORE: 42
ORAL_HYGIENE: INDEPENDENT
ADLS_ACUITY_SCORE: 42

## 2023-07-22 NOTE — PLAN OF CARE
Problem: Sleep Disturbance  Goal: Adequate Sleep/Rest  Outcome: Progressing   Goal Outcome Evaluation:  Patient had no acute events overnight. Appears to have slept for 6 hours. No complaints of pain or requests for prn's. Patient remained in the room through the night. Safety checks in place.

## 2023-07-22 NOTE — PLAN OF CARE
"  Problem: Plan of Care - These are the overarching goals to be used throughout the patient stay.    Goal: Plan of Care Review  Description: The Plan of Care Review/Shift note should be completed every shift.  The Outcome Evaluation is a brief statement about your assessment that the patient is improving, declining, or no change.  This information will be displayed automatically on your shift note.  7/22/2023 1828 by Linnette Rubi RN  Outcome: Progressing  7/22/2023 1827 by Linnette Rubi RN  Outcome: Progressing     Problem: Depression  Goal: Improved Mood  Outcome: Progressing     Problem: Suicidal Behavior  Goal: Suicidal Behavior is Absent or Managed  Outcome: Progressing   Goal Outcome Evaluation:    Plan of Care Reviewed With: patient    Pt visible in the milieu, pacing on the hallway, isolative to self with no social interaction with peers. Pt mood was sad per the pt report, verbalized thinking of her mental health and family. Pt calm, pleasant and cooperative upon approach. Pt hypotensive 82/60 stated she hasn't been drinking adequately verbalized \"medication that am taking always lowers my BP\". Pt was encouraged to push more fluids, afterwards BP was 106/74. Pt denies anxiety,depression, SI, SIB, A/V Hallucination and all other mental health issues. Pt ate 100% in the dining room, compliant with medication, hygiene is adequate, no any issues noted.    /74   Pulse 73   Temp 97.3  F (36.3  C) (Oral)   Resp 18   SpO2 99%                    "

## 2023-07-22 NOTE — PLAN OF CARE
Goal Outcome Evaluation:    Plan of Care Reviewed With: patient        Problem: Depression  Goal: Improved Mood  Outcome: Progressing     Problem: Suicidal Behavior  Goal: Suicidal Behavior is Absent or Managed  Outcome: Progressing     Patient spent most of the morning sleeping in her room, declined to come out for breakfast when invited. Patient slept in and took her AM medication after 11am. Patient verbalized being tired and thinks it due to her medications. Patient skipped her breakfast but verbalized planning to eat breakfast. Patient described her mood as being sad, appeared isolative and withdrawn and did not interact with peers. Patient was receptive with assessment questions, denied SI/HI/SIB/AVH, denied anxiety and depression but endorsed being so sleepy and tired.   Patient ate 100%% of her lunch and went back to her room, patient has been sleeping    /72 (BP Location: Left arm, Patient Position: Sitting, Cuff Size: Adult Regular)   Pulse 101   Temp (!) 96.6  F (35.9  C) (Tympanic)   Resp 18   Wt 70.6 kg (155 lb 11.2 oz)   SpO2 98%   BMI 26.39 kg/m

## 2023-07-23 PROCEDURE — 250N000013 HC RX MED GY IP 250 OP 250 PS 637

## 2023-07-23 PROCEDURE — 124N000002 HC R&B MH UMMC

## 2023-07-23 RX ADMIN — FLUOXETINE 30 MG: 20 CAPSULE ORAL at 11:47

## 2023-07-23 RX ADMIN — CLOZAPINE 250 MG: 100 TABLET ORAL at 20:47

## 2023-07-23 ASSESSMENT — ACTIVITIES OF DAILY LIVING (ADL)
DRESS: INDEPENDENT
DRESS: INDEPENDENT
ORAL_HYGIENE: INDEPENDENT
ADLS_ACUITY_SCORE: 42
ORAL_HYGIENE: INDEPENDENT
ADLS_ACUITY_SCORE: 42
LAUNDRY: WITH SUPERVISION
ADLS_ACUITY_SCORE: 42
HYGIENE/GROOMING: INDEPENDENT
HYGIENE/GROOMING: INDEPENDENT
LAUNDRY: WITH SUPERVISION
ADLS_ACUITY_SCORE: 42

## 2023-07-23 NOTE — PLAN OF CARE
Problem: Depression  Goal: Improved Mood  Outcome: Progressing   Goal Outcome Evaluation:    Plan of Care Reviewed With: patient      Nursing Assessment    Recent Vitals: B/P:104/66  T:97  P:71    General Shift Summary  Pt slept in until about 1145 today and took scheduled meds at this time. Pt had no behavioral concerns. Pt denies having hallucinations. Reports mood to be good. Pt says that they are getting better and that the increase in their Clozaril has been good. Pt said that she went off Clozaril due to being tired during the day, but seems to understand that this triggered a relapse of her symptoms.     Patient is medication compliant. Reports day time sedation and increased appetite which they attribute to their Clozaril. No PRN medications given this shift.     Hygiene and appetite are appropriate.     Branden Doe RN

## 2023-07-23 NOTE — PLAN OF CARE
Problem: Sleep Disturbance  Goal: Adequate Sleep/Rest  Outcome: Progressing   Goal Outcome Evaluation:  Patient  appears to have slept for 6.5 hours. No complaints of pain or requests for prn's. Patient remained in the room through the night. Safety checks in place.

## 2023-07-24 VITALS
OXYGEN SATURATION: 100 % | DIASTOLIC BLOOD PRESSURE: 66 MMHG | HEART RATE: 92 BPM | SYSTOLIC BLOOD PRESSURE: 91 MMHG | TEMPERATURE: 97.9 F | RESPIRATION RATE: 18 BRPM | BODY MASS INDEX: 26.39 KG/M2 | WEIGHT: 155.7 LBS

## 2023-07-24 LAB
BASOPHILS # BLD AUTO: 0 10E3/UL (ref 0–0.2)
BASOPHILS NFR BLD AUTO: 0 %
EOSINOPHIL # BLD AUTO: 0.1 10E3/UL (ref 0–0.7)
EOSINOPHIL NFR BLD AUTO: 2 %
ERYTHROCYTE [DISTWIDTH] IN BLOOD BY AUTOMATED COUNT: 13.9 % (ref 10–15)
HCT VFR BLD AUTO: 38.2 % (ref 35–47)
HGB BLD-MCNC: 12.8 G/DL (ref 11.7–15.7)
IMM GRANULOCYTES # BLD: 0 10E3/UL
IMM GRANULOCYTES NFR BLD: 0 %
LYMPHOCYTES # BLD AUTO: 1.2 10E3/UL (ref 0.8–5.3)
LYMPHOCYTES NFR BLD AUTO: 17 %
MCH RBC QN AUTO: 30.9 PG (ref 26.5–33)
MCHC RBC AUTO-ENTMCNC: 33.5 G/DL (ref 31.5–36.5)
MCV RBC AUTO: 92 FL (ref 78–100)
MONOCYTES # BLD AUTO: 0.4 10E3/UL (ref 0–1.3)
MONOCYTES NFR BLD AUTO: 6 %
NEUTROPHILS # BLD AUTO: 5.1 10E3/UL (ref 1.6–8.3)
NEUTROPHILS NFR BLD AUTO: 75 %
NRBC # BLD AUTO: 0 10E3/UL
NRBC BLD AUTO-RTO: 0 /100
PLATELET # BLD AUTO: 224 10E3/UL (ref 150–450)
RBC # BLD AUTO: 4.14 10E6/UL (ref 3.8–5.2)
WBC # BLD AUTO: 7 10E3/UL (ref 4–11)

## 2023-07-24 PROCEDURE — 250N000013 HC RX MED GY IP 250 OP 250 PS 637

## 2023-07-24 PROCEDURE — 85004 AUTOMATED DIFF WBC COUNT: CPT

## 2023-07-24 PROCEDURE — 36415 COLL VENOUS BLD VENIPUNCTURE: CPT

## 2023-07-24 PROCEDURE — 99238 HOSP IP/OBS DSCHRG MGMT 30/<: CPT | Mod: GC | Performed by: PSYCHIATRY & NEUROLOGY

## 2023-07-24 RX ORDER — FLUOXETINE 10 MG/1
30 CAPSULE ORAL DAILY
Qty: 90 CAPSULE | Refills: 0 | Status: SHIPPED | OUTPATIENT
Start: 2023-07-25

## 2023-07-24 RX ORDER — ARIPIPRAZOLE 5 MG/1
10 TABLET ORAL DAILY PRN
Qty: 60 TABLET | Refills: 0 | Status: SHIPPED | OUTPATIENT
Start: 2023-07-24

## 2023-07-24 RX ORDER — CLOZAPINE 50 MG/1
250 TABLET ORAL AT BEDTIME
Qty: 150 TABLET | Refills: 0 | Status: SHIPPED | OUTPATIENT
Start: 2023-07-24

## 2023-07-24 RX ADMIN — FLUOXETINE 30 MG: 20 CAPSULE ORAL at 09:29

## 2023-07-24 ASSESSMENT — ACTIVITIES OF DAILY LIVING (ADL)
ADLS_ACUITY_SCORE: 42
ADLS_ACUITY_SCORE: 42
ORAL_HYGIENE: INDEPENDENT
DRESS: INDEPENDENT
HYGIENE/GROOMING: INDEPENDENT
ADLS_ACUITY_SCORE: 42
LAUNDRY: WITH SUPERVISION

## 2023-07-24 NOTE — PLAN OF CARE
Patient is cleared for discharge. Patient educated about her discharge AVS instructions. Belongs have been sent with patient. Reviewed discharge medications. Everything has been reviewed and verbalized understanding. No further questions or concerns. Patient will be picked up by .

## 2023-07-24 NOTE — PLAN OF CARE
Problem: Adult Behavioral Health Plan of Care  Goal: Optimized Coping Skills in Response to Life Stressors  Outcome: Progressing     Patient is flat and blunt. Out in the milieu pacing the hallways or eating her meals in the lounge. Denied having anxiety and depression. Denied having SI/SIB/HI. No auditory or visual hallucinations. No reported pain or discomfort. Contracted for safety. Medication compliant. No PRNS requested. Adequate fluid and food intake.     BP 91/66 (BP Location: Right arm, Patient Position: Sitting, Cuff Size: Adult Regular)   Pulse 97   Temp 97.9  F (36.6  C) (Oral)   Resp 18   Wt 70.6 kg (155 lb 11.2 oz)   SpO2 100%   BMI 26.39 kg/m      MD notified about blood pressure.

## 2023-07-24 NOTE — PLAN OF CARE
Patient presented with blunted flat affect, appropriate on engagement, denied any mental health concerns at the moment, stating she will work on learning stress relieving techniques and continue to apply what she learned here, no other concerns reported. She left the unit around 4 pm.       Goal Outcome Evaluation:

## 2023-07-24 NOTE — PLAN OF CARE
"  Problem: Plan of Care - These are the overarching goals to be used throughout the patient stay.    Goal: Plan of Care Review  Description: The Plan of Care Review/Shift note should be completed every shift.  The Outcome Evaluation is a brief statement about your assessment that the patient is improving, declining, or no change.  This information will be displayed automatically on your shift note.  Outcome: Progressing     Problem: Plan of Care - These are the overarching goals to be used throughout the patient stay.    Goal: Patient-Specific Goal (Individualized)  Description: You can add care plan individualizations to a care plan. Examples of Individualization might be:  \"Parent requests to be called daily at 9am for status\", \"I have a hard time hearing out of my right ear\", or \"Do not touch me to wake me up as it startles me\".  Outcome: Progressing     Problem: Depression  Goal: Improved Mood  7/23/2023 2052 by Linnette Rubi RN  Outcome: Progressing  7/23/2023 2027 by Linnette Rubi RN  Outcome: Progressing   Goal Outcome Evaluation:    Plan of Care Reviewed With: patient    Pt has been pacing on the hallway back and forth, visible in the milieu mostly this shift. Pt has a flat affect but bright and calm on approach. Pt reported feeling tired and sleepy but managed to stay awake and visible, c/o thinking the increase of her medication dosage being the cause, pt hypotensive 89/68 also thinks the medication is the cause. Fluids were pushed and BP @ 2000 100/71.  Pt hoping and being positive the medication will work for her, denied SI, SIB, A/VH, anxiety and depression. Pt intake is adequate, took a shower, complaint with medication, no any behavior noted/reported.    /71   Pulse 97   Temp 97.4  F (36.3  C) (Oral)   Resp 18   SpO2 100%                   "

## 2023-07-24 NOTE — PLAN OF CARE
Assessment/Intervention/Current Symtoms and Care Coordination:  Chart review and met with team, discussed pt progress, symptomology, and response to treatment.  Discussed the discharge plan and any potential impediments to discharge.    - Writer assisted pt to access her guardianship hearing at 8:30 am via zoom.     - Patient is compliant with medications. Her symptoms of psychosis have improved. This writer along with tx team called pt's  to discuss baseline and potential discharge for today. Pt's  agrees that pt's symptoms have improved and she seems to be back at her baseline.      - Writer called patient's ACT CM, Francoise Dillard at [phone]. Writer left vm stating that pt will be discharged today to home.        Discharge Plan or Goal:  Will return home, will follow up with ACT team providers.      Barriers to Discharge:  None      Referral Status:  None      Legal Status:  Committed as MI with [facility]   [facility]       Contacts:        Upcoming Meetings and Dates/Important Information and next steps:  Will follow up with ACT tx

## 2023-07-24 NOTE — DISCHARGE INSTRUCTIONS
Behavioral Discharge Planning and Instructions    Summary: You were admitted on 7/18/2023  due to Psychotic Symptomology.  You were treated by Dr. Grimm and discharged on 7/24/2023 from [facility] to Home    Main Diagnosis:  Schizoaffective disorder, Bipolar type     Health Care Follow-up:   [facility] ([facility])   [address] [place] [address]   Main ph: [phone]  Fax: [phone]  Psychiatrist - Dr. Gaviria   - Francoise Dillard ph: [phone]         Attend all scheduled appointments with your outpatient providers. Call at least 24 hours in advance if you need to reschedule an appointment to ensure continued access to your outpatient providers.     Major Treatments, Procedures and Findings:  You were provided with: a psychiatric assessment, assessed for medical stability, medication evaluation and/or management, group therapy, and milieu management    Symptoms to Report: feeling more aggressive, increased confusion, losing more sleep, or mood getting worse    Early warning signs can include: increased depression or anxiety sleep disturbances increased thoughts or behaviors of suicide or self-harm  increased unusual thinking, such as paranoia or hearing voices    Safety and Wellness:  Take all medicines as directed.  Make no changes unless your doctor suggests them.  Follow treatment recommendations.  Refrain from alcohol and non-prescribed drugs.  Ask your support system to help you reduce your access to items that could harm yourself or others. If there is a concern for safety, call 911.    Resources:   Crisis Intervention: 660.332.5084 or 275-654-4430 (TTY: 426.561.4427).  Call anytime for help.  National [place] on Mental Illness (www.[place].doron.org): 510.647.6551 or 748-901-8147.  Suicide Awareness Voices of Education (SAVE) (www.save.org): 583-747-PGGM (3381)  National Suicide Prevention Line (www.mentalhealthmn.org):  "833-795-LIXJ (7813)  Mental Health Consumer/Survivor Network of [place] (www.mhcsn.net): 657.396.5908 or 146-243-1341  Mental Health Association of [place] (www.mentalhealth.org): 760.996.1886 or 083-429-4899  [facility] Crisis (COPE) Response - Adult 928 645-3882  Text 4 Life: txt \"LIFE\" to 31885 for immediate support and crisis intervention  Crisis text line: Text \"[place]\" to 661445. Free, confidential, 24/7.    General Medication Instructions:   See your medication sheet(s) for instructions.   Take all medicines as directed.  Make no changes unless your doctor suggests them.   Go to all your doctor visits.  Be sure to have all your required lab tests. This way, your medicines can be refilled on time.  Do not use any drugs not prescribed by your doctor.  Avoid alcohol.    Advance Directives:   Scanned document on file with [employer]? No scanned doc  Is document scanned? Pt states no documents  Honoring Choices Your Rights Handout: Informed and given  Was more information offered? Materials given    The Treatment team has appreciated the opportunity to work with you. If you have any questions or concerns about your recent admission, you can contact the unit which can receive your call 24 hours a day, 7 days a week. They will be able to get in touch with a Provider if needed. The unit number is [phone].     "

## 2023-07-24 NOTE — PLAN OF CARE
Patient appears to have slept for 7 hours this shift.  No behavioral issues or safety concerns at this time. Safety checks q 15 minutes. No complains of pain and discomfort so far this shift. Will continue to  monitor the patient and provide therapeutic intervention as needed. Will continue with current plan of care. Notify MD with any concerns. The patient had total hours of sleep this shift.      Problem: Sleep Disturbance  Goal: Adequate Sleep/Rest  7/24/2023 0713 by Yuliya Chamberlain, RN  Outcome: Progressing

## 2023-07-25 ENCOUNTER — APPOINTMENT (OUTPATIENT)
Dept: INTERPRETER SERVICES | Facility: CLINIC | Age: 43
End: 2023-07-25
Payer: COMMERCIAL

## 2023-07-25 NOTE — DISCHARGE SUMMARY
"  ----------------------------------------------------------------------------------------------------------  [facility]   Psychiatric Discharge Summary      Nona Finley MRN# [id number]   Age: 43 year old YOB: 1980     Date of Admission:  7/18/2023  Date of Discharge:  7/24/2023  Admitting Physician:  Juarez Grimm MD  Discharge Physician:  Juarez Grimm MD      This document serves as a transfer of care to Nona Finley's outpatient providers.     Events Leading to Hospitalization:     \" Nona Finley is a 43 year old female with a previous psychiatric diagnosis of schizoaffective disorder, depressive type admitted from the [facility] ED  due to psychosis, marked increase in depression symptoms, command hallucinations, and SI with plan in the context of medication non-adherence.       [facility]/DEC Assessment:  Summary of Patient Situation  Patient reports she discontinued her psychiatric medication 2 days ago due to side effects of dizziness and exhaustion. Patient endorses onset of suicidal ideation and command auditory hallucinations since that time. Patient reports yesterday she heard too many voices to count and saw scary people's faces. Patient reports they told her to jump off the [facility] [place] because they want her to be dead. Patient planned on going to the bridge today but decided that it would be easier to get the police to shoot her instead. Patient took a kitchen knife and walked to [address] [place] [address]. Patient threatened to stab herself in front of police, in order to get police to shoot her. Patient reports she hates life, wants it to end, does not want to live anymore. Patient reports she feels stress from taking care of her 6 children ages 7, 12, 14, 16, 17, and 19. Patient reports she still feels suicidal with 5/5 intensity. Patient was brought in by police on health officer hold for further evaluation.     Brief " Psychosocial History   Patient lives in a house with her  and children. Patient reports her highest level of education was 5th grade. Patient is not employed out of the home. Patient has no criminal history. Significant Clinical History     Significant Clinical History   Patient has history of multiple civil commitments, currently provisionally discharged through [facility]. Patient has history of inpatient mental health admissions, most recently 1/10/2022 - 1/21/2022 (11 days) at [facility]. Patient reports established outpatient care through [facility]. Patient has been non-compliant with psychiatric medication 2 days ago due to side effects of dizziness and exhaustion.     Patient interview:  Patient was seen in the interview room with the assistance of the [facility] , Pauline. Patient reports she skipped her medicatioon for three days, and was hearing voices. The AH told her to take a knife, go outside, and if the police see her with a knife would shoot her. She endorses visual hallucinations as well. Stopped medications due to side effects including dizziness and sleepiness during the day. Prior to this was using medication regularly.     Today patient states she is feeling much better. No thoughts of harming herself (active or passive). She took clozapine before bed last night which was helpful. She also finds the Abilify injection helpful, but when discontinues clozapine finds symptoms become markedly worse. Endorses sadness when not taking medication: sad because she needs to take medications.     She lives at home with her  6 children and  (oldest 19, youngest 7), and does not work. She does not endorse any substance use .     She had ECT in the past but does not remember any side effects. She has an event on the 29th, and is eager to get home.     Nona reports no seizure hx, and has never taken Wellbutrin, took Prozac w/ post-partum  "depression and found that helpful. Does not remember if it offered additional energy at that time. States she does not think she is depressed, but acknowledged it can help side effects and mitigate depressive symptoms when they arise.      Court hearing for daughter to obtain legal guardianship on July 24 (Monday). \"    See H&P by Juarez Grimm MD on 7/20/23 for additional details.      Diagnoses:   Primary Psychiatric Diagnosis  schizoaffective disorder, depressive type      Diagnostic Formulation:   Nona Finley is a 43 year old female previously diagnosed with schizoaffective disorder, depressive type with psychosis who was admitted for command AH and distressing VH, with SI and plan in the context of medication noncompliance. Most recent psychiatric hospitalization was1/10/2022 - 1/21/2022 (11 days) at [facility]. Significant symptoms on admission include A/VH, SI and plan.The MSE on admission was notable for depressed mood, SI with plan, commanding AH and disturbing VH. Biological contributions to mental health presentation include previous diagnosis and family history. Psychological contributions to mental health presentation include stress from raising children. Protective factors include family and ACT team.      In summary, the patient's reported symptoms of depressed mood, SI with plan, commanding AH and disturbing VH in the context of psychosocial stressors and medication stoppage is consistent with schizoaffective disorder, depressive type with psychosis. She will benefited from medication management and coordination with ACT team this admission.     Given that she had SI, HI and psychosis, patient warranted inpatient psychiatric hospitalization to maintain her safety.        Psychiatric Hospital Course:     Nona Finley was admitted to [facility] as a voluntary patient, (on provisional discharge.) The patient was placed under status 15 (15 minute checks) " to ensure patient safety.    Medication Trials and Changes: risks, benefits, alternatives, and side effects were discussed and understood by the patient and caregivers.  ? PTA Clozapine 150 mg QHS restarted 7/19 and increased to 175 mg 7/20. Inceased to 200 mg 7/21 then 225 mg 7/22, 250 mg 7/23. Was previously on 375 mg daily. Pt denied AVH once restarted on clozapine  ? Abilify Maintena 400 mg given 7/21   ? FLUoxetine (PROzac) capsule 20 mg started 7/20, and increased to 30 mg 7/22.Pt tolerated this well, activation offset sedating side effect from clozapine.    Level of medication adherence:good    Behaviors: The patient was safe and appropriate and did not require chemical/physical restraints during admission. She was cooperative with cares, had good group attendance, and was visible in the milieu.    Change in psychiatric symptoms: Over the course of this hospitalization the patient's symptoms of psychosis and SI improved.    Nona was released to home. At the time of discharge she was determined to not be a danger to herself or others.     Risk Assessment:      Today Nona Finley denies SI/HI/AH/VH. Patient has notable risk factors for self-harm, including psychosis and previous suicide attempts. However, risk is mitigated by commitment to family and ability to volunteer a safety plan. Therefore, based on all available evidence including the factors cited above, she does not appear to be at imminent risk for self-harm, does not meet criteria for a 72-hr hold, and therefore remains appropriate for ongoing outpatient level of care. Additional steps taken to minimize risk include: medication optimization, close psychiatric follow up and provision of crisis resources. Patient will follow up with ACT team.      Psychiatric Examination:     Mental Status Exam:  Oriented to:  Grossly Oriented, Person/Self and Situation  General:  Awake and Alert  Appearance:  appears stated age and Grooming is  "adequate  Behavior/Attitude:  calm and cooperative  Eye Contact:  appropriate  Psychomotor: normal no catatonia present  Speech:  appropriate volume/tone  Language: Fluent in English with appropriate syntax and vocabulary.  Mood:  \"good\"  Affect:  appropriate and congruent with mood  Thought Process:  linear and coherent  Thought Content:  No SI/HI/AH/VH; No apparent delusions  Associations:  intact  Insight:  good due to ability to recognize medication impact on AVH  Judgment:  good due to engagement in treatment  Impulse control: good  Attention Span:  grossly intact  Concentration:  grossly intact  Recent and Remote Memory:  grossly intact  Fund of Knowledge:  average  Muscle Strength and Tone: normal  Gait and Station: Normal     Medical Hospital Course:   Nona Finley was medically cleared by the ED prior to admission to the unit.   Medical Diagnoses addressed:  none    Consults: none    Labs were notable for the following:  - CBC unremarkable 7/20  - CMP unremarkable 7/20  - TSH normal 7/20   - Vit B12 normal 7/20  - Folate normal 7/20     Discharge Medications:     Discharge Medication List as of 7/24/2023  2:42 PM      START taking these medications    Details   ARIPiprazole (ABILIFY) 5 MG tablet Take 2 tablets (10 mg) by mouth daily as needed (may use to bridge gap last week before MORENO, address psychotic sx), Disp-60 tablet, R-0, E-Prescribe      cloZAPine (CLOZARIL) 50 MG tablet Take 5 tablets (250 mg) by mouth At Bedtime, Disp-150 tablet, R-0, [id number] mg daily, 30 day supply      FLUoxetine (PROZAC) 10 MG capsule Take 3 capsules (30 mg) by mouth daily, Disp-90 capsule, R-0, E-Prescribe         CONTINUE these medications which have NOT CHANGED    Details   ABILIFY MAINTENA 400 MG extended release injection Inject 2 mLs (400 mg) into the muscle every 28 days Due on 3/25, Disp-2 mL, R-0, MARICRUZ, E-Prescribe         STOP taking these medications       cloZAPine (FAZACLO) 100 MG ODT Comments:   Reason " for Stopping:         cloZAPine (FAZACLO) 25 MG ODT Comments:   Reason for Stopping:                Discharge Plan:   1. Medications as above  2. Psychiatric Appointments: Dr. Gaviria, follow up with ACT team   3. Psychotherapy Appointments: follow up with ACT  4. Referrals: none  5. Medical follow up: none     Attestations:       Johanne Peralta MD MPH  PGY1 Psychiatry Resident Physician    Attestation:   The patient has been seen and evaluated by me,  Juarez Grimm MD. I have examined the patient today and reviewed the discharge plan with the resident and medical student. I agree with the final assessment and plan, as noted in the discharge summary. I have reviewed today's vital signs, medications, labs and imaging.  Total time discharge plannin minutes  Juarez Grimm MD ,Ph.D.          Appendix A: All Labs This Admission:     Results for orders placed or performed during the hospital encounter of 23   HCG qualitative urine (UPT)     Status: Normal   Result Value Ref Range    hCG Urine Qualitative Negative Negative   Drug abuse screen 1 urine (ED)     Status: Normal   Result Value Ref Range    Amphetamines Urine Screen Negative Screen Negative    Barbituates Urine Screen Negative Screen Negative    Benzodiazepine Urine Screen Negative Screen Negative    Cannabinoids Urine Screen Negative Screen Negative    Cocaine Urine Screen Negative Screen Negative    Opiates Urine Screen Negative Screen Negative   Clozapine and Metabolites Quant     Status: None   Result Value Ref Range    Clozapine Quant <100 ng/mL    Norclozapine Quant <100 ng/mL    Clozapine-N-Oxide Quant <100 ng/mL    Clozapine and Metabolites Total Not Applicable <=1500 ng/mL   CBC with platelets and differential     Status: None   Result Value Ref Range    WBC Count 6.6 4.0 - 11.0 10e3/uL    RBC Count 4.11 3.80 - 5.20 10e6/uL    Hemoglobin 12.6 11.7 - 15.7 g/dL    Hematocrit 37.9 35.0 - 47.0 %    MCV 92 78 - 100 fL    MCH 30.7 26.5 - 33.0 pg     MCHC 33.2 31.5 - 36.5 g/dL    RDW 14.0 10.0 - 15.0 %    Platelet Count 260 150 - 450 10e3/uL    % Neutrophils 51 %    % Lymphocytes 35 %    % Monocytes 8 %    % Eosinophils 5 %    % Basophils 1 %    % Immature Granulocytes 0 %    NRBCs per 100 WBC 0 <1 /100    Absolute Neutrophils 3.4 1.6 - 8.3 10e3/uL    Absolute Lymphocytes 2.3 0.8 - 5.3 10e3/uL    Absolute Monocytes 0.5 0.0 - 1.3 10e3/uL    Absolute Eosinophils 0.3 0.0 - 0.7 10e3/uL    Absolute Basophils 0.0 0.0 - 0.2 10e3/uL    Absolute Immature Granulocytes 0.0 <=0.4 10e3/uL    Absolute NRBCs 0.0 10e3/uL   Comprehensive metabolic panel     Status: Normal   Result Value Ref Range    Sodium 139 136 - 145 mmol/L    Potassium 4.1 3.4 - 5.3 mmol/L    Chloride 104 98 - 107 mmol/L    Carbon Dioxide (CO2) 25 22 - 29 mmol/L    Anion Gap 10 7 - 15 mmol/L    Urea Nitrogen 18.5 6.0 - 20.0 mg/dL    Creatinine 0.56 0.51 - 0.95 mg/dL    Calcium 9.0 8.6 - 10.0 mg/dL    Glucose 87 70 - 99 mg/dL    Alkaline Phosphatase 71 35 - 104 U/L    AST 23 0 - 45 U/L    ALT 15 0 - 50 U/L    Protein Total 6.9 6.4 - 8.3 g/dL    Albumin 3.8 3.5 - 5.2 g/dL    Bilirubin Total 0.3 <=1.2 mg/dL    GFR Estimate >90 >60 mL/min/1.73m2   TSH with free T4 reflex and/or T3 as indicated     Status: Normal   Result Value Ref Range    TSH 0.86 0.30 - 4.20 uIU/mL   Vitamin B12     Status: Normal   Result Value Ref Range    Vitamin B12 463 232 - 1,245 pg/mL   Folate     Status: Normal   Result Value Ref Range    Folic Acid 10.7 4.6 - 34.8 ng/mL   CBC with platelets and differential     Status: None   Result Value Ref Range    WBC Count 4.9 4.0 - 11.0 10e3/uL    RBC Count 4.07 3.80 - 5.20 10e6/uL    Hemoglobin 12.5 11.7 - 15.7 g/dL    Hematocrit 37.2 35.0 - 47.0 %    MCV 91 78 - 100 fL    MCH 30.7 26.5 - 33.0 pg    MCHC 33.6 31.5 - 36.5 g/dL    RDW 13.9 10.0 - 15.0 %    Platelet Count 261 150 - 450 10e3/uL    % Neutrophils 47 %    % Lymphocytes 36 %    % Monocytes 10 %    % Eosinophils 6 %    % Basophils 1  %    % Immature Granulocytes 0 %    NRBCs per 100 WBC 0 <1 /100    Absolute Neutrophils 2.3 1.6 - 8.3 10e3/uL    Absolute Lymphocytes 1.8 0.8 - 5.3 10e3/uL    Absolute Monocytes 0.5 0.0 - 1.3 10e3/uL    Absolute Eosinophils 0.3 0.0 - 0.7 10e3/uL    Absolute Basophils 0.0 0.0 - 0.2 10e3/uL    Absolute Immature Granulocytes 0.0 <=0.4 10e3/uL    Absolute NRBCs 0.0 10e3/uL   CBC with platelets and differential     Status: None   Result Value Ref Range    WBC Count 7.0 4.0 - 11.0 10e3/uL    RBC Count 4.14 3.80 - 5.20 10e6/uL    Hemoglobin 12.8 11.7 - 15.7 g/dL    Hematocrit 38.2 35.0 - 47.0 %    MCV 92 78 - 100 fL    MCH 30.9 26.5 - 33.0 pg    MCHC 33.5 31.5 - 36.5 g/dL    RDW 13.9 10.0 - 15.0 %    Platelet Count 224 150 - 450 10e3/uL    % Neutrophils 75 %    % Lymphocytes 17 %    % Monocytes 6 %    % Eosinophils 2 %    % Basophils 0 %    % Immature Granulocytes 0 %    NRBCs per 100 WBC 0 <1 /100    Absolute Neutrophils 5.1 1.6 - 8.3 10e3/uL    Absolute Lymphocytes 1.2 0.8 - 5.3 10e3/uL    Absolute Monocytes 0.4 0.0 - 1.3 10e3/uL    Absolute Eosinophils 0.1 0.0 - 0.7 10e3/uL    Absolute Basophils 0.0 0.0 - 0.2 10e3/uL    Absolute Immature Granulocytes 0.0 <=0.4 10e3/uL    Absolute NRBCs 0.0 10e3/uL   Urine Drugs of Abuse Screen     Status: Normal    Narrative    The following orders were created for panel order Urine Drugs of Abuse Screen.  Procedure                               Abnormality         Status                     ---------                               -----------         ------                     Drug abuse screen 1 urin...[[id number]]  Normal              Final result                 Please view results for these tests on the individual orders.   CBC with Platelets & Differential     Status: None    Narrative    The following orders were created for panel order CBC with Platelets & Differential.  Procedure                               Abnormality         Status                     ---------                                -----------         ------                     CBC with platelets and d...[[id number]]                      Final result                 Please view results for these tests on the individual orders.   CBC with Platelets & Differential *Canceled*     Status: None ()    Narrative    The following orders were created for panel order CBC with Platelets & Differential.  Procedure                               Abnormality         Status                     ---------                               -----------         ------                       Please view results for these tests on the individual orders.   CBC with platelets differential     Status: None    Narrative    The following orders were created for panel order CBC with platelets differential.  Procedure                               Abnormality         Status                     ---------                               -----------         ------                     CBC with platelets and d...[[id number]]                      Final result                 Please view results for these tests on the individual orders.   CBC with platelets differential *Canceled*     Status: None ()    Narrative    The following orders were created for panel order CBC with platelets differential.  Procedure                               Abnormality         Status                     ---------                               -----------         ------                       Please view results for these tests on the individual orders.   CBC with Platelets & Differential     Status: None    Narrative    The following orders were created for panel order CBC with Platelets & Differential.  Procedure                               Abnormality         Status                     ---------                               -----------         ------                     CBC with platelets and d...[[id number]]                      Final result                 Please view results for these  tests on the individual orders.

## 2023-09-16 ENCOUNTER — HEALTH MAINTENANCE LETTER (OUTPATIENT)
Age: 43
End: 2023-09-16

## 2023-12-19 ENCOUNTER — HOSPITAL ENCOUNTER (EMERGENCY)
Facility: CLINIC | Age: 43
Discharge: HOME | End: 2023-12-19
Attending: EMERGENCY MEDICINE | Admitting: EMERGENCY MEDICINE
Payer: COMMERCIAL

## 2023-12-19 VITALS
TEMPERATURE: 98.4 F | BODY MASS INDEX: 26.57 KG/M2 | RESPIRATION RATE: 16 BRPM | DIASTOLIC BLOOD PRESSURE: 59 MMHG | SYSTOLIC BLOOD PRESSURE: 93 MMHG | WEIGHT: 156.8 LBS | OXYGEN SATURATION: 96 % | HEART RATE: 105 BPM

## 2023-12-19 DIAGNOSIS — R50.9 FEVER AND CHILLS: ICD-10-CM

## 2023-12-19 LAB
ALBUMIN UR-MCNC: 20 MG/DL
ANION GAP SERPL CALCULATED.3IONS-SCNC: 7 MMOL/L (ref 7–15)
APPEARANCE UR: CLEAR
BACTERIA #/AREA URNS HPF: ABNORMAL /HPF
BASOPHILS # BLD AUTO: 0 10E3/UL (ref 0–0.2)
BASOPHILS NFR BLD AUTO: 0 %
BILIRUB UR QL STRIP: NEGATIVE
BUN SERPL-MCNC: 14.8 MG/DL (ref 6–20)
CALCIUM SERPL-MCNC: 9.9 MG/DL (ref 8.6–10)
CHLORIDE SERPL-SCNC: 102 MMOL/L (ref 98–107)
COLOR UR AUTO: YELLOW
CREAT SERPL-MCNC: 0.6 MG/DL (ref 0.51–0.95)
DEPRECATED HCO3 PLAS-SCNC: 30 MMOL/L (ref 22–29)
EGFRCR SERPLBLD CKD-EPI 2021: >90 ML/MIN/1.73M2
EOSINOPHIL # BLD AUTO: 0 10E3/UL (ref 0–0.7)
EOSINOPHIL NFR BLD AUTO: 0 %
ERYTHROCYTE [DISTWIDTH] IN BLOOD BY AUTOMATED COUNT: 14 % (ref 10–15)
FLUAV RNA SPEC QL NAA+PROBE: NEGATIVE
FLUBV RNA RESP QL NAA+PROBE: NEGATIVE
GLUCOSE SERPL-MCNC: 104 MG/DL (ref 70–99)
GLUCOSE UR STRIP-MCNC: NEGATIVE MG/DL
HCT VFR BLD AUTO: 41.1 % (ref 35–47)
HGB BLD-MCNC: 13.6 G/DL (ref 11.7–15.7)
HGB UR QL STRIP: ABNORMAL
HYALINE CASTS: 1 /LPF
IMM GRANULOCYTES # BLD: 0 10E3/UL
IMM GRANULOCYTES NFR BLD: 0 %
KETONES UR STRIP-MCNC: NEGATIVE MG/DL
LACTATE SERPL-SCNC: 1.8 MMOL/L (ref 0.7–2)
LEUKOCYTE ESTERASE UR QL STRIP: ABNORMAL
LYMPHOCYTES # BLD AUTO: 0.9 10E3/UL (ref 0.8–5.3)
LYMPHOCYTES NFR BLD AUTO: 9 %
MCH RBC QN AUTO: 30.3 PG (ref 26.5–33)
MCHC RBC AUTO-ENTMCNC: 33.1 G/DL (ref 31.5–36.5)
MCV RBC AUTO: 92 FL (ref 78–100)
MONOCYTES # BLD AUTO: 0.8 10E3/UL (ref 0–1.3)
MONOCYTES NFR BLD AUTO: 8 %
MUCOUS THREADS #/AREA URNS LPF: PRESENT /LPF
NEUTROPHILS # BLD AUTO: 8.3 10E3/UL (ref 1.6–8.3)
NEUTROPHILS NFR BLD AUTO: 83 %
NITRATE UR QL: NEGATIVE
NRBC # BLD AUTO: 0 10E3/UL
NRBC BLD AUTO-RTO: 0 /100
PH UR STRIP: 6.5 [PH] (ref 5–7)
PLATELET # BLD AUTO: 249 10E3/UL (ref 150–450)
POTASSIUM SERPL-SCNC: 4.2 MMOL/L (ref 3.4–5.3)
RBC # BLD AUTO: 4.49 10E6/UL (ref 3.8–5.2)
RBC URINE: 13 /HPF
RSV RNA SPEC NAA+PROBE: NEGATIVE
SARS-COV-2 RNA RESP QL NAA+PROBE: NEGATIVE
SODIUM SERPL-SCNC: 139 MMOL/L (ref 135–145)
SP GR UR STRIP: 1.02 (ref 1–1.03)
SQUAMOUS EPITHELIAL: 8 /HPF
UROBILINOGEN UR STRIP-MCNC: NORMAL MG/DL
WBC # BLD AUTO: 10.1 10E3/UL (ref 4–11)
WBC URINE: 9 /HPF

## 2023-12-19 PROCEDURE — 96375 TX/PRO/DX INJ NEW DRUG ADDON: CPT | Performed by: EMERGENCY MEDICINE

## 2023-12-19 PROCEDURE — 258N000003 HC RX IP 258 OP 636: Performed by: EMERGENCY MEDICINE

## 2023-12-19 PROCEDURE — 99284 EMERGENCY DEPT VISIT MOD MDM: CPT | Mod: 25 | Performed by: EMERGENCY MEDICINE

## 2023-12-19 PROCEDURE — 99284 EMERGENCY DEPT VISIT MOD MDM: CPT | Performed by: EMERGENCY MEDICINE

## 2023-12-19 PROCEDURE — 83605 ASSAY OF LACTIC ACID: CPT | Performed by: EMERGENCY MEDICINE

## 2023-12-19 PROCEDURE — 85025 COMPLETE CBC W/AUTO DIFF WBC: CPT | Performed by: EMERGENCY MEDICINE

## 2023-12-19 PROCEDURE — 82310 ASSAY OF CALCIUM: CPT | Performed by: EMERGENCY MEDICINE

## 2023-12-19 PROCEDURE — 87637 SARSCOV2&INF A&B&RSV AMP PRB: CPT | Performed by: EMERGENCY MEDICINE

## 2023-12-19 PROCEDURE — 81001 URINALYSIS AUTO W/SCOPE: CPT | Performed by: EMERGENCY MEDICINE

## 2023-12-19 PROCEDURE — 250N000013 HC RX MED GY IP 250 OP 250 PS 637: Performed by: EMERGENCY MEDICINE

## 2023-12-19 PROCEDURE — 250N000011 HC RX IP 250 OP 636: Performed by: EMERGENCY MEDICINE

## 2023-12-19 PROCEDURE — 96374 THER/PROPH/DIAG INJ IV PUSH: CPT | Performed by: EMERGENCY MEDICINE

## 2023-12-19 PROCEDURE — 87086 URINE CULTURE/COLONY COUNT: CPT | Performed by: EMERGENCY MEDICINE

## 2023-12-19 PROCEDURE — 96361 HYDRATE IV INFUSION ADD-ON: CPT | Performed by: EMERGENCY MEDICINE

## 2023-12-19 PROCEDURE — 36415 COLL VENOUS BLD VENIPUNCTURE: CPT | Performed by: EMERGENCY MEDICINE

## 2023-12-19 RX ORDER — ACETAMINOPHEN 325 MG/1
650 TABLET ORAL ONCE
Status: COMPLETED | OUTPATIENT
Start: 2023-12-19 | End: 2023-12-19

## 2023-12-19 RX ORDER — KETOROLAC TROMETHAMINE 15 MG/ML
15 INJECTION, SOLUTION INTRAMUSCULAR; INTRAVENOUS ONCE
Status: COMPLETED | OUTPATIENT
Start: 2023-12-19 | End: 2023-12-19

## 2023-12-19 RX ORDER — ACETAMINOPHEN 500 MG
500-1000 TABLET ORAL EVERY 6 HOURS PRN
COMMUNITY

## 2023-12-19 RX ORDER — OXYCODONE HYDROCHLORIDE 5 MG/1
5 TABLET ORAL ONCE
Status: COMPLETED | OUTPATIENT
Start: 2023-12-19 | End: 2023-12-19

## 2023-12-19 RX ORDER — DIPHENHYDRAMINE HYDROCHLORIDE 50 MG/ML
25 INJECTION INTRAMUSCULAR; INTRAVENOUS ONCE
Status: COMPLETED | OUTPATIENT
Start: 2023-12-19 | End: 2023-12-19

## 2023-12-19 RX ADMIN — DIPHENHYDRAMINE HYDROCHLORIDE 25 MG: 50 INJECTION, SOLUTION INTRAMUSCULAR; INTRAVENOUS at 10:14

## 2023-12-19 RX ADMIN — KETOROLAC TROMETHAMINE 15 MG: 15 INJECTION, SOLUTION INTRAMUSCULAR; INTRAVENOUS at 10:16

## 2023-12-19 RX ADMIN — SODIUM CHLORIDE 1000 ML: 9 INJECTION, SOLUTION INTRAVENOUS at 10:09

## 2023-12-19 RX ADMIN — ACETAMINOPHEN 650 MG: 325 TABLET, FILM COATED ORAL at 10:29

## 2023-12-19 RX ADMIN — OXYCODONE HYDROCHLORIDE 5 MG: 5 TABLET ORAL at 11:16

## 2023-12-19 ASSESSMENT — ACTIVITIES OF DAILY LIVING (ADL)
ADLS_ACUITY_SCORE: 37

## 2023-12-19 NOTE — ED PROVIDER NOTES
[facility] EMERGENCY DEPARTMENT ([facility])    12/19/23      ED PROVIDER NOTE   ED 8 9:07 AM   History     Chief Complaint   Patient presents with    Headache     For past several days and chills.  Chills resolves with tylenol, but headache unresolved with Tylenol.     The history is provided by the patient and medical records.     Nona Finley is a 43 year old female schizoaffective disorder, depressive type who presents to the Emergency Department with headache, sore throat, shaking rigors, chills for the past several days. She has been taking Tylenol for this which helped the chills but no relief to headache. She doesn't usually get headaches. Has mild sore throat. No fevers. No body aches. No cough. She notes that her clozapine suppresses her immune system and is concerned that this is contributing. She did have sick contact with her daughter who had a viral illness but has been getting better over the past 2-3 days. She denies chance of pregnancy.     Per [facility] records, patient has had 3 doses of the Moderna COVID-19 vaccine.  No flu shot this year.  No COVID booster this year.    Past Medical History  Past Medical History:   Diagnosis Date    Encounter for female birth control 06/14/2017 6/14/2017 Plan Documentation Service ordered Depo Provera injection (150mg IM) may be given every 3 months for one year per loretta. Plan and order should be renewed at a visit no later than 6/14/18   Vanessa Thompson MD        Encounter for insertion or removal of intrauterine contraceptive device 01/03/2008    Paragard 1/08 removed 1/08.    Postpartum depression 08/18/2011    Schizo affective schizophrenia (H)     Schizophrenia (H) 02/12/2019    Suicidal ideation 02/16/2017     Past Surgical History:   Procedure Laterality Date    NO HISTORY OF SURGERY      NO HISTORY OF SURGERY  06/13/2013    Per patient reported this     ABILIFY MAINTENA 400 MG extended release injection  acetaminophen (TYLENOL) 500 MG  tablet  cloZAPine (CLOZARIL) 50 MG tablet  FLUoxetine (PROZAC) 10 MG capsule  ARIPiprazole (ABILIFY) 5 MG tablet      No Known Allergies  Family History  Family History   Problem Relation Age of Onset    Respiratory Father         asthma    Asthma Father     Cerebrovascular Disease Mother     Diabetes Mother     Neurologic Disorder Brother         mental health problem?    Neurologic Disorder Sister         seizures (half sister)    Respiratory Paternal Grandfather         asthma    Respiratory Sister         asthma    Respiratory Brother         asthma    Neurologic Disorder Daughter         autism     Hypertension Maternal Grandmother     Neurologic Disorder Sister         seizures    Diabetes Maternal Grandfather     Coronary Artery Disease Son      Social History   Social History     Tobacco Use    Smoking status: Never    Smokeless tobacco: Never   Substance Use Topics    Alcohol use: No    Drug use: No         A medically appropriate review of systems was performed with pertinent positives and negatives noted in the HPI, and all other systems negative.    Physical Exam   BP: 96/68  Pulse: 68  Temp: 98.1  F (36.7  C)  Resp: 16  Weight: 71.1 kg (156 lb 12.8 oz)  SpO2: 99 %    Physical Exam  Constitutional:       Appearance: She is well-developed.   HENT:      Head: Normocephalic and atraumatic.   Cardiovascular:      Rate and Rhythm: Normal rate and regular rhythm.      Heart sounds: Normal heart sounds.   Pulmonary:      Effort: Pulmonary effort is normal. No respiratory distress.      Breath sounds: Normal breath sounds.   Abdominal:      General: There is no distension.      Palpations: Abdomen is soft.      Tenderness: There is no abdominal tenderness. There is no rebound.   Musculoskeletal:         General: No tenderness.      Cervical back: Normal range of motion and neck supple. No rigidity or tenderness.   Skin:     General: Skin is warm and dry.   Neurological:      Mental Status: She is alert and  oriented to person, place, and time.   Psychiatric:         Behavior: Behavior normal.         Thought Content: Thought content normal.          ED Course, Procedures, & Data     ED Course as of 12/19/23 1009   Tue Dec 19, 2023   1008 Per Jan RN patient spiked a fever to 100.7  F. Lactic acid sent, and Toradol and Benadryl will be given for headache.     Procedures             Results for orders placed or performed during the hospital encounter of 12/19/23   CBC with platelets and differential     Status: None   Result Value Ref Range    WBC Count 10.1 4.0 - 11.0 10e3/uL    RBC Count 4.49 3.80 - 5.20 10e6/uL    Hemoglobin 13.6 11.7 - 15.7 g/dL    Hematocrit 41.1 35.0 - 47.0 %    MCV 92 78 - 100 fL    MCH 30.3 26.5 - 33.0 pg    MCHC 33.1 31.5 - 36.5 g/dL    RDW 14.0 10.0 - 15.0 %    Platelet Count 249 150 - 450 10e3/uL    % Neutrophils 83 %    % Lymphocytes 9 %    % Monocytes 8 %    % Eosinophils 0 %    % Basophils 0 %    % Immature Granulocytes 0 %    NRBCs per 100 WBC 0 <1 /100    Absolute Neutrophils 8.3 1.6 - 8.3 10e3/uL    Absolute Lymphocytes 0.9 0.8 - 5.3 10e3/uL    Absolute Monocytes 0.8 0.0 - 1.3 10e3/uL    Absolute Eosinophils 0.0 0.0 - 0.7 10e3/uL    Absolute Basophils 0.0 0.0 - 0.2 10e3/uL    Absolute Immature Granulocytes 0.0 <=0.4 10e3/uL    Absolute NRBCs 0.0 10e3/uL   CBC with platelets differential     Status: None    Narrative    The following orders were created for panel order CBC with platelets differential.  Procedure                               Abnormality         Status                     ---------                               -----------         ------                     CBC with platelets and d...[[id number]]                      Final result                 Please view results for these tests on the individual orders.     Medications   sodium chloride 0.9% BOLUS 1,000 mL (has no administration in time range)   ketorolac (TORADOL) injection 15 mg (has no administration in time range)    diphenhydrAMINE (BENADRYL) injection 25 mg (has no administration in time range)     Labs Ordered and Resulted from Time of ED Arrival to Time of ED Departure   CBC WITH PLATELETS AND DIFFERENTIAL       Result Value    WBC Count 10.1      RBC Count 4.49      Hemoglobin 13.6      Hematocrit 41.1      MCV 92      MCH 30.3      MCHC 33.1      RDW 14.0      Platelet Count 249      % Neutrophils 83      % Lymphocytes 9      % Monocytes 8      % Eosinophils 0      % Basophils 0      % Immature Granulocytes 0      NRBCs per 100 WBC 0      Absolute Neutrophils 8.3      Absolute Lymphocytes 0.9      Absolute Monocytes 0.8      Absolute Eosinophils 0.0      Absolute Basophils 0.0      Absolute Immature Granulocytes 0.0      Absolute NRBCs 0.0     BASIC METABOLIC PANEL   INFLUENZA A/B, RSV, & SARS-COV2 PCR   LACTIC ACID WHOLE BLOOD     No orders to display          Critical care was not performed.     Medical Decision Making  The patient's presentation was of moderate complexity (an acute illness with systemic symptoms).    The patient's evaluation involved:  review of 2 test result(s) ordered prior to this encounter (see separate area of note for details)    The patient's management necessitated moderate risk (prescription drug management including medications given in the ED).    Assessment & Plan    Nona Finley is a 43 year old female schizoaffective disorder, depressive type who presents to the Emergency Department with headache, sore throat, shaking rigors, chills for the past several days.  Patient's labs are stable including CBC CMP and lactic acid.  Patient received fluids and pain medication and is feeling better and her fever is decreased.  Patient with no clear cause of the fever.  COVID flu and influenza are negative.  Feel the patient likely has a nonspecific viral infection.  Patient's daughter was sick with a similar virus recently.  Plan is to discharge home with outpatient follow-up.  Patient with no  signs of serious systemic infection    I have reviewed the nursing notes. I have reviewed the findings, diagnosis, plan and need for follow up with the patient.    New Prescriptions    No medications on file       Final diagnoses:   None     I, Leila De Jesus, am serving as a trained medical scribe to document services personally performed by Tigist Degroot MD based on the provider's statements to me on December 19, 2023.  This document has been checked and approved by the attending provider.    I, Tigist Degroot MD, was physically present and have reviewed and verified the accuracy of this note documented by Leila De Jesus, medical scribe.      Tigist Degroot MD     [facility] EMERGENCY DEPARTMENT  12/19/2023     Tigist Degroot MD  12/19/23 1545

## 2023-12-19 NOTE — DISCHARGE INSTRUCTIONS
Take tylenol and ibuprofen to help with the fever.     Your blood work is stable.     Drink plenty of fluids.

## 2023-12-19 NOTE — ED TRIAGE NOTES
Triage Assessment (Adult)       Row Name 12/19/23 0757          Triage Assessment    Airway WDL WDL        Respiratory WDL    Respiratory WDL WDL        Skin Circulation/Temperature WDL    Skin Circulation/Temperature WDL WDL        Cardiac WDL    Cardiac WDL WDL        Peripheral/Neurovascular WDL    Peripheral Neurovascular WDL WDL        Cognitive/Neuro/Behavioral WDL    Cognitive/Neuro/Behavioral WDL WDL

## 2023-12-19 NOTE — ED NOTES
States her daughter had similar symptoms with sore throat and she is better now.  States she has been fasting for a week.  States she is not fasting currently.

## 2023-12-20 LAB — BACTERIA UR CULT: NORMAL

## 2024-02-03 ENCOUNTER — HEALTH MAINTENANCE LETTER (OUTPATIENT)
Age: 44
End: 2024-02-03

## 2024-06-22 ENCOUNTER — HEALTH MAINTENANCE LETTER (OUTPATIENT)
Age: 44
End: 2024-06-22

## 2025-07-12 ENCOUNTER — HEALTH MAINTENANCE LETTER (OUTPATIENT)
Age: 45
End: 2025-07-12

## (undated) RX ORDER — DEXMEDETOMIDINE HYDROCHLORIDE 4 UG/ML
INJECTION, SOLUTION INTRAVENOUS
Status: DISPENSED
Start: 2022-01-17

## (undated) RX ORDER — ETOMIDATE 2 MG/ML
INJECTION INTRAVENOUS
Status: DISPENSED
Start: 2022-01-17

## (undated) RX ORDER — NICARDIPINE HCL-0.9% SOD CHLOR 1 MG/10 ML
SYRINGE (ML) INTRAVENOUS
Status: DISPENSED
Start: 2022-01-17